# Patient Record
Sex: FEMALE | Race: WHITE | NOT HISPANIC OR LATINO | Employment: OTHER | URBAN - METROPOLITAN AREA
[De-identification: names, ages, dates, MRNs, and addresses within clinical notes are randomized per-mention and may not be internally consistent; named-entity substitution may affect disease eponyms.]

---

## 2016-04-20 LAB — EKG IMPRESSION: NORMAL

## 2017-02-07 ENCOUNTER — TELEPHONE (OUTPATIENT)
Dept: VASCULAR LAB | Facility: MEDICAL CENTER | Age: 62
End: 2017-02-07

## 2017-02-08 NOTE — TELEPHONE ENCOUNTER
Patient overdue for vascular care.  Medical assistant Unable to reach patient by phone  Await further patient contact  Until that time, we will defer all further fascial care, including management of cardiovascular risk factors to PCP    Michael J. Bloch, MD  Vascular Care    CC: Dr. Winchester

## 2017-11-15 ENCOUNTER — HOSPITAL ENCOUNTER (OUTPATIENT)
Dept: HOSPITAL 8 - CFH | Age: 62
Discharge: HOME | End: 2017-11-15
Attending: REGISTERED NURSE
Payer: MEDICARE

## 2017-11-15 DIAGNOSIS — Z87.891: ICD-10-CM

## 2017-11-15 DIAGNOSIS — J96.11: Primary | ICD-10-CM

## 2017-11-15 DIAGNOSIS — R91.1: ICD-10-CM

## 2017-11-15 DIAGNOSIS — K76.0: ICD-10-CM

## 2017-11-15 PROCEDURE — 71250 CT THORAX DX C-: CPT

## 2018-01-05 ENCOUNTER — HOSPITAL ENCOUNTER (OUTPATIENT)
Dept: RADIOLOGY | Facility: MEDICAL CENTER | Age: 63
DRG: 460 | End: 2018-01-05
Attending: NEUROLOGICAL SURGERY | Admitting: NEUROLOGICAL SURGERY
Payer: MEDICARE

## 2018-01-05 DIAGNOSIS — Z01.810 PRE-OPERATIVE CARDIOVASCULAR EXAMINATION: ICD-10-CM

## 2018-01-05 DIAGNOSIS — Z01.812 PRE-PROCEDURAL LABORATORY EXAMINATION: ICD-10-CM

## 2018-01-05 DIAGNOSIS — Z01.811 PRE-OPERATIVE RESPIRATORY EXAMINATION: ICD-10-CM

## 2018-01-05 LAB
ANION GAP SERPL CALC-SCNC: 10 MMOL/L (ref 0–11.9)
APPEARANCE UR: CLEAR
APTT PPP: 30.9 SEC (ref 24.7–36)
BASOPHILS # BLD AUTO: 0.8 % (ref 0–1.8)
BASOPHILS # BLD: 0.08 K/UL (ref 0–0.12)
BILIRUB UR QL STRIP.AUTO: NEGATIVE
BUN SERPL-MCNC: 14 MG/DL (ref 8–22)
CALCIUM SERPL-MCNC: 9.8 MG/DL (ref 8.5–10.5)
CHLORIDE SERPL-SCNC: 98 MMOL/L (ref 96–112)
CO2 SERPL-SCNC: 32 MMOL/L (ref 20–33)
COLOR UR: YELLOW
CREAT SERPL-MCNC: 0.95 MG/DL (ref 0.5–1.4)
CULTURE IF INDICATED INDCX: NO UA CULTURE
EKG IMPRESSION: NORMAL
EOSINOPHIL # BLD AUTO: 0.19 K/UL (ref 0–0.51)
EOSINOPHIL NFR BLD: 1.9 % (ref 0–6.9)
ERYTHROCYTE [DISTWIDTH] IN BLOOD BY AUTOMATED COUNT: 48.1 FL (ref 35.9–50)
EST. AVERAGE GLUCOSE BLD GHB EST-MCNC: 174 MG/DL
GFR SERPL CREATININE-BSD FRML MDRD: 59 ML/MIN/1.73 M 2
GLUCOSE SERPL-MCNC: 177 MG/DL (ref 65–99)
GLUCOSE UR STRIP.AUTO-MCNC: >=1000 MG/DL
HBA1C MFR BLD: 7.7 % (ref 0–5.6)
HCT VFR BLD AUTO: 43.6 % (ref 37–47)
HGB BLD-MCNC: 13.6 G/DL (ref 12–16)
IMM GRANULOCYTES # BLD AUTO: 0.02 K/UL (ref 0–0.11)
IMM GRANULOCYTES NFR BLD AUTO: 0.2 % (ref 0–0.9)
INR PPP: 0.95 (ref 0.87–1.13)
KETONES UR STRIP.AUTO-MCNC: NEGATIVE MG/DL
LEUKOCYTE ESTERASE UR QL STRIP.AUTO: NEGATIVE
LYMPHOCYTES # BLD AUTO: 3 K/UL (ref 1–4.8)
LYMPHOCYTES NFR BLD: 30.3 % (ref 22–41)
MCH RBC QN AUTO: 27.5 PG (ref 27–33)
MCHC RBC AUTO-ENTMCNC: 31.2 G/DL (ref 33.6–35)
MCV RBC AUTO: 88.3 FL (ref 81.4–97.8)
MICRO URNS: ABNORMAL
MONOCYTES # BLD AUTO: 0.64 K/UL (ref 0–0.85)
MONOCYTES NFR BLD AUTO: 6.5 % (ref 0–13.4)
NEUTROPHILS # BLD AUTO: 5.97 K/UL (ref 2–7.15)
NEUTROPHILS NFR BLD: 60.3 % (ref 44–72)
NITRITE UR QL STRIP.AUTO: NEGATIVE
NRBC # BLD AUTO: 0 K/UL
NRBC BLD-RTO: 0 /100 WBC
PH UR STRIP.AUTO: 5.5 [PH]
PLATELET # BLD AUTO: 384 K/UL (ref 164–446)
PMV BLD AUTO: 10.8 FL (ref 9–12.9)
POTASSIUM SERPL-SCNC: 3.2 MMOL/L (ref 3.6–5.5)
PROT UR QL STRIP: NEGATIVE MG/DL
PROTHROMBIN TIME: 12.4 SEC (ref 12–14.6)
RBC # BLD AUTO: 4.94 M/UL (ref 4.2–5.4)
RBC UR QL AUTO: NEGATIVE
SODIUM SERPL-SCNC: 140 MMOL/L (ref 135–145)
SP GR UR STRIP.AUTO: 1.01
UROBILINOGEN UR STRIP.AUTO-MCNC: 0.2 MG/DL
WBC # BLD AUTO: 9.9 K/UL (ref 4.8–10.8)

## 2018-01-05 PROCEDURE — 93010 ELECTROCARDIOGRAM REPORT: CPT | Performed by: INTERNAL MEDICINE

## 2018-01-05 PROCEDURE — 36415 COLL VENOUS BLD VENIPUNCTURE: CPT

## 2018-01-05 PROCEDURE — 81003 URINALYSIS AUTO W/O SCOPE: CPT

## 2018-01-05 PROCEDURE — 85730 THROMBOPLASTIN TIME PARTIAL: CPT

## 2018-01-05 PROCEDURE — 83036 HEMOGLOBIN GLYCOSYLATED A1C: CPT

## 2018-01-05 PROCEDURE — 93005 ELECTROCARDIOGRAM TRACING: CPT

## 2018-01-05 PROCEDURE — 85025 COMPLETE CBC W/AUTO DIFF WBC: CPT

## 2018-01-05 PROCEDURE — 85610 PROTHROMBIN TIME: CPT

## 2018-01-05 PROCEDURE — 80048 BASIC METABOLIC PNL TOTAL CA: CPT

## 2018-01-05 PROCEDURE — 71046 X-RAY EXAM CHEST 2 VIEWS: CPT

## 2018-01-05 RX ORDER — TEMAZEPAM 30 MG/1
30 CAPSULE ORAL
Status: ON HOLD | COMMUNITY
End: 2019-10-01 | Stop reason: SDUPTHER

## 2018-01-05 RX ORDER — ATORVASTATIN CALCIUM 20 MG/1
20 TABLET, FILM COATED ORAL
COMMUNITY
End: 2019-06-11

## 2018-01-05 RX ORDER — HYDROCODONE BITARTRATE AND ACETAMINOPHEN 10; 325 MG/1; MG/1
1 TABLET ORAL EVERY 4 HOURS PRN
Status: ON HOLD | COMMUNITY
End: 2018-01-14

## 2018-01-11 ENCOUNTER — APPOINTMENT (OUTPATIENT)
Dept: RADIOLOGY | Facility: MEDICAL CENTER | Age: 63
DRG: 460 | End: 2018-01-11
Attending: NEUROLOGICAL SURGERY
Payer: MEDICARE

## 2018-01-11 ENCOUNTER — HOSPITAL ENCOUNTER (INPATIENT)
Facility: MEDICAL CENTER | Age: 63
LOS: 3 days | DRG: 460 | End: 2018-01-14
Attending: NEUROLOGICAL SURGERY | Admitting: NEUROLOGICAL SURGERY
Payer: MEDICARE

## 2018-01-11 DIAGNOSIS — M48.062 LUMBAR STENOSIS WITH NEUROGENIC CLAUDICATION: ICD-10-CM

## 2018-01-11 LAB — GLUCOSE BLD-MCNC: 115 MG/DL (ref 65–99)

## 2018-01-11 PROCEDURE — 95940 IONM IN OPERATNG ROOM 15 MIN: CPT | Performed by: NEUROLOGICAL SURGERY

## 2018-01-11 PROCEDURE — 82962 GLUCOSE BLOOD TEST: CPT

## 2018-01-11 PROCEDURE — A9270 NON-COVERED ITEM OR SERVICE: HCPCS | Performed by: PHYSICIAN ASSISTANT

## 2018-01-11 PROCEDURE — 502000 HCHG MISC OR IMPLANTS RC 0278: Performed by: NEUROLOGICAL SURGERY

## 2018-01-11 PROCEDURE — 72100 X-RAY EXAM L-S SPINE 2/3 VWS: CPT

## 2018-01-11 PROCEDURE — 160009 HCHG ANES TIME/MIN: Performed by: NEUROLOGICAL SURGERY

## 2018-01-11 PROCEDURE — 700102 HCHG RX REV CODE 250 W/ 637 OVERRIDE(OP)

## 2018-01-11 PROCEDURE — 160035 HCHG PACU - 1ST 60 MINS PHASE I: Performed by: NEUROLOGICAL SURGERY

## 2018-01-11 PROCEDURE — 160002 HCHG RECOVERY MINUTES (STAT): Performed by: NEUROLOGICAL SURGERY

## 2018-01-11 PROCEDURE — 502240 HCHG MISC OR SUPPLY RC 0272: Performed by: NEUROLOGICAL SURGERY

## 2018-01-11 PROCEDURE — 160042 HCHG SURGERY MINUTES - EA ADDL 1 MIN LEVEL 5: Performed by: NEUROLOGICAL SURGERY

## 2018-01-11 PROCEDURE — A9270 NON-COVERED ITEM OR SERVICE: HCPCS

## 2018-01-11 PROCEDURE — 95937 NEUROMUSCULAR JUNCTION TEST: CPT | Performed by: NEUROLOGICAL SURGERY

## 2018-01-11 PROCEDURE — 700111 HCHG RX REV CODE 636 W/ 250 OVERRIDE (IP)

## 2018-01-11 PROCEDURE — 110454 HCHG SHELL REV 250: Performed by: NEUROLOGICAL SURGERY

## 2018-01-11 PROCEDURE — 110372 HCHG SHELL REV 278: Performed by: NEUROLOGICAL SURGERY

## 2018-01-11 PROCEDURE — 700112 HCHG RX REV CODE 229: Performed by: PHYSICIAN ASSISTANT

## 2018-01-11 PROCEDURE — 160048 HCHG OR STATISTICAL LEVEL 1-5: Performed by: NEUROLOGICAL SURGERY

## 2018-01-11 PROCEDURE — 501838 HCHG SUTURE GENERAL: Performed by: NEUROLOGICAL SURGERY

## 2018-01-11 PROCEDURE — 0SG00A0 FUSION OF LUMBAR VERTEBRAL JOINT WITH INTERBODY FUSION DEVICE, ANTERIOR APPROACH, ANTERIOR COLUMN, OPEN APPROACH: ICD-10-PCS | Performed by: NEUROLOGICAL SURGERY

## 2018-01-11 PROCEDURE — 500122 HCHG BOVIE, BLADE: Performed by: NEUROLOGICAL SURGERY

## 2018-01-11 PROCEDURE — 4A10X4G MONITORING OF CENTRAL NERVOUS ELECTRICAL ACTIVITY, INTRAOPERATIVE, EXTERNAL APPROACH: ICD-10-PCS | Performed by: NEUROLOGICAL SURGERY

## 2018-01-11 PROCEDURE — 770001 HCHG ROOM/CARE - MED/SURG/GYN PRIV*

## 2018-01-11 PROCEDURE — 700101 HCHG RX REV CODE 250

## 2018-01-11 PROCEDURE — 700111 HCHG RX REV CODE 636 W/ 250 OVERRIDE (IP): Performed by: PHYSICIAN ASSISTANT

## 2018-01-11 PROCEDURE — 95927 SOMATOSENSORY TESTING: CPT | Performed by: NEUROLOGICAL SURGERY

## 2018-01-11 PROCEDURE — 160036 HCHG PACU - EA ADDL 30 MINS PHASE I: Performed by: NEUROLOGICAL SURGERY

## 2018-01-11 PROCEDURE — A6402 STERILE GAUZE <= 16 SQ IN: HCPCS | Performed by: NEUROLOGICAL SURGERY

## 2018-01-11 PROCEDURE — 95861 NEEDLE EMG 2 EXTREMITIES: CPT | Performed by: NEUROLOGICAL SURGERY

## 2018-01-11 PROCEDURE — 700102 HCHG RX REV CODE 250 W/ 637 OVERRIDE(OP): Performed by: PHYSICIAN ASSISTANT

## 2018-01-11 PROCEDURE — 95938 SOMATOSENSORY TESTING: CPT | Performed by: NEUROLOGICAL SURGERY

## 2018-01-11 PROCEDURE — 160031 HCHG SURGERY MINUTES - 1ST 30 MINS LEVEL 5: Performed by: NEUROLOGICAL SURGERY

## 2018-01-11 PROCEDURE — C1713 ANCHOR/SCREW BN/BN,TIS/BN: HCPCS | Performed by: NEUROLOGICAL SURGERY

## 2018-01-11 DEVICE — BONE MATRIX OSTEOCEL PRO 7CC: Type: IMPLANTABLE DEVICE | Status: FUNCTIONAL

## 2018-01-11 DEVICE — IMPLANTABLE DEVICE: Type: IMPLANTABLE DEVICE | Status: FUNCTIONAL

## 2018-01-11 RX ORDER — OXYCODONE HCL 20 MG/1
TABLET, FILM COATED, EXTENDED RELEASE ORAL
Status: DISPENSED
Start: 2018-01-11 | End: 2018-01-12

## 2018-01-11 RX ORDER — POTASSIUM CHLORIDE 750 MG/1
20 TABLET, FILM COATED, EXTENDED RELEASE ORAL DAILY
Status: DISCONTINUED | OUTPATIENT
Start: 2018-01-11 | End: 2018-01-11

## 2018-01-11 RX ORDER — OXYCODONE HCL 10 MG/1
TABLET, FILM COATED, EXTENDED RELEASE ORAL
Status: COMPLETED
Start: 2018-01-11 | End: 2018-01-11

## 2018-01-11 RX ORDER — DOCUSATE SODIUM 100 MG/1
100 CAPSULE, LIQUID FILLED ORAL
Status: DISCONTINUED | OUTPATIENT
Start: 2018-01-11 | End: 2018-01-14 | Stop reason: HOSPADM

## 2018-01-11 RX ORDER — BUPIVACAINE HYDROCHLORIDE AND EPINEPHRINE 5; 5 MG/ML; UG/ML
INJECTION, SOLUTION EPIDURAL; INTRACAUDAL; PERINEURAL
Status: DISCONTINUED | OUTPATIENT
Start: 2018-01-11 | End: 2018-01-11 | Stop reason: HOSPADM

## 2018-01-11 RX ORDER — DIPHENHYDRAMINE HYDROCHLORIDE 50 MG/ML
25 INJECTION INTRAMUSCULAR; INTRAVENOUS EVERY 6 HOURS PRN
Status: DISCONTINUED | OUTPATIENT
Start: 2018-01-11 | End: 2018-01-14 | Stop reason: HOSPADM

## 2018-01-11 RX ORDER — ATORVASTATIN CALCIUM 20 MG/1
20 TABLET, FILM COATED ORAL
Status: DISCONTINUED | OUTPATIENT
Start: 2018-01-11 | End: 2018-01-14 | Stop reason: HOSPADM

## 2018-01-11 RX ORDER — CEFAZOLIN SODIUM 1 G/3ML
1 INJECTION, POWDER, FOR SOLUTION INTRAMUSCULAR; INTRAVENOUS EVERY 8 HOURS
Status: COMPLETED | OUTPATIENT
Start: 2018-01-11 | End: 2018-01-12

## 2018-01-11 RX ORDER — GABAPENTIN 300 MG/1
CAPSULE ORAL
Status: COMPLETED
Start: 2018-01-11 | End: 2018-01-11

## 2018-01-11 RX ORDER — LIDOCAINE HYDROCHLORIDE 10 MG/ML
0.5 INJECTION, SOLUTION INFILTRATION; PERINEURAL
Status: ACTIVE | OUTPATIENT
Start: 2018-01-11 | End: 2018-01-12

## 2018-01-11 RX ORDER — POTASSIUM CHLORIDE 20 MEQ/1
20 TABLET, EXTENDED RELEASE ORAL
Status: DISCONTINUED | OUTPATIENT
Start: 2018-01-11 | End: 2018-01-14 | Stop reason: HOSPADM

## 2018-01-11 RX ORDER — ACETAMINOPHEN 500 MG
TABLET ORAL
Status: COMPLETED
Start: 2018-01-11 | End: 2018-01-11

## 2018-01-11 RX ORDER — DIPHENHYDRAMINE HCL 25 MG
25 TABLET ORAL EVERY 6 HOURS PRN
Status: DISCONTINUED | OUTPATIENT
Start: 2018-01-11 | End: 2018-01-14 | Stop reason: HOSPADM

## 2018-01-11 RX ORDER — CLONIDINE HYDROCHLORIDE 0.1 MG/1
0.1 TABLET ORAL EVERY 4 HOURS PRN
Status: DISCONTINUED | OUTPATIENT
Start: 2018-01-11 | End: 2018-01-14 | Stop reason: HOSPADM

## 2018-01-11 RX ORDER — LABETALOL HYDROCHLORIDE 5 MG/ML
10 INJECTION, SOLUTION INTRAVENOUS
Status: DISCONTINUED | OUTPATIENT
Start: 2018-01-11 | End: 2018-01-14 | Stop reason: HOSPADM

## 2018-01-11 RX ORDER — ENEMA 19; 7 G/133ML; G/133ML
1 ENEMA RECTAL
Status: DISCONTINUED | OUTPATIENT
Start: 2018-01-11 | End: 2018-01-14 | Stop reason: HOSPADM

## 2018-01-11 RX ORDER — TIZANIDINE 4 MG/1
4 TABLET ORAL 2 TIMES DAILY PRN
Status: DISCONTINUED | OUTPATIENT
Start: 2018-01-11 | End: 2018-01-12

## 2018-01-11 RX ORDER — LIDOCAINE HYDROCHLORIDE 10 MG/ML
INJECTION, SOLUTION INFILTRATION; PERINEURAL
Status: COMPLETED
Start: 2018-01-11 | End: 2018-01-11

## 2018-01-11 RX ORDER — BISACODYL 10 MG
10 SUPPOSITORY, RECTAL RECTAL
Status: DISCONTINUED | OUTPATIENT
Start: 2018-01-11 | End: 2018-01-14 | Stop reason: HOSPADM

## 2018-01-11 RX ORDER — HYDROMORPHONE HYDROCHLORIDE 2 MG/ML
INJECTION, SOLUTION INTRAMUSCULAR; INTRAVENOUS; SUBCUTANEOUS
Status: COMPLETED
Start: 2018-01-11 | End: 2018-01-11

## 2018-01-11 RX ORDER — FUROSEMIDE 40 MG/1
40 TABLET ORAL EVERY EVENING
Status: ON HOLD | COMMUNITY
End: 2019-09-18

## 2018-01-11 RX ORDER — HYDROMORPHONE HYDROCHLORIDE 2 MG/ML
0.5 INJECTION, SOLUTION INTRAMUSCULAR; INTRAVENOUS; SUBCUTANEOUS
Status: DISCONTINUED | OUTPATIENT
Start: 2018-01-11 | End: 2018-01-14 | Stop reason: HOSPADM

## 2018-01-11 RX ORDER — TEMAZEPAM 15 MG/1
30 CAPSULE ORAL
Status: DISCONTINUED | OUTPATIENT
Start: 2018-01-11 | End: 2018-01-14 | Stop reason: HOSPADM

## 2018-01-11 RX ORDER — LEVOTHYROXINE SODIUM 0.07 MG/1
75 TABLET ORAL EVERY EVENING
Status: DISCONTINUED | OUTPATIENT
Start: 2018-01-11 | End: 2018-01-12

## 2018-01-11 RX ORDER — GLIPIZIDE 5 MG/1
5 TABLET ORAL EVERY EVENING
Status: DISCONTINUED | OUTPATIENT
Start: 2018-01-11 | End: 2018-01-14 | Stop reason: HOSPADM

## 2018-01-11 RX ORDER — DEXTROSE MONOHYDRATE 25 G/50ML
25 INJECTION, SOLUTION INTRAVENOUS
Status: DISCONTINUED | OUTPATIENT
Start: 2018-01-11 | End: 2018-01-14 | Stop reason: HOSPADM

## 2018-01-11 RX ORDER — TIZANIDINE 4 MG/1
4 TABLET ORAL 2 TIMES DAILY PRN
Status: ON HOLD | COMMUNITY
End: 2018-01-14

## 2018-01-11 RX ORDER — PROMETHAZINE HYDROCHLORIDE 25 MG/1
12.5-25 TABLET ORAL EVERY 4 HOURS PRN
Status: DISCONTINUED | OUTPATIENT
Start: 2018-01-11 | End: 2018-01-14 | Stop reason: HOSPADM

## 2018-01-11 RX ORDER — OXYCODONE HYDROCHLORIDE 5 MG/1
5 TABLET ORAL
Status: DISCONTINUED | OUTPATIENT
Start: 2018-01-11 | End: 2018-01-14 | Stop reason: HOSPADM

## 2018-01-11 RX ORDER — POLYETHYLENE GLYCOL 3350 17 G/17G
1 POWDER, FOR SOLUTION ORAL 2 TIMES DAILY PRN
Status: DISCONTINUED | OUTPATIENT
Start: 2018-01-11 | End: 2018-01-14 | Stop reason: HOSPADM

## 2018-01-11 RX ORDER — AMOXICILLIN 250 MG
1 CAPSULE ORAL NIGHTLY
Status: DISCONTINUED | OUTPATIENT
Start: 2018-01-11 | End: 2018-01-14 | Stop reason: HOSPADM

## 2018-01-11 RX ORDER — CALCIUM CARBONATE 500 MG/1
500 TABLET, CHEWABLE ORAL 2 TIMES DAILY
Status: DISCONTINUED | OUTPATIENT
Start: 2018-01-11 | End: 2018-01-14 | Stop reason: HOSPADM

## 2018-01-11 RX ORDER — FUROSEMIDE 40 MG/1
40 TABLET ORAL EVERY EVENING
Status: DISCONTINUED | OUTPATIENT
Start: 2018-01-11 | End: 2018-01-14 | Stop reason: HOSPADM

## 2018-01-11 RX ORDER — DOCUSATE SODIUM 100 MG/1
100 CAPSULE, LIQUID FILLED ORAL
COMMUNITY
End: 2018-07-18

## 2018-01-11 RX ORDER — VENLAFAXINE HYDROCHLORIDE 75 MG/1
75 CAPSULE, EXTENDED RELEASE ORAL EVERY EVENING
Status: DISCONTINUED | OUTPATIENT
Start: 2018-01-11 | End: 2018-01-14 | Stop reason: HOSPADM

## 2018-01-11 RX ORDER — LIDOCAINE AND PRILOCAINE 25; 25 MG/G; MG/G
1 CREAM TOPICAL
Status: ACTIVE | OUTPATIENT
Start: 2018-01-11 | End: 2018-01-12

## 2018-01-11 RX ORDER — PROMETHAZINE HYDROCHLORIDE 25 MG/1
12.5-25 SUPPOSITORY RECTAL EVERY 4 HOURS PRN
Status: DISCONTINUED | OUTPATIENT
Start: 2018-01-11 | End: 2018-01-14 | Stop reason: HOSPADM

## 2018-01-11 RX ORDER — VENLAFAXINE HYDROCHLORIDE 75 MG/1
150 CAPSULE, EXTENDED RELEASE ORAL EVERY EVENING
Status: ON HOLD | COMMUNITY
End: 2019-06-21

## 2018-01-11 RX ORDER — GABAPENTIN 400 MG/1
400 CAPSULE ORAL 2 TIMES DAILY
Status: DISCONTINUED | OUTPATIENT
Start: 2018-01-11 | End: 2018-01-14 | Stop reason: HOSPADM

## 2018-01-11 RX ORDER — SODIUM CHLORIDE 9 MG/ML
INJECTION, SOLUTION INTRAVENOUS CONTINUOUS
Status: DISCONTINUED | OUTPATIENT
Start: 2018-01-11 | End: 2018-01-14 | Stop reason: HOSPADM

## 2018-01-11 RX ORDER — BACITRACIN 50000 [IU]/1
INJECTION, POWDER, FOR SOLUTION INTRAMUSCULAR
Status: DISCONTINUED | OUTPATIENT
Start: 2018-01-11 | End: 2018-01-11 | Stop reason: HOSPADM

## 2018-01-11 RX ORDER — ONDANSETRON 2 MG/ML
4 INJECTION INTRAMUSCULAR; INTRAVENOUS EVERY 4 HOURS PRN
Status: DISCONTINUED | OUTPATIENT
Start: 2018-01-11 | End: 2018-01-14 | Stop reason: HOSPADM

## 2018-01-11 RX ORDER — OXYCODONE HCL 5 MG/5 ML
SOLUTION, ORAL ORAL
Status: COMPLETED
Start: 2018-01-11 | End: 2018-01-11

## 2018-01-11 RX ORDER — ONDANSETRON 4 MG/1
4 TABLET, ORALLY DISINTEGRATING ORAL EVERY 4 HOURS PRN
Status: DISCONTINUED | OUTPATIENT
Start: 2018-01-11 | End: 2018-01-14 | Stop reason: HOSPADM

## 2018-01-11 RX ORDER — OXYCODONE HYDROCHLORIDE 10 MG/1
10 TABLET ORAL
Status: DISCONTINUED | OUTPATIENT
Start: 2018-01-11 | End: 2018-01-14 | Stop reason: HOSPADM

## 2018-01-11 RX ORDER — MIDAZOLAM HYDROCHLORIDE 1 MG/ML
INJECTION INTRAMUSCULAR; INTRAVENOUS
Status: COMPLETED
Start: 2018-01-11 | End: 2018-01-11

## 2018-01-11 RX ORDER — ROPINIROLE 2 MG/1
4 TABLET, FILM COATED ORAL 2 TIMES DAILY
Status: DISCONTINUED | OUTPATIENT
Start: 2018-01-11 | End: 2018-01-14 | Stop reason: HOSPADM

## 2018-01-11 RX ADMIN — HYDROMORPHONE HYDROCHLORIDE 0.5 MG: 2 INJECTION INTRAMUSCULAR; INTRAVENOUS; SUBCUTANEOUS at 15:55

## 2018-01-11 RX ADMIN — POTASSIUM CHLORIDE 20 MEQ: 1500 TABLET, EXTENDED RELEASE ORAL at 22:09

## 2018-01-11 RX ADMIN — FENTANYL CITRATE 50 MCG: 50 INJECTION, SOLUTION INTRAMUSCULAR; INTRAVENOUS at 15:30

## 2018-01-11 RX ADMIN — SODIUM CHLORIDE: 9 INJECTION, SOLUTION INTRAVENOUS at 11:15

## 2018-01-11 RX ADMIN — OXYCODONE HYDROCHLORIDE 10 MG: 10 TABLET ORAL at 18:12

## 2018-01-11 RX ADMIN — TIZANIDINE HYDROCHLORIDE 4 MG: 4 TABLET ORAL at 18:12

## 2018-01-11 RX ADMIN — CEFAZOLIN 1 G: 330 INJECTION, POWDER, FOR SOLUTION INTRAMUSCULAR; INTRAVENOUS at 22:09

## 2018-01-11 RX ADMIN — STANDARDIZED SENNA CONCENTRATE AND DOCUSATE SODIUM 1 TABLET: 8.6; 5 TABLET, FILM COATED ORAL at 19:57

## 2018-01-11 RX ADMIN — GLIPIZIDE 5 MG: 5 TABLET ORAL at 19:57

## 2018-01-11 RX ADMIN — ANTACID TABLETS 500 MG: 500 TABLET, CHEWABLE ORAL at 19:56

## 2018-01-11 RX ADMIN — HYDROMORPHONE HYDROCHLORIDE 0.5 MG: 2 INJECTION INTRAMUSCULAR; INTRAVENOUS; SUBCUTANEOUS at 15:48

## 2018-01-11 RX ADMIN — VENLAFAXINE HYDROCHLORIDE 75 MG: 75 CAPSULE, EXTENDED RELEASE ORAL at 19:56

## 2018-01-11 RX ADMIN — DOCUSATE SODIUM 100 MG: 100 CAPSULE ORAL at 19:56

## 2018-01-11 RX ADMIN — FUROSEMIDE 40 MG: 40 TABLET ORAL at 19:57

## 2018-01-11 RX ADMIN — LIDOCAINE HYDROCHLORIDE 0.5 ML: 10 INJECTION, SOLUTION INFILTRATION; PERINEURAL at 11:15

## 2018-01-11 RX ADMIN — TEMAZEPAM 30 MG: 15 CAPSULE ORAL at 22:23

## 2018-01-11 RX ADMIN — OXYCODONE HYDROCHLORIDE 20 MG: 10 TABLET, FILM COATED, EXTENDED RELEASE ORAL at 13:15

## 2018-01-11 RX ADMIN — ROPINIROLE HYDROCHLORIDE 4 MG: 2 TABLET, FILM COATED ORAL at 20:11

## 2018-01-11 RX ADMIN — ACETAMINOPHEN 1000 MG: 500 TABLET, FILM COATED ORAL at 13:15

## 2018-01-11 RX ADMIN — OXYCODONE HYDROCHLORIDE 10 MG: 5 SOLUTION ORAL at 16:30

## 2018-01-11 RX ADMIN — MIDAZOLAM 1 MG: 1 INJECTION INTRAMUSCULAR; INTRAVENOUS at 16:15

## 2018-01-11 RX ADMIN — LEVOTHYROXINE SODIUM 75 MCG: 75 TABLET ORAL at 19:57

## 2018-01-11 RX ADMIN — GABAPENTIN 300 MG: 300 CAPSULE ORAL at 13:15

## 2018-01-11 RX ADMIN — FENTANYL CITRATE 50 MCG: 50 INJECTION, SOLUTION INTRAMUSCULAR; INTRAVENOUS at 15:35

## 2018-01-11 RX ADMIN — ATORVASTATIN CALCIUM 20 MG: 20 TABLET, FILM COATED ORAL at 19:56

## 2018-01-11 RX ADMIN — HYDROMORPHONE HYDROCHLORIDE 0.5 MG: 2 INJECTION INTRAMUSCULAR; INTRAVENOUS; SUBCUTANEOUS at 15:39

## 2018-01-11 RX ADMIN — GABAPENTIN 400 MG: 400 CAPSULE ORAL at 19:57

## 2018-01-11 ASSESSMENT — PAIN SCALES - GENERAL
PAINLEVEL_OUTOF10: 10
PAINLEVEL_OUTOF10: 10
PAINLEVEL_OUTOF10: 7
PAINLEVEL_OUTOF10: 9
PAINLEVEL_OUTOF10: 9
PAINLEVEL_OUTOF10: 8
PAINLEVEL_OUTOF10: 7
PAINLEVEL_OUTOF10: 5
PAINLEVEL_OUTOF10: 6
PAINLEVEL_OUTOF10: 7
PAINLEVEL_OUTOF10: 6
PAINLEVEL_OUTOF10: 8
PAINLEVEL_OUTOF10: 7
PAINLEVEL_OUTOF10: 5

## 2018-01-11 NOTE — OR SURGEON
Immediate Post OP Note    PreOp Diagnosis: Lumbar stenosis with Claudicataion with instability at L3-4    PostOp Diagnosis: Same    Procedure(s):  EXTREME LATERAL INTERBODY FUSION-STAGE #1 L3-4 XLIF - Wound Class: Clean    Surgeon(s):  Remi Lechuga III, M.D.    Anesthesiologist/Type of Anesthesia:  Anesthesiologist: Min Arthur M.D./General    Surgical Staff:  Assistant: Reymundo Farfan P.A.-C.  Circulator: Amy Lamb RSAEID; Susi Jackson RSAEID  Relief Circulator: Katlyn Oh R.N.  Scrub Person: Alison Suárez  Radiology Technologist: Jason Hernandez    Specimens:  * No specimens in log *    Estimated Blood Loss: 25cc    Findings: See Op Note    Complications: None        1/11/2018 3:12 PM Reymundo Farfan

## 2018-01-12 ENCOUNTER — APPOINTMENT (OUTPATIENT)
Dept: RADIOLOGY | Facility: MEDICAL CENTER | Age: 63
DRG: 460 | End: 2018-01-12
Attending: NEUROLOGICAL SURGERY
Payer: MEDICARE

## 2018-01-12 LAB
ANION GAP SERPL CALC-SCNC: 9 MMOL/L (ref 0–11.9)
BASOPHILS # BLD AUTO: 0.2 % (ref 0–1.8)
BASOPHILS # BLD: 0.02 K/UL (ref 0–0.12)
BUN SERPL-MCNC: 17 MG/DL (ref 8–22)
CALCIUM SERPL-MCNC: 9.2 MG/DL (ref 8.5–10.5)
CHLORIDE SERPL-SCNC: 101 MMOL/L (ref 96–112)
CO2 SERPL-SCNC: 31 MMOL/L (ref 20–33)
CREAT SERPL-MCNC: 0.93 MG/DL (ref 0.5–1.4)
EOSINOPHIL # BLD AUTO: 0 K/UL (ref 0–0.51)
EOSINOPHIL NFR BLD: 0 % (ref 0–6.9)
ERYTHROCYTE [DISTWIDTH] IN BLOOD BY AUTOMATED COUNT: 47.9 FL (ref 35.9–50)
GLUCOSE SERPL-MCNC: 122 MG/DL (ref 65–99)
HCT VFR BLD AUTO: 36.9 % (ref 37–47)
HGB BLD-MCNC: 11.7 G/DL (ref 12–16)
IMM GRANULOCYTES # BLD AUTO: 0.06 K/UL (ref 0–0.11)
IMM GRANULOCYTES NFR BLD AUTO: 0.5 % (ref 0–0.9)
INR PPP: 1.06 (ref 0.87–1.13)
LYMPHOCYTES # BLD AUTO: 1.32 K/UL (ref 1–4.8)
LYMPHOCYTES NFR BLD: 11.8 % (ref 22–41)
MCH RBC QN AUTO: 28.2 PG (ref 27–33)
MCHC RBC AUTO-ENTMCNC: 31.7 G/DL (ref 33.6–35)
MCV RBC AUTO: 88.9 FL (ref 81.4–97.8)
MONOCYTES # BLD AUTO: 0.57 K/UL (ref 0–0.85)
MONOCYTES NFR BLD AUTO: 5.1 % (ref 0–13.4)
NEUTROPHILS # BLD AUTO: 9.17 K/UL (ref 2–7.15)
NEUTROPHILS NFR BLD: 82.4 % (ref 44–72)
NRBC # BLD AUTO: 0 K/UL
NRBC BLD-RTO: 0 /100 WBC
PLATELET # BLD AUTO: 308 K/UL (ref 164–446)
PMV BLD AUTO: 10.8 FL (ref 9–12.9)
POTASSIUM SERPL-SCNC: 3.7 MMOL/L (ref 3.6–5.5)
PROTHROMBIN TIME: 13.5 SEC (ref 12–14.6)
RBC # BLD AUTO: 4.15 M/UL (ref 4.2–5.4)
SODIUM SERPL-SCNC: 141 MMOL/L (ref 135–145)
WBC # BLD AUTO: 11.1 K/UL (ref 4.8–10.8)

## 2018-01-12 PROCEDURE — 00NY0ZZ RELEASE LUMBAR SPINAL CORD, OPEN APPROACH: ICD-10-PCS | Performed by: NEUROLOGICAL SURGERY

## 2018-01-12 PROCEDURE — 36415 COLL VENOUS BLD VENIPUNCTURE: CPT

## 2018-01-12 PROCEDURE — 160002 HCHG RECOVERY MINUTES (STAT): Performed by: NEUROLOGICAL SURGERY

## 2018-01-12 PROCEDURE — 95927 SOMATOSENSORY TESTING: CPT | Performed by: NEUROLOGICAL SURGERY

## 2018-01-12 PROCEDURE — 80048 BASIC METABOLIC PNL TOTAL CA: CPT

## 2018-01-12 PROCEDURE — 500373 HCHG DRAIN, J-P FLAT 10MM 7044: Performed by: NEUROLOGICAL SURGERY

## 2018-01-12 PROCEDURE — 700105 HCHG RX REV CODE 258: Performed by: PHYSICIAN ASSISTANT

## 2018-01-12 PROCEDURE — 95940 IONM IN OPERATNG ROOM 15 MIN: CPT | Performed by: NEUROLOGICAL SURGERY

## 2018-01-12 PROCEDURE — 770001 HCHG ROOM/CARE - MED/SURG/GYN PRIV*

## 2018-01-12 PROCEDURE — 700111 HCHG RX REV CODE 636 W/ 250 OVERRIDE (IP): Performed by: PHYSICIAN ASSISTANT

## 2018-01-12 PROCEDURE — 90686 IIV4 VACC NO PRSV 0.5 ML IM: CPT | Performed by: NEUROLOGICAL SURGERY

## 2018-01-12 PROCEDURE — 95861 NEEDLE EMG 2 EXTREMITIES: CPT | Performed by: NEUROLOGICAL SURGERY

## 2018-01-12 PROCEDURE — 90471 IMMUNIZATION ADMIN: CPT

## 2018-01-12 PROCEDURE — 160048 HCHG OR STATISTICAL LEVEL 1-5: Performed by: NEUROLOGICAL SURGERY

## 2018-01-12 PROCEDURE — 160035 HCHG PACU - 1ST 60 MINS PHASE I: Performed by: NEUROLOGICAL SURGERY

## 2018-01-12 PROCEDURE — A9270 NON-COVERED ITEM OR SERVICE: HCPCS | Performed by: PHYSICIAN ASSISTANT

## 2018-01-12 PROCEDURE — A4314 CATH W/DRAINAGE 2-WAY LATEX: HCPCS | Performed by: NEUROLOGICAL SURGERY

## 2018-01-12 PROCEDURE — 700102 HCHG RX REV CODE 250 W/ 637 OVERRIDE(OP)

## 2018-01-12 PROCEDURE — 160041 HCHG SURGERY MINUTES - EA ADDL 1 MIN LEVEL 4: Performed by: NEUROLOGICAL SURGERY

## 2018-01-12 PROCEDURE — 700111 HCHG RX REV CODE 636 W/ 250 OVERRIDE (IP)

## 2018-01-12 PROCEDURE — 501838 HCHG SUTURE GENERAL: Performed by: NEUROLOGICAL SURGERY

## 2018-01-12 PROCEDURE — 160036 HCHG PACU - EA ADDL 30 MINS PHASE I: Performed by: NEUROLOGICAL SURGERY

## 2018-01-12 PROCEDURE — 160009 HCHG ANES TIME/MIN: Performed by: NEUROLOGICAL SURGERY

## 2018-01-12 PROCEDURE — 500122 HCHG BOVIE, BLADE: Performed by: NEUROLOGICAL SURGERY

## 2018-01-12 PROCEDURE — 700102 HCHG RX REV CODE 250 W/ 637 OVERRIDE(OP): Performed by: PHYSICIAN ASSISTANT

## 2018-01-12 PROCEDURE — 110454 HCHG SHELL REV 250: Performed by: NEUROLOGICAL SURGERY

## 2018-01-12 PROCEDURE — 72020 X-RAY EXAM OF SPINE 1 VIEW: CPT

## 2018-01-12 PROCEDURE — 4A1034G MONITORING OF CENTRAL NERVOUS ELECTRICAL ACTIVITY, INTRAOPERATIVE, PERCUTANEOUS APPROACH: ICD-10-PCS | Performed by: NEUROLOGICAL SURGERY

## 2018-01-12 PROCEDURE — 500363 HCHG DISSECT TOOL, ANSPACH S: Performed by: NEUROLOGICAL SURGERY

## 2018-01-12 PROCEDURE — 700112 HCHG RX REV CODE 229: Performed by: PHYSICIAN ASSISTANT

## 2018-01-12 PROCEDURE — 85025 COMPLETE CBC W/AUTO DIFF WBC: CPT

## 2018-01-12 PROCEDURE — 95938 SOMATOSENSORY TESTING: CPT | Performed by: NEUROLOGICAL SURGERY

## 2018-01-12 PROCEDURE — 160029 HCHG SURGERY MINUTES - 1ST 30 MINS LEVEL 4: Performed by: NEUROLOGICAL SURGERY

## 2018-01-12 PROCEDURE — 85610 PROTHROMBIN TIME: CPT

## 2018-01-12 PROCEDURE — A9270 NON-COVERED ITEM OR SERVICE: HCPCS

## 2018-01-12 PROCEDURE — 01NB0ZZ RELEASE LUMBAR NERVE, OPEN APPROACH: ICD-10-PCS | Performed by: NEUROLOGICAL SURGERY

## 2018-01-12 PROCEDURE — 700111 HCHG RX REV CODE 636 W/ 250 OVERRIDE (IP): Performed by: NEUROLOGICAL SURGERY

## 2018-01-12 PROCEDURE — 700101 HCHG RX REV CODE 250

## 2018-01-12 PROCEDURE — 95937 NEUROMUSCULAR JUNCTION TEST: CPT | Performed by: NEUROLOGICAL SURGERY

## 2018-01-12 RX ORDER — BUPIVACAINE HYDROCHLORIDE 5 MG/ML
INJECTION, SOLUTION PERINEURAL
Status: DISCONTINUED | OUTPATIENT
Start: 2018-01-12 | End: 2018-01-12 | Stop reason: HOSPADM

## 2018-01-12 RX ORDER — OXYCODONE HYDROCHLORIDE 10 MG/1
10 TABLET ORAL
Status: DISCONTINUED | OUTPATIENT
Start: 2018-01-12 | End: 2018-01-12

## 2018-01-12 RX ORDER — MIDAZOLAM HYDROCHLORIDE 1 MG/ML
INJECTION INTRAMUSCULAR; INTRAVENOUS
Status: DISCONTINUED | OUTPATIENT
Start: 2018-01-12 | End: 2018-01-12 | Stop reason: HOSPADM

## 2018-01-12 RX ORDER — OXYCODONE HYDROCHLORIDE 5 MG/1
5 TABLET ORAL
Status: DISCONTINUED | OUTPATIENT
Start: 2018-01-12 | End: 2018-01-12

## 2018-01-12 RX ORDER — TIZANIDINE 4 MG/1
4 TABLET ORAL EVERY 6 HOURS PRN
Status: DISCONTINUED | OUTPATIENT
Start: 2018-01-13 | End: 2018-01-13

## 2018-01-12 RX ORDER — SODIUM CHLORIDE, SODIUM LACTATE, POTASSIUM CHLORIDE, CALCIUM CHLORIDE 600; 310; 30; 20 MG/100ML; MG/100ML; MG/100ML; MG/100ML
INJECTION, SOLUTION INTRAVENOUS CONTINUOUS
Status: DISCONTINUED | OUTPATIENT
Start: 2018-01-12 | End: 2018-01-14 | Stop reason: HOSPADM

## 2018-01-12 RX ORDER — BUTALBITAL, ACETAMINOPHEN AND CAFFEINE 50; 325; 40 MG/1; MG/1; MG/1
1 TABLET ORAL EVERY 6 HOURS PRN
Status: DISCONTINUED | OUTPATIENT
Start: 2018-01-12 | End: 2018-01-14 | Stop reason: HOSPADM

## 2018-01-12 RX ORDER — ALPRAZOLAM 0.25 MG/1
0.25 TABLET ORAL 2 TIMES DAILY PRN
Status: DISCONTINUED | OUTPATIENT
Start: 2018-01-12 | End: 2018-01-14 | Stop reason: HOSPADM

## 2018-01-12 RX ORDER — ACETAMINOPHEN 500 MG
TABLET ORAL
Status: COMPLETED
Start: 2018-01-12 | End: 2018-01-12

## 2018-01-12 RX ORDER — HYDROMORPHONE HYDROCHLORIDE 2 MG/ML
INJECTION, SOLUTION INTRAMUSCULAR; INTRAVENOUS; SUBCUTANEOUS
Status: COMPLETED
Start: 2018-01-12 | End: 2018-01-12

## 2018-01-12 RX ORDER — CELECOXIB 200 MG/1
CAPSULE ORAL
Status: COMPLETED
Start: 2018-01-12 | End: 2018-01-12

## 2018-01-12 RX ORDER — BACITRACIN 50000 [IU]/1
INJECTION, POWDER, FOR SOLUTION INTRAMUSCULAR
Status: DISCONTINUED | OUTPATIENT
Start: 2018-01-12 | End: 2018-01-12 | Stop reason: HOSPADM

## 2018-01-12 RX ORDER — BUPIVACAINE HYDROCHLORIDE AND EPINEPHRINE 5; 5 MG/ML; UG/ML
INJECTION, SOLUTION PERINEURAL
Status: DISCONTINUED | OUTPATIENT
Start: 2018-01-12 | End: 2018-01-12 | Stop reason: HOSPADM

## 2018-01-12 RX ORDER — HYDROMORPHONE HYDROCHLORIDE 2 MG/ML
0.5 INJECTION, SOLUTION INTRAMUSCULAR; INTRAVENOUS; SUBCUTANEOUS
Status: DISCONTINUED | OUTPATIENT
Start: 2018-01-12 | End: 2018-01-12

## 2018-01-12 RX ORDER — ENEMA 19; 7 G/133ML; G/133ML
1 ENEMA RECTAL
Status: DISCONTINUED | OUTPATIENT
Start: 2018-01-12 | End: 2018-01-12

## 2018-01-12 RX ORDER — OXYCODONE HCL 5 MG/5 ML
SOLUTION, ORAL ORAL
Status: COMPLETED
Start: 2018-01-12 | End: 2018-01-12

## 2018-01-12 RX ORDER — LEVOTHYROXINE SODIUM 0.07 MG/1
75 TABLET ORAL
Status: DISCONTINUED | OUTPATIENT
Start: 2018-01-13 | End: 2018-01-14 | Stop reason: HOSPADM

## 2018-01-12 RX ADMIN — OXYCODONE HYDROCHLORIDE 5 MG: 5 TABLET ORAL at 22:37

## 2018-01-12 RX ADMIN — HYDROMORPHONE HYDROCHLORIDE 0.5 MG: 2 INJECTION INTRAMUSCULAR; INTRAVENOUS; SUBCUTANEOUS at 08:49

## 2018-01-12 RX ADMIN — SODIUM CHLORIDE, POTASSIUM CHLORIDE, SODIUM LACTATE AND CALCIUM CHLORIDE: 600; 310; 30; 20 INJECTION, SOLUTION INTRAVENOUS at 09:35

## 2018-01-12 RX ADMIN — FENTANYL CITRATE 50 MCG: 50 INJECTION, SOLUTION INTRAMUSCULAR; INTRAVENOUS at 17:05

## 2018-01-12 RX ADMIN — GABAPENTIN 400 MG: 400 CAPSULE ORAL at 08:58

## 2018-01-12 RX ADMIN — SITAGLIPTIN 100 MG: 100 TABLET, FILM COATED ORAL at 08:57

## 2018-01-12 RX ADMIN — HYDROMORPHONE HYDROCHLORIDE 0.5 MG: 2 INJECTION INTRAMUSCULAR; INTRAVENOUS; SUBCUTANEOUS at 12:36

## 2018-01-12 RX ADMIN — ACETAMINOPHEN 1000 MG: 500 TABLET, FILM COATED ORAL at 14:30

## 2018-01-12 RX ADMIN — ROPINIROLE HYDROCHLORIDE 4 MG: 2 TABLET, FILM COATED ORAL at 08:57

## 2018-01-12 RX ADMIN — INFLUENZA A VIRUS A/MICHIGAN/45/2015 X-275 (H1N1) ANTIGEN (FORMALDEHYDE INACTIVATED), INFLUENZA A VIRUS A/HONG KONG/4801/2014 X-263B (H3N2) ANTIGEN (FORMALDEHYDE INACTIVATED), INFLUENZA B VIRUS B/PHUKET/3073/2013 ANTIGEN (FORMALDEHYDE INACTIVATED), AND INFLUENZA B VIRUS B/BRISBANE/60/2008 ANTIGEN (FORMALDEHYDE INACTIVATED) 0.5 ML: 15; 15; 15; 15 INJECTION, SUSPENSION INTRAMUSCULAR at 12:43

## 2018-01-12 RX ADMIN — DOCUSATE SODIUM 100 MG: 100 CAPSULE ORAL at 19:50

## 2018-01-12 RX ADMIN — VENLAFAXINE HYDROCHLORIDE 75 MG: 75 CAPSULE, EXTENDED RELEASE ORAL at 19:49

## 2018-01-12 RX ADMIN — OXYCODONE HYDROCHLORIDE 10 MG: 5 SOLUTION ORAL at 17:10

## 2018-01-12 RX ADMIN — HYDROMORPHONE HYDROCHLORIDE 0.5 MG: 2 INJECTION INTRAMUSCULAR; INTRAVENOUS; SUBCUTANEOUS at 17:50

## 2018-01-12 RX ADMIN — ATORVASTATIN CALCIUM 20 MG: 20 TABLET, FILM COATED ORAL at 19:50

## 2018-01-12 RX ADMIN — ANTACID TABLETS 500 MG: 500 TABLET, CHEWABLE ORAL at 19:50

## 2018-01-12 RX ADMIN — TEMAZEPAM 30 MG: 15 CAPSULE ORAL at 22:37

## 2018-01-12 RX ADMIN — GLIPIZIDE 5 MG: 5 TABLET ORAL at 19:50

## 2018-01-12 RX ADMIN — HYDROMORPHONE HYDROCHLORIDE 0.5 MG: 2 INJECTION INTRAMUSCULAR; INTRAVENOUS; SUBCUTANEOUS at 05:33

## 2018-01-12 RX ADMIN — ROPINIROLE HYDROCHLORIDE 4 MG: 2 TABLET, FILM COATED ORAL at 19:53

## 2018-01-12 RX ADMIN — POTASSIUM CHLORIDE 20 MEQ: 1500 TABLET, EXTENDED RELEASE ORAL at 19:50

## 2018-01-12 RX ADMIN — OXYCODONE HYDROCHLORIDE 10 MG: 10 TABLET ORAL at 09:42

## 2018-01-12 RX ADMIN — FUROSEMIDE 40 MG: 40 TABLET ORAL at 19:50

## 2018-01-12 RX ADMIN — GABAPENTIN 400 MG: 400 CAPSULE ORAL at 19:51

## 2018-01-12 RX ADMIN — FENTANYL CITRATE 50 MCG: 50 INJECTION, SOLUTION INTRAMUSCULAR; INTRAVENOUS at 17:15

## 2018-01-12 RX ADMIN — ANTACID TABLETS 500 MG: 500 TABLET, CHEWABLE ORAL at 08:57

## 2018-01-12 RX ADMIN — HYDROMORPHONE HYDROCHLORIDE 0.5 MG: 2 INJECTION INTRAMUSCULAR; INTRAVENOUS; SUBCUTANEOUS at 17:28

## 2018-01-12 RX ADMIN — CEFAZOLIN 1 G: 330 INJECTION, POWDER, FOR SOLUTION INTRAMUSCULAR; INTRAVENOUS at 05:36

## 2018-01-12 RX ADMIN — OXYCODONE HYDROCHLORIDE 10 MG: 10 TABLET ORAL at 19:50

## 2018-01-12 RX ADMIN — OXYCODONE HYDROCHLORIDE 10 MG: 10 TABLET ORAL at 00:27

## 2018-01-12 RX ADMIN — METHOCARBAMOL 1000 MG: 100 INJECTION INTRAMUSCULAR; INTRAVENOUS at 09:34

## 2018-01-12 RX ADMIN — STANDARDIZED SENNA CONCENTRATE AND DOCUSATE SODIUM 1 TABLET: 8.6; 5 TABLET, FILM COATED ORAL at 19:50

## 2018-01-12 RX ADMIN — OXYCODONE HYDROCHLORIDE 10 MG: 10 TABLET ORAL at 03:16

## 2018-01-12 RX ADMIN — HYDROMORPHONE HYDROCHLORIDE 0.5 MG: 2 INJECTION INTRAMUSCULAR; INTRAVENOUS; SUBCUTANEOUS at 17:57

## 2018-01-12 RX ADMIN — METHOCARBAMOL 1000 MG: 100 INJECTION INTRAMUSCULAR; INTRAVENOUS at 19:54

## 2018-01-12 ASSESSMENT — PATIENT HEALTH QUESTIONNAIRE - PHQ9
6. FEELING BAD ABOUT YOURSELF - OR THAT YOU ARE A FAILURE OR HAVE LET YOURSELF OR YOUR FAMILY DOWN: SEVERAL DAYS
SUM OF ALL RESPONSES TO PHQ9 QUESTIONS 1 AND 2: 1
4. FEELING TIRED OR HAVING LITTLE ENERGY: NEARLY EVERY DAY
5. POOR APPETITE OR OVEREATING: MORE THAN HALF THE DAYS
9. THOUGHTS THAT YOU WOULD BE BETTER OFF DEAD, OR OF HURTING YOURSELF: NOT AT ALL
8. MOVING OR SPEAKING SO SLOWLY THAT OTHER PEOPLE COULD HAVE NOTICED. OR THE OPPOSITE, BEING SO FIGETY OR RESTLESS THAT YOU HAVE BEEN MOVING AROUND A LOT MORE THAN USUAL: MORE THAN HALF THE DAYS
2. FEELING DOWN, DEPRESSED, IRRITABLE, OR HOPELESS: NOT AT ALL
7. TROUBLE CONCENTRATING ON THINGS, SUCH AS READING THE NEWSPAPER OR WATCHING TELEVISION: MORE THAN HALF THE DAYS
3. TROUBLE FALLING OR STAYING ASLEEP OR SLEEPING TOO MUCH: NEARLY EVERY DAY
1. LITTLE INTEREST OR PLEASURE IN DOING THINGS: SEVERAL DAYS
SUM OF ALL RESPONSES TO PHQ QUESTIONS 1-9: 14

## 2018-01-12 ASSESSMENT — PAIN SCALES - GENERAL
PAINLEVEL_OUTOF10: 2
PAINLEVEL_OUTOF10: ASSUMED PAIN PRESENT
PAINLEVEL_OUTOF10: 8
PAINLEVEL_OUTOF10: 10
PAINLEVEL_OUTOF10: 8
PAINLEVEL_OUTOF10: 7
PAINLEVEL_OUTOF10: 10
PAINLEVEL_OUTOF10: 10
PAINLEVEL_OUTOF10: 9
PAINLEVEL_OUTOF10: 10
PAINLEVEL_OUTOF10: 7
PAINLEVEL_OUTOF10: 6
PAINLEVEL_OUTOF10: 7

## 2018-01-12 ASSESSMENT — LIFESTYLE VARIABLES
EVER_SMOKED: YES
ALCOHOL_USE: NO

## 2018-01-12 NOTE — PROGRESS NOTES
Neurosurgery Progress Note    Subjective:  POD #1 left L3/4 XLIF  Pt stable  C/o left flank / abdominal pain  Minimal left thigh paresthesia  Denies new neuro sypmtoms  Has mild HA, has hx of headaches  Pt has known broken right foot being managed by Dr. Mcdonald     Exam:  Lower motor 5/5  Sensory grossly intact     BP  Min: 100/53  Max: 127/61  Pulse  Av.5  Min: 65  Max: 113  Resp  Avg: 15.3  Min: 10  Max: 27  Temp  Av.4 °C (97.6 °F)  Min: 35.9 °C (96.7 °F)  Max: 37.4 °C (99.4 °F)  SpO2  Av.3 %  Min: 80 %  Max: 100 %    No Data Recorded        No results for input(s): SODIUM, POTASSIUM, CHLORIDE, CO2, GLUCOSE, BUN, CPKTOTAL in the last 72 hours.            Intake/Output       18 0700 - 18 0659 18 0700 - 18 0659       0460-5262 Total 7103-9375 2688-2152 Total       Intake    P.O.  120  -- 120  --  -- --    P.O. 120 -- 120 -- -- --    I.V.  2400  -- 2400  --  -- --    Crystalloid Intake 2400 -- 2400 -- -- --    Total Intake 2520 -- 2520 -- -- --       Output    Urine  --  -- --  --  -- --    Number of Times Voided -- 4 x 4 x -- -- --    Blood  20  -- 20  --  -- --    Est. Blood Loss (mL) 20 -- 20 -- -- --    Total Output 20 -- 20 -- -- --       Net I/O     2500 -- 2500 -- -- --            Intake/Output Summary (Last 24 hours) at 18 0751  Last data filed at 18 1700   Gross per 24 hour   Intake             2520 ml   Output               20 ml   Net             2500 ml            • Influenza Vaccine Quad pf  0.5 mL Once   • Methocarbamol IVPB  1,000 mg Q8HRS   • [START ON 2018] tizanidine  4 mg Q6HRS PRN   • lidocaine-prilocaine  1 Application Once PRN    Or   • lidocaine  0.5 mL Once PRN   • NS   Continuous   • venlafaxine XR  75 mg Q EVENING   • temazepam  30 mg QHS PRN   • sitagliptin  100 mg DAILY   • ropinirole  4 mg BID   • levothyroxine  75 mcg Q EVENING   • glipiZIDE  5 mg Q EVENING   • gabapentin  400 mg BID   • furosemide  40 mg Q EVENING   •  docusate sodium  100 mg QHS   • atorvastatin  20 mg QHS   • MD ALERT...Do not administer NSAIDS or ASPIRIN unless ORDERED By Neurosurgery  1 Each PRN   • senna-docusate  1 Tab Nightly   • polyethylene glycol/lytes  1 Packet BID PRN   • magnesium hydroxide  30 mL QDAY PRN   • bisacodyl  10 mg Q24HRS PRN   • fleet  1 Each Once PRN   • hydromorphone  0.5 mg Q3HRS PRN   • oxycodone immediate-release  5 mg Q3HRS PRN   • oxycodone immediate release  10 mg Q3HRS PRN   • ondansetron  4 mg Q4HRS PRN   • ondansetron  4 mg Q4HRS PRN   • promethazine  12.5-25 mg Q4HRS PRN   • promethazine  12.5-25 mg Q4HRS PRN   • prochlorperazine  5-10 mg Q4HRS PRN   • diphenhydrAMINE  25 mg Q6HRS PRN    Or   • diphenhydrAMINE  25 mg Q6HRS PRN   • labetalol  10 mg Q HOUR PRN   • benzocaine-menthol  1 Lozenge Q2HRS PRN   • calcium carbonate  500 mg BID   • clonidine  0.1 mg Q4HRS PRN   • glucose 4 g  16 g Q15 MIN PRN   • dextrose 50%  25 mL Q15 MIN PRN   • potassium chloride SA  20 mEq QHS       Assessment and Plan:  POD #1  PT/OT  NPO  Adjust medications  Stage 2 surgery today

## 2018-01-12 NOTE — OP REPORT
DATE OF SERVICE:  01/11/2018    PREOPERATIVE DIAGNOSES:  1.  Lumbar spondylosis.  2.  Lumbar stenosis.  3.  Lumbar spondylolisthesis.  4.  Lumbar radiculopathy.  5.  Obesity.    POSTOPERATIVE DIAGNOSES:  1.  Lumbar spondylosis.  2.  Lumbar stenosis.  3.  Lumbar spondylolisthesis.  4.  Lumbar radiculopathy.  5.  Obesity.    PROCEDURES PERFORMED:      Stage I:    1.  Left lateral retroperitoneal approach to the L3-L4 disk space.  2.  L3-L4 diskectomy and titanium interbody cage placement.  3.  L3-L4 interbody/allograft fusion.  4.  L3-L4 lateral plating and fixation.  5.  Intraoperative monitoring.  6.  Modifier 22.  Patient's body mass index of 33.5 took the case that would   normally be 1 hour, made it 2 hours, greater than 50% increase in the time of   physical and mental effort to complete safely, justifying the modifier 22 for   the entire case due to the additional time to deal with the body habitus.    SURGEON:  Remi Lechuga MD    FIRST ASSISTANT:  Michele Farfan PA-C.    ANESTHESIA:  General.    COMPLICATIONS:  None.    DRAINS LEFT:  None.    BLOOD LOSS:  10 mL.    FINDINGS:  Solid bony fixation.  No change in EMG.    DISPOSITION:  Extubated to recovery stage II tomorrow.    HISTORY OF PRESENT ILLNESS:  This is a 62-year-old woman who had life-limiting   right L3 radiculopathy and grade I L3-L4 spondylolisthesis.  She has multiple   levels of disease by her tenuous pulmonary status dictated that we do stage   minimally invasive procedures and lateral fixation for listhesis reduction and   foraminal height restoration followed by stage II decompression tomorrow.    After being cleared and careful by pulmonary service, she understood the risks   and benefits to include pain, infection, bleeding, CSF leak, failure to   completely resolve symptoms, neurologic deficit including pain, weakness,   numbness, bladder, bowel difficulties, failure of fixation, failure of fusion,   need for rostral caudal extensions  due to junctional stenosis, injury to the   ureter, great vessels, bowel contents and temporary or permanent motor and   sensory neuropathy from the transpsoas approach.  She understood the risks,   benefits, and agreed to consent.    SUMMARY OF OPERATIVE PROCEDURE:  Patient was brought to the operating suite,   placed under general anesthesia.  Duncan catheter was not placed.  Patient was   on a regular OR bed supine and turned in the right lateral decubitus position   with an axillary roll, arms up in winger position, all padded pressure points   secured.  Patient's body mass index lengthened the time even just for   positioning adding to the total time of the case to 2 hours, greater than 50%   increase in typical 1-hour to accept and adjust by the modifier 22 to the   entire case with a BMI of 33.5.  Patient was put in legs flexed position, all   padded pressure points secured caudally.  We did a special taping procedure to   get her with the break of the bed to break her at the iliac crest to open up   the space between the bottom of the rib cage and the iliac bone and using   x-ray fluoroscopy, we lined her up perfectly in the orthogonal plane   perpendicular to the floor.  Centered around the L3-L4 disk space kathie out an   incision guided by x-ray mimicking the L3-L4 disk space.  This was infiltrated   with Marcaine with epinephrine.  Preoperative antibiotics were given.  Proper   time-out was performed.  Patient was prepped and draped in sterile fashion.    A linear incision was made and the soft tissues dissected with bipolar and   monopolar electrocautery.  We got down through the fat into the fascia and   with blunt dissection, we cut external oblique fascia and then carefully with   sequential dissection splitting the fibers of the external oblique, internal   oblique, and transversalis fascia popping into the retroperitoneal fat.  We   brought the x-ray in lateral position using the NuNanoNord XLIF  instruments   centered over the L3-4 disk space and then once we knew we were there,   penetrated through the psoas with the first narrow dilator and then put a K   wire in.  We did sequential dilation with rotational nerve monitoring all the   way through to make sure there was nothing fired under 20 milliamps.  We   constructed a 120 cm deep NuVasive retractor, opened it with fluoroscopic   guidance and cleared with soft tissues using halo retractor to help move some   more the psoas away and stimulated all around to make sure that there was no   nervous tissue and there was not.  We put a posterior tarik, re-verified all   our trajectories and turned attention to the diskectomy.    L3-L4 diskectomy and titanium interbody cage placement:  Using the NuVasive   XLIF instruments, we made a large annulotomy and then using 18 mm and 22 mm   Tigre and curettes rostrally and caudally adjacent to the contralateral   annulus.  We did a 6 and then 8 mm , we did a complete diskectomy   carefully to spare the ALL, all in orthogonal plane.  We got all the way down to the   endplates with a uterine curette.  We trialled out a 12 mm high x 10 degree   lordotic x 22 mm wide x 45 mm long graft and had a nice tight snug fit and   reduced the listhesis.  The actual titanium graft was loaded and introduced   into the interbody space with continuous nerve monitoring, which never showed   any firing and then x-ray fluoroscopy and we had a perfect placement.  This   was verified in the AP and lateral plane.    L3-L4 interbody/allograft fusion:  Because we completely denuded down to the   endplates, the graft fit very nicely into the disk space and the apposition   was sure interbody fusion.  This was also packed with Osteocel allograft to   help supplement that fusion.    L3-L4 lateral plating fixation:  Using appropriately sized XLIF plate, we did    holes for the plate to pass over.  We did anterior and posterior  screws.    We placed 45 and 50 mm screws with bicortical purchase.  We verified with   x-rays in the AP and lateral planes.  We had good trajectories and there was   never any stimulation of the EMG.  Copiously irrigated bacitracin infused   saline.  Patient's body mass index did add to this case particularly in   dissection and time of approach, but the total retractor time was 35 minutes,   but the body habitus add to a total case time of 2 hours greater than 50%   increased time for physical and mental effort due to the patient's BMI of   33.5, justifying modifier 22 to the case.  We slowly withdrew the retractor   lay down the fibrillar over and noted good hemostasis over the graft.  No   drain was left.  We closed the wound after retracting our retractor making   sure all the muscles came together with 0 Vicryl for the fascia, 2-0 Vicryls   for the subcutaneous Michael layer, 2-0 Vicryls for the dermis and Steri-Strips   for the skin.  Sterile dressings were applied.  Patient was rolled to supine   and extubated without incident.    There were no complications.  Needle and sponge count correct at the end of   the case, stage II is tomorrow.       ____________________________________     MD JOHANA Fisher III / SATHISH    DD:  01/11/2018 15:20:43  DT:  01/11/2018 17:25:58    D#:  [id number]  Job#:  [id number]

## 2018-01-12 NOTE — THERAPY
Occupational Therapy Contact Note    OT order received. Pt scheduled for stage II surgery today. Will defer evaluation until post op.     Tish Casas, OTR/L

## 2018-01-12 NOTE — RESPIRATORY CARE
COPD EDUCATION by COPD CLINICAL EDUCATOR  1/12/2018 at 7:10 AM by Rut Marsh     Patient reviewed by COPD education team. Patient does not qualify for COPD program.

## 2018-01-12 NOTE — THERAPY
PT contact note:  pt scheduled for stage II of surgery today. Will check back after surgery is complete.

## 2018-01-12 NOTE — OR NURSING
"VSS.  Oximetry WNL on 2.5L nasal cannula.  Left lateral flank dsg CDI.  Lower / upper extrem strength moderate = bilat; denies tingling.  Pain \"getting better\".  Thomas PO without incident.   "

## 2018-01-12 NOTE — PROGRESS NOTES
Received pt from PACU.  She is oriented x4, c/o some L leg pain from hip.  She is able to ambulate 25 feet from gurney to bed.  She tried to urinate, but was unable to.  Pt c/o some pain at IV site.  New iv started on R FA, old iv d/cd.      Received pain medicaiton at 1815 for pain of 7/10 in back and L leg.

## 2018-01-13 PROCEDURE — A9270 NON-COVERED ITEM OR SERVICE: HCPCS | Performed by: PHYSICIAN ASSISTANT

## 2018-01-13 PROCEDURE — G8987 SELF CARE CURRENT STATUS: HCPCS | Mod: CI

## 2018-01-13 PROCEDURE — 97161 PT EVAL LOW COMPLEX 20 MIN: CPT

## 2018-01-13 PROCEDURE — 700102 HCHG RX REV CODE 250 W/ 637 OVERRIDE(OP): Performed by: PHYSICIAN ASSISTANT

## 2018-01-13 PROCEDURE — 700111 HCHG RX REV CODE 636 W/ 250 OVERRIDE (IP): Performed by: PHYSICIAN ASSISTANT

## 2018-01-13 PROCEDURE — G8989 SELF CARE D/C STATUS: HCPCS | Mod: CI

## 2018-01-13 PROCEDURE — G8988 SELF CARE GOAL STATUS: HCPCS | Mod: CI

## 2018-01-13 PROCEDURE — G8980 MOBILITY D/C STATUS: HCPCS | Mod: CI

## 2018-01-13 PROCEDURE — 97165 OT EVAL LOW COMPLEX 30 MIN: CPT

## 2018-01-13 PROCEDURE — 770001 HCHG ROOM/CARE - MED/SURG/GYN PRIV*

## 2018-01-13 PROCEDURE — 700112 HCHG RX REV CODE 229: Performed by: PHYSICIAN ASSISTANT

## 2018-01-13 PROCEDURE — G8979 MOBILITY GOAL STATUS: HCPCS | Mod: CI

## 2018-01-13 PROCEDURE — G8978 MOBILITY CURRENT STATUS: HCPCS | Mod: CI

## 2018-01-13 RX ORDER — METHOCARBAMOL 500 MG/1
500 TABLET, FILM COATED ORAL 3 TIMES DAILY PRN
Status: DISCONTINUED | OUTPATIENT
Start: 2018-01-13 | End: 2018-01-14 | Stop reason: HOSPADM

## 2018-01-13 RX ADMIN — HYDROMORPHONE HYDROCHLORIDE 0.5 MG: 2 INJECTION INTRAMUSCULAR; INTRAVENOUS; SUBCUTANEOUS at 17:28

## 2018-01-13 RX ADMIN — TIZANIDINE 4 MG: 4 TABLET ORAL at 08:07

## 2018-01-13 RX ADMIN — HYDROMORPHONE HYDROCHLORIDE 0.5 MG: 2 INJECTION INTRAMUSCULAR; INTRAVENOUS; SUBCUTANEOUS at 23:05

## 2018-01-13 RX ADMIN — LEVOTHYROXINE SODIUM 75 MCG: 75 TABLET ORAL at 05:51

## 2018-01-13 RX ADMIN — OXYCODONE HYDROCHLORIDE 10 MG: 10 TABLET ORAL at 01:45

## 2018-01-13 RX ADMIN — STANDARDIZED SENNA CONCENTRATE AND DOCUSATE SODIUM 1 TABLET: 8.6; 5 TABLET, FILM COATED ORAL at 19:56

## 2018-01-13 RX ADMIN — GABAPENTIN 400 MG: 400 CAPSULE ORAL at 19:57

## 2018-01-13 RX ADMIN — SITAGLIPTIN 100 MG: 100 TABLET, FILM COATED ORAL at 07:58

## 2018-01-13 RX ADMIN — VENLAFAXINE HYDROCHLORIDE 75 MG: 75 CAPSULE, EXTENDED RELEASE ORAL at 19:56

## 2018-01-13 RX ADMIN — POTASSIUM CHLORIDE 20 MEQ: 1500 TABLET, EXTENDED RELEASE ORAL at 19:57

## 2018-01-13 RX ADMIN — GABAPENTIN 400 MG: 400 CAPSULE ORAL at 07:58

## 2018-01-13 RX ADMIN — FUROSEMIDE 40 MG: 40 TABLET ORAL at 19:56

## 2018-01-13 RX ADMIN — GLIPIZIDE 5 MG: 5 TABLET ORAL at 19:57

## 2018-01-13 RX ADMIN — HYDROMORPHONE HYDROCHLORIDE 0.5 MG: 2 INJECTION INTRAMUSCULAR; INTRAVENOUS; SUBCUTANEOUS at 12:13

## 2018-01-13 RX ADMIN — TEMAZEPAM 30 MG: 15 CAPSULE ORAL at 19:57

## 2018-01-13 RX ADMIN — ATORVASTATIN CALCIUM 20 MG: 20 TABLET, FILM COATED ORAL at 19:57

## 2018-01-13 RX ADMIN — OXYCODONE HYDROCHLORIDE 10 MG: 10 TABLET ORAL at 05:50

## 2018-01-13 RX ADMIN — OXYCODONE HYDROCHLORIDE 5 MG: 5 TABLET ORAL at 19:57

## 2018-01-13 RX ADMIN — DOCUSATE SODIUM 100 MG: 100 CAPSULE ORAL at 19:56

## 2018-01-13 RX ADMIN — HYDROMORPHONE HYDROCHLORIDE 0.5 MG: 2 INJECTION INTRAMUSCULAR; INTRAVENOUS; SUBCUTANEOUS at 04:14

## 2018-01-13 RX ADMIN — METHOCARBAMOL 500 MG: 500 TABLET ORAL at 19:56

## 2018-01-13 RX ADMIN — ROPINIROLE HYDROCHLORIDE 4 MG: 2 TABLET, FILM COATED ORAL at 19:56

## 2018-01-13 RX ADMIN — HYDROMORPHONE HYDROCHLORIDE 0.5 MG: 2 INJECTION INTRAMUSCULAR; INTRAVENOUS; SUBCUTANEOUS at 07:58

## 2018-01-13 RX ADMIN — ANTACID TABLETS 500 MG: 500 TABLET, CHEWABLE ORAL at 07:58

## 2018-01-13 RX ADMIN — ANTACID TABLETS 500 MG: 500 TABLET, CHEWABLE ORAL at 19:56

## 2018-01-13 RX ADMIN — ENOXAPARIN SODIUM 40 MG: 100 INJECTION SUBCUTANEOUS at 14:50

## 2018-01-13 RX ADMIN — OXYCODONE HYDROCHLORIDE 10 MG: 10 TABLET ORAL at 14:47

## 2018-01-13 RX ADMIN — OXYCODONE HYDROCHLORIDE 10 MG: 10 TABLET ORAL at 18:31

## 2018-01-13 RX ADMIN — ROPINIROLE HYDROCHLORIDE 4 MG: 2 TABLET, FILM COATED ORAL at 07:58

## 2018-01-13 ASSESSMENT — COGNITIVE AND FUNCTIONAL STATUS - GENERAL
SUGGESTED CMS G CODE MODIFIER MOBILITY: CI
HELP NEEDED FOR BATHING: A LITTLE
WALKING IN HOSPITAL ROOM: A LITTLE
DAILY ACTIVITIY SCORE: 22
MOBILITY SCORE: 23
SUGGESTED CMS G CODE MODIFIER DAILY ACTIVITY: CJ
DRESSING REGULAR LOWER BODY CLOTHING: A LITTLE

## 2018-01-13 ASSESSMENT — GAIT ASSESSMENTS
ASSISTIVE DEVICE: FRONT WHEEL WALKER
DISTANCE (FEET): 150
GAIT LEVEL OF ASSIST: SUPERVISED

## 2018-01-13 ASSESSMENT — ACTIVITIES OF DAILY LIVING (ADL): TOILETING: INDEPENDENT

## 2018-01-13 ASSESSMENT — PAIN SCALES - GENERAL
PAINLEVEL_OUTOF10: 6
PAINLEVEL_OUTOF10: 4
PAINLEVEL_OUTOF10: 8
PAINLEVEL_OUTOF10: 8

## 2018-01-13 NOTE — OR SURGEON
Immediate Post OP Note    PreOp Diagnosis: Lumbar Stenosis with Claudication with L3.4 Spondylolisthesis    PostOp Diagnosis: Same    Procedure(s):  Bilateral L3-L4 laminectomy with right L4  transpedicular decompression - Wound Class: Clean    Surgeon(s):  Remi Lechuga III, M.D.    Anesthesiologist/Type of Anesthesia:  Anesthesiologist: Renee Del Rio M.D./General    Surgical Staff:  Assistant: Reymundo Farfan P.A.-C.  Circulator: Naga Nagy R.N.  Scrub Person: Alison Suárez  Radiology Technologist: Manju Huang    Specimens:  * No specimens in log *    Estimated Blood Loss: 50 cc     Findings: Severe facet hypertrophy.  See Op Note.     Complications: None        1/12/2018 4:55 PM Reymundo Farfan

## 2018-01-13 NOTE — THERAPY
"Occupational Therapy Evaluation completed.   Functional Status:  Pt s/p stage 1 left L3-4 XLIF, stage 2 bilateral L3-4 lami/decompression, reports LLE feeling \"weird\" when touched, new onset since sx per pt, RLE no c/o sensation change. Pt able to demonstrate basic self-care tasks with supervision post education/training in spinal precautions and ADL modifications. Pt will have assist from dtr and spouse 24/7 as needed and currently has no concerns regarding ability to care for self at home. No further acute inpatient skilled services indicated at this time.  Plan of Care: Patient with no further skilled OT needs in the acute care setting at this time  Discharge Recommendations:  Equipment: Front-Wheel Walker. Post-acute therapy Discharge to home with outpatient or home health for additional skilled therapy services.    See \"Rehab Therapy-Acute\" Patient Summary Report for complete documentation.    "

## 2018-01-13 NOTE — OP REPORT
DATE OF SERVICE:  01/12/2018    DATE OF SERVICE:  01/12/2018    PREOPERATIVE DIAGNOSES:  1.  Lumbar spondylosis.  2.  Lumbar stenosis.  3.  Lumbar spondylolisthesis.  4.  Lumbar radiculopathy.  5.  Obesity.  6.  Status post L3-L4 XLIF yesterday, stage I.    POSTOPERATIVE DIAGNOSES:  1.  Lumbar spondylosis.  2.  Lumbar stenosis.  3.  Lumbar spondylolisthesis.  4.  Lumbar radiculopathy.  5.  Obesity.  6.  Status post L3-L4 XLIF yesterday, stage I.    PROCEDURES PERFORMED:  1.  Right posterior MIS approach for bilateral L3 laminotomy, foraminotomy,   decompression of the thecal sac and nerve roots.  2.  Right posterior MIS approach for bilateral L4 laminotomy, foraminotomy,   decompression of thecal sac and nerve roots.  3.  Right L4 transpedicular approach 59 modifier.  4.  Intraoperative monitoring.  5.  Modifier 22, patient's body mass index of 33.5, took a case that normally would   be 1 hour, and instead made it  2 hours, a greater than 50% increase in time,  physical   effort to complete safely because of the patient's body habitus, justifying the   modifier 22 for the entire case.    SURGEON:  Remi Lechuga III, MD    FIRST ASSISTANT:  Michele Farfan PA-C    ANESTHESIA:  General.    COMPLICATIONS:  None.    DRAINS:  Left subfascial ARYAN.    ESTIMATED BLOOD LOSS:  50 mL    FINDINGS:  Well decompressed thecal sac.  No change in EMG.    DISPOSITION:  Extubated to recovery, to the floor.    HISTORY OF PRESENT ILLNESS:  This is a 62-year-old woman who had just   undergone L3-4 XLIF with spondylolisthesis.  Because of persistent   radiculopathy, we consented for a stage II decompression.  She understood the   risks and benefits to include pain, infection, bleeding, CSF leak, failure to   completely resolve symptoms, neurologic deficit, including pain, weakness,   numbness, bladder, and bowel difficulties, and iatrogenic destabilization   requiring fusion in the future at another level or posteriorly.  She   understood  the risks, benefits, and agreed to the consent.    SUMMARY OF OPERATIVE PROCEDURE:  Patient was brought to the operating suite,   placed under general anesthesia.  Duncan catheter was not placed.  Lines were   secured.  Upper extremity SSEPs and lower extremity EMG needle were hooked by   LMA monitoring.  The patient was rolled from supine to prone onto a Moe   frame on Carlos table, arms up in superman position, all padded pressure   points secured.  X-ray fluoroscopy brought in to draw a midline, a paraspinal   right MIS incision over the L3-L4 disk space.  This is infiltrated with   Marcaine with epinephrine.  Antibiotics were given.  Proper time-out was   performed.  Patient was prepped and draped in sterile fashion.    A linear incision was made and soft tissues dissected with bipolar and   monopolar electrocautery.  We found the dorsal fascia and incised it sharply.    Using longitudinal Toney muscle splitting technique, got down to the spinal   laminar junction of L3-4 in the hypertrophied facets, advanced a dilating   NuVasive retractor, opened it up for fluoroscopic guidance and cleared with   soft tissue to verify our level with x-ray.  We turned our attention to   decompression.  The patient's body mass index of 33.5 did add dissection time   to clear away the soft tissue adding up to the total time of the case to 2   hours, which is greater than 50% increase someone to expect it 1 hour for a   single level MIS decompression, this justifies the modifier 22 to the entire   case.    Bilateral L3, bilateral L4 laminotomies with a right transpedicular L4   decompression 59 modifier:  In order to make sure we were decompressing the L4   traversing an L3 exiting nerve roots, we performed a transpedicular approach   at L4 and this required additional level of skill, risk, and expertise to do   so without destabilizing the pedicle justifying the modifier 59.  We used the   Heckyl Jose Armando drill to drill off the  lamina at the ipsilateral spinal laminar   junction and formed an ipsilateral laminotomy and undercut the contralateral   lamina for bilateral laminotomies.  Using careful downward pressure on the   thecal sac and curette to remove the stuff from the opposite side, we   performed remaining part of the laminectomies bilaterally.  We brought the   [place] rongeurs to complete the foraminotomies.  With my assistant holding   the retraction of the thecal sac, we had a nice resection already, but we   decided to do the transpedicular approach and drilled down along the pedicle,   this allowed us to visualize the top of the L3 exiting nerve root and the top   of the L4 nerve root and they were nicely decompressed because of the extra   decompression work.  We waxed the bony edges, we put the Surgiflo down and had   good hemostasis.    We copiously irrigated bacitracin infused saline.  We tunneled out 7 fully   flat fluted ARYAN and secured to skin with a stitch.  We closed the wound in   anatomic layers with 0 Vicryl for the muscle, 0 Vicryl for the fascia, 2-0   Vicryl for the dermis, and staples and Steri-Strips for the skin.  Sterile   dressing was applied.  The NuVasive retractor before it was withdrawn had been   slowly moved up and we got good hemostasis of the dilated tissues.  The   patient was rolled from prone to supine and extubated without incident.    There were no complications.    Needle and sponge count correct at the end of the case.       ____________________________________     MD JOHANA Fishre III / SATHISH    DD:  01/12/2018 16:52:41  DT:  01/12/2018 18:30:21    D#:  [id number]  Job#:  [id number]

## 2018-01-13 NOTE — PROGRESS NOTES
Neurosurgery Progress Note    Subjective:  POD#2 Left L3/4 XLIF  POD#1 B/l L3-4 laminectomy/ decompresison   Pt stable, states feeling groggy  C/o left flank/ LLQ abd/ left anterior pain  Denies new neuro sx  Walked in the room and hallway w walker and RN help  On 3L O2 (uses 3L O2 PRN at home)  Has known broken right foot managed by Dr. Mcdonald  Drain 25cc/8hr    Exam:  Motor: 4/5 left hip flexion, otherwise 5/5 in BLEs  Sensory intact in BLEs  Lateral left incision is c/d/i  Posterior lumbar incision c/d/i  Drain working, ouput serosanguinous    BP  Min: 95/50  Max: 127/66  Pulse  Av.3  Min: 64  Max: 87  Resp  Avg: 15  Min: 11  Max: 18  Temp  Av.7 °C (98 °F)  Min: 36.3 °C (97.3 °F)  Max: 37.2 °C (99 °F)  SpO2  Av.2 %  Min: 90 %  Max: 100 %    No Data Recorded    Recent Labs      18   0842   WBC  11.1*   RBC  4.15*   HEMOGLOBIN  11.7*   HEMATOCRIT  36.9*   MCV  88.9   MCH  28.2   MCHC  31.7*   RDW  47.9   PLATELETCT  308   MPV  10.8     Recent Labs      18   0842   SODIUM  141   POTASSIUM  3.7   CHLORIDE  101   CO2  31   GLUCOSE  122*   BUN  17     Recent Labs      18   0842   INR  1.06           Intake/Output       18 0700 - 18 0659 18 0700 - 18 0659      1900-0659 Total 6295-1470 5591-0936 Total       Intake    I.V.  750  -- 750  --  -- --    Crystalloid Intake 750 -- 750 -- -- --    Total Intake 750 -- 750 -- -- --       Output    Urine  --  -- --  --  -- --    Number of Times Voided 1 x 1 x 2 x -- -- --    Drains  20  55 75  --  -- --    Carlos Wynn 1 20 55 75 -- -- --    Blood  50  -- 50  --  -- --    Est. Blood Loss (mL) 50 -- 50 -- -- --    Total Output 70 55 125 -- -- --       Net I/O     680 -45 544 -- -- --            Intake/Output Summary (Last 24 hours) at 18 0912  Last data filed at 18 0400   Gross per 24 hour   Intake              750 ml   Output              125 ml   Net              625 ml            • methocarbamol   500 mg TID PRN   • acetaminophen/caffeine/butalbital 325-40-50 mg  1 Tab Q6HRS PRN   • LR   Continuous   • enoxaparin  40 mg DAILY   • alprazolam  0.25 mg BID PRN   • levothyroxine  75 mcg AM ES   • NS   Continuous   • venlafaxine XR  75 mg Q EVENING   • temazepam  30 mg QHS PRN   • sitagliptin  100 mg DAILY   • ropinirole  4 mg BID   • glipiZIDE  5 mg Q EVENING   • gabapentin  400 mg BID   • furosemide  40 mg Q EVENING   • docusate sodium  100 mg QHS   • atorvastatin  20 mg QHS   • MD ALERT...Do not administer NSAIDS or ASPIRIN unless ORDERED By Neurosurgery  1 Each PRN   • senna-docusate  1 Tab Nightly   • polyethylene glycol/lytes  1 Packet BID PRN   • magnesium hydroxide  30 mL QDAY PRN   • bisacodyl  10 mg Q24HRS PRN   • fleet  1 Each Once PRN   • hydromorphone  0.5 mg Q3HRS PRN   • oxycodone immediate-release  5 mg Q3HRS PRN   • oxycodone immediate release  10 mg Q3HRS PRN   • ondansetron  4 mg Q4HRS PRN   • ondansetron  4 mg Q4HRS PRN   • promethazine  12.5-25 mg Q4HRS PRN   • promethazine  12.5-25 mg Q4HRS PRN   • prochlorperazine  5-10 mg Q4HRS PRN   • diphenhydrAMINE  25 mg Q6HRS PRN    Or   • diphenhydrAMINE  25 mg Q6HRS PRN   • labetalol  10 mg Q HOUR PRN   • benzocaine-menthol  1 Lozenge Q2HRS PRN   • calcium carbonate  500 mg BID   • clonidine  0.1 mg Q4HRS PRN   • glucose 4 g  16 g Q15 MIN PRN   • dextrose 50%  25 mL Q15 MIN PRN   • potassium chloride SA  20 mEq QHS       Assessment and Plan:  POD#2 Left L3/4 XLIF  POD#1 B/l L3-4 laminectomy/ decompresison   Adjusted meds to reduce drowsiness  PT/OT   Pain control  D/c ARYAN drain today  Likely d/c home tomorrow  Prophylactic anticoagulation:            Start date/time: 1/13/17

## 2018-01-13 NOTE — THERAPY
"61 y/o female adm for lumbar spondylosis/spondylolisthesis s/p stage 1 left L3-4 XLIF, stage 2 bilateral L3-4 lami/decompression. Hx of right foot fx, unable to wear boot due to DM. Intact BLE motor and sensory components, intact balance with level ground amb with FWW. No further acute PT services required at this time.    Physical Therapy Evaluation completed.   Bed Mobility:  Supine to Sit: Supervised  Transfers: Sit to Stand: Supervised  Gait: Level Of Assist: Supervised with Front-Wheel Walker       Plan of Care: Patient with no further skilled PT needs in the acute care setting at this time  Discharge Recommendations: Equipment: Front-Wheel Walker. Post-acute therapy Discharge to home with outpatient or home health for additional skilled therapy services.    See \"Rehab Therapy-Acute\" Patient Summary Report for complete documentation.     "

## 2018-01-14 VITALS
BODY MASS INDEX: 33.48 KG/M2 | RESPIRATION RATE: 18 BRPM | TEMPERATURE: 98.3 F | WEIGHT: 208.34 LBS | DIASTOLIC BLOOD PRESSURE: 80 MMHG | OXYGEN SATURATION: 94 % | HEART RATE: 84 BPM | SYSTOLIC BLOOD PRESSURE: 105 MMHG | HEIGHT: 66 IN

## 2018-01-14 PROCEDURE — A9270 NON-COVERED ITEM OR SERVICE: HCPCS | Performed by: PHYSICIAN ASSISTANT

## 2018-01-14 PROCEDURE — 700102 HCHG RX REV CODE 250 W/ 637 OVERRIDE(OP): Performed by: PHYSICIAN ASSISTANT

## 2018-01-14 PROCEDURE — 700111 HCHG RX REV CODE 636 W/ 250 OVERRIDE (IP): Performed by: PHYSICIAN ASSISTANT

## 2018-01-14 RX ORDER — OXYCODONE HYDROCHLORIDE 10 MG/1
10 TABLET ORAL EVERY 4 HOURS PRN
Qty: 90 TAB | Refills: 0 | Status: SHIPPED | OUTPATIENT
Start: 2018-01-14 | End: 2018-01-28

## 2018-01-14 RX ORDER — METHOCARBAMOL 500 MG/1
500 TABLET, FILM COATED ORAL 3 TIMES DAILY PRN
Qty: 90 TAB | Refills: 0 | Status: SHIPPED | OUTPATIENT
Start: 2018-01-14 | End: 2019-06-11

## 2018-01-14 RX ORDER — GABAPENTIN 400 MG/1
400 CAPSULE ORAL 2 TIMES DAILY
Qty: 60 CAP | Refills: 2 | Status: ON HOLD | OUTPATIENT
Start: 2018-01-14 | End: 2019-06-21

## 2018-01-14 RX ORDER — HYDROCODONE BITARTRATE AND ACETAMINOPHEN 10; 325 MG/1; MG/1
1-2 TABLET ORAL EVERY 4 HOURS PRN
Qty: 90 TAB | Refills: 0 | Status: SHIPPED | OUTPATIENT
Start: 2018-01-14 | End: 2018-01-14

## 2018-01-14 RX ADMIN — METHOCARBAMOL 500 MG: 500 TABLET ORAL at 02:33

## 2018-01-14 RX ADMIN — OXYCODONE HYDROCHLORIDE 10 MG: 10 TABLET ORAL at 10:04

## 2018-01-14 RX ADMIN — BUTALBITAL, ACETAMINOPHEN, AND CAFFEINE 1 TABLET: 50; 325; 40 TABLET ORAL at 10:06

## 2018-01-14 RX ADMIN — OXYCODONE HYDROCHLORIDE 10 MG: 10 TABLET ORAL at 05:40

## 2018-01-14 RX ADMIN — GABAPENTIN 400 MG: 400 CAPSULE ORAL at 07:50

## 2018-01-14 RX ADMIN — OXYCODONE HYDROCHLORIDE 10 MG: 10 TABLET ORAL at 02:33

## 2018-01-14 RX ADMIN — METHOCARBAMOL 500 MG: 500 TABLET ORAL at 07:52

## 2018-01-14 RX ADMIN — LEVOTHYROXINE SODIUM 75 MCG: 75 TABLET ORAL at 05:40

## 2018-01-14 RX ADMIN — ANTACID TABLETS 500 MG: 500 TABLET, CHEWABLE ORAL at 07:50

## 2018-01-14 RX ADMIN — HYDROMORPHONE HYDROCHLORIDE 0.5 MG: 2 INJECTION INTRAMUSCULAR; INTRAVENOUS; SUBCUTANEOUS at 07:50

## 2018-01-14 RX ADMIN — SITAGLIPTIN 100 MG: 100 TABLET, FILM COATED ORAL at 07:50

## 2018-01-14 RX ADMIN — ROPINIROLE HYDROCHLORIDE 4 MG: 2 TABLET, FILM COATED ORAL at 07:50

## 2018-01-14 RX ADMIN — OXYCODONE HYDROCHLORIDE 10 MG: 10 TABLET ORAL at 13:03

## 2018-01-14 ASSESSMENT — PAIN SCALES - GENERAL: PAINLEVEL_OUTOF10: 8

## 2018-01-14 NOTE — PROGRESS NOTES
Neurosurgery Progress Note    Subjective:  POD#3 Left L3/4 XLIF  POD#2 B/l L3-4 laminectomy/ decompresison   Pt stable  Much more alert, less groggy today (changed tizanidine to robaxin yesterday)  C/o left flank/ LLQ abd/ left anterior pain  Denies new neuro sx  Ambulated with PT/OT yesterday, rec home w walker  Has known broken right foot managed by Dr. Mcdonald  Drain 20cc/8hr    Exam:  AAOx3  Motor: 4/5 left hip flexor otherwise 5/5   Sensory intact in BLEs  Lateral left incision c/d/i  Posterior dressing old strikethrough    BP  Min: 87/52  Max: 116/66  Pulse  Av.8  Min: 64  Max: 84  Resp  Av  Min: 16  Max: 18  Temp  Av.6 °C (97.9 °F)  Min: 36.4 °C (97.5 °F)  Max: 36.8 °C (98.3 °F)  SpO2  Av.5 %  Min: 90 %  Max: 96 %    No Data Recorded    Recent Labs      18   0842   WBC  11.1*   RBC  4.15*   HEMOGLOBIN  11.7*   HEMATOCRIT  36.9*   MCV  88.9   MCH  28.2   MCHC  31.7*   RDW  47.9   PLATELETCT  308   MPV  10.8     Recent Labs      18   0842   SODIUM  141   POTASSIUM  3.7   CHLORIDE  101   CO2  31   GLUCOSE  122*   BUN  17     Recent Labs      18   0842   INR  1.06           Intake/Output       18 0700 - 18 0659 18 0700 - 01/15/18 0659       Total 190059 Total       Intake    P.O.  240  -- 240  --  -- --    P.O. 240 -- 240 -- -- --    Total Intake 240 -- 240 -- -- --       Output    Urine  --  -- --  --  -- --    Number of Times Voided 1 x -- 1 x -- -- --    Drains  20  45 65  --  -- --    Carlos Wynn 1 20 45 65 -- -- --    Total Output 20 45 65 -- -- --       Net I/O     220 -45 175 -- -- --            Intake/Output Summary (Last 24 hours) at 18 1002  Last data filed at 18 0400   Gross per 24 hour   Intake                0 ml   Output               65 ml   Net              -65 ml            • methocarbamol  500 mg TID PRN   • acetaminophen/caffeine/butalbital 325-40-50 mg  1 Tab Q6HRS PRN   • LR   Continuous    • enoxaparin  40 mg DAILY   • alprazolam  0.25 mg BID PRN   • levothyroxine  75 mcg AM ES   • NS   Continuous   • venlafaxine XR  75 mg Q EVENING   • temazepam  30 mg QHS PRN   • sitagliptin  100 mg DAILY   • ropinirole  4 mg BID   • glipiZIDE  5 mg Q EVENING   • gabapentin  400 mg BID   • furosemide  40 mg Q EVENING   • docusate sodium  100 mg QHS   • atorvastatin  20 mg QHS   • MD ALERT...Do not administer NSAIDS or ASPIRIN unless ORDERED By Neurosurgery  1 Each PRN   • senna-docusate  1 Tab Nightly   • polyethylene glycol/lytes  1 Packet BID PRN   • magnesium hydroxide  30 mL QDAY PRN   • bisacodyl  10 mg Q24HRS PRN   • fleet  1 Each Once PRN   • hydromorphone  0.5 mg Q3HRS PRN   • oxycodone immediate-release  5 mg Q3HRS PRN   • oxycodone immediate release  10 mg Q3HRS PRN   • ondansetron  4 mg Q4HRS PRN   • ondansetron  4 mg Q4HRS PRN   • promethazine  12.5-25 mg Q4HRS PRN   • promethazine  12.5-25 mg Q4HRS PRN   • prochlorperazine  5-10 mg Q4HRS PRN   • diphenhydrAMINE  25 mg Q6HRS PRN    Or   • diphenhydrAMINE  25 mg Q6HRS PRN   • labetalol  10 mg Q HOUR PRN   • benzocaine-menthol  1 Lozenge Q2HRS PRN   • calcium carbonate  500 mg BID   • clonidine  0.1 mg Q4HRS PRN   • glucose 4 g  16 g Q15 MIN PRN   • dextrose 50%  25 mL Q15 MIN PRN   • potassium chloride SA  20 mEq QHS       Assessment and Plan:  POD#3 Left L3/4 XLIF  POD#2 B/l L3-4 laminectomy/ decompresison   D/c drain today  Ordered FWW for d/c   D/c home today    Prophylactic anticoagulation: yes

## 2018-01-14 NOTE — DISCHARGE INSTRUCTIONS
Discharge Instructions    Discharged to home by car with relative. Discharged via wheelchair, hospital escort: Refused.  Special equipment needed: Walker    Be sure to schedule a follow-up appointment with your primary care doctor or any specialists as instructed.     Discharge Plan:   Influenza Vaccine Indication: Indicated: 9 to 64 years of age  Influenza Vaccine Given - only chart on this line when given: Influenza Vaccine Given (See MAR)    I understand that a diet low in cholesterol, fat, and sodium is recommended for good health. Unless I have been given specific instructions below for another diet, I accept this instruction as my diet prescription.   Other diet: general    Special Instructions: None    · Is patient discharged on Warfarin / Coumadin?   No     · Is patient Post Blood Transfusion?  No    Depression / Suicide Risk    As you are discharged from this [facility] facility, it is important to learn how to keep safe from harming yourself.    Recognize the warning signs:  · Abrupt changes in personality, positive or negative- including increase in energy   · Giving away possessions  · Change in eating patterns- significant weight changes-  positive or negative  · Change in sleeping patterns- unable to sleep or sleeping all the time   · Unwillingness or inability to communicate  · Depression  · Unusual sadness, discouragement and loneliness  · Talk of wanting to die  · Neglect of personal appearance   · Rebelliousness- reckless behavior  · Withdrawal from people/activities they love  · Confusion- inability to concentrate     If you or a loved one observes any of these behaviors or has concerns about self-harm, here's what you can do:  · Talk about it- your feelings and reasons for harming yourself  · Remove any means that you might use to hurt yourself (examples: pills, rope, extension cords, firearm)  · Get professional help from the community (Mental Health, Substance Abuse, psychological  counseling)  · Do not be alone:Call your Safe Contact- someone whom you trust who will be there for you.  · Call your local CRISIS HOTLINE 958-1630 or 434-391-7858  · Call your local Children's Mobile Crisis Response Team [place] (827) 161-1887 or www.[employer]  · Call the toll free National Suicide Prevention Hotlines   · National Suicide Prevention Lifeline 391-970-WLEF (3451)  · National MSI Line Network 800-SUICIDE (730-4628)    No bending/twisting  Do not drive if taking narcotics  Leave dressing on for 3 days, then ok to remove and shower. Leave open to air.  Do not submerge incision in water

## 2018-01-14 NOTE — DISCHARGE SUMMARY
CHIEF COMPLAINT ON ADMISSION  No chief complaint on file.      CODE STATUS  Full Code    HPI & HOSPITAL COURSE  This is a 62 y.o. Female with right L3 radiculopathy and grade I L3-4 spondylolisthesis presented to [facility] on 1/11/18 and underwent an elective Stage I left lateral approach L3-4 XLIF on 1/11/18 followed by a Stage II right posterior MIS bilateral L3 and L4 decompression on 1/12/18 by Dr. Remi Lechuga. The surgeries went well without complications and the patient was extubated in the OR and transferred from PACU to neurosurgical floor in stable condition.  Postoperatively the patient c/o expected incisional pain in the left flank, left anterior thigh soreness, and low back pain.  She did ambulate with PT/OT after Stage II surgery and was recommended a FWW.   The patient's hospitalization was unremarkable.      Therefore, she is discharged in good and stable condition with close outpatient follow-up.    SPECIFIC OUTPATIENT FOLLOW-UP  [facility] 2 weeks postop    DISCHARGE PROBLEM LIST  Active Problems:    * No active hospital problems. *  Resolved Problems:    * No resolved hospital problems. *      FOLLOW UP  No future appointments.  No follow-up provider specified.    MEDICATIONS ON DISCHARGE   Valentina Lu   Home Medication Instructions PRAKASH:[id number]    Printed on:01/14/18 1006   Medication Information                      atorvastatin (LIPITOR) 20 MG Tab  Take 20 mg by mouth every bedtime.             Cholecalciferol (VITAMIN D3 PO)  Take 1 Dose by mouth every day.             CINNAMON PO  Take 1 Dose by mouth every evening.             Cyanocobalamin (VITAMIN B-12 PO)  Take 2,500 mg by mouth every morning.             diphenhydrAMINE (BENADRYL) 25 MG Tab  Take 25 mg by mouth every bedtime.             docusate sodium (COLACE) 100 MG Cap  Take 100 mg by mouth every bedtime.             furosemide (LASIX) 40 MG Tab  Take 40 mg by mouth every evening.             gabapentin (NEURONTIN) 800 MG  tablet  Take 800 mg by mouth 4 times a day.             glipiZIDE (GLUCOTROL) 5 MG Tab  Take 5 mg by mouth every evening.             hydrocodone/acetaminophen (NORCO)  MG Tab  Take 1 Tab by mouth every four hours as needed.             levothyroxine (SYNTHROID) 75 MCG Tab  Take 75 mcg by mouth every evening.             potassium chloride (MICRO-K) 10 MEQ capsule  Take 20 mEq by mouth every evening.             ropinirole (REQUIP) 4 MG tablet  Take 4 mg by mouth 2 Times a Day.             sitagliptin (JANUVIA) 100 MG Tab  Take 100 mg by mouth every day.             temazepam (RESTORIL) 30 MG capsule  Take 30 mg by mouth every bedtime.             tizanidine (ZANAFLEX) 4 MG Tab  Take 4 mg by mouth 2 times a day as needed.             venlafaxine XR (EFFEXOR XR) 75 MG CAPSULE SR 24 HR  Take 75 mg by mouth every evening.                 DIET  Orders Placed This Encounter   Procedures   • DIET ORDER     Standing Status:   Standing     Number of Occurrences:   1     Order Specific Question:   Diet:     Answer:   Diabetic [3]       ACTIVITY  As tolerated.  Weight bearing as tolerated      CONSULTATIONS  none    PROCEDURES  See HPI    LABORATORY  Lab Results   Component Value Date/Time    SODIUM 141 01/12/2018 08:42 AM    POTASSIUM 3.7 01/12/2018 08:42 AM    CHLORIDE 101 01/12/2018 08:42 AM    CO2 31 01/12/2018 08:42 AM    GLUCOSE 122 (H) 01/12/2018 08:42 AM    BUN 17 01/12/2018 08:42 AM    CREATININE 0.93 01/12/2018 08:42 AM    CREATININE 0.8 01/19/2009 12:50 PM        Lab Results   Component Value Date/Time    WBC 11.1 (H) 01/12/2018 08:42 AM    HEMOGLOBIN 11.7 (L) 01/12/2018 08:42 AM    HEMATOCRIT 36.9 (L) 01/12/2018 08:42 AM    PLATELETCT 308 01/12/2018 08:42 AM        Total time of the discharge process exceeds 31 minutes

## 2018-01-14 NOTE — FACE TO FACE
Face to Face Note  -  Durable Medical Equipment    Christina Orona P.A.-C. - NPI: [id number]  I certify that this patient is under my care and that they had a durable medical equipment(DME)face to face encounter by myself that meets the physician DME face-to-face encounter requirements with this patient on:    Date of encounter:   Patient:                    MRN:                       YOB: 2020  Valentina Lu  [id number]  1955     The encounter with the patient was in whole, or in part, for the following medical condition, which is the primary reason for durable medical equipment:  Other - gait instability     I certify that, based on my findings, the following durable medical equipment is medically necessary:  Walkers.    HOME O2 Saturation Measurements:(Values must be present for Home Oxygen orders)         ,     ,         My Clinical findings support the need for the above equipment due to:  Abnormal Gait    Supporting Symptoms: low back pain, gait unsteadiness, right foot pain

## 2018-01-14 NOTE — CARE PLAN
Problem: Communication  Goal: The ability to communicate needs accurately and effectively will improve  Outcome: MET Date Met: 01/13/18  Pt communicates needs throughout shift    Problem: Safety  Goal: Will remain free from injury  Outcome: MET Date Met: 01/13/18  Pt remains free of injury during shift

## 2018-02-15 ENCOUNTER — HOSPITAL ENCOUNTER (OUTPATIENT)
Dept: LAB | Facility: MEDICAL CENTER | Age: 63
End: 2018-02-15
Attending: PODIATRIST
Payer: MEDICARE

## 2018-02-15 LAB — URATE SERPL-MCNC: 6 MG/DL (ref 1.9–8.2)

## 2018-02-15 PROCEDURE — 36415 COLL VENOUS BLD VENIPUNCTURE: CPT

## 2018-02-15 PROCEDURE — 84550 ASSAY OF BLOOD/URIC ACID: CPT

## 2018-07-13 ENCOUNTER — HOSPITAL ENCOUNTER (OUTPATIENT)
Facility: MEDICAL CENTER | Age: 63
End: 2018-07-13
Attending: INTERNAL MEDICINE | Admitting: INTERNAL MEDICINE
Payer: MEDICARE

## 2018-07-18 VITALS — BODY MASS INDEX: 37.38 KG/M2 | WEIGHT: 210.98 LBS | HEIGHT: 63 IN

## 2018-07-18 DIAGNOSIS — Z01.810 PRE-OPERATIVE CARDIOVASCULAR EXAMINATION: ICD-10-CM

## 2018-07-18 DIAGNOSIS — Z01.812 PRE-OPERATIVE LABORATORY EXAMINATION: ICD-10-CM

## 2018-07-18 LAB — EKG IMPRESSION: NORMAL

## 2018-07-18 PROCEDURE — 85027 COMPLETE CBC AUTOMATED: CPT

## 2018-07-18 PROCEDURE — 93010 ELECTROCARDIOGRAM REPORT: CPT | Performed by: INTERNAL MEDICINE

## 2018-07-18 PROCEDURE — 93005 ELECTROCARDIOGRAM TRACING: CPT

## 2018-07-18 PROCEDURE — 36415 COLL VENOUS BLD VENIPUNCTURE: CPT

## 2018-07-18 PROCEDURE — 80048 BASIC METABOLIC PNL TOTAL CA: CPT

## 2018-07-18 PROCEDURE — 83036 HEMOGLOBIN GLYCOSYLATED A1C: CPT

## 2018-07-18 NOTE — OR NURSING
Hx and meds reviewed, pre op instructions given. Pt aware may take the listed meds morning of surgery; gabapentin and temazepam if needed. Anesthesia fasting guidelines reviewed with pt .Asked to bring portable O2 tank and CPAP.

## 2018-07-19 LAB
ANION GAP SERPL CALC-SCNC: 11 MMOL/L (ref 0–11.9)
BUN SERPL-MCNC: 21 MG/DL (ref 8–22)
CALCIUM SERPL-MCNC: 9.7 MG/DL (ref 8.5–10.5)
CHLORIDE SERPL-SCNC: 96 MMOL/L (ref 96–112)
CO2 SERPL-SCNC: 34 MMOL/L (ref 20–33)
CREAT SERPL-MCNC: 1.1 MG/DL (ref 0.5–1.4)
ERYTHROCYTE [DISTWIDTH] IN BLOOD BY AUTOMATED COUNT: 49.8 FL (ref 35.9–50)
EST. AVERAGE GLUCOSE BLD GHB EST-MCNC: 134 MG/DL
GLUCOSE SERPL-MCNC: 67 MG/DL (ref 65–99)
HBA1C MFR BLD: 6.3 % (ref 0–5.6)
HCT VFR BLD AUTO: 41.9 % (ref 37–47)
HGB BLD-MCNC: 13 G/DL (ref 12–16)
MCH RBC QN AUTO: 28 PG (ref 27–33)
MCHC RBC AUTO-ENTMCNC: 31 G/DL (ref 33.6–35)
MCV RBC AUTO: 90.1 FL (ref 81.4–97.8)
PLATELET # BLD AUTO: 310 K/UL (ref 164–446)
PMV BLD AUTO: 12.6 FL (ref 9–12.9)
POTASSIUM SERPL-SCNC: 3.2 MMOL/L (ref 3.6–5.5)
RBC # BLD AUTO: 4.65 M/UL (ref 4.2–5.4)
SODIUM SERPL-SCNC: 141 MMOL/L (ref 135–145)
WBC # BLD AUTO: 8.6 K/UL (ref 4.8–10.8)

## 2018-09-04 ENCOUNTER — HOSPITAL ENCOUNTER (OUTPATIENT)
Dept: HOSPITAL 8 - CFH | Age: 63
Discharge: HOME | End: 2018-09-04
Attending: REGISTERED NURSE
Payer: MEDICARE

## 2018-09-04 DIAGNOSIS — J84.10: Primary | ICD-10-CM

## 2018-09-04 DIAGNOSIS — R91.1: ICD-10-CM

## 2018-09-04 PROCEDURE — 71250 CT THORAX DX C-: CPT

## 2018-09-11 ENCOUNTER — HOSPITAL ENCOUNTER (OUTPATIENT)
Facility: MEDICAL CENTER | Age: 63
End: 2018-09-11
Attending: INTERNAL MEDICINE | Admitting: INTERNAL MEDICINE
Payer: MEDICARE

## 2018-09-17 ENCOUNTER — APPOINTMENT (OUTPATIENT)
Dept: ADMISSIONS | Facility: MEDICAL CENTER | Age: 63
End: 2018-09-17
Payer: MEDICARE

## 2018-09-21 VITALS — BODY MASS INDEX: 37.92 KG/M2 | HEIGHT: 63 IN | WEIGHT: 214 LBS

## 2018-09-21 DIAGNOSIS — Z01.812 PRE-OPERATIVE LABORATORY EXAMINATION: ICD-10-CM

## 2018-09-21 LAB
ERYTHROCYTE [DISTWIDTH] IN BLOOD BY AUTOMATED COUNT: 48.9 FL (ref 35.9–50)
HCT VFR BLD AUTO: 43 % (ref 37–47)
HGB BLD-MCNC: 13.5 G/DL (ref 12–16)
MCH RBC QN AUTO: 27.7 PG (ref 27–33)
MCHC RBC AUTO-ENTMCNC: 31.4 G/DL (ref 33.6–35)
MCV RBC AUTO: 88.3 FL (ref 81.4–97.8)
PLATELET # BLD AUTO: 319 K/UL (ref 164–446)
PMV BLD AUTO: 11.6 FL (ref 9–12.9)
RBC # BLD AUTO: 4.87 M/UL (ref 4.2–5.4)
WBC # BLD AUTO: 7.2 K/UL (ref 4.8–10.8)

## 2018-09-21 PROCEDURE — 36415 COLL VENOUS BLD VENIPUNCTURE: CPT

## 2018-09-21 PROCEDURE — 80048 BASIC METABOLIC PNL TOTAL CA: CPT

## 2018-09-21 PROCEDURE — 85027 COMPLETE CBC AUTOMATED: CPT

## 2018-09-21 RX ORDER — PANTOPRAZOLE SODIUM 20 MG/1
20 TABLET, DELAYED RELEASE ORAL DAILY
COMMUNITY
End: 2019-06-11

## 2018-09-21 RX ORDER — HYDROCODONE BITARTRATE AND ACETAMINOPHEN 10; 325 MG/1; MG/1
1-2 TABLET ORAL EVERY 6 HOURS PRN
Status: ON HOLD | COMMUNITY
End: 2019-06-23

## 2018-09-21 RX ORDER — ALLOPURINOL 300 MG/1
300 TABLET ORAL DAILY
COMMUNITY
End: 2019-06-11

## 2018-09-21 NOTE — OR NURSING
"Preparing for your Procedure information\" sheet given to patient with verbal and written instructions. Patient instructed to continue prescribed medications through the day before surgery, instructed to take the following medications the day of surgery per anesthesia protocol  Protonix, hydrocodone as needed robaxin if needed and gabapentin if needed.  Pt instructed to clarify with Dr. Zarco if she should take her nighttime dose of januvia and glipizide the evening before surgery while doing bowel prep, pt agrees. Pt instructed to bring home 02 and cpap machine on DOS as well. Pt agrees.  "

## 2018-09-22 LAB
ANION GAP SERPL CALC-SCNC: 10 MMOL/L (ref 0–11.9)
BUN SERPL-MCNC: 17 MG/DL (ref 8–22)
CALCIUM SERPL-MCNC: 9.8 MG/DL (ref 8.5–10.5)
CHLORIDE SERPL-SCNC: 99 MMOL/L (ref 96–112)
CO2 SERPL-SCNC: 32 MMOL/L (ref 20–33)
CREAT SERPL-MCNC: 0.91 MG/DL (ref 0.5–1.4)
GLUCOSE SERPL-MCNC: 88 MG/DL (ref 65–99)
POTASSIUM SERPL-SCNC: 3.4 MMOL/L (ref 3.6–5.5)
SODIUM SERPL-SCNC: 141 MMOL/L (ref 135–145)

## 2019-01-14 ENCOUNTER — HOSPITAL ENCOUNTER (OUTPATIENT)
Dept: LAB | Facility: MEDICAL CENTER | Age: 64
End: 2019-01-14
Attending: FAMILY MEDICINE
Payer: MEDICARE

## 2019-01-14 LAB
BASOPHILS # BLD AUTO: 0.7 % (ref 0–1.8)
BASOPHILS # BLD: 0.06 K/UL (ref 0–0.12)
CLOSURE TME COLL+ADP BLD: 86 SEC (ref 62–104)
EOSINOPHIL # BLD AUTO: 0.11 K/UL (ref 0–0.51)
EOSINOPHIL NFR BLD: 1.3 % (ref 0–6.9)
ERYTHROCYTE [DISTWIDTH] IN BLOOD BY AUTOMATED COUNT: 50.7 FL (ref 35.9–50)
EST. AVERAGE GLUCOSE BLD GHB EST-MCNC: 128 MG/DL
HBA1C MFR BLD: 6.1 % (ref 0–5.6)
HCT VFR BLD AUTO: 46.8 % (ref 37–47)
HGB BLD-MCNC: 14.1 G/DL (ref 12–16)
IMM GRANULOCYTES # BLD AUTO: 0.02 K/UL (ref 0–0.11)
IMM GRANULOCYTES NFR BLD AUTO: 0.2 % (ref 0–0.9)
LYMPHOCYTES # BLD AUTO: 1.64 K/UL (ref 1–4.8)
LYMPHOCYTES NFR BLD: 20.1 % (ref 22–41)
MCH RBC QN AUTO: 27.1 PG (ref 27–33)
MCHC RBC AUTO-ENTMCNC: 30.1 G/DL (ref 33.6–35)
MCV RBC AUTO: 90 FL (ref 81.4–97.8)
MEDICATIONS NOTED 1688: ABNORMAL
MONOCYTES # BLD AUTO: 0.52 K/UL (ref 0–0.85)
MONOCYTES NFR BLD AUTO: 6.4 % (ref 0–13.4)
NEUTROPHILS # BLD AUTO: 5.8 K/UL (ref 2–7.15)
NEUTROPHILS NFR BLD: 71.3 % (ref 44–72)
NRBC # BLD AUTO: 0 K/UL
NRBC BLD-RTO: 0 /100 WBC
PLATELET # BLD AUTO: 344 K/UL (ref 164–446)
PLT FUNCTION COL/EPI  1661: >300 SEC (ref 83–170)
PMV BLD AUTO: 11.5 FL (ref 9–12.9)
RBC # BLD AUTO: 5.2 M/UL (ref 4.2–5.4)
T3 SERPL-MCNC: 101.9 NG/DL (ref 60–181)
T4 SERPL-MCNC: 7.7 UG/DL (ref 4–12)
TSH SERPL DL<=0.005 MIU/L-ACNC: 3.79 UIU/ML (ref 0.38–5.33)
WBC # BLD AUTO: 8.2 K/UL (ref 4.8–10.8)

## 2019-01-14 PROCEDURE — 83036 HEMOGLOBIN GLYCOSYLATED A1C: CPT | Mod: GA

## 2019-01-14 PROCEDURE — 84443 ASSAY THYROID STIM HORMONE: CPT

## 2019-01-14 PROCEDURE — 84436 ASSAY OF TOTAL THYROXINE: CPT

## 2019-01-14 PROCEDURE — 84480 ASSAY TRIIODOTHYRONINE (T3): CPT

## 2019-01-14 PROCEDURE — 85576 BLOOD PLATELET AGGREGATION: CPT

## 2019-01-14 PROCEDURE — 85025 COMPLETE CBC W/AUTO DIFF WBC: CPT

## 2019-01-14 PROCEDURE — 36415 COLL VENOUS BLD VENIPUNCTURE: CPT

## 2019-06-11 ENCOUNTER — HOSPITAL ENCOUNTER (OUTPATIENT)
Dept: RADIOLOGY | Facility: MEDICAL CENTER | Age: 64
DRG: 472 | End: 2019-06-11
Attending: NEUROLOGICAL SURGERY | Admitting: NEUROLOGICAL SURGERY
Payer: MEDICARE

## 2019-06-11 DIAGNOSIS — Z01.812 PRE-OPERATIVE LABORATORY EXAMINATION: ICD-10-CM

## 2019-06-11 DIAGNOSIS — Z01.810 PRE-OPERATIVE CARDIOVASCULAR EXAMINATION: ICD-10-CM

## 2019-06-11 DIAGNOSIS — Z01.811 PRE-OPERATIVE RESPIRATORY EXAMINATION: ICD-10-CM

## 2019-06-11 LAB
ANION GAP SERPL CALC-SCNC: 6 MMOL/L (ref 0–11.9)
APPEARANCE UR: CLEAR
APTT PPP: 27.1 SEC (ref 24.7–36)
BASOPHILS # BLD AUTO: 0.7 % (ref 0–1.8)
BASOPHILS # BLD: 0.06 K/UL (ref 0–0.12)
BILIRUB UR QL STRIP.AUTO: NEGATIVE
BUN SERPL-MCNC: 21 MG/DL (ref 8–22)
CALCIUM SERPL-MCNC: 9.6 MG/DL (ref 8.5–10.5)
CHLORIDE SERPL-SCNC: 101 MMOL/L (ref 96–112)
CO2 SERPL-SCNC: 30 MMOL/L (ref 20–33)
COLOR UR: YELLOW
CREAT SERPL-MCNC: 0.87 MG/DL (ref 0.5–1.4)
EKG IMPRESSION: NORMAL
EOSINOPHIL # BLD AUTO: 0.18 K/UL (ref 0–0.51)
EOSINOPHIL NFR BLD: 2.2 % (ref 0–6.9)
ERYTHROCYTE [DISTWIDTH] IN BLOOD BY AUTOMATED COUNT: 52.6 FL (ref 35.9–50)
GLUCOSE SERPL-MCNC: 86 MG/DL (ref 65–99)
GLUCOSE UR STRIP.AUTO-MCNC: 500 MG/DL
HCT VFR BLD AUTO: 45.9 % (ref 37–47)
HGB BLD-MCNC: 13.8 G/DL (ref 12–16)
IMM GRANULOCYTES # BLD AUTO: 0.04 K/UL (ref 0–0.11)
IMM GRANULOCYTES NFR BLD AUTO: 0.5 % (ref 0–0.9)
INR PPP: 0.94 (ref 0.87–1.13)
KETONES UR STRIP.AUTO-MCNC: NEGATIVE MG/DL
LEUKOCYTE ESTERASE UR QL STRIP.AUTO: NEGATIVE
LYMPHOCYTES # BLD AUTO: 2.02 K/UL (ref 1–4.8)
LYMPHOCYTES NFR BLD: 24.4 % (ref 22–41)
MCH RBC QN AUTO: 28.2 PG (ref 27–33)
MCHC RBC AUTO-ENTMCNC: 30.1 G/DL (ref 33.6–35)
MCV RBC AUTO: 93.7 FL (ref 81.4–97.8)
MICRO URNS: ABNORMAL
MONOCYTES # BLD AUTO: 0.69 K/UL (ref 0–0.85)
MONOCYTES NFR BLD AUTO: 8.3 % (ref 0–13.4)
NEUTROPHILS # BLD AUTO: 5.29 K/UL (ref 2–7.15)
NEUTROPHILS NFR BLD: 63.9 % (ref 44–72)
NITRITE UR QL STRIP.AUTO: NEGATIVE
NRBC # BLD AUTO: 0 K/UL
NRBC BLD-RTO: 0 /100 WBC
PH UR STRIP.AUTO: 5 [PH]
PLATELET # BLD AUTO: 293 K/UL (ref 164–446)
PMV BLD AUTO: 10.6 FL (ref 9–12.9)
POTASSIUM SERPL-SCNC: 4 MMOL/L (ref 3.6–5.5)
PROT UR QL STRIP: NEGATIVE MG/DL
PROTHROMBIN TIME: 12.8 SEC (ref 12–14.6)
RBC # BLD AUTO: 4.9 M/UL (ref 4.2–5.4)
RBC UR QL AUTO: NEGATIVE
SODIUM SERPL-SCNC: 137 MMOL/L (ref 135–145)
SP GR UR STRIP.AUTO: 1.03
UROBILINOGEN UR STRIP.AUTO-MCNC: 0.2 MG/DL
WBC # BLD AUTO: 8.3 K/UL (ref 4.8–10.8)

## 2019-06-11 PROCEDURE — 93010 ELECTROCARDIOGRAM REPORT: CPT | Performed by: INTERNAL MEDICINE

## 2019-06-11 PROCEDURE — 93005 ELECTROCARDIOGRAM TRACING: CPT

## 2019-06-11 PROCEDURE — 85610 PROTHROMBIN TIME: CPT

## 2019-06-11 PROCEDURE — 80048 BASIC METABOLIC PNL TOTAL CA: CPT

## 2019-06-11 PROCEDURE — 36415 COLL VENOUS BLD VENIPUNCTURE: CPT

## 2019-06-11 PROCEDURE — 85730 THROMBOPLASTIN TIME PARTIAL: CPT

## 2019-06-11 PROCEDURE — 71046 X-RAY EXAM CHEST 2 VIEWS: CPT

## 2019-06-11 PROCEDURE — 85025 COMPLETE CBC W/AUTO DIFF WBC: CPT

## 2019-06-11 PROCEDURE — 81003 URINALYSIS AUTO W/O SCOPE: CPT

## 2019-06-21 ENCOUNTER — APPOINTMENT (OUTPATIENT)
Dept: RADIOLOGY | Facility: MEDICAL CENTER | Age: 64
DRG: 472 | End: 2019-06-21
Attending: NEUROLOGICAL SURGERY
Payer: MEDICARE

## 2019-06-21 ENCOUNTER — ANESTHESIA EVENT (OUTPATIENT)
Dept: SURGERY | Facility: MEDICAL CENTER | Age: 64
DRG: 472 | End: 2019-06-21
Payer: MEDICARE

## 2019-06-21 ENCOUNTER — HOSPITAL ENCOUNTER (INPATIENT)
Facility: MEDICAL CENTER | Age: 64
LOS: 2 days | DRG: 472 | End: 2019-06-23
Attending: NEUROLOGICAL SURGERY | Admitting: NEUROLOGICAL SURGERY
Payer: MEDICARE

## 2019-06-21 ENCOUNTER — ANESTHESIA (OUTPATIENT)
Dept: SURGERY | Facility: MEDICAL CENTER | Age: 64
DRG: 472 | End: 2019-06-21
Payer: MEDICARE

## 2019-06-21 DIAGNOSIS — G89.18 ACUTE POST-OPERATIVE PAIN: ICD-10-CM

## 2019-06-21 DIAGNOSIS — M47.12 CERVICAL SPONDYLOSIS WITH MYELOPATHY: ICD-10-CM

## 2019-06-21 LAB
GLUCOSE BLD-MCNC: 102 MG/DL (ref 65–99)
GLUCOSE BLD-MCNC: 70 MG/DL (ref 65–99)
GLUCOSE BLD-MCNC: 80 MG/DL (ref 65–99)

## 2019-06-21 PROCEDURE — 501838 HCHG SUTURE GENERAL: Performed by: NEUROLOGICAL SURGERY

## 2019-06-21 PROCEDURE — 700111 HCHG RX REV CODE 636 W/ 250 OVERRIDE (IP): Performed by: PHYSICIAN ASSISTANT

## 2019-06-21 PROCEDURE — C1713 ANCHOR/SCREW BN/BN,TIS/BN: HCPCS | Performed by: NEUROLOGICAL SURGERY

## 2019-06-21 PROCEDURE — 00NW0ZZ RELEASE CERVICAL SPINAL CORD, OPEN APPROACH: ICD-10-PCS | Performed by: NEUROLOGICAL SURGERY

## 2019-06-21 PROCEDURE — 160048 HCHG OR STATISTICAL LEVEL 1-5: Performed by: NEUROLOGICAL SURGERY

## 2019-06-21 PROCEDURE — 700101 HCHG RX REV CODE 250: Performed by: NEUROLOGICAL SURGERY

## 2019-06-21 PROCEDURE — 95925 SOMATOSENSORY TESTING: CPT | Performed by: NEUROLOGICAL SURGERY

## 2019-06-21 PROCEDURE — 160002 HCHG RECOVERY MINUTES (STAT): Performed by: NEUROLOGICAL SURGERY

## 2019-06-21 PROCEDURE — 01N10ZZ RELEASE CERVICAL NERVE, OPEN APPROACH: ICD-10-PCS | Performed by: NEUROLOGICAL SURGERY

## 2019-06-21 PROCEDURE — 160029 HCHG SURGERY MINUTES - 1ST 30 MINS LEVEL 4: Performed by: NEUROLOGICAL SURGERY

## 2019-06-21 PROCEDURE — 160035 HCHG PACU - 1ST 60 MINS PHASE I: Performed by: NEUROLOGICAL SURGERY

## 2019-06-21 PROCEDURE — 95861 NEEDLE EMG 2 EXTREMITIES: CPT | Performed by: NEUROLOGICAL SURGERY

## 2019-06-21 PROCEDURE — 700102 HCHG RX REV CODE 250 W/ 637 OVERRIDE(OP): Performed by: ANESTHESIOLOGY

## 2019-06-21 PROCEDURE — 502240 HCHG MISC OR SUPPLY RC 0272: Performed by: NEUROLOGICAL SURGERY

## 2019-06-21 PROCEDURE — 770001 HCHG ROOM/CARE - MED/SURG/GYN PRIV*

## 2019-06-21 PROCEDURE — 700102 HCHG RX REV CODE 250 W/ 637 OVERRIDE(OP): Performed by: PHYSICIAN ASSISTANT

## 2019-06-21 PROCEDURE — 4A10X4G MONITORING OF CENTRAL NERVOUS ELECTRICAL ACTIVITY, INTRAOPERATIVE, EXTERNAL APPROACH: ICD-10-PCS | Performed by: NEUROLOGICAL SURGERY

## 2019-06-21 PROCEDURE — 4A1034G MONITORING OF CENTRAL NERVOUS ELECTRICAL ACTIVITY, INTRAOPERATIVE, PERCUTANEOUS APPROACH: ICD-10-PCS | Performed by: NEUROLOGICAL SURGERY

## 2019-06-21 PROCEDURE — 160009 HCHG ANES TIME/MIN: Performed by: NEUROLOGICAL SURGERY

## 2019-06-21 PROCEDURE — 95940 IONM IN OPERATNG ROOM 15 MIN: CPT | Performed by: NEUROLOGICAL SURGERY

## 2019-06-21 PROCEDURE — 700105 HCHG RX REV CODE 258: Performed by: ANESTHESIOLOGY

## 2019-06-21 PROCEDURE — 0RG20A0 FUSION OF 2 OR MORE CERVICAL VERTEBRAL JOINTS WITH INTERBODY FUSION DEVICE, ANTERIOR APPROACH, ANTERIOR COLUMN, OPEN APPROACH: ICD-10-PCS | Performed by: NEUROLOGICAL SURGERY

## 2019-06-21 PROCEDURE — 502000 HCHG MISC OR IMPLANTS RC 0278: Performed by: NEUROLOGICAL SURGERY

## 2019-06-21 PROCEDURE — 72040 X-RAY EXAM NECK SPINE 2-3 VW: CPT

## 2019-06-21 PROCEDURE — 82962 GLUCOSE BLOOD TEST: CPT

## 2019-06-21 PROCEDURE — 700111 HCHG RX REV CODE 636 W/ 250 OVERRIDE (IP): Performed by: NEUROLOGICAL SURGERY

## 2019-06-21 PROCEDURE — 95865 NEEDLE EMG LARYNX: CPT | Performed by: NEUROLOGICAL SURGERY

## 2019-06-21 PROCEDURE — 700101 HCHG RX REV CODE 250: Performed by: PHYSICIAN ASSISTANT

## 2019-06-21 PROCEDURE — 700105 HCHG RX REV CODE 258: Performed by: NEUROLOGICAL SURGERY

## 2019-06-21 PROCEDURE — 700101 HCHG RX REV CODE 250: Performed by: ANESTHESIOLOGY

## 2019-06-21 PROCEDURE — 700111 HCHG RX REV CODE 636 W/ 250 OVERRIDE (IP): Performed by: ANESTHESIOLOGY

## 2019-06-21 PROCEDURE — 95937 NEUROMUSCULAR JUNCTION TEST: CPT | Performed by: NEUROLOGICAL SURGERY

## 2019-06-21 PROCEDURE — 0PP304Z REMOVAL OF INTERNAL FIXATION DEVICE FROM CERVICAL VERTEBRA, OPEN APPROACH: ICD-10-PCS | Performed by: NEUROLOGICAL SURGERY

## 2019-06-21 PROCEDURE — 500367 HCHG DRAIN KIT, HEMOVAC: Performed by: NEUROLOGICAL SURGERY

## 2019-06-21 PROCEDURE — 160041 HCHG SURGERY MINUTES - EA ADDL 1 MIN LEVEL 4: Performed by: NEUROLOGICAL SURGERY

## 2019-06-21 PROCEDURE — 160036 HCHG PACU - EA ADDL 30 MINS PHASE I: Performed by: NEUROLOGICAL SURGERY

## 2019-06-21 PROCEDURE — 95955 EEG DURING SURGERY: CPT | Performed by: NEUROLOGICAL SURGERY

## 2019-06-21 PROCEDURE — A4314 CATH W/DRAINAGE 2-WAY LATEX: HCPCS | Performed by: NEUROLOGICAL SURGERY

## 2019-06-21 PROCEDURE — 700105 HCHG RX REV CODE 258: Performed by: PHYSICIAN ASSISTANT

## 2019-06-21 PROCEDURE — 500864 HCHG NEEDLE, SPINAL 18G: Performed by: NEUROLOGICAL SURGERY

## 2019-06-21 PROCEDURE — A9270 NON-COVERED ITEM OR SERVICE: HCPCS | Performed by: ANESTHESIOLOGY

## 2019-06-21 PROCEDURE — 700106 HCHG RX REV CODE 271: Performed by: NEUROLOGICAL SURGERY

## 2019-06-21 PROCEDURE — A9270 NON-COVERED ITEM OR SERVICE: HCPCS | Performed by: PHYSICIAN ASSISTANT

## 2019-06-21 DEVICE — IMPLANTABLE DEVICE: Type: IMPLANTABLE DEVICE | Status: FUNCTIONAL

## 2019-06-21 DEVICE — GRAFT ACTIFUSE ABX PUTTY 1.5ML: Type: IMPLANTABLE DEVICE | Status: FUNCTIONAL

## 2019-06-21 RX ORDER — GABAPENTIN 400 MG/1
800 CAPSULE ORAL 3 TIMES DAILY
Status: DISCONTINUED | OUTPATIENT
Start: 2019-06-21 | End: 2019-06-23 | Stop reason: HOSPADM

## 2019-06-21 RX ORDER — SODIUM CHLORIDE, SODIUM LACTATE, POTASSIUM CHLORIDE, CALCIUM CHLORIDE 600; 310; 30; 20 MG/100ML; MG/100ML; MG/100ML; MG/100ML
INJECTION, SOLUTION INTRAVENOUS
Status: DISCONTINUED | OUTPATIENT
Start: 2019-06-21 | End: 2019-06-21 | Stop reason: SURG

## 2019-06-21 RX ORDER — HYDROMORPHONE HYDROCHLORIDE 1 MG/ML
0.4 INJECTION, SOLUTION INTRAMUSCULAR; INTRAVENOUS; SUBCUTANEOUS
Status: DISCONTINUED | OUTPATIENT
Start: 2019-06-21 | End: 2019-06-21 | Stop reason: HOSPADM

## 2019-06-21 RX ORDER — AMOXICILLIN 250 MG
1 CAPSULE ORAL NIGHTLY
Status: DISCONTINUED | OUTPATIENT
Start: 2019-06-21 | End: 2019-06-23 | Stop reason: HOSPADM

## 2019-06-21 RX ORDER — LABETALOL HYDROCHLORIDE 5 MG/ML
5 INJECTION, SOLUTION INTRAVENOUS
Status: DISCONTINUED | OUTPATIENT
Start: 2019-06-21 | End: 2019-06-21 | Stop reason: HOSPADM

## 2019-06-21 RX ORDER — OXYCODONE HYDROCHLORIDE 10 MG/1
10 TABLET ORAL
Status: DISCONTINUED | OUTPATIENT
Start: 2019-06-21 | End: 2019-06-23 | Stop reason: HOSPADM

## 2019-06-21 RX ORDER — METHOCARBAMOL 750 MG/1
750 TABLET, FILM COATED ORAL 4 TIMES DAILY
Status: DISCONTINUED | OUTPATIENT
Start: 2019-06-21 | End: 2019-06-23 | Stop reason: HOSPADM

## 2019-06-21 RX ORDER — VENLAFAXINE HYDROCHLORIDE 75 MG/1
150 CAPSULE, EXTENDED RELEASE ORAL EVERY EVENING
Status: DISCONTINUED | OUTPATIENT
Start: 2019-06-21 | End: 2019-06-23 | Stop reason: HOSPADM

## 2019-06-21 RX ORDER — SODIUM CHLORIDE, SODIUM LACTATE, POTASSIUM CHLORIDE, CALCIUM CHLORIDE 600; 310; 30; 20 MG/100ML; MG/100ML; MG/100ML; MG/100ML
INJECTION, SOLUTION INTRAVENOUS CONTINUOUS
Status: DISCONTINUED | OUTPATIENT
Start: 2019-06-21 | End: 2019-06-21 | Stop reason: HOSPADM

## 2019-06-21 RX ORDER — POTASSIUM CHLORIDE 20 MEQ/1
10 TABLET, EXTENDED RELEASE ORAL EVERY EVENING
Status: DISCONTINUED | OUTPATIENT
Start: 2019-06-21 | End: 2019-06-23 | Stop reason: HOSPADM

## 2019-06-21 RX ORDER — HYDROMORPHONE HYDROCHLORIDE 1 MG/ML
0.2 INJECTION, SOLUTION INTRAMUSCULAR; INTRAVENOUS; SUBCUTANEOUS
Status: DISCONTINUED | OUTPATIENT
Start: 2019-06-21 | End: 2019-06-21 | Stop reason: HOSPADM

## 2019-06-21 RX ORDER — ONDANSETRON 2 MG/ML
INJECTION INTRAMUSCULAR; INTRAVENOUS PRN
Status: DISCONTINUED | OUTPATIENT
Start: 2019-06-21 | End: 2019-06-21 | Stop reason: SURG

## 2019-06-21 RX ORDER — HYDROMORPHONE HYDROCHLORIDE 2 MG/ML
INJECTION, SOLUTION INTRAMUSCULAR; INTRAVENOUS; SUBCUTANEOUS PRN
Status: DISCONTINUED | OUTPATIENT
Start: 2019-06-21 | End: 2019-06-21 | Stop reason: SURG

## 2019-06-21 RX ORDER — HALOPERIDOL 5 MG/ML
1 INJECTION INTRAMUSCULAR
Status: DISCONTINUED | OUTPATIENT
Start: 2019-06-21 | End: 2019-06-21 | Stop reason: HOSPADM

## 2019-06-21 RX ORDER — SODIUM CHLORIDE 9 MG/ML
INJECTION, SOLUTION INTRAVENOUS CONTINUOUS
Status: DISCONTINUED | OUTPATIENT
Start: 2019-06-21 | End: 2019-06-23 | Stop reason: HOSPADM

## 2019-06-21 RX ORDER — BISACODYL 10 MG
10 SUPPOSITORY, RECTAL RECTAL
Status: DISCONTINUED | OUTPATIENT
Start: 2019-06-21 | End: 2019-06-23 | Stop reason: HOSPADM

## 2019-06-21 RX ORDER — BUPIVACAINE HYDROCHLORIDE 5 MG/ML
INJECTION, SOLUTION PERINEURAL
Status: DISCONTINUED | OUTPATIENT
Start: 2019-06-21 | End: 2019-06-21 | Stop reason: HOSPADM

## 2019-06-21 RX ORDER — HYDROMORPHONE HYDROCHLORIDE 1 MG/ML
0.5 INJECTION, SOLUTION INTRAMUSCULAR; INTRAVENOUS; SUBCUTANEOUS
Status: DISCONTINUED | OUTPATIENT
Start: 2019-06-21 | End: 2019-06-23 | Stop reason: HOSPADM

## 2019-06-21 RX ORDER — ALPRAZOLAM 0.25 MG/1
0.25 TABLET ORAL 2 TIMES DAILY PRN
Status: DISCONTINUED | OUTPATIENT
Start: 2019-06-21 | End: 2019-06-23 | Stop reason: HOSPADM

## 2019-06-21 RX ORDER — OXYCODONE HYDROCHLORIDE 5 MG/1
5 TABLET ORAL
Status: DISCONTINUED | OUTPATIENT
Start: 2019-06-21 | End: 2019-06-23 | Stop reason: HOSPADM

## 2019-06-21 RX ORDER — ONDANSETRON 2 MG/ML
4 INJECTION INTRAMUSCULAR; INTRAVENOUS
Status: DISCONTINUED | OUTPATIENT
Start: 2019-06-21 | End: 2019-06-21 | Stop reason: HOSPADM

## 2019-06-21 RX ORDER — DIPHENHYDRAMINE HYDROCHLORIDE 50 MG/ML
12.5 INJECTION INTRAMUSCULAR; INTRAVENOUS
Status: DISCONTINUED | OUTPATIENT
Start: 2019-06-21 | End: 2019-06-21 | Stop reason: HOSPADM

## 2019-06-21 RX ORDER — CLONIDINE HYDROCHLORIDE 0.1 MG/1
0.1 TABLET ORAL EVERY 4 HOURS PRN
Status: DISCONTINUED | OUTPATIENT
Start: 2019-06-21 | End: 2019-06-23 | Stop reason: HOSPADM

## 2019-06-21 RX ORDER — CALCIUM CARBONATE 500 MG/1
500 TABLET, CHEWABLE ORAL 2 TIMES DAILY
Status: DISCONTINUED | OUTPATIENT
Start: 2019-06-21 | End: 2019-06-23 | Stop reason: HOSPADM

## 2019-06-21 RX ORDER — ROPINIROLE 2 MG/1
4 TABLET, FILM COATED ORAL 2 TIMES DAILY
Status: DISCONTINUED | OUTPATIENT
Start: 2019-06-21 | End: 2019-06-23 | Stop reason: HOSPADM

## 2019-06-21 RX ORDER — SODIUM CHLORIDE, SODIUM LACTATE, POTASSIUM CHLORIDE, CALCIUM CHLORIDE 600; 310; 30; 20 MG/100ML; MG/100ML; MG/100ML; MG/100ML
INJECTION, SOLUTION INTRAVENOUS CONTINUOUS
Status: ACTIVE | OUTPATIENT
Start: 2019-06-21 | End: 2019-06-22

## 2019-06-21 RX ORDER — PHENYLEPHRINE HYDROCHLORIDE 10 MG/ML
INJECTION, SOLUTION INTRAMUSCULAR; INTRAVENOUS; SUBCUTANEOUS PRN
Status: DISCONTINUED | OUTPATIENT
Start: 2019-06-21 | End: 2019-06-21 | Stop reason: SURG

## 2019-06-21 RX ORDER — SODIUM CHLORIDE, SODIUM LACTATE, POTASSIUM CHLORIDE, AND CALCIUM CHLORIDE .6; .31; .03; .02 G/100ML; G/100ML; G/100ML; G/100ML
IRRIGANT IRRIGATION
Status: DISCONTINUED | OUTPATIENT
Start: 2019-06-21 | End: 2019-06-21 | Stop reason: HOSPADM

## 2019-06-21 RX ORDER — OXYCODONE HCL 5 MG/5 ML
5 SOLUTION, ORAL ORAL
Status: COMPLETED | OUTPATIENT
Start: 2019-06-21 | End: 2019-06-21

## 2019-06-21 RX ORDER — DOCUSATE SODIUM 100 MG/1
100 CAPSULE, LIQUID FILLED ORAL
Status: DISCONTINUED | OUTPATIENT
Start: 2019-06-21 | End: 2019-06-21

## 2019-06-21 RX ORDER — DIPHENHYDRAMINE HCL 25 MG
25 TABLET ORAL EVERY 6 HOURS PRN
Status: DISCONTINUED | OUTPATIENT
Start: 2019-06-21 | End: 2019-06-23 | Stop reason: HOSPADM

## 2019-06-21 RX ORDER — TEMAZEPAM 15 MG/1
30 CAPSULE ORAL
Status: DISCONTINUED | OUTPATIENT
Start: 2019-06-21 | End: 2019-06-23 | Stop reason: HOSPADM

## 2019-06-21 RX ORDER — ONDANSETRON 2 MG/ML
4 INJECTION INTRAMUSCULAR; INTRAVENOUS EVERY 4 HOURS PRN
Status: DISCONTINUED | OUTPATIENT
Start: 2019-06-21 | End: 2019-06-23 | Stop reason: HOSPADM

## 2019-06-21 RX ORDER — DOCUSATE SODIUM 100 MG/1
100 CAPSULE, LIQUID FILLED ORAL 2 TIMES DAILY
Status: DISCONTINUED | OUTPATIENT
Start: 2019-06-21 | End: 2019-06-23 | Stop reason: HOSPADM

## 2019-06-21 RX ORDER — FUROSEMIDE 40 MG/1
40 TABLET ORAL EVERY EVENING
Status: DISCONTINUED | OUTPATIENT
Start: 2019-06-21 | End: 2019-06-23 | Stop reason: HOSPADM

## 2019-06-21 RX ORDER — BUPIVACAINE HYDROCHLORIDE AND EPINEPHRINE 5; 5 MG/ML; UG/ML
INJECTION, SOLUTION PERINEURAL
Status: DISCONTINUED | OUTPATIENT
Start: 2019-06-21 | End: 2019-06-21 | Stop reason: HOSPADM

## 2019-06-21 RX ORDER — VENLAFAXINE HYDROCHLORIDE 150 MG/1
150 CAPSULE, EXTENDED RELEASE ORAL EVERY EVENING
Status: ON HOLD | COMMUNITY
End: 2019-10-01 | Stop reason: SDUPTHER

## 2019-06-21 RX ORDER — OXYCODONE HCL 5 MG/5 ML
10 SOLUTION, ORAL ORAL
Status: COMPLETED | OUTPATIENT
Start: 2019-06-21 | End: 2019-06-21

## 2019-06-21 RX ORDER — GABAPENTIN 800 MG/1
800 TABLET ORAL 3 TIMES DAILY
Status: ON HOLD | COMMUNITY
End: 2019-10-01 | Stop reason: SDUPTHER

## 2019-06-21 RX ORDER — ENEMA 19; 7 G/133ML; G/133ML
1 ENEMA RECTAL
Status: DISCONTINUED | OUTPATIENT
Start: 2019-06-21 | End: 2019-06-23 | Stop reason: HOSPADM

## 2019-06-21 RX ORDER — DEXAMETHASONE SODIUM PHOSPHATE 4 MG/ML
4 INJECTION, SOLUTION INTRA-ARTICULAR; INTRALESIONAL; INTRAMUSCULAR; INTRAVENOUS; SOFT TISSUE EVERY 6 HOURS
Status: COMPLETED | OUTPATIENT
Start: 2019-06-21 | End: 2019-06-22

## 2019-06-21 RX ORDER — POLYETHYLENE GLYCOL 3350 17 G/17G
1 POWDER, FOR SOLUTION ORAL 2 TIMES DAILY PRN
Status: DISCONTINUED | OUTPATIENT
Start: 2019-06-21 | End: 2019-06-23 | Stop reason: HOSPADM

## 2019-06-21 RX ORDER — CLINDAMYCIN PHOSPHATE 900 MG/50ML
900 INJECTION, SOLUTION INTRAVENOUS EVERY 8 HOURS
Status: COMPLETED | OUTPATIENT
Start: 2019-06-21 | End: 2019-06-22

## 2019-06-21 RX ORDER — MIDAZOLAM HYDROCHLORIDE 1 MG/ML
1 INJECTION INTRAMUSCULAR; INTRAVENOUS
Status: DISCONTINUED | OUTPATIENT
Start: 2019-06-21 | End: 2019-06-21 | Stop reason: HOSPADM

## 2019-06-21 RX ORDER — GLIPIZIDE 5 MG/1
5 TABLET ORAL EVERY EVENING
Status: DISCONTINUED | OUTPATIENT
Start: 2019-06-21 | End: 2019-06-23 | Stop reason: HOSPADM

## 2019-06-21 RX ORDER — CEFAZOLIN SODIUM 1 G/3ML
INJECTION, POWDER, FOR SOLUTION INTRAMUSCULAR; INTRAVENOUS PRN
Status: DISCONTINUED | OUTPATIENT
Start: 2019-06-21 | End: 2019-06-21 | Stop reason: SURG

## 2019-06-21 RX ORDER — DOCUSATE SODIUM 100 MG/1
100 CAPSULE, LIQUID FILLED ORAL
COMMUNITY
End: 2019-07-06

## 2019-06-21 RX ORDER — ONDANSETRON 4 MG/1
4 TABLET, ORALLY DISINTEGRATING ORAL EVERY 4 HOURS PRN
Status: DISCONTINUED | OUTPATIENT
Start: 2019-06-21 | End: 2019-06-23 | Stop reason: HOSPADM

## 2019-06-21 RX ORDER — AMOXICILLIN 250 MG
1 CAPSULE ORAL
Status: DISCONTINUED | OUTPATIENT
Start: 2019-06-21 | End: 2019-06-23 | Stop reason: HOSPADM

## 2019-06-21 RX ORDER — HYDROMORPHONE HYDROCHLORIDE 1 MG/ML
0.1 INJECTION, SOLUTION INTRAMUSCULAR; INTRAVENOUS; SUBCUTANEOUS
Status: DISCONTINUED | OUTPATIENT
Start: 2019-06-21 | End: 2019-06-21 | Stop reason: HOSPADM

## 2019-06-21 RX ORDER — BACITRACIN 50000 [IU]/1
INJECTION, POWDER, FOR SOLUTION INTRAMUSCULAR
Status: DISCONTINUED | OUTPATIENT
Start: 2019-06-21 | End: 2019-06-21 | Stop reason: HOSPADM

## 2019-06-21 RX ORDER — DIPHENHYDRAMINE HYDROCHLORIDE 50 MG/ML
25 INJECTION INTRAMUSCULAR; INTRAVENOUS EVERY 6 HOURS PRN
Status: DISCONTINUED | OUTPATIENT
Start: 2019-06-21 | End: 2019-06-23 | Stop reason: HOSPADM

## 2019-06-21 RX ADMIN — FENTANYL CITRATE 50 MCG: 50 INJECTION INTRAMUSCULAR; INTRAVENOUS at 17:44

## 2019-06-21 RX ADMIN — CEFAZOLIN 2 G: 330 INJECTION, POWDER, FOR SOLUTION INTRAMUSCULAR; INTRAVENOUS at 14:22

## 2019-06-21 RX ADMIN — LIDOCAINE HYDROCHLORIDE 100 MG: 20 INJECTION, SOLUTION INTRAVENOUS at 14:24

## 2019-06-21 RX ADMIN — ONDANSETRON 4 MG: 2 INJECTION INTRAMUSCULAR; INTRAVENOUS at 16:25

## 2019-06-21 RX ADMIN — HYDROMORPHONE HYDROCHLORIDE 1 MG: 2 INJECTION, SOLUTION INTRAMUSCULAR; INTRAVENOUS; SUBCUTANEOUS at 16:26

## 2019-06-21 RX ADMIN — SUCCINYLCHOLINE CHLORIDE 140 MG: 20 INJECTION, SOLUTION INTRAMUSCULAR; INTRAVENOUS at 14:25

## 2019-06-21 RX ADMIN — SODIUM CHLORIDE: 9 INJECTION, SOLUTION INTRAVENOUS at 20:22

## 2019-06-21 RX ADMIN — EPHEDRINE SULFATE 10 MG: 50 INJECTION INTRAMUSCULAR; INTRAVENOUS; SUBCUTANEOUS at 14:42

## 2019-06-21 RX ADMIN — DEXAMETHASONE SODIUM PHOSPHATE 4 MG: 4 INJECTION, SOLUTION INTRA-ARTICULAR; INTRALESIONAL; INTRAMUSCULAR; INTRAVENOUS; SOFT TISSUE at 20:12

## 2019-06-21 RX ADMIN — PHENYLEPHRINE HYDROCHLORIDE 40 MCG: 10 INJECTION INTRAVENOUS at 15:00

## 2019-06-21 RX ADMIN — EPHEDRINE SULFATE 10 MG: 50 INJECTION INTRAMUSCULAR; INTRAVENOUS; SUBCUTANEOUS at 14:38

## 2019-06-21 RX ADMIN — PROPOFOL 140 MCG/KG/MIN: 10 INJECTION, EMULSION INTRAVENOUS at 14:27

## 2019-06-21 RX ADMIN — GLIPIZIDE 5 MG: 5 TABLET ORAL at 20:12

## 2019-06-21 RX ADMIN — PHENYLEPHRINE HYDROCHLORIDE 20 MCG: 10 INJECTION INTRAVENOUS at 15:40

## 2019-06-21 RX ADMIN — PROPOFOL 20 MG: 10 INJECTION, EMULSION INTRAVENOUS at 14:23

## 2019-06-21 RX ADMIN — ALFENTANIL HYDROCHLORIDE 1000 MCG: 500 INJECTION, SOLUTION INTRAVENOUS at 14:23

## 2019-06-21 RX ADMIN — OXYCODONE HYDROCHLORIDE 10 MG: 5 SOLUTION ORAL at 17:04

## 2019-06-21 RX ADMIN — SODIUM CHLORIDE, POTASSIUM CHLORIDE, SODIUM LACTATE AND CALCIUM CHLORIDE: 600; 310; 30; 20 INJECTION, SOLUTION INTRAVENOUS at 13:39

## 2019-06-21 RX ADMIN — CLINDAMYCIN IN 5 PERCENT DEXTROSE 900 MG: 18 INJECTION, SOLUTION INTRAVENOUS at 21:43

## 2019-06-21 RX ADMIN — PHENYLEPHRINE HYDROCHLORIDE 40 MCG: 10 INJECTION INTRAVENOUS at 14:44

## 2019-06-21 RX ADMIN — PROPOFOL 100 MG: 10 INJECTION, EMULSION INTRAVENOUS at 14:25

## 2019-06-21 RX ADMIN — REMIFENTANIL HYDROCHLORIDE 0.25 MCG/KG/MIN: 1 INJECTION, POWDER, LYOPHILIZED, FOR SOLUTION INTRAVENOUS at 14:28

## 2019-06-21 RX ADMIN — SODIUM CHLORIDE, POTASSIUM CHLORIDE, SODIUM LACTATE AND CALCIUM CHLORIDE: 600; 310; 30; 20 INJECTION, SOLUTION INTRAVENOUS at 14:22

## 2019-06-21 RX ADMIN — ROCURONIUM BROMIDE 5 MG: 10 INJECTION, SOLUTION INTRAVENOUS at 14:23

## 2019-06-21 ASSESSMENT — PAIN SCALES - GENERAL: PAIN_LEVEL: 0

## 2019-06-21 NOTE — OR NURSING
POC reviewed with pt. Call light within reach, educated to call for assistance, hourly rounding in place, bed in lowest position and locked.     Pt FS 70. Anesthesiologist aware, no new orders.

## 2019-06-21 NOTE — ANESTHESIA TIME REPORT
Anesthesia Start and Stop Event Times     Date Time Event    6/21/2019 1422 Anesthesia Start     1655 Anesthesia Stop        Responsible Staff  06/21/19    Name Role Begin End    Jerry Trivedi M.D. Anesth 1422 1655        Preop Diagnosis (Free Text):  Pre-op Diagnosis     CERVICAL STENOSIS        Preop Diagnosis (Codes):  Diagnosis Information     Diagnosis Code(s): Cervical stenosis of spinal canal [M48.02]        Post op Diagnosis  DDD (degenerative disc disease), cervical      Premium Reason  A. 3PM - 7AM    Comments:

## 2019-06-21 NOTE — ANESTHESIA POSTPROCEDURE EVALUATION
Patient: Valentina Lu    Procedure Summary     Date:  06/21/19 Room / Location:  [facility] OR 07 / SURGERY [facility]    Anesthesia Start:  1422 Anesthesia Stop:  1655    Procedures:       DISCECTOMY, SPINE, CERVICAL, ANTERIOR APPROACH, WITH FUSION - C3-5 (Neck)      REMOVAL, HARDWARE - C5-7 (Neck) Diagnosis:       Cervical stenosis of spinal canal      (cervical stenosis )    Surgeon:  Remi Lechuga III, M.D. Responsible Provider:  Jerry Trivedi M.D.    Anesthesia Type:  general ASA Status:  3          Final Anesthesia Type: general  Last vitals  BP   Blood Pressure : 149/94    Temp   36.9 °C (98.4 °F)    Pulse   Pulse: 73   Resp   16    SpO2   97 %      Anesthesia Post Evaluation    Patient location during evaluation: PACU  Patient participation: complete - patient participated  Level of consciousness: awake and alert  Pain score: 0    Airway patency: patent  Anesthetic complications: no  Cardiovascular status: hemodynamically stable  Respiratory status: acceptable  Hydration status: euvolemic    PONV: none

## 2019-06-21 NOTE — PROGRESS NOTES
The Medication Reconciliation process has been completed by interviewing the patient    Allergies have been reviewed  Antibiotic use in 30 days - none    Home Pharmacy:  [facility] [place]

## 2019-06-21 NOTE — ANESTHESIA QCDR
2019 [facility] Clinical Data Registry (for Quality Improvement)     Postoperative nausea/vomiting risk protocol (Adult = 18 yrs and Pediatric 3-17 yrs)- (430 and 463)  General inhalation anesthetic (NOT TIVA) with PONV risk factors: Yes  Provision of anti-emetic therapy with at least 2 different classes of agents: Yes   Patient DID NOT receive anti-emetic therapy and reason is documented in Medical Record:  N/A    Multimodal Pain Management- (AQI59)  Patient undergoing Elective Surgery (i.e. Outpatient, or ASC, or Prescheduled Surgery prior to Hospital Admission): Yes  Use of Multimodal Pain Management, two or more drugs and/or interventions, NOT including systemic opioids: Yes   Exception: Documented allergy to multiple classes of analgesics:  N/A    PACU assessment of acute postoperative pain prior to Anesthesia Care End- Applies to Patients Age = 18- (ABG7)  Initial PACU pain score is which of the following: < 7/10  Patient unable to report pain score: N/A    Post-anesthetic transfer of care checklist/protocol to PACU/ICU- (426 and 427)  Upon conclusion of case, patient transferred to which of the following locations: PACU/Non-ICU  Use of transfer checklist/protocol: Yes  Exclusion: Service Performed in Patient Hospital Room (and thus did not require transfer): N/A    PACU Reintubation- (AQI31)  General anesthesia requiring endotracheal intubation (ETT) along with subsequent extubation in OR or PACU: Yes  Required reintubation in the PACU: No   Extubation was a planned trial documented in the medical record prior to removal of the original airway device:  N/A    Unplanned admission to ICU related to anesthesia service up through end of PACU care- (MD51)  Unplanned admission to ICU (not initially anticipated at anesthesia start time): No

## 2019-06-21 NOTE — ANESTHESIA PREPROCEDURE EVALUATION
Relevant Problems   (+) Acute renal failure (HCC)   (+) COPD (chronic obstructive pulmonary disease) (HCC)   (+) HTN (hypertension)       Physical Exam    Airway   Mallampati: II  TM distance: >3 FB  Neck ROM: full       Cardiovascular - normal exam  Rhythm: regular  Rate: normal     Dental - normal exam         Pulmonary - normal exam  Breath sounds clear to auscultation     Abdominal    Neurological - normal exam                 Anesthesia Plan    ASA 3   ASA physical status 3 criteria: other (comment)    Plan - general       Airway plan will be ETT        Induction: intravenous    Postoperative Plan: Postoperative administration of opioids is intended.    Pertinent diagnostic labs and testing reviewed    Informed Consent:    Anesthetic plan and risks discussed with patient.    Use of blood products discussed with: patient whom consented to blood products.

## 2019-06-21 NOTE — OR SURGEON
Immediate Post OP Note    PreOp Diagnosis: C3-5 DDD, cervical stenosis.    PostOp Diagnosis: Same.    Procedure(s):  DISCECTOMY, SPINE, CERVICAL, ANTERIOR APPROACH, WITH FUSION - C3-5 - Wound Class: Clean with Drain  REMOVAL, HARDWARE - C5-7 - Wound Class: Clean with Drain    Surgeon(s):  Remi Lechuga III, M.D.    Anesthesiologist/Type of Anesthesia:  Anesthesiologist: Jerry Trivedi M.D./General    Surgical Staff:  Assistant: Reymundo Farfan P.A.-C.; Linn Pineda P.A.-C.  Circulator: Evelin Ventura R.N.  Scrub Person: Namrata Guillen  Radiology Technologist: Raquel Cantor    Specimens removed if any:  * No specimens in log *    Estimated Blood Loss: 20cc.    Findings: See op note.    Complications: None.        6/21/2019 4:56 PM Linn Pineda P.A.-C.

## 2019-06-21 NOTE — ANESTHESIA PROCEDURE NOTES
Airway  Date/Time: 6/21/2019 2:25 PM  Performed by: NONI AMBROSE  Authorized by: NONI AMBROSE     Location:  OR  Urgency:  Elective  Indications for Airway Management:  Anesthesia  Spontaneous Ventilation: absent    Sedation Level:  Deep  Preoxygenated: Yes    Patient Position:  Sniffing  Final Airway Type:  Endotracheal airway  Final Endotracheal Airway:  ETT and NIM tube  Cuffed: Yes    Technique Used for Successful ETT Placement:  Direct laryngoscopy  Insertion Site:  Oral  Blade Type:  Arnold  Laryngoscope Blade/Videolaryngoscope Blade Size:  2  ETT Size (mm):  8.0  Measured from:  Lips  ETT to Lips (cm):  18  Placement Verified by: auscultation and capnometry    Cormack-Lehane Classification:  Grade I - full view of glottis  Number of Attempts at Approach:  1

## 2019-06-22 LAB
GLUCOSE BLD-MCNC: 115 MG/DL (ref 65–99)
GLUCOSE BLD-MCNC: 139 MG/DL (ref 65–99)
GLUCOSE BLD-MCNC: 139 MG/DL (ref 65–99)
GLUCOSE BLD-MCNC: 146 MG/DL (ref 65–99)

## 2019-06-22 PROCEDURE — 700102 HCHG RX REV CODE 250 W/ 637 OVERRIDE(OP): Performed by: PHYSICIAN ASSISTANT

## 2019-06-22 PROCEDURE — 97162 PT EVAL MOD COMPLEX 30 MIN: CPT

## 2019-06-22 PROCEDURE — 700112 HCHG RX REV CODE 229: Performed by: PHYSICIAN ASSISTANT

## 2019-06-22 PROCEDURE — A9270 NON-COVERED ITEM OR SERVICE: HCPCS | Performed by: PHYSICIAN ASSISTANT

## 2019-06-22 PROCEDURE — 97165 OT EVAL LOW COMPLEX 30 MIN: CPT

## 2019-06-22 PROCEDURE — 700101 HCHG RX REV CODE 250: Performed by: PHYSICIAN ASSISTANT

## 2019-06-22 PROCEDURE — 82962 GLUCOSE BLOOD TEST: CPT | Mod: 91

## 2019-06-22 PROCEDURE — 770001 HCHG ROOM/CARE - MED/SURG/GYN PRIV*

## 2019-06-22 PROCEDURE — 700111 HCHG RX REV CODE 636 W/ 250 OVERRIDE (IP): Performed by: PHYSICIAN ASSISTANT

## 2019-06-22 RX ORDER — BUTALBITAL, ACETAMINOPHEN AND CAFFEINE 50; 325; 40 MG/1; MG/1; MG/1
1 TABLET ORAL EVERY 6 HOURS PRN
Status: DISCONTINUED | OUTPATIENT
Start: 2019-06-22 | End: 2019-06-23 | Stop reason: HOSPADM

## 2019-06-22 RX ADMIN — GABAPENTIN 800 MG: 400 CAPSULE ORAL at 11:56

## 2019-06-22 RX ADMIN — METHOCARBAMOL TABLETS 750 MG: 750 TABLET, COATED ORAL at 11:56

## 2019-06-22 RX ADMIN — ROPINIROLE HYDROCHLORIDE 4 MG: 2 TABLET, FILM COATED ORAL at 16:30

## 2019-06-22 RX ADMIN — METHOCARBAMOL TABLETS 750 MG: 750 TABLET, COATED ORAL at 21:34

## 2019-06-22 RX ADMIN — ANTACID TABLETS 500 MG: 500 TABLET, CHEWABLE ORAL at 16:31

## 2019-06-22 RX ADMIN — GABAPENTIN 800 MG: 400 CAPSULE ORAL at 07:46

## 2019-06-22 RX ADMIN — HYDROMORPHONE HYDROCHLORIDE 0.5 MG: 1 INJECTION, SOLUTION INTRAMUSCULAR; INTRAVENOUS; SUBCUTANEOUS at 06:09

## 2019-06-22 RX ADMIN — GLIPIZIDE 5 MG: 5 TABLET ORAL at 16:33

## 2019-06-22 RX ADMIN — VENLAFAXINE HYDROCHLORIDE 150 MG: 75 CAPSULE, EXTENDED RELEASE ORAL at 16:43

## 2019-06-22 RX ADMIN — OXYCODONE HYDROCHLORIDE 10 MG: 10 TABLET ORAL at 21:33

## 2019-06-22 RX ADMIN — GABAPENTIN 800 MG: 400 CAPSULE ORAL at 16:30

## 2019-06-22 RX ADMIN — SITAGLIPTIN 100 MG: 100 TABLET, FILM COATED ORAL at 07:46

## 2019-06-22 RX ADMIN — DEXAMETHASONE SODIUM PHOSPHATE 4 MG: 4 INJECTION, SOLUTION INTRA-ARTICULAR; INTRALESIONAL; INTRAMUSCULAR; INTRAVENOUS; SOFT TISSUE at 06:09

## 2019-06-22 RX ADMIN — DOCUSATE SODIUM 100 MG: 100 CAPSULE ORAL at 16:32

## 2019-06-22 RX ADMIN — BUTALBITAL, ACETAMINOPHEN, AND CAFFEINE 1 TABLET: 50; 325; 40 TABLET ORAL at 17:47

## 2019-06-22 RX ADMIN — METHOCARBAMOL TABLETS 750 MG: 750 TABLET, COATED ORAL at 16:30

## 2019-06-22 RX ADMIN — VITAMIN D, TAB 1000IU (100/BT) 5000 UNITS: 25 TAB at 11:55

## 2019-06-22 RX ADMIN — OXYCODONE HYDROCHLORIDE 10 MG: 10 TABLET ORAL at 16:33

## 2019-06-22 RX ADMIN — ANTACID TABLETS 500 MG: 500 TABLET, CHEWABLE ORAL at 07:46

## 2019-06-22 RX ADMIN — SENNOSIDES, DOCUSATE SODIUM 1 TABLET: 50; 8.6 TABLET, FILM COATED ORAL at 21:35

## 2019-06-22 RX ADMIN — ROPINIROLE HYDROCHLORIDE 4 MG: 2 TABLET, FILM COATED ORAL at 07:53

## 2019-06-22 RX ADMIN — METHOCARBAMOL TABLETS 750 MG: 750 TABLET, COATED ORAL at 07:46

## 2019-06-22 RX ADMIN — FUROSEMIDE 40 MG: 40 TABLET ORAL at 16:32

## 2019-06-22 RX ADMIN — HYDROMORPHONE HYDROCHLORIDE 0.5 MG: 1 INJECTION, SOLUTION INTRAMUSCULAR; INTRAVENOUS; SUBCUTANEOUS at 02:24

## 2019-06-22 RX ADMIN — CLINDAMYCIN IN 5 PERCENT DEXTROSE 900 MG: 18 INJECTION, SOLUTION INTRAVENOUS at 02:38

## 2019-06-22 RX ADMIN — DEXAMETHASONE SODIUM PHOSPHATE 4 MG: 4 INJECTION, SOLUTION INTRA-ARTICULAR; INTRALESIONAL; INTRAMUSCULAR; INTRAVENOUS; SOFT TISSUE at 02:40

## 2019-06-22 RX ADMIN — DOCUSATE SODIUM 100 MG: 100 CAPSULE ORAL at 07:52

## 2019-06-22 RX ADMIN — OXYCODONE HYDROCHLORIDE 10 MG: 10 TABLET ORAL at 11:57

## 2019-06-22 RX ADMIN — ENOXAPARIN SODIUM 40 MG: 100 INJECTION SUBCUTANEOUS at 16:32

## 2019-06-22 RX ADMIN — TEMAZEPAM 30 MG: 15 CAPSULE ORAL at 21:36

## 2019-06-22 RX ADMIN — OXYCODONE HYDROCHLORIDE 10 MG: 10 TABLET ORAL at 07:49

## 2019-06-22 RX ADMIN — POTASSIUM CHLORIDE 10 MEQ: 1500 TABLET, EXTENDED RELEASE ORAL at 16:32

## 2019-06-22 ASSESSMENT — COGNITIVE AND FUNCTIONAL STATUS - GENERAL
HELP NEEDED FOR BATHING: A LITTLE
DRESSING REGULAR UPPER BODY CLOTHING: A LITTLE
DAILY ACTIVITIY SCORE: 21
DRESSING REGULAR LOWER BODY CLOTHING: A LITTLE
MOVING TO AND FROM BED TO CHAIR: A LITTLE
WALKING IN HOSPITAL ROOM: A LITTLE
CLIMB 3 TO 5 STEPS WITH RAILING: A LITTLE
SUGGESTED CMS G CODE MODIFIER MOBILITY: CK
MOBILITY SCORE: 18
SUGGESTED CMS G CODE MODIFIER DAILY ACTIVITY: CJ
TURNING FROM BACK TO SIDE WHILE IN FLAT BAD: A LOT
STANDING UP FROM CHAIR USING ARMS: A LITTLE

## 2019-06-22 ASSESSMENT — PATIENT HEALTH QUESTIONNAIRE - PHQ9
SUM OF ALL RESPONSES TO PHQ9 QUESTIONS 1 AND 2: 0
1. LITTLE INTEREST OR PLEASURE IN DOING THINGS: NOT AT ALL
2. FEELING DOWN, DEPRESSED, IRRITABLE, OR HOPELESS: NOT AT ALL

## 2019-06-22 ASSESSMENT — GAIT ASSESSMENTS
DISTANCE (FEET): 120
ASSISTIVE DEVICE: FRONT WHEEL WALKER
GAIT LEVEL OF ASSIST: CONTACT GUARD ASSIST

## 2019-06-22 ASSESSMENT — ACTIVITIES OF DAILY LIVING (ADL): TOILETING: INDEPENDENT

## 2019-06-22 NOTE — PROGRESS NOTES
Pt aaox4. FLANAGAN 5/5. Up w/ x1 assist, steady gait with FWW. Pt c/o neck pain, Oxy given PRN w/ + results. +BS, passed bedside swallow, full liquid diet ordered for breakfast. Per Dr. Lechuga advance pt to regular diet. Pt then states difficulty swallowing regular diet and wants to go back on full liquid diet. Voiding w/o difficulty. Incision with s.s. Drain d/c'd by PA this AM. Aspen collar in place when OOB. Ok to have off in bed per PA. Reviewed poc with pt-verbalized understanding. Bed alarm in use. Call light in reach. Pt/Ot to work with pt this afternoon.

## 2019-06-22 NOTE — PROGRESS NOTES
Neurosurgery Progress Note    Subjective:  POD#1 Removal of C5-7 ACDF Plate with extension of C3-5 ACDF  Sore when swallowing this AM with failed swallow screenning at 4 am.   Neck sorer that last surgery but we had to go down to remove previous plate and extended fusion to C3  No swelling or fullness and speaking and breathing well.  Wound C/D/I.     Drain at 10 cc/ 8 hours   Has ambulated in room    Exam:  A&O x 3 . Speaking clearly. Afebrile  No significant swelling in neck.  Sore to swallow but hungry   Arms strong at 5/5  Sensory intact      BP  Min: 131/86  Max: 149/94  Pulse  Av.1  Min: 73  Max: 107  Resp  Av.7  Min: 8  Max: 17  Temp  Av.5 °C (97.7 °F)  Min: 36.1 °C (97 °F)  Max: 37 °C (98.6 °F)  SpO2  Av.8 %  Min: 72 %  Max: 97 %    No Data Recorded                      Intake/Output       19 0700 - 1959 19 07 - 19 0659      8602-2521 0587-6643 Total 1530-8854 4069-6877 Total       Intake    P.O.  150  -- 150  --  -- --    P.O. 150 -- 150 -- -- --    I.V.  1000  -- 1000  --  -- --    Volume (mL) (lactated ringers infusion) 1000 -- 1000 -- -- --    Total Intake 1150 -- 1150 -- -- --       Output    Urine  --  -- --  --  -- --    Number of Times Voided -- 2 x 2 x -- -- --    Drains  10  10 20  --  -- --    Output (mL) (Closed/Suction Drain 1 Anterior Neck Hemovac) 10 10 20 -- -- --    Blood  25  -- 25  --  -- --    Est. Blood Loss 25 -- 25 -- -- --    Total Output 35 10 45 -- -- --       Net I/O     1115 -10 1105 -- -- --            Intake/Output Summary (Last 24 hours) at 19 0706  Last data filed at 19 0400   Gross per 24 hour   Intake             1150 ml   Output               45 ml   Net             1105 ml            • furosemide  40 mg Q EVENING   • gabapentin  800 mg TID   • glipiZIDE  5 mg Q EVENING   • potassium chloride SA  10 mEq Q EVENING   • ROPINIRole  4 mg BID   • SITagliptin  100 mg QAM   • temazepam  30 mg QHS   • venlafaxine XR  150  mg Q EVENING   • Pharmacy Consult Request  1 Each PHARMACY TO DOSE   • MD ALERT...DO NOT ADMINISTER NSAIDS or ASPIRIN unless ORDERED By Neurosurgery  1 Each PRN   • docusate sodium  100 mg BID   • senna-docusate  1 Tab Nightly   • senna-docusate  1 Tab Q24HRS PRN   • polyethylene glycol/lytes  1 Packet BID PRN   • magnesium hydroxide  30 mL QDAY PRN   • bisacodyl  10 mg Q24HRS PRN   • fleet  1 Each Once PRN   • NS   Continuous   • enoxaparin  40 mg DAILY   • oxyCODONE immediate-release  5 mg Q3HRS PRN   • oxyCODONE immediate release  10 mg Q3HRS PRN   • HYDROmorphone  0.5 mg Q3HRS PRN   • diphenhydrAMINE  25 mg Q6HRS PRN    Or   • diphenhydrAMINE  25 mg Q6HRS PRN   • ondansetron  4 mg Q4HRS PRN   • ondansetron  4 mg Q4HRS PRN   • methocarbamol  750 mg 4X/DAY   • ALPRAZolam  0.25 mg BID PRN   • cloNIDine  0.1 mg Q4HRS PRN   • benzocaine-menthol  1 Lozenge Q2HRS PRN   • calcium carbonate  500 mg BID   • vitamin D  5,000 Units DAILY   • insulin regular  1-6 Units 4X/DAY ACHS    And   • glucose  16 g Q15 MIN PRN    And   • dextrose 10% bolus  250 mL Q15 MIN PRN       Assessment and Plan:  POD#1 Removal of C5-7 ACDF Plate with extension of C3-5 ACDF  Wound C/D/I  Swallowing better today--will try with swallowing this AM without collar--May remove for meals and showers.  PT/OT to ambulate today.  Will d/c drain this AM.  If swallowing better and ambulating well, might be able to go home today, otherwise home tomorrow.  Prophylactic anticoagulation: yes         Start date/time: today

## 2019-06-22 NOTE — THERAPY
"Physical Therapy Evaluation completed.   Bed Mobility:  Supine to Sit:  (NT; Pt found in chair)  Transfers: Sit to Stand: Stand by Assist  Gait: Level Of Assist: Contact Guard Assist (CGA 2' to pt complaints of lightheadedness. ) with Front-Wheel Walker       Plan of Care: Patient with no further skilled PT needs in the acute care setting at this time  Discharge Recommendations: Equipment: Front-Wheel Walker. See below    Pt presents to PT s/p cervical decompression. Pt able to perform bed mobility with spinal precautions with spv, stand <> sit with spv, and ambulate with a FWW with CGA for 120'. Pt reports having a CPAP and O2 at home, but not using it as recommended. She presents with dizziness/lightheadedness throughout treatment and reports hx of orthostatic hypotension. Pt reports that daughter can assist her at home, and pt feels confident that she will be able to perform ADL/IADLs if DCd home at this time. Pt is functionally capable of DC home with FWW for improed stability and decrease fall risk given co-morbidities. PT recommends outpatient PT after acute/subacute healing for progression of cervical mobility and strength, and balance training given diabetic neuropathy and orthostatic hypotension.       See \"Rehab Therapy-Acute\" Patient Summary Report for complete documentation.     "

## 2019-06-22 NOTE — RESPIRATORY CARE
COPD EDUCATION by COPD CLINICAL EDUCATOR  6/22/2019 at 7:41 AM by Rut Marsh     Patient's chart reviewed by COPD education team. Patient does not have a history or diagnosis of COPD, is a non-smoker and does not have an order for Respiratory Care Protocol, therefore does not qualify for the COPD program.

## 2019-06-22 NOTE — THERAPY
"Occupational Therapy Evaluation completed.   Functional Status:  Pt presents to skilled OT services following C3-5 fusion, discectomy and hardware removal. Pt performed bed mobility with supervision, LB dressing with supervision(does not wear socks, slip on style), toileting, toilet t/f and standing h/g tasks completed with supervision, trained on techniques for donning/doffing Aspen collar, dtrs to assist at home with ADLs/IADLs per pt. Pt appears to be functionally capable of d/c home with assist from family; however will benefit from  OT services to progress pt w/ higher level ADLs given pt's dynamic balance deficits to decrease fall risk and increase pt's I with higher level activities.   Plan of Care: Patient with no further skilled OT needs in the acute care setting at this time  Discharge Recommendations:  Equipment: No Equipment Needed. Post-acute therapy Recommend home health transitional care for continued occupational therapy services.       See \"Rehab Therapy-Acute\" Patient Summary Report for complete documentation.    "

## 2019-06-22 NOTE — PROGRESS NOTES
Received pt from PACU. Pt ambulated to bed. Two RN skin check complete. No signs of skin break down. Gauze clean dry intact to anterior neck.

## 2019-06-22 NOTE — OP REPORT
DATE OF SERVICE:  06/21/2019    PREOPERATIVE DIAGNOSES:  1.  Cervical spondylosis.  2.  Cervical stenosis.  3.  Cervical radiculopathy.  4.  Cervical myelopathy.  5.  Status post C5-C7 ACDF by another practitioner.    POSTOPERATIVE DIAGNOSES:  1.  Cervical spondylosis.  2.  Cervical stenosis.  3.  Cervical radiculopathy.  4.  Cervical myelopathy.  5.  Status post C5-C7 ACDF by another practitioner.    PROCEDURES PERFORMED:  1.  Anterior C5, C6, C7 hardware removal.  2.  Exploration of fusion.  3.  Partial C3 corpectomy, diskectomy, decompression of thecal sac and nerve   roots, 59 modifier.  4.  Partial C4 corpectomy, diskectomy, decompression of thecal sac and nerve   roots, 59 modifier.  5.  Partial C5 corpectomy, diskectomy, decompression of thecal sac and nerve   roots, 59 modifier.  6.  C3-4, C4-5 titanium interbody cage placement.  7.  C3-4, C4-5 interbody allograft autograft fusion.  8.  C3, C4, C5 anterior plate fixation.  9.  Intraoperative monitoring.  10.  Microscopic dissection.    SURGEON:  Remi Lechuga III, MD    FIRST ASSISTANT:  Michele Farfan PA-C    ANESTHESIA:  General.    COMPLICATIONS:  None.    ESTIMATED BLOOD LOSS:  25 mL.    FINDINGS:  Somewhat soft bony fixation.  Recommend collar for 6-8 weeks, good   fusion noted at C5-C7.    COMPLICATIONS:  None.    DRAINS:  Left subplatysmal Hemovac.    DISPOSITION:  Extubated to recovery in the floor.    HISTORY OF PRESENT ILLNESS:  This is a 63-year-old woman who had life-limiting   cervical spondylitic radicular myelopathy, junctional, above a C5-C7 ACDF.  I   explained to her risks, benefits and alternatives of removal of the hardware   because the plate was in the way of going all the way up to the C3-C5 disk   space and then a C3-5 partial corpectomy, diskectomy with interbody fusion.    She understood the risks, benefits, and alternatives to include pain,   infection, bleeding, CSF leak, failure to completely resolve symptoms,   neurologic  deficit including pain, weakness, bowel or bladder difficulties,   failure to fixation, failure to fusion, need for rostral caudal extension due   to junctional stenosis.  She understood the risks, benefits, and agreed to   consent.    SUMMARY OF OPERATIVE PROCEDURE:  The patient was brought to the operating   suite, placed under general anesthesia.  She was on the regular OR bed supine,   her head placed back in extension with a shoulder roll.  All padded pressure   points secured.  Intermittent monitoring hooked up to SSEP and EMG needles as   well as ran recurrent laryngeal nerve monitoring which only had minimal firing   during the approach and during the case overall.  X-ray fluoroscopy was   brought in to verify incision that was more rostral to the previous C5-7   incision.  This was infiltrated with Marcaine with epinephrine.  Preoperative   antibiotics were given.  Proper timeout was performed.  The patient was   prepped and draped in sterile fashion.    A linear incision was made and soft tissues dissected with bipolar and   monopolar electrocautery.  We found the platysma and incised it sharply.  We   did subplatysmal dissection.  Using blunt techniques, we moved the strap   muscles laterally, the sternocleidomastoid laterally, strap muscles medially   and then found the carotid sheath, moved it laterally, trachea and esophagus   moved medially.  We elevated the longus colli ____ 3-5 and then visualized the   rostral part of the plate.  We expanded that more to be able to visualize the   plate to be able to take it out.  We constructed a Shadow-Line retractor with   the wide corpectomy blade and [place] pins in the appropriate angles.    C5-C6, C6-C7 ANTERIOR HARDWARE REMOVAL AND EXPLORATION OF FUSION:  We used a   Kocher and a biting curette to try to move the screws and plate around, it   seemed solidly in, but it was in our way, so we had to remove it.  We used the   Medtronic Belva plate removal  tool system to unscrew all the screws and   put Surgiflo in the holes and we now began and noted a nice fusion at C5-C7.    PARTIAL C3, PARTIAL C4, PARTIAL C5 CORPECTOMY, DISKECTOMY, DECOMPRESSION OF   THE THECAL SAC AND NERVE ROOTS USING 59 MODIFIER:  Patient had completely   collapsed disk spaces.  We then performed partial corpectomies at every level   to recreate a trapezoidal space to accept interbody graft, greater than 50% of   the depth of the vertebral body justifying the 59 modifier coding.    Sequentially using [place] pin distraction, we did C3-4 and C4-5 levels, made   an annulotomy and removed with small amount of disk that was using [place]ando   drill to construct a trapezoidal space, harvesting the bone for a partial   corpectomy greater than 50% of the depth of the vertebral body at C3, C4, and   C5.  We got down to the thickened PLL.  We brought the microscope in for   increased visualization and elevated that with a Dittmar and then widely   resected it with [place] rongeurs all the way up to the foramen, confirmed   with Dittmar palpation.  We put Surgiflo down.  We bipolared back bleeding at   PLL and very happy with the central and foraminal decompression at C3, 4, and   5.    C3-4, C4-5 TITANIUM INTERBODY CAGE PLACEMENT:  Using the 4WEB cages, we   trialed our [id number] mm x 7 degree lordotic at both C3-4 and C4-5 and would   release [place] pin distraction if not too much over distraction of the   posterior facets.  These had a nice tight snug fit because of the partial   corpectomies and there was no change in SSEPs during the placement of them.    C3-4, C4-5 INTERBODY ALLOGRAFT AUTOGRAFT FUSION:  Before introduction of the   graft, this was packed tightly with the morcellized autograft and a little bit   of Actifuse allograft.  Because of the surface technology, will work for the   4WEB graft, allowing a direct finding from the interbody to the vertebral body   because of the partial  corpectomies reconstructing the trapezoidal space and   that would help assure interbody allograft autograft fusion.    C3, C4, C5, PLATING AND FIXATION:  Using a Zavation properly sized plate, we   cleared away and drilled off the osteophytes anteriorly so that the plate   would lie flat.  We drilled, drilling parallel to the endplates at C3, C4, and   C5.  We did self-tapping screws, 4.0x12 mm screws.  We did have 1 rescue   screw at left C5 because we found a hole from the previous plate placement and   screw placement.  We tightened down the 's locking nut frame to   prevent back out.  We were happy with our final construct and x-rays   demonstrating no change in SSEPs.    We copiously irrigated with bacitracin infused with saline.  We slowly   withdrew the retractors, achieved hemostasis, tunneled out the subplatysmal   Hemovac, secured to the skin with a stitch.  We closed the wound in anatomic   layers, 3-0 Vicryl to bring the platysma together, 3-0 Vicryl for the dermis,   and Steri-Strips for the skin.  Sterile dressing was applied.  The patient was   rolled from prone and was extubated and will go to recovery.    There were no complications.  Needle and sponge counts correct at the end of   the case.       ____________________________________     MD JOHANA Fisher III / SATHISH    DD:  06/21/2019 16:55:02  DT:  06/21/2019 19:31:50    D#:  [id number]  Job#:  [id number]

## 2019-06-23 VITALS
WEIGHT: 208.78 LBS | OXYGEN SATURATION: 90 % | BODY MASS INDEX: 33.55 KG/M2 | HEART RATE: 85 BPM | SYSTOLIC BLOOD PRESSURE: 122 MMHG | RESPIRATION RATE: 18 BRPM | DIASTOLIC BLOOD PRESSURE: 68 MMHG | HEIGHT: 66 IN | TEMPERATURE: 97.7 F

## 2019-06-23 LAB
GLUCOSE BLD-MCNC: 100 MG/DL (ref 65–99)
GLUCOSE BLD-MCNC: 120 MG/DL (ref 65–99)

## 2019-06-23 PROCEDURE — 82962 GLUCOSE BLOOD TEST: CPT

## 2019-06-23 PROCEDURE — A9270 NON-COVERED ITEM OR SERVICE: HCPCS | Performed by: PHYSICIAN ASSISTANT

## 2019-06-23 PROCEDURE — 700112 HCHG RX REV CODE 229: Performed by: PHYSICIAN ASSISTANT

## 2019-06-23 PROCEDURE — 302118 SHAMPOO,NO RINSE: Performed by: NEUROLOGICAL SURGERY

## 2019-06-23 PROCEDURE — 700102 HCHG RX REV CODE 250 W/ 637 OVERRIDE(OP): Performed by: PHYSICIAN ASSISTANT

## 2019-06-23 RX ORDER — METHOCARBAMOL 750 MG/1
750 TABLET, FILM COATED ORAL 3 TIMES DAILY
Qty: 90 TAB | Refills: 0 | Status: SHIPPED | OUTPATIENT
Start: 2019-06-23 | End: 2019-07-06

## 2019-06-23 RX ORDER — OXYCODONE HYDROCHLORIDE 5 MG/1
5 TABLET ORAL EVERY 4 HOURS PRN
Qty: 84 TAB | Refills: 0 | Status: SHIPPED | OUTPATIENT
Start: 2019-06-23 | End: 2019-07-06

## 2019-06-23 RX ADMIN — OXYCODONE HYDROCHLORIDE 10 MG: 10 TABLET ORAL at 04:30

## 2019-06-23 RX ADMIN — ANTACID TABLETS 500 MG: 500 TABLET, CHEWABLE ORAL at 04:34

## 2019-06-23 RX ADMIN — GABAPENTIN 800 MG: 400 CAPSULE ORAL at 04:33

## 2019-06-23 RX ADMIN — SITAGLIPTIN 100 MG: 100 TABLET, FILM COATED ORAL at 04:34

## 2019-06-23 RX ADMIN — GABAPENTIN 800 MG: 400 CAPSULE ORAL at 11:48

## 2019-06-23 RX ADMIN — OXYCODONE HYDROCHLORIDE 10 MG: 10 TABLET ORAL at 01:02

## 2019-06-23 RX ADMIN — METHOCARBAMOL TABLETS 750 MG: 750 TABLET, COATED ORAL at 11:48

## 2019-06-23 RX ADMIN — VITAMIN D, TAB 1000IU (100/BT) 5000 UNITS: 25 TAB at 04:34

## 2019-06-23 RX ADMIN — OXYCODONE HYDROCHLORIDE 10 MG: 10 TABLET ORAL at 07:46

## 2019-06-23 RX ADMIN — BUTALBITAL, ACETAMINOPHEN, AND CAFFEINE 1 TABLET: 50; 325; 40 TABLET ORAL at 01:04

## 2019-06-23 RX ADMIN — METHOCARBAMOL TABLETS 750 MG: 750 TABLET, COATED ORAL at 07:46

## 2019-06-23 RX ADMIN — OXYCODONE HYDROCHLORIDE 10 MG: 10 TABLET ORAL at 11:48

## 2019-06-23 RX ADMIN — DOCUSATE SODIUM 100 MG: 100 CAPSULE ORAL at 04:36

## 2019-06-23 RX ADMIN — ROPINIROLE HYDROCHLORIDE 4 MG: 2 TABLET, FILM COATED ORAL at 04:35

## 2019-06-23 NOTE — DISCHARGE SUMMARY
Admission:  6/21/19    Discharge: 6/23/19    Diagnosis: Cervical stenosis and Myelopathy    Admitting MD: Dr. Remi Lechuga     Procedure: Removal of previous  C5-7 ACDF Plate with extension of C3-5 ACDF    Course:  Valentina Lu is a 63-year-old woman who had life-limiting   cervical spondylitic radicular myelopathy, junctional, above a C5-C7 ACDF.  I   explained to her risks, benefits and alternatives of removal of the hardware   because the plate was in the way of going all the way up to the C3-C5 disk   space and then a C3-5 partial corpectomy, diskectomy with interbody fusion.    She understood the risks, benefits, and alternatives to include pain,   infection, bleeding, CSF leak, failure to completely resolve symptoms,   neurologic deficit including pain, weakness, bowel or bladder difficulties,   failure to fixation, failure to fusion, need for rostral caudal extension due   to junctional stenosis.  She understood the risks, benefits, and agreed to   Consent.    Patient was admitted to the hospital and taken to the operating room with Dr. Lechuga for removal of her previous ACDF C5-7 plate and extension of the ACDF to the C3 through C5 levels due to patient's cervical stenosis and early myelopathy.  Surgery went well and the C5 to C7 level was verified as being stable and fully fused.  The ACDF of C3-4 and C4-5 was performed without complications and the procedure went as well as expected.  Patient went to recovery room and then eventually to the neurosurgery for where she was monitored for the next 2 nights.  She had some difficulty with swallowing for the first day but eventually passed her swallow test without difficulty.  Her arm strength was better after surgery.  Her neck soreness was a little more prominent than her previous surgery but this is because of the additional work of removing the previous plate and then adding on the new levels.  Patient's drain output fell below acceptable ranges and her  drain was removed and Tegaderm dressing was placed over the whole dressing and drain site.  Patient ambulated well with physical therapy and her pain was eventually well controlled.  Patient was ready to leave for home today.    Will d/c to home today  F/u with [facility] in 2 weeks for re-check  Call sooner if symptoms worsen or concerns arise.  [phone]  Activity restrictions and wound care as per Pre-op instruction sheet  Aspen collar when OOB.  May remove in bed, when eating and for showers.  Resume home meds.  May shower tomorrow  No driving x 2 weeks  Continue regular home meds and add:   Oxycodone 5 1 po Q 4-6 hours PRN Pain  #84  No RF  14 days   Robaxin 750 mg 1 tab TID Prn Spasm  #90   1RF  30 Day

## 2019-06-23 NOTE — PROGRESS NOTES
Neurosurgery Progress Note    Subjective:  POD#2 Removal of C5-7 ACDF Plate with extension of C3-5 ACDF  Swallowing better this AM   Neck sorer that last surgery but we had to go down to remove previous plate and extended fusion to C3  No swelling or fullness and speaking and breathing well.  Wound C/D/I.     Has ambulated in room and larios  Feeling ready to d/c    Exam:  A&O x 3 . Speaking clearly. Afebrile  No significant swelling in neck.  Arms strong at 5/5  Sensory intact      BP  Min: 102/65  Max: 122/68  Pulse  Av.8  Min: 83  Max: 93  Resp  Av  Min: 16  Max: 18  Temp  Av.5 °C (97.7 °F)  Min: 36.2 °C (97.1 °F)  Max: 36.8 °C (98.3 °F)  SpO2  Av.3 %  Min: 90 %  Max: 95 %    No Data Recorded                      Intake/Output       19 - 1959 19 - 19 0659       Total 1900-0659 Total       Intake    P.O.    --   --  -- --    P.O.  --  -- -- --    Total Intake 2080 -- -- --       Output    Urine  --  -- --  --  -- --    Number of Times Voided 3 x 6 x 9 x -- -- --    Stool  --  -- --  --  -- --    Number of Times Stooled -- 0 x 0 x -- -- --    Total Output -- -- -- -- -- --       Net I/O      --  -- -- --            Intake/Output Summary (Last 24 hours) at 19 0822  Last data filed at 19 1700   Gross per 24 hour   Intake             1100 ml   Output                0 ml   Net             1100 ml            • acetaminophen/caffeine/butalbital 325-40-50 mg  1 Tab Q6HRS PRN   • furosemide  40 mg Q EVENING   • gabapentin  800 mg TID   • glipiZIDE  5 mg Q EVENING   • potassium chloride SA  10 mEq Q EVENING   • ROPINIRole  4 mg BID   • SITagliptin  100 mg QAM   • temazepam  30 mg QHS   • venlafaxine XR  150 mg Q EVENING   • Pharmacy Consult Request  1 Each PHARMACY TO DOSE   • MD ALERT...DO NOT ADMINISTER NSAIDS or ASPIRIN unless ORDERED By Neurosurgery  1 Each PRN   • docusate sodium  100 mg BID    • senna-docusate  1 Tab Nightly   • senna-docusate  1 Tab Q24HRS PRN   • polyethylene glycol/lytes  1 Packet BID PRN   • magnesium hydroxide  30 mL QDAY PRN   • bisacodyl  10 mg Q24HRS PRN   • fleet  1 Each Once PRN   • NS   Continuous   • enoxaparin  40 mg DAILY   • oxyCODONE immediate-release  5 mg Q3HRS PRN   • oxyCODONE immediate release  10 mg Q3HRS PRN   • HYDROmorphone  0.5 mg Q3HRS PRN   • diphenhydrAMINE  25 mg Q6HRS PRN    Or   • diphenhydrAMINE  25 mg Q6HRS PRN   • ondansetron  4 mg Q4HRS PRN   • ondansetron  4 mg Q4HRS PRN   • methocarbamol  750 mg 4X/DAY   • ALPRAZolam  0.25 mg BID PRN   • cloNIDine  0.1 mg Q4HRS PRN   • benzocaine-menthol  1 Lozenge Q2HRS PRN   • calcium carbonate  500 mg BID   • vitamin D  5,000 Units DAILY   • insulin regular  1-6 Units 4X/DAY ACHS    And   • glucose  16 g Q15 MIN PRN    And   • dextrose 10% bolus  250 mL Q15 MIN PRN       Assessment and Plan:  POD#2 Removal of C5-7 ACDF Plate with extension of C3-5 ACDF  Wound C/D/I  Swallowing better today  In [place] collar when OOB--May remove in bed, for meals and showers.  Ambulated well with PT/OT.  No assistive devices needed.  Pain controlled and arms feeling better.  Will d/c to home today  F/u with [facility] in 2 weeks for re-check  Call sooner if symptoms worsen or concerns arise.  [phone]  Activity restrictions and wound care as per Pre-op instruction sheet  Aspen collar when OOB.  May remove in bed, when eating and for showers.  Resume home meds.  May shower tomorrow  No driving x 2 weeks  Continue regular home meds and add:   Oxycodone 5 1 po Q 4-6 hours PRN Pain  #84  No RF  14 days   Robaxin 750 mg 1 tab TID Prn Spasm  #90   1RF  30 Day

## 2019-06-23 NOTE — PROGRESS NOTES
Pt aaox4. FLANAGAN 5/5. Up w/ SBA, steady gait with FWW. Pt c/o head/neck pain, Oxy and Fioricet given PRN w/ + results. +BS. Voiding w/o difficulty. Incision with s.s. Aspen collar in place when OOB. Ok to have off in bed per PA. Reviewed poc with pt-verbalized understanding. Bed alarm in use. Call light in reach. Pt to d/c home today.

## 2019-06-23 NOTE — DISCHARGE PLANNING
RN CM faxed to choice for [facility] for a FWW for patient to [facility].

## 2019-06-23 NOTE — CARE PLAN
Problem: Safety  Goal: Will remain free from falls    Intervention: Implement fall precautions  Bed alarm in use. Call light w/in reach. Instructed pt to call for assistance before getting OOB- pt verbalized understanding.        Problem: Pain Management  Goal: Pain level will decrease to patient's comfort goal    Intervention: Follow pain managment plan developed in collaboration with patient and Interdisciplinary Team  Oxy and Fioricet given PRN with +results. Educated pt on importance of pain control- pt verbalized understanding.

## 2019-06-23 NOTE — PROGRESS NOTES
Advanced diet to regular diabetic for lunch. Went over discharge instructions w/ pt, when to call the doctor, f/u appointments, medications, spinal precautions. Prescriptions and copy of discharge paperwork given to pt. Pt had no further questions, pt wanted to eat lunch prior to d/c home.

## 2019-06-23 NOTE — DISCHARGE PLANNING
Received Choice form at 1045  Agency/Facility Name: [facility]  Referral sent per Choice form at 1050  Referral for FWW sent to facility.

## 2019-06-23 NOTE — DISCHARGE INSTRUCTIONS
Follow up with [facility] in 2 weeks for re-check  Call sooner if symptoms worsen or concerns arise.  [phone]  Activity restrictions and wound care as per Pre-op instruction sheet  Aspen collar when out of bed.  May remove in bed, when eating and for showers.  Resume home meds.  May shower tomorrow 6/24  No driving x 2 weeks    Discharge Instructions    Discharged to home by car with relative. Discharged via wheelchair, hospital escort: Yes.  Special equipment needed: C-Collar and Walker    Be sure to schedule a follow-up appointment with your primary care doctor or any specialists as instructed.     Discharge Plan:        I understand that a diet low in cholesterol, fat, and sodium is recommended for good health. Unless I have been given specific instructions below for another diet, I accept this instruction as my diet prescription.   Other diet: Diabetic, soft foods    Special Instructions: None    · Is patient discharged on Warfarin / Coumadin?   No     Depression / Suicide Risk    As you are discharged from this [facility] facility, it is important to learn how to keep safe from harming yourself.    Recognize the warning signs:  · Abrupt changes in personality, positive or negative- including increase in energy   · Giving away possessions  · Change in eating patterns- significant weight changes-  positive or negative  · Change in sleeping patterns- unable to sleep or sleeping all the time   · Unwillingness or inability to communicate  · Depression  · Unusual sadness, discouragement and loneliness  · Talk of wanting to die  · Neglect of personal appearance   · Rebelliousness- reckless behavior  · Withdrawal from people/activities they love  · Confusion- inability to concentrate     If you or a loved one observes any of these behaviors or has concerns about self-harm, here's what you can do:  · Talk about it- your feelings and reasons for harming yourself  · Remove any means that you might use to hurt  yourself (examples: pills, rope, extension cords, firearm)  · Get professional help from the community (Mental Health, Substance Abuse, psychological counseling)  · Do not be alone:Call your Safe Contact- someone whom you trust who will be there for you.  · Call your local CRISIS HOTLINE 843-2971 or 412-622-6538  · Call your local Children's Mobile Crisis Response Team [place] (744) 477-8811 or www.GlobeIn  · Call the toll free National Suicide Prevention Hotlines   · National Suicide Prevention Lifeline 650-264-DZLY (2913)  · National Hope Line Network 800-SUICIDE (049-6438)

## 2019-07-06 ENCOUNTER — APPOINTMENT (OUTPATIENT)
Dept: RADIOLOGY | Facility: MEDICAL CENTER | Age: 64
End: 2019-07-06
Attending: EMERGENCY MEDICINE
Payer: MEDICARE

## 2019-07-06 ENCOUNTER — APPOINTMENT (OUTPATIENT)
Dept: RADIOLOGY | Facility: MEDICAL CENTER | Age: 64
End: 2019-07-06
Payer: MEDICARE

## 2019-07-06 ENCOUNTER — HOSPITAL ENCOUNTER (OUTPATIENT)
Facility: MEDICAL CENTER | Age: 64
End: 2019-07-09
Attending: EMERGENCY MEDICINE | Admitting: HOSPITALIST
Payer: MEDICARE

## 2019-07-06 DIAGNOSIS — J44.9 CHRONIC OBSTRUCTIVE PULMONARY DISEASE, UNSPECIFIED COPD TYPE (HCC): ICD-10-CM

## 2019-07-06 DIAGNOSIS — I10 ESSENTIAL HYPERTENSION: ICD-10-CM

## 2019-07-06 DIAGNOSIS — E11.42 DIABETIC POLYNEUROPATHY ASSOCIATED WITH TYPE 2 DIABETES MELLITUS (HCC): ICD-10-CM

## 2019-07-06 DIAGNOSIS — R53.1 RIGHT SIDED WEAKNESS: ICD-10-CM

## 2019-07-06 DIAGNOSIS — G25.81 RESTLESS LEGS SYNDROME (RLS): ICD-10-CM

## 2019-07-06 DIAGNOSIS — R55 NEAR SYNCOPE: ICD-10-CM

## 2019-07-06 DIAGNOSIS — R53.1 WEAKNESS: ICD-10-CM

## 2019-07-06 LAB
ABO GROUP BLD: NORMAL
ALBUMIN SERPL BCP-MCNC: 4.2 G/DL (ref 3.2–4.9)
ALBUMIN/GLOB SERPL: 1.2 G/DL
ALP SERPL-CCNC: 79 U/L (ref 30–99)
ALT SERPL-CCNC: 18 U/L (ref 2–50)
ANION GAP SERPL CALC-SCNC: 8 MMOL/L (ref 0–11.9)
APTT PPP: 28.3 SEC (ref 24.7–36)
AST SERPL-CCNC: 19 U/L (ref 12–45)
BASOPHILS # BLD AUTO: 0.7 % (ref 0–1.8)
BASOPHILS # BLD: 0.06 K/UL (ref 0–0.12)
BILIRUB SERPL-MCNC: 0.5 MG/DL (ref 0.1–1.5)
BLD GP AB SCN SERPL QL: NORMAL
BUN SERPL-MCNC: 16 MG/DL (ref 8–22)
CALCIUM SERPL-MCNC: 10.2 MG/DL (ref 8.5–10.5)
CHLORIDE SERPL-SCNC: 103 MMOL/L (ref 96–112)
CO2 SERPL-SCNC: 29 MMOL/L (ref 20–33)
CREAT SERPL-MCNC: 0.93 MG/DL (ref 0.5–1.4)
EOSINOPHIL # BLD AUTO: 0.19 K/UL (ref 0–0.51)
EOSINOPHIL NFR BLD: 2.2 % (ref 0–6.9)
ERYTHROCYTE [DISTWIDTH] IN BLOOD BY AUTOMATED COUNT: 48.6 FL (ref 35.9–50)
GLOBULIN SER CALC-MCNC: 3.4 G/DL (ref 1.9–3.5)
GLUCOSE SERPL-MCNC: 105 MG/DL (ref 65–99)
HCT VFR BLD AUTO: 44.8 % (ref 37–47)
HGB BLD-MCNC: 13.8 G/DL (ref 12–16)
IMM GRANULOCYTES # BLD AUTO: 0.02 K/UL (ref 0–0.11)
IMM GRANULOCYTES NFR BLD AUTO: 0.2 % (ref 0–0.9)
INR PPP: 1.03 (ref 0.87–1.13)
LYMPHOCYTES # BLD AUTO: 1.38 K/UL (ref 1–4.8)
LYMPHOCYTES NFR BLD: 15.7 % (ref 22–41)
MCH RBC QN AUTO: 27.9 PG (ref 27–33)
MCHC RBC AUTO-ENTMCNC: 30.8 G/DL (ref 33.6–35)
MCV RBC AUTO: 90.7 FL (ref 81.4–97.8)
MONOCYTES # BLD AUTO: 0.73 K/UL (ref 0–0.85)
MONOCYTES NFR BLD AUTO: 8.3 % (ref 0–13.4)
NEUTROPHILS # BLD AUTO: 6.4 K/UL (ref 2–7.15)
NEUTROPHILS NFR BLD: 72.9 % (ref 44–72)
NRBC # BLD AUTO: 0 K/UL
NRBC BLD-RTO: 0 /100 WBC
PLATELET # BLD AUTO: 357 K/UL (ref 164–446)
PMV BLD AUTO: 9.4 FL (ref 9–12.9)
POTASSIUM SERPL-SCNC: 3.5 MMOL/L (ref 3.6–5.5)
PROT SERPL-MCNC: 7.6 G/DL (ref 6–8.2)
PROTHROMBIN TIME: 13.7 SEC (ref 12–14.6)
RBC # BLD AUTO: 4.94 M/UL (ref 4.2–5.4)
RH BLD: NORMAL
SODIUM SERPL-SCNC: 140 MMOL/L (ref 135–145)
TROPONIN I SERPL-MCNC: <0.01 NG/ML (ref 0–0.04)
WBC # BLD AUTO: 8.8 K/UL (ref 4.8–10.8)

## 2019-07-06 PROCEDURE — 85025 COMPLETE CBC W/AUTO DIFF WBC: CPT

## 2019-07-06 PROCEDURE — 86850 RBC ANTIBODY SCREEN: CPT

## 2019-07-06 PROCEDURE — 80053 COMPREHEN METABOLIC PANEL: CPT

## 2019-07-06 PROCEDURE — 700102 HCHG RX REV CODE 250 W/ 637 OVERRIDE(OP): Performed by: EMERGENCY MEDICINE

## 2019-07-06 PROCEDURE — 70450 CT HEAD/BRAIN W/O DYE: CPT

## 2019-07-06 PROCEDURE — 86900 BLOOD TYPING SEROLOGIC ABO: CPT

## 2019-07-06 PROCEDURE — G0378 HOSPITAL OBSERVATION PER HR: HCPCS

## 2019-07-06 PROCEDURE — A9270 NON-COVERED ITEM OR SERVICE: HCPCS | Performed by: EMERGENCY MEDICINE

## 2019-07-06 PROCEDURE — 70498 CT ANGIOGRAPHY NECK: CPT

## 2019-07-06 PROCEDURE — 700117 HCHG RX CONTRAST REV CODE 255: Performed by: EMERGENCY MEDICINE

## 2019-07-06 PROCEDURE — 84484 ASSAY OF TROPONIN QUANT: CPT

## 2019-07-06 PROCEDURE — 99220 PR INITIAL OBSERVATION CARE,LEVL III: CPT | Performed by: HOSPITALIST

## 2019-07-06 PROCEDURE — 85730 THROMBOPLASTIN TIME PARTIAL: CPT

## 2019-07-06 PROCEDURE — 70496 CT ANGIOGRAPHY HEAD: CPT

## 2019-07-06 PROCEDURE — 86901 BLOOD TYPING SEROLOGIC RH(D): CPT

## 2019-07-06 PROCEDURE — 93005 ELECTROCARDIOGRAM TRACING: CPT | Performed by: EMERGENCY MEDICINE

## 2019-07-06 PROCEDURE — 71045 X-RAY EXAM CHEST 1 VIEW: CPT

## 2019-07-06 PROCEDURE — 85610 PROTHROMBIN TIME: CPT

## 2019-07-06 PROCEDURE — 0042T CT-CEREBRAL PERFUSION ANALYSIS: CPT

## 2019-07-06 PROCEDURE — 99285 EMERGENCY DEPT VISIT HI MDM: CPT | Performed by: PSYCHIATRY & NEUROLOGY

## 2019-07-06 PROCEDURE — 99285 EMERGENCY DEPT VISIT HI MDM: CPT

## 2019-07-06 PROCEDURE — 81003 URINALYSIS AUTO W/O SCOPE: CPT

## 2019-07-06 RX ORDER — LABETALOL HYDROCHLORIDE 5 MG/ML
10 INJECTION, SOLUTION INTRAVENOUS EVERY 4 HOURS PRN
Status: DISCONTINUED | OUTPATIENT
Start: 2019-07-06 | End: 2019-07-09 | Stop reason: HOSPADM

## 2019-07-06 RX ORDER — HYDRALAZINE HYDROCHLORIDE 20 MG/ML
10 INJECTION INTRAMUSCULAR; INTRAVENOUS
Status: DISCONTINUED | OUTPATIENT
Start: 2019-07-06 | End: 2019-07-09 | Stop reason: HOSPADM

## 2019-07-06 RX ORDER — ASPIRIN 300 MG/1
300 SUPPOSITORY RECTAL DAILY
Status: DISCONTINUED | OUTPATIENT
Start: 2019-07-06 | End: 2019-07-09 | Stop reason: HOSPADM

## 2019-07-06 RX ORDER — POTASSIUM CHLORIDE 20 MEQ/1
40 TABLET, EXTENDED RELEASE ORAL ONCE
Status: COMPLETED | OUTPATIENT
Start: 2019-07-06 | End: 2019-07-07

## 2019-07-06 RX ORDER — GABAPENTIN 400 MG/1
800 CAPSULE ORAL 3 TIMES DAILY
Status: DISCONTINUED | OUTPATIENT
Start: 2019-07-07 | End: 2019-07-09 | Stop reason: HOSPADM

## 2019-07-06 RX ORDER — METHOCARBAMOL 750 MG/1
750 TABLET, FILM COATED ORAL
Status: ON HOLD | COMMUNITY
End: 2019-09-26 | Stop reason: SDUPTHER

## 2019-07-06 RX ORDER — FUROSEMIDE 40 MG/1
40 TABLET ORAL EVERY EVENING
Status: DISCONTINUED | OUTPATIENT
Start: 2019-07-07 | End: 2019-07-07

## 2019-07-06 RX ORDER — ASPIRIN 81 MG/1
324 TABLET, CHEWABLE ORAL DAILY
Status: DISCONTINUED | OUTPATIENT
Start: 2019-07-06 | End: 2019-07-09 | Stop reason: HOSPADM

## 2019-07-06 RX ORDER — OXYCODONE HYDROCHLORIDE 5 MG/1
5 TABLET ORAL EVERY 4 HOURS PRN
Status: ON HOLD | COMMUNITY
End: 2019-07-08

## 2019-07-06 RX ORDER — ACETAMINOPHEN 325 MG/1
650 TABLET ORAL ONCE
Status: COMPLETED | OUTPATIENT
Start: 2019-07-06 | End: 2019-07-06

## 2019-07-06 RX ORDER — HYDROCODONE BITARTRATE AND ACETAMINOPHEN 10; 325 MG/1; MG/1
1 TABLET ORAL EVERY 8 HOURS PRN
Status: ON HOLD | COMMUNITY
End: 2019-09-26

## 2019-07-06 RX ORDER — METHOCARBAMOL 750 MG/1
750 TABLET, FILM COATED ORAL 3 TIMES DAILY PRN
Status: DISCONTINUED | OUTPATIENT
Start: 2019-07-06 | End: 2019-07-09 | Stop reason: HOSPADM

## 2019-07-06 RX ORDER — POLYETHYLENE GLYCOL 3350 17 G/17G
1 POWDER, FOR SOLUTION ORAL
Status: DISCONTINUED | OUTPATIENT
Start: 2019-07-06 | End: 2019-07-09 | Stop reason: HOSPADM

## 2019-07-06 RX ORDER — ATORVASTATIN CALCIUM 80 MG/1
80 TABLET, FILM COATED ORAL EVERY EVENING
Status: DISCONTINUED | OUTPATIENT
Start: 2019-07-07 | End: 2019-07-09 | Stop reason: HOSPADM

## 2019-07-06 RX ORDER — ROPINIROLE 2 MG/1
4 TABLET, FILM COATED ORAL
Status: DISCONTINUED | OUTPATIENT
Start: 2019-07-06 | End: 2019-07-07

## 2019-07-06 RX ORDER — AMOXICILLIN 250 MG
2 CAPSULE ORAL 2 TIMES DAILY
Status: DISCONTINUED | OUTPATIENT
Start: 2019-07-07 | End: 2019-07-09 | Stop reason: HOSPADM

## 2019-07-06 RX ORDER — SODIUM CHLORIDE 9 MG/ML
INJECTION, SOLUTION INTRAVENOUS CONTINUOUS
Status: DISCONTINUED | OUTPATIENT
Start: 2019-07-06 | End: 2019-07-06

## 2019-07-06 RX ORDER — ASPIRIN 325 MG
325 TABLET ORAL DAILY
Status: DISCONTINUED | OUTPATIENT
Start: 2019-07-06 | End: 2019-07-09 | Stop reason: HOSPADM

## 2019-07-06 RX ORDER — VENLAFAXINE HYDROCHLORIDE 75 MG/1
150 CAPSULE, EXTENDED RELEASE ORAL EVERY EVENING
Status: DISCONTINUED | OUTPATIENT
Start: 2019-07-07 | End: 2019-07-09 | Stop reason: HOSPADM

## 2019-07-06 RX ORDER — TEMAZEPAM 15 MG/1
30 CAPSULE ORAL
Status: DISCONTINUED | OUTPATIENT
Start: 2019-07-06 | End: 2019-07-09 | Stop reason: HOSPADM

## 2019-07-06 RX ORDER — BISACODYL 10 MG
10 SUPPOSITORY, RECTAL RECTAL
Status: DISCONTINUED | OUTPATIENT
Start: 2019-07-06 | End: 2019-07-09 | Stop reason: HOSPADM

## 2019-07-06 RX ADMIN — IOHEXOL 120 ML: 350 INJECTION, SOLUTION INTRAVENOUS at 21:32

## 2019-07-06 RX ADMIN — ACETAMINOPHEN 650 MG: 325 TABLET, FILM COATED ORAL at 23:08

## 2019-07-06 ASSESSMENT — ENCOUNTER SYMPTOMS
DIZZINESS: 1
SENSORY CHANGE: 0
TINGLING: 0
HEADACHES: 0
BACK PAIN: 0
TREMORS: 0
VOMITING: 0
PALPITATIONS: 1
SHORTNESS OF BREATH: 1
SEIZURES: 0
NECK PAIN: 1
WEAKNESS: 1
DOUBLE VISION: 0
MEMORY LOSS: 0
LOSS OF CONSCIOUSNESS: 0
BLURRED VISION: 0
SPEECH CHANGE: 1
CHILLS: 0
MYALGIAS: 0
FOCAL WEAKNESS: 1
NAUSEA: 0
PHOTOPHOBIA: 0
FALLS: 0
FEVER: 0

## 2019-07-07 ENCOUNTER — APPOINTMENT (OUTPATIENT)
Dept: CARDIOLOGY | Facility: MEDICAL CENTER | Age: 64
End: 2019-07-07
Attending: HOSPITALIST
Payer: MEDICARE

## 2019-07-07 ENCOUNTER — APPOINTMENT (OUTPATIENT)
Dept: RADIOLOGY | Facility: MEDICAL CENTER | Age: 64
End: 2019-07-07
Attending: HOSPITALIST
Payer: MEDICARE

## 2019-07-07 PROBLEM — F41.9 ANXIETY: Status: ACTIVE | Noted: 2019-07-07

## 2019-07-07 PROBLEM — I63.9 CVA (CEREBRAL VASCULAR ACCIDENT) (HCC): Status: ACTIVE | Noted: 2019-07-07

## 2019-07-07 PROBLEM — E87.6 HYPOKALEMIA: Status: ACTIVE | Noted: 2019-07-07

## 2019-07-07 PROBLEM — R51.9 HEAD ACHE: Status: ACTIVE | Noted: 2019-07-07

## 2019-07-07 LAB
APPEARANCE UR: CLEAR
BILIRUB UR QL STRIP.AUTO: NEGATIVE
COLOR UR: YELLOW
GLUCOSE BLD-MCNC: 124 MG/DL (ref 65–99)
GLUCOSE BLD-MCNC: 149 MG/DL (ref 65–99)
GLUCOSE BLD-MCNC: 89 MG/DL (ref 65–99)
GLUCOSE UR STRIP.AUTO-MCNC: NEGATIVE MG/DL
KETONES UR STRIP.AUTO-MCNC: NEGATIVE MG/DL
LEUKOCYTE ESTERASE UR QL STRIP.AUTO: NEGATIVE
LV EJECT FRACT  99904: 70
LV EJECT FRACT MOD 2C 99903: 71.22
LV EJECT FRACT MOD 4C 99902: 71.99
LV EJECT FRACT MOD BP 99901: 69.88
MICRO URNS: NORMAL
NITRITE UR QL STRIP.AUTO: NEGATIVE
PH UR STRIP.AUTO: 5.5 [PH]
PROT UR QL STRIP: NEGATIVE MG/DL
RBC UR QL AUTO: NEGATIVE
SP GR UR REFRACTOMETRY: 1
SP GR UR STRIP.AUTO: <=1.005
UROBILINOGEN UR STRIP.AUTO-MCNC: 0.2 MG/DL

## 2019-07-07 PROCEDURE — 700102 HCHG RX REV CODE 250 W/ 637 OVERRIDE(OP): Performed by: HOSPITALIST

## 2019-07-07 PROCEDURE — 96365 THER/PROPH/DIAG IV INF INIT: CPT | Mod: XU

## 2019-07-07 PROCEDURE — 93306 TTE W/DOPPLER COMPLETE: CPT

## 2019-07-07 PROCEDURE — A9270 NON-COVERED ITEM OR SERVICE: HCPCS | Performed by: HOSPITALIST

## 2019-07-07 PROCEDURE — 97165 OT EVAL LOW COMPLEX 30 MIN: CPT

## 2019-07-07 PROCEDURE — 99225 PR SUBSEQUENT OBSERVATION CARE,LEVEL II: CPT | Performed by: HOSPITALIST

## 2019-07-07 PROCEDURE — 94664 DEMO&/EVAL PT USE INHALER: CPT

## 2019-07-07 PROCEDURE — 700111 HCHG RX REV CODE 636 W/ 250 OVERRIDE (IP): Performed by: HOSPITALIST

## 2019-07-07 PROCEDURE — 82962 GLUCOSE BLOOD TEST: CPT | Mod: 91

## 2019-07-07 PROCEDURE — 96375 TX/PRO/DX INJ NEW DRUG ADDON: CPT | Mod: XU

## 2019-07-07 PROCEDURE — G0378 HOSPITAL OBSERVATION PER HR: HCPCS

## 2019-07-07 PROCEDURE — 96376 TX/PRO/DX INJ SAME DRUG ADON: CPT | Mod: XU

## 2019-07-07 PROCEDURE — 97162 PT EVAL MOD COMPLEX 30 MIN: CPT

## 2019-07-07 PROCEDURE — 92610 EVALUATE SWALLOWING FUNCTION: CPT

## 2019-07-07 PROCEDURE — 94660 CPAP INITIATION&MGMT: CPT

## 2019-07-07 PROCEDURE — 700105 HCHG RX REV CODE 258: Performed by: HOSPITALIST

## 2019-07-07 PROCEDURE — 93306 TTE W/DOPPLER COMPLETE: CPT | Mod: 26 | Performed by: INTERNAL MEDICINE

## 2019-07-07 RX ORDER — ROPINIROLE 2 MG/1
4 TABLET, FILM COATED ORAL 2 TIMES DAILY
Status: DISCONTINUED | OUTPATIENT
Start: 2019-07-07 | End: 2019-07-09 | Stop reason: HOSPADM

## 2019-07-07 RX ORDER — LORAZEPAM 1 MG/1
.5-1 TABLET ORAL EVERY 6 HOURS PRN
Status: DISCONTINUED | OUTPATIENT
Start: 2019-07-07 | End: 2019-07-07

## 2019-07-07 RX ORDER — KETOROLAC TROMETHAMINE 30 MG/ML
15 INJECTION, SOLUTION INTRAMUSCULAR; INTRAVENOUS ONCE
Status: COMPLETED | OUTPATIENT
Start: 2019-07-07 | End: 2019-07-07

## 2019-07-07 RX ORDER — HYDROCODONE BITARTRATE AND ACETAMINOPHEN 10; 325 MG/1; MG/1
1 TABLET ORAL
Status: DISCONTINUED | OUTPATIENT
Start: 2019-07-07 | End: 2019-07-08

## 2019-07-07 RX ADMIN — METHOCARBAMOL 1000 MG: 100 INJECTION INTRAMUSCULAR; INTRAVENOUS at 02:24

## 2019-07-07 RX ADMIN — GABAPENTIN 800 MG: 400 CAPSULE ORAL at 12:20

## 2019-07-07 RX ADMIN — ASPIRIN 300 MG: 300 SUPPOSITORY RECTAL at 03:18

## 2019-07-07 RX ADMIN — GABAPENTIN 800 MG: 400 CAPSULE ORAL at 17:53

## 2019-07-07 RX ADMIN — VENLAFAXINE HYDROCHLORIDE 150 MG: 75 CAPSULE, EXTENDED RELEASE ORAL at 17:53

## 2019-07-07 RX ADMIN — FENTANYL CITRATE 50 MCG: 50 INJECTION INTRAMUSCULAR; INTRAVENOUS at 10:10

## 2019-07-07 RX ADMIN — KETOROLAC TROMETHAMINE 15 MG: 30 INJECTION, SOLUTION INTRAMUSCULAR at 22:40

## 2019-07-07 RX ADMIN — LORAZEPAM 1 MG: 1 TABLET ORAL at 13:41

## 2019-07-07 RX ADMIN — FENTANYL CITRATE 50 MCG: 50 INJECTION INTRAMUSCULAR; INTRAVENOUS at 03:56

## 2019-07-07 RX ADMIN — TEMAZEPAM 30 MG: 15 CAPSULE ORAL at 20:36

## 2019-07-07 RX ADMIN — POTASSIUM CHLORIDE 40 MEQ: 20 TABLET, EXTENDED RELEASE ORAL at 10:10

## 2019-07-07 RX ADMIN — ASPIRIN 325 MG: 325 TABLET, FILM COATED ORAL at 20:35

## 2019-07-07 RX ADMIN — HYDROCODONE BITARTRATE AND ACETAMINOPHEN 1 TABLET: 10; 325 TABLET ORAL at 17:53

## 2019-07-07 RX ADMIN — ROPINIROLE HYDROCHLORIDE 4 MG: 2 TABLET, FILM COATED ORAL at 17:52

## 2019-07-07 RX ADMIN — SITAGLIPTIN 100 MG: 100 TABLET, FILM COATED ORAL at 17:52

## 2019-07-07 RX ADMIN — FENTANYL CITRATE 50 MCG: 50 INJECTION INTRAMUSCULAR; INTRAVENOUS at 06:06

## 2019-07-07 RX ADMIN — ATORVASTATIN CALCIUM 80 MG: 80 TABLET, FILM COATED ORAL at 17:52

## 2019-07-07 ASSESSMENT — PATIENT HEALTH QUESTIONNAIRE - PHQ9
1. LITTLE INTEREST OR PLEASURE IN DOING THINGS: NOT AT ALL
2. FEELING DOWN, DEPRESSED, IRRITABLE, OR HOPELESS: NOT AT ALL
SUM OF ALL RESPONSES TO PHQ9 QUESTIONS 1 AND 2: 0

## 2019-07-07 ASSESSMENT — ENCOUNTER SYMPTOMS
HEARTBURN: 0
BRUISES/BLEEDS EASILY: 0
NERVOUS/ANXIOUS: 1
COUGH: 0
PALPITATIONS: 0
HEMOPTYSIS: 0
SENSORY CHANGE: 1
BACK PAIN: 0
DIZZINESS: 0
HEADACHES: 1
WEAKNESS: 0
ABDOMINAL PAIN: 0
EYE REDNESS: 0
STRIDOR: 0
FOCAL WEAKNESS: 1
MYALGIAS: 0
SENSORY CHANGE: 0
FLANK PAIN: 0
DEPRESSION: 0
BLURRED VISION: 0
SPEECH CHANGE: 0
DOUBLE VISION: 0
SHORTNESS OF BREATH: 0
EYE DISCHARGE: 0
HALLUCINATIONS: 0
VOMITING: 0
DIAPHORESIS: 0
TREMORS: 1
NECK PAIN: 0
FOCAL WEAKNESS: 0
NAUSEA: 0
FEVER: 0
CHILLS: 0
DIARRHEA: 0
WEAKNESS: 1

## 2019-07-07 ASSESSMENT — LIFESTYLE VARIABLES
EVER_SMOKED: YES
ALCOHOL_USE: NO
SUBSTANCE_ABUSE: 0

## 2019-07-07 ASSESSMENT — COGNITIVE AND FUNCTIONAL STATUS - GENERAL
SUGGESTED CMS G CODE MODIFIER DAILY ACTIVITY: CJ
HELP NEEDED FOR BATHING: A LOT
WALKING IN HOSPITAL ROOM: A LITTLE
TURNING FROM BACK TO SIDE WHILE IN FLAT BAD: A LITTLE
PERSONAL GROOMING: A LITTLE
WALKING IN HOSPITAL ROOM: TOTAL
MOVING FROM LYING ON BACK TO SITTING ON SIDE OF FLAT BED: A LITTLE
SUGGESTED CMS G CODE MODIFIER MOBILITY: CM
DRESSING REGULAR LOWER BODY CLOTHING: A LITTLE
MOBILITY SCORE: 8
STANDING UP FROM CHAIR USING ARMS: A LITTLE
DRESSING REGULAR LOWER BODY CLOTHING: A LOT
CLIMB 3 TO 5 STEPS WITH RAILING: TOTAL
MOVING TO AND FROM BED TO CHAIR: UNABLE
DAILY ACTIVITIY SCORE: 14
SUGGESTED CMS G CODE MODIFIER DAILY ACTIVITY: CK
EATING MEALS: A LITTLE
CLIMB 3 TO 5 STEPS WITH RAILING: A LITTLE
TOILETING: A LOT
MOVING FROM LYING ON BACK TO SITTING ON SIDE OF FLAT BED: UNABLE
MOBILITY SCORE: 18
HELP NEEDED FOR BATHING: A LITTLE
SUGGESTED CMS G CODE MODIFIER MOBILITY: CK
TURNING FROM BACK TO SIDE WHILE IN FLAT BAD: A LITTLE
MOVING TO AND FROM BED TO CHAIR: A LITTLE
STANDING UP FROM CHAIR USING ARMS: TOTAL
DRESSING REGULAR UPPER BODY CLOTHING: A LOT
PERSONAL GROOMING: A LITTLE
TOILETING: A LITTLE
DAILY ACTIVITIY SCORE: 20

## 2019-07-07 ASSESSMENT — GAIT ASSESSMENTS
DISTANCE (FEET): 15
DEVIATION: OTHER (COMMENT)
ASSISTIVE DEVICE: FRONT WHEEL WALKER
GAIT LEVEL OF ASSIST: SUPERVISED

## 2019-07-07 ASSESSMENT — COPD QUESTIONNAIRES
HAVE YOU SMOKED AT LEAST 100 CIGARETTES IN YOUR ENTIRE LIFE: YES
DO YOU EVER COUGH UP ANY MUCUS OR PHLEGM?: NO/ONLY WITH OCCASIONAL COLDS OR INFECTIONS
IN THE PAST 12 MONTHS DO YOU DO LESS THAN YOU USED TO BECAUSE OF YOUR BREATHING PROBLEMS: DISAGREE/UNSURE
COPD SCREENING SCORE: 5
DURING THE PAST 4 WEEKS HOW MUCH DID YOU FEEL SHORT OF BREATH: SOME OF THE TIME

## 2019-07-07 ASSESSMENT — ACTIVITIES OF DAILY LIVING (ADL): TOILETING: INDEPENDENT

## 2019-07-07 NOTE — THERAPY
Speech Language Therapy Clinical Swallow Evaluation completed.  Functional Status: Pt was A&Ox4, cooperative and eager to eat. Oral mech exam notable for significant R sided facial edema, R facial droop, significantly restricted labiofacial/mandibular/lingual ROM, lingual deviation to L. Pt is edentulous but has well-fitting upper dentures. Pt is s/p cervical fusion via anterior approach 2 wks ago. She reports some difficulty swallowing since surgery, but no choking/coughing with PO intake and she was eating regular foods with the exception of some meats. Pt was presented with ice chips (5), NTL via tsp/cup, thins via tsp/cup/straw, purees, soft solids. Oral phase was notable for reduced rotary/lateral mastication, effortful bolus manipulation and increased oral prep time for chewable solids. Pharyngeal swallow repsonse was timely and hyolaryngeal excursion palpated as complete. Pt swallowed 2x/bolus with purees, reporting globus sensation pointing to throat at the level of the pyriforms. A head turn to the R and/or a thin liquid rinse was effective to relieve this sensation. Pt noted to swallow 3x/bolus with soft solids, concerning for pharyngeal retention (without use of aforementioned strategies). No s/sx of aspiration occurred with PO intake. Recommend initiating a diet of Dysphagia 1/thin liquids with assistance needed for self-feeding as pt is not able to use her R hand. Swallow strategies as posted. SLP to follow.   Recommendations - Diet: Dysphagia I, Thin Liquid                          Strategies: Assistance needed for meal tray set-up and feeding; Head of Bed at 90 Degrees; Head turn to Right with solids; alternate solids and liquids; reduce bite/sip size                          Medication Administration: Crush larger Medications in Puree, Float Whole with Puree if small  Plan of Care: Will benefit from Speech Therapy 5 times per week  Post-Acute Therapy: Recommend inpatient transitional care services  "for continued speech therapy services.        See \"Rehab Therapy-Acute\" Patient Summary Report for complete documentation.   "

## 2019-07-07 NOTE — ED NOTES
"Med Rec Updated and Complete per Pt at bedside  Allergies Reviewed  No PO ABX last 14 days.    Pt reports she prefers to be called \"Maxine\".      "

## 2019-07-07 NOTE — PROGRESS NOTES
Monitor summary: SR 81-92, NM 0.14, QRS 0.10, QT 0.36, with rare PACs and PVCs per strip from monitor room.

## 2019-07-07 NOTE — ED NOTES
Patient transported to floor. All questions answered and concerns alleviated. Neuro assessment unchanged.

## 2019-07-07 NOTE — CARE PLAN
Problem: Venous Thromboembolism (VTW)/Deep Vein Thrombosis (DVT) Prevention:  Goal: Patient will participate in Venous Thrombosis (VTE)/Deep Vein Thrombosis (DVT)Prevention Measures  Outcome: PROGRESSING SLOWER THAN EXPECTED  Pt refusing SCDs due to restless legs     Problem: Skin Integrity  Goal: Risk for impaired skin integrity will decrease  Outcome: PROGRESSING AS EXPECTED  No skin issues noted at this time

## 2019-07-07 NOTE — PROGRESS NOTES
Hospital Medicine Daily Progress Note    Date of Service  7/7/2019    Chief Complaint  63 y.o. female admitted 7/6/2019 with headaches , near syncope and right sided weakness.     Hospital Course    64 yo female, hx multiple medical problems  Including obesity , DM, P neuropathy, migraines, neck surgery with fusion 2 weeks ago admitted w above symptoms .  Plan for stroke workup.       Interval Problem Update    Right hemiparesis improved.  States her RLS is uncontrolled.  Having problems urinating. Low K. Has been under severe stress with family and medical issues .     Consultants/Specialty    Neurology     Code Status  Full     Disposition    Anticipate dc home when stable.     Review of Systems  Review of Systems   Constitutional: Positive for malaise/fatigue. Negative for chills, diaphoresis and fever.   HENT: Negative for congestion.    Eyes: Negative for discharge and redness.   Respiratory: Negative for cough, shortness of breath and stridor.    Cardiovascular: Negative for chest pain, palpitations and leg swelling.   Gastrointestinal: Negative for abdominal pain, diarrhea and vomiting.   Genitourinary: Negative for dysuria, flank pain and hematuria.        Diff voiding.    Musculoskeletal: Negative for back pain, joint pain and neck pain.   Neurological: Positive for tremors (chronic RLS ), weakness and headaches. Negative for sensory change, speech change and focal weakness.   Psychiatric/Behavioral: Negative for hallucinations and substance abuse. The patient is nervous/anxious.         Physical Exam  Temp:  [36.4 °C (97.5 °F)-36.7 °C (98 °F)] 36.4 °C (97.5 °F)  Pulse:  [84-94] 84  Resp:  [16-17] 16  BP: (151-166)/(89-95) 159/93  SpO2:  [89 %-94 %] 94 %    Physical Exam   Constitutional: She is oriented to person, place, and time.   HENT:   Head: Normocephalic and atraumatic.   Right Ear: External ear normal.   Left Ear: External ear normal.   Nose: Nose normal.   Eyes: EOM are normal. Right eye exhibits  no discharge. Left eye exhibits no discharge. No scleral icterus.   Neck: Neck supple. No JVD present.   Cardiovascular: Normal rate and regular rhythm.    No murmur heard.  Pulmonary/Chest: Effort normal. No stridor. She has no wheezes. She has no rales.   Abdominal: Soft. Bowel sounds are normal. She exhibits no distension. There is no tenderness.   Obese.    Musculoskeletal: She exhibits no edema or tenderness.   Intermittent tremors lower ext.   Neurological: She is alert and oriented to person, place, and time. No cranial nerve deficit.   Right upper ext approx 4/5 strength. Lower ext 4/ 5 .    Skin: Skin is warm and dry. She is not diaphoretic. No pallor.   Psychiatric:   Depressed affect .   Vitals reviewed.      Fluids  No intake or output data in the 24 hours ending 07/07/19 1013    Laboratory  Recent Labs      07/06/19 2116   WBC  8.8   RBC  4.94   HEMOGLOBIN  13.8   HEMATOCRIT  44.8   MCV  90.7   MCH  27.9   MCHC  30.8*   RDW  48.6   PLATELETCT  357   MPV  9.4     Recent Labs      07/06/19   2116   SODIUM  140   POTASSIUM  3.5*   CHLORIDE  103   CO2  29   GLUCOSE  105*   BUN  16   CREATININE  0.93   CALCIUM  10.2     Recent Labs      07/06/19   2116   APTT  28.3   INR  1.03               Imaging  DX-CHEST-PORTABLE (1 VIEW)   Final Result      Cardiomegaly and mild diffuse interstitial edema. No focal consolidation or pleural effusions.      CT-CTA NECK WITH & W/O-POST PROCESSING   Final Result      1.  No high-grade stenosis, large vessel occlusion, aneurysm or dissection.   2.  Partially visualized groundglass opacities in the upper lungs may represent mild edema or early or early bronchiolitis/developing bronchopneumonia.      CT-CTA HEAD WITH & W/O-POST PROCESS   Final Result      No large vessel occlusion, high-grade stenosis or aneurysm of the [place].      CT-CEREBRAL PERFUSION ANALYSIS   Final Result      1.  Cerebral blood flow less than 30% likely representing completed infarct = 0 mL.       2.  T Max more than 6 seconds likely representing combination of completed infarct and ischemia = 0 mL.      3.  Mismatched volume likely representing ischemic brain/penumbra = 0 mL.      4.  Please note that the cerebral perfusion was performed on the limited brain tissue around the basal ganglia region. Infarct/ischemia outside the CT perfusion sections can be missed in this study.      CT-HEAD W/O   Final Result      1.  No CT evidence of acute infarct, hemorrhage or mass.   2.  Mild global parenchymal atrophy. Chronic small vessel ischemic changes.      MR-BRAIN-W/O    (Results Pending)   EC-ECHOCARDIOGRAM COMPLETE W/O CONT    (Results Pending)        Assessment/Plan  * CVA (cerebral vascular accident) (HCC)   Assessment & Plan    Concern for CVA with acute right hemiparesis Neuro consulted- No TPA given  Fu MRI brain   Fu echo w bubble study   Pt/ot/speech evaluations   Asa, statin therapy   Neuro input.      Head ache   Assessment & Plan    Reports hx of migraines   Resume home pain relievers.   Dc IV fentanyl .       Hypokalemia   Assessment & Plan    Add kcl  Fu BMP      Anxiety   Assessment & Plan    w depression   Continue Effexor, add prn ativan.      Diabetic polyneuropathy ([facility])- (present on admission)   Assessment & Plan    Resume home gapaentin      Restless legs syndrome (RLS)- (present on admission)   Assessment & Plan    Resume home requip   Leg tremor still uncontrolled - will be unable to do MRI-- add prn Ativan.      Type 2 diabetes mellitus, without long-term current use of insulin ([facility])- (present on admission)   Assessment & Plan    Controlled  Serial accu checks, SSI coverage as needed.   Start Januvia      COPD (chronic obstructive pulmonary disease) ([facility])- (present on admission)   Assessment & Plan    Not in acute exacerbation   resp care protocol ordered     HTN (hypertension)- (present on admission)   Assessment & Plan    Allowing for permissive htn - monitor BP           VTE  prophylaxis:SCDs

## 2019-07-07 NOTE — ED NOTES
Family reports to nursing staff that the patient's house is going to be sold in an upcoming auction and that patient is going to be displaced. Family reports that the patient has been extremely stressed due to the move and has been unlike herself the past week or so.

## 2019-07-07 NOTE — CONSULTS
Referring Physician: Dr. Ancelmo Jackson    Referral Reason:  Stroke code    HPI:  Valentina Lu is a 63 y.o. right-handed female with numerous medical problems as outlined below who was brought to emergency room for evaluation of presyncopal episode associated with severe headache as well as right-sided weakness.  The onset of her symptom was 8:30 PM, almost 30 minutes prior to arrival.  Apparently she was out shopping with her daughter when she felt weak, sweaty and had near syncopal episode.  She did not lose consciousness did not have a fall.  911 called and paramedics arrived and noted she is weak on the right side including face arm and leg.  She underwent brain CT followed by CT angiogram of the head and neck and CT perfusion which were all unremarkable.  I evaluated her briefly in CT scanner and subsequently in the emergency room bed 6.  She appears very emotional and with further questioning she had some family emotional situation earlier with son-in-law.  Patient had neck surgery with fusion 2 weeks ago.      ROS:   Review of Systems   Constitutional: Negative for chills, fever and malaise/fatigue.   HENT: Negative for hearing loss and tinnitus.    Eyes: Negative for blurred vision, double vision and photophobia.   Respiratory: Positive for shortness of breath.    Cardiovascular: Positive for palpitations. Negative for chest pain.   Gastrointestinal: Negative for nausea and vomiting.   Genitourinary: Negative for hematuria.   Musculoskeletal: Positive for neck pain. Negative for back pain, falls and myalgias.   Skin: Negative for rash.   Neurological: Positive for dizziness, speech change, focal weakness and weakness. Negative for tingling, tremors, sensory change, seizures, loss of consciousness and headaches.   Psychiatric/Behavioral: Negative for memory loss.       Past Medical History:   Past Medical History:   Diagnosis Date   • Anemia 01/05/2018   • Anesthesia 7/12/10    difficulty breathing on  "emergence   • Anesthesia 01/05/2018    Pt. states no problems with anesthesia   • Arrhythmia     MVP   • Arthritis     all over   • Asthma 15-18    HX of, not on meds   • BPPV (benign paroxysmal positional vertigo)    • Breast lump or mass     Sourav breasts- had removed   • Breath shortness 01/05/2018    \"With Exertion\" 3l NC PRN   • Cancer ([facility])     ovarian, caught early with hysterectomy   • Chickenpox    • Dental disorder 01/05/2018    upper dentures, lower missing teeth   • depression     taking venlafaxine   • Diabetes (HCC)     oral medication   • Disorder of thyroid     low thyroid   • Essential and other specified forms of tremor    • Fibromyalgia     neuropathy   • Foot fracture, right 12/13/2017   • GERD (gastroesophageal reflux disease)    • [place]serratian measles    • Heart burn    • Heart valve disease     mitral valve prolapse   • Helicobacter pylori    • Hiatus hernia syndrome     pt. denies 1-5-18   • High cholesterol 01/05/2018    \"Controlled with medication\"   • Hyperlipidemia    • Hypertension 01/05/2018    HX of, not currently on meds   • IBS (irritable bowel syndrome) 01/05/2018   • Indigestion    • Influenza    • Migraine with aura, without mention of intractable migraine without mention of status migrainosus    • MRSA (methicillin resistant Staphylococcus aureus) 2005   • Other specified disorder of intestines     IBS   • Oxygen dependent 01/05/2018    3L   • Pain     entire body,pain scale 3   • Pain 01/05/2018    \"Back & Legs\"   • Pneumonia 01/05/2018    \"HX OF\"     • Polyneuropathy in diabetes(357.2)    • Renal disorder 01/05/2018    \"Due to a medication I was taking\". Name unknown   • Restless legs syndrome (RLS)    • Sleep apnea     sleep study done, states it was only for 2 hours, not the whole time   • Sleep apnea 01/05/2018    CPAP USE   • Snoring 01/05/2018    DX ALONZO on CPAP   • Stroke ([facility]) 2007    short term memory loss   • Syncope     September '08 [address]   • TIA (transient ischemic " attack)     HX OF   • TIA (transient ischemic attack)     per pt has had TIAs   • Urinary incontinence 01/05/2018    occ. leaking   • Vision disturbance     flashes of light right/floaters on left eye       Past Surgical History:   Past Surgical History:   Procedure Laterality Date   • CERVICAL DISK AND FUSION ANTERIOR  6/21/2019    Procedure: DISCECTOMY, SPINE, CERVICAL, ANTERIOR APPROACH, WITH FUSION - C3-5;  Surgeon: Remi Lechuga III, M.D.;  Location: SURGERY [facility];  Service: Neurosurgery   • HARDWARE REMOVAL NEURO  6/21/2019    Procedure: REMOVAL, HARDWARE - C5-7;  Surgeon: Remi Lechuga III, M.D.;  Location: SURGERY [facility];  Service: Neurosurgery   • LAMINOTOMY Bilateral 1/12/2018    Procedure: Bilateral L3-L4 laminectomy with right L4  transpedicular decompression;  Surgeon: Remi Lechuga III, M.D.;  Location: [facility];  Service: Neurosurgery   • EXTREME LATERAL INTERBODY FUSION Left 1/11/2018    Procedure: EXTREME LATERAL INTERBODY FUSION-STAGE #1 L3-4 XLIF;  Surgeon: Remi Lechuga III, M.D.;  Location: [facility];  Service: Neurosurgery   • CERVICAL DISK AND FUSION ANTERIOR  1/22/2016    Procedure: CERVICAL DISK AND FUSION ANTERIOR C5-7;  Surgeon: Jerry Flores M.D.;  Location: [facility];  Service:    • MYRINGOTOMY  9/17/2010    Performed by CARY BANUELOS at SURGERY [facility]   • NASAL ENDOSCOPY  8/12/2010    Performed by CARY BANUELOS at SURGERY SAME DAY [facility]   • ETHMOIDECTOMY  8/12/2010    Performed by CARY BANUELOS at SURGERY SAME DAY [facility]   • ABDOMINAL HYSTERECTOMY TOTAL      Hysterectomy, Total Abdominal   • BREAST BIOPSY      X2 bilateral   • CHEST TUBE SUCTION      spontaneous pneumothorax - age 14   • CHOLECYSTECTOMY     • TONSILLECTOMY         Social History:   Social History     Social History   • Marital status:      Spouse name: N/A   • Number of children: N/A   • Years of education: N/A      Occupational History   • Not on file.     Social History Main Topics   • Smoking status: Former Smoker     Packs/day: 1.00     Years: 50.00     Types: Cigarettes     Quit date: 4/20/2015   • Smokeless tobacco: Never Used      Comment: 1 ppd 40 yrs   • Alcohol use No   • Drug use: No   • Sexual activity: Yes     Partners: Female, Male     Other Topics Concern   • Not on file     Social History Narrative   • No narrative on file       Family Hx:   Family History   Problem Relation Age of Onset   • Diabetes Unknown    • Heart Disease Unknown    • Hypertension Unknown    • Lung Disease Unknown        Current Medications:   No current facility-administered medications for this encounter.      Current Outpatient Prescriptions   Medication Sig Dispense Refill   • oxyCODONE immediate-release (ROXICODONE) 5 MG Tab Take 1 Tab by mouth every four hours as needed for Severe Pain for up to 14 days. 84 Tab 0   • methocarbamol (ROBAXIN) 750 MG Tab Take 1 Tab by mouth 3 times a day for 30 days. 90 Tab 0   • docusate sodium (COLACE) 100 MG Cap Take 100 mg by mouth every bedtime.     • gabapentin (NEURONTIN) 800 MG tablet Take 800 mg by mouth 3 times a day.     • venlafaxine (EFFEXOR-XR) 150 MG extended-release capsule Take 150 mg by mouth every evening.     • furosemide (LASIX) 40 MG Tab Take 40 mg by mouth every evening.     • sitagliptin (JANUVIA) 100 MG Tab Take 100 mg by mouth every morning.     • CINNAMON PO Take 1,000 mg by mouth 2 Times a Day.     • temazepam (RESTORIL) 30 MG capsule Take 30 mg by mouth every bedtime.     • ropinirole (REQUIP) 4 MG tablet Take 4 mg by mouth 2 Times a Day.     • glipiZIDE (GLUCOTROL) 5 MG Tab Take 5 mg by mouth every evening.     • potassium chloride (MICRO-K) 10 MEQ capsule Take 10 mEq by mouth every evening.     • diphenhydrAMINE (BENADRYL) 25 MG Tab Take 25 mg by mouth every bedtime.         Allergies:   Allergies   Allergen Reactions   • Pcn [Penicillins] Anaphylaxis     Tolerated  "Cefazolin 01/22/16  RXN=age 6   • Demerol Itching     PEX=5277   • Imitrex [Sumatriptan Succinate] Unspecified     Heart beats fast  OMG=4579   • Metformin Vomiting and Nausea     HXE=2981   • Morphine Itching, Vomiting and Cough     voniting headaches. Tolerates dilaudid,oxycodone 01/22/16   FAN=8580   • Stadol [Butorphanol Tartrate] Itching     RXN= 2011   • Tape Unspecified     RXN=ongoing Paper ok   • Vistaril [Hyzine] Itching     DJY=9377   • Prednisone      \"crying\" nonstop after getting an epidural steriod per pt; requests steroids be put in her allergy list       Physical Exam:   Vitals:    07/06/19 2118 07/06/19 2134   BP: (!) 166/95    Pulse: 94    Resp: 16    SpO2: 94%    Weight:  91 kg (200 lb 9.9 oz)   Height:  1.676 m (5' 6\")       Physical Exam   GENERAL:  Lying in the hospital bed in no apparent distress.  Head: Normocephalic and atraumatic.   Eyes: Pupils are equal, round, and reactive to light. EOM are normal.   Cardiovascular: Normal rate and regular rhythm.    Pulmonary/Chest: Breath sounds normal.   Abdominal: Soft. Bowel sounds are normal. He exhibits no distension. There is no tenderness.   Skin: Skin is warm and dry. No rash noted. No erythema.  Neuro Exam  MENTAL STATUS:  Awake, alert, oriented times 3.  Speech is slightly dysarthric but fully comprehendible, she is very slow to respond, comprehension is intact.  CRANIAL NERVES:  PERRL, EOMI with no nystagmus, face is symmetric, although intermittently she has right facial weakness which is not constant and appears to be voluntary, facial sensation is decreased to light touch, tongue is in the midline, palate is symmetric. Hearing is intact to finger rub bilaterally. Shoulder shrugs are normal.  MOTOR:  Motor examination showed normal strength in direct testing of left upper and lower extremities, proximal and distal.  She has significant inconsistency on her exam and has no effort to raise her right arm against gravity although when I hold " her arm up she hold it for a few seconds and then all of a sudden drop it.  SENSATION: Decreased in the right upper and lower extremity and right face.  REFLEXES:  2+ and symmetric, toes are downgoing bilaterally  COORDINATION:  Normal finger to nose and heel to shin bilaterally  GAIT:  Deferred       NIH Stroke Scale:    1a. Level of Consciousness (Alert, drowsy, etc): 0= Alert    1b. LOC Questions (Month, age): 0= Answers both correctly    1c. LOC Commands (Open/close eyes make fist/let go): 0= Obeys both correctly    2.   Best Gaze (Eyes open - patient follows examiner's finger on face): 0= Normal    3.   Visual Fields (introduce visual stimulus/threat to patient's field quadrants): 0= No visual loss    4.   Facial Paresis (Show teeth, raise eyebrows and squeeze eyes shut): 2 = Partial     5a. Motor Arm - Left (Elevate arm to 90 degrees if patient is sitting, 45 degrees if  supine): 0= No drift    5b. Motor Arm - Right (Elevate arm to 90 degrees if patient is sitting, 45 degrees if supine): 2= Can't resist gravity    6a. Motor Leg - Left (Elevate leg 30 degrees with patient supine): 0= No drift    6b. Motor Leg - Right  (Elevate leg 30 degrees with patient supine): 1= Drift    7.   Limb Ataxia (Finger-nose, heel down shin): 0= No ataxia    8.   Sensory (Pin prick to face, arm, trunk and leg - compare side to side): 1= Partial loss    9.  Best Language (Name item, describe a picture and read sentences): 0= No aphasia    10. Dysarthria (Evaluate speech clarity by patient repeating listed words): 0    11. Extinction and Inattention (Use information from prior testing to identify neglect or  double simultaneous stimuli testing): 0= No neglect    Total NIH Score: 6     Labs:  Recent Labs      07/06/19 2116   WBC  8.8   RBC  4.94   HEMOGLOBIN  13.8   HEMATOCRIT  44.8   MCV  90.7   MCH  27.9   MCHC  30.8*   RDW  48.6   PLATELETCT  357   MPV  9.4     Recent Labs      07/06/19 2116   SODIUM  140   POTASSIUM  3.5*    CHLORIDE  103   CO2  29   GLUCOSE  105*   BUN  16   CREATININE  0.93   CALCIUM  10.2     Recent Labs      07/06/19 2116   APTT  28.3   INR  1.03         Recent Labs      07/06/19 2116   TROPONINI  <0.01         Recent Labs      07/06/19 2116   SODIUM  140   POTASSIUM  3.5*   CHLORIDE  103   CO2  29   GLUCOSE  105*   BUN  16     Recent Labs      07/06/19 2116   SODIUM  140   POTASSIUM  3.5*   CHLORIDE  103   CO2  29   BUN  16   CREATININE  0.93   CALCIUM  10.2     Recent Labs      07/06/19 2116   APTT  28.3   INR  1.03     Results for orders placed or performed during the hospital encounter of 01/22/16   ECHOCARDIOGRAM COMP W/O CONT   Result Value Ref Range    Eject.Frac. MOD BP 59.62     Eject.Frac. MOD 4C 62.32     Eject.Frac. MOD 2C 74.85     Left Ventrical Ejection Fraction 60          Imaging reviewed:    DX-CHEST-PORTABLE (1 VIEW)   Final Result      Cardiomegaly and mild diffuse interstitial edema. No focal consolidation or pleural effusions.      CT-CTA NECK WITH & W/O-POST PROCESSING   Final Result      1.  No high-grade stenosis, large vessel occlusion, aneurysm or dissection.   2.  Partially visualized groundglass opacities in the upper lungs may represent mild edema or early or early bronchiolitis/developing bronchopneumonia.      CT-CTA HEAD WITH & W/O-POST PROCESS   Final Result      No large vessel occlusion, high-grade stenosis or aneurysm of the [place].      CT-CEREBRAL PERFUSION ANALYSIS   Final Result      1.  Cerebral blood flow less than 30% likely representing completed infarct = 0 mL.      2.  T Max more than 6 seconds likely representing combination of completed infarct and ischemia = 0 mL.      3.  Mismatched volume likely representing ischemic brain/penumbra = 0 mL.      4.  Please note that the cerebral perfusion was performed on the limited brain tissue around the basal ganglia region. Infarct/ischemia outside the CT perfusion sections can be missed in this study.       CT-HEAD W/O   Final Result      1.  No CT evidence of acute infarct, hemorrhage or mass.   2.  Mild global parenchymal atrophy. Chronic small vessel ischemic changes.             Assessment/Plan:  Valentina is a 63 y.o. female with multiple medical problems as outlined above who presented with presyncopal episode and right-sided weakness involving face arm and leg.  There is significant inconsistency on her exam and I doubt this represents acute his stroke, although stroke cannot be 100% ruled out at this point.  According to her daughter she had some emotional situation this afternoon anything her symptoms are more consistent with conversion.  In any case even if this is vascular in the stroke she is not a candidate for administration of TPA due to recent neck surgery as of 2 weeks ago.  She is not a thrombectomy candidate as there is no large vessel occlusion on CT angiogram of the head and neck and his CT perfusion is unremarkable.  She will be admitted for stroke work-up.  Q4hr neuro checks.  Telemetry.  Permissive Hypertension at least 24hrs post event, allow <220/<120  Aspirin 325mg daily. HOLD ANTICOAGULATION.  Statin full dose high-potency LDL <70 GOAL  Check labs: Hgb A1C, lipid profile, sed rate, TSH, troponin, UA  Physical/Occupational Therapy Evaluation, & PMR MD consult.  NPO strict until dysphagia screen cleared.  MRI of the brain w/o contrast.   TTE with bubble study  Total critical care time spent in the emergency room was 50 minutes    Greater than 50% of this 45 minute face to face encounter was devoted to disease state counseling on stroke and coordination of care. (see above assessment and plan for further details of discussion).

## 2019-07-07 NOTE — PROGRESS NOTES
Assumed pt care at 0000. Received telephone report from KAYLEEN Kline. Pt A&O x 3. Pt has right sided numbness and weakness being worked up for a stroke. Pt is continent. Ambulates with 2x max assist. Patient NPO due to failed dysphagia screen. Plan of care discussed. Fall and aspiration precautions in place. Q4 hour neuro checks and hourly rounding in place.      0120: Dr. [place] notified that pt failed dysphagia screen and cannot take her PO medication for restless legs. MD also notified that pt is complaining of 10/10 head pain. Orders received for IV robaxin for restless legs and IV fentanyl PRN for pain.

## 2019-07-07 NOTE — H&P
Hospital Medicine History & Physical Note    Date of Service  7/6/2019    Primary Care Physician  Clarisa Winchester M.D.    Consultants  neuro    Code Status  full    Chief Complaint  Right sided weakness     History of Presenting Illness  63 y.o. female who presented 7/6/2019 with past medical history of fibromyalgia, chronic pain, hypertension, COPD, restless leg syndrome who presents with right-sided weakness.  This patient presented with a presyncopal episode with severe headache associated right-sided weakness.  Symptoms started 30 minutes prior to arrival at 8:30 PM.  Shopping with her daughter then felt weak sweaty near syncopal and right-sided weakness.  She has had right-sided facial droop arm and leg.  This patient will be admitted to the hospital with concerns of stroke.  Neuro has been consulted for evaluation.    Review of Systems  Review of Systems   Constitutional: Negative for chills and fever.   HENT: Negative for congestion, hearing loss and tinnitus.    Eyes: Negative for blurred vision, double vision and discharge.   Respiratory: Negative for cough, hemoptysis and shortness of breath.    Cardiovascular: Negative for chest pain, palpitations and leg swelling.   Gastrointestinal: Negative for abdominal pain, heartburn, nausea and vomiting.   Genitourinary: Negative for dysuria and flank pain.   Musculoskeletal: Negative for joint pain and myalgias.   Skin: Negative for rash.   Neurological: Positive for sensory change and focal weakness. Negative for dizziness, speech change and weakness.   Endo/Heme/Allergies: Negative for environmental allergies. Does not bruise/bleed easily.   Psychiatric/Behavioral: Negative for depression, hallucinations and substance abuse.       Past Medical History   has a past medical history of Anemia (01/05/2018); Anesthesia (7/12/10); Anesthesia (01/05/2018); Arrhythmia; Arthritis; Asthma (15-18); BPPV (benign paroxysmal positional vertigo); Breast lump or mass; Breath  shortness (01/05/2018); Cancer ([facility]); Chickenpox; Dental disorder (01/05/2018); depression; Diabetes ([facility]); Disorder of thyroid; Essential and other specified forms of tremor; Fibromyalgia; Foot fracture, right (12/13/2017); GERD (gastroesophageal reflux disease); [place] measles; Heart burn; Heart valve disease; Helicobacter pylori; Hiatus hernia syndrome; High cholesterol (01/05/2018); Hyperlipidemia; Hypertension (01/05/2018); IBS (irritable bowel syndrome) (01/05/2018); Indigestion; Influenza; Migraine with aura, without mention of intractable migraine without mention of status migrainosus; MRSA (methicillin resistant Staphylococcus aureus) (2005); Other specified disorder of intestines; Oxygen dependent (01/05/2018); Pain; Pain (01/05/2018); Pneumonia (01/05/2018); Polyneuropathy in diabetes(357.2); Renal disorder (01/05/2018); Restless legs syndrome (RLS); Sleep apnea; Sleep apnea (01/05/2018); Snoring (01/05/2018); Stroke ([facility]) (2007); Syncope; TIA (transient ischemic attack); TIA (transient ischemic attack); Urinary incontinence (01/05/2018); and Vision disturbance.    Surgical History   has a past surgical history that includes Chest Tube Suction; breast biopsy; tonsillectomy; nasal endoscopy (8/12/2010); ethmoidectomy (8/12/2010); myringotomy (9/17/2010); cervical disk and fusion anterior (1/22/2016); extreme lateral interbody fusion (Left, 1/11/2018); laminotomy (Bilateral, 1/12/2018); cholecystectomy; abdominal hysterectomy total; cervical disk and fusion anterior (6/21/2019); and hardware removal neuro (6/21/2019).     Family History  family history includes Diabetes in her unknown relative; Heart Disease in her unknown relative; Hypertension in her unknown relative; Lung Disease in her unknown relative.     Social History   reports that she quit smoking about 4 years ago. Her smoking use included Cigarettes. She has a 50.00 pack-year smoking history. She has never used smokeless tobacco. She reports  "that she does not drink alcohol or use drugs.    Allergies  Allergies   Allergen Reactions   • Pcn [Penicillins] Anaphylaxis     Tolerated Cefazolin 01/22/16  RXN=age 6   • Demerol Itching     RSE=4663   • Imitrex [Sumatriptan Succinate] Unspecified     Heart beats fast  GWB=0909   • Metformin Vomiting and Nausea     VKR=8356   • Morphine Itching, Vomiting and Cough     voniting headaches. Tolerates dilaudid,oxycodone 01/22/16   CPE=0909   • Stadol [Butorphanol Tartrate] Itching     RXN= 2011   • Tape Unspecified     RXN=ongoing Paper ok   • Vistaril [Hyzine] Itching     KSJ=7968   • Prednisone      \"crying\" nonstop after getting an epidural steriod per pt; requests steroids be put in her allergy list       Medications  Prior to Admission Medications   Prescriptions Last Dose Informant Patient Reported? Taking?   Cinnamon 500 MG Tab 7/5/2019 at PM Patient Yes No   Sig: Take 1,000 mg by mouth every evening.   HYDROcodone/acetaminophen (NORCO)  MG Tab 7/5/2019 at PRN Patient Yes Yes   Sig: Take 1 Tab by mouth every evening as needed for Severe Pain.   diphenhydrAMINE (BENADRYL) 25 MG Tab 7/5/2019 at PM Patient Yes No   Sig: Take 25 mg by mouth every bedtime.   furosemide (LASIX) 40 MG Tab 7/5/2019 at PM Patient Yes No   Sig: Take 40 mg by mouth every evening.   gabapentin (NEURONTIN) 800 MG tablet 7/5/2019 at PM Patient Yes No   Sig: Take 800 mg by mouth 3 times a day.   glipiZIDE (GLUCOTROL) 5 MG Tab 7/5/2019 at PM Patient Yes No   Sig: Take 5 mg by mouth every evening.   methocarbamol (ROBAXIN) 750 MG Tab 7/5/2019 at PRN Patient Yes Yes   Sig: Take 750 mg by mouth 3 times a day as needed (Pain).   oxyCODONE immediate-release (ROXICODONE) 5 MG Tab <2weeks at PRN Patient Yes Yes   Sig: Take 5 mg by mouth every four hours as needed for Severe Pain.   potassium chloride (MICRO-K) 10 MEQ capsule 7/5/2019 at PM Patient Yes No   Sig: Take 10 mEq by mouth every evening.   ropinirole (REQUIP) 4 MG tablet 7/5/2019 at PM " Patient Yes No   Sig: Take 4 mg by mouth every bedtime.   sitagliptin (JANUVIA) 100 MG Tab 7/5/2019 at PM Patient Yes No   Sig: Take 100 mg by mouth every evening.   temazepam (RESTORIL) 30 MG capsule 7/5/2019 at PM Patient Yes No   Sig: Take 30 mg by mouth every bedtime.   venlafaxine (EFFEXOR-XR) 150 MG extended-release capsule 7/5/2019 at PM Patient Yes No   Sig: Take 150 mg by mouth every evening.      Facility-Administered Medications: None       Physical Exam  Pulse:  [86-94] 88  Resp:  [16] 16  BP: (166)/(95) 166/95  SpO2:  [89 %-94 %] 91 %    Physical Exam   Constitutional: She appears well-developed and well-nourished. She appears distressed.   HENT:   Head: Normocephalic and atraumatic.   Eyes: Pupils are equal, round, and reactive to light. Conjunctivae and EOM are normal.   Neck: Normal range of motion. Neck supple. No JVD present.   Cardiovascular: Normal rate, regular rhythm, normal heart sounds and intact distal pulses.    No murmur heard.  Pulmonary/Chest: Effort normal and breath sounds normal. No respiratory distress. She has no wheezes.   Abdominal: Soft. Bowel sounds are normal. She exhibits no distension. There is no tenderness.   Musculoskeletal: Normal range of motion. She exhibits no edema.   Neurological: She exhibits normal muscle tone.   Right sided weakness 4/5, right sided facial droop   Skin: Skin is warm and dry.   Psychiatric: She has a normal mood and affect. Her behavior is normal. Judgment and thought content normal.   Nursing note and vitals reviewed.      Laboratory:  Recent Labs      07/06/19 2116   WBC  8.8   RBC  4.94   HEMOGLOBIN  13.8   HEMATOCRIT  44.8   MCV  90.7   MCH  27.9   MCHC  30.8*   RDW  48.6   PLATELETCT  357   MPV  9.4     Recent Labs      07/06/19 2116   SODIUM  140   POTASSIUM  3.5*   CHLORIDE  103   CO2  29   GLUCOSE  105*   BUN  16   CREATININE  0.93   CALCIUM  10.2     Recent Labs      07/06/19 2116   ALTSGPT  18   ASTSGOT  19   ALKPHOSPHAT  79    TBILIRUBIN  0.5   GLUCOSE  105*     Recent Labs      07/06/19 2116   APTT  28.3   INR  1.03             Recent Labs      07/06/19 2116   TROPONINI  <0.01       Urinalysis:    No results found     Imaging:  DX-CHEST-PORTABLE (1 VIEW)   Final Result      Cardiomegaly and mild diffuse interstitial edema. No focal consolidation or pleural effusions.      CT-CTA NECK WITH & W/O-POST PROCESSING   Final Result      1.  No high-grade stenosis, large vessel occlusion, aneurysm or dissection.   2.  Partially visualized groundglass opacities in the upper lungs may represent mild edema or early or early bronchiolitis/developing bronchopneumonia.      CT-CTA HEAD WITH & W/O-POST PROCESS   Final Result      No large vessel occlusion, high-grade stenosis or aneurysm of the [place].      CT-CEREBRAL PERFUSION ANALYSIS   Final Result      1.  Cerebral blood flow less than 30% likely representing completed infarct = 0 mL.      2.  T Max more than 6 seconds likely representing combination of completed infarct and ischemia = 0 mL.      3.  Mismatched volume likely representing ischemic brain/penumbra = 0 mL.      4.  Please note that the cerebral perfusion was performed on the limited brain tissue around the basal ganglia region. Infarct/ischemia outside the CT perfusion sections can be missed in this study.      CT-HEAD W/O   Final Result      1.  No CT evidence of acute infarct, hemorrhage or mass.   2.  Mild global parenchymal atrophy. Chronic small vessel ischemic changes.      MR-BRAIN-W/O    (Results Pending)   EC-ECHOCARDIOGRAM COMPLETE W/O CONT    (Results Pending)         Assessment/Plan:  I anticipate this patient will require at least two midnights for appropriate medical management, necessitating inpatient admission.    * CVA (cerebral vascular accident) ([facility])   Assessment & Plan    Concern for CVA, Neuro consulted  No TPA given  Cont with Neuro checks   NIH of 6  Admit to neuro   A1c, lipid panel   Echo  cardiogram with bubble  Mri brain   Pt/ot/speech evaluations   Asa, statin therapy      Diabetic polyneuropathy (HCC)- (present on admission)   Assessment & Plan    Resume home gapaentin      Restless legs syndrome (RLS)- (present on admission)   Assessment & Plan    Resume home requip      COPD (chronic obstructive pulmonary disease) (HCC)- (present on admission)   Assessment & Plan    Not in acute exacerbation   resp care protocol ordered     HTN (hypertension)- (present on admission)   Assessment & Plan    Allowing for permissive htn          VTE prophylaxis: scd

## 2019-07-07 NOTE — RESPIRATORY CARE
COPD EDUCATION by COPD CLINICAL EDUCATOR  7/7/2019 at 12:40 PM by Erna Hamm     Patient interviewed by COPD education team. Patient refused COPD program at this time. She has ALONZO for which she has a CPAP machine. She has seen [facility] in 2016 with PFT's (see EMR). She now sees [facility] for her continued care. She only has an Albuterol MDI as needed. Her 2016 PFT showed COPD (DLOC) with -0- bronchodilator response.  We reviewed care/cleaing of CPAP device.

## 2019-07-07 NOTE — ASSESSMENT & PLAN NOTE
Concern for CVA with acute right hemiparesis Neuro consulted- No TPA given.  Exam and observations of right weakness inconsistent .  Overall improved.  Echo no embolic source, negative study for shunt  Plan MRI brain with anesthesia  Pt/ot/speech evaluations   Continue Asa, statin therapy   Neuro input.   Home PT/OT as needed  Consider psychiatry evaluation if clinically worsens and MRI negative .

## 2019-07-07 NOTE — ED PROVIDER NOTES
ED Provider Note    Scribed for Ancelmo Jackson M.D. by Markos Hicks. 7/6/2019, 9:13 PM.    Primary care provider: Clarisa Winchester M.D.  Means of arrival: Ambulance  History obtained from: EMS  History limited by: None    CHIEF COMPLAINT  Chief Complaint   Patient presents with   • Possible Stroke   • Syncope       HPI  Valentina Lu is a 63 y.o. female who presents to the Emergency Department for evaluation of a possible stroke onset prior to arrival. Per EMS, patient was at [employer] with her family when they noticed the patient felt weak and sweaty and had a near-syncopal episode. The patient laid on the ground for some time before feeling slightly better and walked to the [facility] area. It was there when the patient had a full syncopal episode and laid on the ground until EMS arrived for transport to [facility]. EMS states the patient did not have full loss of consciousness. EMS notes that the patient had definite right sided deficits with facial drooping. The patient has a history of a stoke 2 years ago and she also has extensive family history of stroke. Patient has medical history of neuropathy, diabetes, hypertension, and a recent spinal fusion.    REVIEW OF SYSTEMS  See HPI for further details. All other systems are negative.     PAST MEDICAL HISTORY   has a past medical history of Anemia (01/05/2018); Anesthesia (7/12/10); Anesthesia (01/05/2018); Arrhythmia; Arthritis; Asthma (15-18); BPPV (benign paroxysmal positional vertigo); Breast lump or mass; Breath shortness (01/05/2018); Cancer ([facility]); Chickenpox; Dental disorder (01/05/2018); depression; Diabetes ([facility]); Disorder of thyroid; Essential and other specified forms of tremor; Fibromyalgia; Foot fracture, right (12/13/2017); GERD (gastroesophageal reflux disease); Belgian measles; Heart burn; Heart valve disease; Helicobacter pylori; Hiatus hernia syndrome; High cholesterol (01/05/2018); Hyperlipidemia; Hypertension (01/05/2018); IBS (irritable bowel  syndrome) (01/05/2018); Indigestion; Influenza; Migraine with aura, without mention of intractable migraine without mention of status migrainosus; MRSA (methicillin resistant Staphylococcus aureus) (2005); Other specified disorder of intestines; Oxygen dependent (01/05/2018); Pain; Pain (01/05/2018); Pneumonia (01/05/2018); Polyneuropathy in diabetes(357.2); Renal disorder (01/05/2018); Restless legs syndrome (RLS); Sleep apnea; Sleep apnea (01/05/2018); Snoring (01/05/2018); Stroke (HCC) (2007); Syncope; TIA (transient ischemic attack); TIA (transient ischemic attack); Urinary incontinence (01/05/2018); and Vision disturbance.    SURGICAL HISTORY   has a past surgical history that includes Chest Tube Suction; breast biopsy; tonsillectomy; nasal endoscopy (8/12/2010); ethmoidectomy (8/12/2010); myringotomy (9/17/2010); cervical disk and fusion anterior (1/22/2016); extreme lateral interbody fusion (Left, 1/11/2018); laminotomy (Bilateral, 1/12/2018); cholecystectomy; abdominal hysterectomy total; cervical disk and fusion anterior (6/21/2019); and hardware removal neuro (6/21/2019).    SOCIAL HISTORY  Social History   Substance Use Topics   • Smoking status: Former Smoker     Packs/day: 1.00     Years: 50.00     Types: Cigarettes     Quit date: 4/20/2015   • Smokeless tobacco: Never Used      Comment: 1 ppd 40 yrs   • Alcohol use No      History   Drug Use No       FAMILY HISTORY  Family History   Problem Relation Age of Onset   • Diabetes Unknown    • Heart Disease Unknown    • Hypertension Unknown    • Lung Disease Unknown        CURRENT MEDICATIONS  Reviewed.  See Encounter Summary.     ALLERGIES  Allergies   Allergen Reactions   • Pcn [Penicillins] Anaphylaxis     Tolerated Cefazolin 01/22/16  RXN=age 6   • Demerol Itching     TJS=3645   • Imitrex [Sumatriptan Succinate] Unspecified     Heart beats fast  NUU=2616   • Metformin Vomiting and Nausea     TAV=5686   • Morphine Itching, Vomiting and Cough     voniting  "headaches. Tolerates dilaudid,oxycodone 01/22/16   YRO=4173   • Stadol [Butorphanol Tartrate] Itching     RXN= 2011   • Tape Unspecified     RXN=ongoing Paper ok   • Vistaril [Hyzine] Itching     CIW=0399   • Prednisone      \"crying\" nonstop after getting an epidural steriod per pt; requests steroids be put in her allergy list       PHYSICAL EXAM  VITAL SIGNS: BP (!) 166/95   Pulse 94   Resp 16   Ht 1.676 m (5' 6\")   Wt 91 kg (200 lb 9.9 oz)   SpO2 94%   BMI 32.38 kg/m²   Constitutional: Alert in mild distress.  HENT: No signs of trauma, Bilateral external ears normal, Nose normal.   Eyes: Pupils are equal and reactive, Conjunctiva normal, Non-icteric.   Neck: Normal range of motion, No tenderness, Supple, No stridor.   Cardiovascular: Regular rate and rhythm, no murmurs.   Thorax & Lungs: Normal breath sounds, No respiratory distress, No wheezing, No chest tenderness.   Abdomen: Bowel sounds normal, Soft, No tenderness, No masses, No pulsatile masses. No peritoneal signs.  Skin: Warm, Dry, No erythema, No rash.   Back: No bony tenderness, No CVA tenderness.   Extremities: Intact distal pulses, No edema, No tenderness, No cyanosis  Musculoskeletal: Good range of motion in all major joints. No tenderness to palpation or major deformities noted.   Neurologic: Right facial droop. Right upper and lower extremity weakness. Exam continues to change and is inconsistent. Patient places downward pressure when asked to raise extremities. NIH 5  Psychiatric: Affect normal, Judgment normal, Mood normal.       DIAGNOSTIC STUDIES / PROCEDURES     LABS  Results for orders placed or performed during the hospital encounter of 07/06/19   CBC WITH DIFFERENTIAL   Result Value Ref Range    WBC 8.8 4.8 - 10.8 K/uL    RBC 4.94 4.20 - 5.40 M/uL    Hemoglobin 13.8 12.0 - 16.0 g/dL    Hematocrit 44.8 37.0 - 47.0 %    MCV 90.7 81.4 - 97.8 fL    MCH 27.9 27.0 - 33.0 pg    MCHC 30.8 (L) 33.6 - 35.0 g/dL    RDW 48.6 35.9 - 50.0 fL    " Platelet Count 357 164 - 446 K/uL    MPV 9.4 9.0 - 12.9 fL    Neutrophils-Polys 72.90 (H) 44.00 - 72.00 %    Lymphocytes 15.70 (L) 22.00 - 41.00 %    Monocytes 8.30 0.00 - 13.40 %    Eosinophils 2.20 0.00 - 6.90 %    Basophils 0.70 0.00 - 1.80 %    Immature Granulocytes 0.20 0.00 - 0.90 %    Nucleated RBC 0.00 /100 WBC    Neutrophils (Absolute) 6.40 2.00 - 7.15 K/uL    Lymphs (Absolute) 1.38 1.00 - 4.80 K/uL    Monos (Absolute) 0.73 0.00 - 0.85 K/uL    Eos (Absolute) 0.19 0.00 - 0.51 K/uL    Baso (Absolute) 0.06 0.00 - 0.12 K/uL    Immature Granulocytes (abs) 0.02 0.00 - 0.11 K/uL    NRBC (Absolute) 0.00 K/uL   COMP METABOLIC PANEL   Result Value Ref Range    Sodium 140 135 - 145 mmol/L    Potassium 3.5 (L) 3.6 - 5.5 mmol/L    Chloride 103 96 - 112 mmol/L    Co2 29 20 - 33 mmol/L    Anion Gap 8.0 0.0 - 11.9    Glucose 105 (H) 65 - 99 mg/dL    Bun 16 8 - 22 mg/dL    Creatinine 0.93 0.50 - 1.40 mg/dL    Calcium 10.2 8.5 - 10.5 mg/dL    AST(SGOT) 19 12 - 45 U/L    ALT(SGPT) 18 2 - 50 U/L    Alkaline Phosphatase 79 30 - 99 U/L    Total Bilirubin 0.5 0.1 - 1.5 mg/dL    Albumin 4.2 3.2 - 4.9 g/dL    Total Protein 7.6 6.0 - 8.2 g/dL    Globulin 3.4 1.9 - 3.5 g/dL    A-G Ratio 1.2 g/dL   PROTHROMBIN TIME   Result Value Ref Range    PT 13.7 12.0 - 14.6 sec    INR 1.03 0.87 - 1.13   APTT   Result Value Ref Range    APTT 28.3 24.7 - 36.0 sec   COD (ADULT)   Result Value Ref Range    ABO Grouping Only A     Rh Grouping Only POS     Antibody Screen-Cod NEG    TROPONIN   Result Value Ref Range    Troponin I <0.01 0.00 - 0.04 ng/mL   URINALYSIS CULTURE, IF INDICATED   Result Value Ref Range    Color Yellow     Character Clear     Specific Gravity <=1.005 <1.035    Ph 5.5 5.0 - 8.0    Glucose Negative Negative mg/dL    Ketones Negative Negative mg/dL    Protein Negative Negative mg/dL    Bilirubin Negative Negative    Urobilinogen, Urine 0.2 Negative    Nitrite Negative Negative    Leukocyte Esterase Negative Negative    Occult  Blood Negative Negative    Micro Urine Req see below    ESTIMATED GFR   Result Value Ref Range    GFR If African American >60 >60 mL/min/1.73 m 2    GFR If Non African American >60 >60 mL/min/1.73 m 2   REFRACTOMETER SG   Result Value Ref Range    Specific Gravity 1.005      All labs were reviewed by me.    RADIOLOGY  DX-CHEST-PORTABLE (1 VIEW)   Final Result      Cardiomegaly and mild diffuse interstitial edema. No focal consolidation or pleural effusions.      CT-CTA NECK WITH & W/O-POST PROCESSING   Final Result      1.  No high-grade stenosis, large vessel occlusion, aneurysm or dissection.   2.  Partially visualized groundglass opacities in the upper lungs may represent mild edema or early or early bronchiolitis/developing bronchopneumonia.      CT-CTA HEAD WITH & W/O-POST PROCESS   Final Result      No large vessel occlusion, high-grade stenosis or aneurysm of the [place].      CT-CEREBRAL PERFUSION ANALYSIS   Final Result      1.  Cerebral blood flow less than 30% likely representing completed infarct = 0 mL.      2.  T Max more than 6 seconds likely representing combination of completed infarct and ischemia = 0 mL.      3.  Mismatched volume likely representing ischemic brain/penumbra = 0 mL.      4.  Please note that the cerebral perfusion was performed on the limited brain tissue around the basal ganglia region. Infarct/ischemia outside the CT perfusion sections can be missed in this study.      CT-HEAD W/O   Final Result      1.  No CT evidence of acute infarct, hemorrhage or mass.   2.  Mild global parenchymal atrophy. Chronic small vessel ischemic changes.      MR-BRAIN-W/O    (Results Pending)   EC-ECHOCARDIOGRAM COMPLETE W/O CONT    (Results Pending)     The radiologist's interpretation of all radiological studies and images have been reviewed by me.    COURSE & MEDICAL DECISION MAKING  Pertinent Labs & Imaging studies reviewed. (See chart for details)    9:13 PM - Patient seen and examined  at bedside. Ordered DX-Chest, CT-Head w/o, CT-Cerebral Perfusion Analysis, CT-CTA Head w/ and w/o, CT-CTA Neck w/ and w/o, CBC w/ Diff, CMP, Prothrombin Time, APTT, COD (Adult), Troponin, and Urinalysis to evaluate her symptoms.     9:34 PM I discussed the patient's case and the above findings with Dr. Mcgowan (Neurology) who states that his initial impression is that the patient did not have a stroke. Patient appears to place downward pressure when asked to keep extremities raised.      9:36 PM - Patient was reevaluated at bedside. The neurologist discussed with the patient regarding the complications of TPA. The patient is not a candidate for IV Alteplase for stroke because she recently had surgery.    10:07 PM - Paged Hospitalist at this time.    10:20 PM - Obtained and reviewed past medical records which indicated the patient had cervical surgery performed by Dr. Lechuga. The patient had an extension of ACDF from C3 to C5      11:08 PM - 12:33 AM I discussed the patient's case and the above findings with Dr. Garcia (Hospitalist) who agrees to admit the patient.      CRITICAL CARE  The very real possibility of a deterioration of this patient's condition required the highest level of my preparedness for sudden, emergent intervention.  I provided critical care services, which included medication orders, frequent reevaluations of the patient's condition and response to treatment, ordering and reviewing test results, and discussing the case with various consultants.  The critical care time associated with the care of the patient was 35 minutes. Review chart for interventions. This time is exclusive of any other billable procedures.       Decision Making:  This is a 63 y.o. year old female who presents with strokelike symptoms.  Prehospital alert for strokelike symptoms.  Vital signs, past medical history, medications and blood sugar reviewed with EMS crew was emergently at triage desk.  Patient was brought emergently to  CT scanner and the patient was then evaluated again both by myself as well as neurology that has been consulted.  At this point we agree that the story is somewhat vague, the patient has had recent surgery which leaves her with her indication for lytics.  Laboratory evaluation and current imaging is again largely benign.  The patient has had improving symptoms throughout her observation here in the ER multiple re-evaluations.  At this point I discussed the case with the hospital service was agreeable ongoing inpatient stroke rule out.    DISPOSITION:  Patient will be admitted to Dr. Garcia in gurded condition.    FINAL IMPRESSION  1. Near syncope    2. Right sided weakness       The critical care time associated with the care of the patient was 35 minutes. Review chart for interventions. This time is exclusive of any other billable procedures.      IMarkos (Kayeibe), am scribing for, and in the presence of, Ancelmo Jackson M.D..    Electronically signed by: Markos Hicks (Kayeibreuben), 7/6/2019    Ancelmo MELLO M.D. personally performed the services described in this documentation, as scribed by Maroks Hicks in my presence, and it is both accurate and complete.    C.    The note accurately reflects work and decisions made by me.  Ancelmo Jackson  7/7/2019  6:21 PM

## 2019-07-07 NOTE — THERAPY
"Physical Therapy Evaluation completed.   Bed Mobility:  Supine to Sit: Supervised  Transfers: Sit to Stand: Supervised  Gait: Level Of Assist: Supervised with Front-Wheel Walker       Plan of Care: Will benefit from Physical Therapy 2 times per week  Discharge Recommendations: Equipment: No Equipment Needed. Post-acute therapy Currently anticipate no further skilled therapy needs once patient is discharged from the inpatient setting.    See \"Rehab Therapy-Acute\" Patient Summary Report for complete documentation.       Pt is a 64 y/o female presenting to acute setting with c/o right sided weakness. Upon performing PT eval, Pt demonstrated equal strength at BLE. SHe did exhibit some right foot drop with ambulation initially but was able to compensate for it for about 2-3 strides before returning to a more normal pattern with heel strike and toe off. Pt was able to manage a FWW without assistance and maintained standing static posture without LOB while performing ADLs. Pt refused continued ambulation into [place], PT to continue working with Pt in acute setting to increase activity tolerance and work toward ambulation without AD. Anticipate she will be able to DC home when medically cleared.   "

## 2019-07-07 NOTE — ED TRIAGE NOTES
"Valentina Lu  63 y.o. female  Chief Complaint   Patient presents with   • Possible Stroke   • Syncope       Pt BIBA as a Stoke Alert.     Per EMS, pt was at a grocery store this evening when she became diaphoretic and weak and had what family reports as a near syncopal episode. Pt was sat down, and felt better. Before leaving the grocery store, pt had another syncopal episode. Following the second syncopal episode, pt was found to have right sided weakness, right sided facial droop and HA.     Vitals PTA: /100, HR 99 SR  FSBS PTA: 109  Time of onset 20:45  Door Time: 21:11  Pt is not known to be on blood thinners.     Pt to CT and to RED 6 with trauma RN, pt remained on [place].   Hand off to Eliud BEYER.  Neurology bedside.     Blood Pressure : (!) 166/95, Pulse: 94, Respiration: 16, Height: 167.6 cm (5' 6\"), Weight: 91 kg (200 lb 9.9 oz), BMI (Calculated): 32.38, BSA (Calculated): 2.1, Pulse Oximetry: 94 %, O2 (LPM): 0, O2 Delivery: None (Room Air)      "

## 2019-07-07 NOTE — PROGRESS NOTES
2 RN skin check complete with Manju RN    -Yanna-rectal area red and blanching     -2-3 wk old incision on neck from discectomy CDI, edges well approximated    No other skin issues noted. Pt turns self from side to side.

## 2019-07-07 NOTE — ED NOTES
Patient and informed medical team that she had spinal fusion surgery 2 weeks ago. Neuro informed family that the patient would not be a candidate for TPA due to the risk of bleeding.

## 2019-07-08 ENCOUNTER — APPOINTMENT (OUTPATIENT)
Dept: RADIOLOGY | Facility: MEDICAL CENTER | Age: 64
End: 2019-07-08
Attending: HOSPITALIST
Payer: MEDICARE

## 2019-07-08 ENCOUNTER — ANESTHESIA EVENT (OUTPATIENT)
Dept: RADIOLOGY | Facility: MEDICAL CENTER | Age: 64
End: 2019-07-08
Payer: MEDICARE

## 2019-07-08 ENCOUNTER — ANESTHESIA (OUTPATIENT)
Dept: RADIOLOGY | Facility: MEDICAL CENTER | Age: 64
End: 2019-07-08
Payer: MEDICARE

## 2019-07-08 PROBLEM — R13.10 DYSPHAGIA: Status: ACTIVE | Noted: 2019-07-08

## 2019-07-08 LAB
GLUCOSE BLD-MCNC: 130 MG/DL (ref 65–99)
GLUCOSE BLD-MCNC: 131 MG/DL (ref 65–99)
GLUCOSE BLD-MCNC: 93 MG/DL (ref 65–99)
GLUCOSE BLD-MCNC: 97 MG/DL (ref 65–99)

## 2019-07-08 PROCEDURE — 94660 CPAP INITIATION&MGMT: CPT

## 2019-07-08 PROCEDURE — G0378 HOSPITAL OBSERVATION PER HR: HCPCS

## 2019-07-08 PROCEDURE — 160002 HCHG RECOVERY MINUTES (STAT)

## 2019-07-08 PROCEDURE — 700102 HCHG RX REV CODE 250 W/ 637 OVERRIDE(OP): Performed by: HOSPITALIST

## 2019-07-08 PROCEDURE — 700111 HCHG RX REV CODE 636 W/ 250 OVERRIDE (IP): Performed by: HOSPITALIST

## 2019-07-08 PROCEDURE — 700101 HCHG RX REV CODE 250: Performed by: ANESTHESIOLOGY

## 2019-07-08 PROCEDURE — 700111 HCHG RX REV CODE 636 W/ 250 OVERRIDE (IP): Performed by: ANESTHESIOLOGY

## 2019-07-08 PROCEDURE — A9270 NON-COVERED ITEM OR SERVICE: HCPCS | Performed by: HOSPITALIST

## 2019-07-08 PROCEDURE — 700105 HCHG RX REV CODE 258: Performed by: ANESTHESIOLOGY

## 2019-07-08 PROCEDURE — 82962 GLUCOSE BLOOD TEST: CPT | Mod: 91

## 2019-07-08 PROCEDURE — 70551 MRI BRAIN STEM W/O DYE: CPT

## 2019-07-08 PROCEDURE — 99225 PR SUBSEQUENT OBSERVATION CARE,LEVEL II: CPT | Performed by: HOSPITALIST

## 2019-07-08 PROCEDURE — 96376 TX/PRO/DX INJ SAME DRUG ADON: CPT

## 2019-07-08 RX ORDER — HYDROCODONE BITARTRATE AND ACETAMINOPHEN 10; 325 MG/1; MG/1
1 TABLET ORAL EVERY 6 HOURS PRN
Status: DISCONTINUED | OUTPATIENT
Start: 2019-07-08 | End: 2019-07-09 | Stop reason: HOSPADM

## 2019-07-08 RX ORDER — KETOROLAC TROMETHAMINE 30 MG/ML
15 INJECTION, SOLUTION INTRAMUSCULAR; INTRAVENOUS ONCE
Status: COMPLETED | OUTPATIENT
Start: 2019-07-08 | End: 2019-07-08

## 2019-07-08 RX ORDER — SODIUM CHLORIDE, SODIUM LACTATE, POTASSIUM CHLORIDE, CALCIUM CHLORIDE 600; 310; 30; 20 MG/100ML; MG/100ML; MG/100ML; MG/100ML
INJECTION, SOLUTION INTRAVENOUS
Status: DISCONTINUED | OUTPATIENT
Start: 2019-07-08 | End: 2019-07-08 | Stop reason: SURG

## 2019-07-08 RX ADMIN — PROPOFOL 180 MG: 10 INJECTION, EMULSION INTRAVENOUS at 12:47

## 2019-07-08 RX ADMIN — TEMAZEPAM 30 MG: 15 CAPSULE ORAL at 20:25

## 2019-07-08 RX ADMIN — HYDROCODONE BITARTRATE AND ACETAMINOPHEN 1 TABLET: 10; 325 TABLET ORAL at 15:13

## 2019-07-08 RX ADMIN — LIDOCAINE HYDROCHLORIDE 40 MG: 20 INJECTION, SOLUTION INFILTRATION; PERINEURAL at 12:47

## 2019-07-08 RX ADMIN — ASPIRIN 325 MG: 325 TABLET, FILM COATED ORAL at 20:25

## 2019-07-08 RX ADMIN — SODIUM CHLORIDE, POTASSIUM CHLORIDE, SODIUM LACTATE AND CALCIUM CHLORIDE: 600; 310; 30; 20 INJECTION, SOLUTION INTRAVENOUS at 12:44

## 2019-07-08 RX ADMIN — ATORVASTATIN CALCIUM 80 MG: 80 TABLET, FILM COATED ORAL at 20:29

## 2019-07-08 RX ADMIN — ROPINIROLE HYDROCHLORIDE 4 MG: 2 TABLET, FILM COATED ORAL at 18:04

## 2019-07-08 RX ADMIN — GABAPENTIN 800 MG: 400 CAPSULE ORAL at 18:05

## 2019-07-08 RX ADMIN — HYDROCODONE BITARTRATE AND ACETAMINOPHEN 1 TABLET: 10; 325 TABLET ORAL at 21:03

## 2019-07-08 RX ADMIN — ROPINIROLE HYDROCHLORIDE 4 MG: 2 TABLET, FILM COATED ORAL at 05:50

## 2019-07-08 RX ADMIN — SITAGLIPTIN 100 MG: 100 TABLET, FILM COATED ORAL at 18:05

## 2019-07-08 RX ADMIN — KETOROLAC TROMETHAMINE 15 MG: 30 INJECTION, SOLUTION INTRAMUSCULAR; INTRAVENOUS at 21:12

## 2019-07-08 RX ADMIN — VENLAFAXINE HYDROCHLORIDE 150 MG: 75 CAPSULE, EXTENDED RELEASE ORAL at 18:05

## 2019-07-08 RX ADMIN — GABAPENTIN 800 MG: 400 CAPSULE ORAL at 05:50

## 2019-07-08 ASSESSMENT — ENCOUNTER SYMPTOMS
CHILLS: 0
BACK PAIN: 0
SENSORY CHANGE: 0
ABDOMINAL PAIN: 0
DIARRHEA: 0
SHORTNESS OF BREATH: 0
HEADACHES: 1
COUGH: 0
HALLUCINATIONS: 0
DIAPHORESIS: 0
VOMITING: 0
NERVOUS/ANXIOUS: 1
TREMORS: 1
FEVER: 0
NECK PAIN: 0
WEAKNESS: 1
SPEECH CHANGE: 0
FOCAL WEAKNESS: 1

## 2019-07-08 ASSESSMENT — PAIN SCALES - GENERAL: PAIN_LEVEL: 1

## 2019-07-08 ASSESSMENT — LIFESTYLE VARIABLES: SUBSTANCE_ABUSE: 0

## 2019-07-08 NOTE — PROGRESS NOTES
Monitor Summary:  SR 71- , ME .18, QRS .08, QT .40 with Freq pvc, coup, coup, rare pac.  per strip from monitor room.

## 2019-07-08 NOTE — CARE PLAN
Problem: Pain Management  Goal: Pain level will decrease to patient's comfort goal  Outcome: PROGRESSING AS EXPECTED  Assessing pain level frequently using 0 to 10 scale.  Providing medication per MAR.  Providing non-pharmacological intervention and therapeutic communication.      Problem: Safety  Goal: Will remain free from injury  Outcome: PROGRESSING AS EXPECTED  Fall precautions in place. Bed alarm on.

## 2019-07-08 NOTE — THERAPY
"Occupational Therapy Evaluation completed.   Functional Status:  SPV standing grooming, toileting, functional mobility w/ FWW  Plan of Care: Will benefit from Occupational Therapy 2 times per week  Discharge Recommendations:  Equipment: No Equipment Needed. Post-acute therapy Currently anticipate no further skilled therapy needs once patient is discharged from the inpatient setting.    See \"Rehab Therapy-Acute\" Patient Summary Report for complete documentation.      Pt is a 62 y/o female who presents to acute due to new onset of R sided weakness. PMH includes fibromyalgia, chronic pain, HTN, COPD and restless leg syndrome. Pt demo'd weakness in R UE during formal testing, however was able to use it functionally during ADL tasks. Used R UE to wipe herself after toileting, put toothpaste on toothbrush, and grasped FWW w/ it. Pt required encouragement to perform fine motor tasks w/ R hand, however w/ increased time and effort was able to complete tasks. Will follow for Acute OT services while in house. Recommend DC home.   "

## 2019-07-08 NOTE — PROGRESS NOTES
Assumed care of patient at 0700.    Received report from night RN.    Pt alert and oriented x 4.  Pt resting comfortably in bed.   Bed alarm on.  Hourly rounding implemented.

## 2019-07-08 NOTE — THERAPY
Note only: Attempted to see patient for dysphagia tx, pt currently NPO for MRI w/sedation. SLP to follow as patient is able.

## 2019-07-08 NOTE — ANESTHESIA PREPROCEDURE EVALUATION
Relevant Problems   (+) Acute renal failure (HCC)   (+) COPD (chronic obstructive pulmonary disease) (HCC)   (+) CVA (cerebral vascular accident) (HCC)   (+) HTN (hypertension)   (+) Head ache   (+) Type 2 diabetes mellitus, without long-term current use of insulin ([facility])       Physical Exam    Airway   Mallampati: II  TM distance: >3 FB  Neck ROM: full       Cardiovascular - normal exam  Rhythm: regular  Rate: normal  (-) murmur     Dental - normal exam         Pulmonary - normal exam  Breath sounds clear to auscultation     Abdominal    Neurological - normal exam                 Anesthesia Plan    ASA 3       Plan - general       Airway plan will be LMA                  Informed Consent:

## 2019-07-08 NOTE — PROGRESS NOTES
Assumed care of patient at 0700.    Received report from night RN.    Pt alert and oriented x 4, has complaints of pain in her head.  Discussed with Dr. Jack regarding pain management.  Dr. Jack to order pain medication.  Pt states she can't use her right arm.  PT able to move it on command.  Was able to hold and use the shower hand wand to clean herself.    Pt resting comfortably in bed.   Bed alarm on.  Hourly rounding implemented.

## 2019-07-08 NOTE — OR NURSING
13[facility] S/P MRI with sedation. Arrived with LMA in place. Removed on arrival.  Bedside report received from RN. And Anesthesiologist.    1355-resting quietly. Report called to KAYLEEN Georges.    1435-trans. To room on procedure table in stable condition with transport. Chart and dentures with patient.

## 2019-07-08 NOTE — PROGRESS NOTES
Hospital Medicine Daily Progress Note    Date of Service  7/8/2019    Chief Complaint  63 y.o. female admitted 7/6/2019 with headaches , near syncope and right sided weakness.     Hospital Course    62 yo female, hx multiple medical problems  Including obesity , DM, P neuropathy, migraines, neck surgery with fusion 2 weeks ago admitted w above symptoms .  Plan for stroke workup.       Interval Problem Update    Right upper and lower extremity strength continues to improve  Has been observed walking.  Very anxious with movements from restless legs.  States she cannot do MRI unless under anesthesia    Consultants/Specialty    Neurology     Code Status  Full     Disposition    Anticipate dc home when stable     Review of Systems  Review of Systems   Constitutional: Positive for malaise/fatigue. Negative for chills, diaphoresis and fever.   Respiratory: Negative for cough and shortness of breath.    Cardiovascular: Negative for chest pain and leg swelling.   Gastrointestinal: Negative for abdominal pain, diarrhea and vomiting.   Genitourinary: Negative for dysuria and hematuria.        Diff voiding.    Musculoskeletal: Negative for back pain, joint pain and neck pain.   Neurological: Positive for tremors (chronic RLS ), focal weakness, weakness and headaches. Negative for sensory change and speech change.   Psychiatric/Behavioral: Negative for hallucinations and substance abuse. The patient is nervous/anxious.         Physical Exam  Temp:  [36.2 °C (97.2 °F)-36.9 °C (98.4 °F)] 36.9 °C (98.4 °F)  Pulse:  [76-88] 88  Resp:  [14-19] 18  BP: (123-159)/(80-99) 136/94  SpO2:  [90 %-95 %] 90 %    Physical Exam   Constitutional: She is oriented to person, place, and time.   Anxious   HENT:   Head: Normocephalic and atraumatic.   Right Ear: External ear normal.   Left Ear: External ear normal.   Nose: Nose normal.   Eyes: EOM are normal. Right eye exhibits no discharge. Left eye exhibits no discharge. No scleral icterus.   Neck:  Neck supple. No JVD present.   Cardiovascular: Normal rate and regular rhythm.    No murmur heard.  Pulmonary/Chest: Effort normal. No stridor. She has no wheezes. She has no rales.   Abdominal: Soft. Bowel sounds are normal. She exhibits no distension. There is no tenderness.   Obese.    Musculoskeletal: She exhibits no edema or tenderness.   Intermittent tremors lower ext.   Neurological: She is alert and oriented to person, place, and time. No cranial nerve deficit.   Right upper and lower extremity slightly weaker than left.  Otherwise 5/5   Skin: Skin is warm and dry. She is not diaphoretic. No pallor.   Vitals reviewed.      Fluids    Intake/Output Summary (Last 24 hours) at 07/08/19 0716  Last data filed at 07/07/19 1900   Gross per 24 hour   Intake              300 ml   Output                0 ml   Net              300 ml       Laboratory  Recent Labs      07/06/19 2116   WBC  8.8   RBC  4.94   HEMOGLOBIN  13.8   HEMATOCRIT  44.8   MCV  90.7   MCH  27.9   MCHC  30.8*   RDW  48.6   PLATELETCT  357   MPV  9.4     Recent Labs      07/06/19 2116   SODIUM  140   POTASSIUM  3.5*   CHLORIDE  103   CO2  29   GLUCOSE  105*   BUN  16   CREATININE  0.93   CALCIUM  10.2     Recent Labs      07/06/19 2116   APTT  28.3   INR  1.03               Imaging  EC-ECHOCARDIOGRAM COMPLETE W/O CONT   Final Result      DX-CHEST-PORTABLE (1 VIEW)   Final Result      Cardiomegaly and mild diffuse interstitial edema. No focal consolidation or pleural effusions.      CT-CTA NECK WITH & W/O-POST PROCESSING   Final Result      1.  No high-grade stenosis, large vessel occlusion, aneurysm or dissection.   2.  Partially visualized groundglass opacities in the upper lungs may represent mild edema or early or early bronchiolitis/developing bronchopneumonia.      CT-CTA HEAD WITH & W/O-POST PROCESS   Final Result      No large vessel occlusion, high-grade stenosis or aneurysm of the [place] of [place].      CT-CEREBRAL PERFUSION ANALYSIS    Final Result      1.  Cerebral blood flow less than 30% likely representing completed infarct = 0 mL.      2.  T Max more than 6 seconds likely representing combination of completed infarct and ischemia = 0 mL.      3.  Mismatched volume likely representing ischemic brain/penumbra = 0 mL.      4.  Please note that the cerebral perfusion was performed on the limited brain tissue around the basal ganglia region. Infarct/ischemia outside the CT perfusion sections can be missed in this study.      CT-HEAD W/O   Final Result      1.  No CT evidence of acute infarct, hemorrhage or mass.   2.  Mild global parenchymal atrophy. Chronic small vessel ischemic changes.      MR-BRAIN-W/O    (Results Pending)        Assessment/Plan  * CVA (cerebral vascular accident) (HCC)   Assessment & Plan    Concern for CVA with acute right hemiparesis Neuro consulted- No TPA given.  Exam and observations of right weakness inconsistent .  Overall improved.  Echo no embolic source, negative study for shunt  Plan MRI brain with anesthesia  Pt/ot/speech evaluations   Continue Asa, statin therapy   Neuro input.   Home PT/OT as needed  Consider psychiatry evaluation if clinically worsens and MRI negative .  Consideration for psychogenic cause.      Head ache   Assessment & Plan    Reports hx of migraines   Resume home pain relievers.   PRN Norco       Hypokalemia   Assessment & Plan    Continue KCl supplements       Anxiety   Assessment & Plan    w depression   Continue Effexor     Diabetic polyneuropathy (HCC)- (present on admission)   Assessment & Plan    Resume home gapaentin      Restless legs syndrome (RLS)- (present on admission)   Assessment & Plan    Resumed on  home requip 4 mg twice daily     Type 2 diabetes mellitus, without long-term current use of insulin (HCC)- (present on admission)   Assessment & Plan    Fair control  Monitor with serial accu checks, SSI coverage as needed.   Continue Januvia      COPD (chronic obstructive  pulmonary disease) (HCC)- (present on admission)   Assessment & Plan    Not in acute exacerbation   resp care protocol ordered     HTN (hypertension)- (present on admission)   Assessment & Plan    Allowing for permissive htn - monitor BP           VTE prophylaxis:SCDs

## 2019-07-08 NOTE — ANESTHESIA PREPROCEDURE EVALUATION
Relevant Problems   (+) Acute renal failure (HCC)   (+) COPD (chronic obstructive pulmonary disease) (HCC)   (+) CVA (cerebral vascular accident) (HCC)   (+) HTN (hypertension)   (+) Head ache   (+) Type 2 diabetes mellitus, without long-term current use of insulin (HCC)       Physical Exam    Anesthesia Plan

## 2019-07-08 NOTE — FACE TO FACE
Face to Face Supporting Documentation - Home Health    The encounter with this patient was in whole or in part the primary reason for home health admission.    Date of encounter:   Patient:                    MRN:                       YOB: 2021  Valentina Lu  [id number]  1955     Home health to see patient for:  Physical Therapy evaluation and treatment and Occupational therapy evaluation and treatment    Skilled need for:  Comment: right sided weakness, restless leg syndrome dysphagia     Skilled nursing interventions to include:  Comment: PT/OT, SLP    Homebound status evidenced by:  Need the aid of supportive devices such as crutches, canes, wheelchairs or walkers or Needs the assistance of another person in order to leave the home. Leaving home requires a considerable and taxing effort. There is a normal inability to leave the home.    Community Physician to provide follow up care: Clarisa Winchester M.D.     Optional Interventions? No      I certify the face to face encounter for this home health care referral meets the CMS requirements and the encounter/clinical assessment with the patient was, in whole, or in part, for the medical condition(s) listed above, which is the primary reason for home health care. Based on my clinical findings: the service(s) are medically necessary, support the need for home health care, and the homebound criteria are met.  I certify that this patient has had a face to face encounter by myself.  Jose Elias Jack M.D. - NPI: [id number]

## 2019-07-08 NOTE — PROGRESS NOTES
Pt is A/Ox4, PANCHITO. Pt c/o headache. Voiding. Up with x1 assist and FWW to bathroom. Tele in place. Bed alarm on. Call light within reach. Hourly rounding in place.

## 2019-07-08 NOTE — ANESTHESIA POSTPROCEDURE EVALUATION
Patient: Valentina Lu    Procedure Summary     Date:  07/08/19 Room / Location:  [facility]    Anesthesia Start:  1244 Anesthesia Stop:      Procedure:  MR-BRAIN-W/O Diagnosis:  (Okay for patient to be off telemetry monitoring for MRI)    Scheduled Providers:   Responsible Provider:  Remi Lamb M.D.    Anesthesia Type:  general ASA Status:  3          Final Anesthesia Type: general  Last vitals  BP   Blood Pressure : 152/87, NIBP: 144/89    Temp   37.7 °C (99.9 °F)    Pulse   Pulse: 79, Heart Rate (Monitored): 84   Resp   16    SpO2   90 %      Anesthesia Post Evaluation    Patient location during evaluation: PACU  Patient participation: complete - patient participated  Level of consciousness: awake  Pain score: 1    Airway patency: patent  Anesthetic complications: no  Cardiovascular status: hemodynamically stable  Respiratory status: acceptable  Hydration status: euvolemic    PONV: none           Nurse Pain Score: 8 (NPRS)

## 2019-07-08 NOTE — ANESTHESIA QCDR
2019 Qualified Clinical Data Registry (for Quality Improvement)     Postoperative nausea/vomiting risk protocol (Adult = 18 yrs and Pediatric 3-17 yrs)- (430 and 463)  General inhalation anesthetic (NOT TIVA) with PONV risk factors: Yes  Provision of anti-emetic therapy with at least 2 different classes of agents: No   Patient DID NOT receive anti-emetic therapy and reason is documented in Medical Record: No       Multimodal Pain Management- (AQI59)  Patient undergoing Elective Surgery (i.e. Outpatient, or ASC, or Prescheduled Surgery prior to Hospital Admission): Yes  Use of Multimodal Pain Management, two or more drugs and/or interventions, NOT including systemic opioids: No   Exception: Documented allergy to multiple classes of analgesics: No        PACU assessment of acute postoperative pain prior to Anesthesia Care End- Applies to Patients Age = 18- (ABG7)  Initial PACU pain score is which of the following: < 7/10  Patient unable to report pain score: N/A    Post-anesthetic transfer of care checklist/protocol to PACU/ICU- (426 and 427)  Upon conclusion of case, patient transferred to which of the following locations: PACU/Non-ICU  Use of transfer checklist/protocol: Yes  Exclusion: Service Performed in Patient Hospital Room (and thus did not require transfer): N/A    PACU Reintubation- (AQI31)  General anesthesia requiring endotracheal intubation (ETT) along with subsequent extubation in OR or PACU: No  Required reintubation in the PACU: N/A  Extubation was a planned trial documented in the medical record prior to removal of the original airway device: N/A    Unplanned admission to ICU related to anesthesia service up through end of PACU care- (MD51)  Unplanned admission to ICU (not initially anticipated at anesthesia start time): No

## 2019-07-08 NOTE — ANESTHESIA TIME REPORT
Anesthesia Start and Stop Event Times     Date Time Event    7/8/2019 1244 Anesthesia Start        Responsible Staff  07/08/19    Name Role Begin End    Remi Lamb M.D. Anesth 1317         Preop Diagnosis (Free Text):  Pre-op Diagnosis             Preop Diagnosis (Codes):    Post op Diagnosis  Headache      Premium Reason  Non-Premium    Comments:

## 2019-07-08 NOTE — DISCHARGE PLANNING
Follow up for rehab current documentation does not support therapy need for IRF level of care. No physiatry consult ordered per protocol.

## 2019-07-08 NOTE — PROGRESS NOTES
Monitor Summary: SR 78-89, ID .16, QRS .08, QT .40 with frequent PVC, coup, trig, and rare PAC per strip from monitor room.

## 2019-07-08 NOTE — ANESTHESIA PROCEDURE NOTES
Airway  Date/Time: 7/8/2019 12:48 PM  Performed by: GAEL HEBERT  Authorized by: GAEL HEBERT     Location:  OR  Urgency:  Elective  Indications for Airway Management:  Anesthesia  Spontaneous Ventilation: absent    Sedation Level:  Deep  Preoxygenated: Yes    Patient Position:  Sniffing  Mask Difficulty Assessment:  1 - vent by mask  Final Airway Type:  Supraglottic airway  SGA Size:  3  Number of Attempts at Approach:  1

## 2019-07-09 ENCOUNTER — PATIENT OUTREACH (OUTPATIENT)
Dept: HEALTH INFORMATION MANAGEMENT | Facility: OTHER | Age: 64
End: 2019-07-09

## 2019-07-09 VITALS
SYSTOLIC BLOOD PRESSURE: 142 MMHG | TEMPERATURE: 97 F | DIASTOLIC BLOOD PRESSURE: 83 MMHG | RESPIRATION RATE: 18 BRPM | WEIGHT: 201.06 LBS | OXYGEN SATURATION: 93 % | HEIGHT: 66 IN | HEART RATE: 70 BPM | BODY MASS INDEX: 32.31 KG/M2

## 2019-07-09 LAB
CHOLEST SERPL-MCNC: 157 MG/DL (ref 100–199)
GLUCOSE BLD-MCNC: 101 MG/DL (ref 65–99)
GLUCOSE BLD-MCNC: 85 MG/DL (ref 65–99)
HDLC SERPL-MCNC: 35 MG/DL
LDLC SERPL CALC-MCNC: 88 MG/DL
TRIGL SERPL-MCNC: 168 MG/DL (ref 0–149)

## 2019-07-09 PROCEDURE — G0378 HOSPITAL OBSERVATION PER HR: HCPCS

## 2019-07-09 PROCEDURE — A9270 NON-COVERED ITEM OR SERVICE: HCPCS | Performed by: HOSPITALIST

## 2019-07-09 PROCEDURE — 94660 CPAP INITIATION&MGMT: CPT

## 2019-07-09 PROCEDURE — 700102 HCHG RX REV CODE 250 W/ 637 OVERRIDE(OP): Performed by: HOSPITALIST

## 2019-07-09 PROCEDURE — 99217 PR OBSERVATION CARE DISCHARGE: CPT | Performed by: HOSPITALIST

## 2019-07-09 PROCEDURE — 82962 GLUCOSE BLOOD TEST: CPT

## 2019-07-09 PROCEDURE — 96376 TX/PRO/DX INJ SAME DRUG ADON: CPT

## 2019-07-09 PROCEDURE — 36415 COLL VENOUS BLD VENIPUNCTURE: CPT

## 2019-07-09 PROCEDURE — 80061 LIPID PANEL: CPT

## 2019-07-09 PROCEDURE — 700111 HCHG RX REV CODE 636 W/ 250 OVERRIDE (IP): Performed by: HOSPITALIST

## 2019-07-09 RX ORDER — KETOROLAC TROMETHAMINE 30 MG/ML
30 INJECTION, SOLUTION INTRAMUSCULAR; INTRAVENOUS ONCE
Status: COMPLETED | OUTPATIENT
Start: 2019-07-09 | End: 2019-07-09

## 2019-07-09 RX ADMIN — GABAPENTIN 800 MG: 400 CAPSULE ORAL at 12:15

## 2019-07-09 RX ADMIN — GABAPENTIN 800 MG: 400 CAPSULE ORAL at 06:25

## 2019-07-09 RX ADMIN — ROPINIROLE HYDROCHLORIDE 4 MG: 2 TABLET, FILM COATED ORAL at 03:22

## 2019-07-09 RX ADMIN — HYDROCODONE BITARTRATE AND ACETAMINOPHEN 1 TABLET: 10; 325 TABLET ORAL at 03:21

## 2019-07-09 RX ADMIN — HYDROCODONE BITARTRATE AND ACETAMINOPHEN 1 TABLET: 10; 325 TABLET ORAL at 09:57

## 2019-07-09 RX ADMIN — KETOROLAC TROMETHAMINE 30 MG: 30 INJECTION, SOLUTION INTRAMUSCULAR at 12:15

## 2019-07-09 NOTE — DISCHARGE PLANNING
Agency/Facility Name: [facility]  Spoke To: Nataly  Outcome: Patient accepted. Macarena(W) notified. Ella([facility]) notified of discharge.

## 2019-07-09 NOTE — DISCHARGE INSTRUCTIONS
Discharge Instructions    Discharged to home by car with friend. Discharged via wheelchair, hospital escort: Refused.  Special equipment needed: Not Applicable    Be sure to schedule a follow-up appointment with your primary care doctor or any specialists as instructed.     Discharge Plan:   Diet Plan: Discussed  Activity Level: Discussed  Confirmed Follow up Appointment: Appointment Scheduled  Confirmed Symptoms Management: Discussed  Medication Reconciliation Updated: Yes  Influenza Vaccine Indication: Not indicated: Previously immunized this influenza season and > 8 years of age    I understand that a diet low in cholesterol, fat, and sodium is recommended for good health. Unless I have been given specific instructions below for another diet, I accept this instruction as my diet prescription.   Other diet: Dysphagia 1, thin liquids    Special Instructions: None    · Is patient discharged on Warfarin / Coumadin?   No     Depression / Suicide Risk    As you are discharged from this [facility] facility, it is important to learn how to keep safe from harming yourself.    Recognize the warning signs:  · Abrupt changes in personality, positive or negative- including increase in energy   · Giving away possessions  · Change in eating patterns- significant weight changes-  positive or negative  · Change in sleeping patterns- unable to sleep or sleeping all the time   · Unwillingness or inability to communicate  · Depression  · Unusual sadness, discouragement and loneliness  · Talk of wanting to die  · Neglect of personal appearance   · Rebelliousness- reckless behavior  · Withdrawal from people/activities they love  · Confusion- inability to concentrate     If you or a loved one observes any of these behaviors or has concerns about self-harm, here's what you can do:  · Talk about it- your feelings and reasons for harming yourself  · Remove any means that you might use to hurt yourself (examples: pills, rope, extension  cords, firearm)  · Get professional help from the community (Mental Health, Substance Abuse, psychological counseling)  · Do not be alone:Call your Safe Contact- someone whom you trust who will be there for you.  · Call your local CRISIS HOTLINE 177-3368 or 977-085-1928  · Call your local Children's Mobile Crisis Response Team [place] (234) 465-2014 or www.[employer]  · Call the toll free National Suicide Prevention Hotlines   · National Suicide Prevention Lifeline 799-839-ERKR (4299)  · National Hope Line Network 800-SUICIDE (048-0058)

## 2019-07-09 NOTE — DISCHARGE SUMMARY
Hospital Medicine Discharge Note     Admit Date:  7/6/2019       Discharge Date:   7/9/2019    Attending Physician: Jose Fuentes    Diagnoses (includes active and resolved):     Principal Problem:    CVA (cerebral vascular accident) (HCC) POA: Unknown  Active Problems:    HTN (hypertension) POA: Yes    COPD (chronic obstructive pulmonary disease) (HCC) POA: Yes    Type 2 diabetes mellitus, without long-term current use of insulin (HCC) POA: Yes    Restless legs syndrome (RLS) POA: Yes    Diabetic polyneuropathy (HCC) POA: Yes      Overview: ICD-10 transition    Anxiety POA: Unknown    Hypokalemia POA: Unknown    Head ache POA: Unknown    Dysphagia POA: Unknown  Resolved Problems:    * No resolved hospital problems. *     Hospital Summary (Brief Narrative):       62 yo female, hx multiple medical problems  Including obesity , DM, P neuropathy, migraines, neck surgery with fusion 2 weeks ago admitted w headaches , near syncope and right sided weakness.    Neurology consulted given symptoms had stroke work-up.  Placed on aspirin, statin therapy.   She had inconsistent exam and observations of right weakness.  MRI revealed no acute stroke.  Symptoms improving.  She has been under extreme stress.  Possible psychogenic symptoms .  Following admission her weakness markedly improved .  She has dysphagia and recommended for continued speech therapy . She will be arranged for home health.  Patient discharged in stable condition.    Consultants:      none    Imaging/ Testing:      MR-BRAIN-W/O   Final Result      1.  Mild supratentorial white matter disease most consistent with microvascular ischemic change.   2.  No evidence of acute infarction, hemorrhage, or mass lesion.      EC-ECHOCARDIOGRAM COMPLETE W/O CONT   Final Result      DX-CHEST-PORTABLE (1 VIEW)   Final Result      Cardiomegaly and mild diffuse interstitial edema. No focal consolidation or pleural effusions.      CT-CTA NECK WITH & W/O-POST PROCESSING   Final  Result      1.  No high-grade stenosis, large vessel occlusion, aneurysm or dissection.   2.  Partially visualized groundglass opacities in the upper lungs may represent mild edema or early or early bronchiolitis/developing bronchopneumonia.      CT-CTA HEAD WITH & W/O-POST PROCESS   Final Result      No large vessel occlusion, high-grade stenosis or aneurysm of the [place] of [place].      CT-CEREBRAL PERFUSION ANALYSIS   Final Result      1.  Cerebral blood flow less than 30% likely representing completed infarct = 0 mL.      2.  T Max more than 6 seconds likely representing combination of completed infarct and ischemia = 0 mL.      3.  Mismatched volume likely representing ischemic brain/penumbra = 0 mL.      4.  Please note that the cerebral perfusion was performed on the limited brain tissue around the basal ganglia region. Infarct/ischemia outside the CT perfusion sections can be missed in this study.      CT-HEAD W/O   Final Result      1.  No CT evidence of acute infarct, hemorrhage or mass.   2.  Mild global parenchymal atrophy. Chronic small vessel ischemic changes.        Procedures:          none    Discharge Medications:             Medication List      START taking these medications      Instructions   aspirin EC 81 MG Tbec  Commonly known as:  ECOTRIN   Take 1 Tab by mouth every day.  Dose:  81 mg        CONTINUE taking these medications      Instructions   Cinnamon 500 MG Tabs   Take 1,000 mg by mouth every evening.  Dose:  1000 mg     diphenhydrAMINE 25 MG Tabs  Commonly known as:  BENADRYL   Take 25 mg by mouth every bedtime.  Dose:  25 mg     furosemide 40 MG Tabs  Commonly known as:  LASIX   Take 40 mg by mouth every evening.  Dose:  40 mg     gabapentin 800 MG tablet  Commonly known as:  NEURONTIN   Take 800 mg by mouth 3 times a day.  Dose:  800 mg     glipiZIDE 5 MG Tabs  Commonly known as:  GLUCOTROL   Take 5 mg by mouth every evening.  Dose:  5 mg     HYDROcodone/acetaminophen  MG  Tabs  Commonly known as:  NORCO   Take 1 Tab by mouth every evening as needed for Severe Pain.  Dose:  1 Tab     methocarbamol 750 MG Tabs  Commonly known as:  ROBAXIN   Take 750 mg by mouth 3 times a day as needed (Pain).  Dose:  750 mg     potassium chloride 10 MEQ capsule  Commonly known as:  MICRO-K   Take 10 mEq by mouth every evening.  Dose:  10 mEq     ropinirole 4 MG tablet  Commonly known as:  REQUIP   Take 4 mg by mouth every bedtime.  Dose:  4 mg     SITagliptin 100 MG Tabs  Commonly known as:  JANUVIA   Take 100 mg by mouth every evening.  Dose:  100 mg     temazepam 30 MG capsule  Commonly known as:  RESTORIL   Take 30 mg by mouth every bedtime.  Dose:  30 mg     venlafaxine 150 MG extended-release capsule  Commonly known as:  EFFEXOR-XR   Take 150 mg by mouth every evening.  Dose:  150 mg        STOP taking these medications    oxyCODONE immediate-release 5 MG Tabs  Commonly known as:  ROXICODONE          Diet:       DIET ORDERS (Through next 24h)    Start     Ordered    07/07/19 0955  Diet Order Regular  ALL MEALS     Question Answer Comment   Diet: Regular    Texture/Fiber modifications: Dysphagia 1(Pureed)specify fluid consistency(question 6)    Consistency/Fluid modifications: Thin Liquids    Miscellaneous modifications: SLP - 1:1 Supervision by Nursing pt needs A with feeding       07/07/19 0955        Activity:   As tolerated.    Code status:   Full code    Primary Care Provider:    Clarisa Winchester M.D.    Follow up appointment details :      .  Clarisa Winchester M.D.  [address] [facility] [address]  [phone]    Go on 7/15/2019  PLEASE ARRIVE AT 11:00AM FOR YOUR 11:15AM APPOINTMENT. THIS APPOINTMENT WILL BE WITH LUCIA DOUGLAS. THANK YOU **PLEASE BE SURE TO CONTACT OFFICE 24 HOURS IN ADVANCE IF YOU WILL BE UNABLE TO MAKE THIS APPOINTMENT**    [facility]  [address]   [place] [phone]        Time spent on discharge day patient visit: 40  minutes    #################################################
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
## 2019-07-09 NOTE — CARE PLAN
Problem: Pain Management  Goal: Pain level will decrease to patient's comfort goal  Outcome: PROGRESSING AS EXPECTED  Pt medicated per MAR for headache. MD notified and PRNs received. Will continue to monitor and treat as needed.     Problem: Safety  Goal: Will remain free from injury  Outcome: PROGRESSING AS EXPECTED  Bed locked and in lowest position. Call light and personal belongings in reach. Bed alarm in place. Hourly rounding.

## 2019-07-09 NOTE — DISCHARGE PLANNING
Received Choice form at [phone]  Agency/Facility Name: [facility]  Referral sent per Choice form @ [phone]

## 2019-07-09 NOTE — CARE PLAN
Problem: Pain Management  Goal: Pain level will decrease to patient's comfort goal  Outcome: PROGRESSING SLOWER THAN EXPECTED  Assessing pain level frequently using 0 to 10 scale.  Providing medication per MAR.  Providing non-pharmacological intervention and therapeutic communication.      Problem: Knowledge Deficit  Goal: Knowledge of disease process/condition, treatment plan, diagnostic tests, and medications will improve  Outcome: PROGRESSING AS EXPECTED  Reviewing plan of care, activities, and medication with patient.  Encouraging patient to ask questions and participate in plan of care.  Providing answers to all questions.  Continuing with current plan of care.  Hourly rounding in practice.

## 2019-07-09 NOTE — PROGRESS NOTES
Pt discharged home with home health. Family at bedside. Discharge education provided to patient and family, all questions answered at this time. All belongings accounted for, patients personal CPAP taken home. PIV removed, dressing applied.

## 2019-07-09 NOTE — PROGRESS NOTES
Pt A/Ox4, FLANAGAN. Pt c/o headache medicated per MAR. VSS on RA. Voiding. Up with x1 assist and FWW. Able to make needs known. Fall precautions in place. Hourly rounding in place.

## 2019-07-09 NOTE — PROGRESS NOTES
Monitor Summary: SR 74-91, KY .16, QRS .10, QT .38 with frequent pvc, bigem, trigem, coup  per strip from monitor room.

## 2019-07-10 NOTE — PROGRESS NOTES
Monitor summary: SR 68-86, PA 0.14, QRS 0.10, QT 0.38, rare couplet PVC, rare bigem and tachy up to 130 bpm per strip from monitor room.

## 2019-09-10 ENCOUNTER — HOSPITAL ENCOUNTER (OUTPATIENT)
Dept: RADIOLOGY | Facility: MEDICAL CENTER | Age: 64
DRG: 455 | End: 2019-09-10
Attending: NEUROLOGICAL SURGERY | Admitting: NEUROLOGICAL SURGERY
Payer: MEDICARE

## 2019-09-10 DIAGNOSIS — Z01.811 PRE-OPERATIVE RESPIRATORY EXAMINATION: ICD-10-CM

## 2019-09-10 DIAGNOSIS — Z01.812 PRE-OPERATIVE LABORATORY EXAMINATION: ICD-10-CM

## 2019-09-10 DIAGNOSIS — Z01.810 PRE-OPERATIVE CARDIOVASCULAR EXAMINATION: ICD-10-CM

## 2019-09-10 LAB
ANION GAP SERPL CALC-SCNC: 6 MMOL/L (ref 0–11.9)
APPEARANCE UR: CLEAR
APTT PPP: 25.2 SEC (ref 24.7–36)
BACTERIA #/AREA URNS HPF: ABNORMAL /HPF
BASOPHILS # BLD AUTO: 0.9 % (ref 0–1.8)
BASOPHILS # BLD: 0.06 K/UL (ref 0–0.12)
BILIRUB UR QL STRIP.AUTO: NEGATIVE
BUN SERPL-MCNC: 18 MG/DL (ref 8–22)
CALCIUM SERPL-MCNC: 10.3 MG/DL (ref 8.5–10.5)
CHLORIDE SERPL-SCNC: 100 MMOL/L (ref 96–112)
CO2 SERPL-SCNC: 35 MMOL/L (ref 20–33)
COLOR UR: YELLOW
CREAT SERPL-MCNC: 0.93 MG/DL (ref 0.5–1.4)
EOSINOPHIL # BLD AUTO: 0.13 K/UL (ref 0–0.51)
EOSINOPHIL NFR BLD: 2 % (ref 0–6.9)
EPI CELLS #/AREA URNS HPF: NEGATIVE /HPF
ERYTHROCYTE [DISTWIDTH] IN BLOOD BY AUTOMATED COUNT: 47.5 FL (ref 35.9–50)
GLUCOSE SERPL-MCNC: 107 MG/DL (ref 65–99)
GLUCOSE UR STRIP.AUTO-MCNC: 500 MG/DL
HCT VFR BLD AUTO: 46.2 % (ref 37–47)
HGB BLD-MCNC: 14.2 G/DL (ref 12–16)
HYALINE CASTS #/AREA URNS LPF: ABNORMAL /LPF
IMM GRANULOCYTES # BLD AUTO: 0.02 K/UL (ref 0–0.11)
IMM GRANULOCYTES NFR BLD AUTO: 0.3 % (ref 0–0.9)
INR PPP: 0.95 (ref 0.87–1.13)
KETONES UR STRIP.AUTO-MCNC: ABNORMAL MG/DL
LEUKOCYTE ESTERASE UR QL STRIP.AUTO: ABNORMAL
LYMPHOCYTES # BLD AUTO: 1.84 K/UL (ref 1–4.8)
LYMPHOCYTES NFR BLD: 28.4 % (ref 22–41)
MCH RBC QN AUTO: 28.6 PG (ref 27–33)
MCHC RBC AUTO-ENTMCNC: 30.7 G/DL (ref 33.6–35)
MCV RBC AUTO: 93.1 FL (ref 81.4–97.8)
MICRO URNS: ABNORMAL
MONOCYTES # BLD AUTO: 0.63 K/UL (ref 0–0.85)
MONOCYTES NFR BLD AUTO: 9.7 % (ref 0–13.4)
NEUTROPHILS # BLD AUTO: 3.79 K/UL (ref 2–7.15)
NEUTROPHILS NFR BLD: 58.7 % (ref 44–72)
NITRITE UR QL STRIP.AUTO: NEGATIVE
NRBC # BLD AUTO: 0 K/UL
NRBC BLD-RTO: 0 /100 WBC
PH UR STRIP.AUTO: 5.5 [PH] (ref 5–8)
PLATELET # BLD AUTO: 262 K/UL (ref 164–446)
PMV BLD AUTO: 11.1 FL (ref 9–12.9)
POTASSIUM SERPL-SCNC: 4.1 MMOL/L (ref 3.6–5.5)
PROT UR QL STRIP: NEGATIVE MG/DL
PROTHROMBIN TIME: 12.9 SEC (ref 12–14.6)
RBC # BLD AUTO: 4.96 M/UL (ref 4.2–5.4)
RBC # URNS HPF: ABNORMAL /HPF
RBC UR QL AUTO: NEGATIVE
SODIUM SERPL-SCNC: 141 MMOL/L (ref 135–145)
SP GR UR STRIP.AUTO: 1.03
UROBILINOGEN UR STRIP.AUTO-MCNC: 0.2 MG/DL
WBC # BLD AUTO: 6.5 K/UL (ref 4.8–10.8)
WBC #/AREA URNS HPF: ABNORMAL /HPF

## 2019-09-10 PROCEDURE — 81001 URINALYSIS AUTO W/SCOPE: CPT

## 2019-09-10 PROCEDURE — 87186 SC STD MICRODIL/AGAR DIL: CPT

## 2019-09-10 PROCEDURE — 36415 COLL VENOUS BLD VENIPUNCTURE: CPT

## 2019-09-10 PROCEDURE — 71045 X-RAY EXAM CHEST 1 VIEW: CPT

## 2019-09-10 PROCEDURE — 87086 URINE CULTURE/COLONY COUNT: CPT

## 2019-09-10 PROCEDURE — 83036 HEMOGLOBIN GLYCOSYLATED A1C: CPT

## 2019-09-10 PROCEDURE — 80048 BASIC METABOLIC PNL TOTAL CA: CPT

## 2019-09-10 PROCEDURE — 85610 PROTHROMBIN TIME: CPT

## 2019-09-10 PROCEDURE — 85730 THROMBOPLASTIN TIME PARTIAL: CPT

## 2019-09-10 PROCEDURE — 87077 CULTURE AEROBIC IDENTIFY: CPT

## 2019-09-10 PROCEDURE — 85025 COMPLETE CBC W/AUTO DIFF WBC: CPT

## 2019-09-11 LAB
EST. AVERAGE GLUCOSE BLD GHB EST-MCNC: 134 MG/DL
HBA1C MFR BLD: 6.3 % (ref 0–5.6)

## 2019-09-12 LAB
BACTERIA UR CULT: ABNORMAL
BACTERIA UR CULT: ABNORMAL
SIGNIFICANT IND 70042: ABNORMAL
SITE SITE: ABNORMAL
SOURCE SOURCE: ABNORMAL

## 2019-09-12 NOTE — PROGRESS NOTES
Spoke to Dr Ankush Wilson's medical assistant, and informed him that patient's urine culture came back positive for E. Coli.

## 2019-09-18 ENCOUNTER — APPOINTMENT (OUTPATIENT)
Dept: RADIOLOGY | Facility: MEDICAL CENTER | Age: 64
DRG: 455 | End: 2019-09-18
Attending: NEUROLOGICAL SURGERY
Payer: MEDICARE

## 2019-09-18 ENCOUNTER — HOSPITAL ENCOUNTER (INPATIENT)
Facility: MEDICAL CENTER | Age: 64
LOS: 9 days | DRG: 455 | End: 2019-09-27
Attending: NEUROLOGICAL SURGERY | Admitting: NEUROLOGICAL SURGERY
Payer: MEDICARE

## 2019-09-18 ENCOUNTER — APPOINTMENT (OUTPATIENT)
Dept: RADIOLOGY | Facility: MEDICAL CENTER | Age: 64
DRG: 455 | End: 2019-09-18
Attending: PHYSICIAN ASSISTANT
Payer: MEDICARE

## 2019-09-18 ENCOUNTER — ANESTHESIA (OUTPATIENT)
Dept: SURGERY | Facility: MEDICAL CENTER | Age: 64
DRG: 455 | End: 2019-09-18
Payer: MEDICARE

## 2019-09-18 ENCOUNTER — ANESTHESIA EVENT (OUTPATIENT)
Dept: SURGERY | Facility: MEDICAL CENTER | Age: 64
DRG: 455 | End: 2019-09-18
Payer: MEDICARE

## 2019-09-18 DIAGNOSIS — M47.12 CERVICAL SPONDYLOSIS WITH MYELOPATHY: ICD-10-CM

## 2019-09-18 DIAGNOSIS — R26.9 ABNORMALITY OF GAIT AND MOBILITY: ICD-10-CM

## 2019-09-18 DIAGNOSIS — Z98.1 STATUS POST CERVICAL SPINAL FUSION: ICD-10-CM

## 2019-09-18 LAB — GLUCOSE BLD-MCNC: 76 MG/DL (ref 65–99)

## 2019-09-18 PROCEDURE — 700111 HCHG RX REV CODE 636 W/ 250 OVERRIDE (IP): Performed by: ANESTHESIOLOGY

## 2019-09-18 PROCEDURE — A9270 NON-COVERED ITEM OR SERVICE: HCPCS | Performed by: PHYSICIAN ASSISTANT

## 2019-09-18 PROCEDURE — 160035 HCHG PACU - 1ST 60 MINS PHASE I: Performed by: NEUROLOGICAL SURGERY

## 2019-09-18 PROCEDURE — 01N10ZZ RELEASE CERVICAL NERVE, OPEN APPROACH: ICD-10-PCS | Performed by: NEUROLOGICAL SURGERY

## 2019-09-18 PROCEDURE — 700111 HCHG RX REV CODE 636 W/ 250 OVERRIDE (IP): Performed by: NEUROLOGICAL SURGERY

## 2019-09-18 PROCEDURE — 00NW0ZZ RELEASE CERVICAL SPINAL CORD, OPEN APPROACH: ICD-10-PCS | Performed by: NEUROLOGICAL SURGERY

## 2019-09-18 PROCEDURE — 82962 GLUCOSE BLOOD TEST: CPT

## 2019-09-18 PROCEDURE — 110371 HCHG SHELL REV 272: Performed by: NEUROLOGICAL SURGERY

## 2019-09-18 PROCEDURE — 770001 HCHG ROOM/CARE - MED/SURG/GYN PRIV*

## 2019-09-18 PROCEDURE — 700102 HCHG RX REV CODE 250 W/ 637 OVERRIDE(OP): Performed by: ANESTHESIOLOGY

## 2019-09-18 PROCEDURE — 72040 X-RAY EXAM NECK SPINE 2-3 VW: CPT

## 2019-09-18 PROCEDURE — 0PP304Z REMOVAL OF INTERNAL FIXATION DEVICE FROM CERVICAL VERTEBRA, OPEN APPROACH: ICD-10-PCS | Performed by: NEUROLOGICAL SURGERY

## 2019-09-18 PROCEDURE — 95937 NEUROMUSCULAR JUNCTION TEST: CPT | Performed by: NEUROLOGICAL SURGERY

## 2019-09-18 PROCEDURE — 95861 NEEDLE EMG 2 EXTREMITIES: CPT | Performed by: NEUROLOGICAL SURGERY

## 2019-09-18 PROCEDURE — 160029 HCHG SURGERY MINUTES - 1ST 30 MINS LEVEL 4: Performed by: NEUROLOGICAL SURGERY

## 2019-09-18 PROCEDURE — 501838 HCHG SUTURE GENERAL: Performed by: NEUROLOGICAL SURGERY

## 2019-09-18 PROCEDURE — 95939 C MOTOR EVOKED UPR&LWR LIMBS: CPT | Performed by: NEUROLOGICAL SURGERY

## 2019-09-18 PROCEDURE — 95925 SOMATOSENSORY TESTING: CPT | Performed by: NEUROLOGICAL SURGERY

## 2019-09-18 PROCEDURE — 700101 HCHG RX REV CODE 250: Performed by: NEUROLOGICAL SURGERY

## 2019-09-18 PROCEDURE — 500112 HCHG BONE WAX: Performed by: NEUROLOGICAL SURGERY

## 2019-09-18 PROCEDURE — 700105 HCHG RX REV CODE 258: Performed by: NEUROLOGICAL SURGERY

## 2019-09-18 PROCEDURE — C1713 ANCHOR/SCREW BN/BN,TIS/BN: HCPCS | Performed by: NEUROLOGICAL SURGERY

## 2019-09-18 PROCEDURE — 0RG10AJ FUSION OF CERVICAL VERTEBRAL JOINT WITH INTERBODY FUSION DEVICE, POSTERIOR APPROACH, ANTERIOR COLUMN, OPEN APPROACH: ICD-10-PCS | Performed by: NEUROLOGICAL SURGERY

## 2019-09-18 PROCEDURE — 700102 HCHG RX REV CODE 250 W/ 637 OVERRIDE(OP): Performed by: PHYSICIAN ASSISTANT

## 2019-09-18 PROCEDURE — 0RB30ZZ EXCISION OF CERVICAL VERTEBRAL DISC, OPEN APPROACH: ICD-10-PCS | Performed by: NEUROLOGICAL SURGERY

## 2019-09-18 PROCEDURE — 502000 HCHG MISC OR IMPLANTS RC 0278: Performed by: NEUROLOGICAL SURGERY

## 2019-09-18 PROCEDURE — 700105 HCHG RX REV CODE 258: Performed by: PHYSICIAN ASSISTANT

## 2019-09-18 PROCEDURE — 700112 HCHG RX REV CODE 229: Performed by: PHYSICIAN ASSISTANT

## 2019-09-18 PROCEDURE — 700111 HCHG RX REV CODE 636 W/ 250 OVERRIDE (IP): Performed by: PHYSICIAN ASSISTANT

## 2019-09-18 PROCEDURE — A4314 CATH W/DRAINAGE 2-WAY LATEX: HCPCS | Performed by: NEUROLOGICAL SURGERY

## 2019-09-18 PROCEDURE — 160002 HCHG RECOVERY MINUTES (STAT): Performed by: NEUROLOGICAL SURGERY

## 2019-09-18 PROCEDURE — A9270 NON-COVERED ITEM OR SERVICE: HCPCS | Performed by: ANESTHESIOLOGY

## 2019-09-18 PROCEDURE — 700101 HCHG RX REV CODE 250: Performed by: ANESTHESIOLOGY

## 2019-09-18 PROCEDURE — 160036 HCHG PACU - EA ADDL 30 MINS PHASE I: Performed by: NEUROLOGICAL SURGERY

## 2019-09-18 PROCEDURE — 700111 HCHG RX REV CODE 636 W/ 250 OVERRIDE (IP)

## 2019-09-18 PROCEDURE — A6402 STERILE GAUZE <= 16 SQ IN: HCPCS | Performed by: NEUROLOGICAL SURGERY

## 2019-09-18 PROCEDURE — 95940 IONM IN OPERATNG ROOM 15 MIN: CPT | Performed by: NEUROLOGICAL SURGERY

## 2019-09-18 PROCEDURE — 160048 HCHG OR STATISTICAL LEVEL 1-5: Performed by: NEUROLOGICAL SURGERY

## 2019-09-18 PROCEDURE — 160009 HCHG ANES TIME/MIN: Performed by: NEUROLOGICAL SURGERY

## 2019-09-18 PROCEDURE — 160041 HCHG SURGERY MINUTES - EA ADDL 1 MIN LEVEL 4: Performed by: NEUROLOGICAL SURGERY

## 2019-09-18 DEVICE — GRAFT ACTIFUSE ABX PUTTY 1.5ML: Type: IMPLANTABLE DEVICE | Status: FUNCTIONAL

## 2019-09-18 RX ORDER — IPRATROPIUM BROMIDE AND ALBUTEROL SULFATE 2.5; .5 MG/3ML; MG/3ML
3 SOLUTION RESPIRATORY (INHALATION)
Status: DISCONTINUED | OUTPATIENT
Start: 2019-09-18 | End: 2019-09-18 | Stop reason: HOSPADM

## 2019-09-18 RX ORDER — AMOXICILLIN 250 MG
1 CAPSULE ORAL NIGHTLY
Status: DISCONTINUED | OUTPATIENT
Start: 2019-09-18 | End: 2019-09-23

## 2019-09-18 RX ORDER — GLIPIZIDE 5 MG/1
5 TABLET ORAL EVERY EVENING
Status: DISCONTINUED | OUTPATIENT
Start: 2019-09-18 | End: 2019-09-27 | Stop reason: HOSPADM

## 2019-09-18 RX ORDER — LIDOCAINE HYDROCHLORIDE 10 MG/ML
INJECTION, SOLUTION EPIDURAL; INFILTRATION; INTRACAUDAL; PERINEURAL
Status: COMPLETED
Start: 2019-09-18 | End: 2019-09-18

## 2019-09-18 RX ORDER — DIPHENHYDRAMINE HCL 25 MG
25 TABLET ORAL EVERY 6 HOURS PRN
Status: DISCONTINUED | OUTPATIENT
Start: 2019-09-18 | End: 2019-09-27 | Stop reason: HOSPADM

## 2019-09-18 RX ORDER — TEMAZEPAM 15 MG/1
30 CAPSULE ORAL
Status: DISCONTINUED | OUTPATIENT
Start: 2019-09-18 | End: 2019-09-27 | Stop reason: HOSPADM

## 2019-09-18 RX ORDER — MORPHINE SULFATE 4 MG/ML
4 INJECTION, SOLUTION INTRAMUSCULAR; INTRAVENOUS
Status: DISCONTINUED | OUTPATIENT
Start: 2019-09-18 | End: 2019-09-23

## 2019-09-18 RX ORDER — POTASSIUM CHLORIDE 20 MEQ/1
10 TABLET, EXTENDED RELEASE ORAL EVERY EVENING
Status: DISCONTINUED | OUTPATIENT
Start: 2019-09-18 | End: 2019-09-27 | Stop reason: HOSPADM

## 2019-09-18 RX ORDER — OXYCODONE HCL 5 MG/5 ML
5 SOLUTION, ORAL ORAL
Status: COMPLETED | OUTPATIENT
Start: 2019-09-18 | End: 2019-09-18

## 2019-09-18 RX ORDER — ROPINIROLE 2 MG/1
8 TABLET, FILM COATED ORAL
Status: DISCONTINUED | OUTPATIENT
Start: 2019-09-18 | End: 2019-09-27 | Stop reason: HOSPADM

## 2019-09-18 RX ORDER — ENEMA 19; 7 G/133ML; G/133ML
1 ENEMA RECTAL
Status: DISCONTINUED | OUTPATIENT
Start: 2019-09-18 | End: 2019-09-23

## 2019-09-18 RX ORDER — ATORVASTATIN CALCIUM 40 MG/1
20 TABLET, FILM COATED ORAL
Status: DISCONTINUED | OUTPATIENT
Start: 2019-09-18 | End: 2019-09-27 | Stop reason: HOSPADM

## 2019-09-18 RX ORDER — CEFAZOLIN SODIUM 1 G/3ML
INJECTION, POWDER, FOR SOLUTION INTRAMUSCULAR; INTRAVENOUS PRN
Status: DISCONTINUED | OUTPATIENT
Start: 2019-09-18 | End: 2019-09-18 | Stop reason: SURG

## 2019-09-18 RX ORDER — OXYCODONE HYDROCHLORIDE 5 MG/1
5 TABLET ORAL
Status: DISCONTINUED | OUTPATIENT
Start: 2019-09-18 | End: 2019-09-23

## 2019-09-18 RX ORDER — LEVOTHYROXINE SODIUM 0.07 MG/1
75 TABLET ORAL
Status: DISCONTINUED | OUTPATIENT
Start: 2019-09-18 | End: 2019-09-27 | Stop reason: HOSPADM

## 2019-09-18 RX ORDER — ONDANSETRON 4 MG/1
4 TABLET, ORALLY DISINTEGRATING ORAL EVERY 4 HOURS PRN
Status: DISCONTINUED | OUTPATIENT
Start: 2019-09-18 | End: 2019-09-27 | Stop reason: HOSPADM

## 2019-09-18 RX ORDER — OXYCODONE HYDROCHLORIDE 10 MG/1
10 TABLET ORAL
Status: DISCONTINUED | OUTPATIENT
Start: 2019-09-18 | End: 2019-09-23

## 2019-09-18 RX ORDER — PANTOPRAZOLE SODIUM 20 MG/1
20 TABLET, DELAYED RELEASE ORAL
Refills: 2 | Status: ON HOLD | COMMUNITY
Start: 2019-08-09 | End: 2021-06-11

## 2019-09-18 RX ORDER — OMEPRAZOLE 20 MG/1
20 CAPSULE, DELAYED RELEASE ORAL
Status: DISCONTINUED | OUTPATIENT
Start: 2019-09-18 | End: 2019-09-27 | Stop reason: HOSPADM

## 2019-09-18 RX ORDER — BUPIVACAINE HYDROCHLORIDE AND EPINEPHRINE 5; 5 MG/ML; UG/ML
INJECTION, SOLUTION PERINEURAL
Status: DISCONTINUED | OUTPATIENT
Start: 2019-09-18 | End: 2019-09-18 | Stop reason: HOSPADM

## 2019-09-18 RX ORDER — LEVOTHYROXINE SODIUM 0.07 MG/1
75 TABLET ORAL
Refills: 2 | Status: ON HOLD | COMMUNITY
Start: 2019-07-11 | End: 2019-10-01 | Stop reason: SDUPTHER

## 2019-09-18 RX ORDER — SODIUM CHLORIDE 9 MG/ML
INJECTION, SOLUTION INTRAVENOUS CONTINUOUS
Status: DISCONTINUED | OUTPATIENT
Start: 2019-09-18 | End: 2019-09-27 | Stop reason: HOSPADM

## 2019-09-18 RX ORDER — SODIUM CHLORIDE, SODIUM LACTATE, POTASSIUM CHLORIDE, CALCIUM CHLORIDE 600; 310; 30; 20 MG/100ML; MG/100ML; MG/100ML; MG/100ML
INJECTION, SOLUTION INTRAVENOUS CONTINUOUS
Status: ACTIVE | OUTPATIENT
Start: 2019-09-18 | End: 2019-09-19

## 2019-09-18 RX ORDER — METHOCARBAMOL 750 MG/1
750 TABLET, FILM COATED ORAL 4 TIMES DAILY
Status: DISCONTINUED | OUTPATIENT
Start: 2019-09-18 | End: 2019-09-23

## 2019-09-18 RX ORDER — SODIUM CHLORIDE, SODIUM LACTATE, POTASSIUM CHLORIDE, CALCIUM CHLORIDE 600; 310; 30; 20 MG/100ML; MG/100ML; MG/100ML; MG/100ML
INJECTION, SOLUTION INTRAVENOUS CONTINUOUS
Status: DISCONTINUED | OUTPATIENT
Start: 2019-09-18 | End: 2019-09-18 | Stop reason: HOSPADM

## 2019-09-18 RX ORDER — FUROSEMIDE 20 MG/1
20 TABLET ORAL
Status: DISCONTINUED | OUTPATIENT
Start: 2019-09-18 | End: 2019-09-27 | Stop reason: HOSPADM

## 2019-09-18 RX ORDER — GABAPENTIN 400 MG/1
800 CAPSULE ORAL 3 TIMES DAILY
Status: DISCONTINUED | OUTPATIENT
Start: 2019-09-19 | End: 2019-09-27 | Stop reason: HOSPADM

## 2019-09-18 RX ORDER — FUROSEMIDE 20 MG/1
20 TABLET ORAL PRN
Status: ON HOLD | COMMUNITY
End: 2019-10-01

## 2019-09-18 RX ORDER — OXYCODONE HCL 5 MG/5 ML
10 SOLUTION, ORAL ORAL
Status: COMPLETED | OUTPATIENT
Start: 2019-09-18 | End: 2019-09-18

## 2019-09-18 RX ORDER — CALCIUM CARBONATE 500 MG/1
500 TABLET, CHEWABLE ORAL 2 TIMES DAILY
Status: DISCONTINUED | OUTPATIENT
Start: 2019-09-18 | End: 2019-09-23

## 2019-09-18 RX ORDER — HYDROMORPHONE HYDROCHLORIDE 2 MG/ML
INJECTION, SOLUTION INTRAMUSCULAR; INTRAVENOUS; SUBCUTANEOUS PRN
Status: DISCONTINUED | OUTPATIENT
Start: 2019-09-18 | End: 2019-09-18 | Stop reason: SURG

## 2019-09-18 RX ORDER — CIPROFLOXACIN 500 MG/1
500 TABLET, FILM COATED ORAL 2 TIMES DAILY
Status: ON HOLD | COMMUNITY
Start: 2019-09-17 | End: 2019-09-26

## 2019-09-18 RX ORDER — BISACODYL 10 MG
10 SUPPOSITORY, RECTAL RECTAL
Status: DISCONTINUED | OUTPATIENT
Start: 2019-09-18 | End: 2019-09-23

## 2019-09-18 RX ORDER — METHOCARBAMOL 750 MG/1
750 TABLET, FILM COATED ORAL 4 TIMES DAILY
Status: DISCONTINUED | OUTPATIENT
Start: 2019-09-18 | End: 2019-09-18

## 2019-09-18 RX ORDER — AMOXICILLIN 250 MG
1 CAPSULE ORAL
Status: DISCONTINUED | OUTPATIENT
Start: 2019-09-18 | End: 2019-09-23

## 2019-09-18 RX ORDER — HALOPERIDOL 5 MG/ML
1 INJECTION INTRAMUSCULAR
Status: DISCONTINUED | OUTPATIENT
Start: 2019-09-18 | End: 2019-09-18 | Stop reason: HOSPADM

## 2019-09-18 RX ORDER — HYDROMORPHONE HYDROCHLORIDE 1 MG/ML
0.4 INJECTION, SOLUTION INTRAMUSCULAR; INTRAVENOUS; SUBCUTANEOUS
Status: DISCONTINUED | OUTPATIENT
Start: 2019-09-18 | End: 2019-09-18 | Stop reason: HOSPADM

## 2019-09-18 RX ORDER — CEFAZOLIN SODIUM 2 G/100ML
2 INJECTION, SOLUTION INTRAVENOUS EVERY 8 HOURS
Status: COMPLETED | OUTPATIENT
Start: 2019-09-19 | End: 2019-09-19

## 2019-09-18 RX ORDER — LORAZEPAM 2 MG/ML
0.5 INJECTION INTRAMUSCULAR
Status: DISCONTINUED | OUTPATIENT
Start: 2019-09-18 | End: 2019-09-18 | Stop reason: HOSPADM

## 2019-09-18 RX ORDER — ONDANSETRON 2 MG/ML
INJECTION INTRAMUSCULAR; INTRAVENOUS PRN
Status: DISCONTINUED | OUTPATIENT
Start: 2019-09-18 | End: 2019-09-18 | Stop reason: SURG

## 2019-09-18 RX ORDER — DEXAMETHASONE SODIUM PHOSPHATE 4 MG/ML
INJECTION, SOLUTION INTRA-ARTICULAR; INTRALESIONAL; INTRAMUSCULAR; INTRAVENOUS; SOFT TISSUE PRN
Status: DISCONTINUED | OUTPATIENT
Start: 2019-09-18 | End: 2019-09-18 | Stop reason: SURG

## 2019-09-18 RX ORDER — ATORVASTATIN CALCIUM 20 MG/1
20 TABLET, FILM COATED ORAL
Refills: 2 | Status: ON HOLD | COMMUNITY
Start: 2019-07-11 | End: 2019-10-01 | Stop reason: SDUPTHER

## 2019-09-18 RX ORDER — HYDROMORPHONE HYDROCHLORIDE 1 MG/ML
0.1 INJECTION, SOLUTION INTRAMUSCULAR; INTRAVENOUS; SUBCUTANEOUS
Status: DISCONTINUED | OUTPATIENT
Start: 2019-09-18 | End: 2019-09-18 | Stop reason: HOSPADM

## 2019-09-18 RX ORDER — ONDANSETRON 2 MG/ML
4 INJECTION INTRAMUSCULAR; INTRAVENOUS EVERY 4 HOURS PRN
Status: DISCONTINUED | OUTPATIENT
Start: 2019-09-18 | End: 2019-09-27 | Stop reason: HOSPADM

## 2019-09-18 RX ORDER — BACITRACIN 50000 [IU]/1
INJECTION, POWDER, FOR SOLUTION INTRAMUSCULAR
Status: DISCONTINUED | OUTPATIENT
Start: 2019-09-18 | End: 2019-09-18 | Stop reason: HOSPADM

## 2019-09-18 RX ORDER — HYDROMORPHONE HYDROCHLORIDE 1 MG/ML
0.2 INJECTION, SOLUTION INTRAMUSCULAR; INTRAVENOUS; SUBCUTANEOUS
Status: DISCONTINUED | OUTPATIENT
Start: 2019-09-18 | End: 2019-09-18 | Stop reason: HOSPADM

## 2019-09-18 RX ORDER — HYDRALAZINE HYDROCHLORIDE 20 MG/ML
5 INJECTION INTRAMUSCULAR; INTRAVENOUS
Status: DISCONTINUED | OUTPATIENT
Start: 2019-09-18 | End: 2019-09-18 | Stop reason: HOSPADM

## 2019-09-18 RX ORDER — CIPROFLOXACIN 500 MG/1
500 TABLET, FILM COATED ORAL 2 TIMES DAILY
Status: DISPENSED | OUTPATIENT
Start: 2019-09-19 | End: 2019-09-24

## 2019-09-18 RX ORDER — ACETAMINOPHEN 500 MG
1000 TABLET ORAL
Status: COMPLETED | OUTPATIENT
Start: 2019-09-18 | End: 2019-09-18

## 2019-09-18 RX ORDER — ALPRAZOLAM 0.5 MG/1
0.5 TABLET ORAL 2 TIMES DAILY PRN
Status: DISCONTINUED | OUTPATIENT
Start: 2019-09-18 | End: 2019-09-23

## 2019-09-18 RX ORDER — DOCUSATE SODIUM 100 MG/1
100 CAPSULE, LIQUID FILLED ORAL 2 TIMES DAILY
Status: DISCONTINUED | OUTPATIENT
Start: 2019-09-18 | End: 2019-09-23

## 2019-09-18 RX ORDER — LABETALOL HYDROCHLORIDE 5 MG/ML
5 INJECTION, SOLUTION INTRAVENOUS
Status: DISCONTINUED | OUTPATIENT
Start: 2019-09-18 | End: 2019-09-18 | Stop reason: HOSPADM

## 2019-09-18 RX ORDER — DIPHENHYDRAMINE HYDROCHLORIDE 50 MG/ML
25 INJECTION INTRAMUSCULAR; INTRAVENOUS EVERY 6 HOURS PRN
Status: DISCONTINUED | OUTPATIENT
Start: 2019-09-18 | End: 2019-09-27 | Stop reason: HOSPADM

## 2019-09-18 RX ORDER — BUPIVACAINE HYDROCHLORIDE 5 MG/ML
INJECTION, SOLUTION PERINEURAL
Status: DISCONTINUED | OUTPATIENT
Start: 2019-09-18 | End: 2019-09-18 | Stop reason: HOSPADM

## 2019-09-18 RX ORDER — REMIFENTANIL HYDROCHLORIDE 1 MG/ML
INJECTION, POWDER, LYOPHILIZED, FOR SOLUTION INTRAVENOUS
Status: DISCONTINUED | OUTPATIENT
Start: 2019-09-18 | End: 2019-09-18 | Stop reason: SURG

## 2019-09-18 RX ORDER — POLYETHYLENE GLYCOL 3350 17 G/17G
1 POWDER, FOR SOLUTION ORAL 2 TIMES DAILY PRN
Status: DISCONTINUED | OUTPATIENT
Start: 2019-09-18 | End: 2019-09-23

## 2019-09-18 RX ORDER — ONDANSETRON 2 MG/ML
4 INJECTION INTRAMUSCULAR; INTRAVENOUS
Status: DISCONTINUED | OUTPATIENT
Start: 2019-09-18 | End: 2019-09-18 | Stop reason: HOSPADM

## 2019-09-18 RX ORDER — VENLAFAXINE 75 MG/1
150 TABLET ORAL
Status: DISCONTINUED | OUTPATIENT
Start: 2019-09-18 | End: 2019-09-27 | Stop reason: HOSPADM

## 2019-09-18 RX ADMIN — DIPHENHYDRAMINE HCL 25 MG: 25 TABLET ORAL at 22:17

## 2019-09-18 RX ADMIN — LIDOCAINE HYDROCHLORIDE 0.5 ML: 10 INJECTION, SOLUTION EPIDURAL; INFILTRATION; INTRACAUDAL; PERINEURAL at 13:15

## 2019-09-18 RX ADMIN — POTASSIUM CHLORIDE 10 MEQ: 1500 TABLET, EXTENDED RELEASE ORAL at 22:21

## 2019-09-18 RX ADMIN — MIDAZOLAM HYDROCHLORIDE 2 MG: 1 INJECTION, SOLUTION INTRAMUSCULAR; INTRAVENOUS at 15:10

## 2019-09-18 RX ADMIN — ROCURONIUM BROMIDE 10 MG: 10 INJECTION, SOLUTION INTRAVENOUS at 15:18

## 2019-09-18 RX ADMIN — FENTANYL CITRATE 50 MCG: 50 INJECTION INTRAMUSCULAR; INTRAVENOUS at 18:09

## 2019-09-18 RX ADMIN — FENTANYL CITRATE 50 MCG: 50 INJECTION INTRAMUSCULAR; INTRAVENOUS at 18:11

## 2019-09-18 RX ADMIN — HYDROMORPHONE HYDROCHLORIDE 0.8 MG: 2 INJECTION, SOLUTION INTRAMUSCULAR; INTRAVENOUS; SUBCUTANEOUS at 15:12

## 2019-09-18 RX ADMIN — ACETAMINOPHEN 1000 MG: 500 TABLET ORAL at 13:37

## 2019-09-18 RX ADMIN — FENTANYL CITRATE 50 MCG: 50 INJECTION INTRAMUSCULAR; INTRAVENOUS at 18:16

## 2019-09-18 RX ADMIN — DEXAMETHASONE SODIUM PHOSPHATE 4 MG: 4 INJECTION, SOLUTION INTRA-ARTICULAR; INTRALESIONAL; INTRAMUSCULAR; INTRAVENOUS; SOFT TISSUE at 16:38

## 2019-09-18 RX ADMIN — ONDANSETRON 4 MG: 2 INJECTION INTRAMUSCULAR; INTRAVENOUS at 17:24

## 2019-09-18 RX ADMIN — FENTANYL CITRATE 50 MCG: 50 INJECTION INTRAMUSCULAR; INTRAVENOUS at 18:05

## 2019-09-18 RX ADMIN — MORPHINE SULFATE 4 MG: 4 INJECTION INTRAVENOUS at 23:00

## 2019-09-18 RX ADMIN — HYDROMORPHONE HYDROCHLORIDE 0.2 MG: 1 INJECTION, SOLUTION INTRAMUSCULAR; INTRAVENOUS; SUBCUTANEOUS at 20:19

## 2019-09-18 RX ADMIN — PROPOFOL 120 MCG/KG/MIN: 10 INJECTION, EMULSION INTRAVENOUS at 15:21

## 2019-09-18 RX ADMIN — VENLAFAXINE 150 MG: 75 TABLET ORAL at 22:22

## 2019-09-18 RX ADMIN — PROPOFOL 150 MG: 10 INJECTION, EMULSION INTRAVENOUS at 15:18

## 2019-09-18 RX ADMIN — LIDOCAINE HYDROCHLORIDE 75 MG: 20 INJECTION, SOLUTION INTRAVENOUS at 15:18

## 2019-09-18 RX ADMIN — SUCCINYLCHOLINE CHLORIDE 140 MG: 20 INJECTION, SOLUTION INTRAMUSCULAR; INTRAVENOUS at 15:18

## 2019-09-18 RX ADMIN — ROPINIROLE HYDROCHLORIDE 8 MG: 2 TABLET, FILM COATED ORAL at 22:26

## 2019-09-18 RX ADMIN — SODIUM CHLORIDE, POTASSIUM CHLORIDE, SODIUM LACTATE AND CALCIUM CHLORIDE: 600; 310; 30; 20 INJECTION, SOLUTION INTRAVENOUS at 13:15

## 2019-09-18 RX ADMIN — TEMAZEPAM 30 MG: 15 CAPSULE ORAL at 22:22

## 2019-09-18 RX ADMIN — SITAGLIPTIN 100 MG: 100 TABLET, FILM COATED ORAL at 22:22

## 2019-09-18 RX ADMIN — LEVOTHYROXINE SODIUM 75 MCG: 75 TABLET ORAL at 22:20

## 2019-09-18 RX ADMIN — SENNOSIDES, DOCUSATE SODIUM 1 TABLET: 50; 8.6 TABLET, FILM COATED ORAL at 22:22

## 2019-09-18 RX ADMIN — GLIPIZIDE 5 MG: 5 TABLET ORAL at 22:19

## 2019-09-18 RX ADMIN — METHOCARBAMOL TABLETS 750 MG: 750 TABLET, COATED ORAL at 22:20

## 2019-09-18 RX ADMIN — CEFAZOLIN 2 G: 330 INJECTION, POWDER, FOR SOLUTION INTRAMUSCULAR; INTRAVENOUS at 15:32

## 2019-09-18 RX ADMIN — SODIUM CHLORIDE: 9 INJECTION, SOLUTION INTRAVENOUS at 22:18

## 2019-09-18 RX ADMIN — EPHEDRINE SULFATE 5 MG: 50 INJECTION, SOLUTION INTRAVENOUS at 15:23

## 2019-09-18 RX ADMIN — OMEPRAZOLE 20 MG: 20 CAPSULE, DELAYED RELEASE ORAL at 22:20

## 2019-09-18 RX ADMIN — EPHEDRINE SULFATE 5 MG: 50 INJECTION, SOLUTION INTRAVENOUS at 15:37

## 2019-09-18 RX ADMIN — Medication 0.5 ML: at 13:15

## 2019-09-18 RX ADMIN — DOCUSATE SODIUM 100 MG: 100 CAPSULE, LIQUID FILLED ORAL at 22:19

## 2019-09-18 RX ADMIN — OXYCODONE HYDROCHLORIDE 10 MG: 5 SOLUTION ORAL at 18:05

## 2019-09-18 RX ADMIN — ANTACID TABLETS 500 MG: 500 TABLET, CHEWABLE ORAL at 22:17

## 2019-09-18 RX ADMIN — CEFAZOLIN SODIUM 2 G: 2 INJECTION, SOLUTION INTRAVENOUS at 22:30

## 2019-09-18 RX ADMIN — REMIFENTANIL HYDROCHLORIDE 0.09 MCG/KG/MIN: 1 INJECTION, POWDER, LYOPHILIZED, FOR SOLUTION INTRAVENOUS at 15:21

## 2019-09-18 RX ADMIN — EPHEDRINE SULFATE 5 MG: 50 INJECTION, SOLUTION INTRAVENOUS at 15:42

## 2019-09-18 RX ADMIN — ATORVASTATIN CALCIUM 20 MG: 40 TABLET, FILM COATED ORAL at 22:18

## 2019-09-18 ASSESSMENT — LIFESTYLE VARIABLES
TOTAL SCORE: 0
ALCOHOL_USE: NO
TOTAL SCORE: 0
EVER FELT BAD OR GUILTY ABOUT YOUR DRINKING: NO
HOW MANY TIMES IN THE PAST YEAR HAVE YOU HAD 5 OR MORE DRINKS IN A DAY: 0
TOTAL SCORE: 0
HAVE PEOPLE ANNOYED YOU BY CRITICIZING YOUR DRINKING: NO
DOES PATIENT WANT TO STOP DRINKING: NO
CONSUMPTION TOTAL: NEGATIVE
ON A TYPICAL DAY WHEN YOU DRINK ALCOHOL HOW MANY DRINKS DO YOU HAVE: 0
AVERAGE NUMBER OF DAYS PER WEEK YOU HAVE A DRINK CONTAINING ALCOHOL: 0
EVER HAD A DRINK FIRST THING IN THE MORNING TO STEADY YOUR NERVES TO GET RID OF A HANGOVER: NO
HAVE YOU EVER FELT YOU SHOULD CUT DOWN ON YOUR DRINKING: NO

## 2019-09-18 ASSESSMENT — COGNITIVE AND FUNCTIONAL STATUS - GENERAL
DAILY ACTIVITIY SCORE: 24
SUGGESTED CMS G CODE MODIFIER MOBILITY: CJ
WALKING IN HOSPITAL ROOM: A LITTLE
CLIMB 3 TO 5 STEPS WITH RAILING: A LITTLE
SUGGESTED CMS G CODE MODIFIER DAILY ACTIVITY: CH
MOBILITY SCORE: 22

## 2019-09-18 ASSESSMENT — PATIENT HEALTH QUESTIONNAIRE - PHQ9
8. MOVING OR SPEAKING SO SLOWLY THAT OTHER PEOPLE COULD HAVE NOTICED. OR THE OPPOSITE, BEING SO FIGETY OR RESTLESS THAT YOU HAVE BEEN MOVING AROUND A LOT MORE THAN USUAL: NOT AT ALL
SUM OF ALL RESPONSES TO PHQ QUESTIONS 1-9: 9
SUM OF ALL RESPONSES TO PHQ9 QUESTIONS 1 AND 2: 3
9. THOUGHTS THAT YOU WOULD BE BETTER OFF DEAD, OR OF HURTING YOURSELF: NOT AT ALL
6. FEELING BAD ABOUT YOURSELF - OR THAT YOU ARE A FAILURE OR HAVE LET YOURSELF OR YOUR FAMILY DOWN: SEVERAL DAYS
4. FEELING TIRED OR HAVING LITTLE ENERGY: NEARLY EVERY DAY
5. POOR APPETITE OR OVEREATING: NOT AT ALL
1. LITTLE INTEREST OR PLEASURE IN DOING THINGS: NOT AT ALL
3. TROUBLE FALLING OR STAYING ASLEEP OR SLEEPING TOO MUCH: SEVERAL DAYS
7. TROUBLE CONCENTRATING ON THINGS, SUCH AS READING THE NEWSPAPER OR WATCHING TELEVISION: SEVERAL DAYS
2. FEELING DOWN, DEPRESSED, IRRITABLE, OR HOPELESS: NEARLY EVERY DAY

## 2019-09-18 ASSESSMENT — COPD QUESTIONNAIRES
HAVE YOU SMOKED AT LEAST 100 CIGARETTES IN YOUR ENTIRE LIFE: NO/DON'T KNOW
DURING THE PAST 4 WEEKS HOW MUCH DID YOU FEEL SHORT OF BREATH: SOME OF THE TIME
COPD SCREENING SCORE: 3
IN THE PAST 12 MONTHS DO YOU DO LESS THAN YOU USED TO BECAUSE OF YOUR BREATHING PROBLEMS: DISAGREE/UNSURE
DO YOU EVER COUGH UP ANY MUCUS OR PHLEGM?: NO/ONLY WITH OCCASIONAL COLDS OR INFECTIONS

## 2019-09-18 ASSESSMENT — PAIN SCALES - GENERAL: PAIN_LEVEL: 4

## 2019-09-18 NOTE — PROGRESS NOTES
Med rec updated and complete  Allergies reviewed  Interviewed pt with family at bedside with permission from pt.  Pt reports that she was told to take half a tablet of her GLIPIZIDE 5MG and JANUVIA 100MG for today's surgery (9/18/2019)

## 2019-09-18 NOTE — ANESTHESIA PREPROCEDURE EVALUATION
Relevant Problems   PULMONARY   (+) COPD (chronic obstructive pulmonary disease) ([facility])      NEURO   (+) CVA (cerebral vascular accident) ([facility])   (+) Head ache      CARDIAC   (+) CVA (cerebral vascular accident) ([facility])   (+) HTN (hypertension)   (+) Migraine with aura         (+) Acute renal failure ([facility])      ENDO   (+) Type 2 diabetes mellitus, without long-term current use of insulin ([facility])      Other   (+) Anxiety   (+) Cervical spondylosis with myelopathy   (+) Chronic pain syndrome   (+) Diabetic polyneuropathy ([facility])   (+) Fibromyalgia syndrome   ALONZO    Physical Exam    Airway   Mallampati: III  TM distance: >3 FB  Neck ROM: full       Cardiovascular - normal exam  Rhythm: regular  Rate: normal  (-) murmur     Dental - normal exam  (+) upper dentures, lower dentures         Pulmonary - normal exam  Breath sounds clear to auscultation     Abdominal    Neurological - normal exam               Anesthesia Plan    ASA 3 (see relevant problem list)       Plan - general       Airway plan will be ETT        Induction: intravenous    Postoperative Plan: Postoperative administration of opioids is intended.    Pertinent diagnostic labs and testing reviewed    Informed Consent:    Anesthetic plan and risks discussed with patient.    Use of blood products discussed with: patient whom consented to blood products.

## 2019-09-18 NOTE — ANESTHESIA PROCEDURE NOTES
Airway  Date/Time: 9/18/2019 3:19 PM  Performed by: Rolf Moya M.D.  Authorized by: Rolf Moya M.D.     Location:  OR  Urgency:  Elective  Indications for Airway Management:  Anesthesia  Spontaneous Ventilation: absent    Sedation Level:  Deep  Preoxygenated: Yes    Patient Position:  Sniffing  Final Airway Type:  Endotracheal airway  Final Endotracheal Airway:  ETT and NIM tube  Cuffed: Yes    Technique Used for Successful ETT Placement:  Video laryngoscopy  Insertion Site:  Oral  Blade Type:  Tati  Laryngoscope Blade/Videolaryngoscope Blade Size:  3  ETT Size (mm):  6.0  Measured from:  Lips  ETT to Lips (cm):  21  Placement Verified by: auscultation and capnometry    Cormack-Lehane Classification:  Grade I - full view of glottis  Number of Attempts at Approach:  1

## 2019-09-19 ENCOUNTER — APPOINTMENT (OUTPATIENT)
Dept: RADIOLOGY | Facility: MEDICAL CENTER | Age: 64
DRG: 455 | End: 2019-09-19
Attending: PHYSICIAN ASSISTANT
Payer: MEDICARE

## 2019-09-19 LAB
ANION GAP SERPL CALC-SCNC: 11 MMOL/L (ref 0–11.9)
BUN SERPL-MCNC: 13 MG/DL (ref 8–22)
CALCIUM SERPL-MCNC: 9 MG/DL (ref 8.5–10.5)
CHLORIDE SERPL-SCNC: 103 MMOL/L (ref 96–112)
CO2 SERPL-SCNC: 27 MMOL/L (ref 20–33)
CREAT SERPL-MCNC: 0.83 MG/DL (ref 0.5–1.4)
ERYTHROCYTE [DISTWIDTH] IN BLOOD BY AUTOMATED COUNT: 49.1 FL (ref 35.9–50)
GLUCOSE SERPL-MCNC: 130 MG/DL (ref 65–99)
HCT VFR BLD AUTO: 43.8 % (ref 37–47)
HGB BLD-MCNC: 12.9 G/DL (ref 12–16)
MCH RBC QN AUTO: 28 PG (ref 27–33)
MCHC RBC AUTO-ENTMCNC: 29.5 G/DL (ref 33.6–35)
MCV RBC AUTO: 95 FL (ref 81.4–97.8)
PLATELET # BLD AUTO: 233 K/UL (ref 164–446)
PMV BLD AUTO: 10.4 FL (ref 9–12.9)
POTASSIUM SERPL-SCNC: 4.3 MMOL/L (ref 3.6–5.5)
RBC # BLD AUTO: 4.61 M/UL (ref 4.2–5.4)
SODIUM SERPL-SCNC: 141 MMOL/L (ref 135–145)
WBC # BLD AUTO: 6.9 K/UL (ref 4.8–10.8)

## 2019-09-19 PROCEDURE — 700117 HCHG RX CONTRAST REV CODE 255: Performed by: PHYSICIAN ASSISTANT

## 2019-09-19 PROCEDURE — 70498 CT ANGIOGRAPHY NECK: CPT

## 2019-09-19 PROCEDURE — 36415 COLL VENOUS BLD VENIPUNCTURE: CPT

## 2019-09-19 PROCEDURE — A9270 NON-COVERED ITEM OR SERVICE: HCPCS | Performed by: PHYSICIAN ASSISTANT

## 2019-09-19 PROCEDURE — 700102 HCHG RX REV CODE 250 W/ 637 OVERRIDE(OP): Performed by: PHYSICIAN ASSISTANT

## 2019-09-19 PROCEDURE — 700111 HCHG RX REV CODE 636 W/ 250 OVERRIDE (IP): Performed by: PHYSICIAN ASSISTANT

## 2019-09-19 PROCEDURE — 85027 COMPLETE CBC AUTOMATED: CPT

## 2019-09-19 PROCEDURE — 80048 BASIC METABOLIC PNL TOTAL CA: CPT

## 2019-09-19 PROCEDURE — 700105 HCHG RX REV CODE 258: Performed by: PHYSICIAN ASSISTANT

## 2019-09-19 PROCEDURE — 770001 HCHG ROOM/CARE - MED/SURG/GYN PRIV*

## 2019-09-19 PROCEDURE — 72141 MRI NECK SPINE W/O DYE: CPT

## 2019-09-19 PROCEDURE — 700112 HCHG RX REV CODE 229: Performed by: PHYSICIAN ASSISTANT

## 2019-09-19 PROCEDURE — 97161 PT EVAL LOW COMPLEX 20 MIN: CPT

## 2019-09-19 PROCEDURE — 72125 CT NECK SPINE W/O DYE: CPT

## 2019-09-19 PROCEDURE — 97166 OT EVAL MOD COMPLEX 45 MIN: CPT

## 2019-09-19 RX ORDER — LORAZEPAM 2 MG/ML
2 INJECTION INTRAMUSCULAR ONCE
Status: COMPLETED | OUTPATIENT
Start: 2019-09-19 | End: 2019-09-19

## 2019-09-19 RX ORDER — DIPHENHYDRAMINE HCL 25 MG
25 TABLET ORAL ONCE
Status: COMPLETED | OUTPATIENT
Start: 2019-09-19 | End: 2019-09-19

## 2019-09-19 RX ADMIN — POTASSIUM CHLORIDE 10 MEQ: 1500 TABLET, EXTENDED RELEASE ORAL at 17:20

## 2019-09-19 RX ADMIN — ROPINIROLE HYDROCHLORIDE 8 MG: 2 TABLET, FILM COATED ORAL at 22:34

## 2019-09-19 RX ADMIN — LEVOTHYROXINE SODIUM 75 MCG: 75 TABLET ORAL at 22:29

## 2019-09-19 RX ADMIN — DOCUSATE SODIUM 100 MG: 100 CAPSULE, LIQUID FILLED ORAL at 17:19

## 2019-09-19 RX ADMIN — METHOCARBAMOL TABLETS 750 MG: 750 TABLET, COATED ORAL at 09:34

## 2019-09-19 RX ADMIN — CIPROFLOXACIN HYDROCHLORIDE 500 MG: 500 TABLET, FILM COATED ORAL at 17:19

## 2019-09-19 RX ADMIN — METHOCARBAMOL TABLETS 750 MG: 750 TABLET, COATED ORAL at 17:16

## 2019-09-19 RX ADMIN — METHOCARBAMOL TABLETS 750 MG: 750 TABLET, COATED ORAL at 22:29

## 2019-09-19 RX ADMIN — MELATONIN 5000 UNITS: at 05:47

## 2019-09-19 RX ADMIN — SITAGLIPTIN 100 MG: 100 TABLET, FILM COATED ORAL at 17:16

## 2019-09-19 RX ADMIN — DIPHENHYDRAMINE HCL 25 MG: 25 TABLET ORAL at 07:55

## 2019-09-19 RX ADMIN — DIPHENHYDRAMINE HCL 25 MG: 25 TABLET ORAL at 09:45

## 2019-09-19 RX ADMIN — FUROSEMIDE 20 MG: 20 TABLET ORAL at 17:17

## 2019-09-19 RX ADMIN — MORPHINE SULFATE 4 MG: 4 INJECTION INTRAVENOUS at 07:18

## 2019-09-19 RX ADMIN — LORAZEPAM 2 MG: 2 INJECTION INTRAMUSCULAR; INTRAVENOUS at 09:45

## 2019-09-19 RX ADMIN — TEMAZEPAM 30 MG: 15 CAPSULE ORAL at 22:28

## 2019-09-19 RX ADMIN — OXYCODONE HYDROCHLORIDE 10 MG: 10 TABLET ORAL at 09:40

## 2019-09-19 RX ADMIN — DOCUSATE SODIUM 100 MG: 100 CAPSULE, LIQUID FILLED ORAL at 05:48

## 2019-09-19 RX ADMIN — VENLAFAXINE 150 MG: 75 TABLET ORAL at 22:29

## 2019-09-19 RX ADMIN — SODIUM CHLORIDE: 9 INJECTION, SOLUTION INTRAVENOUS at 13:42

## 2019-09-19 RX ADMIN — GABAPENTIN 800 MG: 400 CAPSULE ORAL at 05:48

## 2019-09-19 RX ADMIN — SENNOSIDES, DOCUSATE SODIUM 1 TABLET: 50; 8.6 TABLET, FILM COATED ORAL at 22:29

## 2019-09-19 RX ADMIN — OXYCODONE HYDROCHLORIDE 5 MG: 5 TABLET ORAL at 22:33

## 2019-09-19 RX ADMIN — OMEPRAZOLE 20 MG: 20 CAPSULE, DELAYED RELEASE ORAL at 22:28

## 2019-09-19 RX ADMIN — ENOXAPARIN SODIUM 40 MG: 100 INJECTION SUBCUTANEOUS at 17:21

## 2019-09-19 RX ADMIN — GABAPENTIN 800 MG: 400 CAPSULE ORAL at 17:12

## 2019-09-19 RX ADMIN — OXYCODONE HYDROCHLORIDE 10 MG: 10 TABLET ORAL at 00:14

## 2019-09-19 RX ADMIN — OXYCODONE HYDROCHLORIDE 10 MG: 10 TABLET ORAL at 05:48

## 2019-09-19 RX ADMIN — ONDANSETRON 4 MG: 2 INJECTION INTRAMUSCULAR; INTRAVENOUS at 13:42

## 2019-09-19 RX ADMIN — ANTACID TABLETS 500 MG: 500 TABLET, CHEWABLE ORAL at 05:49

## 2019-09-19 RX ADMIN — CEFAZOLIN SODIUM 2 G: 2 INJECTION, SOLUTION INTRAVENOUS at 09:34

## 2019-09-19 RX ADMIN — ANTACID TABLETS 500 MG: 500 TABLET, CHEWABLE ORAL at 17:19

## 2019-09-19 RX ADMIN — CIPROFLOXACIN HYDROCHLORIDE 500 MG: 500 TABLET, FILM COATED ORAL at 05:48

## 2019-09-19 RX ADMIN — GLIPIZIDE 5 MG: 5 TABLET ORAL at 17:17

## 2019-09-19 RX ADMIN — OXYCODONE HYDROCHLORIDE 10 MG: 10 TABLET ORAL at 17:26

## 2019-09-19 RX ADMIN — IOHEXOL 80 ML: 350 INJECTION, SOLUTION INTRAVENOUS at 00:02

## 2019-09-19 RX ADMIN — ATORVASTATIN CALCIUM 20 MG: 40 TABLET, FILM COATED ORAL at 22:30

## 2019-09-19 ASSESSMENT — GAIT ASSESSMENTS
ASSISTIVE DEVICE: FRONT WHEEL WALKER
DISTANCE (FEET): 125
GAIT LEVEL OF ASSIST: SUPERVISED
DEVIATION: BRADYKINETIC

## 2019-09-19 ASSESSMENT — COGNITIVE AND FUNCTIONAL STATUS - GENERAL
WALKING IN HOSPITAL ROOM: A LITTLE
MOBILITY SCORE: 18
SUGGESTED CMS G CODE MODIFIER MOBILITY: CK
DAILY ACTIVITIY SCORE: 18
PERSONAL GROOMING: A LITTLE
HELP NEEDED FOR BATHING: A LITTLE
DRESSING REGULAR LOWER BODY CLOTHING: A LITTLE
CLIMB 3 TO 5 STEPS WITH RAILING: A LITTLE
SUGGESTED CMS G CODE MODIFIER DAILY ACTIVITY: CK
EATING MEALS: A LITTLE
STANDING UP FROM CHAIR USING ARMS: A LITTLE
MOVING TO AND FROM BED TO CHAIR: A LITTLE
TOILETING: A LITTLE
MOVING FROM LYING ON BACK TO SITTING ON SIDE OF FLAT BED: A LITTLE
TURNING FROM BACK TO SIDE WHILE IN FLAT BAD: A LITTLE
DRESSING REGULAR UPPER BODY CLOTHING: A LITTLE

## 2019-09-19 NOTE — THERAPY
"Occupational Therapy Evaluation completed.   Plan of Care: Will benefit from Occupational Therapy 3 times per week  Discharge Recommendations:  Equipment: No Equipment Needed. Post-acute therapy Recommend home health transitional care for continued occupational therapy services.     Patient present stage 1, pending stage 2, cervical sx with collar. Patient reports I with ADLs IADLS and mobility PTA. Patient, upon eval, presents with decreased balance, endurance, tolerance, ADLs, and mobility necessitating CG and cues ADLs and hand held assist with mobility. Patient limited by end of session due to Ativan for MRI fully on board. Patient SO assist as needed. Patient would benefit from skilled OT in this setting followed by likely progress to DC home with services and SO.     See \"Rehab Therapy-Acute\" Patient Summary Report for complete documentation.    "

## 2019-09-19 NOTE — RESPIRATORY CARE
COPD EDUCATION by COPD CLINICAL EDUCATOR  9/19/2019 at 10:21 AM by Rut Marsh     Patient's chart reviewed by COPD education team. Patient does not have an order for Respiratory Care Protocol, therefore the COPD education team cannot treat.

## 2019-09-19 NOTE — ANESTHESIA POSTPROCEDURE EVALUATION
Patient: Valentina Lu    Procedure Summary     Date:  09/18/19 Room / Location:  [facility] OR 05 / SURGERY [facility] [place]    Anesthesia Start:  1510 Anesthesia Stop:  1753    Procedures:       EXPLORATION OF CERVICAL FUSION and ANTERIOR CERVICAL HARDWARE REMOVAL, ANTERIOR CERVICAL 3 - 4 INTERBODY FUSION, CERVICAL 3 - 5 ANTERIOR FIXATION (Spine Cervical)      PARTIAL CERVICAL 3 - 4 CORPECTOMY (Spine Cervical) Diagnosis:  (CERVICAL PSEUDOARTHOSIS, KYPHOSIS, HARDWARE FAILURE)    Surgeon:  Remi Lechuga III, M.D. Responsible Provider:  Rolf Moya M.D.    Anesthesia Type:  general ASA Status:  3          Final Anesthesia Type: general  Last vitals  BP   Blood Pressure : 130/68, NIBP: 109/58    Temp   36.2 °C (97.1 °F)    Pulse   Pulse: 78   Resp   12    SpO2   94 %      Anesthesia Post Evaluation    Patient location during evaluation: PACU  Patient participation: complete - patient participated  Level of consciousness: awake and alert  Pain score: 4    Airway patency: patent  Anesthetic complications: no  Cardiovascular status: hemodynamically stable  Respiratory status: acceptable  Hydration status: euvolemic    PONV: none

## 2019-09-19 NOTE — PROGRESS NOTES
Neurosurgery Progress Note    Subjective:  POD#1 Revision of C3-4 and C4-5 ACDF After hardware failure  Will need posterior Stage 2 Fusion but looking for time to do this  Getting MRI and CTA neck today for planning.  Arms feel good.    Neck pain tolerable   Pain control good  Drain at 25cc/8 hrs .  Will remove drain this afterneeon if less that 20cc/8.  Will dicsuss stage 2 scheduling with Dr Lechuga today--May be next week,  Swallowing and speaking well    Exam:  Wound C/D/I  Motor 5/5  Sensory intact  Swallowing and speaking well    BP  Min: 99/63  Max: 140/70  Pulse  Av.1  Min: 65  Max: 91  Resp  Av.3  Min: 12  Max: 19  Temp  Av.7 °C (98 °F)  Min: 36.2 °C (97.1 °F)  Max: 37.3 °C (99.1 °F)  SpO2  Av.3 %  Min: 91 %  Max: 98 %    No data recorded    Recent Labs     19  0231   WBC 6.9   RBC 4.61   HEMOGLOBIN 12.9   HEMATOCRIT 43.8   MCV 95.0   MCH 28.0   MCHC 29.5*   RDW 49.1   PLATELETCT 233   MPV 10.4     Recent Labs     19  0231   SODIUM 141   POTASSIUM 4.3   CHLORIDE 103   CO2 27   GLUCOSE 130*   BUN 13   CREATININE 0.83   CALCIUM 9.0               Intake/Output       19 0700 - 19 0659 19 0700 - 19 0659       6611-2679 Total 1900-0659 Total       Intake    I.V.  1600  653 2253  --  -- --    Volume (mL) (NS infusion) -- 653 653 -- -- --    Volume (mL) (lactated ringers infusion) 1600 -- 1600 -- -- --    Total Intake 4410 148 7178 -- -- --       Output    Urine  275  -- 275  --  -- --    Urine 275 -- 275 -- -- --    Number of Times Voided -- 1 x 1 x -- -- --    Drains  --  25 25  --  -- --    Output (mL) (Closed/Suction Drain Anterior Neck Hemovac) -- 25 25 -- -- --    Blood  100  -- 100  --  -- --    Est. Blood Loss 100 -- 100 -- -- --    Total Output 375 25 400 -- -- --       Net I/O     1518 767 2895 -- -- --            Intake/Output Summary (Last 24 hours) at 2019 0843  Last data filed at 2019 0550  Gross per 24 hour   Intake  2253 ml   Output 400 ml   Net 1853 ml            • LORazepam  2 mg Once   • diphenhydrAMINE  25 mg Q6HRS PRN   • potassium chloride SA  10 mEq Q EVENING   • omeprazole  20 mg QHS   • levothyroxine  75 mcg QHS   • glipiZIDE  5 mg Q EVENING   • gabapentin  800 mg TID   • furosemide  20 mg QDAY PRN   • ciprofloxacin  500 mg BID   • atorvastatin  20 mg QHS   • venlafaxine  150 mg QHS   • ROPINIRole  8 mg QHS   • SITagliptin  100 mg Q EVENING   • temazepam  30 mg QHS   • Pharmacy Consult Request  1 Each PHARMACY TO DOSE   • MD ALERT...DO NOT ADMINISTER NSAIDS or ASPIRIN unless ORDERED By Neurosurgery  1 Each PRN   • docusate sodium  100 mg BID   • senna-docusate  1 Tab Nightly   • senna-docusate  1 Tab Q24HRS PRN   • polyethylene glycol/lytes  1 Packet BID PRN   • magnesium hydroxide  30 mL QDAY PRN   • bisacodyl  10 mg Q24HRS PRN   • fleet  1 Each Once PRN   • NS   Continuous   • enoxaparin  40 mg DAILY   • oxyCODONE immediate release  10 mg Q3HRS PRN   • oxyCODONE immediate-release  5 mg Q3HRS PRN   • morphine injection  4 mg Q3HRS PRN   • ceFAZolin  2 g Q8HR   • diphenhydrAMINE  25 mg Q6HRS PRN    Or   • diphenhydrAMINE  25 mg Q6HRS PRN   • ondansetron  4 mg Q4HRS PRN   • ondansetron  4 mg Q4HRS PRN   • methocarbamol  750 mg 4X/DAY   • ALPRAZolam  0.5 mg BID PRN   • vitamin D  5,000 Units DAILY   • calcium carbonate  500 mg BID   • benzocaine-menthol  1 Lozenge Q2HRS PRN   • Influenza Vaccine High-Dose pf  0.5 mL Once PRN       Assessment and Plan:  POD#1 Revision of C3-4 and C4-5 ACDF After hardware failure  Will need posterior Stage 2 Fusion but looking for time to do this  To CT and MRI today   Will likely D/C drain this afternoon.  Strength good.  Continue pain management and PT ambulation   Home tomorrow if unable to do stage 2  Over weekend.  Prophylactic anticoagulation: yes         Start date/time: today

## 2019-09-19 NOTE — ANESTHESIA TIME REPORT
Anesthesia Start and Stop Event Times     Date Time Event    9/18/2019 1451 Ready for Procedure     1510 Anesthesia Start     1753 Anesthesia Stop        Responsible Staff  09/18/19    Name Role Begin End    Rolf Moya M.D. Anesth 1510 1753        Preop Diagnosis (Free Text):  Pre-op Diagnosis     CERVICAL PSEUDOARTHOSIS, KYPHOSIS, HARDWARE FAILURE        Preop Diagnosis (Codes):    Post op Diagnosis  Cervical pseudoarthrosis (HCC)  hardware failure    Premium Reason  A. 3PM - 7AM    Comments:

## 2019-09-19 NOTE — ANESTHESIA QCDR
2019 [facility] Clinical Data Registry (for Quality Improvement)     Postoperative nausea/vomiting risk protocol (Adult = 18 yrs and Pediatric 3-17 yrs)- (430 and 463)  General inhalation anesthetic (NOT TIVA) with PONV risk factors: No  Provision of anti-emetic therapy with at least 2 different classes of agents: N/A  Patient DID NOT receive anti-emetic therapy and reason is documented in Medical Record: N/A    Multimodal Pain Management- (AQI59)  Patient undergoing Elective Surgery (i.e. Outpatient, or ASC, or Prescheduled Surgery prior to Hospital Admission): Yes  Use of Multimodal Pain Management, two or more drugs and/or interventions, NOT including systemic opioids: Yes   Exception: Documented allergy to multiple classes of analgesics:  N/A    PACU assessment of acute postoperative pain prior to Anesthesia Care End- Applies to Patients Age = 18- (ABG7)  Initial PACU pain score is which of the following: < 7/10  Patient unable to report pain score: N/A    Post-anesthetic transfer of care checklist/protocol to PACU/ICU- (426 and 427)  Upon conclusion of case, patient transferred to which of the following locations: PACU/Non-ICU  Use of transfer checklist/protocol: Yes  Exclusion: Service Performed in Patient Hospital Room (and thus did not require transfer): N/A    PACU Reintubation- (AQI31)  General anesthesia requiring endotracheal intubation (ETT) along with subsequent extubation in OR or PACU: Yes  Required reintubation in the PACU: No   Extubation was a planned trial documented in the medical record prior to removal of the original airway device:  N/A    Unplanned admission to ICU related to anesthesia service up through end of PACU care- (MD51)  Unplanned admission to ICU (not initially anticipated at anesthesia start time): No

## 2019-09-19 NOTE — OR SURGEON
Immediate Post OP Note    PreOp Diagnosis: Cervical Pseudoarthrosis with previous C3-C5 ACDF Hardware failure and Instability    PostOp Diagnosis: Same    Procedure(s):  EXPLORATION OF CERVICAL FUSION and ANTERIOR CERVICAL HARDWARE REMOVAL, ANTERIOR CERVICAL 3 - 4 INTERBODY FUSION, CERVICAL 3 - 5 ANTERIOR FIXATION - Wound Class: Clean with Drain  PARTIAL CERVICAL 3 - 4 CORPECTOMY - Wound Class: Clean with Drain    Surgeon(s):  Remi Lechuga III, M.D.    Anesthesiologist/Type of Anesthesia:  Anesthesiologist: Rolf Moya M.D./General    Surgical Staff:  Assistant: Reymundo Farfan P.A.-C.  Circulator: Danita Reina R.N.; Ruby Fraga R.N.  Scrub Person: Martell Penaloza  Radiology Technologist: Beckie Arnold    Specimens removed if any:  * No specimens in log *    Estimated Blood Loss: 50cc    Findings: See Op Note    Complications: None        9/18/2019 5:47 PM Reymundo Farfan P.A.-C.

## 2019-09-19 NOTE — THERAPY
"Physical Therapy Evaluation completed.   Bed Mobility:  Supine to Sit: Supervision  Transfers: Sit to Stand: Supervision  Gait: Level Of Assist: Supervised with Front-Wheel Walker       Plan of Care: Patient with no further skilled PT needs in the acute care setting at this time  Discharge Recommendations: Equipment: 4-Wheel Walker. Post-acute therapy Recommend home health transitional care for continued physical therapy services.     Ms. Lu is a 62 y/o female who presents to acute secondary to removal old cervical hardwear and C3-5 ACDF. PA reports plan for stage II however currently unable to set a date and likely will discharge patient home prior to stage II, thus PT eval completed. Pt presents with lower extremity strength that is equal bilaterally and WFL. No sensation deficits. Reviewed spinal precautions and Aspen use, pt demonstrated good undersatnding. Functionally she was able to perform gait, transfers, and bed mobility without assist using FWW. No additional acute PT needs. Pt requesting 4WW for home which is reasonable with MD approval. Will need either 4WW or FWW. REcommend home health therapies. If pt does remain in house for stage II surgery PT will re-eval as ordered by MD.     See \"Rehab Therapy-Acute\" Patient Summary Report for complete documentation.     "

## 2019-09-19 NOTE — PROGRESS NOTES
2 RN skin assessment complete. Scattered bruises bilat arms. Surgical incision anterior neck with original surgical dressing in place. No other skin concerns   Subjective:       Reason for visit: Izabella Dumont is a 23 y.o. female here for follow up of Morbid obesity. She was last seen 2 months ago. She was initially referred by her gynecologist. She is here today with her mother. History of present illness: As per mother, patients history is significant for   - Autism  - Migraines severe on multiple medications - See below  - Severe Menstrual migraines - On OCP, as Depo associated with weight gain   - Morbid Obesity - Gained 9 lbs in 2 months    Menstrual history - attained menarche at age 4 years, started on OCPs 12/2016  for severe menstrual migraines. Followed by Gynecology. She has not had a cycle for more than a year. Has baseline migraines requiring sumatriptan injections once a week. Obesity - Has struggled with obesity for many years. Denies polyphagia, + polydipsia and No polyuria or nocturia. .   Denies symptoms of hypothyroidism such as cold tolerance, dry hair, dry skin, constipation. No snoring at night except when really tired. No hip or joint pains  No exercise intolerance, SOB, chest pain, palpitations    Has seen Nutritionist in the past. Patient is frustrated as she continues to gain weight despite dietary changes. Does yoga twice a week, but no intense activity. Does chores around the home and is helping out mother more as mothers neck is sprained. Plans to start gym in January along with mother. However, concerned that migraines may get worse with exercise. Breakfast - Smoothies - Strawberry and Mangoes, greek Yoghurt/ Skim milk  Lunch - Cheese stick   Dinner - Meat and vegetables with sweet potatoes - does portion control   Drinks - Water    At last visit - due to hypertension and striae noted, ACTH and cortisol were drawn and they were not high, however they are are not excellent screening tests to assess hypercortisolism. Patient had 24 hour urinary cortisol done with  (Ph# [phone]) due to similar concerns of [place] which was normal - 7 (NOrmal 0-50). Patients blood pressure I snot elevated today. MIgraines are very severe. - On multiple medications. Triggers - anxiety, noise , certain aura    Autism and anxiety - Receives weekly counseling. On Zoloft - dose increased 2 months ago. Birth History - Weighed 6 lbs, Term, NVD    Surgeries: NONE    Hospitalizations: NONE    Family history: No family history of thyroid disease, heart disease, hypertension or high cholesterol or DM. Fathers side unknown. He has Gout. Father is obese. Social History:  Home schooled for past 1 year as anxiety at school seemed to worsen headaches    ROS:  Constitutional: decreased energy   ENT: normal hearing, no sorethroat   Eye: normal vision, denied double vision, blurred vision  Respiratory system: no wheezing, no respiratory discomfort  CVS: no palpitations, + pedal edema  GI: normal bowel movements, abdominal pain followed by GI, food allergy related? Dov Plsugey Allergy: no skin rash or angioedema, significant stria on abdomen and inner thighs and arms, habit of skin pricking  Neuorlogical: headache, no focal weakness. No burning  Behavioural: normal behavior, normal mood. Medications - See scanned   Prior to Admission medications    Medication Sig Start Date End Date Taking? Authorizing Provider   omeprazole (PRILOSEC) 40 mg capsule TAKE 1 CAPSULE BY MOUTH DAILY 11/22/17  Yes Corona Pichardo MD   SUMAtriptan succinate [facility]) 3 mg/0.5 mL pnij by SubCUTAneous route. Indications: MIGRAINE   Yes Historical Provider   hydrOXYzine HCl (ATARAX) 25 mg tablet TK 1 T PO Q 6 HOURS PRF ITCHING 6/6/17  Yes Historical Provider   norethindrone-ethinyl estradiol (JUNEL 1/20, 21,) 1-20 mg-mcg tab Take  by mouth.    Yes Historical Provider   omeprazole (PRILOSEC) 40 mg capsule TK 1 C PO D 8/24/17  Yes Historical Provider   ketoconazole (NIZORAL) 2 % topical cream KIARA EXT AA QD 8/30/17  Yes Historical Provider   mupirocin (BACTROBAN) 2 % ointment APPLY TO AFFECTED AREA OF SKIN TID UNTIL RESOLVED 6/6/17  Yes Historical Provider   triamcinolone acetonide (KENALOG) 0.1 % topical cream APPLY TO INSECT BITES BID PRN DO NOT APPLY TO FACE 6/6/17  Yes Historical Provider   sertraline (ZOLOFT) 50 mg tablet Take one tablet by mouth daily 4/13/17  Yes Historical Provider   butalbital-acetaminophen-caff (FIORICET) -40 mg per capsule Take 1-2 capsules by mouth every 8-12 hours prn 4/4/17  Yes Historical Provider   diclofenac potassium (CATAFLAM) 50 mg tablet Take one tablet by mouth at onset of migraine. May repeat in 2 hours. No more than 2 tablets per 24 hours 4/21/17  Yes Historical Provider   isomethepten-caf-acetaminophen 65- mg tab Take by mouth. Take 1 tablet by mouth at onset of migraine (may repeat in 2 hours-max dose 2 tablets in 24 hours   Yes Historical Provider   diphenhydrAMINE (BENADRYL) 25 mg capsule Take by mouth as needed (migraine). Yes Historical Provider   diclofenac potassium (ZIPSOR) 25 mg capsule Take 50 mg by mouth. Yes Historical Provider   B INFANTIS/B ANI/B ADDISON/B BIFID (PROBIOTIC DIGESTIVE CARE PO) Take  by mouth. Yes Historical Provider   acetaminophen-isometheptene-caffeine 500-130-20 mg (PRODRIN) 130- mg tablet Take 1 Tab by mouth. Indications: take one tab at onset of HA may repeat in 2 hours   Yes Historical Provider   zonisamide (ZONEGRAN) 100 mg capsule Take 100 mg by mouth daily. Yes Historical Provider   ondansetron (ZOFRAN ODT) 8 mg disintegrating tablet Take 1 Tab by mouth every eight (8) hours as needed for Nausea. 10/15/15  Yes Jodie Howell MD   ketorolac (TORADOL) 10 mg tablet Take 1 Tab by mouth every six (6) hours as needed for Pain. Indications: SEVERE PAIN 10/15/15  Yes Jodie Howell MD   butalbital-acetaminophen-caffeine (FIORICET, ESGIC) -40 mg per tablet Take 1 Tab by mouth every six (6) hours as needed for Pain or Headache. Max Daily Amount: 4 Tabs. Indications: MIGRAINE 9/24/15  Yes Caralee Osler, MD   fexofenadine TY [facility]) 180 mg tablet Take 180 mg by mouth daily. Historical Provider       Allergies: Allergies   Allergen Reactions    Yeast, Dried Other (comments)     Upset stomach            Objective:       Visit Vitals    /77 (BP 1 Location: Right arm, BP Patient Position: Sitting)    Pulse 124    Temp 98.2 °F (36.8 °C) (Oral)    Ht 5' 5.95\" (1.675 m)    Wt 296 lb 3.2 oz (134.4 kg)    SpO2 100%    BMI 47.89 kg/m2       Height: 75 %ile (Z= 0.67) based on [facility] 2-20 Years stature-for-age data using vitals from 12/12/2017. Weight: >99 %ile (Z= 2.70) based on [facility] 2-20 Years weight-for-age data using vitals from 12/12/2017. BMI: Body mass index is 47.89 kg/(m^2). Percentile: [unfilled]    Alert, Cooperative, obese   HEENT: No thyromegaly, EOM intact, No tonsillar hypertrophy   S1 S2 heard: Normal rhythm  Bilateral air entry. No rhonchi or crepitation    Abdomen is soft, non tender, No organomegaly   Tray 5 breast    - Tray 5 Pubic hair   MSK - Normal ROM  Skin - No rashes or birth marks, striae on abdomen, upper arms and inner thighs.  Few are velvety in color and wide, No acanthosis  Significant pedal edema, non pitting      Laboratory data:  Results for orders placed or performed in visit on 10/17/17   ACTH   Result Value Ref Range    ACTH, plasma 3.6 (L) 7.2 - 63.3 pg/mL   CORTISOL   Result Value Ref Range    Cortisol, random 9.0 ug/dL   METABOLIC PANEL, COMPREHENSIVE   Result Value Ref Range    Glucose 86 65 - 99 mg/dL    BUN 10 6 - 20 mg/dL    Creatinine 0.61 0.57 - 1.00 mg/dL    GFR est non- >59 mL/min/1.73    GFR est  >59 mL/min/1.73    BUN/Creatinine ratio 16 9 - 23    Sodium 139 134 - 144 mmol/L    Potassium 4.5 3.5 - 5.2 mmol/L    Chloride 99 96 - 106 mmol/L    CO2 19 18 - 29 mmol/L    Calcium 9.6 8.7 - 10.2 mg/dL    Protein, total 6.9 6.0 - 8.5 g/dL    Albumin 3.9 3.5 - 5.5 g/dL    GLOBULIN, TOTAL 3.0 1.5 - 4.5 g/dL    A-G Ratio 1.3 1.2 - 2.2    Bilirubin, total 0.2 0.0 - 1.2 mg/dL    Alk. phosphatase 67 43 - 101 IU/L    AST (SGOT) 12 0 - 40 IU/L    ALT (SGPT) 11 0 - 32 IU/L   HEMOGLOBIN A1C WITH EAG   Result Value Ref Range    Hemoglobin A1c 5.2 4.8 - 5.6 %    Estimated average glucose 103 mg/dL     9/2016 - Lipids - High TG, High Total cholesterol  4/2017 - TFT - WNL  9/2016 - A1C - 5.5  4/2017 - Vitamin D - 51    Radiology -   4/2017 - Pelvic US - wnl        Assessment:       Juan Molina is a 23 y.o. female presenting for morbid obesity. She has ontinued weight gain. Her diet is not unhealthy, however is not very active which is also limited as this may be a potential trigger for migraines. She has continued weight gain. She is symptomatic with increased thirst, however no acanthosis. Her A1C has been darrell. Will obtain OGTT along with insulin if she is at risk for diabetes. She has significant pedal edema, her most recent renal function were WNL. Etiology unclear. No signs of DVT. No diagnosis found. Plan:       ICD-10-CM ICD-9-CM    1.  Morbid obesity (HCC) E66.01 278.01 GLUCOSE, 2 HR, PP GLUCOLA      INSULIN      INSULIN      INSULIN      GLUCOSE, RANDOM      GLUCOSE, RANDOM      VITAMIN D, 25 HYDROXY       - Follow up in 3 month    Total time with patient 40 minutes  Time spent counseling patient more than 50%

## 2019-09-19 NOTE — DISCHARGE PLANNING
Anticipated Discharge Disposition: Home with HH    Action: LSW spoke with Pt at bedside regarding PT/OT recommendation for HH, Pt requested to speak with Reinaldo ISAAC asshe has in the past, LSW faxed choice to [facility]. LSW requested HH order/F2F be entered by Dr. Lechuga when D/C date has been determined.    Reinaldo Ingram met with Pt at [facility]      Barriers to Discharge: pending surgery, HH order/F2F     Plan: HH order/F2F to be entered when D/C date is determined, Referral to be sent after order is entered

## 2019-09-19 NOTE — DISCHARGE PLANNING
Received Choice form at 0583  Agency/Facility Name: [facility]  Waiting for Order, cancelled order in workqueue as it was from previous admission and .

## 2019-09-19 NOTE — OP REPORT
DATE OF SERVICE:  09/18/2019    PREOPERATIVE DIAGNOSES:  1.  Cervical spondylosis.  2.  Cervical stenosis.  3.  Cervical spondylolisthesis.  4.  Cervical kyphosis.  5.  Pseudoarthrosis.  6.  Obesity.  7.  Status post C3-C5 anterior cervical discectomy and fusion with me 4 months   ago.     POSTOPERATIVE DIAGNOSES:  1.  Cervical spondylosis.  2.  Cervical stenosis.  3.  Cervical spondylolisthesis.  4.  Cervical kyphosis.  5.  Pseudoarthrosis.  6.  Obesity.  7.  Status post C3-C5 anterior cervical discectomy and fusion with me 4 months   ago.     PROCEDURES PERFORMED:  1.  Removal of hardware C3, C4, C5 anteriorly.  2.  Exploration of fusion C3, C4, C5.  3.  C3 partial corpectomy, diskectomy, decompression of thecal sac and nerve   roots, 59 modifier.  4.  Partial C4 corpectomy, diskectomy, decompression of thecal sac and nerve   roots, 59 modifier.  5.  C3, C4 PEEK interbody cage placement.  6.  C3, C4 interbody/allograft/autograft fusion.  7.  C3, C4, C5 anterior plating fixation.  8.  Intraoperative monitoring.  7.  Modifier 22, patient's body mass index of 35, took case normally an hour   and half, made it 2 hours and 45 minutes, greater than 50% increase in time,   physical and mental effort to complete it safely deal with additional body   habitus and scar from previous surgery justifying modifier 22 to the entire   case.    SURGEON:  Remi Lechuga III, MD    ASSISTANT:  Michele Farfan PA-C    ANESTHESIA:  General.    COMPLICATIONS:  None.    ESTIMATED BLOOD LOSS:  50 mL.    FINDINGS:  Clear pseudoarthrosis at C3-C4.  All hardware removed.  Patient's   very soft bone noted on placement of stand-alone cage because of C3 was   unavailable for anterior plating.  Dictates that we must do a posterior   procedure preferably during this hospitalization due to the tenuous nature of   this construct.  We will discuss the patient postoperatively.  No change in   motors and SSEPs.    COMPLICATIONS:  None.    DRAINS:   Left subplatysmal Hemovac.    DISPOSITION:  Extubated to recovery and to floor.    HISTORY OF PRESENT ILLNESS:  This is a 63-year-old woman who had undergone an   uncomplicated C3-C5 ACDF with me above C5-C6, C6-C7 previous ACDF.  She   developed increasing neck pain, cervical kyphosis and fairly early   postoperatively, she noted already have extruded the C3-C4 graft and the   screws at C3 and C4 clear.  DEXA scan showed osteoporosis.  She started on   medications.  This is likely accounting for why she failed so quickly with a   pseudoarthrosis.  I explained the risks, benefits, and alternatives of an   anterior hardware removal, repeat corpectomies and large interbody cage   placement, possibly stand-alone and re-plating of the disk space at C4-C5 that   is still fusing and this includes pain, infection, bleeding, CSF leak,   failure to completely resolve symptoms, neurologic deficit, weakness,   numbness, bladder or bowel difficulties, failure of fixation, failure of   fusion, need for posterior supplementation, rostral caudal extensions due to   junctional stenosis, injury to the carotid artery, trachea, esophagus and   temporary or permanent speaking and swallowing difficulties.  She understood   the risks, benefits, and agreed to consent.    SUMMARY OF OPERATIVE PROCEDURE:  Patient was brought to the operating suite,   placed under general anesthesia.  Recurrent laryngeal nerve monitoring was   done and motors and SSEPs were run at baseline.  Patient was placed back in   head back in extension with a shoulder bump and head placed in a head pillow.    All padded pressure points secured.  Shoulders and head taped down.  Motors   and SSEPs were stable throughout the case and there was only intermittent   recurrent laryngeal nerve firing during the approach.  We decided to go on the   left side because she had 2 previous surgeries on the right side to avoid   scarring and injury to the important internal organs.   We kathie out a left   anterior transverse incision overlying the C4 screws.  This was infiltrated   with Marcaine with epinephrine.  Preoperative antibiotics were given.  Proper   time-out was performed.  Patient was prepped and draped in sterile fashion.    The patient's body mass index increased the time, all adding the time of   positioning, approach, etc. justifying modifier 22 to the entire case due to   the scar and BMI of 35.    A linear incision was made and soft tissues dissected with monopolar   electrocautery.  We found the platysma, incised it sharply and did   subplatysmal dissection.  We did encounter scar down deep, but the initial   dissection planes were okay.  We found the sternocleidomastoid, moved   laterally, the strap muscles moved medially, found the carotid sheath moved   laterally.  We only saw intermittent recurrent laryngeal nerve firing during   the approach.  We elevated the esophagus and passed an NG tube by anesthesia   to confirm the tubular structure.  There is lot of thickened scar in the   prevertebral space over the plate.  We elevated this with Bovie.  We isolated   the plate.  We constructed a Shadow-Line retractor with up-down corpectomy   blades to give us nice visualization.  We cleared away his all soft tissue   over the hardware.    C3-C4 hardware removal and exploration of fusion:  This was clear that the   screws were elevated and were removed easily.  At C3 and C4, noting a clear   pseudoarthrosis.  We removed the plate with the Zavation hardware screwdriver,   it elevated easily.  We cleared away all the way down to actual bone at C3,   C4, and C5.  C3 was very remodeled due to the extrusion of the graft.  The   graft came out and clearly was easily removed, indicating pseudoarthrosis at   those levels.    Partial C3, partial C4 corpectomy, diskectomy, decompression of thecal sac and   nerve roots:  Using [employer] Armando drill, we performed partial corpectomies just   to try  to align with very degenerated and collapsed into vertebral body at C3   and as well as C4 to trying to maintain the endplates best we can, but we   drilled greater than 50% of depth of the vertebral body.  We put Surgiflo   down.  We felt that this was at least good enough bone to put a graft into.    We got all the way down to the dura, which had adherent to the scar from the   previous surgery.  We were happy with this creation of a new trapezoidal space   widened it to accept the standalone graft.    C3-C4 PEEK interbody cage placement:  Using the Z-LINK standalone cage from   [employer], we placed that cage pounded into place.  There was no change in   motors and SSEPs.  We had reasonable apposition on C4, but C3 was so   degenerated; and from the sunken in graft previously, the partial corpectomies   were created.  It created nice space with a size of 9 mm graft that had a   reasonable snug fit.  We were unable to use [place] pin distraction either   because there was really not much vertebral body bone available at C3.  We put   Surgiflo down before introduction of the graft and we are as happy as we   could be with the apposition, but I do recommend there is a strong   consideration for posterior fixation before she leaves the hospital, the   single screw in C3 from the standalone graft, I do not want to fail quickly   like she did before.    C3-C4 interbody allograft autograft fusion:  Before introduction of the graft,   we packed tightly with demineralized bone matrix allograft and some of the   corpectomy bone.  Because of the tightest apposition we could make with   re-drilling the trapezoidal space with partial corpectomies, helped to assure   interbody fusion.    C3, C4, C5 anterior plating fixation:  The C3-C4 was a standalone plate and so   was awled in and then drilled to a depth of 14 mm.  Bony confines were   confirmed with whole probe and then we placed 4.0x14 mm self-tapping screws   with  reasonable bony purchase, although the soft bone was noted.  At C4-C5, we   chose another plate that mimicked the holes from the previous, re-drilled   them and used rescue screws 4.5x40 mm screws with good bony purchase.  We   tightened using  screwdriver to tighten down the locking nuts to   prevent back out.  We were happy with our final construct.  There was no   change in motors and SSEPs.    We copiously irrigated with bacitracin infused saline.  We tunneled out a   subfascial ARYAN and secured to skin with a stitch.  We slowly withdrew the   retractors and assured hemostasis, closed the wound in anatomic layers with   3-0 Vicryl to bring the platysma back together, 3-0 Vicryl for the dermis, and   Steri-Strips for the skin.  Sterile dressing was applied.  Patient was   extubated and Duncan removed.    There were no complications.  Needle and sponge count were correct at the end   of the case.  Body mass index of 35 and increased scar from previous surgery   took the case normally an hour and half, made it 2 hours 45 minutes, greater   than 50% increase in time, physical and mental effort to complete it safely   justifying modifier 22 to the entire case.       ____________________________________     MD JOHANA Fisher III / SATHISH    DD:  09/18/2019 17:51:52  DT:  09/19/2019 00:40:07    D#:  [id number]  Job#:  [id number]

## 2019-09-19 NOTE — CARE PLAN
Problem: Communication  Goal: The ability to communicate needs accurately and effectively will improve  Outcome: PROGRESSING AS EXPECTED  Note:   Effectively communicates needs with staff     Problem: Infection  Goal: Will remain free from infection  Outcome: PROGRESSING AS EXPECTED  Note:   Antibiotics administered as ordered     Problem: Pain Management  Goal: Pain level will decrease to patient's comfort goal  Outcome: PROGRESSING AS EXPECTED  Note:   Pain level within acceptable limits with use of PRN pain medications

## 2019-09-20 ENCOUNTER — HOSPITAL ENCOUNTER (OUTPATIENT)
Dept: RADIOLOGY | Facility: MEDICAL CENTER | Age: 64
End: 2019-09-20

## 2019-09-20 LAB
ANION GAP SERPL CALC-SCNC: 9 MMOL/L (ref 0–11.9)
BUN SERPL-MCNC: 15 MG/DL (ref 8–22)
CALCIUM SERPL-MCNC: 8.9 MG/DL (ref 8.5–10.5)
CHLORIDE SERPL-SCNC: 104 MMOL/L (ref 96–112)
CO2 SERPL-SCNC: 31 MMOL/L (ref 20–33)
CREAT SERPL-MCNC: 0.87 MG/DL (ref 0.5–1.4)
ERYTHROCYTE [DISTWIDTH] IN BLOOD BY AUTOMATED COUNT: 50 FL (ref 35.9–50)
GLUCOSE SERPL-MCNC: 114 MG/DL (ref 65–99)
HCT VFR BLD AUTO: 40.8 % (ref 37–47)
HGB BLD-MCNC: 12 G/DL (ref 12–16)
MCH RBC QN AUTO: 28.3 PG (ref 27–33)
MCHC RBC AUTO-ENTMCNC: 29.4 G/DL (ref 33.6–35)
MCV RBC AUTO: 96.2 FL (ref 81.4–97.8)
PLATELET # BLD AUTO: 236 K/UL (ref 164–446)
PMV BLD AUTO: 10.6 FL (ref 9–12.9)
POTASSIUM SERPL-SCNC: 3.5 MMOL/L (ref 3.6–5.5)
RBC # BLD AUTO: 4.24 M/UL (ref 4.2–5.4)
SODIUM SERPL-SCNC: 144 MMOL/L (ref 135–145)
WBC # BLD AUTO: 8.2 K/UL (ref 4.8–10.8)

## 2019-09-20 PROCEDURE — 36415 COLL VENOUS BLD VENIPUNCTURE: CPT

## 2019-09-20 PROCEDURE — A9270 NON-COVERED ITEM OR SERVICE: HCPCS | Performed by: PHYSICIAN ASSISTANT

## 2019-09-20 PROCEDURE — 770001 HCHG ROOM/CARE - MED/SURG/GYN PRIV*

## 2019-09-20 PROCEDURE — 85027 COMPLETE CBC AUTOMATED: CPT

## 2019-09-20 PROCEDURE — 700111 HCHG RX REV CODE 636 W/ 250 OVERRIDE (IP): Performed by: PHYSICIAN ASSISTANT

## 2019-09-20 PROCEDURE — 700112 HCHG RX REV CODE 229: Performed by: PHYSICIAN ASSISTANT

## 2019-09-20 PROCEDURE — 80048 BASIC METABOLIC PNL TOTAL CA: CPT

## 2019-09-20 PROCEDURE — 700102 HCHG RX REV CODE 250 W/ 637 OVERRIDE(OP): Performed by: PHYSICIAN ASSISTANT

## 2019-09-20 RX ADMIN — DOCUSATE SODIUM 100 MG: 100 CAPSULE, LIQUID FILLED ORAL at 18:09

## 2019-09-20 RX ADMIN — GABAPENTIN 800 MG: 400 CAPSULE ORAL at 06:21

## 2019-09-20 RX ADMIN — METHOCARBAMOL TABLETS 750 MG: 750 TABLET, COATED ORAL at 20:34

## 2019-09-20 RX ADMIN — CIPROFLOXACIN HYDROCHLORIDE 500 MG: 500 TABLET, FILM COATED ORAL at 18:09

## 2019-09-20 RX ADMIN — GABAPENTIN 800 MG: 400 CAPSULE ORAL at 18:09

## 2019-09-20 RX ADMIN — ATORVASTATIN CALCIUM 20 MG: 40 TABLET, FILM COATED ORAL at 20:35

## 2019-09-20 RX ADMIN — OXYCODONE HYDROCHLORIDE 10 MG: 10 TABLET ORAL at 03:12

## 2019-09-20 RX ADMIN — OXYCODONE HYDROCHLORIDE 10 MG: 10 TABLET ORAL at 22:05

## 2019-09-20 RX ADMIN — GABAPENTIN 800 MG: 400 CAPSULE ORAL at 12:12

## 2019-09-20 RX ADMIN — SITAGLIPTIN 100 MG: 100 TABLET, FILM COATED ORAL at 18:10

## 2019-09-20 RX ADMIN — TEMAZEPAM 30 MG: 15 CAPSULE ORAL at 20:35

## 2019-09-20 RX ADMIN — MELATONIN 5000 UNITS: at 06:21

## 2019-09-20 RX ADMIN — ANTACID TABLETS 500 MG: 500 TABLET, CHEWABLE ORAL at 06:21

## 2019-09-20 RX ADMIN — OXYCODONE HYDROCHLORIDE 10 MG: 10 TABLET ORAL at 12:12

## 2019-09-20 RX ADMIN — DOCUSATE SODIUM 100 MG: 100 CAPSULE, LIQUID FILLED ORAL at 06:21

## 2019-09-20 RX ADMIN — CIPROFLOXACIN HYDROCHLORIDE 500 MG: 500 TABLET, FILM COATED ORAL at 06:21

## 2019-09-20 RX ADMIN — OMEPRAZOLE 20 MG: 20 CAPSULE, DELAYED RELEASE ORAL at 20:34

## 2019-09-20 RX ADMIN — POTASSIUM CHLORIDE 10 MEQ: 1500 TABLET, EXTENDED RELEASE ORAL at 18:10

## 2019-09-20 RX ADMIN — ANTACID TABLETS 500 MG: 500 TABLET, CHEWABLE ORAL at 18:09

## 2019-09-20 RX ADMIN — OXYCODONE HYDROCHLORIDE 10 MG: 10 TABLET ORAL at 06:21

## 2019-09-20 RX ADMIN — SENNOSIDES, DOCUSATE SODIUM 1 TABLET: 50; 8.6 TABLET, FILM COATED ORAL at 20:35

## 2019-09-20 RX ADMIN — MORPHINE SULFATE 4 MG: 4 INJECTION INTRAVENOUS at 07:42

## 2019-09-20 RX ADMIN — VENLAFAXINE 150 MG: 75 TABLET ORAL at 20:36

## 2019-09-20 RX ADMIN — GLIPIZIDE 5 MG: 5 TABLET ORAL at 18:10

## 2019-09-20 RX ADMIN — ROPINIROLE HYDROCHLORIDE 8 MG: 2 TABLET, FILM COATED ORAL at 22:05

## 2019-09-20 RX ADMIN — LEVOTHYROXINE SODIUM 75 MCG: 75 TABLET ORAL at 20:36

## 2019-09-20 RX ADMIN — METHOCARBAMOL TABLETS 750 MG: 750 TABLET, COATED ORAL at 18:09

## 2019-09-20 RX ADMIN — DIPHENHYDRAMINE HCL 25 MG: 25 TABLET ORAL at 20:35

## 2019-09-20 RX ADMIN — METHOCARBAMOL TABLETS 750 MG: 750 TABLET, COATED ORAL at 09:40

## 2019-09-20 RX ADMIN — MORPHINE SULFATE 4 MG: 4 INJECTION INTRAVENOUS at 16:16

## 2019-09-20 NOTE — PROGRESS NOTES
Neurosurgery Progress Note    Subjective:  POD#2 Revision of C3-4 and C4-5 ACDF After hardware failure  C2-T1 PCF on Monday.  Getting MRI and CTA neck today for planning.  Arms feel good.    Neck pain tolerable, minimal right arm soreness.  Eating, voiding, ambulating.  Drain 0cc/8hr    Exam:  Wound C/D/I  Motor 5/5  Sensory intact  Swallowing and speaking well    BP  Min: 93/74  Max: 121/72  Pulse  Av.8  Min: 69  Max: 77  Resp  Av  Min: 16  Max: 18  Temp  Av.8 °C (98.3 °F)  Min: 36.7 °C (98 °F)  Max: 37.2 °C (98.9 °F)  SpO2  Av.6 %  Min: 90 %  Max: 97 %    No data recorded    Recent Labs     19  02319  023   WBC 6.9 8.2   RBC 4.61 4.24   HEMOGLOBIN 12.9 12.0   HEMATOCRIT 43.8 40.8   MCV 95.0 96.2   MCH 28.0 28.3   MCHC 29.5* 29.4*   RDW 49.1 50.0   PLATELETCT 233 236   MPV 10.4 10.6     Recent Labs     19  0231 19  023   SODIUM 141 144   POTASSIUM 4.3 3.5*   CHLORIDE 103 104   CO2 27 31   GLUCOSE 130* 114*   BUN 13 15   CREATININE 0.83 0.87   CALCIUM 9.0 8.9               Intake/Output       19 07 - 19 0659 19 07 - 19 59       Total  Total       Intake    Total Intake -- -- -- -- -- --       Output    Urine  --  -- --  --  -- --    Number of Times Voided -- 4 x 4 x -- -- --    Drains  20  10 30  --  -- --    Output (mL) (Closed/Suction Drain Anterior Neck Hemovac) 20 10 30 -- -- --    Total Output 20 10 30 -- -- --       Net I/O     -20 -10 -30 -- -- --            Intake/Output Summary (Last 24 hours) at 2019 0737  Last data filed at 2019 0404  Gross per 24 hour   Intake --   Output 30 ml   Net -30 ml            • potassium chloride SA  10 mEq Q EVENING   • omeprazole  20 mg QHS   • levothyroxine  75 mcg QHS   • glipiZIDE  5 mg Q EVENING   • gabapentin  800 mg TID   • furosemide  20 mg QDAY PRN   • ciprofloxacin  500 mg BID   • atorvastatin  20 mg QHS   • venlafaxine  150 mg QHS   •  ROPINIRole  8 mg QHS   • SITagliptin  100 mg Q EVENING   • temazepam  30 mg QHS   • Pharmacy Consult Request  1 Each PHARMACY TO DOSE   • MD ALERT...DO NOT ADMINISTER NSAIDS or ASPIRIN unless ORDERED By Neurosurgery  1 Each PRN   • docusate sodium  100 mg BID   • senna-docusate  1 Tab Nightly   • senna-docusate  1 Tab Q24HRS PRN   • polyethylene glycol/lytes  1 Packet BID PRN   • magnesium hydroxide  30 mL QDAY PRN   • bisacodyl  10 mg Q24HRS PRN   • fleet  1 Each Once PRN   • NS   Continuous   • enoxaparin  40 mg DAILY   • oxyCODONE immediate release  10 mg Q3HRS PRN   • oxyCODONE immediate-release  5 mg Q3HRS PRN   • morphine injection  4 mg Q3HRS PRN   • diphenhydrAMINE  25 mg Q6HRS PRN    Or   • diphenhydrAMINE  25 mg Q6HRS PRN   • ondansetron  4 mg Q4HRS PRN   • ondansetron  4 mg Q4HRS PRN   • methocarbamol  750 mg 4X/DAY   • ALPRAZolam  0.5 mg BID PRN   • vitamin D  5,000 Units DAILY   • calcium carbonate  500 mg BID   • benzocaine-menthol  1 Lozenge Q2HRS PRN   • Influenza Vaccine High-Dose pf  0.5 mL Once PRN       Assessment and Plan:  POD#2 Revision of C3-4 and C4-5 ACDF After hardware failure  C2-T1 fusion Monday.  Ordered NPO Sunday at midnight.  D/c drain today.  PT/OT/ambulate as tolerated.

## 2019-09-20 NOTE — PROGRESS NOTES
0815Report received, plan of care reviewed and discussed, assessment complete, oriented to room, bed alarm on, nonskid socks applied, advised to call for assistance.   1015 Discussed plan of care, encouraged and answered all questions, IV ativan given for MRI, will continue to monitor.  1215 Lethargic after ativan administration, will continue to monitor.  1415 Sleeping, call light within reach.  1615 Up in bed, more alert, all needs met at this time.  1845 Report given to next shift.

## 2019-09-20 NOTE — CARE PLAN
Problem: Communication  Goal: The ability to communicate needs accurately and effectively will improve  Outcome: PROGRESSING AS EXPECTED  Note:   Effectively voices needs to staff     Problem: Pain Management  Goal: Pain level will decrease to patient's comfort goal  Outcome: PROGRESSING AS EXPECTED  Note:   Pain within acceptable limits with use of prescribed medications

## 2019-09-20 NOTE — PROGRESS NOTES
R C2 pedicle unfavorable for full pedicle screw, but will perform stage 2 C2-T1 fusion with probably C3/4 lami Monday afternoon.

## 2019-09-21 PROCEDURE — A9270 NON-COVERED ITEM OR SERVICE: HCPCS | Performed by: PHYSICIAN ASSISTANT

## 2019-09-21 PROCEDURE — 770001 HCHG ROOM/CARE - MED/SURG/GYN PRIV*

## 2019-09-21 PROCEDURE — 700112 HCHG RX REV CODE 229: Performed by: PHYSICIAN ASSISTANT

## 2019-09-21 PROCEDURE — 700111 HCHG RX REV CODE 636 W/ 250 OVERRIDE (IP): Performed by: PHYSICIAN ASSISTANT

## 2019-09-21 PROCEDURE — 700102 HCHG RX REV CODE 250 W/ 637 OVERRIDE(OP): Performed by: PHYSICIAN ASSISTANT

## 2019-09-21 RX ADMIN — OMEPRAZOLE 20 MG: 20 CAPSULE, DELAYED RELEASE ORAL at 22:03

## 2019-09-21 RX ADMIN — OXYCODONE HYDROCHLORIDE 10 MG: 10 TABLET ORAL at 23:36

## 2019-09-21 RX ADMIN — MORPHINE SULFATE 4 MG: 4 INJECTION INTRAVENOUS at 15:05

## 2019-09-21 RX ADMIN — METHOCARBAMOL TABLETS 750 MG: 750 TABLET, COATED ORAL at 16:47

## 2019-09-21 RX ADMIN — GABAPENTIN 800 MG: 400 CAPSULE ORAL at 12:48

## 2019-09-21 RX ADMIN — MELATONIN 5000 UNITS: at 06:03

## 2019-09-21 RX ADMIN — LEVOTHYROXINE SODIUM 75 MCG: 75 TABLET ORAL at 22:04

## 2019-09-21 RX ADMIN — GLIPIZIDE 5 MG: 5 TABLET ORAL at 16:46

## 2019-09-21 RX ADMIN — OXYCODONE HYDROCHLORIDE 10 MG: 10 TABLET ORAL at 12:49

## 2019-09-21 RX ADMIN — TEMAZEPAM 30 MG: 15 CAPSULE ORAL at 22:03

## 2019-09-21 RX ADMIN — GABAPENTIN 800 MG: 400 CAPSULE ORAL at 16:47

## 2019-09-21 RX ADMIN — OXYCODONE HYDROCHLORIDE 10 MG: 10 TABLET ORAL at 16:46

## 2019-09-21 RX ADMIN — ATORVASTATIN CALCIUM 20 MG: 40 TABLET, FILM COATED ORAL at 22:04

## 2019-09-21 RX ADMIN — ANTACID TABLETS 500 MG: 500 TABLET, CHEWABLE ORAL at 16:48

## 2019-09-21 RX ADMIN — SENNOSIDES, DOCUSATE SODIUM 1 TABLET: 50; 8.6 TABLET, FILM COATED ORAL at 22:04

## 2019-09-21 RX ADMIN — DOCUSATE SODIUM 100 MG: 100 CAPSULE, LIQUID FILLED ORAL at 16:47

## 2019-09-21 RX ADMIN — CIPROFLOXACIN HYDROCHLORIDE 500 MG: 500 TABLET, FILM COATED ORAL at 06:03

## 2019-09-21 RX ADMIN — METHOCARBAMOL TABLETS 750 MG: 750 TABLET, COATED ORAL at 22:04

## 2019-09-21 RX ADMIN — POTASSIUM CHLORIDE 10 MEQ: 1500 TABLET, EXTENDED RELEASE ORAL at 16:48

## 2019-09-21 RX ADMIN — DOCUSATE SODIUM 100 MG: 100 CAPSULE, LIQUID FILLED ORAL at 06:03

## 2019-09-21 RX ADMIN — ENOXAPARIN SODIUM 40 MG: 100 INJECTION SUBCUTANEOUS at 06:02

## 2019-09-21 RX ADMIN — ANTACID TABLETS 500 MG: 500 TABLET, CHEWABLE ORAL at 06:04

## 2019-09-21 RX ADMIN — VENLAFAXINE 150 MG: 75 TABLET ORAL at 22:03

## 2019-09-21 RX ADMIN — OXYCODONE HYDROCHLORIDE 5 MG: 5 TABLET ORAL at 06:06

## 2019-09-21 RX ADMIN — ROPINIROLE HYDROCHLORIDE 8 MG: 2 TABLET, FILM COATED ORAL at 22:04

## 2019-09-21 RX ADMIN — SITAGLIPTIN 100 MG: 100 TABLET, FILM COATED ORAL at 16:46

## 2019-09-21 RX ADMIN — GABAPENTIN 800 MG: 400 CAPSULE ORAL at 06:03

## 2019-09-21 RX ADMIN — MORPHINE SULFATE 4 MG: 4 INJECTION INTRAVENOUS at 19:23

## 2019-09-21 RX ADMIN — METHOCARBAMOL TABLETS 750 MG: 750 TABLET, COATED ORAL at 12:48

## 2019-09-21 RX ADMIN — OXYCODONE HYDROCHLORIDE 10 MG: 10 TABLET ORAL at 02:34

## 2019-09-21 RX ADMIN — CIPROFLOXACIN HYDROCHLORIDE 500 MG: 500 TABLET, FILM COATED ORAL at 16:47

## 2019-09-21 NOTE — PROGRESS NOTES
0900Report received, plan of care reviewed and discussed, assessment complete, oriented to room, bed alarm on, nonskid socks applied, advised to call for assistance.   1100 Discussed plan of care, encouraged and answered all questions, will continue to monitor.  1300 Up in bed, all needs met at this time.  1500 Complaints of pain, medication administered per MAR.  1700 Complaints of pain, medication administered per MAR.  1845 Report given to next shift.

## 2019-09-21 NOTE — CARE PLAN
Problem: Communication  Goal: The ability to communicate needs accurately and effectively will improve  Outcome: PROGRESSING AS EXPECTED  Note:   Effectively communicates needs to staff     Problem: Venous Thromboembolism (VTW)/Deep Vein Thrombosis (DVT) Prevention:  Goal: Patient will participate in Venous Thrombosis (VTE)/Deep Vein Thrombosis (DVT)Prevention Measures  Outcome: PROGRESSING AS EXPECTED  Note:   Frequently changes position and moves/flexes legs     Problem: Knowledge Deficit  Goal: Knowledge of disease process/condition, treatment plan, diagnostic tests, and medications will improve  Outcome: PROGRESSING AS EXPECTED  Note:   Asks questions and verbalizes understanding of plan of care

## 2019-09-21 NOTE — PROGRESS NOTES
Neurosurgery Progress Note    Subjective:  POD#3 Revision of C3-4 and C4-5 ACDF After hardware failure  Stage 2 C2-T1 PCF on Monday.  MRI and CTA neck Completed Friday  Arms feel good.    Neck pain tolerable, minimal right arm soreness.  Eating, voiding, ambulating.  Drain out  Patient is remaining in the hospital until her stage II surgery is done on Monday    Exam:  Wound C/D/I  Motor 5/5  Sensory intact  Swallowing and speaking well    BP  Min: 105/55  Max: 135/77  Pulse  Av.3  Min: 75  Max: 83  Resp  Av.1  Min: 16  Max: 18  Temp  Av.1 °C (98.7 °F)  Min: 36.7 °C (98 °F)  Max: 37.4 °C (99.4 °F)  SpO2  Av %  Min: 92 %  Max: 97 %    No data recorded    Recent Labs     19   WBC 6.9 8.2   RBC 4.61 4.24   HEMOGLOBIN 12.9 12.0   HEMATOCRIT 43.8 40.8   MCV 95.0 96.2   MCH 28.0 28.3   MCHC 29.5* 29.4*   RDW 49.1 50.0   PLATELETCT 233 236   MPV 10.4 10.6     Recent Labs     19   SODIUM 141 144   POTASSIUM 4.3 3.5*   CHLORIDE 103 104   CO2 27 31   GLUCOSE 130* 114*   BUN 13 15   CREATININE 0.83 0.87   CALCIUM 9.0 8.9               Intake/Output       19 - 19 - 19 0659       Total  Total       Intake    Total Intake -- -- -- -- -- --       Output    Drains  5  -- 5  --  -- --    Output (mL) ([REMOVED] Closed/Suction Drain Anterior Neck Hemovac) 5 -- 5 -- -- --    Stool  --  -- --  --  -- --    Number of Times Stooled 0 x -- 0 x -- -- --    Total Output 5 -- 5 -- -- --       Net I/O     -5 -- -5 -- -- --          No intake or output data in the 24 hours ending 19 0958         • potassium chloride SA  10 mEq Q EVENING   • omeprazole  20 mg QHS   • levothyroxine  75 mcg QHS   • glipiZIDE  5 mg Q EVENING   • gabapentin  800 mg TID   • furosemide  20 mg QDAY PRN   • ciprofloxacin  500 mg BID   • atorvastatin  20 mg QHS   • venlafaxine  150 mg QHS   • ROPINIRole  8 mg QHS    • SITagliptin  100 mg Q EVENING   • temazepam  30 mg QHS   • Pharmacy Consult Request  1 Each PHARMACY TO DOSE   • MD ALERT...DO NOT ADMINISTER NSAIDS or ASPIRIN unless ORDERED By Neurosurgery  1 Each PRN   • docusate sodium  100 mg BID   • senna-docusate  1 Tab Nightly   • senna-docusate  1 Tab Q24HRS PRN   • polyethylene glycol/lytes  1 Packet BID PRN   • magnesium hydroxide  30 mL QDAY PRN   • bisacodyl  10 mg Q24HRS PRN   • fleet  1 Each Once PRN   • NS   Continuous   • enoxaparin  40 mg DAILY   • oxyCODONE immediate release  10 mg Q3HRS PRN   • oxyCODONE immediate-release  5 mg Q3HRS PRN   • morphine injection  4 mg Q3HRS PRN   • diphenhydrAMINE  25 mg Q6HRS PRN    Or   • diphenhydrAMINE  25 mg Q6HRS PRN   • ondansetron  4 mg Q4HRS PRN   • ondansetron  4 mg Q4HRS PRN   • methocarbamol  750 mg 4X/DAY   • ALPRAZolam  0.5 mg BID PRN   • vitamin D  5,000 Units DAILY   • calcium carbonate  500 mg BID   • benzocaine-menthol  1 Lozenge Q2HRS PRN   • Influenza Vaccine High-Dose pf  0.5 mL Once PRN       Assessment and Plan:  POD#32 Revision of C3-4 and C4-5 ACDF After hardware failure  C2-T1 fusion Monday and patient will remain in the hospital until stage II is completed  Ordered NPO Sunday at midnight.  PT/OT/ambulate as tolerated.  Continue pain management as ordered.

## 2019-09-22 PROCEDURE — A9270 NON-COVERED ITEM OR SERVICE: HCPCS | Performed by: PHYSICIAN ASSISTANT

## 2019-09-22 PROCEDURE — 700112 HCHG RX REV CODE 229: Performed by: PHYSICIAN ASSISTANT

## 2019-09-22 PROCEDURE — 700111 HCHG RX REV CODE 636 W/ 250 OVERRIDE (IP): Performed by: PHYSICIAN ASSISTANT

## 2019-09-22 PROCEDURE — 700102 HCHG RX REV CODE 250 W/ 637 OVERRIDE(OP): Performed by: PHYSICIAN ASSISTANT

## 2019-09-22 PROCEDURE — 770001 HCHG ROOM/CARE - MED/SURG/GYN PRIV*

## 2019-09-22 RX ADMIN — OXYCODONE HYDROCHLORIDE 10 MG: 10 TABLET ORAL at 05:02

## 2019-09-22 RX ADMIN — GABAPENTIN 800 MG: 400 CAPSULE ORAL at 11:57

## 2019-09-22 RX ADMIN — METHOCARBAMOL TABLETS 750 MG: 750 TABLET, COATED ORAL at 16:30

## 2019-09-22 RX ADMIN — ANTACID TABLETS 500 MG: 500 TABLET, CHEWABLE ORAL at 05:01

## 2019-09-22 RX ADMIN — CIPROFLOXACIN HYDROCHLORIDE 500 MG: 500 TABLET, FILM COATED ORAL at 05:02

## 2019-09-22 RX ADMIN — SITAGLIPTIN 100 MG: 100 TABLET, FILM COATED ORAL at 16:29

## 2019-09-22 RX ADMIN — ENOXAPARIN SODIUM 40 MG: 100 INJECTION SUBCUTANEOUS at 05:01

## 2019-09-22 RX ADMIN — METHOCARBAMOL TABLETS 750 MG: 750 TABLET, COATED ORAL at 11:57

## 2019-09-22 RX ADMIN — MORPHINE SULFATE 4 MG: 4 INJECTION INTRAVENOUS at 11:57

## 2019-09-22 RX ADMIN — ROPINIROLE HYDROCHLORIDE 8 MG: 2 TABLET, FILM COATED ORAL at 21:54

## 2019-09-22 RX ADMIN — SENNOSIDES, DOCUSATE SODIUM 1 TABLET: 50; 8.6 TABLET, FILM COATED ORAL at 21:55

## 2019-09-22 RX ADMIN — DOCUSATE SODIUM 100 MG: 100 CAPSULE, LIQUID FILLED ORAL at 16:29

## 2019-09-22 RX ADMIN — GLIPIZIDE 5 MG: 5 TABLET ORAL at 16:30

## 2019-09-22 RX ADMIN — GABAPENTIN 800 MG: 400 CAPSULE ORAL at 16:30

## 2019-09-22 RX ADMIN — MELATONIN 5000 UNITS: at 05:01

## 2019-09-22 RX ADMIN — OMEPRAZOLE 20 MG: 20 CAPSULE, DELAYED RELEASE ORAL at 21:55

## 2019-09-22 RX ADMIN — OXYCODONE HYDROCHLORIDE 10 MG: 10 TABLET ORAL at 09:40

## 2019-09-22 RX ADMIN — OXYCODONE HYDROCHLORIDE 10 MG: 10 TABLET ORAL at 21:55

## 2019-09-22 RX ADMIN — DOCUSATE SODIUM 100 MG: 100 CAPSULE, LIQUID FILLED ORAL at 05:01

## 2019-09-22 RX ADMIN — TEMAZEPAM 30 MG: 15 CAPSULE ORAL at 21:54

## 2019-09-22 RX ADMIN — METHOCARBAMOL TABLETS 750 MG: 750 TABLET, COATED ORAL at 09:40

## 2019-09-22 RX ADMIN — GABAPENTIN 800 MG: 400 CAPSULE ORAL at 05:02

## 2019-09-22 RX ADMIN — VENLAFAXINE 150 MG: 75 TABLET ORAL at 21:55

## 2019-09-22 RX ADMIN — ANTACID TABLETS 500 MG: 500 TABLET, CHEWABLE ORAL at 16:29

## 2019-09-22 RX ADMIN — POTASSIUM CHLORIDE 10 MEQ: 1500 TABLET, EXTENDED RELEASE ORAL at 16:29

## 2019-09-22 RX ADMIN — LEVOTHYROXINE SODIUM 75 MCG: 75 TABLET ORAL at 21:54

## 2019-09-22 RX ADMIN — CIPROFLOXACIN HYDROCHLORIDE 500 MG: 500 TABLET, FILM COATED ORAL at 16:29

## 2019-09-22 RX ADMIN — DIPHENHYDRAMINE HCL 25 MG: 25 TABLET ORAL at 13:43

## 2019-09-22 RX ADMIN — ATORVASTATIN CALCIUM 20 MG: 40 TABLET, FILM COATED ORAL at 21:55

## 2019-09-22 RX ADMIN — MORPHINE SULFATE 4 MG: 4 INJECTION INTRAVENOUS at 19:04

## 2019-09-22 RX ADMIN — OXYCODONE HYDROCHLORIDE 10 MG: 10 TABLET ORAL at 16:30

## 2019-09-22 RX ADMIN — MAGNESIUM HYDROXIDE 30 ML: 400 SUSPENSION ORAL at 09:40

## 2019-09-22 RX ADMIN — METHOCARBAMOL TABLETS 750 MG: 750 TABLET, COATED ORAL at 21:55

## 2019-09-22 NOTE — PROGRESS NOTES
Pt is A/Ox4 and reports of pain, medicated per MAR. Family is at bedside and husbands stays overnight. Pt does not like to wear her Aspen collar as ordered. Pt has a shorter neck and reports that it is not comfortable because her jaw is pushed up. Pt was agreeable to wear collar while sleeping.

## 2019-09-22 NOTE — PROGRESS NOTES
Problem: Communication  Goal: The ability to communicate needs accurately and effectively will improve  Outcome: PROGRESSING AS EXPECTED  Note:   Pt is able to effectively communicate needs and concerns. Pt utilizes call light appropriately to call for assistance. Will continue to monitor for additional communication needs and provide interventions as needed.      Problem: Safety  Goal: Will remain free from injury  Outcome: PROGRESSING AS EXPECTED  Note:   Fall risk has been assessed. Pt was educated on level of fall risk and to call for assistance when getting out of bed. Fall precautions are in place. Will continue to assess fall risk and implement appropriate fall interventions.

## 2019-09-22 NOTE — PROGRESS NOTES
0745Report received, plan of care reviewed and discussed, assessment complete, oriented to room, bed alarm on, nonskid socks applied, advised to call for assistance.   0945 Discussed plan of care, encouraged and answered all questions, will continue to monitor.  1145 Complaints of pain, medication administered per MAR, will continue to monitor.  1345 Complaints of itching, medication administered per MAR, all needs met at this time.  1545 Up in bed, all needs met at this time.  1745 Complaints of pain, medication administered per MAR, will continue to monitor.  1845 Report given to next shift.

## 2019-09-22 NOTE — PROGRESS NOTES
Neurosurgery Progress Note    Subjective:  POD#4 Revision of C3-4 and C4-5 ACDF After hardware failure  Stage 2 C2-T1 PCF on Monday.  MRI and CTA neck Completed Friday  Arms feel good.    Neck pain tolerable, minimal right arm soreness.  Eating, voiding, ambulating.  Drain out  Patient is remaining in the hospital until her stage II surgery is done on Monday    Exam:  Wound C/D/I  Motor 5/5  Sensory intact  Swallowing and speaking well    BP  Min: 108/71  Max: 122/75  Pulse  Av.5  Min: 74  Max: 77  Resp  Av.5  Min: 17  Max: 18  Temp  Av.4 °C (97.6 °F)  Min: 36.4 °C (97.6 °F)  Max: 36.4 °C (97.6 °F)  SpO2  Av.5 %  Min: 95 %  Max: 96 %    No data recorded    Recent Labs     19  0238   WBC 8.2   RBC 4.24   HEMOGLOBIN 12.0   HEMATOCRIT 40.8   MCV 96.2   MCH 28.3   MCHC 29.4*   RDW 50.0   PLATELETCT 236   MPV 10.6     Recent Labs     19  0238   SODIUM 144   POTASSIUM 3.5*   CHLORIDE 104   CO2 31   GLUCOSE 114*   BUN 15   CREATININE 0.87   CALCIUM 8.9               Intake/Output       19 - 1959 19 07 - 19 0659      6314-5199 9684-3037 Total 4033-8506 1946-5294 Total       Intake    Total Intake -- -- -- -- -- --       Output    Urine  --  -- --  --  -- --    Number of Times Voided -- 2 x 2 x -- -- --    Total Output -- -- -- -- -- --       Net I/O     -- -- -- -- -- --          No intake or output data in the 24 hours ending 19 1308         • potassium chloride SA  10 mEq Q EVENING   • omeprazole  20 mg QHS   • levothyroxine  75 mcg QHS   • glipiZIDE  5 mg Q EVENING   • gabapentin  800 mg TID   • furosemide  20 mg QDAY PRN   • ciprofloxacin  500 mg BID   • atorvastatin  20 mg QHS   • venlafaxine  150 mg QHS   • ROPINIRole  8 mg QHS   • SITagliptin  100 mg Q EVENING   • temazepam  30 mg QHS   • Pharmacy Consult Request  1 Each PHARMACY TO DOSE   • MD ALERT...DO NOT ADMINISTER NSAIDS or ASPIRIN unless ORDERED By Neurosurgery  1 Each PRN   • docusate  sodium  100 mg BID   • senna-docusate  1 Tab Nightly   • senna-docusate  1 Tab Q24HRS PRN   • polyethylene glycol/lytes  1 Packet BID PRN   • magnesium hydroxide  30 mL QDAY PRN   • bisacodyl  10 mg Q24HRS PRN   • fleet  1 Each Once PRN   • NS   Continuous   • enoxaparin  40 mg DAILY   • oxyCODONE immediate release  10 mg Q3HRS PRN   • oxyCODONE immediate-release  5 mg Q3HRS PRN   • morphine injection  4 mg Q3HRS PRN   • diphenhydrAMINE  25 mg Q6HRS PRN    Or   • diphenhydrAMINE  25 mg Q6HRS PRN   • ondansetron  4 mg Q4HRS PRN   • ondansetron  4 mg Q4HRS PRN   • methocarbamol  750 mg 4X/DAY   • ALPRAZolam  0.5 mg BID PRN   • vitamin D  5,000 Units DAILY   • calcium carbonate  500 mg BID   • benzocaine-menthol  1 Lozenge Q2HRS PRN   • Influenza Vaccine High-Dose pf  0.5 mL Once PRN       Assessment and Plan:  POD#4 Revision of C3-4 and C4-5 ACDF After hardware failure  Stage 2 C2-T1 fusion Monday and patient will remain in the hospital until stage II is completed  Ordered NPO Sunday at midnight.  PT/OT/ambulate as tolerated.  Continue pain management as ordered.

## 2019-09-23 ENCOUNTER — APPOINTMENT (OUTPATIENT)
Dept: RADIOLOGY | Facility: MEDICAL CENTER | Age: 64
DRG: 455 | End: 2019-09-23
Attending: NEUROLOGICAL SURGERY
Payer: MEDICARE

## 2019-09-23 ENCOUNTER — ANESTHESIA EVENT (OUTPATIENT)
Dept: SURGERY | Facility: MEDICAL CENTER | Age: 64
DRG: 455 | End: 2019-09-23
Payer: MEDICARE

## 2019-09-23 ENCOUNTER — ANESTHESIA (OUTPATIENT)
Dept: SURGERY | Facility: MEDICAL CENTER | Age: 64
DRG: 455 | End: 2019-09-23
Payer: MEDICARE

## 2019-09-23 PROCEDURE — 160048 HCHG OR STATISTICAL LEVEL 1-5: Performed by: NEUROLOGICAL SURGERY

## 2019-09-23 PROCEDURE — 770001 HCHG ROOM/CARE - MED/SURG/GYN PRIV*

## 2019-09-23 PROCEDURE — 160002 HCHG RECOVERY MINUTES (STAT): Performed by: NEUROLOGICAL SURGERY

## 2019-09-23 PROCEDURE — 700111 HCHG RX REV CODE 636 W/ 250 OVERRIDE (IP): Performed by: ANESTHESIOLOGY

## 2019-09-23 PROCEDURE — 500885 HCHG PACK, JACKSON TABLE: Performed by: NEUROLOGICAL SURGERY

## 2019-09-23 PROCEDURE — C1713 ANCHOR/SCREW BN/BN,TIS/BN: HCPCS | Performed by: NEUROLOGICAL SURGERY

## 2019-09-23 PROCEDURE — 700106 HCHG RX REV CODE 271: Performed by: NEUROLOGICAL SURGERY

## 2019-09-23 PROCEDURE — 502000 HCHG MISC OR IMPLANTS RC 0278: Performed by: NEUROLOGICAL SURGERY

## 2019-09-23 PROCEDURE — 95907 NVR CNDJ TST 1-2 STUDIES: CPT | Performed by: NEUROLOGICAL SURGERY

## 2019-09-23 PROCEDURE — 160036 HCHG PACU - EA ADDL 30 MINS PHASE I: Performed by: NEUROLOGICAL SURGERY

## 2019-09-23 PROCEDURE — 700105 HCHG RX REV CODE 258: Performed by: ANESTHESIOLOGY

## 2019-09-23 PROCEDURE — 700102 HCHG RX REV CODE 250 W/ 637 OVERRIDE(OP): Performed by: ANESTHESIOLOGY

## 2019-09-23 PROCEDURE — 72040 X-RAY EXAM NECK SPINE 2-3 VW: CPT

## 2019-09-23 PROCEDURE — 95925 SOMATOSENSORY TESTING: CPT | Performed by: NEUROLOGICAL SURGERY

## 2019-09-23 PROCEDURE — 501838 HCHG SUTURE GENERAL: Performed by: NEUROLOGICAL SURGERY

## 2019-09-23 PROCEDURE — 700101 HCHG RX REV CODE 250: Performed by: ANESTHESIOLOGY

## 2019-09-23 PROCEDURE — 95939 C MOTOR EVOKED UPR&LWR LIMBS: CPT | Performed by: NEUROLOGICAL SURGERY

## 2019-09-23 PROCEDURE — 700101 HCHG RX REV CODE 250: Performed by: NEUROLOGICAL SURGERY

## 2019-09-23 PROCEDURE — 95861 NEEDLE EMG 2 EXTREMITIES: CPT | Performed by: NEUROLOGICAL SURGERY

## 2019-09-23 PROCEDURE — 95940 IONM IN OPERATNG ROOM 15 MIN: CPT | Performed by: NEUROLOGICAL SURGERY

## 2019-09-23 PROCEDURE — A4314 CATH W/DRAINAGE 2-WAY LATEX: HCPCS | Performed by: NEUROLOGICAL SURGERY

## 2019-09-23 PROCEDURE — 95937 NEUROMUSCULAR JUNCTION TEST: CPT | Performed by: NEUROLOGICAL SURGERY

## 2019-09-23 PROCEDURE — 160035 HCHG PACU - 1ST 60 MINS PHASE I: Performed by: NEUROLOGICAL SURGERY

## 2019-09-23 PROCEDURE — 0RG20K1 FUSION OF 2 OR MORE CERVICAL VERTEBRAL JOINTS WITH NONAUTOLOGOUS TISSUE SUBSTITUTE, POSTERIOR APPROACH, POSTERIOR COLUMN, OPEN APPROACH: ICD-10-PCS | Performed by: NEUROLOGICAL SURGERY

## 2019-09-23 PROCEDURE — 700112 HCHG RX REV CODE 229: Performed by: PHYSICIAN ASSISTANT

## 2019-09-23 PROCEDURE — 500374 HCHG DRAIN, J-P FLAT 7MM FULL: Performed by: NEUROLOGICAL SURGERY

## 2019-09-23 PROCEDURE — A9270 NON-COVERED ITEM OR SERVICE: HCPCS | Performed by: PHYSICIAN ASSISTANT

## 2019-09-23 PROCEDURE — 160009 HCHG ANES TIME/MIN: Performed by: NEUROLOGICAL SURGERY

## 2019-09-23 PROCEDURE — 160031 HCHG SURGERY MINUTES - 1ST 30 MINS LEVEL 5: Performed by: NEUROLOGICAL SURGERY

## 2019-09-23 PROCEDURE — 01N80ZZ RELEASE THORACIC NERVE, OPEN APPROACH: ICD-10-PCS | Performed by: NEUROLOGICAL SURGERY

## 2019-09-23 PROCEDURE — 700102 HCHG RX REV CODE 250 W/ 637 OVERRIDE(OP): Performed by: PHYSICIAN ASSISTANT

## 2019-09-23 PROCEDURE — 700111 HCHG RX REV CODE 636 W/ 250 OVERRIDE (IP): Performed by: PHYSICIAN ASSISTANT

## 2019-09-23 PROCEDURE — A9270 NON-COVERED ITEM OR SERVICE: HCPCS | Performed by: ANESTHESIOLOGY

## 2019-09-23 PROCEDURE — 0RG40K1 FUSION OF CERVICOTHORACIC VERTEBRAL JOINT WITH NONAUTOLOGOUS TISSUE SUBSTITUTE, POSTERIOR APPROACH, POSTERIOR COLUMN, OPEN APPROACH: ICD-10-PCS | Performed by: NEUROLOGICAL SURGERY

## 2019-09-23 PROCEDURE — 95955 EEG DURING SURGERY: CPT | Performed by: NEUROLOGICAL SURGERY

## 2019-09-23 PROCEDURE — 500864 HCHG NEEDLE, SPINAL 18G: Performed by: NEUROLOGICAL SURGERY

## 2019-09-23 PROCEDURE — 160042 HCHG SURGERY MINUTES - EA ADDL 1 MIN LEVEL 5: Performed by: NEUROLOGICAL SURGERY

## 2019-09-23 DEVICE — IMPLANTABLE DEVICE: Type: IMPLANTABLE DEVICE | Status: FUNCTIONAL

## 2019-09-23 DEVICE — MAGNIFUSE 1X5CM: Type: IMPLANTABLE DEVICE | Status: FUNCTIONAL

## 2019-09-23 RX ORDER — CEFAZOLIN SODIUM 1 G/3ML
INJECTION, POWDER, FOR SOLUTION INTRAMUSCULAR; INTRAVENOUS PRN
Status: DISCONTINUED | OUTPATIENT
Start: 2019-09-23 | End: 2019-09-23 | Stop reason: SURG

## 2019-09-23 RX ORDER — BACITRACIN 50000 [IU]/1
INJECTION, POWDER, FOR SOLUTION INTRAMUSCULAR
Status: DISCONTINUED | OUTPATIENT
Start: 2019-09-23 | End: 2019-09-23 | Stop reason: HOSPADM

## 2019-09-23 RX ORDER — HALOPERIDOL 5 MG/ML
1 INJECTION INTRAMUSCULAR
Status: DISCONTINUED | OUTPATIENT
Start: 2019-09-23 | End: 2019-09-23 | Stop reason: HOSPADM

## 2019-09-23 RX ORDER — ACETAMINOPHEN 500 MG
1000 TABLET ORAL EVERY 4 HOURS PRN
Status: DISCONTINUED | OUTPATIENT
Start: 2019-09-23 | End: 2019-09-23 | Stop reason: HOSPADM

## 2019-09-23 RX ORDER — HYDROMORPHONE HYDROCHLORIDE 1 MG/ML
0.4 INJECTION, SOLUTION INTRAMUSCULAR; INTRAVENOUS; SUBCUTANEOUS
Status: DISCONTINUED | OUTPATIENT
Start: 2019-09-23 | End: 2019-09-23 | Stop reason: HOSPADM

## 2019-09-23 RX ORDER — OXYCODONE HCL 5 MG/5 ML
10 SOLUTION, ORAL ORAL
Status: COMPLETED | OUTPATIENT
Start: 2019-09-23 | End: 2019-09-23

## 2019-09-23 RX ORDER — AMOXICILLIN 250 MG
1 CAPSULE ORAL NIGHTLY
Status: DISCONTINUED | OUTPATIENT
Start: 2019-09-24 | End: 2019-09-27 | Stop reason: HOSPADM

## 2019-09-23 RX ORDER — BUPIVACAINE HYDROCHLORIDE AND EPINEPHRINE 5; 5 MG/ML; UG/ML
INJECTION, SOLUTION PERINEURAL
Status: DISCONTINUED | OUTPATIENT
Start: 2019-09-23 | End: 2019-09-23 | Stop reason: HOSPADM

## 2019-09-23 RX ORDER — MIDAZOLAM HYDROCHLORIDE 1 MG/ML
1 INJECTION INTRAMUSCULAR; INTRAVENOUS
Status: DISCONTINUED | OUTPATIENT
Start: 2019-09-23 | End: 2019-09-23 | Stop reason: HOSPADM

## 2019-09-23 RX ORDER — SODIUM CHLORIDE AND POTASSIUM CHLORIDE 150; 900 MG/100ML; MG/100ML
INJECTION, SOLUTION INTRAVENOUS CONTINUOUS
Status: DISCONTINUED | OUTPATIENT
Start: 2019-09-24 | End: 2019-09-27 | Stop reason: HOSPADM

## 2019-09-23 RX ORDER — OXYCODONE HYDROCHLORIDE 10 MG/1
10 TABLET ORAL
Status: DISCONTINUED | OUTPATIENT
Start: 2019-09-23 | End: 2019-09-27 | Stop reason: HOSPADM

## 2019-09-23 RX ORDER — POLYETHYLENE GLYCOL 3350 17 G/17G
1 POWDER, FOR SOLUTION ORAL 2 TIMES DAILY PRN
Status: DISCONTINUED | OUTPATIENT
Start: 2019-09-23 | End: 2019-09-27 | Stop reason: HOSPADM

## 2019-09-23 RX ORDER — METHOCARBAMOL 750 MG/1
750 TABLET, FILM COATED ORAL EVERY 8 HOURS PRN
Status: DISCONTINUED | OUTPATIENT
Start: 2019-09-23 | End: 2019-09-24

## 2019-09-23 RX ORDER — HYDROMORPHONE HYDROCHLORIDE 1 MG/ML
0.5 INJECTION, SOLUTION INTRAMUSCULAR; INTRAVENOUS; SUBCUTANEOUS
Status: DISCONTINUED | OUTPATIENT
Start: 2019-09-23 | End: 2019-09-27 | Stop reason: HOSPADM

## 2019-09-23 RX ORDER — ENEMA 19; 7 G/133ML; G/133ML
1 ENEMA RECTAL
Status: DISCONTINUED | OUTPATIENT
Start: 2019-09-23 | End: 2019-09-27 | Stop reason: HOSPADM

## 2019-09-23 RX ORDER — ALPRAZOLAM 0.25 MG/1
0.25 TABLET ORAL 2 TIMES DAILY PRN
Status: DISCONTINUED | OUTPATIENT
Start: 2019-09-23 | End: 2019-09-27 | Stop reason: HOSPADM

## 2019-09-23 RX ORDER — LABETALOL HYDROCHLORIDE 5 MG/ML
INJECTION, SOLUTION INTRAVENOUS PRN
Status: DISCONTINUED | OUTPATIENT
Start: 2019-09-23 | End: 2019-09-23 | Stop reason: SURG

## 2019-09-23 RX ORDER — SODIUM CHLORIDE, SODIUM LACTATE, POTASSIUM CHLORIDE, AND CALCIUM CHLORIDE .6; .31; .03; .02 G/100ML; G/100ML; G/100ML; G/100ML
IRRIGANT IRRIGATION
Status: DISCONTINUED | OUTPATIENT
Start: 2019-09-23 | End: 2019-09-23 | Stop reason: HOSPADM

## 2019-09-23 RX ORDER — CEFAZOLIN SODIUM 2 G/100ML
2 INJECTION, SOLUTION INTRAVENOUS EVERY 8 HOURS
Status: COMPLETED | OUTPATIENT
Start: 2019-09-24 | End: 2019-09-26

## 2019-09-23 RX ORDER — MIDAZOLAM HYDROCHLORIDE 1 MG/ML
INJECTION INTRAMUSCULAR; INTRAVENOUS PRN
Status: DISCONTINUED | OUTPATIENT
Start: 2019-09-23 | End: 2019-09-23 | Stop reason: SURG

## 2019-09-23 RX ORDER — BUPIVACAINE HYDROCHLORIDE 5 MG/ML
INJECTION, SOLUTION EPIDURAL; INTRACAUDAL
Status: DISCONTINUED | OUTPATIENT
Start: 2019-09-23 | End: 2019-09-23 | Stop reason: HOSPADM

## 2019-09-23 RX ORDER — ONDANSETRON 2 MG/ML
INJECTION INTRAMUSCULAR; INTRAVENOUS PRN
Status: DISCONTINUED | OUTPATIENT
Start: 2019-09-23 | End: 2019-09-23 | Stop reason: SURG

## 2019-09-23 RX ORDER — DOCUSATE SODIUM 100 MG/1
100 CAPSULE, LIQUID FILLED ORAL 2 TIMES DAILY
Status: DISCONTINUED | OUTPATIENT
Start: 2019-09-24 | End: 2019-09-27 | Stop reason: HOSPADM

## 2019-09-23 RX ORDER — DIPHENHYDRAMINE HYDROCHLORIDE 50 MG/ML
12.5 INJECTION INTRAMUSCULAR; INTRAVENOUS
Status: DISCONTINUED | OUTPATIENT
Start: 2019-09-23 | End: 2019-09-23 | Stop reason: HOSPADM

## 2019-09-23 RX ORDER — SODIUM CHLORIDE, SODIUM LACTATE, POTASSIUM CHLORIDE, CALCIUM CHLORIDE 600; 310; 30; 20 MG/100ML; MG/100ML; MG/100ML; MG/100ML
INJECTION, SOLUTION INTRAVENOUS CONTINUOUS
Status: DISCONTINUED | OUTPATIENT
Start: 2019-09-23 | End: 2019-09-23 | Stop reason: HOSPADM

## 2019-09-23 RX ORDER — PHENYLEPHRINE HYDROCHLORIDE 10 MG/ML
INJECTION, SOLUTION INTRAMUSCULAR; INTRAVENOUS; SUBCUTANEOUS PRN
Status: DISCONTINUED | OUTPATIENT
Start: 2019-09-23 | End: 2019-09-23 | Stop reason: SURG

## 2019-09-23 RX ORDER — CLONIDINE HYDROCHLORIDE 0.1 MG/1
0.1 TABLET ORAL EVERY 4 HOURS PRN
Status: DISCONTINUED | OUTPATIENT
Start: 2019-09-23 | End: 2019-09-27 | Stop reason: HOSPADM

## 2019-09-23 RX ORDER — LABETALOL HYDROCHLORIDE 5 MG/ML
5 INJECTION, SOLUTION INTRAVENOUS
Status: DISCONTINUED | OUTPATIENT
Start: 2019-09-23 | End: 2019-09-23 | Stop reason: HOSPADM

## 2019-09-23 RX ORDER — AMOXICILLIN 250 MG
1 CAPSULE ORAL
Status: DISCONTINUED | OUTPATIENT
Start: 2019-09-23 | End: 2019-09-27 | Stop reason: HOSPADM

## 2019-09-23 RX ORDER — BISACODYL 10 MG
10 SUPPOSITORY, RECTAL RECTAL
Status: DISCONTINUED | OUTPATIENT
Start: 2019-09-23 | End: 2019-09-27 | Stop reason: HOSPADM

## 2019-09-23 RX ORDER — HYDROMORPHONE HYDROCHLORIDE 1 MG/ML
0.2 INJECTION, SOLUTION INTRAMUSCULAR; INTRAVENOUS; SUBCUTANEOUS
Status: DISCONTINUED | OUTPATIENT
Start: 2019-09-23 | End: 2019-09-23 | Stop reason: HOSPADM

## 2019-09-23 RX ORDER — OXYCODONE HYDROCHLORIDE 5 MG/1
5 TABLET ORAL
Status: DISCONTINUED | OUTPATIENT
Start: 2019-09-23 | End: 2019-09-27 | Stop reason: HOSPADM

## 2019-09-23 RX ORDER — METOPROLOL TARTRATE 1 MG/ML
1 INJECTION, SOLUTION INTRAVENOUS
Status: DISCONTINUED | OUTPATIENT
Start: 2019-09-23 | End: 2019-09-23 | Stop reason: HOSPADM

## 2019-09-23 RX ORDER — CALCIUM CARBONATE 500 MG/1
500 TABLET, CHEWABLE ORAL 2 TIMES DAILY
Status: DISCONTINUED | OUTPATIENT
Start: 2019-09-24 | End: 2019-09-27 | Stop reason: HOSPADM

## 2019-09-23 RX ORDER — ONDANSETRON 2 MG/ML
4 INJECTION INTRAMUSCULAR; INTRAVENOUS
Status: DISCONTINUED | OUTPATIENT
Start: 2019-09-23 | End: 2019-09-23 | Stop reason: HOSPADM

## 2019-09-23 RX ORDER — HYDROMORPHONE HYDROCHLORIDE 1 MG/ML
0.1 INJECTION, SOLUTION INTRAMUSCULAR; INTRAVENOUS; SUBCUTANEOUS
Status: DISCONTINUED | OUTPATIENT
Start: 2019-09-23 | End: 2019-09-23 | Stop reason: HOSPADM

## 2019-09-23 RX ORDER — OXYCODONE HCL 5 MG/5 ML
5 SOLUTION, ORAL ORAL
Status: COMPLETED | OUTPATIENT
Start: 2019-09-23 | End: 2019-09-23

## 2019-09-23 RX ADMIN — LABETALOL HYDROCHLORIDE 10 MG: 5 INJECTION, SOLUTION INTRAVENOUS at 19:43

## 2019-09-23 RX ADMIN — PHENYLEPHRINE HYDROCHLORIDE 200 MCG: 10 INJECTION INTRAVENOUS at 18:45

## 2019-09-23 RX ADMIN — OMEPRAZOLE 20 MG: 20 CAPSULE, DELAYED RELEASE ORAL at 23:22

## 2019-09-23 RX ADMIN — PROPOFOL 120 MCG/KG/MIN: 10 INJECTION, EMULSION INTRAVENOUS at 17:05

## 2019-09-23 RX ADMIN — MELATONIN 5000 UNITS: at 04:02

## 2019-09-23 RX ADMIN — METHOCARBAMOL TABLETS 750 MG: 750 TABLET, COATED ORAL at 09:49

## 2019-09-23 RX ADMIN — HYDROMORPHONE HYDROCHLORIDE 0.4 MG: 1 INJECTION, SOLUTION INTRAMUSCULAR; INTRAVENOUS; SUBCUTANEOUS at 20:40

## 2019-09-23 RX ADMIN — MORPHINE SULFATE 4 MG: 4 INJECTION INTRAVENOUS at 00:26

## 2019-09-23 RX ADMIN — FENTANYL CITRATE 50 MCG: 50 INJECTION INTRAMUSCULAR; INTRAVENOUS at 20:12

## 2019-09-23 RX ADMIN — HYDROMORPHONE HYDROCHLORIDE 0.2 MG: 1 INJECTION, SOLUTION INTRAMUSCULAR; INTRAVENOUS; SUBCUTANEOUS at 21:03

## 2019-09-23 RX ADMIN — DOCUSATE SODIUM 100 MG: 100 CAPSULE, LIQUID FILLED ORAL at 04:02

## 2019-09-23 RX ADMIN — GABAPENTIN 800 MG: 400 CAPSULE ORAL at 04:02

## 2019-09-23 RX ADMIN — GABAPENTIN 800 MG: 400 CAPSULE ORAL at 12:40

## 2019-09-23 RX ADMIN — SUCCINYLCHOLINE CHLORIDE 100 MG: 20 INJECTION, SOLUTION INTRAMUSCULAR; INTRAVENOUS at 15:52

## 2019-09-23 RX ADMIN — ATORVASTATIN CALCIUM 20 MG: 40 TABLET, FILM COATED ORAL at 23:24

## 2019-09-23 RX ADMIN — SENNOSIDES, DOCUSATE SODIUM 1 TABLET: 50; 8.6 TABLET, FILM COATED ORAL at 23:24

## 2019-09-23 RX ADMIN — OXYCODONE HYDROCHLORIDE 10 MG: 10 TABLET ORAL at 07:39

## 2019-09-23 RX ADMIN — PHENYLEPHRINE HYDROCHLORIDE 200 MCG: 10 INJECTION INTRAVENOUS at 16:44

## 2019-09-23 RX ADMIN — VENLAFAXINE 150 MG: 75 TABLET ORAL at 23:23

## 2019-09-23 RX ADMIN — PHENYLEPHRINE HYDROCHLORIDE 200 MCG: 10 INJECTION INTRAVENOUS at 16:10

## 2019-09-23 RX ADMIN — PROPOFOL 160 MG: 10 INJECTION, EMULSION INTRAVENOUS at 15:52

## 2019-09-23 RX ADMIN — PROPOFOL 50 MG: 10 INJECTION, EMULSION INTRAVENOUS at 19:04

## 2019-09-23 RX ADMIN — CIPROFLOXACIN HYDROCHLORIDE 500 MG: 500 TABLET, FILM COATED ORAL at 04:02

## 2019-09-23 RX ADMIN — FENTANYL CITRATE 50 MCG: 50 INJECTION INTRAMUSCULAR; INTRAVENOUS at 20:18

## 2019-09-23 RX ADMIN — DIPHENHYDRAMINE HCL 25 MG: 25 TABLET ORAL at 03:59

## 2019-09-23 RX ADMIN — PROPOFOL 160 MCG/KG/MIN: 10 INJECTION, EMULSION INTRAVENOUS at 15:52

## 2019-09-23 RX ADMIN — OXYCODONE HYDROCHLORIDE 10 MG: 10 TABLET ORAL at 03:59

## 2019-09-23 RX ADMIN — TEMAZEPAM 30 MG: 15 CAPSULE ORAL at 23:23

## 2019-09-23 RX ADMIN — METHOCARBAMOL TABLETS 750 MG: 750 TABLET, COATED ORAL at 23:23

## 2019-09-23 RX ADMIN — ANTACID TABLETS 500 MG: 500 TABLET, CHEWABLE ORAL at 04:02

## 2019-09-23 RX ADMIN — PHENYLEPHRINE HYDROCHLORIDE 200 MCG: 10 INJECTION INTRAVENOUS at 16:23

## 2019-09-23 RX ADMIN — LEVOTHYROXINE SODIUM 75 MCG: 75 TABLET ORAL at 23:23

## 2019-09-23 RX ADMIN — PHENYLEPHRINE HYDROCHLORIDE 200 MCG: 10 INJECTION INTRAVENOUS at 17:36

## 2019-09-23 RX ADMIN — OXYCODONE HYDROCHLORIDE 10 MG: 5 SOLUTION ORAL at 20:12

## 2019-09-23 RX ADMIN — OXYCODONE HYDROCHLORIDE 10 MG: 10 TABLET ORAL at 12:40

## 2019-09-23 RX ADMIN — PHENYLEPHRINE HYDROCHLORIDE 200 MCG: 10 INJECTION INTRAVENOUS at 18:00

## 2019-09-23 RX ADMIN — OXYCODONE HYDROCHLORIDE 10 MG: 10 TABLET ORAL at 23:23

## 2019-09-23 RX ADMIN — ONDANSETRON 4 MG: 2 INJECTION INTRAMUSCULAR; INTRAVENOUS at 19:02

## 2019-09-23 RX ADMIN — REMIFENTANIL HYDROCHLORIDE 0.1 MCG/KG/MIN: 1 INJECTION, POWDER, LYOPHILIZED, FOR SOLUTION INTRAVENOUS at 16:40

## 2019-09-23 RX ADMIN — PHENYLEPHRINE HYDROCHLORIDE 200 MCG: 10 INJECTION INTRAVENOUS at 17:18

## 2019-09-23 RX ADMIN — CEFAZOLIN 2 G: 330 INJECTION, POWDER, FOR SOLUTION INTRAMUSCULAR; INTRAVENOUS at 16:43

## 2019-09-23 RX ADMIN — MIDAZOLAM 2 MG: 1 INJECTION INTRAMUSCULAR; INTRAVENOUS at 15:53

## 2019-09-23 RX ADMIN — HYDROMORPHONE HYDROCHLORIDE 0.4 MG: 1 INJECTION, SOLUTION INTRAMUSCULAR; INTRAVENOUS; SUBCUTANEOUS at 20:53

## 2019-09-23 RX ADMIN — POLYETHYLENE GLYCOL 3350 1 PACKET: 17 POWDER, FOR SOLUTION ORAL at 23:22

## 2019-09-23 ASSESSMENT — PAIN SCALES - GENERAL: PAIN_LEVEL: 3

## 2019-09-23 NOTE — PROGRESS NOTES
Pt is A/Ox4 with c/o pain at her neck incision, medicated per MAR. She is also reporting itching from the dressing r/t her tape allergy, ice packs were provided, which seem to help. Pt is aware that she is NPO starting midnight.     POC was discussed and all concerns were addressed at this time with the pt.

## 2019-09-23 NOTE — PROGRESS NOTES
Neurosurgery Progress Note    Subjective:  POD#5 Revision of C3-4 and C4-5 ACDF   C/O pain at op site and posterior cervical region      Exam:  Wound C/D/I  Motor 5/5  Sensory intact  Swallowing and speaking well    BP  Min: 106/56  Max: 133/83  Pulse  Av.3  Min: 72  Max: 79  Resp  Av  Min: 16  Max: 18  Temp  Av.4 °C (97.6 °F)  Min: 36.3 °C (97.4 °F)  Max: 36.6 °C (97.8 °F)  SpO2  Av.8 %  Min: 95 %  Max: 96 %    No data recorded                      Intake/Output       19 - 1959 19 - 19 0659       Total  Total       Intake    Total Intake -- -- -- -- -- --       Output    Urine  --  -- --  --  -- --    Number of Times Voided 1 x 2 x 3 x -- -- --    Total Output -- -- -- -- -- --       Net I/O     -- -- -- -- -- --          No intake or output data in the 24 hours ending 19 0752         • potassium chloride SA  10 mEq Q EVENING   • omeprazole  20 mg QHS   • levothyroxine  75 mcg QHS   • glipiZIDE  5 mg Q EVENING   • gabapentin  800 mg TID   • furosemide  20 mg QDAY PRN   • ciprofloxacin  500 mg BID   • atorvastatin  20 mg QHS   • venlafaxine  150 mg QHS   • ROPINIRole  8 mg QHS   • SITagliptin  100 mg Q EVENING   • temazepam  30 mg QHS   • Pharmacy Consult Request  1 Each PHARMACY TO DOSE   • MD ALERT...DO NOT ADMINISTER NSAIDS or ASPIRIN unless ORDERED By Neurosurgery  1 Each PRN   • docusate sodium  100 mg BID   • senna-docusate  1 Tab Nightly   • senna-docusate  1 Tab Q24HRS PRN   • polyethylene glycol/lytes  1 Packet BID PRN   • magnesium hydroxide  30 mL QDAY PRN   • bisacodyl  10 mg Q24HRS PRN   • fleet  1 Each Once PRN   • NS   Continuous   • oxyCODONE immediate release  10 mg Q3HRS PRN   • oxyCODONE immediate-release  5 mg Q3HRS PRN   • morphine injection  4 mg Q3HRS PRN   • diphenhydrAMINE  25 mg Q6HRS PRN    Or   • diphenhydrAMINE  25 mg Q6HRS PRN   • ondansetron  4 mg Q4HRS PRN   • ondansetron  4 mg Q4HRS  PRN   • methocarbamol  750 mg 4X/DAY   • ALPRAZolam  0.5 mg BID PRN   • vitamin D  5,000 Units DAILY   • calcium carbonate  500 mg BID   • benzocaine-menthol  1 Lozenge Q2HRS PRN   • Influenza Vaccine High-Dose pf  0.5 mL Once PRN       Assessment and Plan:  POD#5 Revision of C3-4 and C4-5 ACDF   To OR today for planned staged II  NPO. OK for sips with meds

## 2019-09-23 NOTE — ANESTHESIA PROCEDURE NOTES
Airway  Date/Time: 9/23/2019 3:54 PM  Performed by: Ancelmo Duke M.D.  Authorized by: Ancelmo Duke M.D.     Location:  OR  Urgency:  Elective  Indications for Airway Management:  Anesthesia  Spontaneous Ventilation: absent    Sedation Level:  Deep  Preoxygenated: Yes    Patient Position:  Sniffing  Mask Difficulty Assessment:  1 - vent by mask  Final Airway Type:  Endotracheal airway  Final Endotracheal Airway:  ETT  Cuffed: Yes    Technique Used for Successful ETT Placement:  Direct laryngoscopy  Insertion Site:  Oral  Blade Type:  Tati  Laryngoscope Blade/Videolaryngoscope Blade Size:  3  ETT Size (mm):  6.5  Measured from:  Teeth  ETT to Teeth (cm):  18  Placement Verified by: auscultation and capnometry    Cormack-Lehane Classification:  Grade I - full view of glottis  Number of Attempts at Approach:  1

## 2019-09-23 NOTE — ANESTHESIA PREPROCEDURE EVALUATION
Relevant Problems   PULMONARY   (+) COPD (chronic obstructive pulmonary disease) ([facility])      NEURO   (+) CVA (cerebral vascular accident) ([facility])   (+) Head ache      CARDIAC   (+) CVA (cerebral vascular accident) ([facility])   (+) HTN (hypertension)   (+) Migraine with aura         (+) Acute renal failure ([facility])      ENDO   (+) Type 2 diabetes mellitus, without long-term current use of insulin ([facility])       Physical Exam    Airway   Mallampati: II  TM distance: >3 FB  Neck ROM: full       Cardiovascular - normal exam  Rhythm: regular  Rate: normal  (-) murmur     Dental - normal exam         Pulmonary - normal exam  Breath sounds clear to auscultation     Abdominal    Neurological - normal exam                 Anesthesia Plan    ASA 3   ASA physical status 3 criteria: CVA or TIA - history (> 3 months)    Plan - general       Airway plan will be ETT                  Informed Consent:    Anesthetic plan and risks discussed with patient.

## 2019-09-23 NOTE — PROGRESS NOTES
Patient refused SCDs, educated regarding importance.  Education reinforced by KAYLEEN Vargas.  Patient continues to refuse.

## 2019-09-24 ENCOUNTER — APPOINTMENT (OUTPATIENT)
Dept: RADIOLOGY | Facility: MEDICAL CENTER | Age: 64
DRG: 455 | End: 2019-09-24
Attending: NEUROLOGICAL SURGERY
Payer: MEDICARE

## 2019-09-24 LAB
ANION GAP SERPL CALC-SCNC: 7 MMOL/L (ref 0–11.9)
BUN SERPL-MCNC: 12 MG/DL (ref 8–22)
CALCIUM SERPL-MCNC: 9 MG/DL (ref 8.5–10.5)
CHLORIDE SERPL-SCNC: 98 MMOL/L (ref 96–112)
CO2 SERPL-SCNC: 36 MMOL/L (ref 20–33)
CREAT SERPL-MCNC: 0.75 MG/DL (ref 0.5–1.4)
ERYTHROCYTE [DISTWIDTH] IN BLOOD BY AUTOMATED COUNT: 48.8 FL (ref 35.9–50)
GLUCOSE BLD-MCNC: 118 MG/DL (ref 65–99)
GLUCOSE SERPL-MCNC: 92 MG/DL (ref 65–99)
HCT VFR BLD AUTO: 40.7 % (ref 37–47)
HGB BLD-MCNC: 12.3 G/DL (ref 12–16)
MCH RBC QN AUTO: 28.9 PG (ref 27–33)
MCHC RBC AUTO-ENTMCNC: 30.2 G/DL (ref 33.6–35)
MCV RBC AUTO: 95.5 FL (ref 81.4–97.8)
PLATELET # BLD AUTO: 239 K/UL (ref 164–446)
PMV BLD AUTO: 9.7 FL (ref 9–12.9)
POTASSIUM SERPL-SCNC: 4.3 MMOL/L (ref 3.6–5.5)
RBC # BLD AUTO: 4.26 M/UL (ref 4.2–5.4)
SODIUM SERPL-SCNC: 141 MMOL/L (ref 135–145)
WBC # BLD AUTO: 6.4 K/UL (ref 4.8–10.8)

## 2019-09-24 PROCEDURE — 700101 HCHG RX REV CODE 250: Performed by: PHYSICIAN ASSISTANT

## 2019-09-24 PROCEDURE — 80048 BASIC METABOLIC PNL TOTAL CA: CPT

## 2019-09-24 PROCEDURE — 700102 HCHG RX REV CODE 250 W/ 637 OVERRIDE(OP): Performed by: PHYSICIAN ASSISTANT

## 2019-09-24 PROCEDURE — 82962 GLUCOSE BLOOD TEST: CPT

## 2019-09-24 PROCEDURE — A9270 NON-COVERED ITEM OR SERVICE: HCPCS | Performed by: PHYSICIAN ASSISTANT

## 2019-09-24 PROCEDURE — 700105 HCHG RX REV CODE 258: Performed by: PHYSICIAN ASSISTANT

## 2019-09-24 PROCEDURE — 700111 HCHG RX REV CODE 636 W/ 250 OVERRIDE (IP): Performed by: PHYSICIAN ASSISTANT

## 2019-09-24 PROCEDURE — 72040 X-RAY EXAM NECK SPINE 2-3 VW: CPT

## 2019-09-24 PROCEDURE — 770001 HCHG ROOM/CARE - MED/SURG/GYN PRIV*

## 2019-09-24 PROCEDURE — 36415 COLL VENOUS BLD VENIPUNCTURE: CPT

## 2019-09-24 PROCEDURE — 97164 PT RE-EVAL EST PLAN CARE: CPT

## 2019-09-24 PROCEDURE — 97166 OT EVAL MOD COMPLEX 45 MIN: CPT

## 2019-09-24 PROCEDURE — 85027 COMPLETE CBC AUTOMATED: CPT

## 2019-09-24 PROCEDURE — 700112 HCHG RX REV CODE 229: Performed by: PHYSICIAN ASSISTANT

## 2019-09-24 PROCEDURE — 302128 INFUSION PUMP: Performed by: NEUROLOGICAL SURGERY

## 2019-09-24 RX ORDER — KETOROLAC TROMETHAMINE 30 MG/ML
30 INJECTION, SOLUTION INTRAMUSCULAR; INTRAVENOUS EVERY 6 HOURS
Status: COMPLETED | OUTPATIENT
Start: 2019-09-24 | End: 2019-09-25

## 2019-09-24 RX ORDER — METHOCARBAMOL 750 MG/1
750 TABLET, FILM COATED ORAL 4 TIMES DAILY
Status: DISCONTINUED | OUTPATIENT
Start: 2019-09-24 | End: 2019-09-25

## 2019-09-24 RX ORDER — ROPINIROLE 2 MG/1
8 TABLET, FILM COATED ORAL ONCE
Status: COMPLETED | OUTPATIENT
Start: 2019-09-24 | End: 2019-09-24

## 2019-09-24 RX ADMIN — MAGNESIUM HYDROXIDE 30 ML: 400 SUSPENSION ORAL at 17:52

## 2019-09-24 RX ADMIN — ROPINIROLE HYDROCHLORIDE 8 MG: 2 TABLET, FILM COATED ORAL at 22:27

## 2019-09-24 RX ADMIN — KETOROLAC TROMETHAMINE 30 MG: 30 INJECTION, SOLUTION INTRAMUSCULAR at 12:25

## 2019-09-24 RX ADMIN — ANTACID TABLETS 500 MG: 500 TABLET, CHEWABLE ORAL at 17:50

## 2019-09-24 RX ADMIN — KETOROLAC TROMETHAMINE 30 MG: 30 INJECTION, SOLUTION INTRAMUSCULAR at 17:52

## 2019-09-24 RX ADMIN — GABAPENTIN 800 MG: 400 CAPSULE ORAL at 12:25

## 2019-09-24 RX ADMIN — METHOCARBAMOL TABLETS 750 MG: 750 TABLET, COATED ORAL at 12:25

## 2019-09-24 RX ADMIN — CEFAZOLIN SODIUM 2 G: 2 INJECTION, SOLUTION INTRAVENOUS at 17:50

## 2019-09-24 RX ADMIN — POTASSIUM CHLORIDE AND SODIUM CHLORIDE: 900; 150 INJECTION, SOLUTION INTRAVENOUS at 22:51

## 2019-09-24 RX ADMIN — SITAGLIPTIN 100 MG: 100 TABLET, FILM COATED ORAL at 17:52

## 2019-09-24 RX ADMIN — DOCUSATE SODIUM 100 MG: 100 CAPSULE, LIQUID FILLED ORAL at 06:01

## 2019-09-24 RX ADMIN — GABAPENTIN 800 MG: 400 CAPSULE ORAL at 17:50

## 2019-09-24 RX ADMIN — OMEPRAZOLE 20 MG: 20 CAPSULE, DELAYED RELEASE ORAL at 22:28

## 2019-09-24 RX ADMIN — SENNOSIDES, DOCUSATE SODIUM 1 TABLET: 50; 8.6 TABLET, FILM COATED ORAL at 22:28

## 2019-09-24 RX ADMIN — POTASSIUM CHLORIDE AND SODIUM CHLORIDE: 900; 150 INJECTION, SOLUTION INTRAVENOUS at 02:21

## 2019-09-24 RX ADMIN — ATORVASTATIN CALCIUM 20 MG: 40 TABLET, FILM COATED ORAL at 22:28

## 2019-09-24 RX ADMIN — GABAPENTIN 800 MG: 400 CAPSULE ORAL at 06:01

## 2019-09-24 RX ADMIN — ENOXAPARIN SODIUM 40 MG: 100 INJECTION SUBCUTANEOUS at 22:27

## 2019-09-24 RX ADMIN — HYDROMORPHONE HYDROCHLORIDE 0.5 MG: 1 INJECTION, SOLUTION INTRAMUSCULAR; INTRAVENOUS; SUBCUTANEOUS at 02:07

## 2019-09-24 RX ADMIN — VENLAFAXINE 150 MG: 75 TABLET ORAL at 22:28

## 2019-09-24 RX ADMIN — POTASSIUM CHLORIDE 10 MEQ: 1500 TABLET, EXTENDED RELEASE ORAL at 17:50

## 2019-09-24 RX ADMIN — ANTACID TABLETS 500 MG: 500 TABLET, CHEWABLE ORAL at 06:01

## 2019-09-24 RX ADMIN — ROPINIROLE HYDROCHLORIDE 8 MG: 2 TABLET, FILM COATED ORAL at 14:23

## 2019-09-24 RX ADMIN — CEFAZOLIN SODIUM 2 G: 2 INJECTION, SOLUTION INTRAVENOUS at 09:27

## 2019-09-24 RX ADMIN — TEMAZEPAM 30 MG: 15 CAPSULE ORAL at 22:27

## 2019-09-24 RX ADMIN — OXYCODONE HYDROCHLORIDE 10 MG: 10 TABLET ORAL at 22:51

## 2019-09-24 RX ADMIN — DOCUSATE SODIUM 100 MG: 100 CAPSULE, LIQUID FILLED ORAL at 17:52

## 2019-09-24 RX ADMIN — METHOCARBAMOL TABLETS 750 MG: 750 TABLET, COATED ORAL at 17:50

## 2019-09-24 RX ADMIN — METHOCARBAMOL TABLETS 750 MG: 750 TABLET, COATED ORAL at 22:28

## 2019-09-24 RX ADMIN — LEVOTHYROXINE SODIUM 75 MCG: 75 TABLET ORAL at 22:28

## 2019-09-24 RX ADMIN — KETOROLAC TROMETHAMINE 30 MG: 30 INJECTION, SOLUTION INTRAMUSCULAR at 09:28

## 2019-09-24 RX ADMIN — GLIPIZIDE 5 MG: 5 TABLET ORAL at 17:50

## 2019-09-24 RX ADMIN — CEFAZOLIN SODIUM 2 G: 2 INJECTION, SOLUTION INTRAVENOUS at 02:13

## 2019-09-24 RX ADMIN — MELATONIN 5000 UNITS: at 06:01

## 2019-09-24 RX ADMIN — METHOCARBAMOL TABLETS 750 MG: 750 TABLET, COATED ORAL at 09:28

## 2019-09-24 ASSESSMENT — COGNITIVE AND FUNCTIONAL STATUS - GENERAL
PERSONAL GROOMING: A LITTLE
MOVING TO AND FROM BED TO CHAIR: UNABLE
CLIMB 3 TO 5 STEPS WITH RAILING: TOTAL
WALKING IN HOSPITAL ROOM: A LOT
TURNING FROM BACK TO SIDE WHILE IN FLAT BAD: A LITTLE
SUGGESTED CMS G CODE MODIFIER DAILY ACTIVITY: CK
DRESSING REGULAR LOWER BODY CLOTHING: A LOT
DRESSING REGULAR UPPER BODY CLOTHING: A LOT
TOILETING: A LITTLE
STANDING UP FROM CHAIR USING ARMS: A LITTLE
HELP NEEDED FOR BATHING: A LOT
SUGGESTED CMS G CODE MODIFIER MOBILITY: CL
EATING MEALS: A LITTLE
MOBILITY SCORE: 11
DAILY ACTIVITIY SCORE: 15
MOVING FROM LYING ON BACK TO SITTING ON SIDE OF FLAT BED: UNABLE

## 2019-09-24 ASSESSMENT — GAIT ASSESSMENTS
GAIT LEVEL OF ASSIST: MODERATE ASSIST
DEVIATION: ATAXIC;BRADYKINETIC
DISTANCE (FEET): 2
ASSISTIVE DEVICE: FRONT WHEEL WALKER

## 2019-09-24 ASSESSMENT — ACTIVITIES OF DAILY LIVING (ADL): TOILETING: INDEPENDENT

## 2019-09-24 NOTE — PROGRESS NOTES
2245 Pt arrived back to unit via bed. Family is at bedside, and her  will be staying overnight. C/o of pain, 10/10. VSS. Pt has aspen collar in place per orders, slight drainage on dressing and on collar pads from posterior incision. Pt was ambulated to bathroom x1 assist, very slow and unsteady with FWW. Schedule 2100 meds and PRN pain meds were administered at this time.

## 2019-09-24 NOTE — PROGRESS NOTES
Neurosurgery Progress Note    Subjective:  POD#6 Revision of C3-4 and C4-5 ACDF   POD#1 C2-T1 PCF  C/o severe posterior neck pain, tension headache  Denies radicular pain  Limited mobility  Drain 60cc/8hr  Voiding, eating        Exam:  Incisions C/D/I  Motor 5/5  Sensory intact  Swallowing and speaking well    BP  Min: 97/67  Max: 153/79  Pulse  Av.1  Min: 64  Max: 98  Resp  Avg: 15.5  Min: 12  Max: 20  Temp  Av.5 °C (97.7 °F)  Min: 35.9 °C (96.6 °F)  Max: 37 °C (98.6 °F)  SpO2  Av.8 %  Min: 90 %  Max: 96 %    No data recorded    Recent Labs     19   WBC 6.4   RBC 4.26   HEMOGLOBIN 12.3   HEMATOCRIT 40.7   MCV 95.5   MCH 28.9   MCHC 30.2*   RDW 48.8   PLATELETCT 239   MPV 9.7     Recent Labs     19   SODIUM 141   POTASSIUM 4.3   CHLORIDE 98   CO2 36*   GLUCOSE 92   BUN 12   CREATININE 0.75   CALCIUM 9.0               Intake/Output       19 0700 - 19 0659 19 0700 - 19 0659       8401-2427 Total 4506-5865 4186-4264 Total       Intake    P.O.  --  90 90  --  -- --    P.O. -- 90 90 -- -- --    I.V.  --  2800.1 2800.1  --  -- --    Phenylephrine Volume -- 0.1 0.1 -- -- --    Volume (mL) (NS infusion) -- 2800 2800 -- -- --    Total Intake -- 2890.1 2890.1 -- -- --       Output    Urine  150  325 475  --  -- --    Urine 150 325 475 -- -- --    Number of Times Voided -- -- -- 1 x -- 1 x    Drains  --  60 60  --  -- --    Output (mL) (Closed/Suction Drain 1 Posterior Neck Carlos Wynn) -- 60 60 -- -- --    Blood  75  -- 75  --  -- --    Est. Blood Loss 75 -- 75 -- -- --    Total Output 225 385 610 -- -- --       Net I/O     -225 2505.1 2280.1 -- -- --            Intake/Output Summary (Last 24 hours) at 2019 0750  Last data filed at 2019 2200  Gross per 24 hour   Intake 2890.14 ml   Output 610 ml   Net 2280.14 ml            • methocarbamol  750 mg 4X/DAY   • Pharmacy Consult Request  1 Each PHARMACY TO DOSE   • MD ALERT...DO NOT ADMINISTER  NSAIDS or ASPIRIN unless ORDERED By Neurosurgery  1 Each PRN   • ALPRAZolam  0.25 mg BID PRN   • cloNIDine  0.1 mg Q4HRS PRN   • benzocaine-menthol  1 Lozenge Q2HRS PRN   • calcium carbonate  500 mg BID   • docusate sodium  100 mg BID   • senna-docusate  1 Tab Nightly   • senna-docusate  1 Tab Q24HRS PRN   • polyethylene glycol/lytes  1 Packet BID PRN   • magnesium hydroxide  30 mL QDAY PRN   • bisacodyl  10 mg Q24HRS PRN   • fleet  1 Each Once PRN   • 0.9 % NaCl with KCl 20 mEq 1,000 mL   Continuous   • enoxaparin  40 mg DAILY   • oxyCODONE immediate-release  5 mg Q3HRS PRN   • oxyCODONE immediate release  10 mg Q3HRS PRN   • HYDROmorphone  0.5 mg Q3HRS PRN   • ceFAZolin  2 g Q8HR   • potassium chloride SA  10 mEq Q EVENING   • omeprazole  20 mg QHS   • levothyroxine  75 mcg QHS   • glipiZIDE  5 mg Q EVENING   • gabapentin  800 mg TID   • furosemide  20 mg QDAY PRN   • atorvastatin  20 mg QHS   • venlafaxine  150 mg QHS   • ROPINIRole  8 mg QHS   • SITagliptin  100 mg Q EVENING   • temazepam  30 mg QHS   • NS   Continuous   • diphenhydrAMINE  25 mg Q6HRS PRN    Or   • diphenhydrAMINE  25 mg Q6HRS PRN   • ondansetron  4 mg Q4HRS PRN   • ondansetron  4 mg Q4HRS PRN   • vitamin D  5,000 Units DAILY   • Influenza Vaccine High-Dose pf  0.5 mL Once PRN       Assessment and Plan:  POD#6 Revision of C3-4 and C4-5 ACDF   POD#1 C2-T1 PCF  PT/OT/ambulate as tolerated  Adding toradol for pain  To wear cervical collar at all times  NPO. OK for sips with meds

## 2019-09-24 NOTE — OP REPORT
DATE OF SERVICE:  09/23/2019    PREOPERATIVE DIAGNOSES:  1.  Cervical spondylosis.  2.  Cervical stenosis.  3.  Cervical radiculopathy.  4.  Status post C3-C4 partial corpectomy, interbody fusion and revision C3-C5   anterior fixation.  5.  Osteoporosis.  6.  Cervical kyphosis.      POSTOPERATIVE DIAGNOSES:  1.  Cervical spondylosis.  2.  Cervical stenosis.  3.  Cervical radiculopathy.  4.  Status post C3-C4 partial corpectomy, interbody fusion and revision, C3-C5   anterior fixation.  5.  Osteoporosis.  6.  Cervical kyphosis.    PROCEDURES PERFORMED:  Stage II:  1.  Stealth guidance for the spine.  2.  T1 bilateral laminotomies.  3. C2, C3, C4, C5, C6, T1 lateral mass and pedicle screw fixation.  4. C2, C3, C4, C5, C6, T1 posterolateral allograft/bere fusion.  4.  Intraoperative monitoring.  5.  Modifier 22.  Patient's body mass index of 36 took the case that normally   would be 2 hours, made it 4 hours, greater than 50% increase in time, physical   and mental effort to deal with additional body habitus justifying modifier 22   the entire case.    SURGEON:  Remi Lechuga MD    ASSISTANT:  Linn Scales PA-C    COMPLICATIONS:  None.    DRAINS:  Left subfascial ARYAN.    BLOOD LOSS:  50 mL    FINDINGS:  Reasonable bony fixation, no change in motors and SSEPs.    DISPOSITION:  Extubated to recovery and to floor.    HISTORY OF PRESENT ILLNESS:  This is a 63-year-old woman who had a previous   C5-C7 ACDF, which seemed like good fusion, but developing cervical kyphosis.    Several months ago she had undergone a C3-C5 ACDF.  They were rostral to the   original ACDF with another practitioner and she quickly failed her hardware.    She had a DEXA scan which shows significant osteoporosis.  We deemed her a   candidate for anterior revision, but given the small amount of bone that was   left at C3 and unfortunately due to very poor bone quality, I recommend C2-T1   supplementation to prevent any future kyphosis and other  hardware failures.  I   explained the risks, benefits, and alternatives including pain, infection,   bleeding, CSF leak, failure to completely resolve symptoms, neurologic   deficit, weakness, numbness, bladder or bowel difficulties, failure of   fixation, failure of fusion, need for rostral caudal extensions due to   junctional stenosis.  She understood the risks, benefits, agreed and   consented, and done well from the anterior procedure to revise the collapsing   and extruding hardware.    SUMMARY OF OPERATIVE PROCEDURE:  Patient was brought to the operating suite,   placed under general anesthesia.  Duncan catheter was placed, lines secured.    Upper and lower extremity motors and SSEPs were run at baseline by NMA   monitoring.  Anesthesia was kept at MAPs greater than 80 during the case.  The   patient was placed in [place] headholder fixation, flipped prone onto a   chest, hip, thigh OSI table with a [place] headholder attached in anatomic   position, guided by x-ray to try to bring the head back in a more normal   positioning.  Repeat motors and SSEPs showed no changes.  X-ray fluoroscopy   was brought into draw a midline C2-T1 incision.  This was infiltrated with   Marcaine with epinephrine.  Preoperative antibiotics were given.  Proper   time-out was performed.  Patient was prepped and draped in sterile fashion.    The patient's body mass index greatly increased the time of positioning, all   adding the total case time of 4 hours justifying modifier 22 the entire case.    A linear incision was made and soft tissues dissected with bipolar and   monopolar electrocautery.  We found the dorsal fascia, incised it sharply   using subperiosteal technique, stripped the muscles off the C2, C3, C4, C5,   C6, C7, and T1 lateral masses and transverse processes.  The patient's body   mass index added the time length of dissection justifying modifier 22.  We   advanced the retractors, infiltrated the muscle with  Maryse, verified levels   with x-ray, and turned our attention to the Stealth guidance.    Stealth guidance for the spine:  Using the [employer] O-arm system, we did a T1   spinous process clamp, draped the patient appropriately, ran an O-arm spin   and all trajectories were excellent.    Bilateral T1 laminotomies:  To improve our visualization of T1, we did perform   bilateral laminotomies at T1.  This is to visualize the pedicle being able to   utilize the stealth and having a second check on that well angled screw.  We   got down to ligamentum flavum, used Kerrison rongeurs and we had nice   visualization, bilaterally T1 pedicle trajectories.    C2, C3, C4, C5, C6, and T1 lateral mass and pedicle screw fixation:  Using the   pinnacle-ecs Z-Link system as well as the Medtronic O-arm instruments, for the   C2-T1 pedicle we made a small corticectomy, very aware that the right   vertebral artery was very abnormally medialized and superior, so we are going   to place a shorter screw on the left C2.  We made a small corticectomy guided   by the reference frame, awled under stealth, tapped under stealth and then we   placed a 4.0x24 mm screw with good bony purchase.  On the right C2, we made a   much more shallow screw because of the vertebral artery was right in our way,   a 3.5x12 mm screw.  There was no injury to the vertebral arteries that could   be detected bilaterally at C3, C4, C5 and C6.  We placed 3.5x14 mm screws.    The left C6 was a short lateral mass.  We have gone through it, so we decided   to use a 3.5x12 mm screw.  The left T1 was medialized and the inferior   trajectory guided by stealth, we placed a 4.0x24 mm screw and on the right a   4.0x26 mm screw.  We then stimulated the screws and the stimulation parameters   were 10 milliamps or greater.  We redraped the patient, ran another O-arm   spin and all trajectories were excellent for our screws.    C3, C4, C5, C6, C7, and T1 posterolateral allograft/bere  fusion:  Leaving the   interspinous ligament intact for structure, we decorticated the facet joints   of C2-C3, C3-C4, C4-C5, C5-C6, C6-C7 and transverse processes of T1 as well as   the lateral gutters; to that, we laid a MagniFuse bag of bones, absorbable   allograft bags to allow for onlay fusion.  This was supplemented with a 4.0   lordotic bere, cut to appropriate length, bent with required amount of curved   to fit nicely into the setscrews using manufacture torque device to tighten   down the locking caps.  We were happy with our final construct.  There was no   change in motors and SSEPs.  An x-ray result was good.    We copiously irrigated with bacitracin infused saline.  We tunneled a 7 Duncan   flat fluted ARYAN and secured to skin with a stitch.  We closed the wound in   anatomic layers, 0 Vicryl for the fascia, 2-0 Vicryls for the dermis and   staples for the skin.  Sterile dressing was applied.  Patient was rolled prone   to supine, removed from [place] headholder without incident.  Duncan removed   and extubated without incident.  There were no complications.  Needle and   sponge count correct at the end of the case.  The patient's body mass index of   36 greatly increased the time of the case to 4 hours justifying modifier 22   the entire case.       ____________________________________     MD JOHANA Fisher III / SATHISH    DD:  09/23/2019 19:44:25  DT:  09/23/2019 21:38:38    D#:  [id number]  Job#:  [id number]

## 2019-09-24 NOTE — PROGRESS NOTES
Neurosurgery Progress Note    Subjective:  POD#6 Revision of C3-4 and C4-5 ACDF, POD#1 Cervical Lami/fusion C2-T1 with Stealth  Confused with pain meds and slow moving/difficult to mobilize.    C/O pain at posterior cervical Op site.  Requesting pain meds every 3 hours-  Muscle relaxant on PRN on post-op order  Collar hard to tolerate in bed due to short neck/obesity  Arm feel better   Drain at 60cc/8hrs.    Exam:  Wound C/D/I  Motor 5/5, but sluggish with pain meds  Sensory intact  Swallowing and speaking well    BP  Min: 97/67  Max: 153/79  Pulse  Av.1  Min: 64  Max: 98  Resp  Avg: 15.5  Min: 12  Max: 20  Temp  Av.5 °C (97.7 °F)  Min: 35.9 °C (96.6 °F)  Max: 37 °C (98.6 °F)  SpO2  Av.8 %  Min: 90 %  Max: 96 %    No data recorded    Recent Labs     19  0225   WBC 6.4   RBC 4.26   HEMOGLOBIN 12.3   HEMATOCRIT 40.7   MCV 95.5   MCH 28.9   MCHC 30.2*   RDW 48.8   PLATELETCT 239   MPV 9.7     Recent Labs     19  0225   SODIUM 141   POTASSIUM 4.3   CHLORIDE 98   CO2 36*   GLUCOSE 92   BUN 12   CREATININE 0.75   CALCIUM 9.0               Intake/Output       19 0700 - 19 0659 19 07 - 19 0659       Total 1900-0659 Total       Intake    P.O.  --  90 90  --  -- --    P.O. -- 90 90 -- -- --    I.V.  --  2800.1 2800.1  --  -- --    Phenylephrine Volume -- 0.1 0.1 -- -- --    Volume (mL) (NS infusion) -- 2800 2800 -- -- --    Total Intake -- 2890.1 2890.1 -- -- --       Output    Urine  150  325 475  --  -- --    Urine 150 325 475 -- -- --    Number of Times Voided -- -- -- 1 x -- 1 x    Drains  --  60 60  --  -- --    Output (mL) (Closed/Suction Drain 1 Posterior Neck Carlos Wynn) -- 60 60 -- -- --    Blood  75  -- 75  --  -- --    Est. Blood Loss 75 -- 75 -- -- --    Total Output 225 385 610 -- -- --       Net I/O     -225 2505.1 2280.1 -- -- --            Intake/Output Summary (Last 24 hours) at 2019 0747  Last data filed at 2019  2200  Gross per 24 hour   Intake 2890.14 ml   Output 610 ml   Net 2280.14 ml            • methocarbamol  750 mg 4X/DAY   • Pharmacy Consult Request  1 Each PHARMACY TO DOSE   • MD ALERT...DO NOT ADMINISTER NSAIDS or ASPIRIN unless ORDERED By Neurosurgery  1 Each PRN   • ALPRAZolam  0.25 mg BID PRN   • cloNIDine  0.1 mg Q4HRS PRN   • benzocaine-menthol  1 Lozenge Q2HRS PRN   • calcium carbonate  500 mg BID   • docusate sodium  100 mg BID   • senna-docusate  1 Tab Nightly   • senna-docusate  1 Tab Q24HRS PRN   • polyethylene glycol/lytes  1 Packet BID PRN   • magnesium hydroxide  30 mL QDAY PRN   • bisacodyl  10 mg Q24HRS PRN   • fleet  1 Each Once PRN   • 0.9 % NaCl with KCl 20 mEq 1,000 mL   Continuous   • enoxaparin  40 mg DAILY   • oxyCODONE immediate-release  5 mg Q3HRS PRN   • oxyCODONE immediate release  10 mg Q3HRS PRN   • HYDROmorphone  0.5 mg Q3HRS PRN   • ceFAZolin  2 g Q8HR   • potassium chloride SA  10 mEq Q EVENING   • omeprazole  20 mg QHS   • levothyroxine  75 mcg QHS   • glipiZIDE  5 mg Q EVENING   • gabapentin  800 mg TID   • furosemide  20 mg QDAY PRN   • atorvastatin  20 mg QHS   • venlafaxine  150 mg QHS   • ROPINIRole  8 mg QHS   • SITagliptin  100 mg Q EVENING   • temazepam  30 mg QHS   • NS   Continuous   • diphenhydrAMINE  25 mg Q6HRS PRN    Or   • diphenhydrAMINE  25 mg Q6HRS PRN   • ondansetron  4 mg Q4HRS PRN   • ondansetron  4 mg Q4HRS PRN   • vitamin D  5,000 Units DAILY   • Influenza Vaccine High-Dose pf  0.5 mL Once PRN       Assessment and Plan:  POD#6 Revision of C3-4 and C4-5 ACDF, POD#1 Cervical Lami/fusion C2-T1 with Stealth.  Arms feel good but increased posterior neck pain as expected.  Adjusted Muscle relaxant and Added tylenol to decrease sedation of Oxycodone  Collar to be on if Pt OOB (Fits poorly due to body habitus in bed)  PT/OT today  Continue drain monitoring  Lovenox today

## 2019-09-24 NOTE — PROGRESS NOTES
Pt A&Ox4, denies any numbness and tingling, pain is 8/10 (pain med given).    Bed alarm on, call light within reach, pt educated on importance of using the call light when she needs assistance, pt verbalized understanding.

## 2019-09-24 NOTE — PROGRESS NOTES
Pt has been rating her pain 8/10 and higher, medicated per MAR. Since arrival back on the unit, pt has been slower to ambulate with c/o severe pain in her neck region that is affecting her ability to get out of bed. As the shift progressed, with each subsequent episode of getting out of bed, the pt has become shakier and weaker.     With the latest attempt of going to the bathroom, the patient had extreme difficulty with sitting up at the edge of bed and then was very unsteady with ambulating to the bathroom. This RN administered a fingerstick glucose check to check if the pt was hypoglycemic due to the increased shakiness. Blood glucose was 116. Pt was also able to answer all of the orientation questions and there were no neuro changes at this time. The pt was laid flat in bed and the aspen collar was removed, which the patient stated provided some relief to her pain. The posterior dressing also had increased dried drainage present. No additional pain medications were administered at this time, encouraged pt to sleep and relax to help with her pain.

## 2019-09-24 NOTE — ANESTHESIA QCDR
2019 [facility] Clinical Data Registry (for Quality Improvement)     Postoperative nausea/vomiting risk protocol (Adult = 18 yrs and Pediatric 3-17 yrs)- (430 and 463)  General inhalation anesthetic (NOT TIVA) with PONV risk factors: Yes  Provision of anti-emetic therapy with at least 2 different classes of agents: Yes   Patient DID NOT receive anti-emetic therapy and reason is documented in Medical Record:  N/A    Multimodal Pain Management- (AQI59)  Patient undergoing Elective Surgery (i.e. Outpatient, or ASC, or Prescheduled Surgery prior to Hospital Admission): Yes  Use of Multimodal Pain Management, two or more drugs and/or interventions, NOT including systemic opioids: Yes   Exception: Documented allergy to multiple classes of analgesics:  N/A    PACU assessment of acute postoperative pain prior to Anesthesia Care End- Applies to Patients Age = 18- (ABG7)  Initial PACU pain score is which of the following: < 7/10  Patient unable to report pain score: N/A    Post-anesthetic transfer of care checklist/protocol to PACU/ICU- (426 and 427)  Upon conclusion of case, patient transferred to which of the following locations: PACU/Non-ICU  Use of transfer checklist/protocol: Yes  Exclusion: Service Performed in Patient Hospital Room (and thus did not require transfer): N/A    PACU Reintubation- (AQI31)  General anesthesia requiring endotracheal intubation (ETT) along with subsequent extubation in OR or PACU: Yes  Required reintubation in the PACU: No   Extubation was a planned trial documented in the medical record prior to removal of the original airway device:  N/A    Unplanned admission to ICU related to anesthesia service up through end of PACU care- (MD51)  Unplanned admission to ICU (not initially anticipated at anesthesia start time):

## 2019-09-24 NOTE — PROGRESS NOTES
1350 - MAX Farfan paged to notify him that per pt, she was seen by MD Lechuga and MD Lechuga is ok with her to get a dose of her requip at this time. Per MAX Farfan, ok to give one dose of requip right now and pt can still get her scheduled dose tonight.    6434 - Pharmacy notified that when entering order for 1 time dose, emar is giving a pop up warning that we are exceeding daily limit dose. Per pharmacy, ok to override this.

## 2019-09-24 NOTE — ANESTHESIA TIME REPORT
Anesthesia Start and Stop Event Times     Date Time Event    9/23/2019 1539 Ready for Procedure     1540 Anesthesia Start     1956 Anesthesia Stop        Responsible Staff  09/23/19    Name Role Begin End    Ancelmo Duke M.D. Anesth 1540 1956        Preop Diagnosis (Free Text):  Pre-op Diagnosis     cervical spondylosis         Preop Diagnosis (Codes):  Diagnosis Information     Diagnosis Code(s): Cervical spondylosis [M47.812]        Post op Diagnosis  Spinal stenosis in cervical region      Premium Reason  A. 3PM - 7AM    Comments: a  line

## 2019-09-24 NOTE — PROGRESS NOTES
1138 - MAX Farfan contacted to clarify his order and MAX Esteves's order about when pt should wear her c-collar brace.    1143 - Per MAX Farfan, we will follow his orders.

## 2019-09-24 NOTE — THERAPY
"Occupational Therapy Evaluation completed.   Functional Status:  ModA sit>supine, ModA toilet txf to BSC, MaxA LB dress, MaxA UB dress/aspen collar don, SPV toileting, poor activity tolerance, high pain  Plan of Care: Will benefit from Occupational Therapy 4 times per week  Discharge Recommendations:  Equipment: Will Continue to Assess for Equipment Needs. Post-acute therapy Recommend post-acute placement for additional occupational therapy services prior to discharge home. Patient can tolerate post-acute therapies at a 5x/week frequency. (Pt may progress in acute setting to allow for safe d/c home however at current level would need placement, will continue to assess.)    See \"Rehab Therapy-Acute\" Patient Summary Report for complete documentation.    Pt re-evaluated for OT needs following stage II C2-T1 posterior fusion d/t significant decline in functional status. Pt presents with dramatically decreased activity tolerance and high pain limiting her independence with ADLs and ADL transfers. 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 Pt may demonstrate functional improvements with acute OT to be safe for d/c home at SPV level but would currently require post-acute placement for continued inpatient therapies. Will follow closely and update recommendations pending pt progress.   "

## 2019-09-24 NOTE — OR NURSING
Pt slow to fully wake from anesthesia.  Pt's eyes puffy, nose stuffy.  Pt on 4 L NC.  No c/o nausea, tolerating PO fluids and medication.  Difficulty getting control of pt's pain, down from 10/10 to 6/10.  Posterior neck surgical site intact, small amount of serosanguineous drainage on dressing.  Aspen collar in place. ARYAN drain compressed, patent and draining.  VSS, afebrile, FLANAGAN, A/O x4.  BUE strength 5, BLE strength 5.    No belongings in PACU.

## 2019-09-24 NOTE — OR SURGEON
Immediate Post OP Note    PreOp Diagnosis: C3-5 hardware failure, cervical stenosis, cervical radiculopathy    PostOp Diagnosis: Same    Procedure(s):  FUSION, SPINE, CERVICAL, POSTERIOR APPROACH- C2-t1 STEALTH FUSION - Wound Class: Clean with Drain  LAMINotomy T1, SPINE, Wound Class: Clean with Drain    Surgeon(s):  Remi Lechuga III, M.D.    Anesthesiologist/Type of Anesthesia:  Anesthesiologist: Ancelmo Duke M.D./General    Surgical Staff:  Assistant: Linn Pineda P.A.-C.  Circulator: Evelin Ventura RSAEID; Rody Freeman RSAEID  Relief Circulator: Shayy Worley RAlissonNAlisson  Relief Scrub: Deny Hatch; Ashly George  Scrub Person: Dipti Franco  Radiology Technologist: Prem Crocker  Stealth : Carlotta Whaley    Specimens removed if any:  * No specimens in log *    Estimated Blood Loss: 50cc    Findings: See dictation. Went well.    Complications: None.        9/23/2019 7:34 PM Linn Pineda P.A.-C.

## 2019-09-24 NOTE — ANESTHESIA POSTPROCEDURE EVALUATION
Patient: Valentina Lu    Procedure Summary     Date:  09/23/19 Room / Location:  [facility] OR 05 / SURGERY [facility]    Anesthesia Start:  1540 Anesthesia Stop:  1956    Procedures:       FUSION, SPINE, CERVICAL, POSTERIOR APPROACH- C2-t1 STEALTH FUSION (N/A Neck)      LAMINECTOMY, SPINE, CERVICAL, POSTERIOR APPROACH- C3-4 (Neck) Diagnosis:       Cervical spondylosis      (cervical spondylosis )    Surgeon:  Remi Lechuga III, M.D. Responsible Provider:  Ancelmo Duke M.D.    Anesthesia Type:  general ASA Status:  3          Final Anesthesia Type: general  Last vitals  BP   Blood Pressure : (!) 97/67    Temp   36.5 °C (97.7 °F)    Pulse   Pulse: 70   Resp   16    SpO2   94 %      Anesthesia Post Evaluation    Patient location during evaluation: PACU  Patient participation: complete - patient participated  Level of consciousness: awake and alert  Pain score: 3    Airway patency: patent  Anesthetic complications: no  Cardiovascular status: hemodynamically stable  Respiratory status: acceptable  Hydration status: euvolemic    PONV: none           Nurse Pain Score: 9 (NPRS)

## 2019-09-24 NOTE — PROGRESS NOTES
Spiritual Care Note    Patient Information     Patient's Name: Valentina Lu   MRN: [id number]    YOB: 1955   Age and Gender: 63 y.o. female   Service Area: Neurosurgery   Room (and Bed): [place] [address]   Ethnicity or Nationality:     Primary Language: English   Spiritism/Spiritual preference: Religion   Place of Residence:    Family/Friends/Others Present:    Clinical Team Present: No   Medical Diagnosis(-es)/Procedure(s): Cervical Spondylosis   Code Status: Full Code    Date of Admission: 9/18/2019   Length of Stay: 6 days        Spiritual Care Provider Information:  Name of Spiritual Care Provider: Kanika Pearce  Title of Spiritual Care Provider: Associate [place]  Phone Number: [phone]  E-mail: [email or web address]  Total time : 10 minutes    Spiritual Screen Results:    Gen Nursing  Spiritual Screen  Is your spiritual health or inner well-being important to you as you cope with your medical condition?: Yes  Would you like to receive a visit from our Spiritual Care team or your own Samaritan or spiritual leader?: Yes  Was spiritual care education provided to the patient?: Declined  Sources of Hope/[place]: Family, Prayer     Palliative Care  PC Spiritism/Spiritual Screening  Was spiritual care education provided to the patient?: Declined      Encounter/Request Information  Encounter/Request Type   Visited With: Patient not available(Visited twice; pt out of room for testing, and then asleep. )  Nature of the Visit: Initial  Continue Visiting: Yes  Next Follow-up Date: 09/25/19  General Visit: Yes  Referral From/ Origin of Request: Epic nursing    Religous Needs/Values       Spiritual Assessment     Spiritual Care Encounters    Observations/Symptoms: (Patient sleeping.)    Interacton/Conversation: Pt sleeping.  Left card offering follow-up visit; chatted with .    Interventions: (Left card.)    Plan: (Visit on 9/25.)    Notes:

## 2019-09-24 NOTE — CARE PLAN
Problem: Knowledge Deficit  Goal: Knowledge of disease process/condition, treatment plan, diagnostic tests, and medications will improve  Note:   Pt updated on plan of care     Problem: Pain Management  Goal: Pain level will decrease to patient's comfort goal  Note:   Routine pain assessment performed and appropriate pain management interventions implemented.

## 2019-09-24 NOTE — THERAPY
"Physical Therapy Evaluation completed.   Bed Mobility:  Supine to Sit: Supervised Sit to supine moderate assist  Transfers: Sit to Stand: Minimal Assist  Gait: Level Of Assist: Moderate Assist with Front-Wheel Walker       Plan of Care: Will benefit from Physical Therapy 5 times per week  Discharge Recommendations: Equipment: Will Continue to Assess for Equipment Needs. Post-acute therapy Recommend post-acute placement for continued physical therapy services prior to discharge home. Patient can tolerate post-acute therapies at a 5x/week frequency.       Ms. Lu is now s/p stage II C2-T1 posterior fusion. She presents with a significant decline in function since her initial surgery. Suspect deficits are largely pain related, however results in significant dynamic balance and gross motor coordination deficits that negatively imapct her ability to perform gait, transfers, and bed mobility at her prior level of function and prevent her safe discharge home. Despite her high pain she was very motivated to work with therapies and did demonstrate LE strength that is WFL. First steps to commode very ataxic with poor motor control, however this improved when returning into bed. At this point recommend post acute placement for additional therapies prior to discharge home. If her pain resolves and she can improve to a supervision level during her acute stay, then her deficits can be managed by home health therapies. Acute PT to continue to follow while in house.     See \"Rehab Therapy-Acute\" Patient Summary Report for complete documentation.     "

## 2019-09-25 ENCOUNTER — APPOINTMENT (OUTPATIENT)
Dept: RADIOLOGY | Facility: MEDICAL CENTER | Age: 64
DRG: 455 | End: 2019-09-25
Attending: NEUROLOGICAL SURGERY
Payer: MEDICARE

## 2019-09-25 LAB
ANION GAP SERPL CALC-SCNC: 4 MMOL/L (ref 0–11.9)
BUN SERPL-MCNC: 10 MG/DL (ref 8–22)
CALCIUM SERPL-MCNC: 8.7 MG/DL (ref 8.5–10.5)
CHLORIDE SERPL-SCNC: 100 MMOL/L (ref 96–112)
CO2 SERPL-SCNC: 37 MMOL/L (ref 20–33)
CREAT SERPL-MCNC: 0.76 MG/DL (ref 0.5–1.4)
ERYTHROCYTE [DISTWIDTH] IN BLOOD BY AUTOMATED COUNT: 48.5 FL (ref 35.9–50)
GLUCOSE SERPL-MCNC: 101 MG/DL (ref 65–99)
HCT VFR BLD AUTO: 38.9 % (ref 37–47)
HGB BLD-MCNC: 11.4 G/DL (ref 12–16)
MCH RBC QN AUTO: 27.8 PG (ref 27–33)
MCHC RBC AUTO-ENTMCNC: 29.3 G/DL (ref 33.6–35)
MCV RBC AUTO: 94.9 FL (ref 81.4–97.8)
PLATELET # BLD AUTO: 212 K/UL (ref 164–446)
PMV BLD AUTO: 9.8 FL (ref 9–12.9)
POTASSIUM SERPL-SCNC: 4 MMOL/L (ref 3.6–5.5)
RBC # BLD AUTO: 4.1 M/UL (ref 4.2–5.4)
SODIUM SERPL-SCNC: 141 MMOL/L (ref 135–145)
WBC # BLD AUTO: 4.9 K/UL (ref 4.8–10.8)

## 2019-09-25 PROCEDURE — 36415 COLL VENOUS BLD VENIPUNCTURE: CPT

## 2019-09-25 PROCEDURE — 770001 HCHG ROOM/CARE - MED/SURG/GYN PRIV*

## 2019-09-25 PROCEDURE — 700102 HCHG RX REV CODE 250 W/ 637 OVERRIDE(OP): Performed by: PHYSICIAN ASSISTANT

## 2019-09-25 PROCEDURE — 80048 BASIC METABOLIC PNL TOTAL CA: CPT

## 2019-09-25 PROCEDURE — A9270 NON-COVERED ITEM OR SERVICE: HCPCS | Performed by: PHYSICIAN ASSISTANT

## 2019-09-25 PROCEDURE — 700101 HCHG RX REV CODE 250: Performed by: PHYSICIAN ASSISTANT

## 2019-09-25 PROCEDURE — 700112 HCHG RX REV CODE 229: Performed by: PHYSICIAN ASSISTANT

## 2019-09-25 PROCEDURE — 700105 HCHG RX REV CODE 258: Performed by: PHYSICIAN ASSISTANT

## 2019-09-25 PROCEDURE — 97530 THERAPEUTIC ACTIVITIES: CPT

## 2019-09-25 PROCEDURE — 700111 HCHG RX REV CODE 636 W/ 250 OVERRIDE (IP): Performed by: PHYSICIAN ASSISTANT

## 2019-09-25 PROCEDURE — 85027 COMPLETE CBC AUTOMATED: CPT

## 2019-09-25 RX ADMIN — METHOCARBAMOL 1000 MG: 100 INJECTION INTRAMUSCULAR; INTRAVENOUS at 17:52

## 2019-09-25 RX ADMIN — CEFAZOLIN SODIUM 2 G: 2 INJECTION, SOLUTION INTRAVENOUS at 08:31

## 2019-09-25 RX ADMIN — DIPHENHYDRAMINE HCL 25 MG: 25 TABLET ORAL at 18:40

## 2019-09-25 RX ADMIN — GABAPENTIN 800 MG: 400 CAPSULE ORAL at 13:01

## 2019-09-25 RX ADMIN — ANTACID TABLETS 500 MG: 500 TABLET, CHEWABLE ORAL at 16:24

## 2019-09-25 RX ADMIN — MELATONIN 5000 UNITS: at 05:04

## 2019-09-25 RX ADMIN — ROPINIROLE HYDROCHLORIDE 8 MG: 2 TABLET, FILM COATED ORAL at 22:57

## 2019-09-25 RX ADMIN — KETOROLAC TROMETHAMINE 30 MG: 30 INJECTION, SOLUTION INTRAMUSCULAR at 00:11

## 2019-09-25 RX ADMIN — OXYCODONE HYDROCHLORIDE 10 MG: 10 TABLET ORAL at 13:05

## 2019-09-25 RX ADMIN — POTASSIUM CHLORIDE AND SODIUM CHLORIDE: 900; 150 INJECTION, SOLUTION INTRAVENOUS at 17:52

## 2019-09-25 RX ADMIN — OXYCODONE HYDROCHLORIDE 10 MG: 10 TABLET ORAL at 19:29

## 2019-09-25 RX ADMIN — ENOXAPARIN SODIUM 40 MG: 100 INJECTION SUBCUTANEOUS at 22:53

## 2019-09-25 RX ADMIN — METHOCARBAMOL 1000 MG: 100 INJECTION INTRAMUSCULAR; INTRAVENOUS at 09:25

## 2019-09-25 RX ADMIN — SITAGLIPTIN 100 MG: 100 TABLET, FILM COATED ORAL at 16:24

## 2019-09-25 RX ADMIN — POTASSIUM CHLORIDE 10 MEQ: 1500 TABLET, EXTENDED RELEASE ORAL at 16:24

## 2019-09-25 RX ADMIN — METHOCARBAMOL 1000 MG: 100 INJECTION INTRAMUSCULAR; INTRAVENOUS at 22:40

## 2019-09-25 RX ADMIN — CEFAZOLIN SODIUM 2 G: 2 INJECTION, SOLUTION INTRAVENOUS at 00:11

## 2019-09-25 RX ADMIN — OMEPRAZOLE 20 MG: 20 CAPSULE, DELAYED RELEASE ORAL at 22:53

## 2019-09-25 RX ADMIN — OXYCODONE HYDROCHLORIDE 10 MG: 10 TABLET ORAL at 08:28

## 2019-09-25 RX ADMIN — CEFAZOLIN SODIUM 2 G: 2 INJECTION, SOLUTION INTRAVENOUS at 18:24

## 2019-09-25 RX ADMIN — GLIPIZIDE 5 MG: 5 TABLET ORAL at 16:24

## 2019-09-25 RX ADMIN — LEVOTHYROXINE SODIUM 75 MCG: 75 TABLET ORAL at 22:53

## 2019-09-25 RX ADMIN — DOCUSATE SODIUM 100 MG: 100 CAPSULE, LIQUID FILLED ORAL at 05:04

## 2019-09-25 RX ADMIN — HYDROMORPHONE HYDROCHLORIDE 0.5 MG: 1 INJECTION, SOLUTION INTRAMUSCULAR; INTRAVENOUS; SUBCUTANEOUS at 03:39

## 2019-09-25 RX ADMIN — ATORVASTATIN CALCIUM 20 MG: 40 TABLET, FILM COATED ORAL at 22:52

## 2019-09-25 RX ADMIN — GABAPENTIN 800 MG: 400 CAPSULE ORAL at 05:04

## 2019-09-25 RX ADMIN — SENNOSIDES, DOCUSATE SODIUM 1 TABLET: 50; 8.6 TABLET, FILM COATED ORAL at 22:53

## 2019-09-25 RX ADMIN — GABAPENTIN 800 MG: 400 CAPSULE ORAL at 16:25

## 2019-09-25 RX ADMIN — ANTACID TABLETS 500 MG: 500 TABLET, CHEWABLE ORAL at 05:04

## 2019-09-25 RX ADMIN — VENLAFAXINE 150 MG: 75 TABLET ORAL at 22:53

## 2019-09-25 RX ADMIN — DOCUSATE SODIUM 100 MG: 100 CAPSULE, LIQUID FILLED ORAL at 16:24

## 2019-09-25 RX ADMIN — OXYCODONE HYDROCHLORIDE 10 MG: 10 TABLET ORAL at 16:24

## 2019-09-25 RX ADMIN — Medication 10 MG: at 18:37

## 2019-09-25 RX ADMIN — TEMAZEPAM 30 MG: 15 CAPSULE ORAL at 22:51

## 2019-09-25 ASSESSMENT — COGNITIVE AND FUNCTIONAL STATUS - GENERAL
MOVING FROM LYING ON BACK TO SITTING ON SIDE OF FLAT BED: A LOT
TURNING FROM BACK TO SIDE WHILE IN FLAT BAD: A LOT
WALKING IN HOSPITAL ROOM: A LOT
STANDING UP FROM CHAIR USING ARMS: A LOT
MOVING TO AND FROM BED TO CHAIR: A LOT
CLIMB 3 TO 5 STEPS WITH RAILING: TOTAL
SUGGESTED CMS G CODE MODIFIER MOBILITY: CL
MOBILITY SCORE: 11

## 2019-09-25 ASSESSMENT — GAIT ASSESSMENTS: GAIT LEVEL OF ASSIST: UNABLE TO PARTICIPATE

## 2019-09-25 NOTE — DISCHARGE PLANNING
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      Call out to daughter to verify discharge support. Awaiting call back.      Thank you for referral.

## 2019-09-25 NOTE — PROGRESS NOTES
Neurosurgery Progress Note    Subjective:  POD#7 Revision of C3-4 and C4-5 ACDF   POD#2 C2-T1 PCF  C/o severe posterior neck pain  Denies radicular pain  Limited mobility  Drain 65cc/8hr  Pt does not want rehab and would like to go home with HH if possible        Exam:  Incisions C/D/I  Motor 5/5  Sensory intact  Swallowing and speaking well    BP  Min: 108/58  Max: 148/65  Pulse  Av  Min: 75  Max: 98  Resp  Av  Min: 18  Max: 18  Temp  Av.8 °C (98.2 °F)  Min: 36.7 °C (98.1 °F)  Max: 36.9 °C (98.4 °F)  SpO2  Av.7 %  Min: 93 %  Max: 96 %    No data recorded    Recent Labs     19   WBC 6.4 4.9   RBC 4.26 4.10*   HEMOGLOBIN 12.3 11.4*   HEMATOCRIT 40.7 38.9   MCV 95.5 94.9   MCH 28.9 27.8   MCHC 30.2* 29.3*   RDW 48.8 48.5   PLATELETCT 239 212   MPV 9.7 9.8     Recent Labs     19   SODIUM 141 141   POTASSIUM 4.3 4.0   CHLORIDE 98 100   CO2 36* 37*   GLUCOSE 92 101*   BUN 12 10   CREATININE 0.75 0.76   CALCIUM 9.0 8.7               Intake/Output       19 07 - 1959 19 07 - 19 0659       Total 1900-0659 Total       Intake    P.O.  360  -- 360  --  -- --    P.O. 360 -- 360 -- -- --    I.V.  --  2821.7 2821.7  --  -- --    Volume (mL) (0.9 % NaCl with KCl 20 mEq infusion) -- 2821.7 2821.7 -- -- --    IV Piggyback  --  100 100  --  -- --    Volume (mL) (ceFAZolin in dextrose (ANCEF) IVPB premix 2 g) -- 100 100 -- -- --    Total Intake 360 2921.7 3281.7 -- -- --       Output    Urine  --  1950  --  -- --    Number of Times Voided 2 x -- 2 x -- -- --    Urine Void (mL) -- 1950 -- -- --    Drains  80  135 215  --  -- --    Output (mL) (Closed/Suction Drain 1 Posterior Neck Carlos Wynn) 80 135 215 -- -- --    Total Output 80 5 2165 -- -- --       Net I/O     280 836.7 1116.7 -- -- --            Intake/Output Summary (Last 24 hours) at 2019 0755  Last data filed at 2019  0634  Gross per 24 hour   Intake 3281.67 ml   Output 2165 ml   Net 1116.67 ml            • methocarbamol  750 mg 4X/DAY   • Pharmacy Consult Request  1 Each PHARMACY TO DOSE   • MD BARR...DO NOT ADMINISTER NSAIDS or ASPIRIN unless ORDERED By Neurosurgery  1 Each PRN   • ALPRAZolam  0.25 mg BID PRN   • cloNIDine  0.1 mg Q4HRS PRN   • benzocaine-menthol  1 Lozenge Q2HRS PRN   • calcium carbonate  500 mg BID   • docusate sodium  100 mg BID   • senna-docusate  1 Tab Nightly   • senna-docusate  1 Tab Q24HRS PRN   • polyethylene glycol/lytes  1 Packet BID PRN   • magnesium hydroxide  30 mL QDAY PRN   • bisacodyl  10 mg Q24HRS PRN   • fleet  1 Each Once PRN   • 0.9 % NaCl with KCl 20 mEq 1,000 mL   Continuous   • enoxaparin  40 mg DAILY   • oxyCODONE immediate-release  5 mg Q3HRS PRN   • oxyCODONE immediate release  10 mg Q3HRS PRN   • HYDROmorphone  0.5 mg Q3HRS PRN   • ceFAZolin  2 g Q8HR   • potassium chloride SA  10 mEq Q EVENING   • omeprazole  20 mg QHS   • levothyroxine  75 mcg QHS   • glipiZIDE  5 mg Q EVENING   • gabapentin  800 mg TID   • furosemide  20 mg QDAY PRN   • atorvastatin  20 mg QHS   • venlafaxine  150 mg QHS   • ROPINIRole  8 mg QHS   • SITagliptin  100 mg Q EVENING   • temazepam  30 mg QHS   • NS   Continuous   • diphenhydrAMINE  25 mg Q6HRS PRN    Or   • diphenhydrAMINE  25 mg Q6HRS PRN   • ondansetron  4 mg Q4HRS PRN   • ondansetron  4 mg Q4HRS PRN   • vitamin D  5,000 Units DAILY   • Influenza Vaccine High-Dose pf  0.5 mL Once PRN       Assessment and Plan:  POD#7 Revision of C3-4 and C4-5 ACDF   POD#2 C2-T1 PCF  PT/OT/ambulate as tolerated  Started IV Robaxin  Collar to be on if Pt OOB (Fits poorly due to body habitus in bed)  HH has been ordered, discussed with patient she will need to ambulate. If not we can arrange SNF vs Rehab  Hopefully home tomorrow

## 2019-09-25 NOTE — PROGRESS NOTES
0815Report received, plan of care reviewed and discussed, assessment complete, oriented to room, bed alarm on, nonskid socks applied, advised to call for assistance.   1015 Sleeping, will continue to monitor.  1215 Up to bedside commode, lethargic, all needs met at this time.  1415 Resting, all needs met at this time.  1615 Complaints of pain, lethargic, medication administered per MAR.  1845 Report given to next shift.

## 2019-09-25 NOTE — DISCHARGE PLANNING
Received Order at 0930  Agency/Facility Name: [facility]  Referral sent per Choice form @ 0930     @1010  Agency/Facility Name: [facility]  Spoke To:   Outcome: Admission in meeting, will callback    @1040  Agency/Facility Name: [facility]  Spoke To:    Outcome: No longer accepting Medicare

## 2019-09-25 NOTE — PROGRESS NOTES
"      Spiritual Care Note    Patient Information     Patient's Name: Valentina Lu   MRN: [id number]    YOB: 1955   Age and Gender: 63 y.o. female   Service Area: Neurosurgery   Room (and Bed): [place] [address]   Ethnicity or Nationality:     Primary Language: English   Church/Spiritual preference: Faith   Place of Residence: [place], [place]   Family/Friends/Others Present:    Clinical Team Present: No   Medical Diagnosis(-es)/Procedure(s): Cervical Spondylosis   Code Status: Full Code    Date of Admission: 9/18/2019   Length of Stay: 7 days        Spiritual Care Provider Information:  Name of Spiritual Care Provider: Kanika Pearce  Title of Spiritual Care Provider: Associate   Phone Number: [phone]  E-mail: [email or web address]  Total time : 20 minutes    Spiritual Screen Results:    Gen Nursing  Spiritual Screen  Is your spiritual health or inner well-being important to you as you cope with your medical condition?: Yes  Would you like to receive a visit from our Spiritual Care team or your own Gnosticist or spiritual leader?: Yes  Was spiritual care education provided to the patient?: Declined  Sources of Hope/[place]: Family, Prayer     Palliative Care  PC Church/Spiritual Screening  Was spiritual care education provided to the patient?: Declined      Encounter/Request Information  Encounter/Request Type   Visited With: Patient and family together  Nature of the Visit: Follow-up  Continue Visiting: No  Next Follow-up Date: 09/25/19  General Visit: Yes  Referral From/ Origin of Request: Epic nursing    Religous Needs/Values  Church Needs Visit  Church Needs: Prayer  Ritual Needs Visit  Ritual Needs: [place]    Spiritual Assessment     Spiritual Care Encounters    Observations/Symptoms: Discouraged, Pain, Other (Comment)(Sleepy)    Interacton/Conversation: Pt said \"I'm not feeling too well today.  It hurts.\"   offered prayer and blessing; pt and  were " grateful.    Assessment: Need, Distress    Need: Seeking Spiritual Assistance and Support    Distress: Coping    Interventions: (Prayer, blessing)    Outcomes: Connectedness with the Holy/with God, Coping, Spiritual Comfort    Plan: Visit Upon Request    Notes:

## 2019-09-25 NOTE — FACE TO FACE
Face to Face Supporting Documentation - Home Health    The encounter with this patient was in whole or in part the primary reason for home health admission.    Date of encounter:   Patient:                    MRN:                       YOB: 2021  Valentina Lu  [id number]  1955     Home health to see patient for:  Skilled Nursing care for assessment, interventions & education, Physical Therapy evaluation and treatment and Occupational therapy evaluation and treatment    Skilled need for:  Surgical Aftercare s/p cervical fusion    Skilled nursing interventions to include:  Wound Care    Homebound status evidenced by:  Needs the assistance of another person in order to leave the home. Leaving home requires a considerable and taxing effort. There is a normal inability to leave the home.    Community Physician to provide follow up care: Clarisa Winchester M.D.     Optional Interventions? No      I certify the face to face encounter for this home health care referral me  ets the CMS requirements and the encounter/clinical assessment with the patient was, in whole, or in part, for the medical condition(s) listed above, which is the primary reason for home health care. Based on my clinical findings: the service(s) are medically necessary, support the need for home health care, and the homebound criteria are met.  I certify that this patient has had a face to face encounter by myself.  Linn Pineda P.A.-C. - NPI: [id number]

## 2019-09-25 NOTE — DISCHARGE PLANNING
Anticipated Discharge Disposition:   [facility]    Action:    Patient s/p cervical surgeries.  Spoke to patient and significant other.  Pt drowsy receiving O24LNC.  She uses oxygen intermittently at home and [employer] company is [employer].  Pt stated she lives with her s/o Cesar, son, daughter-in-law and daughter.  Pt independent with ADLs and IADLs.      OT/PT recommending post-acute placement.  Pt agreeable.    KARYNA Pineda paged at [phone] to request physiatry consult.    [place] text to [facility] EDDIE Andres regarding possible consult.    Barriers to Discharge:    Neurosurgery clearance  ARYAN drain    Plan:    Collaborate with patient and medical team.    Care Transition Team Assessment    Information Source  Orientation : Oriented x 4  Information Given By: Patient  Informant's Name: alesia  Who is responsible for making decisions for patient? : Patient    Readmission Evaluation  Is this a readmission?: Yes - planned readmission    Elopement Risk  Legal Hold: No  Ambulatory or Self Mobile in Wheelchair: No-Not an Elopement Risk  Disoriented: No  Psychiatric Symptoms: None  History of Wandering: No  Elopement this Admit: No  Vocalizing Wanting to Leave: No  Displays Behaviors, Body Language Wanting to Leave: No-Not at Risk for Elopement  Elopement Risk: Not at Risk for Elopement    Interdisciplinary Discharge Planning  Does Admitting Nurse Feel This Could be a Complex Discharge?: No  Lives with - Patient's Self Care Capacity: Spouse  Patient or legal guardian wants to designate a caregiver (see row info): No  Support Systems: Family Member(s)  Housing / Facility: 1 Story Apartment / Condo  Do You Take your Prescribed Medications Regularly: Yes  Able to Return to Previous ADL's: Future Time w/Therapy  Mobility Issues: No  Prior Services: Meals on Wheels  Patient Expects to be Discharged to:: home with therapy visiting the home  Assistance Needed: Unknown at this Time  Durable Medical Equipment: Home Oxygen    Discharge  Preparedness  What is your plan after discharge?: Uncertain - pending medical team collaboration  What are your discharge supports?: Other (comment)  Prior Functional Level: Ambulatory, Independent with Activities of Daily Living, Independent with Medication Management  Difficulity with ADLs: Bathing, Dressing, Toileting, Walking  Difficulity with IADLs: Cooking, Driving, Laundry, Shopping    Functional Assesment  Prior Functional Level: Ambulatory, Independent with Activities of Daily Living, Independent with Medication Management    Finances  Financial Barriers to Discharge: No  Prescription Coverage: Yes    Vision / Hearing Impairment  Vision Impairment : No  Hearing Impairment : Yes  Hearing Impairment: Patient Declines to Wear Hearing Device(s)  Does Pt Need Special Equipment for the Hearing Impaired?: No         Advance Directive  Advance Directive?: None    Domestic Abuse  Have you ever been the victim of abuse or violence?: No  Physical Abuse or Sexual Abuse: No  Verbal Abuse or Emotional Abuse: No  Possible Abuse Reported to:: Not Applicable         Discharge Risks or Barriers  Discharge risks or barriers?: No    Anticipated Discharge Information  Anticipated discharge disposition: Acute rehab  Discharge Contact Phone Number: [phone]

## 2019-09-25 NOTE — CARE PLAN
Problem: Knowledge Deficit  Goal: Knowledge of the prescribed therapeutic regimen will improve  Outcome: PROGRESSING AS EXPECTED   Educated on prescribed medications and their uses and side effects  Problem: Fluid Volume:  Goal: Will maintain balanced intake and output  Outcome: PROGRESSING AS EXPECTED  Monitored intake and output. Po fluid intake encouraged and frequent toileting offered

## 2019-09-25 NOTE — THERAPY
"Patient pleasant and agreeable to therapy session.  Patient c/o pain throughout session and therefor moved very slowly.  Able to perform bed mobility, after patient education on proper log roll, with mod assist.  Required max assist to scoot to EOB.  Patient sit<>stand using fww and min assist from therapist.  Patient urgently requesting to use restroom.  Patient able to transfer onto bedside commode with min assist.  Therapist deferred further treatment due to patients pain levels.  Will continue to follow while in house.  Per eval recommend post acute placement at this time.      Physical Therapy Treatment completed.   Bed Mobility:  Supine to Sit: Moderate Assist  Transfers: Sit to Stand: Minimal Assist  Gait: Level Of Assist: Unable to Participate        Plan of Care: Will benefit from Physical Therapy 5 times per week  Discharge Recommendations: Equipment: Will Continue to Assess for Equipment Needs. Post-acute therapy Discharge to a transitional care facility for continued skilled therapy services.     See \"Rehab Therapy-Acute\" Patient Summary Report for complete documentation.       "

## 2019-09-25 NOTE — PROGRESS NOTES
Patient A&O x4. One person assist to bedside commode, slow to mobilize. R Foot PIV and RFA PIV, both patent. ARYAN Drain to posterior neck. Incisions to posterior and anterioR neck, dressing C/D/I. Complained of neck pain, Oxycodone given x1. Scheduled Toradol given per MAR. Urgency with urination. Needs met at this time. Bed alarm on, bed in lowest position. [facility]

## 2019-09-26 PROCEDURE — 770001 HCHG ROOM/CARE - MED/SURG/GYN PRIV*

## 2019-09-26 PROCEDURE — 700112 HCHG RX REV CODE 229: Performed by: PHYSICIAN ASSISTANT

## 2019-09-26 PROCEDURE — 92610 EVALUATE SWALLOWING FUNCTION: CPT

## 2019-09-26 PROCEDURE — 97116 GAIT TRAINING THERAPY: CPT

## 2019-09-26 PROCEDURE — 700102 HCHG RX REV CODE 250 W/ 637 OVERRIDE(OP): Performed by: PHYSICIAN ASSISTANT

## 2019-09-26 PROCEDURE — 97530 THERAPEUTIC ACTIVITIES: CPT

## 2019-09-26 PROCEDURE — 700105 HCHG RX REV CODE 258: Performed by: PHYSICIAN ASSISTANT

## 2019-09-26 PROCEDURE — A9270 NON-COVERED ITEM OR SERVICE: HCPCS | Performed by: PHYSICIAN ASSISTANT

## 2019-09-26 PROCEDURE — 700111 HCHG RX REV CODE 636 W/ 250 OVERRIDE (IP): Performed by: PHYSICIAN ASSISTANT

## 2019-09-26 PROCEDURE — 302118 SHAMPOO,NO RINSE: Performed by: NEUROLOGICAL SURGERY

## 2019-09-26 PROCEDURE — 99358 PROLONG SERVICE W/O CONTACT: CPT | Performed by: PHYSICAL MEDICINE & REHABILITATION

## 2019-09-26 RX ORDER — ACETAMINOPHEN 325 MG/1
650 TABLET ORAL EVERY 6 HOURS PRN
Status: DISCONTINUED | OUTPATIENT
Start: 2019-09-26 | End: 2019-09-27 | Stop reason: HOSPADM

## 2019-09-26 RX ORDER — METHOCARBAMOL 750 MG/1
750 TABLET, FILM COATED ORAL 3 TIMES DAILY
Qty: 9090 TAB | Refills: 1 | Status: ON HOLD | OUTPATIENT
Start: 2019-09-26 | End: 2019-10-01 | Stop reason: SDUPTHER

## 2019-09-26 RX ORDER — OXYCODONE AND ACETAMINOPHEN 10; 325 MG/1; MG/1
1 TABLET ORAL EVERY 4 HOURS PRN
Qty: 84 TAB | Refills: 0 | Status: ON HOLD | OUTPATIENT
Start: 2019-09-26 | End: 2019-10-01 | Stop reason: SDUPTHER

## 2019-09-26 RX ADMIN — MELATONIN 5000 UNITS: at 06:40

## 2019-09-26 RX ADMIN — OXYCODONE HYDROCHLORIDE 10 MG: 10 TABLET ORAL at 02:05

## 2019-09-26 RX ADMIN — DOCUSATE SODIUM 100 MG: 100 CAPSULE, LIQUID FILLED ORAL at 17:34

## 2019-09-26 RX ADMIN — SENNOSIDES, DOCUSATE SODIUM 1 TABLET: 50; 8.6 TABLET, FILM COATED ORAL at 20:59

## 2019-09-26 RX ADMIN — GLIPIZIDE 5 MG: 5 TABLET ORAL at 17:33

## 2019-09-26 RX ADMIN — ENOXAPARIN SODIUM 40 MG: 100 INJECTION SUBCUTANEOUS at 20:58

## 2019-09-26 RX ADMIN — ANTACID TABLETS 500 MG: 500 TABLET, CHEWABLE ORAL at 17:35

## 2019-09-26 RX ADMIN — SITAGLIPTIN 100 MG: 100 TABLET, FILM COATED ORAL at 17:34

## 2019-09-26 RX ADMIN — GABAPENTIN 800 MG: 400 CAPSULE ORAL at 17:34

## 2019-09-26 RX ADMIN — OXYCODONE HYDROCHLORIDE 10 MG: 10 TABLET ORAL at 09:51

## 2019-09-26 RX ADMIN — OXYCODONE HYDROCHLORIDE 10 MG: 10 TABLET ORAL at 15:13

## 2019-09-26 RX ADMIN — TEMAZEPAM 30 MG: 15 CAPSULE ORAL at 20:58

## 2019-09-26 RX ADMIN — GABAPENTIN 800 MG: 400 CAPSULE ORAL at 14:50

## 2019-09-26 RX ADMIN — VENLAFAXINE 150 MG: 75 TABLET ORAL at 20:58

## 2019-09-26 RX ADMIN — DOCUSATE SODIUM 100 MG: 100 CAPSULE, LIQUID FILLED ORAL at 06:41

## 2019-09-26 RX ADMIN — POTASSIUM CHLORIDE 10 MEQ: 1500 TABLET, EXTENDED RELEASE ORAL at 17:34

## 2019-09-26 RX ADMIN — ANTACID TABLETS 500 MG: 500 TABLET, CHEWABLE ORAL at 06:40

## 2019-09-26 RX ADMIN — CEFAZOLIN SODIUM 2 G: 2 INJECTION, SOLUTION INTRAVENOUS at 02:05

## 2019-09-26 RX ADMIN — GABAPENTIN 800 MG: 400 CAPSULE ORAL at 06:42

## 2019-09-26 RX ADMIN — LEVOTHYROXINE SODIUM 75 MCG: 75 TABLET ORAL at 20:58

## 2019-09-26 RX ADMIN — METHOCARBAMOL 1000 MG: 100 INJECTION INTRAMUSCULAR; INTRAVENOUS at 06:40

## 2019-09-26 RX ADMIN — CEFAZOLIN SODIUM 2 G: 2 INJECTION, SOLUTION INTRAVENOUS at 11:15

## 2019-09-26 RX ADMIN — ATORVASTATIN CALCIUM 20 MG: 40 TABLET, FILM COATED ORAL at 20:58

## 2019-09-26 RX ADMIN — OXYCODONE HYDROCHLORIDE 10 MG: 10 TABLET ORAL at 06:42

## 2019-09-26 RX ADMIN — OMEPRAZOLE 20 MG: 20 CAPSULE, DELAYED RELEASE ORAL at 20:58

## 2019-09-26 RX ADMIN — METHOCARBAMOL 1000 MG: 100 INJECTION INTRAMUSCULAR; INTRAVENOUS at 23:04

## 2019-09-26 RX ADMIN — OXYCODONE HYDROCHLORIDE 10 MG: 10 TABLET ORAL at 23:30

## 2019-09-26 RX ADMIN — HYDROMORPHONE HYDROCHLORIDE 0.5 MG: 1 INJECTION, SOLUTION INTRAMUSCULAR; INTRAVENOUS; SUBCUTANEOUS at 17:47

## 2019-09-26 RX ADMIN — ROPINIROLE HYDROCHLORIDE 8 MG: 2 TABLET, FILM COATED ORAL at 23:06

## 2019-09-26 RX ADMIN — CEFAZOLIN SODIUM 2 G: 2 INJECTION, SOLUTION INTRAVENOUS at 23:18

## 2019-09-26 RX ADMIN — METHOCARBAMOL 1000 MG: 100 INJECTION INTRAMUSCULAR; INTRAVENOUS at 14:50

## 2019-09-26 ASSESSMENT — COGNITIVE AND FUNCTIONAL STATUS - GENERAL
MOVING FROM LYING ON BACK TO SITTING ON SIDE OF FLAT BED: UNABLE
CLIMB 3 TO 5 STEPS WITH RAILING: TOTAL
MOVING TO AND FROM BED TO CHAIR: UNABLE
SUGGESTED CMS G CODE MODIFIER MOBILITY: CL
WALKING IN HOSPITAL ROOM: A LITTLE
TURNING FROM BACK TO SIDE WHILE IN FLAT BAD: UNABLE
STANDING UP FROM CHAIR USING ARMS: A LITTLE
MOBILITY SCORE: 10

## 2019-09-26 ASSESSMENT — GAIT ASSESSMENTS
DISTANCE (FEET): 5
ASSISTIVE DEVICE: FRONT WHEEL WALKER
DEVIATION: BRADYKINETIC;SHUFFLED GAIT
GAIT LEVEL OF ASSIST: MINIMAL ASSIST

## 2019-09-26 NOTE — FACE TO FACE
ceFace to Face Supporting Documentation - Home Health    The encounter with this patient was in whole or in part the primary reason for home health admission.    Date of encounter:   Patient:                    MRN:                       YOB: 2019  Valentina Lu  [id number]  1955     Home health to see patient for:  Physical Therapy evaluation and treatment and Occupational therapy evaluation and treatment    Skilled need for:  Surgical Aftercare Anterior and Posterior cervical fusion    Skilled nursing interventions to include:  Comment: PT and OT only    Homebound status evidenced by:  Needs the assistance of another person in order to leave the home. Leaving home requires a considerable and taxing effort. There is a normal inability to leave the home.    Community Physician to provide follow up care: Clarisa Winchester M.D.     Optional Interventions? No      I certify the face to face encounter for this home health care referral meets the CMS requirements and the encounter/clinical assessment with the patient was, in whole, or in part, for the medical condition(s) listed above, which is the primary reason for home health care. Based on my clinical findings: the service(s) are medically necessary, support the need for home health care, and the homebound criteria are met.  I certify that this patient has had a face to face encounter by myself.  Reymundo Farfan P.A.-C. - NPI: [id number]

## 2019-09-26 NOTE — DISCHARGE PLANNING
Spoke with Cesar (MICHAEL) and he is agreeable with [facility] rehab as well.  He would like to speak with their daughter this evening.  A swallow eval has been ordered.  This can be completed @ [facility].  TCC will follow up with Valentina in the am.

## 2019-09-26 NOTE — DISCHARGE PLANNING
Dr. Gordon is recommending [facility].  Spoke with Valentina and she is agreeable.  She will speak with her Spouse to be sure he is good with it.

## 2019-09-26 NOTE — PROGRESS NOTES
Neurosurgery Progress Note    Subjective:  POD#8 Revision of C3-4 and C4-5 ACDF   POD#3 C2-T1 PCF  C/o posterior neck pain--expected s/p Surgery  Denies radicular pain  So to mobilize, but doing better today  Drain 30cc/8hr  Pt does not want rehab and would like to go home with HH if possible  Feels ready to go home today if HH set up      Exam:  Incisions C/D/I  Motor 5/5  Sensory intact  Swallowing and speaking well    BP  Min: 108/69  Max: 148/85  Pulse  Av  Min: 73  Max: 78  Resp  Av  Min: 16  Max: 18  Temp  Av.7 °C (98 °F)  Min: 36.6 °C (97.8 °F)  Max: 36.8 °C (98.2 °F)  SpO2  Av.7 %  Min: 97 %  Max: 98 %    No data recorded    Recent Labs     19   WBC 6.4 4.9   RBC 4.26 4.10*   HEMOGLOBIN 12.3 11.4*   HEMATOCRIT 40.7 38.9   MCV 95.5 94.9   MCH 28.9 27.8   MCHC 30.2* 29.3*   RDW 48.8 48.5   PLATELETCT 239 212   MPV 9.7 9.8     Recent Labs     19   SODIUM 141 141   POTASSIUM 4.3 4.0   CHLORIDE 98 100   CO2 36* 37*   GLUCOSE 92 101*   BUN 12 10   CREATININE 0.75 0.76   CALCIUM 9.0 8.7               Intake/Output       19 - 19 - 19 0659       Total  Total       Intake    Total Intake -- -- -- -- -- --       Output    Drains  40  65 105  --  -- --    Output (mL) (Closed/Suction Drain 1 Posterior Neck Carlos Wynn) 40 65 105 -- -- --    Total Output 40 65 105 -- -- --       Net I/O     -40 -65 -105 -- -- --            Intake/Output Summary (Last 24 hours) at 2019 0816  Last data filed at 2019 0400  Gross per 24 hour   Intake --   Output 105 ml   Net -105 ml            • acetaminophen  650 mg Q6HRS PRN   • Methocarbamol IVPB  1,000 mg Q8HRS   • Pharmacy Consult Request  1 Each PHARMACY TO DOSE   • MD ALERT...DO NOT ADMINISTER NSAIDS or ASPIRIN unless ORDERED By Neurosurgery  1 Each PRN   • ALPRAZolam  0.25 mg BID PRN   • cloNIDine  0.1 mg Q4HRS PRN   •  benzocaine-menthol  1 Lozenge Q2HRS PRN   • calcium carbonate  500 mg BID   • docusate sodium  100 mg BID   • senna-docusate  1 Tab Nightly   • senna-docusate  1 Tab Q24HRS PRN   • polyethylene glycol/lytes  1 Packet BID PRN   • magnesium hydroxide  30 mL QDAY PRN   • bisacodyl  10 mg Q24HRS PRN   • fleet  1 Each Once PRN   • 0.9 % NaCl with KCl 20 mEq 1,000 mL   Continuous   • enoxaparin  40 mg DAILY   • oxyCODONE immediate-release  5 mg Q3HRS PRN   • oxyCODONE immediate release  10 mg Q3HRS PRN   • HYDROmorphone  0.5 mg Q3HRS PRN   • ceFAZolin  2 g Q8HR   • potassium chloride SA  10 mEq Q EVENING   • omeprazole  20 mg QHS   • levothyroxine  75 mcg QHS   • glipiZIDE  5 mg Q EVENING   • gabapentin  800 mg TID   • furosemide  20 mg QDAY PRN   • atorvastatin  20 mg QHS   • venlafaxine  150 mg QHS   • ROPINIRole  8 mg QHS   • SITagliptin  100 mg Q EVENING   • temazepam  30 mg QHS   • NS   Continuous   • diphenhydrAMINE  25 mg Q6HRS PRN    Or   • diphenhydrAMINE  25 mg Q6HRS PRN   • ondansetron  4 mg Q4HRS PRN   • ondansetron  4 mg Q4HRS PRN   • vitamin D  5,000 Units DAILY   • Influenza Vaccine High-Dose pf  0.5 mL Once PRN       Assessment and Plan:  POD#8 Revision of C3-4 and C4-5 ACDF   POD#3 C2-T1 PCF  PT/OT/ambulate as tolerated. Home Health ordered.Today   Started IV Robaxin  Collar to be on if Pt OOB (Fits poorly due to body habitus in bed)  HH has been ordered, discussed with patient she will need to ambulate.   Home today  F/u in [facility] in 2 weeks foe wound check.Staple removal  Activity and wound careas per Pre op instuctions sheet and HH PT/OT.  Collar at all times--off for meals and showers.    May shower.  Resume home meds   Add Percocet 10/325 1 tab PO q 4 hours prn pain   Robaxin 750 mg 1 PO TID Prn Stasm

## 2019-09-26 NOTE — CARE PLAN
Problem: Communication  Goal: The ability to communicate needs accurately and effectively will improve  9/26/2019 0637 by KOTA Tran.N.  Outcome: PROGRESSING AS EXPECTED  9/26/2019 0530 by KOTA Tran.N.  Outcome: PROGRESSING AS EXPECTED     Problem: Safety  Goal: Will remain free from injury  9/26/2019 0637 by KOTA Tran.N.  Outcome: PROGRESSING AS EXPECTED  9/26/2019 0530 by Madelaine Blake R.N.  Outcome: PROGRESSING AS EXPECTED  Goal: Will remain free from falls  9/26/2019 0637 by Madelaine Blake R.N.  Outcome: PROGRESSING AS EXPECTED  9/26/2019 0530 by Madelaine Blake R.N.  Outcome: PROGRESSING AS EXPECTED     Problem: Infection  Goal: Will remain free from infection  Outcome: PROGRESSING AS EXPECTED

## 2019-09-26 NOTE — PROGRESS NOTES
Pt has difficulty swallowing, chocking over the breakfast, moist cough after sips of water. NPO per nursing judgment at this time. Reymundo Farfan PA notified at this time. SLP arvind ordered per PA at this time.    Per PT pt is not safe to go home with HH. Rehab recommended. PA notified at this time. Per PA, hold dc till tomorrow.    Paged SLP at 1010.

## 2019-09-26 NOTE — THERAPY
"Physical Therapy Treatment completed.   Bed Mobility:  Supine to Sit: Minimal Assist  Transfers: Sit to Stand: Minimal Assist  Gait: Level Of Assist: Minimal Assist with Front-Wheel Walker       Plan of Care: Will benefit from Physical Therapy 5 times per week  Discharge Recommendations: Equipment: Will Continue to Assess for Equipment Needs. Post-acute therapy Recommend post-acute placement for continued physical therapy services prior to discharge home. Patient can tolerate post-acute therapies at a 5x/week frequency.       See \"Rehab Therapy-Acute\" Patient Summary Report for complete documentation.     Pt found having a very hard time swallowing her breakfast, RN notified. Pt presenting w/ improved functional mobility. She still c/o high levels of pain however was able to tolerate more mobility today. Pt requires a lot of time and effort to perform all mobility. She needed cues for log rolling and was unable to recall spinal precautions. Pt not wanting to wear brace d/t the pressure it put on her neck muscles as they are very sensitive right now. Pt able to ambulate 5 feet x 2. She required a lot of rest breaks for just a short distances and took a very long time to perform. Pt states she will be home w/ her SO only during the day and he needs surgery himself so he will not be able to assist. Pt currently is not at a safe level for DC home. Continue to recommend post acute placement.  "

## 2019-09-26 NOTE — THERAPY
"Speech Language Therapy Clinical Swallow Evaluation completed.  Functional Status: Pt was alert, responsive and cooperative. Per RN, pt had difficulty swallowing with breakfast, coughing with sips of water. Pt corroborated this report, stating she felt like something was stuck in her throat and wouldn't go down. [facility] exam was WFL for orofacial strength, ROM, coordination. Pt is edentulous, reports she does not wear her dentures to eat. Pt also reports she progressed back to a regular diet after prior admission and first ACDF surgery. Pt was presented with ice chips (5), thins via tsp cup (5 oz), purees (4 oz), soft solids (3 oz). Oral phase was prolonged due to lack of dentition, though pt was adequate able to mash chewable solids with no oral residue remaining. Pharyngeal swallow trigger was timely. Hyolaryngeal excursion palpated as complete. Swallows were audible with thins. Pt reported sensation of soft solids getting stuck x2, but a 2nd swallow was effective to clear. No s/sx of aspiration occurred with PO intake and pt reported she felt much better than earlier this morning. Recommend restarting pt on a Dysphagia 2/thin liquid diet with monitoring during meals. SLP to follow.     Recommendations - Diet: Dysphagia II, Thin Liquid                          Strategies: Monitor during meals and Head of Bed at 90 Degrees                          Medication Administration: Crush all Medications in Puree, Float Whole with Puree (per pt tolerance)  Plan of Care: Will benefit from Speech Therapy 3 times per week  Post-Acute Therapy: Recommend outpatient or home health transitional care services for continued speech therapy services      See \"Rehab Therapy-Acute\" Patient Summary Report for complete documentation.   "

## 2019-09-26 NOTE — CONSULTS
Physical Medicine and Rehabilitation   Chart Review Consult     NAME: Valentina Lu  Date: 9/26/2019  LOS: 8 Day(s)      Chief Compliant: Postop day #8 revision of C3-4 and C4-5 ACDF; postop day #3 C2-T1 PCF.  Surgeries performed by Dr. Colby Lechuga MD on 9/18/2019, and 9/23/2019.    Chart review completed.  Valentina Lu is a 63 y.o.  female with a PMH of diabetes, hypertension, hyperlipidemia, GERD, arthritis; who is admitted for cervical hardware failure and is currently status post two-stage ACDF revision and PCF.  Per chart review, patient has had a relatively uncomplicated recovery.    9/25/2019 physical therapy: Mod assist with bed mobility, min assist with sit to stand.  9/24/2019 occupational therapy: Mod assist toilet transfer to bedside commode, max assist lower body dressing, max assist upper body dressing    she does apparently continue to have moderately severe cognitive impairment, dysphagia, and functional impairment with mobility and activities of daily living. This patient is a very good candidate for acute inpatient rehabilitation, both reasonable and necessary, including 24 hr Physician supervision and rehab nursing care, evaluation and treatment by physical therapy, occupational therapy. she appears to be able to tolerate therapy 3 hours per day 5 days per week or a minimum of 15 hours per week.     her precautions include:Fall/Safety precautions.     DVT PPX: Lovenox 40 mg daily    PMH:  Past Medical History:   Diagnosis Date   • Anemia 01/05/2018   • Arrhythmia     MVP   • Arthritis     all over   • Asthma 15-18    HX of, not on meds   • BPPV (benign paroxysmal positional vertigo)    • Breast lump or mass     Sourav breasts- had removed   • Cancer (HCC)     ovarian, caught early with hysterectomy   • Chickenpox    • Dental disorder 01/05/2018    upper dentures, lower missing teeth   • depression     taking venlafaxine   • Diabetes (HCC)     oral medication   • Disorder of thyroid  "    low thyroid   • Essential and other specified forms of tremor    • Fibromyalgia     neuropathy   • Foot fracture, right 12/13/2017   • GERD (gastroesophageal reflux disease)    • [place] measles    • Heart burn    • Heart valve disease     mitral valve prolapse   • Helicobacter pylori    • Hemorrhagic disorder (HCC)     bruises easily   • Hiatus hernia syndrome     pt. denies 1-5-18   • High cholesterol 01/05/2018    \"Controlled with medication\"   • Hyperlipidemia    • Hypertension 09/10/2019    HX of, not currently on meds   • IBS (irritable bowel syndrome) 01/05/2018   • Indigestion    • Influenza    • Migraine with aura, without mention of intractable migraine without mention of status migrainosus    • MRSA (methicillin resistant Staphylococcus aureus) 2005   • Other specified disorder of intestines     IBS   • Oxygen dependent 09/10/2019    3 liter PRN   • Pain 09/10/2019    entire body,pain scale 6-7/10   • Pneumonia 01/05/2018    \"HX OF\"     • Polyneuropathy in diabetes(357.2)    • Renal disorder 01/05/2018    \"Due to a medication I was taking\". Name unknown   • Restless legs syndrome (RLS)    • Sleep apnea     sleep study done, states it was only for 2 hours, not the whole time   • Sleep apnea 01/05/2018    doesn't tolerate CPAP   • Snoring 01/05/2018   • Stroke ([facility]) 2007    short term memory loss   • Syncope     September '08 [place]   • TIA (transient ischemic attack)     per pt has had TIAs reports last    • Urinary incontinence 01/05/2018    occ. leaking   • Vision disturbance     flashes of light right/floaters on left eye       PSH:  Past Surgical History:   Procedure Laterality Date   • CERVICAL LAMINECTOMY POSTERIOR  9/23/2019    Procedure: LAMINECTOMY, SPINE, CERVICAL, POSTERIOR APPROACH- C3-4;  Surgeon: Remi Lechuga III, M.D.;  Location: SURGERY [facility];  Service: Neurosurgery   • CERVICAL FUSION POSTERIOR N/A 9/23/2019    Procedure: FUSION, SPINE, CERVICAL, POSTERIOR " APPROACH- C2-t1 STEALTH FUSION;  Surgeon: Remi Lechuga III, M.D.;  Location: [facility];  Service: Neurosurgery   • CERVICAL DISK AND FUSION ANTERIOR  9/18/2019    Procedure: EXPLORATION OF CERVICAL FUSION and ANTERIOR CERVICAL HARDWARE REMOVAL, ANTERIOR CERVICAL 3 - 4 INTERBODY FUSION, CERVICAL 3 - 5 ANTERIOR FIXATION;  Surgeon: Remi Lechuga III, M.D.;  Location: [facility];  Service: Neurosurgery   • CORPECTOMY  9/18/2019    Procedure: PARTIAL CERVICAL 3 - 4 CORPECTOMY;  Surgeon: eRmi Lechuga III, M.D.;  Location: [facility];  Service: Neurosurgery   • CERVICAL DISK AND FUSION ANTERIOR  6/21/2019    Procedure: DISCECTOMY, SPINE, CERVICAL, ANTERIOR APPROACH, WITH FUSION - C3-5;  Surgeon: Remi Lechuga III, M.D.;  Location: [facility];  Service: Neurosurgery   • HARDWARE REMOVAL NEURO  6/21/2019    Procedure: REMOVAL, HARDWARE - C5-7;  Surgeon: Remi Lechuga III, M.D.;  Location: [facility];  Service: Neurosurgery   • LAMINOTOMY Bilateral 1/12/2018    Procedure: Bilateral L3-L4 laminectomy with right L4  transpedicular decompression;  Surgeon: Remi Lechuga III, M.D.;  Location: [facility];  Service: Neurosurgery   • EXTREME LATERAL INTERBODY FUSION Left 1/11/2018    Procedure: EXTREME LATERAL INTERBODY FUSION-STAGE #1 L3-4 XLIF;  Surgeon: Remi Lechuga III, M.D.;  Location: [facility];  Service: Neurosurgery   • CERVICAL DISK AND FUSION ANTERIOR  1/22/2016    Procedure: CERVICAL DISK AND FUSION ANTERIOR C5-7;  Surgeon: Jerry Flores M.D.;  Location: [facility];  Service:    • MYRINGOTOMY  9/17/2010    Performed by CARY BANUELOS at [facility] ORS   • NASAL ENDOSCOPY  8/12/2010    Performed by CARY BANUELOS at [facility]ERY [facility] ORS   • ETHMOIDECTOMY  8/12/2010    Performed by CARY BANUELOS at [facility] ORS   • ABDOMINAL HYSTERECTOMY TOTAL      Hysterectomy, Total  Abdominal   • BREAST BIOPSY      X2 bilateral   • CHEST TUBE SUCTION      spontaneous pneumothorax - age 14   • CHOLECYSTECTOMY     • TONSILLECTOMY         FAMILY HISTORY:  Family History   Problem Relation Age of Onset   • Diabetes Other    • Heart Disease Other    • Hypertension Other    • Lung Disease Other        MEDICATIONS:  Current Facility-Administered Medications   Medication Dose   • acetaminophen (TYLENOL) tablet 650 mg  650 mg   • methocarbamol (ROBAXIN) 1,000 mg in  mL IVPB  1,000 mg   • Pharmacy Consult Request ...Pain Management Review 1 Each  1 Each   • MD ALERT...DO NOT ADMINISTER NSAIDS or ASPIRIN unless ORDERED By Neurosurgery 1 Each  1 Each   • ALPRAZolam (XANAX) tablet 0.25 mg  0.25 mg   • cloNIDine (CATAPRES) tablet 0.1 mg  0.1 mg   • benzocaine-menthol (CEPACOL) lozenge 1 Lozenge  1 Lozenge   • calcium carbonate (TUMS) chewable tab 500 mg  500 mg   • docusate sodium (COLACE) capsule 100 mg  100 mg   • senna-docusate (PERICOLACE or SENOKOT S) 8.6-50 MG per tablet 1 Tab  1 Tab   • senna-docusate (PERICOLACE or SENOKOT S) 8.6-50 MG per tablet 1 Tab  1 Tab   • polyethylene glycol/lytes (MIRALAX) PACKET 1 Packet  1 Packet   • magnesium hydroxide (MILK OF MAGNESIA) suspension 30 mL  30 mL   • bisacodyl (DULCOLAX) suppository 10 mg  10 mg   • fleet enema 133 mL  1 Each   • 0.9 % NaCl with KCl 20 mEq infusion     • enoxaparin (LOVENOX) inj 40 mg  40 mg   • oxyCODONE immediate-release (ROXICODONE) tablet 5 mg  5 mg   • oxyCODONE immediate release (ROXICODONE) tablet 10 mg  10 mg   • HYDROmorphone pf (DILAUDID) injection 0.5 mg  0.5 mg   • ceFAZolin in dextrose (ANCEF) IVPB premix 2 g  2 g   • potassium chloride SA (Kdur) tablet 10 mEq  10 mEq   • omeprazole (PRILOSEC) capsule 20 mg  20 mg   • levothyroxine (SYNTHROID) tablet 75 mcg  75 mcg   • glipiZIDE (GLUCOTROL) tablet 5 mg  5 mg   • gabapentin (NEURONTIN) capsule 800 mg  800 mg   • furosemide (LASIX) tablet 20 mg  20 mg   • atorvastatin  (LIPITOR) tablet 20 mg  20 mg   • venlafaxine (EFFEXOR) tablet 150 mg  150 mg   • ROPINIRole (REQUIP) tablet 8 mg  8 mg   • SITagliptin (JANUVIA) tablet 100 mg  100 mg   • temazepam (RESTORIL) capsule 30 mg  30 mg   • NS infusion     • diphenhydrAMINE (BENADRYL) tablet/capsule 25 mg  25 mg    Or   • diphenhydrAMINE (BENADRYL) injection 25 mg  25 mg   • ondansetron (ZOFRAN) syringe/vial injection 4 mg  4 mg   • ondansetron (ZOFRAN ODT) dispertab 4 mg  4 mg   • vitamin D (cholecalciferol) tablet 5,000 Units  5,000 Units   • Influenza Vaccine High-Dose pf injection 0.5 mL  0.5 mL       ALLERGIES:  Pcn [penicillins]; Demerol; Imitrex [sumatriptan succinate]; Metformin; Morphine; Prednisone; Stadol [butorphanol tartrate]; Tape; and Vistaril [hyzine]    PSYCHOSOCIAL HISTORY:  Patient lives with her significant other Cesar, son, daughter-in-law and daughter.  Previously independent with ADLs and IADLs.  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 just moved into a handicap accessible apartment.    Estimated length of stay is approximately 10-14 days with good rehabilitation potential.   her disposition is to discharge to premorbid independent living setting with the supportive care of her spouse/family and community resources.     We will proceed with transfer to acute inpatient rehabilitation when appropriate as per her attending physician.  Thank for allowing us to participate in her care.      Antonio Gordon, DO   Physical Medicine and Rehabilitation   9/26/2019

## 2019-09-26 NOTE — PREADMISSION SCREENING NOTE
"  Pre-Admission Screening Form    Patient Information:   Name: Valentina Lu     MRN: [id number]       : 1955      Age: 63 y.o.   Gender: female      Race: White [7]       Marital Status:  [4]  Family Contact: Mohini Curiel,Ashlie Young        Relationship: Daughter [2]  Significant other [13]  Daughter [2]  Home Phone: [phone]             Cell Phone: [phone]  Advanced Directives: None  Code Status:  FULL  Current Attending Provider: Remi Lechuga III, M.D.  Referring Physician: Linn Rodrigues PA-C       Physiatrist Consult: Dr. Gordon       Referral Date: 2019  Primary Payor Source:  MEDICARE  Secondary Payor Source:  MEDICAID FFS    Medical Information:   Date of Admission to Acute Care [place]  Room Number: S146/00  Rehabilitation Diagnosis: 03.9 Other Neurologic  Immunization History   Administered Date(s) Administered   • Influenza TIV (IM) 10/01/2015   • Influenza Vaccine Pediatric Preserv Free 2010   • Influenza Vaccine Quad Inj (Pf) 2018   • Pneumococcal Conjugate Vaccine (Prevnar/PCV-13) 2016   • Pneumococcal polysaccharide vaccine (PPSV-23) 2010     Allergies   Allergen Reactions   • Pcn [Penicillins] Anaphylaxis     Tolerated Cefazolin 14  RXN=age 6   • Demerol Itching     HVY=9547   • Imitrex [Sumatriptan Succinate] Unspecified     Heart beats fast  PZR=3266   • Metformin Vomiting and Nausea     AYE=2930   • Morphine Itching, Vomiting and Cough     voniting headaches. Tolerates dilaudid,oxycodone 16   RKM=1071   • Prednisone      \"crying\" nonstop after getting an epidural steriod per pt; requests steroids be put in her allergy list   • Stadol [Butorphanol Tartrate] Itching     RXN=    • Tape Unspecified     RXN=ongoing Paper ok   • Vistaril [Hyzine] Itching     EOJ=9237     Past Medical History:   Diagnosis Date   • Anemia 2018   • Arrhythmia     " "MVP   • Arthritis     all over   • Asthma 15-18    HX of, not on meds   • BPPV (benign paroxysmal positional vertigo)    • Breast lump or mass     Sourav breasts- had removed   • Cancer (formerly [facility])     ovarian, caught early with hysterectomy   • Chickenpox    • Dental disorder 01/05/2018    upper dentures, lower missing teeth   • depression     taking venlafaxine   • Diabetes (formerly [facility])     oral medication   • Disorder of thyroid     low thyroid   • Essential and other specified forms of tremor    • Fibromyalgia     neuropathy   • Foot fracture, right 12/13/2017   • GERD (gastroesophageal reflux disease)    • Malay measles    • Heart burn    • Heart valve disease     mitral valve prolapse   • Helicobacter pylori    • Hemorrhagic disorder (formerly [facility])     bruises easily   • Hiatus hernia syndrome     pt. denies 1-5-18   • High cholesterol 01/05/2018    \"Controlled with medication\"   • Hyperlipidemia    • Hypertension 09/10/2019    HX of, not currently on meds   • IBS (irritable bowel syndrome) 01/05/2018   • Indigestion    • Influenza    • Migraine with aura, without mention of intractable migraine without mention of status migrainosus    • MRSA (methicillin resistant Staphylococcus aureus) 2005   • Other specified disorder of intestines     IBS   • Oxygen dependent 09/10/2019    3 liter PRN   • Pain 09/10/2019    entire body,pain scale 6-7/10   • Pneumonia 01/05/2018    \"HX OF\"     • Polyneuropathy in diabetes(357.2)    • Renal disorder 01/05/2018    \"Due to a medication I was taking\". Name unknown   • Restless legs syndrome (RLS)    • Sleep apnea     sleep study done, states it was only for 2 hours, not the whole time   • Sleep apnea 01/05/2018    doesn't tolerate CPAP   • Snoring 01/05/2018   • Stroke (formerly [facility]) 2007    short term memory loss   • Syncope     September '08 [place]   • TIA (transient ischemic attack)     per pt has had TIAs reports last    • Urinary incontinence 01/05/2018    occ. leaking   • Vision disturbance  "    flashes of light right/floaters on left eye     Past Surgical History:   Procedure Laterality Date   • CERVICAL LAMINECTOMY POSTERIOR  9/23/2019    Procedure: LAMINECTOMY, SPINE, CERVICAL, POSTERIOR APPROACH- C3-4;  Surgeon: Remi Lechuga III, M.D.;  Location: [facility];  Service: Neurosurgery   • CERVICAL FUSION POSTERIOR N/A 9/23/2019    Procedure: FUSION, SPINE, CERVICAL, POSTERIOR APPROACH- C2-t1 STEALTH FUSION;  Surgeon: Remi Lechuga III, M.D.;  Location: [facility];  Service: Neurosurgery   • CERVICAL DISK AND FUSION ANTERIOR  9/18/2019    Procedure: EXPLORATION OF CERVICAL FUSION and ANTERIOR CERVICAL HARDWARE REMOVAL, ANTERIOR CERVICAL 3 - 4 INTERBODY FUSION, CERVICAL 3 - 5 ANTERIOR FIXATION;  Surgeon: Remi Lechuga III, M.D.;  Location: [facility];  Service: Neurosurgery   • CORPECTOMY  9/18/2019    Procedure: PARTIAL CERVICAL 3 - 4 CORPECTOMY;  Surgeon: Remi Lechuga III, M.D.;  Location: [facility];  Service: Neurosurgery   • CERVICAL DISK AND FUSION ANTERIOR  6/21/2019    Procedure: DISCECTOMY, SPINE, CERVICAL, ANTERIOR APPROACH, WITH FUSION - C3-5;  Surgeon: Remi Lechuga III, M.D.;  Location: [facility];  Service: Neurosurgery   • HARDWARE REMOVAL NEURO  6/21/2019    Procedure: REMOVAL, HARDWARE - C5-7;  Surgeon: Remi Lechuga III, M.D.;  Location: [facility];  Service: Neurosurgery   • LAMINOTOMY Bilateral 1/12/2018    Procedure: Bilateral L3-L4 laminectomy with right L4  transpedicular decompression;  Surgeon: Remi Lechuga III, M.D.;  Location: [facility];  Service: Neurosurgery   • EXTREME LATERAL INTERBODY FUSION Left 1/11/2018    Procedure: EXTREME LATERAL INTERBODY FUSION-STAGE #1 L3-4 XLIF;  Surgeon: Remi Lechuga III, M.D.;  Location: [facility];  Service: Neurosurgery   • CERVICAL DISK AND FUSION ANTERIOR  1/22/2016    Procedure: CERVICAL DISK AND FUSION ANTERIOR C5-7;   Surgeon: Jerry Flores M.D.;  Location: SURGERY [facility];  Service:    • MYRINGOTOMY  9/17/2010    Performed by CARY BANUELOS at SURGERY [facility]   • NASAL ENDOSCOPY  8/12/2010    Performed by CARY BANUELOS at SURGERY SAME DAY [facility]   • ETHMOIDECTOMY  8/12/2010    Performed by CARY BANUELOS at SURGERY SAME DAY [facility]   • ABDOMINAL HYSTERECTOMY TOTAL      Hysterectomy, Total Abdominal   • BREAST BIOPSY      X2 bilateral   • CHEST TUBE SUCTION      spontaneous pneumothorax - age 14   • CHOLECYSTECTOMY     • TONSILLECTOMY         History Leading to Admission, Conditions that Caused the Need for Rehab (CMS):   Remi Lechuga III, M.D.   Physician   Surgery Neurosurgery   OP Report                    DATE OF SERVICE:  09/18/2019     PREOPERATIVE DIAGNOSES:  1.  Cervical spondylosis.  2.  Cervical stenosis.  3.  Cervical spondylolisthesis.  4.  Cervical kyphosis.  5.  Pseudoarthrosis.  6.  Obesity.  7.  Status post C3-C5 anterior cervical discectomy and fusion with me 4 months   ago.      POSTOPERATIVE DIAGNOSES:  1.  Cervical spondylosis.  2.  Cervical stenosis.  3.  Cervical spondylolisthesis.  4.  Cervical kyphosis.  5.  Pseudoarthrosis.  6.  Obesity.  7.  Status post C3-C5 anterior cervical discectomy and fusion with me 4 months   ago.      PROCEDURES PERFORMED:  1.  Removal of hardware C3, C4, C5 anteriorly.  2.  Exploration of fusion C3, C4, C5.  3.  C3 partial corpectomy, diskectomy, decompression of thecal sac and nerve   roots, 59 modifier.  4.  Partial C4 corpectomy, diskectomy, decompression of thecal sac and nerve   roots, 59 modifier.  5.  C3, C4 PEEK interbody cage placement.  6.  C3, C4 interbody/allograft/autograft fusion.  7.  C3, C4, C5 anterior plating fixation.  8.  Intraoperative monitoring.  7.  Modifier 22, patient's body mass index of 35, took case normally an hour   and half, made it 2 hours and 45 minutes, greater than 50% increase in time,   physical and  mental effort to complete it safely deal with additional body   habitus and scar from previous surgery justifying modifier 22 to the entire   case.     SURGEON:  Remi Lechuga III, MD     ASSISTANT:  Michele Farfan PA-C     ANESTHESIA:  General.     COMPLICATIONS:  None.     ESTIMATED BLOOD LOSS:  50 mL.     FINDINGS:  Clear pseudoarthrosis at C3-C4.  All hardware removed.  Patient's   very soft bone noted on placement of stand-alone cage because of C3 was   unavailable for anterior plating.  Dictates that we must do a posterior   procedure preferably during this hospitalization due to the tenuous nature of   this construct.  We will discuss the patient postoperatively.  No change in   motors and SSEPs.     COMPLICATIONS:  None.     DRAINS:  Left subplatysmal Hemovac.     DISPOSITION:  Extubated to recovery and to floor.     HISTORY OF PRESENT ILLNESS:  This is a 63-year-old woman who had undergone an   uncomplicated C3-C5 ACDF with me above C5-C6, C6-C7 previous ACDF.  She   developed increasing neck pain, cervical kyphosis and fairly early   postoperatively, she noted already have extruded the C3-C4 graft and the   screws at C3 and C4 clear.  DEXA scan showed osteoporosis.  She started on   medications.  This is likely accounting for why she failed so quickly with a   pseudoarthrosis.  I explained the risks, benefits, and alternatives of an   anterior hardware removal, repeat corpectomies and large interbody cage   placement, possibly stand-alone and re-plating of the disk space at C4-C5 that   is still fusing and this includes pain, infection, bleeding, CSF leak,   failure to completely resolve symptoms, neurologic deficit, weakness,   numbness, bladder or bowel difficulties, failure of fixation, failure of   fusion, need for posterior supplementation, rostral caudal extensions due to   junctional stenosis, injury to the carotid artery, trachea, esophagus and   temporary or permanent speaking and swallowing  difficulties.  She understood   the risks, benefits, and agreed to consent.     SUMMARY OF OPERATIVE PROCEDURE:  Patient was brought to the operating suite,   placed under general anesthesia.  Recurrent laryngeal nerve monitoring was   done and motors and SSEPs were run at baseline.  Patient was placed back in   head back in extension with a shoulder bump and head placed in a head pillow.    All padded pressure points secured.  Shoulders and head taped down.  Motors   and SSEPs were stable throughout the case and there was only intermittent   recurrent laryngeal nerve firing during the approach.  We decided to go on the   left side because she had 2 previous surgeries on the right side to avoid   scarring and injury to the important internal organs.  We kathie out a left   anterior transverse incision overlying the C4 screws.  This was infiltrated   with Marcaine with epinephrine.  Preoperative antibiotics were given.  Proper   time-out was performed.  Patient was prepped and draped in sterile fashion.    The patient's body mass index increased the time, all adding the time of   positioning, approach, etc. justifying modifier 22 to the entire case due to   the scar and BMI of 35.     A linear incision was made and soft tissues dissected with monopolar   electrocautery.  We found the platysma, incised it sharply and did   subplatysmal dissection.  We did encounter scar down deep, but the initial   dissection planes were okay.  We found the sternocleidomastoid, moved   laterally, the strap muscles moved medially, found the carotid sheath moved   laterally.  We only saw intermittent recurrent laryngeal nerve firing during   the approach.  We elevated the esophagus and passed an NG tube by anesthesia   to confirm the tubular structure.  There is lot of thickened scar in the   prevertebral space over the plate.  We elevated this with Bovie.  We isolated   the plate.  We constructed a Shadow-Line retractor with up-down  corpectomy   blades to give us nice visualization.  We cleared away his all soft tissue   over the hardware.     C3-C4 hardware removal and exploration of fusion:  This was clear that the   screws were elevated and were removed easily.  At C3 and C4, noting a clear   pseudoarthrosis.  We removed the plate with the [employer] hardware screwdriver,   it elevated easily.  We cleared away all the way down to actual bone at C3,   C4, and C5.  C3 was very remodeled due to the extrusion of the graft.  The   graft came out and clearly was easily removed, indicating pseudoarthrosis at   those levels.     Partial C3, partial C4 corpectomy, diskectomy, decompression of thecal sac and   nerve roots:  Using [employer] Armando drill, we performed partial corpectomies just   to try to align with very degenerated and collapsed into vertebral body at C3   and as well as C4 to trying to maintain the endplates best we can, but we   drilled greater than 50% of depth of the vertebral body.  We put Surgiflo   down.  We felt that this was at least good enough bone to put a graft into.    We got all the way down to the dura, which had adherent to the scar from the   previous surgery.  We were happy with this creation of a new trapezoidal space   widened it to accept the standalone graft.     C3-C4 PEEK interbody cage placement:  Using the Z-LINK standalone cage from   [employer], we placed that cage pounded into place.  There was no change in   motors and SSEPs.  We had reasonable apposition on C4, but C3 was so   degenerated; and from the sunken in graft previously, the partial corpectomies   were created.  It created nice space with a size of 9 mm graft that had a   reasonable snug fit.  We were unable to use [place] pin distraction either   because there was really not much vertebral body bone available at C3.  We put   Surgiflo down before introduction of the graft and we are as happy as we   could be with the apposition, but I do recommend there is  a strong   consideration for posterior fixation before she leaves the hospital, the   single screw in C3 from the standalone graft, I do not want to fail quickly   like she did before.     C3-C4 interbody allograft autograft fusion:  Before introduction of the graft,   we packed tightly with demineralized bone matrix allograft and some of the   corpectomy bone.  Because of the tightest apposition we could make with   re-drilling the trapezoidal space with partial corpectomies, helped to assure   interbody fusion.     C3, C4, C5 anterior plating fixation:  The C3-C4 was a standalone plate and so   was awled in and then drilled to a depth of 14 mm.  Bony confines were   confirmed with whole probe and then we placed 4.0x14 mm self-tapping screws   with reasonable bony purchase, although the soft bone was noted.  At C4-C5, we   chose another plate that mimicked the holes from the previous, re-drilled   them and used rescue screws 4.5x40 mm screws with good bony purchase.  We   tightened using  screwdriver to tighten down the locking nuts to   prevent back out.  We were happy with our final construct.  There was no   change in motors and SSEPs.     We copiously irrigated with bacitracin infused saline.  We tunneled out a   subfascial ARYAN and secured to skin with a stitch.  We slowly withdrew the   retractors and assured hemostasis, closed the wound in anatomic layers with   3-0 Vicryl to bring the platysma back together, 3-0 Vicryl for the dermis, and   Steri-Strips for the skin.  Sterile dressing was applied.  Patient was   extubated and Duncan removed.     There were no complications.  Needle and sponge count were correct at the end   of the case.  Body mass index of 35 and increased scar from previous surgery   took the case normally an hour and half, made it 2 hours 45 minutes, greater   than 50% increase in time, physical and mental effort to complete it safely   justifying modifier 22 to the entire  case.        ____________________________________     MD Remi Fisher III, III, M.D.   Physician   Surgery Neurosurgery   OP Report      Date of Service:  9/23/2019 12:00 AM                      DATE OF SERVICE:  09/23/2019     PREOPERATIVE DIAGNOSES:  1.  Cervical spondylosis.  2.  Cervical stenosis.  3.  Cervical radiculopathy.  4.  Status post C3-C4 partial corpectomy, interbody fusion and revision C3-C5   anterior fixation.  5.  Osteoporosis.  6.  Cervical kyphosis.      POSTOPERATIVE DIAGNOSES:  1.  Cervical spondylosis.  2.  Cervical stenosis.  3.  Cervical radiculopathy.  4.  Status post C3-C4 partial corpectomy, interbody fusion and revision, C3-C5   anterior fixation.  5.  Osteoporosis.  6.  Cervical kyphosis.     PROCEDURES PERFORMED:  Stage II:  1.  Stealth guidance for the spine.  2.  T1 bilateral laminotomies.  3. C2, C3, C4, C5, C6, T1 lateral mass and pedicle screw fixation.  4. C2, C3, C4, C5, C6, T1 posterolateral allograft/bere fusion.  4.  Intraoperative monitoring.  5.  Modifier 22.  Patient's body mass index of 36 took the case that normally   would be 2 hours, made it 4 hours, greater than 50% increase in time, physical   and mental effort to deal with additional body habitus justifying modifier 22   the entire case.     SURGEON:  Remi Lechuga MD     ASSISTANT:  Linn Scales PA-C     COMPLICATIONS:  None.     DRAINS:  Left subfascial ARYAN.     BLOOD LOSS:  50 mL     FINDINGS:  Reasonable bony fixation, no change in motors and SSEPs.     DISPOSITION:  Extubated to recovery and to floor.     HISTORY OF PRESENT ILLNESS:  This is a 63-year-old woman who had a previous   C5-C7 ACDF, which seemed like good fusion, but developing cervical kyphosis.    Several months ago she had undergone a C3-C5 ACDF.  They were rostral to the   original ACDF with another practitioner and she quickly failed her hardware.    She had a DEXA scan which shows significant osteoporosis.   We deemed her a   candidate for anterior revision, but given the small amount of bone that was   left at C3 and unfortunately due to very poor bone quality, I recommend C2-T1   supplementation to prevent any future kyphosis and other hardware failures.  I   explained the risks, benefits, and alternatives including pain, infection,   bleeding, CSF leak, failure to completely resolve symptoms, neurologic   deficit, weakness, numbness, bladder or bowel difficulties, failure of   fixation, failure of fusion, need for rostral caudal extensions due to   junctional stenosis.  She understood the risks, benefits, agreed and   consented, and done well from the anterior procedure to revise the collapsing   and extruding hardware.     SUMMARY OF OPERATIVE PROCEDURE:  Patient was brought to the operating suite,   placed under general anesthesia.  Duncan catheter was placed, lines secured.    Upper and lower extremity motors and SSEPs were run at baseline by NMA   monitoring.  Anesthesia was kept at MAPs greater than 80 during the case.  The   patient was placed in Quintero headholder fixation, flipped prone onto a   chest, hip, thigh OSI table with a Quintero headholder attached in anatomic   position, guided by x-ray to try to bring the head back in a more normal   positioning.  Repeat motors and SSEPs showed no changes.  X-ray fluoroscopy   was brought into draw a midline C2-T1 incision.  This was infiltrated with   Marcaine with epinephrine.  Preoperative antibiotics were given.  Proper   time-out was performed.  Patient was prepped and draped in sterile fashion.     The patient's body mass index greatly increased the time of positioning, all   adding the total case time of 4 hours justifying modifier 22 the entire case.    A linear incision was made and soft tissues dissected with bipolar and   monopolar electrocautery.  We found the dorsal fascia, incised it sharply   using subperiosteal technique, stripped the muscles off  the C2, C3, C4, C5,   C6, C7, and T1 lateral masses and transverse processes.  The patient's body   mass index added the time length of dissection justifying modifier 22.  We   advanced the retractors, infiltrated the muscle with Marcaine, verified levels   with x-ray, and turned our attention to the Stealth guidance.     Stealth guidance for the spine:  Using the [employer] O-arm system, we did a T1   spinous process clamp, draped the patient appropriately, ran an O-arm spin   and all trajectories were excellent.     Bilateral T1 laminotomies:  To improve our visualization of T1, we did perform   bilateral laminotomies at T1.  This is to visualize the pedicle being able to   utilize the stealth and having a second check on that well angled screw.  We   got down to ligamentum flavum, used Kerrison rongeurs and we had nice   visualization, bilaterally T1 pedicle trajectories.     C2, C3, C4, C5, C6, and T1 lateral mass and pedicle screw fixation:  Using the   [employer] Z-Link system as well as the [employer] O-arm instruments, for the   C2-T1 pedicle we made a small corticectomy, very aware that the right   vertebral artery was very abnormally medialized and superior, so we are going   to place a shorter screw on the left C2.  We made a small corticectomy guided   by the reference frame, awled under stealth, tapped under stealth and then we   placed a 4.0x24 mm screw with good bony purchase.  On the right C2, we made a   much more shallow screw because of the vertebral artery was right in our way,   a 3.5x12 mm screw.  There was no injury to the vertebral arteries that could   be detected bilaterally at C3, C4, C5 and C6.  We placed 3.5x14 mm screws.    The left C6 was a short lateral mass.  We have gone through it, so we decided   to use a 3.5x12 mm screw.  The left T1 was medialized and the inferior   trajectory guided by stealth, we placed a 4.0x24 mm screw and on the right a   4.0x26 mm screw.  We then stimulated  the screws and the stimulation parameters   were 10 milliamps or greater.  We redraped the patient, ran another O-arm   spin and all trajectories were excellent for our screws.     C3, C4, C5, C6, C7, and T1 posterolateral allograft/bere fusion:  Leaving the   interspinous ligament intact for structure, we decorticated the facet joints   of C2-C3, C3-C4, C4-C5, C5-C6, C6-C7 and transverse processes of T1 as well as   the lateral gutters; to that, we laid a MagniFuse bag of bones, absorbable   allograft bags to allow for onlay fusion.  This was supplemented with a 4.0   lordotic bere, cut to appropriate length, bent with required amount of curved   to fit nicely into the setscrews using manufacture torque device to tighten   down the locking caps.  We were happy with our final construct.  There was no   change in motors and SSEPs.  An x-ray result was good.     We copiously irrigated with bacitracin infused saline.  We tunneled a 7 Duncan   flat fluted ARYAN and secured to skin with a stitch.  We closed the wound in   anatomic layers, 0 Vicryl for the fascia, 2-0 Vicryls for the dermis and   staples for the skin.  Sterile dressing was applied.  Patient was rolled prone   to supine, removed from [place] headholder without incident.  Duncan removed   and extubated without incident.  There were no complications.  Needle and   sponge count correct at the end of the case.  The patient's body mass index of   36 greatly increased the time of the case to 4 hours justifying modifier 22   the entire case.        ____________________________________     Remi Lechuga III, MD     [facility]     DD:  09/23/2019 19:44:25  DT:  09/23/2019 21:38:38        Antonio Gordon D.O.   Physician   Physical Medicine & Rehab   Consults      Date of Service:  9/26/2019  8:47 AM                                Physical Medicine and Rehabilitation   Chart Review Consult      NAME: Valentina Lu  Date: 9/26/2019  LOS: 8 Day(s)        Chief  Compliant: Postop day #8 revision of C3-4 and C4-5 ACDF; postop day #3 C2-T1 PCF.  Surgeries performed by Dr. Colby Lechuga MD on 9/18/2019, and 9/23/2019.     Chart review completed.  Valentina Lu is a 63 y.o.  female with a PMH of diabetes, hypertension, hyperlipidemia, GERD, arthritis; who is admitted for cervical hardware failure and is currently status post two-stage ACDF revision and PCF.  Per chart review, patient has had a relatively uncomplicated recovery.     9/25/2019 physical therapy: Mod assist with bed mobility, min assist with sit to stand.  9/24/2019 occupational therapy: Mod assist toilet transfer to bedside commode, max assist lower body dressing, max assist upper body dressing     she does apparently continue to have moderately severe cognitive impairment, dysphagia, and functional impairment with mobility and activities of daily living. This patient is a very good candidate for acute inpatient rehabilitation, both reasonable and necessary, including 24 hr Physician supervision and rehab nursing care, evaluation and treatment by physical therapy, occupational therapy. she appears to be able to tolerate therapy 3 hours per day 5 days per week or a minimum of 15 hours per week.      her precautions include:Fall/Safety precautions.      DVT PPX: Lovenox 40 mg daily     PMH:  Past Medical History   Past Medical History:   Diagnosis Date   • Anemia 01/05/2018   • Arrhythmia       MVP   • Arthritis       all over   • Asthma 15-18     HX of, not on meds   • BPPV (benign paroxysmal positional vertigo)     • Breast lump or mass       Sourav breasts- had removed   • Cancer (HCC)       ovarian, caught early with hysterectomy   • Chickenpox     • Dental disorder 01/05/2018     upper dentures, lower missing teeth   • depression       taking venlafaxine   • Diabetes (HCC)       oral medication   • Disorder of thyroid       low thyroid   • Essential and other specified forms of tremor     • Fibromyalgia        "neuropathy   • Foot fracture, right 12/13/2017   • GERD (gastroesophageal reflux disease)     • Portuguese measles     • Heart burn     • Heart valve disease       mitral valve prolapse   • Helicobacter pylori     • Hemorrhagic disorder (HCC)       bruises easily   • Hiatus hernia syndrome       pt. denies 1-5-18   • High cholesterol 01/05/2018     \"Controlled with medication\"   • Hyperlipidemia     • Hypertension 09/10/2019     HX of, not currently on meds   • IBS (irritable bowel syndrome) 01/05/2018   • Indigestion     • Influenza     • Migraine with aura, without mention of intractable migraine without mention of status migrainosus     • MRSA (methicillin resistant Staphylococcus aureus) 2005   • Other specified disorder of intestines       IBS   • Oxygen dependent 09/10/2019     3 liter PRN   • Pain 09/10/2019     entire body,pain scale 6-7/10   • Pneumonia 01/05/2018     \"HX OF\"     • Polyneuropathy in diabetes(357.2)     • Renal disorder 01/05/2018     \"Due to a medication I was taking\". Name unknown   • Restless legs syndrome (RLS)     • Sleep apnea       sleep study done, states it was only for 2 hours, not the whole time   • Sleep apnea 01/05/2018     doesn't tolerate CPAP   • Snoring 01/05/2018   • Stroke ([facility]) 2007     short term memory loss   • Syncope       September '08 [place]   • TIA (transient ischemic attack)       per pt has had TIAs reports last    • Urinary incontinence 01/05/2018     occ. leaking   • Vision disturbance       flashes of light right/floaters on left eye            PSH:  Past Surgical History         Past Surgical History:   Procedure Laterality Date   • CERVICAL LAMINECTOMY POSTERIOR   9/23/2019     Procedure: LAMINECTOMY, SPINE, CERVICAL, POSTERIOR APPROACH- C3-4;  Surgeon: Remi Lechuga III, M.D.;  Location: SURGERY [facility];  Service: Neurosurgery   • CERVICAL FUSION POSTERIOR N/A 9/23/2019     Procedure: FUSION, SPINE, CERVICAL, POSTERIOR APPROACH- C2-t1 " STEALTH FUSION;  Surgeon: Remi Lechuga III, M.D.;  Location: [facility];  Service: Neurosurgery   • CERVICAL DISK AND FUSION ANTERIOR   9/18/2019     Procedure: EXPLORATION OF CERVICAL FUSION and ANTERIOR CERVICAL HARDWARE REMOVAL, ANTERIOR CERVICAL 3 - 4 INTERBODY FUSION, CERVICAL 3 - 5 ANTERIOR FIXATION;  Surgeon: Remi Lechuga III, M.D.;  Location: [facility];  Service: Neurosurgery   • CORPECTOMY   9/18/2019     Procedure: PARTIAL CERVICAL 3 - 4 CORPECTOMY;  Surgeon: Remi Lechuga III, M.D.;  Location: [facility];  Service: Neurosurgery   • CERVICAL DISK AND FUSION ANTERIOR   6/21/2019     Procedure: DISCECTOMY, SPINE, CERVICAL, ANTERIOR APPROACH, WITH FUSION - C3-5;  Surgeon: Remi Lechuga III, M.D.;  Location: [facility];  Service: Neurosurgery   • HARDWARE REMOVAL NEURO   6/21/2019     Procedure: REMOVAL, HARDWARE - C5-7;  Surgeon: Remi Lechuga III, M.D.;  Location: [facility];  Service: Neurosurgery   • LAMINOTOMY Bilateral 1/12/2018     Procedure: Bilateral L3-L4 laminectomy with right L4  transpedicular decompression;  Surgeon: Remi Lechuga III, M.D.;  Location: [facility];  Service: Neurosurgery   • EXTREME LATERAL INTERBODY FUSION Left 1/11/2018     Procedure: EXTREME LATERAL INTERBODY FUSION-STAGE #1 L3-4 XLIF;  Surgeon: Remi Lechuga III, M.D.;  Location: [facility];  Service: Neurosurgery   • CERVICAL DISK AND FUSION ANTERIOR   1/22/2016     Procedure: CERVICAL DISK AND FUSION ANTERIOR C5-7;  Surgeon: Jerry Flores M.D.;  Location: [facility];  Service:    • MYRINGOTOMY   9/17/2010     Performed by CARY BANUELOS at [facility] ORS   • NASAL ENDOSCOPY   8/12/2010     Performed by CARY BANUELOS at SURGERY [facility] ORS   • ETHMOIDECTOMY   8/12/2010     Performed by CARY BANUELOS at SURGERY [facility] ORS   • ABDOMINAL HYSTERECTOMY TOTAL         Hysterectomy,  Total Abdominal   • BREAST BIOPSY         X2 bilateral   • CHEST TUBE SUCTION         spontaneous pneumothorax - age 14   • CHOLECYSTECTOMY       • TONSILLECTOMY                FAMILY HISTORY:  Family History         Family History   Problem Relation Age of Onset   • Diabetes Other     • Heart Disease Other     • Hypertension Other     • Lung Disease Other              MEDICATIONS:       Current Facility-Administered Medications   Medication Dose   • acetaminophen (TYLENOL) tablet 650 mg  650 mg   • methocarbamol (ROBAXIN) 1,000 mg in  mL IVPB  1,000 mg   • Pharmacy Consult Request ...Pain Management Review 1 Each  1 Each   • MD ALERT...DO NOT ADMINISTER NSAIDS or ASPIRIN unless ORDERED By Neurosurgery 1 Each  1 Each   • ALPRAZolam (XANAX) tablet 0.25 mg  0.25 mg   • cloNIDine (CATAPRES) tablet 0.1 mg  0.1 mg   • benzocaine-menthol (CEPACOL) lozenge 1 Lozenge  1 Lozenge   • calcium carbonate (TUMS) chewable tab 500 mg  500 mg   • docusate sodium (COLACE) capsule 100 mg  100 mg   • senna-docusate (PERICOLACE or SENOKOT S) 8.6-50 MG per tablet 1 Tab  1 Tab   • senna-docusate (PERICOLACE or SENOKOT S) 8.6-50 MG per tablet 1 Tab  1 Tab   • polyethylene glycol/lytes (MIRALAX) PACKET 1 Packet  1 Packet   • magnesium hydroxide (MILK OF MAGNESIA) suspension 30 mL  30 mL   • bisacodyl (DULCOLAX) suppository 10 mg  10 mg   • fleet enema 133 mL  1 Each   • 0.9 % NaCl with KCl 20 mEq infusion     • enoxaparin (LOVENOX) inj 40 mg  40 mg   • oxyCODONE immediate-release (ROXICODONE) tablet 5 mg  5 mg   • oxyCODONE immediate release (ROXICODONE) tablet 10 mg  10 mg   • HYDROmorphone pf (DILAUDID) injection 0.5 mg  0.5 mg   • ceFAZolin in dextrose (ANCEF) IVPB premix 2 g  2 g   • potassium chloride SA (Kdur) tablet 10 mEq  10 mEq   • omeprazole (PRILOSEC) capsule 20 mg  20 mg   • levothyroxine (SYNTHROID) tablet 75 mcg  75 mcg   • glipiZIDE (GLUCOTROL) tablet 5 mg  5 mg   • gabapentin (NEURONTIN) capsule 800 mg  800 mg   •  furosemide (LASIX) tablet 20 mg  20 mg   • atorvastatin (LIPITOR) tablet 20 mg  20 mg   • venlafaxine (EFFEXOR) tablet 150 mg  150 mg   • ROPINIRole (REQUIP) tablet 8 mg  8 mg   • SITagliptin (JANUVIA) tablet 100 mg  100 mg   • temazepam (RESTORIL) capsule 30 mg  30 mg   • NS infusion     • diphenhydrAMINE (BENADRYL) tablet/capsule 25 mg  25 mg     Or   • diphenhydrAMINE (BENADRYL) injection 25 mg  25 mg   • ondansetron (ZOFRAN) syringe/vial injection 4 mg  4 mg   • ondansetron (ZOFRAN ODT) dispertab 4 mg  4 mg   • vitamin D (cholecalciferol) tablet 5,000 Units  5,000 Units   • Influenza Vaccine High-Dose pf injection 0.5 mL  0.5 mL         ALLERGIES:  Pcn [penicillins]; Demerol; Imitrex [sumatriptan succinate]; Metformin; Morphine; Prednisone; Stadol [butorphanol tartrate]; Tape; and Vistaril [hyzine]     PSYCHOSOCIAL HISTORY:  Patient lives with her significant other Cesar, son, daughter-in-law and daughter.  Previously independent with ADLs and IADLs.  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 just moved into a handicap accessible apartment.     Estimated length of stay is approximately 10-14 days with good rehabilitation potential.   her disposition is to discharge to premorbid independent living setting with the supportive care of her spouse/family and community resources.      We will proceed with transfer to acute inpatient rehabilitation when appropriate as per her attending physician.  Thank for allowing us to participate in her care.        Antonio Gordon DO   Physical Medicine and Rehabilitation   9/26/2019                   Imaging:   MRI Cervical Spine w/o on 9/18/2019.     1.  Postsurgical changes C3-C5 ACDF . Prevertebral soft tissue swelling consistent with recent surgery.  2.  C5-C7 bony interbody fusion.  3.  Mild degenerative changes at C2 without significant spinal stenosis seen.      Cervical Spine 2 or 3 views on 9/23/2019      Intraoperative images intended for surgical hardware localization and  operative diagnostic purposes demonstrating C2-T1 posterior spinal fusion. ACDF is again noted. See surgical report for details.  Fluoroscopy Time:  12 seconds.  3 fluoroscopic images were obtained.     Cervial Spine 2 or 3 view on 9/24/2019  HISTORY/REASON FOR EXAM: Neck pain    TECHNIQUE/EXAM DESCRIPTION AND NUMBER OF VIEWS:  Cervical spine series, 2 views.    COMPARISON: None.      FINDINGS:  There are surgical changes extending from C2 through T1 characterized by posterior pedicular screw and bere fixation with laminectomy change. There is anterior fusion at C3-4 and C4-5 as well as disc prosthesis material at C5-6 and C6-7.  There are surgical drains posteriorly.  There are skin staples.        1.  Surgical change extending from C2 through T1.    2.  Surgical drains and skin staples present.         Co-morbidities: Pleas see below.  Potential Risk - Complications: Deep Vein Thrombosis, Pain and Pressure Ulcer  Level of Risk: High    Ongoing Medical Management Needed (Medical/Nursing Needs):   Patient Active Problem List    Diagnosis Date Noted   • CVA (cerebral vascular accident) ([facility]) 07/07/2019     Priority: High   • Cervical spondylosis with myelopathy 01/22/2016     Priority: High   • Dysphagia 07/08/2019   • Anxiety 07/07/2019   • Hypokalemia 07/07/2019   • Head ache 07/07/2019   • Hypotension 04/20/2016   • Normocytic anemia 04/20/2016   • Acute renal failure ([facility]) 04/20/2016   • Weakness 04/20/2016   • Bradycardia 01/25/2016   • Chest pain 01/25/2016   • Diabetic polyneuropathy ([facility])    • BPPV (benign paroxysmal positional vertigo)    • Restless legs syndrome (RLS)    • Migraine with aura    • Essential and other specified forms of tremor    • Type 2 diabetes mellitus, without long-term current use of insulin ([facility]) 02/09/2012   • FH: diabetes mellitus 09/19/2011   • Blood glucose elevated 09/19/2011   • COPD (chronic obstructive pulmonary disease) ([facility]) 11/28/2010   • Tremor 11/28/2010   • Eustachian tube  "dysfunction 09/22/2010   • Sinusitis, chronic 09/22/2010   • HTN (hypertension) 09/22/2010   • Fibromyalgia syndrome 09/22/2010   • Chronic pain syndrome 09/22/2010       Current Vital Signs:   Temperature: 36.7 °C (98.1 °F) Pulse: 75 Respiration: 18 Blood Pressure : 132/73  Weight: 95.1 kg (209 lb 10.5 oz) Height: 162.6 cm (5' 4\")  Pulse Oximetry: 96 % O2 (LPM): 4      Completed Laboratory Reports:  Recent Labs     09/24/19 0225 09/24/19 0542 09/25/19 0225   WBC 6.4  --  4.9   HEMOGLOBIN 12.3  --  11.4*   HEMATOCRIT 40.7  --  38.9   PLATELETCT 239  --  212   SODIUM 141  --  141   POTASSIUM 4.3  --  4.0   BUN 12  --  10   CREATININE 0.75  --  0.76   GLUCOSE 92  --  101*   POCGLUCOSE  --  118*  --      Additional Labs: Not Applicable    Prior Living Situation:   Housing / Facility: 1 Story Apartment / Condo  Steps Into Home: 0  Steps In Home: 0  Lives with - Patient's Self Care Capacity: Spouse  Equipment Owned: 4-Wheel Walker, Front-Wheel Walker, Wheelchair, Grab Bar(s) In Tub / Shower, Grab Bar(s) By Toilet, Raised Toilet Seat Without Arms, Bed Side Commode    Prior Level of Function / Living Situation:   Physical Therapy: Prior Services: Meals on Wheels  Housing / Facility: 1 Story Apartment / Condo  Steps Into Home: 0  Steps In Home: 0  Bathroom Set up: Walk In Shower, Grab Bars, Shower Chair  Equipment Owned: 4-Wheel Walker, Front-Wheel Walker, Wheelchair, Grab Bar(s) In Tub / Shower, Grab Bar(s) By Toilet, Raised Toilet Seat Without Arms, Bed Side Commode  Lives with - Patient's Self Care Capacity: Spouse  Bed Mobility: Independent  Transfer Status: Independent  Ambulation: Independent  Distance Ambulation (Feet): (community ambulator)  Assistive Devices Used: None  Current Level of Function:   Level Of Assist: Unable to Participate  Assistive Device: Front Wheel Walker  Distance (Feet): 2  Deviation: Ataxic, Bradykinetic  Weight Bearing Status: FWB  Skilled Intervention: Verbal Cuing, Tactile Cuing, " Sequencing  Comments: 2/2 pain  Supine to Sit: Moderate Assist  Sit to Supine: Moderate Assist  Scooting: Maximal Assist  Rolling: Minimal Assist to Rt.  Skilled Intervention: Verbal Cuing, Sequencing  Comments: Patient required increased assist this day secondary to pain.  required VCs for proper log roll and hand palcement.    Sit to Stand: Minimal Assist  Bed, Chair, Wheelchair Transfer: Minimal Assist  Toilet Transfers: Minimal Assist  Tub / Shower Transfers: Stand by Assist  Transfer Method: Stand Pivot  Skilled Intervention: Verbal Cuing, Sequencing  Comments: VCs on proper hand placement.  Sitting in Chair: 9 min(BSC)  Sitting Edge of Bed: 5min  Standinmin  Occupational Therapy:   Self Feeding: Independent  Grooming / Hygiene: Independent  Bathing: Independent  Dressing: Independent  Toileting: Independent  Medication Management: Independent  Laundry: Independent  Kitchen Mobility: Independent  Finances: Independent  Home Management: Independent  Shopping: Independent  Prior Level Of Mobility: Supervision With Device in Community, Uses Wheel Chair for Community Mobility, Independent With Device in Home  Prior Services: Meals on Wheels  Housing / Facility: 1 Story Apartment / [facility]  Current Level of Function:   Eating: Not Tested  Bathing: Not Tested  Upper Body Dressing: Maximal Assist  Lower Body Dressing: Maximal Assist  Toileting: Supervision  Speech Language Pathology:      Rehabilitation Prognosis/Potential: Good  Estimated Length of Stay:10 - 14  days    Nursing:   Orientation : Oriented x 4  Continent    Scope/Intensity of Services Recommended:  Physical Therapy: 1.5 hr / day  5 days / week. Therapeutic Interventions Required: Maximize Endurance, Mobility, Strength, Safety and family training.   Occupational Therapy: 1.5 hr / day 5 days / week. Therapeutic Interventions Required: Maximize Self Care, ADLs, IADLs, Energy Conservation and family training.   Rehabilitation Nursin/. Therapeutic  Interventions Required: Monitor Pain, Skin, Wound(s), Vital Signs, Intake and Output, Labs, Safety and Family Training  Rehabilitation Physician: 3 - 5 days / week. Therapeutic Interventions Required: Medical Management  Respiratory Care: Eval and Tx. Therapeutic Interventions Required: Pulmonary Toileting and Per Protcol  Dietician: Ara and TX. Therapeutic Interventions Required: per protcol.    Rehabilitation Goals and Plan (Expected frequency & duration of treatment in the IRF):   Return to the Community -  Anticipated Date of Rehabilitation Admission: 9/26/2019  Patient/Family oriented IRF level of care/facility/plan: Yes  Patient/Family willing to participate in IRF care/facility/plan: Yes  Patient able to tolerate IRF level of care proposed: Yes  Patient has potential to benefit IRF level of care proposed: Yes  Comments: Not Applicable    Special Needs or Precautions - Medical Necessity:  Pain Management  Diet:   DIET ORDERS (From admission to next 24h)     Start     Ordered    09/26/19 0844  Diet NPO  ALL MEALS     Question:  Restrict to:  Answer:  Strict    09/26/19 0843              Betsy Hadley R.N.   Registered Nurse            Date of Service:  9/26/2019 10:07 AM               Addendum             Pt has difficulty swallowing, chocking over the breakfast, moist cough after sips of water. NPO per nursing judgment at this time. Reymundo Farfan PA notified at this time. SLP eval ordered per PA at this time.     Per PT pt is not safe to go home with HH. Rehab recommended. PA notified at this time. Per PA, hold dc till tomorrow.     Paged SLP at 1010.                Anticipated Discharge Destination / Patient/Family Goal:  Destination: Patient lives with her sig other, Cesar, son and dtr in law plus daugher.  Single story apartment/condo, walk in shower, grab bars by toilet and shower.   Support System: SO, son and dtr in law, and dtr.   Anticipated home health services: OT, PT and Nursing  Previously  used HH service/ provider: Not Applicable  Anticipated DME Needs: Pt has the following:  fww, 4ww, wc, raised toilet seat w/o arms, bed side commode, and home 02 thorugh [facility]-wears intermintently   Outpatient Services: OT, PT and this will be confirmed---home health vs out pt therapy depending on pt's progress wth therapies during stay @ Rehab.   Alternative resources to address additional identified needs:   PT also has Meals On Wheels.     Pre-Screen Completed: 9/26/2019 10:26 AM ERNESTO Muñoz, CCM

## 2019-09-26 NOTE — PROGRESS NOTES
Noted redness and open sore on Sacrum (blanching), wound photos uploaded, wound RN protocol initiated, barier cream in use, no other major skin breakdown noted, will continue to monitor,

## 2019-09-27 ENCOUNTER — APPOINTMENT (OUTPATIENT)
Dept: PAIN MANAGEMENT | Facility: REHABILITATION | Age: 64
DRG: 561 | End: 2019-09-27
Payer: MEDICARE

## 2019-09-27 ENCOUNTER — HOSPITAL ENCOUNTER (INPATIENT)
Facility: REHABILITATION | Age: 64
LOS: 4 days | DRG: 561 | End: 2019-10-01
Attending: PHYSICAL MEDICINE & REHABILITATION | Admitting: PHYSICAL MEDICINE & REHABILITATION
Payer: MEDICARE

## 2019-09-27 VITALS
HEART RATE: 71 BPM | TEMPERATURE: 97.9 F | DIASTOLIC BLOOD PRESSURE: 63 MMHG | WEIGHT: 209.66 LBS | BODY MASS INDEX: 35.79 KG/M2 | OXYGEN SATURATION: 92 % | SYSTOLIC BLOOD PRESSURE: 115 MMHG | HEIGHT: 64 IN | RESPIRATION RATE: 16 BRPM

## 2019-09-27 DIAGNOSIS — F51.01 PRIMARY INSOMNIA: ICD-10-CM

## 2019-09-27 DIAGNOSIS — M47.12 CERVICAL SPONDYLOSIS WITH MYELOPATHY: ICD-10-CM

## 2019-09-27 DIAGNOSIS — G89.4 CHRONIC PAIN SYNDROME: ICD-10-CM

## 2019-09-27 PROBLEM — Z98.1 S/P LAMINECTOMY WITH SPINAL FUSION: Status: ACTIVE | Noted: 2019-09-27

## 2019-09-27 PROBLEM — E07.9 THYROID DISORDER: Status: ACTIVE | Noted: 2019-09-27

## 2019-09-27 PROCEDURE — 700111 HCHG RX REV CODE 636 W/ 250 OVERRIDE (IP): Performed by: PHYSICAL MEDICINE & REHABILITATION

## 2019-09-27 PROCEDURE — A9270 NON-COVERED ITEM OR SERVICE: HCPCS | Performed by: PHYSICIAN ASSISTANT

## 2019-09-27 PROCEDURE — 99223 1ST HOSP IP/OBS HIGH 75: CPT | Mod: AI | Performed by: PHYSICAL MEDICINE & REHABILITATION

## 2019-09-27 PROCEDURE — 770010 HCHG ROOM/CARE - REHAB SEMI PRIVAT*

## 2019-09-27 PROCEDURE — 700101 HCHG RX REV CODE 250: Performed by: PHYSICIAN ASSISTANT

## 2019-09-27 PROCEDURE — A9270 NON-COVERED ITEM OR SERVICE: HCPCS | Performed by: PHYSICAL MEDICINE & REHABILITATION

## 2019-09-27 PROCEDURE — 94760 N-INVAS EAR/PLS OXIMETRY 1: CPT

## 2019-09-27 PROCEDURE — 700102 HCHG RX REV CODE 250 W/ 637 OVERRIDE(OP): Performed by: PHYSICAL MEDICINE & REHABILITATION

## 2019-09-27 PROCEDURE — 700105 HCHG RX REV CODE 258: Performed by: PHYSICIAN ASSISTANT

## 2019-09-27 PROCEDURE — 700111 HCHG RX REV CODE 636 W/ 250 OVERRIDE (IP): Performed by: PHYSICIAN ASSISTANT

## 2019-09-27 PROCEDURE — 700101 HCHG RX REV CODE 250: Performed by: PHYSICAL MEDICINE & REHABILITATION

## 2019-09-27 PROCEDURE — 700102 HCHG RX REV CODE 250 W/ 637 OVERRIDE(OP): Performed by: PHYSICIAN ASSISTANT

## 2019-09-27 PROCEDURE — 700112 HCHG RX REV CODE 229: Performed by: PHYSICIAN ASSISTANT

## 2019-09-27 RX ORDER — ATORVASTATIN CALCIUM 10 MG/1
20 TABLET, FILM COATED ORAL
Status: DISCONTINUED | OUTPATIENT
Start: 2019-09-27 | End: 2019-10-01 | Stop reason: HOSPADM

## 2019-09-27 RX ORDER — BISACODYL 10 MG
10 SUPPOSITORY, RECTAL RECTAL
Status: DISCONTINUED | OUTPATIENT
Start: 2019-09-27 | End: 2019-10-01 | Stop reason: HOSPADM

## 2019-09-27 RX ORDER — GLIPIZIDE 5 MG/1
5 TABLET ORAL EVERY EVENING
Status: DISCONTINUED | OUTPATIENT
Start: 2019-09-27 | End: 2019-10-01 | Stop reason: HOSPADM

## 2019-09-27 RX ORDER — GLIPIZIDE 5 MG/1
5 TABLET ORAL EVERY EVENING
Status: CANCELLED | OUTPATIENT
Start: 2019-09-27

## 2019-09-27 RX ORDER — GABAPENTIN 400 MG/1
800 CAPSULE ORAL 3 TIMES DAILY
Status: DISCONTINUED | OUTPATIENT
Start: 2019-09-27 | End: 2019-09-27

## 2019-09-27 RX ORDER — VENLAFAXINE 75 MG/1
150 TABLET ORAL
Status: CANCELLED | OUTPATIENT
Start: 2019-09-27

## 2019-09-27 RX ORDER — POTASSIUM CHLORIDE 20 MEQ/1
10 TABLET, EXTENDED RELEASE ORAL EVERY EVENING
Status: DISCONTINUED | OUTPATIENT
Start: 2019-09-27 | End: 2019-09-28

## 2019-09-27 RX ORDER — VENLAFAXINE 37.5 MG/1
150 TABLET ORAL
Status: DISCONTINUED | OUTPATIENT
Start: 2019-09-27 | End: 2019-10-01 | Stop reason: HOSPADM

## 2019-09-27 RX ORDER — ECHINACEA PURPUREA EXTRACT 125 MG
2 TABLET ORAL PRN
Status: DISCONTINUED | OUTPATIENT
Start: 2019-09-27 | End: 2019-10-01 | Stop reason: HOSPADM

## 2019-09-27 RX ORDER — ALUMINA, MAGNESIA, AND SIMETHICONE 2400; 2400; 240 MG/30ML; MG/30ML; MG/30ML
20 SUSPENSION ORAL
Status: DISCONTINUED | OUTPATIENT
Start: 2019-09-27 | End: 2019-10-01 | Stop reason: HOSPADM

## 2019-09-27 RX ORDER — ROPINIROLE 2 MG/1
8 TABLET, FILM COATED ORAL
Status: CANCELLED | OUTPATIENT
Start: 2019-09-27

## 2019-09-27 RX ORDER — GABAPENTIN 400 MG/1
800 CAPSULE ORAL 3 TIMES DAILY
Status: CANCELLED | OUTPATIENT
Start: 2019-09-27

## 2019-09-27 RX ORDER — ACETAMINOPHEN 325 MG/1
650 TABLET ORAL EVERY 4 HOURS PRN
Status: DISCONTINUED | OUTPATIENT
Start: 2019-09-27 | End: 2019-10-01 | Stop reason: HOSPADM

## 2019-09-27 RX ORDER — OXYCODONE HYDROCHLORIDE 5 MG/1
5 TABLET ORAL
Status: DISCONTINUED | OUTPATIENT
Start: 2019-09-27 | End: 2019-10-01 | Stop reason: HOSPADM

## 2019-09-27 RX ORDER — POLYVINYL ALCOHOL 14 MG/ML
1 SOLUTION/ DROPS OPHTHALMIC PRN
Status: DISCONTINUED | OUTPATIENT
Start: 2019-09-27 | End: 2019-10-01 | Stop reason: HOSPADM

## 2019-09-27 RX ORDER — METHOCARBAMOL 500 MG/1
500 TABLET, FILM COATED ORAL EVERY 8 HOURS
Status: DISCONTINUED | OUTPATIENT
Start: 2019-09-27 | End: 2019-09-28

## 2019-09-27 RX ORDER — LIDOCAINE 50 MG/G
2 PATCH TOPICAL EVERY 24 HOURS
Status: DISCONTINUED | OUTPATIENT
Start: 2019-09-27 | End: 2019-09-28

## 2019-09-27 RX ORDER — FUROSEMIDE 20 MG/1
20 TABLET ORAL
Status: CANCELLED | OUTPATIENT
Start: 2019-09-27

## 2019-09-27 RX ORDER — ATORVASTATIN CALCIUM 40 MG/1
20 TABLET, FILM COATED ORAL
Status: CANCELLED | OUTPATIENT
Start: 2019-09-27

## 2019-09-27 RX ORDER — LEVOTHYROXINE SODIUM 0.07 MG/1
75 TABLET ORAL
Status: DISCONTINUED | OUTPATIENT
Start: 2019-09-27 | End: 2019-10-01 | Stop reason: HOSPADM

## 2019-09-27 RX ORDER — LEVOTHYROXINE SODIUM 0.07 MG/1
75 TABLET ORAL
Status: CANCELLED | OUTPATIENT
Start: 2019-09-27

## 2019-09-27 RX ORDER — TRAMADOL HYDROCHLORIDE 50 MG/1
50 TABLET ORAL EVERY 4 HOURS PRN
Status: DISCONTINUED | OUTPATIENT
Start: 2019-09-27 | End: 2019-10-01 | Stop reason: HOSPADM

## 2019-09-27 RX ORDER — POLYETHYLENE GLYCOL 3350 17 G/17G
1 POWDER, FOR SOLUTION ORAL
Status: DISCONTINUED | OUTPATIENT
Start: 2019-09-27 | End: 2019-10-01 | Stop reason: HOSPADM

## 2019-09-27 RX ORDER — OMEPRAZOLE 20 MG/1
20 CAPSULE, DELAYED RELEASE ORAL DAILY
Status: DISCONTINUED | OUTPATIENT
Start: 2019-09-28 | End: 2019-10-01 | Stop reason: HOSPADM

## 2019-09-27 RX ORDER — HYDRALAZINE HYDROCHLORIDE 25 MG/1
25 TABLET, FILM COATED ORAL EVERY 8 HOURS PRN
Status: DISCONTINUED | OUTPATIENT
Start: 2019-09-27 | End: 2019-10-01 | Stop reason: HOSPADM

## 2019-09-27 RX ORDER — TEMAZEPAM 15 MG/1
30 CAPSULE ORAL
Status: CANCELLED | OUTPATIENT
Start: 2019-09-27

## 2019-09-27 RX ORDER — FUROSEMIDE 20 MG/1
20 TABLET ORAL
Status: DISCONTINUED | OUTPATIENT
Start: 2019-09-27 | End: 2019-10-01 | Stop reason: HOSPADM

## 2019-09-27 RX ORDER — TRAZODONE HYDROCHLORIDE 50 MG/1
50 TABLET ORAL
Status: DISCONTINUED | OUTPATIENT
Start: 2019-09-27 | End: 2019-10-01 | Stop reason: HOSPADM

## 2019-09-27 RX ORDER — TEMAZEPAM 15 MG/1
30 CAPSULE ORAL
Status: DISCONTINUED | OUTPATIENT
Start: 2019-09-27 | End: 2019-10-01 | Stop reason: HOSPADM

## 2019-09-27 RX ORDER — ROPINIROLE 1 MG/1
8 TABLET, FILM COATED ORAL
Status: DISCONTINUED | OUTPATIENT
Start: 2019-09-27 | End: 2019-10-01 | Stop reason: HOSPADM

## 2019-09-27 RX ORDER — POTASSIUM CHLORIDE 20 MEQ/1
10 TABLET, EXTENDED RELEASE ORAL EVERY EVENING
Status: CANCELLED | OUTPATIENT
Start: 2019-09-27

## 2019-09-27 RX ORDER — ONDANSETRON 2 MG/ML
4 INJECTION INTRAMUSCULAR; INTRAVENOUS 4 TIMES DAILY PRN
Status: DISCONTINUED | OUTPATIENT
Start: 2019-09-27 | End: 2019-10-01 | Stop reason: HOSPADM

## 2019-09-27 RX ORDER — GABAPENTIN 300 MG/1
900 CAPSULE ORAL 3 TIMES DAILY
Status: DISCONTINUED | OUTPATIENT
Start: 2019-09-27 | End: 2019-10-01 | Stop reason: HOSPADM

## 2019-09-27 RX ORDER — OXYCODONE HYDROCHLORIDE 10 MG/1
10 TABLET ORAL
Status: DISCONTINUED | OUTPATIENT
Start: 2019-09-27 | End: 2019-10-01 | Stop reason: HOSPADM

## 2019-09-27 RX ORDER — AMOXICILLIN 250 MG
2 CAPSULE ORAL 2 TIMES DAILY
Status: DISCONTINUED | OUTPATIENT
Start: 2019-09-27 | End: 2019-10-01 | Stop reason: HOSPADM

## 2019-09-27 RX ORDER — ONDANSETRON 4 MG/1
4 TABLET, ORALLY DISINTEGRATING ORAL 4 TIMES DAILY PRN
Status: DISCONTINUED | OUTPATIENT
Start: 2019-09-27 | End: 2019-10-01 | Stop reason: HOSPADM

## 2019-09-27 RX ADMIN — OXYCODONE HYDROCHLORIDE 10 MG: 10 TABLET ORAL at 16:46

## 2019-09-27 RX ADMIN — DOCUSATE SODIUM 100 MG: 100 CAPSULE, LIQUID FILLED ORAL at 04:28

## 2019-09-27 RX ADMIN — MELATONIN 5000 UNITS: at 04:28

## 2019-09-27 RX ADMIN — OXYCODONE HYDROCHLORIDE 10 MG: 10 TABLET ORAL at 19:54

## 2019-09-27 RX ADMIN — METHOCARBAMOL TABLETS 500 MG: 500 TABLET, COATED ORAL at 21:32

## 2019-09-27 RX ADMIN — POTASSIUM CHLORIDE 10 MEQ: 1500 TABLET, EXTENDED RELEASE ORAL at 21:33

## 2019-09-27 RX ADMIN — POTASSIUM CHLORIDE AND SODIUM CHLORIDE: 900; 150 INJECTION, SOLUTION INTRAVENOUS at 05:15

## 2019-09-27 RX ADMIN — ATORVASTATIN CALCIUM 20 MG: 10 TABLET, FILM COATED ORAL at 21:34

## 2019-09-27 RX ADMIN — VENLAFAXINE HYDROCHLORIDE 150 MG: 37.5 TABLET ORAL at 21:32

## 2019-09-27 RX ADMIN — GABAPENTIN 800 MG: 400 CAPSULE ORAL at 04:28

## 2019-09-27 RX ADMIN — OXYCODONE HYDROCHLORIDE 10 MG: 10 TABLET ORAL at 04:28

## 2019-09-27 RX ADMIN — METHOCARBAMOL TABLETS 500 MG: 500 TABLET, COATED ORAL at 14:07

## 2019-09-27 RX ADMIN — TEMAZEPAM 30 MG: 15 CAPSULE ORAL at 21:32

## 2019-09-27 RX ADMIN — ANTACID TABLETS 500 MG: 500 TABLET, CHEWABLE ORAL at 04:28

## 2019-09-27 RX ADMIN — GLIPIZIDE 5 MG: 5 TABLET ORAL at 21:34

## 2019-09-27 RX ADMIN — LIDOCAINE 2 PATCH: 50 PATCH TOPICAL at 16:46

## 2019-09-27 RX ADMIN — ENOXAPARIN SODIUM 40 MG: 100 INJECTION SUBCUTANEOUS at 21:35

## 2019-09-27 RX ADMIN — METHOCARBAMOL 1000 MG: 100 INJECTION INTRAMUSCULAR; INTRAVENOUS at 04:28

## 2019-09-27 RX ADMIN — GABAPENTIN 800 MG: 400 CAPSULE ORAL at 14:07

## 2019-09-27 RX ADMIN — SENNOSIDES,DOCUSATE SODIUM 2 TABLET: 8.6; 5 TABLET, FILM COATED ORAL at 21:33

## 2019-09-27 RX ADMIN — GABAPENTIN 900 MG: 300 CAPSULE ORAL at 21:34

## 2019-09-27 RX ADMIN — SENNOSIDES,DOCUSATE SODIUM 2 TABLET: 8.6; 5 TABLET, FILM COATED ORAL at 12:23

## 2019-09-27 RX ADMIN — OXYCODONE HYDROCHLORIDE 10 MG: 10 TABLET ORAL at 08:54

## 2019-09-27 RX ADMIN — SITAGLIPTIN 100 MG: 50 TABLET, FILM COATED ORAL at 21:32

## 2019-09-27 RX ADMIN — OXYCODONE HYDROCHLORIDE 10 MG: 10 TABLET ORAL at 12:23

## 2019-09-27 RX ADMIN — ROPINIROLE HYDROCHLORIDE 8 MG: 1 TABLET, FILM COATED ORAL at 21:31

## 2019-09-27 RX ADMIN — LEVOTHYROXINE SODIUM 75 MCG: 75 TABLET ORAL at 22:43

## 2019-09-27 ASSESSMENT — COPD QUESTIONNAIRES
DO YOU EVER COUGH UP ANY MUCUS OR PHLEGM?: NO/ONLY WITH OCCASIONAL COLDS OR INFECTIONS
COPD SCREENING SCORE: 4
HAVE YOU SMOKED AT LEAST 100 CIGARETTES IN YOUR ENTIRE LIFE: YES
DURING THE PAST 4 WEEKS HOW MUCH DID YOU FEEL SHORT OF BREATH: NONE/LITTLE OF THE TIME

## 2019-09-27 ASSESSMENT — PATIENT HEALTH QUESTIONNAIRE - PHQ9
2. FEELING DOWN, DEPRESSED, IRRITABLE, OR HOPELESS: NOT AT ALL
1. LITTLE INTEREST OR PLEASURE IN DOING THINGS: NOT AT ALL
SUM OF ALL RESPONSES TO PHQ9 QUESTIONS 1 AND 2: 0

## 2019-09-27 ASSESSMENT — LIFESTYLE VARIABLES: EVER_SMOKED: YES

## 2019-09-27 NOTE — CARE PLAN
Problem: Communication  Goal: The ability to communicate needs accurately and effectively will improve  Outcome: PROGRESSING AS EXPECTED  Intervention: [place] patient and significant other/support system to call light to alert staff of needs  Flowsheets (Taken 9/26/2019 1854)  Oriented to:: All of the Following : Location of Bathroom, Visiting Policy, Unit Routine, Call Light and Bedside Controls, Bedside Rail Policy, Smoking Policy, Rights and Responsibilities, Bedside Report, and Patient Education Notebook  Note:   Pt is able to voice his needs/feelings at this time.      Problem: Safety  Goal: Will remain free from falls  Outcome: PROGRESSING AS EXPECTED  Intervention: Implement fall precautions  Flowsheets  Taken 9/25/2019 2100 by Madelaine Blake RAlissonN.  Environmental Precautions: Treaded Slipper Socks on Patient;Personal Belongings, Wastebasket, Call Bell etc. in Easy Reach;Report Given to Other Health Care Providers Regarding Fall Risk;Bed in Low Position;Communication Sign for Patients & Families;Mobility Assessed & Appropriate Sign Placed  Taken 9/26/2019 1854 by Betsy Hadley RSAEID  Chair/Bed Strip Alarm: Yes - Alarm On  Note:   Bed locked in lowest position, bed alarm on. Call light and personal belongings within reach. Hourly rounding.

## 2019-09-27 NOTE — DISCHARGE PLANNING
Spoke with Valentina and Cesar (S.O.) this am and they are all in agreement with [facility].  Dr. Ivy has accepted.  Anticipate an admission once medically cleared.  Jackie GUEVARA) is aware.

## 2019-09-27 NOTE — CARE PLAN
Problem: Infection  Goal: Will remain free from infection  Outcome: PROGRESSING AS EXPECTED  Intervention: Give CDC handouts for infection prevention (infection prevention/hand washing, disease specific, and device specific) and document in Education  Note:   Patient educated regarding infection control including hand hygiene. Patient educated regarding s/s of infection including pain, redness, and swelling. Patient encouraged to discuss any concerns with care team.       Problem: Pain Management  Goal: Pain level will decrease to patient's comfort goal  Outcome: PROGRESSING AS EXPECTED  Intervention: Educate and implement non-pharmacologic comfort measures. Examples: relaxation, distration, play therapy, activity therapy, massage, etc.  Note:   Educate and implement non-pharmacologic comfort measures and give PRN pain medication per MAR

## 2019-09-27 NOTE — DISCHARGE INSTRUCTIONS
Discharge Instructions    Discharged to other by medical transportation with relative. Discharged via wheelchair, hospital escort: Yes.  Special equipment needed: Not Applicable    Be sure to schedule a follow-up appointment with your primary care doctor or any specialists as instructed.     Discharge Plan:   Diet Plan: (P) Discussed  Activity Level: (P) Discussed  Confirmed Follow up Appointment: (P) Patient to Call and Schedule Appointment  Confirmed Symptoms Management: (P) Discussed  Medication Reconciliation Updated: (P) Yes  Influenza Vaccine Indication: Indicated: 65 years and older  Influenza Vaccine Given - only chart on this line when given: (P) Influenza Vaccine Given (See MAR)    I understand that a diet low in cholesterol, fat, and sodium is recommended for good health. Unless I have been given specific instructions below for another diet, I accept this instruction as my diet prescription.       Special Instructions:    F/u in [facility] in 2 weeks foe wound check.Staple removal   Activity and wound careas per Pre op instuctions sheet and HH PT/OT.   Collar at all times--off for meals and showers.     May shower.   Resume home meds   · Is patient discharged on Warfarin / Coumadin?   No     Depression / Suicide Risk    As you are discharged from this [facility] facility, it is important to learn how to keep safe from harming yourself.    Recognize the warning signs:  · Abrupt changes in personality, positive or negative- including increase in energy   · Giving away possessions  · Change in eating patterns- significant weight changes-  positive or negative  · Change in sleeping patterns- unable to sleep or sleeping all the time   · Unwillingness or inability to communicate  · Depression  · Unusual sadness, discouragement and loneliness  · Talk of wanting to die  · Neglect of personal appearance   · Rebelliousness- reckless behavior  · Withdrawal from people/activities they love  · Confusion-  inability to concentrate     If you or a loved one observes any of these behaviors or has concerns about self-harm, here's what you can do:  · Talk about it- your feelings and reasons for harming yourself  · Remove any means that you might use to hurt yourself (examples: pills, rope, extension cords, firearm)  · Get professional help from the community (Mental Health, Substance Abuse, psychological counseling)  · Do not be alone:Call your Safe Contact- someone whom you trust who will be there for you.  · Call your local CRISIS HOTLINE 946-6865 or 794-660-1736  · Call your local Children's Mobile Crisis Response Team [place] (173) 840-7709 or www.[employer]  · Call the toll free National Suicide Prevention Hotlines   · National Suicide Prevention Lifeline 975-571-NTNS (3454)  · [employer] Network 800-SUICIDE (254-3417)            Cervical Fusion  Cervical fusion is a procedure that is done on bones in the neck (cervical spine) to relieve pressure on the spinal cord or one or more nerve roots.  There are two types of cervical fusion:  · Posterior cervical fusion. This surgery is done through the back (posterior) of the neck. The goal of this procedure is to make two or more of the bones in the spinal column (vertebrae) grow together (fuse). This procedure is most commonly used to treat neck fractures and dislocations and to fix deformities in the curve of the neck.  · Anterior cervical fusion. This surgery is done through the front (anterior) part of the neck. This procedure is most commonly used to treat herniated cervical disk, degenerative cervical disease, and arthritis in the cervical spine. During this type of surgery:  ¨ The affected disk between the two vertebrae (intervertebral disk) is removed, as well as any extra bone on the edges of the vertebrae (bone spurs). This takes pressure off of the nerves or spinal cord (decompression).  ¨ The area where the disk was removed is filled with a  bone material (graft) or artificial bone material (implant) and then it fuses over time.  In either procedure, hardware such as rods, screws, metal plates, or cages may be inserted to stabilize the vertebrae while they heal.  Tell a health care provider about:  · Any allergies you have.  · All medicines you are taking, including vitamins, herbs, eye drops, creams, and over-the-counter medicines.  · Any problems you or family members have had with anesthetic medicines.  · Any blood disorders you have.  · Any surgeries you have had.  · Any medical conditions you have.  · Whether you are pregnant or may be pregnant.  What are the risks?  Generally, this is a safe procedure. However, problems may occur, including:  · Infection.  · Bleeding.  · Allergic reactions to medicines or dyes.  · Damage to other structures or organs, such as nerves near the spine.  · Spinal fluid leakage.  · Blood clots.  · Trouble controlling urination or bowel movements.  · Vertebrae not fusing together completely (pseudoarthrosis).  · Temporary hoarseness of the voice.  · Difficulty swallowing.  What happens before the procedure?  Staying hydrated   Follow instructions from your health care provider about hydration, which may include:  · Up to 2 hours before the procedure - you may continue to drink clear liquids, such as water, clear fruit juice, black coffee, and plain tea.  Eating and drinking restrictions   Follow instructions from your health care provider about eating and drinking, which may include:  · 8 hours before the procedure - stop eating heavy meals or foods such as meat, fried foods, or fatty foods.  · 6 hours before the procedure - stop eating light meals or foods, such as toast or cereal.  · 6 hours before the procedure - stop drinking milk or drinks that contain milk.  · 2 hours before the procedure - stop drinking clear liquids.  Medicines  · Ask your health care provider about:  ¨ Changing or stopping your regular  medicines. This is especially important if you are taking diabetes medicines or blood thinners.  ¨ Taking medicines such as aspirin and ibuprofen. These medicines can thin your blood. Do not take these medicines before your procedure if your health care provider instructs you not to.  · You may be given antibiotic medicine to help prevent infection.  General instructions  · Ask your health care provider how your surgical site will be marked or identified.  · You will have blood and urine samples taken.  · You may have tests, such as:  ¨ X-rays.  ¨ CT scan.  ¨ MRI.  · Plan to have someone take you home from the hospital or clinic.  · If you will be going home right after the procedure, plan to have someone with you for 24 hours.  What happens during the procedure?  · To reduce your risk of infection:  ¨ Your health care team will wash or sanitize their hands.  ¨ Your skin will be washed with soap.  ¨ Hair may be removed from the surgical area.  · An IV tube will be inserted into one of your veins.  · You will be given one or more of the following:  ¨ A medicine to help you relax (sedative).  ¨ A medicine to make you fall asleep (general anesthetic).  · If you are having a posterior cervical fusion:  ¨ An incision will be made in the back of your neck.  ¨ Two or more vertebrae will be joined into one solid section of bone with a bone graft.  · If you are having an anterior cervical fusion:  ¨ An incision will be made in the area where the fusion will be placed. This is usually done at the front of your neck.  ¨ Your neck muscles will be pushed aside.  ¨ The affected disk and bone spurs will be removed (decompression).  ¨ The area where the disk was removed will then be filled with bone graft or bone implants or both.  · Screws and rods or metal plates may be used to stabilize the vertebrae while they fuse.  · Your incision may be closed.  · A bandage (dressing) may be placed over your incision.  The procedure may vary  among health care providers and hospitals.  What happens after the procedure?  · You will be given medicine to relieve pain as needed.  · You will continue to receive fluids and medicines through an IV tube.  · Your blood pressure, heart rate, breathing rate, and blood oxygen level will be monitored until the medicines you were given have worn off.  · You may be given a brace to wear while you heal.  · Do not drive until your health care provider approves.  · You may have to wear compression stockings. These stockings help to prevent blood clots and reduce swelling in your legs.  · You may be asked to do breathing exercises. This helps prevent a lung infection.  · You will be encouraged to stand up and walk as soon as you are able. You will be taught how to move correctly and how to stand and walk.  · You will be assisted to turn in bed frequently by moving your whole body without twisting your back (log rolling technique).  This information is not intended to replace advice given to you by your health care provider. Make sure you discuss any questions you have with your health care provider.  Document Released: 06/09/2003 Document Revised: 07/12/2017 Document Reviewed: 06/28/2017  [employer] Interactive Patient Education © 2017 [employer]        Acetaminophen; Oxycodone tablets  What is this medicine?  ACETAMINOPHEN; OXYCODONE (a set a RAFAT reji fen; ox i KOE done) is a pain reliever. It is used to treat moderate to severe pain.  This medicine may be used for other purposes; ask your health care provider or pharmacist if you have questions.  COMMON BRAND NAME(S): Endocet, Magnacet, Narvox, Percocet, Perloxx, Primalev, Primlev, Roxicet, Xolox  What should I tell my health care provider before I take this medicine?  They need to know if you have any of these conditions:  -brain tumor  -Crohn's disease, inflammatory bowel disease, or ulcerative colitis  -drug abuse or addiction  -head injury  -heart or circulation  problems  -if you often drink alcohol  -kidney disease or problems going to the bathroom  -liver disease  -lung disease, asthma, or breathing problems  -an unusual or allergic reaction to acetaminophen, oxycodone, other opioid analgesics, other medicines, foods, dyes, or preservatives  -pregnant or trying to get pregnant  -breast-feeding  How should I use this medicine?  Take this medicine by mouth with a full glass of water. Follow the directions on the prescription label. You can take it with or without food. If it upsets your stomach, take it with food. Take your medicine at regular intervals. Do not take it more often than directed.  A special MedGuide will be given to you by the pharmacist with each prescription and refill. Be sure to read this information carefully each time.  Talk to your pediatrician regarding the use of this medicine in children. Special care may be needed.  Overdosage: If you think you have taken too much of this medicine contact a poison control center or emergency room at once.  NOTE: This medicine is only for you. Do not share this medicine with others.  What if I miss a dose?  If you miss a dose, take it as soon as you can. If it is almost time for your next dose, take only that dose. Do not take double or extra doses.  What may interact with this medicine?  This medicine may interact with the following medications:  -alcohol  -antihistamines for allergy, cough and cold  -antiviral medicines for HIV or AIDS  -atropine  -certain antibiotics like clarithromycin, erythromycin, linezolid, rifampin  -certain medicines for anxiety or sleep  -certain medicines for bladder problems like oxybutynin, tolterodine  -certain medicines for depression like amitriptyline, fluoxetine, sertraline  -certain medicines for fungal infections like ketoconazole, itraconazole, voriconazole  -certain medicines for migraine headache like almotriptan, eletriptan, frovatriptan, naratriptan, rizatriptan,  sumatriptan, zolmitriptan  -certain medicines for nausea or vomiting like dolasetron, ondansetron, palonosetron  -certain medicines for Parkinson's disease like benztropine, trihexyphenidyl  -certain medicines for seizures like phenobarbital, phenytoin, primidone  -certain medicines for stomach problems like dicyclomine, hyoscyamine  -certain medicines for travel sickness like scopolamine  -diuretics  -general anesthetics like halothane, isoflurane, methoxyflurane, propofol  -ipratropium  -local anesthetics like lidocaine, pramoxine, tetracaine  -MAOIs like Carbex, Eldepryl, Marplan, Nardil, and Parnate  -medicines that relax muscles for surgery  -methylene blue  -nilotinib  -other medicines with acetaminophen  -other narcotic medicines for pain or cough  -phenothiazines like chlorpromazine, mesoridazine, prochlorperazine, thioridazine  This list may not describe all possible interactions. Give your health care provider a list of all the medicines, herbs, non-prescription drugs, or dietary supplements you use. Also tell them if you smoke, drink alcohol, or use illegal drugs. Some items may interact with your medicine.  What should I watch for while using this medicine?  Tell your doctor or health care professional if your pain does not go away, if it gets worse, or if you have new or a different type of pain. You may develop tolerance to the medicine. Tolerance means that you will need a higher dose of the medication for pain relief. Tolerance is normal and is expected if you take this medicine for a long time.  Do not suddenly stop taking your medicine because you may develop a severe reaction. Your body becomes used to the medicine. This does NOT mean you are addicted. Addiction is a behavior related to getting and using a drug for a non-medical reason. If you have pain, you have a medical reason to take pain medicine. Your doctor will tell you how much medicine to take. If your doctor wants you to stop the  medicine, the dose will be slowly lowered over time to avoid any side effects.  There are different types of narcotic medicines (opiates). If you take more than one type at the same time or if you are taking another medicine that also causes drowsiness, you may have more side effects. Give your health care provider a list of all medicines you use. Your doctor will tell you how much medicine to take. Do not take more medicine than directed. Call emergency for help if you have problems breathing or unusual sleepiness.  Do not take other medicines that contain acetaminophen with this medicine. Always read labels carefully. If you have questions, ask your doctor or pharmacist.  If you take too much acetaminophen get medical help right away. Too much acetaminophen can be very dangerous and cause liver damage. Even if you do not have symptoms, it is important to get help right away.  You may get drowsy or dizzy. Do not drive, use machinery, or do anything that needs mental alertness until you know how this medicine affects you. Do not stand or sit up quickly, especially if you are an older patient. This reduces the risk of dizzy or fainting spells. Alcohol may interfere with the effect of this medicine. Avoid alcoholic drinks.  The medicine will cause constipation. Try to have a bowel movement at least every 2 to 3 days. If you do not have a bowel movement for 3 days, call your doctor or health care professional.  Your mouth may get dry. Chewing sugarless gum or sucking hard candy, and drinking plenty or water may help. Contact your doctor if the problem does not go away or is severe.  What side effects may I notice from receiving this medicine?  Side effects that you should report to your doctor or health care professional as soon as possible:  -allergic reactions like skin rash, itching or hives, swelling of the face, lips, or tongue  -breathing problems  -confusion  -redness, blistering, peeling or loosening of the  skin, including inside the mouth  -signs and symptoms of liver injury like dark yellow or brown urine; general ill feeling or flu-like symptoms; light-colored stools; loss of appetite; nausea; right upper belly pain; unusually weak or tired; yellowing of the eyes or skin  -signs and symptoms of low blood pressure like dizziness; feeling faint or lightheaded, falls; unusually weak or tired  -trouble passing urine or change in the amount of urine  Side effects that usually do not require medical attention (report to your doctor or health care professional if they continue or are bothersome):  -constipation  -dry mouth  -nausea, vomiting  -tiredness  This list may not describe all possible side effects. Call your doctor for medical advice about side effects. You may report side effects to FDA at 0-912-FDA-1266.  Where should I keep my medicine?  Keep out of the reach of children. This medicine can be abused. Keep your medicine in a safe place to protect it from theft. Do not share this medicine with anyone. Selling or giving away this medicine is dangerous and against the law.  This medicine may cause accidental overdose and death if it taken by other adults, children, or pets. Mix any unused medicine with a substance like cat litter or coffee grounds. Then throw the medicine away in a sealed container like a sealed bag or a coffee can with a lid. Do not use the medicine after the expiration date.  Store at room temperature between 20 and 25 degrees C (68 and 77 degrees F).  NOTE: This sheet is a summary. It may not cover all possible information. If you have questions about this medicine, talk to your doctor, pharmacist, or health care provider.  © 2018 [employer] Standard (2016-09-12 21:48:12)      Methocarbamol tablets  What is this medicine?  METHOCARBAMOL (meth oh LEV ba mole) helps to relieve pain and stiffness in muscles caused by strains, sprains, or other injury to your muscles.  This medicine may be used  for other purposes; ask your health care provider or pharmacist if you have questions.  COMMON BRAND NAME(S): Robaxin  What should I tell my health care provider before I take this medicine?  They need to know if you have any of these conditions:  -kidney disease  -seizures  -an unusual or allergic reaction to methocarbamol, other medicines, foods, dyes, or preservatives  -pregnant or trying to get pregnant  -breast-feeding  How should I use this medicine?  Take this medicine by mouth with a full glass of water. Follow the directions on the prescription label. Take your medicine at regular intervals. Do not take your medicine more often than directed.  Talk to your pediatrician regarding the use of this medicine in children. Special care may be needed.  Overdosage: If you think you have taken too much of this medicine contact a poison control center or emergency room at once.  NOTE: This medicine is only for you. Do not share this medicine with others.  What if I miss a dose?  If you miss a dose, take it as soon as you can. If it is almost time for your next dose, take only the next dose. Do not take double or extra doses.  What may interact with this medicine?  Do not take this medication with any of the following medicines:  -narcotic medicines for cough  This medicine may also interact with the following medications:  -alcohol  -antihistamines for allergy, cough and cold  -certain medicines for anxiety or sleep  -certain medicines for depression like amitriptyline, fluoxetine, sertraline  -certain medicines for seizures like phenobarbital, primidone  -cholinesterase inhibitors like neostigmine, ambenonium, and pyridostigmine bromide  -general anesthetics like halothane, isoflurane, methoxyflurane, propofol  -local anesthetics like lidocaine, pramoxine, tetracaine  -medicines that relax muscles for surgery  -narcotic medicines for pain  -phenothiazines like chlorpromazine, mesoridazine, prochlorperazine,  thioridazine  This list may not describe all possible interactions. Give your health care provider a list of all the medicines, herbs, non-prescription drugs, or dietary supplements you use. Also tell them if you smoke, drink alcohol, or use illegal drugs. Some items may interact with your medicine.  What should I watch for while using this medicine?  Tell your doctor or health care professional if your symptoms do not start to get better or if they get worse.  You may get drowsy or dizzy. Do not drive, use machinery, or do anything that needs mental alertness until you know how this medicine affects you. Do not stand or sit up quickly, especially if you are an older patient. This reduces the risk of dizzy or fainting spells. Alcohol may interfere with the effect of this medicine. Avoid alcoholic drinks.  If you are taking another medicine that also causes drowsiness, you may have more side effects. Give your health care provider a list of all medicines you use. Your doctor will tell you how much medicine to take. Do not take more medicine than directed. Call emergency for help if you have problems breathing or unusual sleepiness.  What side effects may I notice from receiving this medicine?  Side effects that you should report to your doctor or health care professional as soon as possible:  -allergic reactions like skin rash, itching or hives, swelling of the face, lips, or tongue  -breathing problems  -confusion  -seizures  -unusually weak or tired  Side effects that usually do not require medical attention (report to your doctor or health care professional if they continue or are bothersome):  -dizziness  -headache  -metallic taste  -tiredness  -upset stomach  This list may not describe all possible side effects. Call your doctor for medical advice about side effects. You may report side effects to FDA at 0-823-FDA-6083.  Where should I keep my medicine?  Keep out of the reach of children.  Store at room  temperature between 20 and 25 degrees C (68 and 77 degrees F). Keep container tightly closed. Throw away any unused medicine after the expiration date.  NOTE: This sheet is a summary. It may not cover all possible information. If you have questions about this medicine, talk to your doctor, pharmacist, or health care provider.  © 2018 [employer] (2016-09-27 13:11:54)

## 2019-09-27 NOTE — PROGRESS NOTES
Neurosurgery Progress Note    Subjective:  POD#9 Revision of C3-4 and C4-5 ACDF   POD#4 C2-T1 PCF  C/o posterior neck pain  Denies radicular pain  Has been seen by rehab and accepted.       Exam:  Incisions C/D/I  Motor 5/5  Sensory intact  Swallowing and speaking well    BP  Min: 112/72  Max: 124/73  Pulse  Av.7  Min: 66  Max: 73  Resp  Avg: 15.3  Min: 14  Max: 16  Temp  Av.6 °C (97.8 °F)  Min: 36.1 °C (96.9 °F)  Max: 37.1 °C (98.7 °F)  SpO2  Av.3 %  Min: 93 %  Max: 94 %    No data recorded    Recent Labs     19   WBC 4.9   RBC 4.10*   HEMOGLOBIN 11.4*   HEMATOCRIT 38.9   MCV 94.9   MCH 27.8   MCHC 29.3*   RDW 48.5   PLATELETCT 212   MPV 9.8     Recent Labs     19   SODIUM 141   POTASSIUM 4.0   CHLORIDE 100   CO2 37*   GLUCOSE 101*   BUN 10   CREATININE 0.76   CALCIUM 8.7               Intake/Output       19 07 - 19 0659 19 07 - 19 0659       0775-0922 Total  4269-9650 Total       Intake    Total Intake -- -- -- -- -- --       Output    Drains  25  -- 25  --  -- --    Output (mL) (Closed/Suction Drain 1 Posterior Neck Carlos Wynn) 25 -- 25 -- -- --    Total Output 25 -- 25 -- -- --       Net I/O     -25 -- -25 -- -- --            Intake/Output Summary (Last 24 hours) at 2019 0826  Last data filed at 2019 1200  Gross per 24 hour   Intake --   Output 25 ml   Net -25 ml            • acetaminophen  650 mg Q6HRS PRN   • Methocarbamol IVPB  1,000 mg Q8HRS   • Pharmacy Consult Request  1 Each PHARMACY TO DOSE   • MD ALERT...DO NOT ADMINISTER NSAIDS or ASPIRIN unless ORDERED By Neurosurgery  1 Each PRN   • ALPRAZolam  0.25 mg BID PRN   • cloNIDine  0.1 mg Q4HRS PRN   • benzocaine-menthol  1 Lozenge Q2HRS PRN   • calcium carbonate  500 mg BID   • docusate sodium  100 mg BID   • senna-docusate  1 Tab Nightly   • senna-docusate  1 Tab Q24HRS PRN   • polyethylene glycol/lytes  1 Packet BID PRN   • magnesium hydroxide  30 mL QDAY  PRN   • bisacodyl  10 mg Q24HRS PRN   • fleet  1 Each Once PRN   • 0.9 % NaCl with KCl 20 mEq 1,000 mL   Continuous   • enoxaparin  40 mg DAILY   • oxyCODONE immediate-release  5 mg Q3HRS PRN   • oxyCODONE immediate release  10 mg Q3HRS PRN   • HYDROmorphone  0.5 mg Q3HRS PRN   • potassium chloride SA  10 mEq Q EVENING   • omeprazole  20 mg QHS   • levothyroxine  75 mcg QHS   • glipiZIDE  5 mg Q EVENING   • gabapentin  800 mg TID   • furosemide  20 mg QDAY PRN   • atorvastatin  20 mg QHS   • venlafaxine  150 mg QHS   • ROPINIRole  8 mg QHS   • SITagliptin  100 mg Q EVENING   • temazepam  30 mg QHS   • NS   Continuous   • diphenhydrAMINE  25 mg Q6HRS PRN    Or   • diphenhydrAMINE  25 mg Q6HRS PRN   • ondansetron  4 mg Q4HRS PRN   • ondansetron  4 mg Q4HRS PRN   • vitamin D  5,000 Units DAILY   • Influenza Vaccine High-Dose pf  0.5 mL Once PRN       Assessment and Plan:  POD#9 Revision of C3-4 and C4-5 ACDF   POD#4 C2-T1 PCF  OK for transfer to rehab.

## 2019-09-27 NOTE — DISCHARGE SUMMARY
DATE OF ADMISSION:  09/18/2019    DATE OF DISCHARGE:  09/27/2019    ADMISSION DIAGNOSES:  1. Cervical spondylosis.  2. Cervical stenosis.  3. Cervical spondylolisthesis.  4. Cervical kyphosis.  5. Pseudoarthrosis.  6. Status post C3 to C5 anterior cervical discectomy and fusion with hardware   failure.    DISCHARGE DIAGNOSES:  1. Cervical spondylosis.  2. Cervical stenosis.  3. Cervical spondylolisthesis.  4. Cervical kyphosis.  5. Pseudoarthrosis.  6. Status post C3 to C5 anterior cervical discectomy and fusion with hardware   failure.  7. Status post stage I removal of hardware C3, C4, C5 anteriorly with   exploration of fusion with C3 partial corpectomy, C4 partial corpectomy, and   C3-C4 PEEK interbody cage placement with anterior plate fixation followed by   stage II C2-T1 cervical fusion.    HOSPITAL COURSE:  The patient is a very pleasant 63-year-old female who   previously underwent a C3 to C5 ACDF with Dr. Lechuga on 06/21/2019.  Following   surgery, she had resolution of her preoperative radicular pain; however,   experienced a new onset of posterior neck pain and on followup x-rays, she was   found to have an anterior fixation plate sitting proud of the C3 to C5   vertebral body with C3-C4 cage protruding anterior to the body of C4 requiring   revision.  She has known osteopenia.  The patient was taken to surgery as   above.  Following stage I, she did undergo MRI and CTA for planning for   posterior procedure.  She denied significant arm pain.  Her drain output was   monitored closely.  Her speech and swallow were good.  She was mobilized with   PT and OT.  Stage II was planned for Monday.  Following stage II, patient has   done well here on the floor.  Denying radicular leg pain.  Complaining of   severe posterior neck pain and headaches.  Her pain was controlled with oral   and IV analgesia.  She was placed in a cervical collar at all times.  Toradol   was added.  Home health was ordered as the patient  did not want to go to   rehab.  This was arranged here in the hospital.  On the date of discharge, her   vital signs are stable.  She is afebrile.  Her pain is well controlled.  She   is neurologically intact.  Her incisions are clean, dry, and intact.    DISCHARGE INSTRUCTIONS:  Follow up at [facility] as previously arranged.    Wear cervical collar at all times.  No bending, lifting, or twisting.  No   overhead lifting.  Reports fever, chills, or drainage from incision.  Return   to the ER with chest pain, shortness of breath, fever, chills, leg, or arm   swelling.  Ice the incision frequently.  Stool softeners p.r.n.    DISCHARGE PRESCRIPTIONS:  Include Percocet and Robaxin.  Patient is to follow   up with [facility] in 2 weeks' time in a previously arranged appointment.    The above represents a brief summary of the patient's hospital stay.  For   details, please see the medical chart.             ____________________________________     KARYNA Guadarrama / SATHISH    DD:  09/26/2019 08:17:09  DT:  09/27/2019 06:52:34    D#:  [id number]  Job#:  [id number]    cc: Remi Lechuga III, MD

## 2019-09-27 NOTE — PROGRESS NOTES
-----------Non-Face-to-Face------------  Prolonged non-face-to-face time was spent on 9/27/19 from 1515 to 1550 and 1615 to 1625 by this reviewer.  This time included medical chart review, medication review, and decision making for the appropriateness of transfer to inpatient rehabilitation.  Please see PM&R Chart Review Consult from 9/27/19 by Dr. Gordon for detailed summation of the medical chart and the reasoning for current recommendations.  Notified of possible transfer, reviewed the case and notified they were thinking of discharge home. Then notified later that decision to come to [facility]. Clarified cervical precautions.  In addition to the above, time was spent coordinating care with case management as well as transitional care coordination team in the acceptance and transfer of the patient to the inpatient rehabilitation facility setting.

## 2019-09-27 NOTE — PROGRESS NOTES
Patient admitted to facility at 1115 via WC; accompanied by hospital transport.  Patient assisted to room and positioned in bed for comfort and safety; call light within reach.  Patient assisted with stowing belongings and oriented to room and facility.  Admission assessment performed and documented in computer. 2 RN skin check complete with Gabi, photos taken and uploaded to .  Admission paperwork completed; signed copies placed in chart.  Will continue to monitor.

## 2019-09-27 NOTE — H&P
REHABILITATION HISTORY AND PHYSICAL/POST ADMISSION PHYSICAL EVALUATION    Date of Admission: 9/27/2019  3:41 PM  Valentina Lu  [id number]/01    [facility] Code / Diagnosis to Support: 08.9 Other Orthopedic  Etiologic Diagnosis: S/P laminectomy with spinal fusion    CC: Neck pain    HPI:  Adapted from Dr. Gordon's PM&R chart review from 9/26/19:   Valentina Lu is a 63 y.o.  female with a PMH of diabetes, hypertension, hyperlipidemia, GERD, arthritis; who is admitted for cervical hardware failure and is currently status post two-stage ACDF revision and PCF.  Per chart patient underwent revision of C3-4 and C4-C5 ACDF on 9/18/19 and C2-T1 PCF on 9/23/19 with Dr. Lechuga. Post-operative course with only mild anemia.  Patient switched from IV pain medications to PO.    9/25/2019 physical therapy: Mod assist with bed mobility, min assist with sit to stand.  9/24/2019 occupational therapy: Mod assist toilet transfer to bedside commode, max assist lower body dressing, max assist upper body dressing    Patient transferred on 9/27/19 after monitoring on 9/26/19.  Patient reports her radicular symptoms into her hands are improved but she has severe pain in her trapezius muscles bilaterally. She reports she understands that this is post-operative pain. She reports gabapentin and Oxycodone have helped.  Discussed lidocaine patches as well.  She is on oxygen but denies SOB. Not on oxygen at home. Denies NVD; has had a bowel movement. She reports she did have choking episodes once but it never happened before or after. Discussed SLP evaluation. Denies easy bruising or bleeding.     REVIEW OF SYSTEMS:     Comprehensive 14 point ROS was reviewed and all were negative except as noted elsewhere in this document.     PMH:  Past Medical History:   Diagnosis Date   • Anemia 01/05/2018   • Arrhythmia     MVP   • Arthritis     all over   • Asthma 15-18    HX of, not on meds   • BPPV (benign paroxysmal positional vertigo)    • Breast  "lump or mass     Sourav breasts- had removed   • Cancer (HCC)     ovarian, caught early with hysterectomy   • Chickenpox    • Dental disorder 01/05/2018    upper dentures, lower missing teeth   • depression     taking venlafaxine   • Diabetes (HCC)     oral medication   • Disorder of thyroid     low thyroid   • Essential and other specified forms of tremor    • Fibromyalgia     neuropathy   • Foot fracture, right 12/13/2017   • GERD (gastroesophageal reflux disease)    • Mexican measles    • Heart burn    • Heart valve disease     mitral valve prolapse   • Helicobacter pylori    • Hemorrhagic disorder ([facility])     bruises easily   • Hiatus hernia syndrome     pt. denies 1-5-18   • High cholesterol 01/05/2018    \"Controlled with medication\"   • Hyperlipidemia    • Hypertension 09/10/2019    HX of, not currently on meds   • IBS (irritable bowel syndrome) 01/05/2018   • Indigestion    • Influenza    • Migraine with aura, without mention of intractable migraine without mention of status migrainosus    • MRSA (methicillin resistant Staphylococcus aureus) 2005   • Other specified disorder of intestines     IBS   • Oxygen dependent 09/10/2019    3 liter PRN   • Pain 09/10/2019    entire body,pain scale 6-7/10   • Pneumonia 01/05/2018    \"HX OF\"     • Polyneuropathy in diabetes(357.2)    • Renal disorder 01/05/2018    \"Due to a medication I was taking\". Name unknown   • Restless legs syndrome (RLS)    • Sleep apnea     sleep study done, states it was only for 2 hours, not the whole time   • Sleep apnea 01/05/2018    doesn't tolerate CPAP   • Snoring 01/05/2018   • Stroke ([facility]) 2007    short term memory loss   • Syncope     September '08 [place]   • TIA (transient ischemic attack)     per pt has had TIAs reports last    • Urinary incontinence 01/05/2018    occ. leaking   • Vision disturbance     flashes of light right/floaters on left eye       PSH:  Past Surgical History:   Procedure Laterality Date   • CERVICAL " LAMINECTOMY POSTERIOR  9/23/2019    Procedure: LAMINECTOMY, SPINE, CERVICAL, POSTERIOR APPROACH- C3-4;  Surgeon: Remi Lechuga III, M.D.;  Location: [facility];  Service: Neurosurgery   • CERVICAL FUSION POSTERIOR N/A 9/23/2019    Procedure: FUSION, SPINE, CERVICAL, POSTERIOR APPROACH- C2-t1 STEALTH FUSION;  Surgeon: Remi Lechuga III, M.D.;  Location: [facility];  Service: Neurosurgery   • CERVICAL DISK AND FUSION ANTERIOR  9/18/2019    Procedure: EXPLORATION OF CERVICAL FUSION and ANTERIOR CERVICAL HARDWARE REMOVAL, ANTERIOR CERVICAL 3 - 4 INTERBODY FUSION, CERVICAL 3 - 5 ANTERIOR FIXATION;  Surgeon: Remi Lechuga III, M.D.;  Location: [facility];  Service: Neurosurgery   • CORPECTOMY  9/18/2019    Procedure: PARTIAL CERVICAL 3 - 4 CORPECTOMY;  Surgeon: Remi Lechuga III, M.D.;  Location: [facility];  Service: Neurosurgery   • CERVICAL DISK AND FUSION ANTERIOR  6/21/2019    Procedure: DISCECTOMY, SPINE, CERVICAL, ANTERIOR APPROACH, WITH FUSION - C3-5;  Surgeon: Remi Lechuga III, M.D.;  Location: [facility];  Service: Neurosurgery   • HARDWARE REMOVAL NEURO  6/21/2019    Procedure: REMOVAL, HARDWARE - C5-7;  Surgeon: Remi Lechuga III, M.D.;  Location: [facility];  Service: Neurosurgery   • LAMINOTOMY Bilateral 1/12/2018    Procedure: Bilateral L3-L4 laminectomy with right L4  transpedicular decompression;  Surgeon: Remi Lechuga III, M.D.;  Location: [facility];  Service: Neurosurgery   • EXTREME LATERAL INTERBODY FUSION Left 1/11/2018    Procedure: EXTREME LATERAL INTERBODY FUSION-STAGE #1 L3-4 XLIF;  Surgeon: Remi Lechuga III, M.D.;  Location: [facility];  Service: Neurosurgery   • CERVICAL DISK AND FUSION ANTERIOR  1/22/2016    Procedure: CERVICAL DISK AND FUSION ANTERIOR C5-7;  Surgeon: Jerry Flores M.D.;  Location: [facility];  Service:    • MYRINGOTOMY  9/17/2010    Performed by  CARY BANUELOS at SURGERY [facility] ORS   • NASAL ENDOSCOPY  8/12/2010    Performed by CARY BANUELOS at SURGERY SAME DAY [facility] ORS   • ETHMOIDECTOMY  8/12/2010    Performed by CARY BANUELOS at SURGERY SAME DAY [facility] ORS   • ABDOMINAL HYSTERECTOMY TOTAL      Hysterectomy, Total Abdominal   • BREAST BIOPSY      X2 bilateral   • CHEST TUBE SUCTION      spontaneous pneumothorax - age 14   • CHOLECYSTECTOMY     • TONSILLECTOMY         FAMILY HISTORY:  Family History   Problem Relation Age of Onset   • Diabetes Other    • Heart Disease Other    • Hypertension Other    • Lung Disease Other    Non pertinent to schedule C spine operation.      MEDICATIONS:  Current Facility-Administered Medications   Medication Dose   • Respiratory Care per Protocol     • Pharmacy Consult Request ...Pain Management Review 1 Each  1 Each   • oxyCODONE immediate-release (ROXICODONE) tablet 5 mg  5 mg   • oxyCODONE immediate release (ROXICODONE) tablet 10 mg  10 mg   • tramadol (ULTRAM) 50 MG tablet 50 mg  50 mg   • hydrALAZINE (APRESOLINE) tablet 25 mg  25 mg   • acetaminophen (TYLENOL) tablet 650 mg  650 mg   • senna-docusate (PERICOLACE or SENOKOT S) 8.6-50 MG per tablet 2 Tab  2 Tab    And   • polyethylene glycol/lytes (MIRALAX) PACKET 1 Packet  1 Packet    And   • magnesium hydroxide (MILK OF MAGNESIA) suspension 30 mL  30 mL    And   • bisacodyl (DULCOLAX) suppository 10 mg  10 mg   • artificial tears ophthalmic solution 1 Drop  1 Drop   • benzocaine-menthol (CEPACOL) lozenge 1 Lozenge  1 Lozenge   • mag hydrox-al hydrox-simeth (MAALOX PLUS ES or MYLANTA DS) suspension 20 mL  20 mL   • ondansetron (ZOFRAN ODT) dispertab 4 mg  4 mg    Or   • ondansetron (ZOFRAN) syringe/vial injection 4 mg  4 mg   • traZODone (DESYREL) tablet 50 mg  50 mg   • sodium chloride (OCEAN) 0.65 % nasal spray 2 Spray  2 Spray   • enoxaparin (LOVENOX) inj 40 mg  40 mg   • [START ON 9/28/2019] vitamin D (cholecalciferol) tablet 5,000 Units  5,000  Units   • atorvastatin (LIPITOR) tablet 20 mg  20 mg   • furosemide (LASIX) tablet 20 mg  20 mg   • gabapentin (NEURONTIN) capsule 800 mg  800 mg   • glipiZIDE (GLUCOTROL) tablet 5 mg  5 mg   • levothyroxine (SYNTHROID) tablet 75 mcg  75 mcg   • potassium chloride SA (Kdur) tablet 10 mEq  10 mEq   • ROPINIRole (REQUIP) tablet 8 mg  8 mg   • SITagliptin (JANUVIA) tablet 100 mg  100 mg   • temazepam (RESTORIL) capsule 30 mg  30 mg   • venlafaxine (EFFEXOR) tablet 150 mg  150 mg   • methocarbamol (ROBAXIN) tablet 500 mg  500 mg       ALLERGIES:  Pcn [penicillins]; Demerol; Imitrex [sumatriptan succinate]; Metformin; Morphine; Prednisone; Stadol [butorphanol tartrate]; Tape; and Vistaril [hyzine]    PSYCHOSOCIAL HISTORY:  Housing / Facility: 1 Story Apartment / Condo  Steps Into Home: 0  Steps In Home: 0  Lives with - Patient's Self Care Capacity: Spouse  Equipment Owned: 4-Wheel Walker, Front-Wheel Walker, Wheelchair, Grab Bar(s) In Tub / Shower, Grab Bar(s) By Toilet, Raised Toilet Seat Without Arms, Bed Side Commode     Prior Level of Function / Living Situation:   Physical Therapy: Prior Services: Meals on Wheels  Housing / Facility: 1 Story Apartment / Condo  Steps Into Home: 0  Steps In Home: 0  Bathroom Set up: Walk In Shower, Grab Bars, Shower Chair  Equipment Owned: 4-Wheel Walker, Front-Wheel Walker, Wheelchair, Grab Bar(s) In Tub / Shower, Grab Bar(s) By Toilet, Raised Toilet Seat Without Arms, Bed Side Commode  Lives with - Patient's Self Care Capacity: Spouse  Bed Mobility: Independent  Transfer Status: Independent  Ambulation: Independent  Distance Ambulation (Feet): (community ambulator)  Assistive Devices Used: None  Current Level of Function:   Level Of Assist: Unable to Participate  Assistive Device: Front Wheel Walker  Distance (Feet): 2  Deviation: Ataxic, Bradykinetic  Weight Bearing Status: FWB  Skilled Intervention: Verbal Cuing, Tactile Cuing, Sequencing  Comments: 2/2 pain  Supine to Sit:  "Moderate Assist  Sit to Supine: Moderate Assist  Scooting: Maximal Assist  Rolling: Minimal Assist to Rt.  Skilled Intervention: Verbal Cuing, Sequencing  Comments: Patient required increased assist this day secondary to pain.  required VCs for proper log roll and hand palcement.    Sit to Stand: Minimal Assist  Bed, Chair, Wheelchair Transfer: Minimal Assist  Toilet Transfers: Minimal Assist  Tub / Shower Transfers: Stand by Assist  Transfer Method: Stand Pivot  Skilled Intervention: Verbal Cuing, Sequencing  Comments: VCs on proper hand placement.  Sitting in Chair: 9 min(BSC)  Sitting Edge of Bed: 5min  Standinmin  Occupational Therapy:   Self Feeding: Independent  Grooming / Hygiene: Independent  Bathing: Independent  Dressing: Independent  Toileting: Independent  Medication Management: Independent  Laundry: Independent  Kitchen Mobility: Independent  Finances: Independent  Home Management: Independent  Shopping: Independent  Prior Level Of Mobility: Supervision With Device in Community, Uses Wheel Chair for Community Mobility, Independent With Device in Home  Prior Services: Meals on Wheels  Housing / Facility: 1 Story Apartment / Condo  Current Level of Function:   Eating: Not Tested  Bathing: Not Tested  Upper Body Dressing: Maximal Assist  Lower Body Dressing: Maximal Assist  Toileting: Supervision  Speech Language Pathology:      Rehabilitation Prognosis/Potential: Good  Estimated Length of Stay:10 - 14  days       CURRENT LEVEL OF FUNCTION:   Same as level of function prior to admission to [facility]    PHYSICAL EXAM:     VITAL SIGNS:   height is 1.702 m (5' 7\") and weight is 85.7 kg (189 lb). Her temporal temperature is 36.4 °C (97.6 °F). Her blood pressure is 109/57 and her pulse is 75. Her respiration is 18 and oxygen saturation is 96%.     GENERAL: No apparent distress  HEENT: Normocephalic/atraumatic, EOMI and PERRL; anterior incision bandaged  CARDIAC: Regular rate and rhythm, " normal S1, S2   LUNGS: Clear to auscultation   ABDOMINAL: bowel sounds present, soft, nontender and nondistended , protuberant  EXTREMITIES: no contractures, spasticity, or edema.  No calf tenderness bilaterally  NEURO:  Mental status:  A&Ox4 (person, place, date, situation) answers questions appropriately  Speech: fluent, no aphasia or dysarthria    CRANIAL NERVES:  Motor:   Limited proximal due to pain  At least antigravity  3/5 EF   4/5 WE   4/5 FF  4/5 BLE except HF 3/5 limited due to body habitus and bed  Sensory: intact to light touch through out  DTRs: 3+ in left bicep, 2+ in right    RADIOLOGY:  MRI C spine 9/19/19  Impression       1.  Postsurgical changes C3-C5 ACDF . Prevertebral soft tissue swelling consistent with recent surgery.  2.  C5-C7 bony interbody fusion.  3.  Mild degenerative changes at C2 without significant spinal stenosis seen.         LABS:  Recent Labs     09/25/19 0225   SODIUM 141   POTASSIUM 4.0   CHLORIDE 100   CO2 37*   GLUCOSE 101*   BUN 10   CREATININE 0.76   CALCIUM 8.7     Recent Labs     09/25/19 0225   WBC 4.9   RBC 4.10*   HEMOGLOBIN 11.4*   HEMATOCRIT 38.9   MCV 94.9   MCH 27.8   MCHC 29.3*   RDW 48.5   PLATELETCT 212   MPV 9.8             PRIMARY REHAB DIAGNOSIS:    This patient is a 63 y.o. female admitted for acute inpatient rehabilitation with S/P laminectomy with spinal fusion.    IMPAIRMENTS:   ADLs/IADLs  Mobility  Swallow    SECONDARY DIAGNOSIS/MEDICAL CO-MORBIDITIES AFFECTING FUNCTION:  DM  hypertension, hyperlipidemia, GERD, arthritis  Anemia     RELEVANT CHANGES SINCE PREADMISSION EVALUATION:    Status unchanged    The patient's rehabilitation potential is Very Good  The patient's medical prognosis is good    PLAN:   Discussion and Recommendations:   1. The patient requires an acute inpatient rehabilitation program with a coordinated program of care at an intensity and frequency not available at a lower level of care. This recommendation is substantiated by the  patient's medical physicians who recommend that the patient's intervention and assessment of medical issues needs to be done at an acute level of care for patient's safety and maximum outcome.   2. A coordinated program of care will be supplied by an interdisciplinary team of physical therapy, occupational therapy, rehab physician, rehab nursing, and, if needed, speech therapy and rehab psychology. Rehab team presents a patient-specific rehabilitation and education program concentrating on prevention of future problems related to accessibility, mobility, skin, bowel, bladder, sexuality, and psychosocial and medical/surgical problems.   3. Need for Rehabilitation Physician: The rehab physician will be evaluating the patient on a multi-weekly basis to help coordinate the program of care. The rehab physician communicates between medical physicians, therapists, and nurses to maximize the patient's potential outcome. Specific areas in which the rehab physician will be providing daily assessment include the following:   A. Assessing the patient's heart rate and blood pressure response (vitals monitoring) to activity and making adjustments in medications or conservative measures as needed.   B. The rehab physician will be assessing the frequency at which the program can be increased to allow the patient to reach optimal functional outcome.   C. The rehab physician will also provide assessments in daily skin care, especially in light of patient's impairments in mobility.   D. The rehab physician will provide special expertise in understanding how to work with functional impairment and recommend appropriate interventions, compensatory techniques, and education that will facilitate the patient's outcome.   4. Rehab R.N.   The rehab RN will be working with patient to carry over in room mobility and activities of daily living when the patient is not in 3 hours of skilled therapy. Rehab nursing will be working in conjunction  with rehab physician to address all the medical issues above and continue to assess laboratory work and discuss abnormalities with the treating physicians, assess vitals, and response to activity, and discuss and report abnormalities with the rehab physician. Rehab RN will also continue daily skin care, supervise bladder/bowel program, instruct in medication administration, and ensure patient safety.   5. Rehab Therapy: Therapies to treat at intensity and frequency of (may change after completion of evaluation by all therapeutic disciplines):       PT:  Physical therapy to address mobility, transfer, gait training and evaluation for adaptive equipment needs 1 hour/day at least 5 days/week for the duration of the ELOS (see below)       OT:  Occupational therapy to address ADLs, self-care, home management training, functional mobility/transfers and assistive device evaluation, and community re-integration 1  hour/day at least 5 days/week for the duration of the ELOS (see below).        ST/Dysphagia:  Speech therapy to address speech, language, and cognitive deficits as well as swallowing difficulties with retraining/dysphagia management and community re-integration with comprehension, expression, cognitive training 1  hour/day at least 5 days/week for the duration of the ELOS (see below).     Medical management / Rehabilitation Issues/ Adverse Potential as part of rehabilitation plan     REHABILITATION ISSUES/ADVERSE POTENTIAL:  1.  Cervical hardware failure - s/p  revision of C3-4 and C4-C5 ACDF on 9/18/19 and C2-T1 PCF on 9/23/19 with Dr. Lechuga. Patient demonstrates functional deficits in strength, balance, coordination, and ADL's. Patient is admitted to [facility] for comprehensive rehabilitation therapy as described below.   Rehabilitation nursing monitors bowel and bladder control, educates on medication administration, co-morbidities and monitors patient safety.    2.  Neurostimulants: None  at this time but continue to assess daily for need to initiate should status change.    3.  DVT prophylaxis:  Patient is on Lovenox for anticoagulation upon transfer. Encourage OOB. Monitor daily for signs and symptoms of DVT including but not limited to swelling and pain to prevent the development of DVT that may interfere with therapies.    4.  GI prophylaxis:  On prilosec to prevent gastritis/dyspepsia which may interfere with therapies.    5.  Pain: No issues with pain currently / Controlled with APAP/Oxycodone/Gabapentin     6.  Nutrition/Dysphagia: Dietician monitors nutrient intake, recommend supplements prn and provide nutrition education to pt/family to promote optimal nutrition for wound healing/recovery.     7.  Bladder/bowel:  Start bowel and bladder program as described below, to prevent constipation, urinary retention (which may lead to UTI), and urinary incontinence (which will impact upon pt's functional independence).   - Post void bladder scans, I&O cath for PVRs >400  - up to commode after meal     8.  Skin/dermal ulcer prophylaxis: Monitor for new skin conditions with q.2 h. turns as required to prevent the development of skin breakdown.     9.  Cognition/Behavior:  Psychologist Dr. Mayer provides adjustment counseling to illness and psychosocial barriers that may be potential barriers to rehabilitation.     10. Respiratory therapy: RT performs O2 management prn, breathing retraining, pulmonary hygiene and bronchospasm management prn to optimize participation in therapies.     MEDICAL CO-MORBIDITIES/ADVERSE POTENTIAL AFFECTING FUNCTION:  Cervical stenosis - s/p  revision of C3-4 and C4-C5 ACDF on 9/18/19 and C2-T1 PCF on 9/23/19 with Dr. Lechuga.  -PT and OT for mobility and ADLs  -C collar - when OOB due to not fitting in bed. OK off for eating and showers. Cleared for shower    Dysphagia - Patient with choking episode after surgical fixation which is common after anterior approach.    -SLP for  swallow. Dysphagia 2 on transfer    Pain - Patient on APAP/Oxycodone PRN and Gabapentin 800 mg TID on transfer  -Increase Gabapentin and start 2 patches to trapezius.   -Continue Robaxin TID    DM - Patient on Glipizide 5 mg, Sitagliptin  100 mg daily,  -Monitor, most recent A1c 6.3 on 9/10/19.     HLD - Patient on Atorvastatin 20 mg.    Hypothyroidism - On 75 mcg QHS.     Hypokalemia - On 10 mEq daily. Check AM CMP    Anemia - Mild post-operative blood loss anemia. Check CBC in AM.     RLS - Patient on Ropinirole 8 mg QHS    GI Ppx - Start omeprazole while on dysphagia diet.     DVT Ppx - Patient on Lovenox on transfer.    I personally performed a complete drug regimen review and no potential clinically significant medication issues were identified.     Pt was seen today for 77 min, and entire time spent in face-to-face contact was >50% in counseling and coordination of care as detailed in A/P above.        GOALS/EXPECTED LEVEL OF FUNCTION BASED ON CURRENT MEDICAL AND FUNCTIONAL STATUS (may change based on patient's medical status and rate of impairment recovery):  Transfers:   Modified Independent  Mobility/Gait:   Modified Independent  ADL's:   Modified Independent  Swallowing:  Regular    DISPOSITION: Discharge to pre-morbid independent living setting with the supportive care of patient's spouse.    ELOS: 7-14 days

## 2019-09-27 NOTE — PROGRESS NOTES
0900Report received, plan of care reviewed and discussed, assessment complete, oriented to room, bed alarm on, nonskid socks applied, advised to call for assistance.   1015 Discharge instructions reviewed, encouraged and answered all questions, awaiting discharge to rehab.

## 2019-09-27 NOTE — THERAPY
Speech Therapy Contact Note    Per RN, pt is leaving for [facility] at 10:30 am. RN reports pt sometimes chokes while eating when she's sleepy and not sitting upright. SLP requested pt be upright and alert for all PO intake to minimize risk of aspiration/choking. RN to cancel swallow re-evaluation as pt is leaving and will be seen by SLP at rehab.

## 2019-09-28 LAB
25(OH)D3 SERPL-MCNC: 34 NG/ML (ref 30–100)
ALBUMIN SERPL BCP-MCNC: 4.3 G/DL (ref 3.2–4.9)
ALBUMIN/GLOB SERPL: 1.5 G/DL
ALP SERPL-CCNC: 92 U/L (ref 30–99)
ALT SERPL-CCNC: 13 U/L (ref 2–50)
ANION GAP SERPL CALC-SCNC: 8 MMOL/L (ref 0–11.9)
AST SERPL-CCNC: 19 U/L (ref 12–45)
BASOPHILS # BLD AUTO: 0.8 % (ref 0–1.8)
BASOPHILS # BLD: 0.05 K/UL (ref 0–0.12)
BILIRUB SERPL-MCNC: 0.5 MG/DL (ref 0.1–1.5)
BUN SERPL-MCNC: 16 MG/DL (ref 8–22)
CALCIUM SERPL-MCNC: 10.3 MG/DL (ref 8.5–10.5)
CHLORIDE SERPL-SCNC: 99 MMOL/L (ref 96–112)
CHOLEST SERPL-MCNC: 141 MG/DL (ref 100–199)
CO2 SERPL-SCNC: 35 MMOL/L (ref 20–33)
CREAT SERPL-MCNC: 0.69 MG/DL (ref 0.5–1.4)
EOSINOPHIL # BLD AUTO: 0.34 K/UL (ref 0–0.51)
EOSINOPHIL NFR BLD: 5.2 % (ref 0–6.9)
ERYTHROCYTE [DISTWIDTH] IN BLOOD BY AUTOMATED COUNT: 47.7 FL (ref 35.9–50)
GLOBULIN SER CALC-MCNC: 2.9 G/DL (ref 1.9–3.5)
GLUCOSE SERPL-MCNC: 86 MG/DL (ref 65–99)
HCT VFR BLD AUTO: 43.7 % (ref 37–47)
HDLC SERPL-MCNC: 44 MG/DL
HGB BLD-MCNC: 13.3 G/DL (ref 12–16)
IMM GRANULOCYTES # BLD AUTO: 0.04 K/UL (ref 0–0.11)
IMM GRANULOCYTES NFR BLD AUTO: 0.6 % (ref 0–0.9)
LDLC SERPL CALC-MCNC: 61 MG/DL
LYMPHOCYTES # BLD AUTO: 1.48 K/UL (ref 1–4.8)
LYMPHOCYTES NFR BLD: 22.7 % (ref 22–41)
MCH RBC QN AUTO: 28 PG (ref 27–33)
MCHC RBC AUTO-ENTMCNC: 30.4 G/DL (ref 33.6–35)
MCV RBC AUTO: 92 FL (ref 81.4–97.8)
MONOCYTES # BLD AUTO: 0.69 K/UL (ref 0–0.85)
MONOCYTES NFR BLD AUTO: 10.6 % (ref 0–13.4)
NEUTROPHILS # BLD AUTO: 3.93 K/UL (ref 2–7.15)
NEUTROPHILS NFR BLD: 60.1 % (ref 44–72)
NRBC # BLD AUTO: 0 K/UL
NRBC BLD-RTO: 0 /100 WBC
PLATELET # BLD AUTO: 280 K/UL (ref 164–446)
PMV BLD AUTO: 9.8 FL (ref 9–12.9)
POTASSIUM SERPL-SCNC: 4.7 MMOL/L (ref 3.6–5.5)
PROT SERPL-MCNC: 7.2 G/DL (ref 6–8.2)
RBC # BLD AUTO: 4.75 M/UL (ref 4.2–5.4)
SODIUM SERPL-SCNC: 142 MMOL/L (ref 135–145)
TRIGL SERPL-MCNC: 181 MG/DL (ref 0–149)
TSH SERPL DL<=0.005 MIU/L-ACNC: 3.3 UIU/ML (ref 0.38–5.33)
WBC # BLD AUTO: 6.5 K/UL (ref 4.8–10.8)

## 2019-09-28 PROCEDURE — 92610 EVALUATE SWALLOWING FUNCTION: CPT

## 2019-09-28 PROCEDURE — 80061 LIPID PANEL: CPT

## 2019-09-28 PROCEDURE — 700101 HCHG RX REV CODE 250: Performed by: PHYSICAL MEDICINE & REHABILITATION

## 2019-09-28 PROCEDURE — 99233 SBSQ HOSP IP/OBS HIGH 50: CPT | Performed by: PHYSICAL MEDICINE & REHABILITATION

## 2019-09-28 PROCEDURE — 80053 COMPREHEN METABOLIC PANEL: CPT

## 2019-09-28 PROCEDURE — 700111 HCHG RX REV CODE 636 W/ 250 OVERRIDE (IP): Performed by: PHYSICAL MEDICINE & REHABILITATION

## 2019-09-28 PROCEDURE — 700102 HCHG RX REV CODE 250 W/ 637 OVERRIDE(OP): Performed by: PHYSICAL MEDICINE & REHABILITATION

## 2019-09-28 PROCEDURE — 97166 OT EVAL MOD COMPLEX 45 MIN: CPT

## 2019-09-28 PROCEDURE — 85025 COMPLETE CBC W/AUTO DIFF WBC: CPT

## 2019-09-28 PROCEDURE — 36415 COLL VENOUS BLD VENIPUNCTURE: CPT

## 2019-09-28 PROCEDURE — 82306 VITAMIN D 25 HYDROXY: CPT

## 2019-09-28 PROCEDURE — 97535 SELF CARE MNGMENT TRAINING: CPT

## 2019-09-28 PROCEDURE — 97162 PT EVAL MOD COMPLEX 30 MIN: CPT

## 2019-09-28 PROCEDURE — 770010 HCHG ROOM/CARE - REHAB SEMI PRIVAT*

## 2019-09-28 PROCEDURE — 84443 ASSAY THYROID STIM HORMONE: CPT

## 2019-09-28 PROCEDURE — A9270 NON-COVERED ITEM OR SERVICE: HCPCS | Performed by: PHYSICAL MEDICINE & REHABILITATION

## 2019-09-28 PROCEDURE — 97530 THERAPEUTIC ACTIVITIES: CPT

## 2019-09-28 RX ORDER — METHOCARBAMOL 750 MG/1
750 TABLET, FILM COATED ORAL ONCE
Status: COMPLETED | OUTPATIENT
Start: 2019-09-28 | End: 2019-09-28

## 2019-09-28 RX ORDER — METHOCARBAMOL 750 MG/1
750 TABLET, FILM COATED ORAL EVERY 8 HOURS
Status: DISCONTINUED | OUTPATIENT
Start: 2019-09-28 | End: 2019-10-01 | Stop reason: HOSPADM

## 2019-09-28 RX ORDER — LIDOCAINE 50 MG/G
2 PATCH TOPICAL ONCE
Status: COMPLETED | OUTPATIENT
Start: 2019-09-28 | End: 2019-09-29

## 2019-09-28 RX ORDER — LIDOCAINE 50 MG/G
2 PATCH TOPICAL EVERY 24 HOURS
Status: DISCONTINUED | OUTPATIENT
Start: 2019-09-29 | End: 2019-10-01 | Stop reason: HOSPADM

## 2019-09-28 RX ADMIN — METHOCARBAMOL TABLETS 750 MG: 750 TABLET, COATED ORAL at 14:47

## 2019-09-28 RX ADMIN — LEVOTHYROXINE SODIUM 75 MCG: 75 TABLET ORAL at 20:56

## 2019-09-28 RX ADMIN — VITAMIN D, TAB 1000IU (100/BT) 5000 UNITS: 25 TAB at 08:42

## 2019-09-28 RX ADMIN — METHOCARBAMOL TABLETS 750 MG: 750 TABLET, COATED ORAL at 21:00

## 2019-09-28 RX ADMIN — ATORVASTATIN CALCIUM 20 MG: 10 TABLET, FILM COATED ORAL at 20:57

## 2019-09-28 RX ADMIN — GABAPENTIN 900 MG: 300 CAPSULE ORAL at 20:57

## 2019-09-28 RX ADMIN — ROPINIROLE HYDROCHLORIDE 8 MG: 1 TABLET, FILM COATED ORAL at 20:55

## 2019-09-28 RX ADMIN — SENNOSIDES,DOCUSATE SODIUM 2 TABLET: 8.6; 5 TABLET, FILM COATED ORAL at 08:43

## 2019-09-28 RX ADMIN — VENLAFAXINE HYDROCHLORIDE 150 MG: 37.5 TABLET ORAL at 20:56

## 2019-09-28 RX ADMIN — METHOCARBAMOL TABLETS 500 MG: 500 TABLET, COATED ORAL at 05:00

## 2019-09-28 RX ADMIN — GABAPENTIN 900 MG: 300 CAPSULE ORAL at 14:43

## 2019-09-28 RX ADMIN — LIDOCAINE 2 PATCH: 50 PATCH TOPICAL at 14:47

## 2019-09-28 RX ADMIN — OXYCODONE HYDROCHLORIDE 10 MG: 10 TABLET ORAL at 22:24

## 2019-09-28 RX ADMIN — OXYCODONE HYDROCHLORIDE 10 MG: 10 TABLET ORAL at 04:05

## 2019-09-28 RX ADMIN — SENNOSIDES,DOCUSATE SODIUM 2 TABLET: 8.6; 5 TABLET, FILM COATED ORAL at 20:55

## 2019-09-28 RX ADMIN — METHOCARBAMOL TABLETS 500 MG: 500 TABLET, COATED ORAL at 13:00

## 2019-09-28 RX ADMIN — TEMAZEPAM 30 MG: 15 CAPSULE ORAL at 20:55

## 2019-09-28 RX ADMIN — OXYCODONE HYDROCHLORIDE 10 MG: 10 TABLET ORAL at 13:00

## 2019-09-28 RX ADMIN — OMEPRAZOLE 20 MG: 20 CAPSULE, DELAYED RELEASE ORAL at 08:43

## 2019-09-28 RX ADMIN — OXYCODONE HYDROCHLORIDE 10 MG: 10 TABLET ORAL at 08:42

## 2019-09-28 RX ADMIN — OXYCODONE HYDROCHLORIDE 10 MG: 10 TABLET ORAL at 18:11

## 2019-09-28 RX ADMIN — GLIPIZIDE 5 MG: 5 TABLET ORAL at 20:57

## 2019-09-28 RX ADMIN — SITAGLIPTIN 100 MG: 50 TABLET, FILM COATED ORAL at 20:56

## 2019-09-28 RX ADMIN — ENOXAPARIN SODIUM 40 MG: 100 INJECTION SUBCUTANEOUS at 20:57

## 2019-09-28 RX ADMIN — GABAPENTIN 900 MG: 300 CAPSULE ORAL at 08:42

## 2019-09-28 ASSESSMENT — BRIEF INTERVIEW FOR MENTAL STATUS (BIMS)
BIMS SUMMARY SCORE: 13
WHAT DAY OF THE WEEK IS IT: CORRECT
WHAT MONTH IS IT: MISSED BY 6 DAYS TO 1 MONTH
ASKED TO RECALL BED: YES, AFTER CUEING (A PIECE OF FURNITURE")"
ASKED TO RECALL SOCK: YES, NO CUE REQUIRED
ASKED TO RECALL BLUE: YES, NO CUE REQUIRED
WHAT YEAR IS IT: CORRECT
INITIAL REPETITION OF BED BLUE SOCK - FIRST ATTEMPT: 3

## 2019-09-28 ASSESSMENT — ACTIVITIES OF DAILY LIVING (ADL): TOILETING: INDEPENDENT

## 2019-09-28 NOTE — PROGRESS NOTES
"Rehab Progress Note     Encounter Date: 9/28/2019    CC: Neck pain, back spasms    Interval Events (Subjective)  Patient sitting up in bed after therapy evaluations. She reports the evaluations went very well and she is happy to get a shower. Reviewed admission labs with her including normal CMP and CBC.  She reports her only complaint is her neck pain. She reports the lidocaine patches helped overnight but was told she can only wear them for 12 hours. She is also having significant left trap spasms.  Discussed increasing spasm medication and she is in agreement. Will adjust timing of lidocaine patches. Denies NVD. Denies constipation    Objective:  VITAL SIGNS: /82   Pulse 84   Temp 36.6 °C (97.8 °F) (Oral)   Resp 18   Ht 1.702 m (5' 7\")   Wt 85.7 kg (189 lb)   LMP  (LMP Unknown)   SpO2 94%   BMI 29.60 kg/m²   Gen: NAD  Psych: Mood and affect appropriate  CV: RRR, no edema  Resp: CTAB, no upper airway sounds  Abd: NTND  Neuro: AOx4, 5/5 BUE, muscle spasms felt in left upper trap    Recent Results (from the past 72 hour(s))   CBC with Differential    Collection Time: 09/28/19  5:58 AM   Result Value Ref Range    WBC 6.5 4.8 - 10.8 K/uL    RBC 4.75 4.20 - 5.40 M/uL    Hemoglobin 13.3 12.0 - 16.0 g/dL    Hematocrit 43.7 37.0 - 47.0 %    MCV 92.0 81.4 - 97.8 fL    MCH 28.0 27.0 - 33.0 pg    MCHC 30.4 (L) 33.6 - 35.0 g/dL    RDW 47.7 35.9 - 50.0 fL    Platelet Count 280 164 - 446 K/uL    MPV 9.8 9.0 - 12.9 fL    Neutrophils-Polys 60.10 44.00 - 72.00 %    Lymphocytes 22.70 22.00 - 41.00 %    Monocytes 10.60 0.00 - 13.40 %    Eosinophils 5.20 0.00 - 6.90 %    Basophils 0.80 0.00 - 1.80 %    Immature Granulocytes 0.60 0.00 - 0.90 %    Nucleated RBC 0.00 /100 WBC    Neutrophils (Absolute) 3.93 2.00 - 7.15 K/uL    Lymphs (Absolute) 1.48 1.00 - 4.80 K/uL    Monos (Absolute) 0.69 0.00 - 0.85 K/uL    Eos (Absolute) 0.34 0.00 - 0.51 K/uL    Baso (Absolute) 0.05 0.00 - 0.12 K/uL    Immature Granulocytes (abs) 0.04 " 0.00 - 0.11 K/uL    NRBC (Absolute) 0.00 K/uL   Comp Metabolic Panel (CMP)    Collection Time: 09/28/19  5:58 AM   Result Value Ref Range    Sodium 142 135 - 145 mmol/L    Potassium 4.7 3.6 - 5.5 mmol/L    Chloride 99 96 - 112 mmol/L    Co2 35 (H) 20 - 33 mmol/L    Anion Gap 8.0 0.0 - 11.9    Glucose 86 65 - 99 mg/dL    Bun 16 8 - 22 mg/dL    Creatinine 0.69 0.50 - 1.40 mg/dL    Calcium 10.3 8.5 - 10.5 mg/dL    AST(SGOT) 19 12 - 45 U/L    ALT(SGPT) 13 2 - 50 U/L    Alkaline Phosphatase 92 30 - 99 U/L    Total Bilirubin 0.5 0.1 - 1.5 mg/dL    Albumin 4.3 3.2 - 4.9 g/dL    Total Protein 7.2 6.0 - 8.2 g/dL    Globulin 2.9 1.9 - 3.5 g/dL    A-G Ratio 1.5 g/dL   Lipid Profile (Lipid Panel)    Collection Time: 09/28/19  5:58 AM   Result Value Ref Range    Cholesterol,Tot 141 100 - 199 mg/dL    Triglycerides 181 (H) 0 - 149 mg/dL    HDL 44 >=40 mg/dL    LDL 61 <100 mg/dL   TSH with Reflex to FT4    Collection Time: 09/28/19  5:58 AM   Result Value Ref Range    TSH 3.300 0.380 - 5.330 uIU/mL   Vitamin D, 25-hydroxy (blood)    Collection Time: 09/28/19  5:58 AM   Result Value Ref Range    25-Hydroxy   Vitamin D 25 34 30 - 100 ng/mL   ESTIMATED GFR    Collection Time: 09/28/19  5:58 AM   Result Value Ref Range    GFR If African American >60 >60 mL/min/1.73 m 2    GFR If Non African American >60 >60 mL/min/1.73 m 2       Current Facility-Administered Medications   Medication Frequency   • Respiratory Care per Protocol Continuous RT   • oxyCODONE immediate-release (ROXICODONE) tablet 5 mg Q3HRS PRN   • oxyCODONE immediate release (ROXICODONE) tablet 10 mg Q3HRS PRN   • tramadol (ULTRAM) 50 MG tablet 50 mg Q4HRS PRN   • hydrALAZINE (APRESOLINE) tablet 25 mg Q8HRS PRN   • acetaminophen (TYLENOL) tablet 650 mg Q4HRS PRN   • senna-docusate (PERICOLACE or SENOKOT S) 8.6-50 MG per tablet 2 Tab BID    And   • polyethylene glycol/lytes (MIRALAX) PACKET 1 Packet QDAY PRN    And   • magnesium hydroxide (MILK OF MAGNESIA) suspension 30 mL  QDAY PRN    And   • bisacodyl (DULCOLAX) suppository 10 mg QDAY PRN   • artificial tears ophthalmic solution 1 Drop PRN   • benzocaine-menthol (CEPACOL) lozenge 1 Lozenge Q2HRS PRN   • mag hydrox-al hydrox-simeth (MAALOX PLUS ES or MYLANTA DS) suspension 20 mL Q2HRS PRN   • ondansetron (ZOFRAN ODT) dispertab 4 mg 4X/DAY PRN    Or   • ondansetron (ZOFRAN) syringe/vial injection 4 mg 4X/DAY PRN   • traZODone (DESYREL) tablet 50 mg QHS PRN   • sodium chloride (OCEAN) 0.65 % nasal spray 2 Spray PRN   • enoxaparin (LOVENOX) inj 40 mg DAILY   • vitamin D (cholecalciferol) tablet 5,000 Units DAILY   • atorvastatin (LIPITOR) tablet 20 mg QHS   • furosemide (LASIX) tablet 20 mg QDAY PRN   • glipiZIDE (GLUCOTROL) tablet 5 mg Q EVENING   • levothyroxine (SYNTHROID) tablet 75 mcg QHS   • potassium chloride SA (Kdur) tablet 10 mEq Q EVENING   • ROPINIRole (REQUIP) tablet 8 mg QHS   • SITagliptin (JANUVIA) tablet 100 mg Q EVENING   • temazepam (RESTORIL) capsule 30 mg QHS   • venlafaxine (EFFEXOR) tablet 150 mg QHS   • methocarbamol (ROBAXIN) tablet 500 mg Q8HRS   • gabapentin (NEURONTIN) capsule 900 mg TID   • lidocaine (LIDODERM) 5 % 2 Patch Q24HR   • omeprazole (PRILOSEC) capsule 20 mg DAILY       Orders Placed This Encounter   Procedures   • Diet Order Diabetic     Standing Status:   Standing     Number of Occurrences:   1     Order Specific Question:   Diet:     Answer:   Diabetic [3]     Order Specific Question:   Texture/Fiber modifications:     Answer:   Dysphagia 2(Pureed/Chopped)specify fluid consistency(question 6) [2]     Order Specific Question:   Consistency/Fluid modifications:     Answer:   Thin Liquids [3]       Assessment:  Active Hospital Problems    Diagnosis   • *S/P laminectomy with spinal fusion   • CVA (cerebral vascular accident) ([facility])   • Thyroid disorder   • Diabetic polyneuropathy ([facility])   • Restless legs syndrome (RLS)   • Type 2 diabetes mellitus, without long-term current use of insulin ([facility])   •  COPD (chronic obstructive pulmonary disease) (HCC)   • Tremor   • HTN (hypertension)       Medical Decision Making and Plan:  Cervical stenosis - s/p  revision of C3-4 and C4-C5 ACDF on 9/18/19 and C2-T1 PCF on 9/23/19 with Dr. Lechuga.  -PT and OT for mobility and ADLs  -C collar - when OOB due to not fitting in bed. OK off for eating and showers. Cleared for shower     Dysphagia - Patient with choking episode after surgical fixation which is common after anterior approach.    -SLP for swallow. Dysphagia 2 on transfer     Pain - Patient on APAP/Oxycodone PRN and Gabapentin 800 mg TID on transfer  -Increase Gabapentin and start 2 Lidocaine patches to trapezius.   -Continue Robaxin TID. Increase to 750 mg TID, and adjust timing of lidocaine patches.      DM - Patient on Glipizide 5 mg, Sitagliptin  100 mg daily,  -Monitor, most recent A1c 6.3 on 9/10/19.      HLD - Patient on Atorvastatin 20 mg.     Hypothyroidism - On 75 mcg QHS.      Hypokalemia - On 10 mEq daily. Check AM CMP - wnl. Stop K supplement     Anemia - Mild post-operative blood loss anemia. Check CBC in AM - wnl.     RLS - Patient on Ropinirole 8 mg QHS     GI Ppx - Start omeprazole while on dysphagia diet.      DVT Ppx - Patient on Lovenox on transfer.     Total time:  35 minutes.  I spent greater than 50% of the time for patient care and coordination on this date, including unit/floor time, and face-to-face time with the patient as per assessment and plan above.  Discussion included normal admission labs, stop K supplement and remove IV, and increase Robaxin. Discussed risk of sedation and constipation. Patient will monitor on stool softener.     Deedee Ivy M.D.

## 2019-09-28 NOTE — DISCHARGE SUMMARY
DATE OF ADMISSION:  09/18/2019    DATE OF TRANSFER:  09/27/2019    TRANSFER DIAGNOSES:  1.  C3 through C5 cervical spondylosis.  2.  C3 through C5 cervical spinal stenosis.  3.  Cervical spondylolisthesis with kyphotic deformity and pseudoarthrosis.  4.  Status post prior C3 through C5 anterior cervical diskectomy and fusion   done 4 months ago.    SURGERIES PERFORMED:    1.  Removal of C3 through C5 anterior cervical hardware with exploration of   fusion, C3-C4 partial corpectomy with C3-C4 anterior cervical fusion with C3   through C5 anterior plating performed 09/18/2019 by Dr. Remi Lechuga.  2.  C2 through T1 stealth-guided posterior instrumentation performed   09/23/2019 by Dr. Remi Lechuga.    COMPLICATIONS:  None.    REASON FOR ADMISSION:  This is a 63-year-old female who had undergone prior C3   through C5 anterior cervical diskectomy and fusion 4 months ago.  She did   develop hardware failure with pseudoarthrosis.  Due to the degree of   instability and deformity, patient was indicated for revision of the anterior   cervical fusion with posterior stabilization via staged procedures.    HOSPITAL COURSE:  The patient was admitted on date of surgery #1, she   underwent surgery without complication.  After recovery, she was transferred   to neurosurgical floor.  She was seen by physical therapy throughout her stay.    By 09/23, she was taken back to the operating room for the planned stage II   procedure.  That surgery as well, went without complication.  After recovery,   she was transferred back to the neurosurgical floor.  She has been slow to   mobilize throughout her stay.  She has been seen by the rehab service and has   been deemed appropriate for inpatient rehabilitation.  She has been accepted   at this point, she is now cleared for transfer.    INSTRUCTIONS ON TRANSFER:  Patient can mobilize as tolerated with standard   cervical precautions.  She is to avoid pushing, pulling or lifting greater   than  15 pounds.  It is okay for her to shower.  She is not to submerge the   wound.  Her diet is to remain regular as tolerated.    TRANSFER MEDICATIONS:  Include Tylenol 650 mg p.o. q.6 hours p.r.n.   temperature greater than 101 or mild pain, Xanax 0.25 mg p.o. twice daily,   Lipitor 20 mg p.o. nightly, Dulcolax suppository 10 mg daily p.r.n.   constipation, Tums 500 mg oral twice daily p.r.n. indigestion, Catapres 0.1 mg   p.o. q.4 hours p.r.n. systolic greater than 160, Benadryl 25 mg p.o. q.6   hours p.r.n. itching; Colace 100 mg p.o. twice daily, hold for loose stools;   Lovenox 40 mg subcutaneously daily, Lasix 20 mg p.o. daily, Neurontin 800 mg   p.o. 3 times daily, Glucotrol 5 mg p.o. nightly, Synthroid 75 mcg p.o.   nightly, milk of magnesia 30 mL p.o. daily p.r.n. constipation, Prilosec 20 mg   p.o. daily, Zofran 4 mg p.o. q.6 hours p.r.n. nausea, oxycodone 5-10 mg p.o.   q.3 hours p.r.n. pain, potassium chloride 10 mEq p.o. nightly, Requip 8 mg   p.o. nightly, Januvia tablet 100 mg p.o. nightly, Restoril 30 mg p.o. nightly,   Effexor 150 mg p.o. nightly, vitamin D 5000 units p.o. daily.    All the patient's questions have been answered.  She is cleared for transfer.       ____________________________________     KARYNA BROWNE / SATHISH    DD:  09/27/2019 08:35:42  DT:  09/28/2019 03:34:06    D#:  [id number]  Job#:  [id number]

## 2019-09-28 NOTE — THERAPY
"Occupational Therapy   Initial Evaluation     Patient Name: Valentina Lu  Age:  63 y.o., Sex:  female  Medical Record #: [id number]  Today's Date: 9/28/2019     Subjective    \"Everyone calls me Maxine, but I like Pinky.\"      Objective       09/28/19 1301   Prior Living Situation   Prior Services Home-Independent   Housing / Facility 1 Story House  (Recently moved into ADA accessible apartment with spouse)   Bathroom Set up   (Grab bars to be installed in shower/toilet)   Equipment Owned 4-Wheel Walker;Front-Wheel Walker;Wheelchair;Power Wheel Chair;Raised Toilet Seat With Arms;Bed Side Commode  (w/c belong to spouse )   Lives with - Patient's Self Care Capacity Spouse   Comments Daughter lives nearby; spouse has MS and has been experiencing increased incidents of falling.     Prior Level of ADL Function   Self Feeding Independent   Grooming / Hygiene Independent   Bathing Independent   Dressing Independent   Toileting Independent   Prior Level of IADL Function   Medication Management Independent   Laundry Independent   Kitchen Mobility Independent   Finances Independent   Home Management Independent   Shopping Independent   Prior Level Of Mobility Independent With Device in Community   Driving / Transportation Driving Independent   Occupation (Pre-Hospital Vocational) Retired Due To Age   IRF-THEO:  Prior Functioning: Everyday Activities   Self Care Independent   Indoor Mobility (Ambulation) Independent   Stairs Independent   Functional Cognition Independent   Prior Device Use None of the given options   Pain 0 - 10 Group   Location Shoulder   Location Orientation Left   Pain Rating Scale (NPRS) 6   Description Burning   Therapist Pain Assessment Post Activity   Cognition    Cognition / Consciousness WDL   Level of Consciousness Alert   Passive ROM Upper Body   Passive ROM Upper Body WDL   Active ROM Upper Body   Active ROM Upper Body  WDL   Strength Upper Body   Upper Body Strength  Not Tested   Sensation " Upper Body   Upper Extremity Sensation  WDL   Upper Body Muscle Tone   Upper Body Muscle Tone  WDL   Balance Assessment   Sitting Balance (Static) Fair +   Sitting Balance (Dynamic) Fair +   Standing Balance (Static) Fair -   Standing Balance (Dynamic) Fair -   Weight Shift Sitting Fair   Weight Shift Standing Fair   Comments Standing balance maintained with FWW; no LOB   Bed Mobility    Supine to Sit Supervised   Sit to Supine Supervised   Sit to Stand Stand by Assist   Coordination Upper Body   Coordination WDL   IRF-THEO:  Eating   Assistance Needed Set-up / clean-up   CARE Score 5   Discharge Goal:  Assistance Needed Independent   Discharge Goal:  Score 6   IRF-THEO:  Oral Hygiene   Assistance Needed Set-up / clean-up   CARE Score 5   Discharge Goal:  Assistance Needed Independent   Discharge Goal:  Score 6   IRF-THEO:  Shower/Bathe Self   Assistance Needed Physical assistance   Physical Assistance Level Less than 25%   CARE Score 3   Discharge Goal:  Assistance Needed Independent   Discharge Goal Score 6   IRF-THEO:  Upper Body Dressing   Assistance Needed Incidental touching   Physical Assistance Level No physical assistance or only touching/steadying assist   CARE Score 4   Discharge Goal:  Assistance Needed Independent   Dischage Goal:  Score 6   IRF-THEO:  Lower Body Dressing   Assistance Needed Physical assistance   Physical Assistance Level Less than 25%   CARE Score 3   Discharge Goal:  Assistance Needed Independent   Discharge Goal:  Score 6   IRF THEO:  Putting On/Taking Off Footwear   Assistance Needed Set-up / clean-up   Physical Assistance Level No physical assistance or only touching/steadying assist   CARE Score 5   Discharge Goal:  Assistance Needed Independent   Discharge Goal:  Score 6   IRF-THEO:  Toileting Hygiene   Assistance Needed Incidental touching   Physical Assistance Level No physical assistance or only touching/steadying assist   CARE Score 4   Discharge Goal:  Assistance Needed Independent    Discharge Goal:  Score 6   IRF-THEO:  Toilet Transfer   Assistance Needed Incidental touching   Physical Assistance Level No physical assistance or only touching/steadying assist   CARE Score 4   Discharge Goal:  Assistance Needed Independent   Discahrge Goal:  Score 6   IRF-THEO:  Hearing, Speech, and Vision   Expression of Ideas and Wants 4   Understanding Verbal and Non-Verbal Content 4   Precautions   Precautions Fall Risk;Cervical Collar  ;Spinal / Back Precautions    Current Discharge Plan   Current Discharge Plan Return to Prior Living Situation   Benefit    Therapy Benefit Patient Would Benefit from Inpatient Rehab Occupational Therapy to Maximize [place] with ADLs, IADLs and Functional Mobility.   Interdisciplinary Plan of Care Collaboration   IDT Collaboration with  Physical Therapist;Physician   Patient Position at End of Therapy In Bed;Call Light within Reach;Phone within Reach   Collaboration Comments Transfer of care    Equipment Needs   Assistive Device / DME 4-Wheel Walker;Grab Bars In Shower / Tub;Grab Bars By Toilet   Adaptive Equipment Not Assessed   OT Total Time Spent   OT Individual Total Time Spent (Mins) 60   OT Charge Group   Charges Yes   OT Self Care / ADL 1   OT Evaluation OT Evaluation Mod     FIM Grooming Score:  5 - Standby Prompting/Supervision or Set-up  Grooming Description:  Increased time, Supervision for safety(hair care seated at sinkside )    FIM Bathing Score:  4 - Minimal Assistance  Bathing Description:  Grab bar, Hand held shower, Tub bench, Supervision for safety, Increased time(CGA for standing pursuits )    FIM Upper Body Dressin - Standby Prompting/Supervision or Set-up  Upper Body Dressing Description:  Verbal cueing, Requires minimal contact, Application of orthotic or brace(Assist applying c-collar only)    FIM Lower Body Dressing Score:  4 - Minimal Assistance  Lower Body Dressing Description:  Increased time, Requires minimal contact, Supervision for  safety, Verbal cueing(RLE threading assist)    FIM Toileting Body Dressin - Minimal Assistance  Toileting Description:  Grab bar, Increased time, Supervision for safety(CGA to don pants with FWW support)    FIM Bed/Chair/Wheelchair Transfers Score: 4 - Minimal Assistance  Bed/Chair/Wheelchair Transfers Description:  Increased time, Supervision for safety, Verbal cueing, Initial preparation for task(Supine to sit with HOB elevated and use of handrail at a CGA level secondary to upper trapezius pain. )    FIM Toilet Transfer Score:  4 - Minimal Assistance  Toilet Transfer Description:  Grab bar, Increased time, Supervision for safety, Verbal cueing(CGA with FWW)    Assessment  Patient is 63 y.o. female with a diagnosis of S/P laminectomy with spinal fusion of C3-C5.  Additional factors influencing patient status / progress (ie: cognitive factors, co-morbidities, social support, etc): diabetes, hypertension, GERD, arthritis.      Plan  Recommend Occupational Therapy  minutes per day 5-7 days per week for 10-14 days for the following treatments:  OT Group Therapy, OT Self Care/ADL, OT Neuro Re-Ed/Balance, OT Therapeutic Activity, OT Evaluation and OT Therapeutic Exercise.    Goals:  Long term and short term goals have been discussed with patient and they are in agreement.    Occupational Therapy Goals (Active)      There are no active problems.

## 2019-09-28 NOTE — FLOWSHEET NOTE
09/27/19 1907   Type of Assessment   Assessment Yes   Patient History   Pulmonary Diagnosis Hx of Asthma, ALONZO, both not being treated/   Surgical Procedures cervical spine surgery   Home O2 Yes  (PRN very occasionally)   Home Treatments/Frequency No   COPD Risk Screening   Do you have a history of COPD? No   COPD Population Screener   During the past 4 weeks, how much did you feel short of breath? 0   Do you ever cough up any mucus or phlegm? 0   In the past 12 months, you do less than you used to because of your breathing problems 0   Have you smoked at least 100 cigarettes in your entire life? 2   How old are you? 2   COPD Screening Score 4   Smoking History   Have you ever smoked Yes   Have you smoked in the last 12 months No   Confirm Quit Date 09/27/14   Level Of Consciousness   Level of Consciousness Alert   Respiratory WDL   Respiratory (WDL) X   Chest Exam   Respiration 16   Pulse 72   Breath Sounds   RUL Breath Sounds Clear   RML Breath Sounds Clear   RLL Breath Sounds Clear   WILYE Breath Sounds Clear   LLL Breath Sounds Clear   Secretions   Cough Non Productive   Oximetry   #Pulse Oximetry (Single Determination) Yes   Oxygen   Home O2 Use Prior To Admission? No   Pulse Oximetry 92 %   O2 Daily Delivery Respiratory  Room Air with O2 Available

## 2019-09-28 NOTE — THERAPY
"Speech Language Pathology   Initial Assessment     Patient Name: Valentina Lu  AGE:  63 y.o., SEX:  female  Medical Record #: [id number]  Today's Date: 9/28/2019     Subjective  63 year old female with intermittent reported dysphagia symptoms in the setting of two-stage ACDF revision and PCF.  Underwent revision of C3-4 and C4-C5 ACDF on 9/18/19 and C2-T1 PCF on 9/23/19.    Patient does not recall prior SLP intervention during hospital stay X2.  Chart review indicates prior SLP evaluation and intervention during 7/2019 cervical surgery with recommendation for dysphagia I, thin liquids, and HH SLP intervention. Patient reports no HH SLP following hospital discharge and advanced self back to regular textures. SLP swallow evaluation completed 9/26/19 recommending dysphagia II and thin liquids, medications crushed vs floated whole in puree per patient preference.      Patient reports no awareness of swallow impairment at the present time.  When asked, she does endorse a single isolated episode in the last 1-2 days when she experienced the sensation of \"not choking, but it wouldn't go down.  I was gurgly.\"      Patient resting in bed when approached for SLP evaluation.  Spouse, Cesar, present at bedside. Patient reports a remote history of esophageal dilation approximately 5 years ago; she does endorse globus sensation prior to 7/2019 cervical surgery and \"I just deal with it.\"  She does not implement any compensatory swallowing strategies at baseline; consumes FOIS level 6 diet (soft meat/chopped small).  She does not always wear her upper denture plate for po intake.  She does not have a lower denture plate.       Objective   09/28/19 0801   Prior Living Situation   Prior Services Home-Independent;Meals on Wheels   Housing / Facility 1 Story House   Lives with - Patient's Self Care Capacity Spouse;Other (Comments)  (able to care for self)   Prior Level Of Function   Communication Within Functional Limits " "  Swallow Impaired  (prior esophageal, pharyngeal dysphagia)   Dentition Edentulous   Dentures Uppers  (not in place during evaluation, does not have lower dentures)   Hearing Within Functional Limits for Evaluation   Hearing Aid None   Vision Within Functional Limits for Evaluation   Patient's Primary Language English   Occupation (Pre-Hospital Vocational) Other (Comments)  (not working, spends time crafting)   Comments Provides care for grandchild, care for spouse   IRF-THEO:  Prior Functioning: Everyday Activities   Self Care Independent   Indoor Mobility (Ambulation) Independent  (with device at home)   Stairs Independent  (does have AD, if needed)   Functional Cognition Independent   Prior Device Use None of the given options  (AD in community)   IRF-THEO:  Cognitive Pattern Assessment   Cognitive Pattern Assessment Used BIMS   IRF-THEO:  Brief Interview for Mental Status (BIMS)   Repetition of Three Words (First Attempt) 3   Temporal Orientation: Able to Report the Correct Year Correct   Temporal Orientation: Able to Report the Correct Month Missed by 6 days to 1 month   Temporal Orientation: Able to Report the Correct Day of the Week Correct   Able to Recall \"Sock\" Yes, no cue required   Able to Recall \"Blue\" Yes, no cue required   Able to Recall \"Bed\" Yes, after cueing (\"a piece of furniture\")   BIMS Summary Score 13   Social / Pragmatic Communication   Social / Pragmatic Communication WDL   Tracheostomy   Tracheostomy No   Speech Mechanisms / Voice Production   Speech Mechanisms / Voice Production (WDL) X   Pitch: Other (Comment)  (reduced)   Labial Function   Labial Function (WDL) WDL   Lingual Function   Lingual Function (WDL) WDL   Jaw Function   Jaw Function (WDL) WDL   Velar Function   Velar Function (WDL) WDL   Laryngeal Function   Laryngeal Function (WDL) X   Voice Quality Minimal  (reduced vocal range)   Volutional Cough Within Functional Limits   Excursion Upon Swallow Complete   Swallowing   Puree " Within Functional Limits   Thin Liquid Minimal   Masticated Foods Minimal   Dysphagia Strategies / Recommendations   Strategies / Interventions Recommended (Yes / No) Yes   Compensatory Strategies Direct Supervision During Meals;Head of Bed 90 Degrees During Eating / Drinking   Diet / Liquid Recommendation Dysphagia II;Thin Liquid   Medication Administration  Whole with Liquid Wash  (1 at a time)   Therapy Interventions Dysphagia Therapy By Speech Language Pathologist;MBSS Evaluation   Barriers To Oral Feeding   Barriers To Oral Feeding None   Cervical Ausculatation Not Tested   Pre-Feeding Oral Stimulation Trial Not Tested   IRF-THEO:  Swallowing/Nutritional Status   Swallowing/Nutritional Status Modified food consistency;Supervision during eating   IRF-THEO:  Eating   Assistance Needed Set-up / clean-up;Supervision   [place] Physical Assistance Level No physical assistance or only touching/steadying assist   CARE Score 4   Discharge Goal:  Assistance Needed Independent   Discharge Goal:  Physical Assistance Level No physical assistance or only touching/steadying assist   Discharge Goal:  Score 6   IRF-THEO:  Oral Hygiene   Assistance Needed Set-up / clean-up   Physical Assistance Level No physical assistance   CARE Score 5   Discharge Goal:  Assistance Needed Independent   Discharge Goal:  Physical Assistance Level No physical assistance or only touching/steadying assist   Discharge Goal:  Score 6   Outcome Measures   Outcome Measures Utilized MASA   MASA (Singh Assessment of Swallowing Ability)   Alertness 10   Cooperation 10   Auditory Comprehension 10   Respiration 8  (h/o Asthma per chart review)   Respiratory Rate for Swallow 5   Dysphasia 5   Dyspraxia 5   Dysarthria 5   Saliva 5   Lip Seal 5   Tongue Movement 10   Tongue Strength 10   Tongue Coordination 10   Oral Preparation 10   Gag 1   Palate 10   Bolus Clearance 10   Oral Transit 10   Cough Reflex 1   Voluntary Cough 10   Voice 8  (reduced vocal range)    Trache 10   Pharygneal Phase 8  (suspected post-swallow residue d/t coughing)   Pharyngeal Response 5  (cough during/after swallow X3)   Diet Recommendations Mechanical soft with chopped meat  (Dysphagia II)   Fluid Recommendations Regular  (thin)   Swallow Integrity Possible dysphagia/aspiration  (minimal risk for dysphagia and aspiration per MASA )   Total Score 181   Dysphagia Severity No abnormality detected   Aspiration Severity No abnormality detected   Problem List   Problem List Dysphagia;Memory Deficit;Cognitive-Linguistic Deficits   Current Discharge Plan   Current Discharge Plan Return to Prior Living Situation   Benefit   Therapy Benefit Patient would benefit from Inpatient Rehab Speech-Language Pathology to address above identified deficits.   Interdisciplinary Plan of Care Collaboration   IDT Collaboration with  Nursing   Patient Position at End of Therapy In Bed;Bed Alarm On;Call Light within Reach;Tray Table within Reach;Phone within Reach;Family / Friend in Room   Collaboration Comments Notified RN of SLP evaluation results and recommindations, T-dine, continue dysphagia diet.    Speech Language Pathologist Assigned   Assigned SLP / Pager # LM, 60 swallow and cog, T-dine   SLP Total Time Spent   SLP Individual Total Time Spent (Mins) 60   SLP Charge Group   SLP Oral Pharyngeal Evaluation Oral Pharyngeal Evaluation       FIM Eating Score:  5 - Standby Prompting/Supervision or Set-up  Eating Description:  Modified diet, Supervision for safety    FIM Comprehension Score:  5 - Stand-by Prompting/Supervision or Set-up  Comprehension Description:  Verbal cues    FIM Expression Score:  7 - Independent  Expression Description:       FIM Social Interaction Score:  7 - Independent  Social Interaction Description:       FIM Problem Solving Score:  5 - Standby Prompting/Supervision or Set-up  Problem Solving Description:  Verbal cueing, Bed/chair alarm, Therapy schedule    FIM Memory Score:  4 - Minimal  Assistance  Memory Description:  Verbal cueing, Bed/chair alarm, Therapy schedule    Assessment  Patient is 63 y.o. female with suspected oral-pharyngeal and esophageal dysphagia in the setting of revision of C3-4 and C4-C5 ACDF on 9/18/19 and C2-T1 PCF on 9/23/19.  Additional factors influencing patient status/progress (ie: cognitive factors, co-morbidities, social support, etc): dysphagia diet upon admission, prior dysphagia, prior ACDF surgery, multi-level fusion, + coughing with po intake, prior esophageal dilation, pleasant and cooperative.      Initiate T-dine for supervision, safe swallow training  (upright for po intake, oral care before meals).  Recommend MBSS for physiologic evaluation of swallowing for development of swallow rehabilitation plan of care.  Of note, patient endorses she is below her cognitive baseline during this admission. Note reduced recall for recent events re: swallow status upon SLP questioning.  Recommend cognitive-linguistic evaluation.    Plan  Recommend Speech Therapy 30-60 minutes per day 5-6 days per week for 2 weeks for the following treatments:  SLP Swallowing Dysfunction Treatment, SLP Oral Pharyngeal Evaluation, SLP Video Swallow Evaluation, SLP Self Care / ADL Training , SLP Cognitive Skill Development and SLP Group Treatment.    Goals:  Long term and short term goals have been discussed with patient and spouse and they are in agreement.    Speech Therapy Problems     Problem: Problem Solving STGs     Dates: Start: 09/28/19       Description:     Goal: STG-Within one week, patient will     Dates: Start: 09/28/19       Description: 1) Individualized goal:  Standardized cognitive assessment with goals, as indicated  2) Interventions:  SLP Aphasia Evaluation, SLP Self Care / ADL Training , SLP Cognitive Skill Development and SLP Group Treatment                   Problem: Speech/Swallowing LTGs     Dates: Start: 09/28/19       Description:     Goal: LTG-By discharge, patient  will safely swallow     Dates: Start: 09/28/19       Description: 1) Individualized goal:  Regular textures and thin liquids to maintain nutrition and hydration free from evidence of prandial pneumonia  2) Interventions:  SLP Swallowing Dysfunction Treatment, SLP Video Swallow Evaluation and SLP Group Treatment           Goal: LTG-By discharge, patient will solve complex problem     Dates: Start: 09/28/19       Description: 1) Individualized goal:  With mod I at the home/community level with 90% accuracy or greater  2) Interventions:  SLP Speech Language Treatment, SLP Self Care / ADL Training  and SLP Cognitive Skill Development                   Problem: Swallowing STGs     Dates: Start: 09/28/19       Description:     Goal: STG-Within one week, patient will     Dates: Start: 09/28/19       Description: 1) Individualized goal:  Participate in MBSS with goals as appropriate  2) Interventions:  SLP Swallowing Dysfunction Treatment, SLP Video Swallow Evaluation and SLP Group Treatment             Goal: STG-Within one week, patient will     Dates: Start: 09/28/19       Description: 1) Individualized goal:  Will return demonstrate safe swallow precautions (upright for intake, remain upright for 30 minutes, oral care before meals) with mod I X2 sessions  2) Interventions:  SLP Swallowing Dysfunction Treatment, SLP Video Swallow Evaluation and SLP Group Treatment

## 2019-09-28 NOTE — THERAPY
Physical Therapy   Initial Evaluation     Patient Name: Valentina Lu  Age:  63 y.o., Sex:  female  Medical Record #: [id number]  Today's Date: 9/28/2019     Subjective    Patient reported spasm-type pain in posterior neck.      Objective       09/28/19 1031   Prior Living Situation   Prior Services Home-Independent   Housing / Facility 1 Story House   Steps Into Home 0   Steps In Home 0   Bathroom Set up   (Grab bars to be installed at shower and toilet)   Equipment Owned 4-Wheel Walker;Front-Wheel Walker;Wheelchair;Power Wheel Chair;Raised Toilet Seat With Arms;Bed Side Commode  (both wc belong to spouse)   Lives with - Patient's Self Care Capacity Spouse   Prior Level of Functional Mobility   Bed Mobility Independent   Transfer Status Independent   Ambulation Independent   Distance Ambulation (Feet)   (unlimited)   Assistive Devices Used None   Stairs Independent   IRF-THEO:  Prior Functioning: Everyday Activities   Self Care Independent   Indoor Mobility (Ambulation) Independent   Stairs Independent   Functional Cognition Independent   Prior Device Use None of the given options   Pain 0 - 10 Group   Location Shoulder;Neck   Location Orientation Right;Left;Posterior   Therapist Pain Assessment 6;7;Prior to Activity   Active ROM Lower Body    Active ROM Lower Body  WDL   Strength Lower Body   Lower Body Strength  WDL   Sensation Lower Body   Lower Extremity Sensation   X   Lt Lower Extremity Light Touch   (L great toe numbness )   Paresthesia Present    Comments Neuropathy on bottom of feet per patient report.   Lower Body Muscle Tone   Lower Body Muscle Tone  Not Tested   Bed Mobility    Supine to Sit Supervised   Sit to Supine Supervised   Sit to Stand Stand by Assist   Scooting Supervised   Rolling Supervised   Comments Log roll   Coordination Lower Body    Coordination Lower Body  Not Tested   IRF-THEO:  Roll Left and Right   Assistance Needed Supervision   Physical Assistance Level No physical assistance  or only touching/steadying assist   CARE Score 4   Discharge Goal:  Assistance Needed Independent   Discharge Goal:  Physical Assistance Level No physical assistance or only touching/steadying assist   Discharge Goal:  Score 6   IRF-THEO:  Sit to Lying   Assistance Needed Supervision;Adaptive equipment   Physical Assistance Level No physical assistance or only touching/steadying assist   CARE Score 4   Discharge Goal:  Assistance Needed Independent   Discharge Goal:  Physical Assistance Level No physical assistance or only touching/steadying assist   Discharge Goal:  Score 6   IRF-THEO:  Lying to Sitting on Side of Bed   Assistance Needed Supervision;Adaptive equipment   Physical Assistance Level No physical assistance or only touching/steadying assist   CARE Score 4   Discharge Goal:  Assistance Needed Independent   Discharge Goal:  Physical Assistance Level No physical assistance or only touching/steadying assist   Discharge Goal:  Score 6   IRF-THEO:  Sit to Stand   Assistance Needed Supervision;Adaptive equipment   Physical Assistance Level No physical assistance or only touching/steadying assist   CARE Score 4   Discharge Goal:  Assistance Needed Independent;Adaptive equipment   Discharge Goal:  Physical Assistance Level No physical assistance or only touching/steadying assist   Discahrge Goal:  Score 6   IRF-THEO:  Chair/Bed-to-Chair Transfer   Assistance Needed Supervision;Adaptive equipment   Physical Assistance Level No physical assistance or only touching/steadying assist   CARE Score 4   Discharge Goal:  Assistance Needed Adaptive equipment;Independent   Discharge Goal:  Physical Assistance Level No physical assistance or only touching/steadying assist   Discharge Goal:  Score 6   IRF-THEO:  Car Transfer   Reason if not Attempted Safety concerns   CARE Score 88   Discharge Goal:  Assistance Needed Adaptive equipment;Supervision   Discharge Goal:  Physical Assistance Level No physical assistance or only  touching/steadying assist   Discharge Goal:  Score 4   IRF THEO:  Walking   Does the Patient Walk? Yes   IRF THEO:  Walk 10 Feet   Assistance Needed Adaptive equipment;Incidental touching   Physical Assistance Level No physical assistance or only touching/steadying assist   CARE Score 4   Discharge Goal:  Assistance Needed Independent;Adaptive equipment   Discharge Goal:  Physical Assistance Level No physical assistance or only touching/steadying assist   Discharge Goal:  Score 6   IRF-THEO:  Walk 50 Feet with Two Turns   Assistance Needed Adaptive equipment;Incidental touching   Physical Assistance Level No physical assistance or only touching/steadying assist   CARE Score 4   Discharge Goal:  Assistance Needed Adaptive equipment;Independent   Discharge Goal:  Physical Assistance Level No physical assistance or only touching/steadying assist   Discharge Goal:  Score 6   IRF-THEO:  Walk 150 Feet   Reason if not Attempted Safety concerns   CARE Score 88   Discharge Goal:  Assistance Needed Adaptive equipment;Independent   Discharge Goal:  Physical Assistance Level No physical assistance or only touching/steadying assist   Discharge Goal:  Score 6   IRF THEO:  Walking 10 Feet on Uneven Surfaces   Reason if not Attempted Safety concerns   CARE Score 88   Discharge Goal:  Assistance Needed Supervision;Adaptive equipment   Discharge Goal:  Physical Assistance Level No physical assistance or only touching/steadying assist   Discharge Goal:  Score 4   IRF THEO:  1 Step (Curb)   Reason if not Attempted Safety concerns   CARE Score 88   Discharge Goal:  Assistance Needed Adaptive equipment;Supervision   Discharge Goal:  Physical Assistance Level No physical assistance or only touching/steadying assist   Discharge Goal:  Score 4   IRF-THEO:  4 Steps   Assistance Needed Incidental touching;Adaptive equipment   Physical Assistance Level No physical assistance or only touching/steadying assist   CARE Score 4   Discharge Goal:   Assistance Needed Adaptive equipment;Supervision   Discharge Goal:  Physical Assistance Level No physical assistance or only touching/steadying assist   Discharge Goal:  Score 4   IRF THEO:  12 Steps   Reason if not Attempted Safety concerns   CARE Score 88   Discharge Goal:  Assistance Needed Adaptive equipment;Supervision   Discharge Goal:  Physical Assistance Level No physical assistance or only touching/steadying assist   Discharge Goal:  Score 4   IRF THEO:  Picking Up Object   Reason if not Attempted Safety concerns   CARE Score 88   Discharge Goal:  Assistance Needed Adaptive equipment;Independent   Discharge Goal:  Physical Assistance Level No physical assistance or only touching/steadying assist   Discharge Goal:  Score 6   IRF-THEO:  Wheel 50 Feet with Two Turns   Indicate the Type of Wheelchair or Scooter Used Manual   Reason if not Attempted Safety concerns   CARE Score 88   Discharge Goal:  Assistance Needed Adaptive equipment;Independent   Discharge Goal:  Physical Assistance Level No physical assistance or only touching/steadying assist   Discharge Goal:  Score 6   IRF-THEO:  Wheel 150 Feet   Indicate the Type of Wheelchair or Scooter Used Manual   Reason if not Attempted Safety concerns   CARE Score 88   Discharge Goal:  Assistance Needed Adaptive equipment;Independent   Discharge Goal:  Physical Assistance Level No physical assistance or only touching/steadying assist   Discharge Goal:  Score 6   Problem List    Problems Impaired Ambulation;Impaired Balance;Decreased Activity Tolerance;Limited Knowledge of Post-Op Precautions;Impaired Transfers;Pain   Precautions   Precautions Fall Risk;Cervical Collar  ;Spinal / Back Precautions    Interdisciplinary Plan of Care Collaboration   IDT Collaboration with  Speech Therapist;Family / Caregiver   Patient Position at End of Therapy Seated  (T Dine)   Collaboration Comments POC   Benefit   Therapy Benefit Patient Would Benefit from Inpatient Rehabilitation  "Physical Therapy to Maximize Functional Fisher with ADLs, IADLs and Mobility.   PT Total Time Spent   PT Individual Total Time Spent (Mins) 60   PT Charge Group   PT Therapeutic Activities 1   PT Evaluation PT Evaluation Mod       FIM Bed/Chair/Wheelchair Transfers Score: 4 - Minimal Assistance  Bed/Chair/Wheelchair Transfers Description:  Adaptive equipment(CGA SPT wc <> bed, SPV log roll)    FIM Walking Score:  2 - Max Assistance  Walking Description:  Walker(120 ft with FWW indoors CGA)    FIM Wheelchair Score:  1 - Total Assistance  Wheelchair Description:  (30 ft indoors SPV )    FIM Stairs Score:  2 - Max Assistance  Stairs Description:  Ascends/descends 4 to 11 steps, Requires incidental assist, Hand rails(CGA up/down 4 6\" stairs with BHR)    Assessment  Patient is 63 y.o. female with a diagnosis of s/p spinal fusion: 2 ACDF revision C3-C4, C4-C5 9/18 and PCF revision C2-T1n 9/13, after cervical hardware failure.  Additional factors influencing patient status / progress (ie: cognitive factors, co-morbidities, social support, etc): diabetes, HTN, hyperlipidemia, GERD, arthritis, spinal precautions, C collar.      Plan  Recommend Physical Therapy  minutes per day 5-7 days per week for 2 weeks for the following treatments:  PT Group Therapy, PT E Stim Attended, PT Orthotics Training, PT Gait Training, PT Self Care/Home Eval, PT Therapeutic Exercises, PT Neuro Re-Ed/Balance, PT Aquatic Therapy, PT Therapeutic Activity, PT Manual Therapy and PT Evaluation.    Goals:  Long term and short term goals have been discussed with patient and they are in agreement.    Physical Therapy Problems     Problem: Mobility     Dates: Start: 09/28/19       Description:     Goal: STG-Within one week, patient will ambulate community distances     Dates: Start: 09/28/19       Description: 1) Individualized goal:  Patient will amb with FWW over various surfaces >150 ft SBA  2) Interventions:  PT Group Therapy, PT E Stim " Attended, PT Gait Training, PT Therapeutic Exercises, PT Neuro Re-Ed/Balance, PT Aquatic Therapy, PT Therapeutic Activity and PT Manual Therapy                   Problem: Mobility Transfers     Dates: Start: 09/28/19       Description:     Goal: STG-Within one week, patient will sit to stand     Dates: Start: 09/28/19       Description: 1) Individualized goal:  Patient will transfer SPV with FWW SPT/ STS  2) Interventions:  PT Group Therapy, PT E Stim Attended, PT Gait Training, PT Therapeutic Exercises, PT Neuro Re-Ed/Balance, PT Aquatic Therapy, PT Therapeutic Activity and PT Manual Therapy                   Problem: PT-Long Term Goals     Dates: Start: 09/28/19       Description:     Goal: LTG-By discharge, patient will ambulate     Dates: Start: 09/28/19       Description: 1) Individualized goal:  Patient will amb with FWW >300 ft over various surfaces mod I   2) Interventions:  PT Group Therapy, PT E Stim Attended, PT Gait Training, PT Therapeutic Exercises, PT Neuro Re-Ed/Balance, PT Aquatic Therapy, PT Therapeutic Activity and PT Manual Therapy             Goal: LTG-By discharge, patient will transfer one surface to another     Dates: Start: 09/28/19       Description: 1) Individualized goal:  Patient will transfer with FWW mod I  2) Interventions:  PT Group Therapy, PT E Stim Attended, PT Gait Training, PT Therapeutic Exercises, PT Neuro Re-Ed/Balance, PT Aquatic Therapy, PT Therapeutic Activity and PT Manual Therapy

## 2019-09-28 NOTE — CARE PLAN
Problem: Safety  Goal: Will remain free from injury  Outcome: PROGRESSING AS EXPECTED  Note:   Call light with in reach. Redirection to use call light for assistance. Non skid socks. Upper siderails up x2 while in bed. Family stays in room.      Problem: Pain Management  Goal: Pain level will decrease to patient's comfort goal  Outcome: PROGRESSING AS EXPECTED  Note:   Educate patient of non-pharmacological comfort measures: repositioning, relaxation/breathing technique, cold compress and activities. Complains of upper back pain, repositioned self in bed and as needed medication given with relief. No respiratory distress, on room air. Able to make needs known. Assisted as needed. Will continue to monitor.

## 2019-09-28 NOTE — PROGRESS NOTES
1915 received report from day shift nurse and assume patient care. Pt is calm and stable .  No s/sx of distress. Safety precautions in place. Call light with in reach. Will continue to monitor.

## 2019-09-29 PROCEDURE — 700102 HCHG RX REV CODE 250 W/ 637 OVERRIDE(OP): Performed by: PHYSICAL MEDICINE & REHABILITATION

## 2019-09-29 PROCEDURE — 700111 HCHG RX REV CODE 636 W/ 250 OVERRIDE (IP): Performed by: PHYSICAL MEDICINE & REHABILITATION

## 2019-09-29 PROCEDURE — 700101 HCHG RX REV CODE 250: Performed by: PHYSICAL MEDICINE & REHABILITATION

## 2019-09-29 PROCEDURE — G0515 COGNITIVE SKILLS DEVELOPMENT: HCPCS

## 2019-09-29 PROCEDURE — A9270 NON-COVERED ITEM OR SERVICE: HCPCS | Performed by: PHYSICAL MEDICINE & REHABILITATION

## 2019-09-29 PROCEDURE — 770010 HCHG ROOM/CARE - REHAB SEMI PRIVAT*

## 2019-09-29 RX ADMIN — GABAPENTIN 900 MG: 300 CAPSULE ORAL at 20:44

## 2019-09-29 RX ADMIN — VITAMIN D, TAB 1000IU (100/BT) 5000 UNITS: 25 TAB at 08:58

## 2019-09-29 RX ADMIN — GABAPENTIN 900 MG: 300 CAPSULE ORAL at 08:58

## 2019-09-29 RX ADMIN — GLIPIZIDE 5 MG: 5 TABLET ORAL at 20:44

## 2019-09-29 RX ADMIN — OXYCODONE HYDROCHLORIDE 10 MG: 10 TABLET ORAL at 20:41

## 2019-09-29 RX ADMIN — METHOCARBAMOL TABLETS 750 MG: 750 TABLET, COATED ORAL at 20:45

## 2019-09-29 RX ADMIN — GABAPENTIN 900 MG: 300 CAPSULE ORAL at 14:13

## 2019-09-29 RX ADMIN — LEVOTHYROXINE SODIUM 75 MCG: 75 TABLET ORAL at 20:45

## 2019-09-29 RX ADMIN — SITAGLIPTIN 100 MG: 50 TABLET, FILM COATED ORAL at 20:42

## 2019-09-29 RX ADMIN — METHOCARBAMOL TABLETS 750 MG: 750 TABLET, COATED ORAL at 05:05

## 2019-09-29 RX ADMIN — METHOCARBAMOL TABLETS 750 MG: 750 TABLET, COATED ORAL at 14:13

## 2019-09-29 RX ADMIN — SENNOSIDES,DOCUSATE SODIUM 2 TABLET: 8.6; 5 TABLET, FILM COATED ORAL at 08:59

## 2019-09-29 RX ADMIN — ROPINIROLE HYDROCHLORIDE 8 MG: 1 TABLET, FILM COATED ORAL at 20:43

## 2019-09-29 RX ADMIN — TEMAZEPAM 30 MG: 15 CAPSULE ORAL at 20:42

## 2019-09-29 RX ADMIN — OXYCODONE HYDROCHLORIDE 10 MG: 10 TABLET ORAL at 14:25

## 2019-09-29 RX ADMIN — VENLAFAXINE HYDROCHLORIDE 150 MG: 37.5 TABLET ORAL at 20:44

## 2019-09-29 RX ADMIN — SENNOSIDES,DOCUSATE SODIUM 2 TABLET: 8.6; 5 TABLET, FILM COATED ORAL at 20:45

## 2019-09-29 RX ADMIN — OMEPRAZOLE 20 MG: 20 CAPSULE, DELAYED RELEASE ORAL at 08:59

## 2019-09-29 RX ADMIN — LIDOCAINE 2 PATCH: 50 PATCH TOPICAL at 08:59

## 2019-09-29 RX ADMIN — ATORVASTATIN CALCIUM 20 MG: 10 TABLET, FILM COATED ORAL at 20:42

## 2019-09-29 RX ADMIN — ENOXAPARIN SODIUM 40 MG: 100 INJECTION SUBCUTANEOUS at 20:46

## 2019-09-29 RX ADMIN — OXYCODONE HYDROCHLORIDE 10 MG: 10 TABLET ORAL at 09:05

## 2019-09-29 NOTE — CARE PLAN
Problem: Communication  Goal: The ability to communicate needs accurately and effectively will improve  Outcome: PROGRESSING AS EXPECTED  Note:   Patient uses call light consistently and appropriately this shift.  Waits for assistance when needed and does not attempt self transfer.  Able to verbalize needs.  Will continue to monitor.      Problem: Infection  Goal: Will remain free from infection  Outcome: PROGRESSING AS EXPECTED  Note:   Patient remains free from s/s infection; afebrile. R forearm PIV removed today per order. Will continue to monitor.

## 2019-09-29 NOTE — THERAPY
Speech Language Pathology  Daily Treatment     Patient Name: Valentina Lu  Age:  63 y.o., Sex:  female  Medical Record #: [id number]  Today's Date: 9/29/2019     Subjective    Pt pleasant and cooperative during tx.  Pt reported decreased memory, and occasional difficulty finding the right words to say.       Objective       09/29/19 0901   Outcome Measures   Outcome Measures Utilized SCCAN   SCCAN (Scales of Cognitive and Communicative Ability for Neurorehabilitation)   Oral Expression - Raw Score 18   Oral Expression - Scale Performance Score 95   Orientation - Raw Score 12   Orientation - Scale Performance Score 100   Memory - Raw Score 14   Memory - Scale Performance Score 74   Speech Comprehension - Raw Score 12   Speech Comprehension - Scale Performance Score 92   Reading Comprehension - Raw Score 12   Reading Comprehension - Scale Performance Score 100   Writing - Raw Score 5   Writing - Scale Performance Score 71   Attention - Raw Score 13   Attention - Scale Performance Score 81   Problem Solving - Raw Score 22   Problem Solving - Scale Performance Score 96   SCCAN Total Raw Score 84   SCCAN Degree of Severity Mild Impairment   SLP Total Time Spent   SLP Individual Total Time Spent (Mins) 60   Charge Group   SLP Cognitive Skill Development 4       FIM Comprehension Score:  5 - Stand-by Prompting/Supervision or Set-up  Comprehension Description:  Verbal cues    FIM Expression Score:  7 - Independent  Expression Description:       FIM Social Interaction Score:  7 - Independent  Social Interaction Description:       FIM Problem Solving Score:  5 - Standby Prompting/Supervision or Set-up  Problem Solving Description:  Verbal cueing, Therapy schedule, Bed/chair alarm    FIM Memory Score:  4 - Minimal Assistance  Memory Description:  Verbal cueing, Therapy schedule, Bed/chair alarm      Assessment    Pt scored 84 on the SCCAN, which is characteristic of mild cognitive deficits.  Pt presented with most  deficit in attn and short term memory.      Plan    Target attn, memory, introduce memory book.

## 2019-09-30 ENCOUNTER — APPOINTMENT (OUTPATIENT)
Dept: PAIN MANAGEMENT | Facility: REHABILITATION | Age: 64
DRG: 561 | End: 2019-09-30
Attending: PHYSICAL MEDICINE & REHABILITATION
Payer: MEDICARE

## 2019-09-30 ENCOUNTER — APPOINTMENT (OUTPATIENT)
Dept: RADIOLOGY | Facility: REHABILITATION | Age: 64
DRG: 561 | End: 2019-09-30
Attending: PHYSICAL MEDICINE & REHABILITATION
Payer: MEDICARE

## 2019-09-30 PROCEDURE — 97110 THERAPEUTIC EXERCISES: CPT

## 2019-09-30 PROCEDURE — 770010 HCHG ROOM/CARE - REHAB SEMI PRIVAT*

## 2019-09-30 PROCEDURE — 700101 HCHG RX REV CODE 250: Performed by: PHYSICAL MEDICINE & REHABILITATION

## 2019-09-30 PROCEDURE — 92611 MOTION FLUOROSCOPY/SWALLOW: CPT

## 2019-09-30 PROCEDURE — 97530 THERAPEUTIC ACTIVITIES: CPT

## 2019-09-30 PROCEDURE — 97116 GAIT TRAINING THERAPY: CPT

## 2019-09-30 PROCEDURE — 92526 ORAL FUNCTION THERAPY: CPT

## 2019-09-30 PROCEDURE — 74230 X-RAY XM SWLNG FUNCJ C+: CPT

## 2019-09-30 PROCEDURE — A9270 NON-COVERED ITEM OR SERVICE: HCPCS | Performed by: PHYSICAL MEDICINE & REHABILITATION

## 2019-09-30 PROCEDURE — 700111 HCHG RX REV CODE 636 W/ 250 OVERRIDE (IP): Performed by: PHYSICAL MEDICINE & REHABILITATION

## 2019-09-30 PROCEDURE — 700102 HCHG RX REV CODE 250 W/ 637 OVERRIDE(OP): Performed by: PHYSICAL MEDICINE & REHABILITATION

## 2019-09-30 PROCEDURE — 99232 SBSQ HOSP IP/OBS MODERATE 35: CPT | Performed by: PHYSICAL MEDICINE & REHABILITATION

## 2019-09-30 PROCEDURE — 97535 SELF CARE MNGMENT TRAINING: CPT

## 2019-09-30 RX ADMIN — OXYCODONE HYDROCHLORIDE 10 MG: 10 TABLET ORAL at 12:55

## 2019-09-30 RX ADMIN — OXYCODONE HYDROCHLORIDE 10 MG: 10 TABLET ORAL at 20:36

## 2019-09-30 RX ADMIN — GABAPENTIN 900 MG: 300 CAPSULE ORAL at 14:00

## 2019-09-30 RX ADMIN — SENNOSIDES,DOCUSATE SODIUM 2 TABLET: 8.6; 5 TABLET, FILM COATED ORAL at 20:33

## 2019-09-30 RX ADMIN — LEVOTHYROXINE SODIUM 75 MCG: 75 TABLET ORAL at 20:36

## 2019-09-30 RX ADMIN — METHOCARBAMOL TABLETS 750 MG: 750 TABLET, COATED ORAL at 05:17

## 2019-09-30 RX ADMIN — ROPINIROLE HYDROCHLORIDE 8 MG: 1 TABLET, FILM COATED ORAL at 20:33

## 2019-09-30 RX ADMIN — VENLAFAXINE HYDROCHLORIDE 150 MG: 37.5 TABLET ORAL at 20:34

## 2019-09-30 RX ADMIN — OXYCODONE HYDROCHLORIDE 10 MG: 10 TABLET ORAL at 05:19

## 2019-09-30 RX ADMIN — ATORVASTATIN CALCIUM 20 MG: 10 TABLET, FILM COATED ORAL at 20:34

## 2019-09-30 RX ADMIN — GABAPENTIN 900 MG: 300 CAPSULE ORAL at 09:07

## 2019-09-30 RX ADMIN — LIDOCAINE 2 PATCH: 50 PATCH TOPICAL at 09:06

## 2019-09-30 RX ADMIN — VITAMIN D, TAB 1000IU (100/BT) 5000 UNITS: 25 TAB at 09:06

## 2019-09-30 RX ADMIN — GLIPIZIDE 5 MG: 5 TABLET ORAL at 20:36

## 2019-09-30 RX ADMIN — ENOXAPARIN SODIUM 40 MG: 100 INJECTION SUBCUTANEOUS at 20:32

## 2019-09-30 RX ADMIN — METHOCARBAMOL TABLETS 750 MG: 750 TABLET, COATED ORAL at 20:36

## 2019-09-30 RX ADMIN — OMEPRAZOLE 20 MG: 20 CAPSULE, DELAYED RELEASE ORAL at 09:06

## 2019-09-30 RX ADMIN — SENNOSIDES,DOCUSATE SODIUM 2 TABLET: 8.6; 5 TABLET, FILM COATED ORAL at 09:07

## 2019-09-30 RX ADMIN — METHOCARBAMOL TABLETS 750 MG: 750 TABLET, COATED ORAL at 14:00

## 2019-09-30 RX ADMIN — TEMAZEPAM 30 MG: 15 CAPSULE ORAL at 20:35

## 2019-09-30 RX ADMIN — GABAPENTIN 900 MG: 300 CAPSULE ORAL at 20:35

## 2019-09-30 RX ADMIN — SITAGLIPTIN 100 MG: 50 TABLET, FILM COATED ORAL at 20:36

## 2019-09-30 ASSESSMENT — GAIT ASSESSMENTS: ASSISTIVE DEVICE: 4 WHEEL WALKER

## 2019-09-30 ASSESSMENT — 6 MINUTE WALK TEST (6MWT)
TOTAL DISTANCE WALKED (FT): 849
GAIT SPEED - METERS PER SECOND: .72
LEVEL OF ASSISTANCE: SUPERVISION
NUMBER OF RESTS: 0

## 2019-09-30 NOTE — THERAPY
Speech Language Pathology  Daily Treatment     Patient Name: Valentina Lu  Age:  63 y.o., Sex:  female  Medical Record #: [id number]  Today's Date: 9/30/2019     Subjective    Patient seen for lunch meal.  Patient pleasant and cooperative.     Objective       09/30/19 1131   Dysphagia    Positioning / Behavior Modification Modulate Rate or Bite Size;Multiple Swallows;Other (see Comments)  (Alternate liquids and solids.)   Other Treatments Therapeutic trial of regular diet texture and thin liquids   Diet / Liquid Recommendation Thin Liquid;Regular   Nutritional Liquid Intake Rating Scale 3   Nutritional Food Intake Rating Scale 7   Speech Language Pathologist Assigned   Assigned SLP / Pager # [phone]   SLP Total Time Spent   SLP Individual Total Time Spent (Mins) 30   Charge Group   SLP Swallowing Dysfunction Treatment Swallowing Dysfunction Treatment       FIM Eating Score:  5 - Standby Prompting/Supervision or Set-up  Eating Description:  Modified diet    FIM Comprehension Score:  5 - Stand-by Prompting/Supervision or Set-up  Comprehension Description:       FIM Expression Score:  7 - Independent  Expression Description:       FIM Social Interaction Score:  7 - Independent  Social Interaction Description:       FIM Problem Solving Score:  5 - Standby Prompting/Supervision or Set-up  Problem Solving Description:  Verbal cueing, Therapy schedule, Increased time    FIM Memory Score:  4 - Minimal Assistance  Memory Description:  Verbal cueing, Therapy schedule      Assessment    Patient tolerated regular texture trials and thin liquids with no overt s/sx of aspiration.  She utilized slow rate and alternating liquids and solids effectively with set up    Plan    Recommend regular diet textures and thin liquids. Discontinue t-dine.  Recommend GI consultation to follow up on  And assess GI symptoms.

## 2019-09-30 NOTE — THERAPY
"Physical Therapy   Daily Treatment     Patient Name: Valentina Lu  Age:  63 y.o., Sex:  female  Medical Record #: [id number]  Today's Date: 9/30/2019     Precautions  Precautions: (P) Cervical Collar  , Spinal / Back Precautions , Fall Risk    Subjective    Patient reported that is was ready to D/C \"yesterday\".  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 Her daughter lives in the same apartment complex, \"upstairs\".  Patient prefers HH therapy post D/C from rehab.  Patient preferred 4WW over FWW, but current model at home is not the correct size for her. Grab bars were installed today.      Objective       09/30/19 1301   Precautions   Precautions Cervical Collar  ;Spinal / Back Precautions ;Fall Risk   Sitting Lower Body Exercises   Other Exercises MotoMed LE cycle 3:39min SPV, fwd, 0.49 miles, 49%L, 51%R.    Bed Mobility    Supine to Sit Modified Independent   Sit to Supine Modified Independent   Sit to Stand Supervised   Scooting Modified Independent   Rolling Modified Independent   Neuro-Muscular Treatments   Neuro-Muscular Treatments Weight Shift Right;Weight Shift Left   Lower Extremity Outcome Measures   Assistive Device 4 Wheel Walker   6 Minute Walk Test   Distance (feet) 849   Gait Speed (meters per second) 0.72   Number of Rests 0   Level of Assistance 5   Interdisciplinary Plan of Care Collaboration   IDT Collaboration with  Family / Caregiver   Patient Position at End of Therapy In Bed;Tray Table within Reach;Call Light within Reach;Phone within Reach;Family / Friend in Room   Collaboration Comments POC   PT Total Time Spent   PT Individual Total Time Spent (Mins) 60   PT Charge Group   PT Gait Training 2   PT Therapeutic Activities 2       FIM Bed/Chair/Wheelchair Transfers Score: 5 - Standby Prompting/Supervision or Set-up  Bed/Chair/Wheelchair Transfers Description:  Supervision for safety(SPV with 4WW SPT)    FIM Walking Score:  5 - Standby " "Prompting/Supervision or Set-up  Walking Description:  Supervision for safety, Extra time(200 - 850 ft x 5 with 4WW indoors SPV)    FIM Wheelchair Score:  5 - Standby Prompting/Supervision or Set-up  Wheelchair Description:  Supervision for safety    FIM Stairs Score:  5 - Standby Prompting/Supervision or Set-up  Stairs Description:  Ascends/descends 12 to 14 steps, Safety concerns, Supervision for safety(12 6\" stairs with BHR, step through)      Assessment    Patient demonstrated progression in both self awareness and environmental awareness.  Patient's safety with mobility has improved, and activity tolerance.  Patient is motivated to return home, with 4WW. Patient scored 0.72 on 6MWT= likely D/C home, continued fall prevention/ balance intervention, and limited community ambulation.     Plan    Assess safety in room with 4WW for mod I privileges.  Continue progressing indep with functional mobility. HEP, and endurance training.       "

## 2019-09-30 NOTE — THERAPY
Physical Therapy   Daily Treatment     Patient Name: Valentina Lu  Age:  63 y.o., Sex:  female  Medical Record #: [id number]  Today's Date: 9/30/2019     Precautions  Precautions: (P) Fall Risk, Cervical Collar  , Spinal / Back Precautions     Subjective    Pt is agreeable to therapy.She reported that the tape on her neck might be too tight and causing pain     Objective       09/30/19 1001   Precautions   Precautions Fall Risk;Cervical Collar  ;Spinal / Back Precautions    Sitting Lower Body Exercises   Nustep Resistance Level 2;Time (See Comments)  (10' B LE's)   Bed Mobility    Sit to Stand Supervised   Interdisciplinary Plan of Care Collaboration   Patient Position at End of Therapy Seated;Self Releasing Lap Belt Applied   PT Total Time Spent   PT Individual Total Time Spent (Mins) 30   PT Charge Group   PT Gait Training 1   PT Therapeutic Exercise 1   Supervising Physical Therapist Carlos Hamilton       FIM Walking Score:  5 - Standby Prompting/Supervision or Set-up  Walking Description:  Walker, Supervision for safety, Extra time, Requires incidental assist(220' x 2 FWW SBA, indoorlbevel surfaces)    Assessment    Pt tolerated nustep activity with no rest breaks. She was able to amb an increased distance today and decreased level of assistance with a FWW    Plan    Gait with LRAD, endurance, Le strengthning

## 2019-09-30 NOTE — PROGRESS NOTES
Patient medicated with Roxicodone 10 mg po for posterior neck pain, will continue to monitor.  Had shower today with OT, dressing to posterior neck incision changed no drainage, staples intact.

## 2019-09-30 NOTE — REHAB-CM IDT TEAM NOTE
Case Management    DC Planning  DC destination/dispostion:  Patient lives in a fully accessible condo with her significant other and her daughter next door.    DC Needs:  She has used [facility] for dme and ramp.  She wants to resume [facility].  She has 4ww from [facility] and it is too big.  I will order one with her insurance.  She has a fww, comode and power chair.    Barriers to discharge:   None    Strengths: Her S.O. Has medical issues.    Section completed by:  Rachael Avalos R.N.

## 2019-09-30 NOTE — CARE PLAN
Problem: Safety  Goal: Will remain free from injury  Outcome: PROGRESSING AS EXPECTED  Intervention: Provide assistance with mobility  Note:   Patient demonstrates good safety technique this shift.  Asks for assistance when needed and does not attempt self transfer.  Able to verbalize needs.  Will continue to monitor.      Problem: Pain Management  Goal: Pain level will decrease to patient's comfort goal  Outcome: PROGRESSING AS EXPECTED  Intervention: Follow pain managment plan developed in collaboration with patient and Interdisciplinary Team  Note:   Roxicodone 1 tab given PRN per MAR for c/o posterior neck pain with adequate relief. Pt sleeps good with scheduled restoril. Will continue to monitor.

## 2019-09-30 NOTE — DISCHARGE PLANNING
CASE MANAGEMENT INITIAL ASSESSMENT    Admit Date:  9/27/2019     I met with patient to discuss role of case management / discharge planning / team conference.  Her significant other was also present.  Patient is a  63 y.o. female transferred from [facility].  She is alert and able to provide all information.  She is anxious to go home.  Her admitting physician for rehab is Dr. Ivy.    Diagnosis: 03.9 other neurologic  Status post cervical spinal fusion    Co-morbidities:   Patient Active Problem List    Diagnosis Date Noted   • CVA (cerebral vascular accident) ([facility]) 07/07/2019     Priority: High   • Cervical spondylosis with myelopathy 01/22/2016     Priority: High   • Thyroid disorder 09/27/2019   • S/P laminectomy with spinal fusion 09/27/2019   • Dysphagia 07/08/2019   • Anxiety 07/07/2019   • Hypokalemia 07/07/2019   • Head ache 07/07/2019   • Hypotension 04/20/2016   • Normocytic anemia 04/20/2016   • Acute renal failure ([facility]) 04/20/2016   • Weakness 04/20/2016   • Bradycardia 01/25/2016   • Chest pain 01/25/2016   • Diabetic polyneuropathy ([facility])    • BPPV (benign paroxysmal positional vertigo)    • Restless legs syndrome (RLS)    • Migraine with aura    • Essential and other specified forms of tremor    • Type 2 diabetes mellitus, without long-term current use of insulin ([facility]) 02/09/2012   • FH: diabetes mellitus 09/19/2011   • Blood glucose elevated 09/19/2011   • COPD (chronic obstructive pulmonary disease) ([facility]) 11/28/2010   • Tremor 11/28/2010   • Eustachian tube dysfunction 09/22/2010   • Sinusitis, chronic 09/22/2010   • HTN (hypertension) 09/22/2010   • Fibromyalgia syndrome 09/22/2010   • Chronic pain syndrome 09/22/2010     Prior Living Situation:  Housing / Facility: 1 Story Apartment / Condo  Lives with - Patient's Self Care Capacity: Significant Other    Prior Level of Function:  Medication Management: Independent  Finances: Independent  Home Management: Independent  Shopping: Independent  Prior  Level Of Mobility: Independent With Device in Community  Driving / Transportation: Driving Independent    Support Systems:  Primary : Cesar Hodge at [phone]  Other support systems: Daughter Estee Wong at [phone], daughter Mohini Curiel at [phone]     Previous Services Utilized:   Equipment Owned: Front-Wheel Walker, 4-Wheel Walker, Power Wheel Chair, Bed Side Commode(4ww is too big, needs new one.)  Prior Services: Home-Independent    Other Information:  Occupation (Pre-Hospital Vocational): Retired Due To Age  Primary Payor Source: Medicare A, Medicare B  Secondary Payor Source: Supplemental Insurance(Medicaid)  Primary Care Practitioner : Clarisa Winchester  Other MDs: Dr. Lechuga for neurosurgery    Additional Case Management Questions:  Have you ever received case management services for yourself or a family member? yes    Do you feel you have and an understanding of what services  provide? yes    Do you have any additional questions regarding case management?  no        CASE MANAGEMENT PLAN OF CARE   Individualized Goals:   1. Patient would like to go home as soon as possible.  2. Patient would like a new 4ww that fits her.  3. Patient wants to resume [facility].    Barriers:   1. Significant other has MS    Patient / Family Goal:  Patient / Family Goal: Patient will return to her current home which is handicapped accessible.  Her significant other has medical issues also.  She has a 4ww from [facility] that is too big for her.  She would like a appropriate sized one.  I will obtain an order for this and follow.  She is current with [facility] and would like to resume this.  I will follow to assist.  She wants to go home by tomorrow.  Her daughter lives next door also and is supportive.    Plan:  1. Continue to follow patient through hospitalization and provide discharge planning in collaboration with patient, family, physicians and ancillary services.      2. Utilize community resources to ensure a safe discharge.

## 2019-09-30 NOTE — THERAPY
"Occupational Therapy  Daily Treatment     Patient Name: Valentina Lu  Age:  63 y.o., Sex:  female  Medical Record #: [id number]  Today's Date: 2019     Precautions  Precautions: (P) Fall Risk, Cervical Collar  , Spinal / Back Precautions     Safety   ADL Safety : (P) Requires Supervision for Safety, Requires Cueing for Safety  Bathroom Safety: (P) Requires Supervision for Safety, Requires Cuing for Safety  Comments: (P) Requires verbal cues to maintain spinal precautions    Subjective    \"I really need a shower\"     Objective       19 1031   Precautions   Precautions Fall Risk;Cervical Collar  ;Spinal / Back Precautions    Safety    ADL Safety  Requires Supervision for Safety;Requires Cueing for Safety   Bathroom Safety Requires Supervision for Safety;Requires Cuing for Safety   Comments Requires verbal cues to maintain spinal precautions   Interdisciplinary Plan of Care Collaboration   IDT Collaboration with  Nursing   Patient Position at End of Therapy Seated  (in dining room)   Collaboration Comments re: need for new dressing   OT Total Time Spent   OT Individual Total Time Spent (Mins) 60   OT Charge Group   OT Self Care / ADL 4     FIM Bathing Score:  5 - Standby Prompting/Supervision or Set-up  Bathing Description:       FIM Upper Body Dressin - Standby Prompting/Supervision or Set-up  Upper Body Dressing Description:  (setup for t-shirt, verbal cues and increased time for c-collar management)    FIM Lower Body Dressing Score:  5 - Standby Prompting/Supervsion or Set-up  Lower Body Dressing Description:  Reacher, Sock aid, Increased time, Verbal cueing, Supervision for safety(verabl cues for mechanics to maintain spinal precautions, educated on use of sock aid (pt has different version at home) and reacher (pt has one at home) and issued for in-room use)    FIM Tub/Shower Transfers Score:  4 - Minimal Assistance  Tub/Shower Transfers Description:  Grab bar, Supervision for safety, " Increased time(FWW into bathroom, CGA for transition to GB)      Assessment    Pt receptive to education on strategies to maintain spinal precautions during ADLs, able to return demo use of AE for dressing and to don/doff cervical collar with min verbal cues. Educated on pad placement which pt verbalized understanding of but time limited return demo. Continues to require verbal cues for adherence to cervical precautions    Plan    Spinal precautions into ADLs/IADLs, cervical collar management, standing balance, functional mobility, strength/endurance

## 2019-09-30 NOTE — THERAPY
Speech Language Pathology  Video Swallow     Patient Name:  Valentina Lu  AGE:  63 y.o., SEX:  female  Medical Record #:  [id number]  Today's Date: 9/30/2019     Objective       09/30/19 0831   History / Background Information   Prior Level of Function for Eating / Swallowing Oral pharyngeal swallow reported to be WFL.  Patient does report a history of GERD and esophageal symptoms and dilitation several years ago.   Diagnosis Revision of C3-4 ACDF, and C2-T1 PCF   Onset Date Of Dysphagia 9/18/19   Dysphagia Symptoms [place] Video Swallow Patient currently receives a dysphagia 2 diet texture with thin liquids.   General Anatomy / Physiology Cervical hardware observed.  Patient is wearing a c-collar.  Edema  of posterior  pharyngeal wall.   Dentition Edentulous   Procedure   Patient Seated in  wheel chair   Seated at (Degrees) 90   Views Completed Lateral;Anterior / Posterior   Fluoroscopy Time 144.0 sec   Consistencies / Presentation Method   Consistencies / Presentation Method Tested   Puree Teaspoon   Nectar Thick Liquids Cup   Thin Liquids Cup   Mechanical Soft / Mixed Teaspoon   Solid Teaspoon   Barium Tablet Cup   Oral Phase   Oral Phase X   Puree Delayed Oral Transit;Premature Spillage Into Valleculae   [place] Thick Liquids Delayed Oral Transit;Premature Spillage Into Valleculae   Thin Liquids Delayed Oral Transit;Premature Spillage Into Valleculea   Mechanical Soft / Mixed Delayed Oral Transit;Oral Residue After the Swallow;Premature Spillage Into  Pyriform Sinus   Solid Impaired Anterior / Posterior Bolus Movement;Delayed Oral Transit;Oral Residue After the Swallow;Premature Spillage Into Valleculea   Barium Tablet Delayed Oral Transit;Oral Residue After the Swallow;Premature Spillage Into Valleculea   Additional Comments Mild oral stage dysphagia characterized by increased mastication time, mild oral residue clearing with extra swallow, premature spillage to pyriforms for mixed texture ( canned  fruit with juice).     Pharyngeal Phase   Pharyngeal Phase X   Puree Residue In Valleculae   Nectar Thick Liquids Residue In Valleculae   Thin Liquids Residue In Valleculae   Mechanical Soft / Mixed Residue In Valleculae   Solid Residue In Valleculae   Additional Comments Mild pharyngeal stage of dysphagia characterized by mild residue in the valleculae which cleared with extra swallows.  Patient displayed premature spillage to the pyriforms with mixed texture, but no penetration nor aspiration was seen during this study.   Esophageal Phase   Esophageal Phase X   Barium Tablet Esophageal Dysmotility    Additional Comments  Stasis in the upper esophagus seen from prior solid swallows.  Upper esophagus visualized during a liquid wash with barium capsule.  Patient  did show some slow emptying of upper esophagus, but liquid wash appeared to aid transport through esophagus.   Compensatory Strategies Attempted   Compensatory Strategies Attempted Yes   Multiple Swallows Effective for clearing mild oral residue and vallecular retention.   Liquid Wash After Swallow Effective for facilitate slow but improved transport through upper esophagus.   Impression   Dysphagia Present Yes   Oral Mild Impairment   Pharyngeal Mild Impairment   Prognosis   Prognosis for Improvement Excellent   Barriers to Improvement Patient has tendency to eat quickly.   Positive Indicators for Improvement Patient able to follow directions.   Recommendations   Diet / Liquid Recommendation Thin Liquid;Regular   Medication Administration  Whole with Liquid Wash   Strategies / Precautions Multiple Swallows;Alternate Solids / Liquids   Interventions Dysphagia Therapy by SLP;Therapeutic Dining Program for Meals   SLP Contact Information (Name / Extension) Jackie Aviles M.S. [facility] ext. [phone]   Video Tape Number [facility] DVD #[id number]   SLP Total Time Spent   SLP Individual Total Time Spent (Mins) 30   Charge Group   SLP Video Swallow / FEES Videofluoroscopic  Evaluation       Assessment    Mild oral and pharyngeal stage dysphagia.  Patient is demonstrating adequate oral pharyngeal swallow function with regular diet textures.  No aspiration or penetration seen during study.  Patient does display abnormal esophageal motility.  Patient complains of sensation of food in upper esophagus.    Plan    Advance to regular diet textures and thin liquids.  Alternate liquids and solids, with slow rate of eating to decrease esophageal symptoms.   Recommend consult with GI to assess esophageal symptoms.

## 2019-09-30 NOTE — THERAPY
Speech Language Pathology  Daily Treatment     Patient Name: Valentina Lu  Age:  63 y.o., Sex:  female  Medical Record #: [id number]  Today's Date: 9/30/2019     Subjective    Patient pleasant and cooperative.  She was accompanied by her spouse to breakfast meal.     Objective     09/30/19 0801   Dysphagia    Diet / Liquid Recommendation Thin Liquid;Dysphagia II   Nutritional Liquid Intake Rating Scale 3   Nutritional Food Intake Rating Scale 5   SLP Total Time Spent   SLP Individual Total Time Spent (Mins) 30   Charge Group   SLP Swallowing Dysfunction Treatment Swallowing Dysfunction Treatment         Assessment    Patient reports that she does feel some sensation in esophageal area.  She is a fast eater.  She tolerated her dysphagia 2 textures with no overt s/sx of aspiration.    Plan    MBSS scheduled to follow.  Recommend therapeutic trial of regular diet textures at lunch.

## 2019-10-01 VITALS
BODY MASS INDEX: 30.87 KG/M2 | HEART RATE: 83 BPM | TEMPERATURE: 98.1 F | SYSTOLIC BLOOD PRESSURE: 121 MMHG | RESPIRATION RATE: 18 BRPM | OXYGEN SATURATION: 90 % | HEIGHT: 67 IN | DIASTOLIC BLOOD PRESSURE: 75 MMHG | WEIGHT: 196.65 LBS

## 2019-10-01 PROBLEM — I63.9 CVA (CEREBRAL VASCULAR ACCIDENT) (HCC): Status: RESOLVED | Noted: 2019-07-07 | Resolved: 2019-10-01

## 2019-10-01 PROCEDURE — 97110 THERAPEUTIC EXERCISES: CPT

## 2019-10-01 PROCEDURE — A9270 NON-COVERED ITEM OR SERVICE: HCPCS | Performed by: PHYSICAL MEDICINE & REHABILITATION

## 2019-10-01 PROCEDURE — 97535 SELF CARE MNGMENT TRAINING: CPT

## 2019-10-01 PROCEDURE — 97530 THERAPEUTIC ACTIVITIES: CPT

## 2019-10-01 PROCEDURE — 700101 HCHG RX REV CODE 250: Performed by: PHYSICAL MEDICINE & REHABILITATION

## 2019-10-01 PROCEDURE — 700102 HCHG RX REV CODE 250 W/ 637 OVERRIDE(OP): Performed by: PHYSICAL MEDICINE & REHABILITATION

## 2019-10-01 PROCEDURE — 99233 SBSQ HOSP IP/OBS HIGH 50: CPT | Performed by: PHYSICAL MEDICINE & REHABILITATION

## 2019-10-01 PROCEDURE — G0515 COGNITIVE SKILLS DEVELOPMENT: HCPCS

## 2019-10-01 RX ORDER — GABAPENTIN 800 MG/1
800 TABLET ORAL 3 TIMES DAILY
Qty: 270 TAB | Refills: 2 | Status: ON HOLD | OUTPATIENT
Start: 2019-10-01 | End: 2021-06-11

## 2019-10-01 RX ORDER — ROPINIROLE 4 MG/1
8 TABLET, FILM COATED ORAL
Qty: 180 TAB | Refills: 2 | Status: SHIPPED | OUTPATIENT
Start: 2019-10-01

## 2019-10-01 RX ORDER — ATORVASTATIN CALCIUM 20 MG/1
20 TABLET, FILM COATED ORAL
Qty: 90 TAB | Refills: 2 | Status: SHIPPED | OUTPATIENT
Start: 2019-10-01

## 2019-10-01 RX ORDER — HYDROCODONE BITARTRATE AND ACETAMINOPHEN 10; 325 MG/1; MG/1
1 TABLET ORAL EVERY 4 HOURS PRN
Qty: 84 TAB | Refills: 0 | Status: SHIPPED | OUTPATIENT
Start: 2019-10-01 | End: 2019-10-15

## 2019-10-01 RX ORDER — VENLAFAXINE HYDROCHLORIDE 150 MG/1
150 CAPSULE, EXTENDED RELEASE ORAL EVERY EVENING
Qty: 90 CAP | Refills: 2 | Status: SHIPPED | OUTPATIENT
Start: 2019-10-01

## 2019-10-01 RX ORDER — LEVOTHYROXINE SODIUM 0.07 MG/1
75 TABLET ORAL
Qty: 90 TAB | Refills: 2 | Status: SHIPPED | OUTPATIENT
Start: 2019-10-01

## 2019-10-01 RX ORDER — GLIPIZIDE 5 MG/1
5 TABLET ORAL EVERY EVENING
Qty: 90 TAB | Refills: 2 | Status: ON HOLD | OUTPATIENT
Start: 2019-10-01 | End: 2021-06-11

## 2019-10-01 RX ORDER — METHOCARBAMOL 750 MG/1
750 TABLET, FILM COATED ORAL 3 TIMES DAILY
Qty: 90 TAB | Refills: 0 | Status: SHIPPED | OUTPATIENT
Start: 2019-10-01 | End: 2019-10-31

## 2019-10-01 RX ORDER — OXYCODONE AND ACETAMINOPHEN 10; 325 MG/1; MG/1
1 TABLET ORAL EVERY 4 HOURS PRN
Qty: 84 TAB | Refills: 0 | Status: SHIPPED | OUTPATIENT
Start: 2019-10-01 | End: 2019-10-01

## 2019-10-01 RX ORDER — TEMAZEPAM 30 MG/1
30 CAPSULE ORAL
Qty: 30 CAP | Refills: 0 | Status: SHIPPED | OUTPATIENT
Start: 2019-10-01 | End: 2019-10-31

## 2019-10-01 RX ADMIN — OXYCODONE HYDROCHLORIDE 10 MG: 10 TABLET ORAL at 14:24

## 2019-10-01 RX ADMIN — GABAPENTIN 900 MG: 300 CAPSULE ORAL at 08:15

## 2019-10-01 RX ADMIN — METHOCARBAMOL TABLETS 750 MG: 750 TABLET, COATED ORAL at 14:24

## 2019-10-01 RX ADMIN — LIDOCAINE 2 PATCH: 50 PATCH TOPICAL at 09:04

## 2019-10-01 RX ADMIN — OMEPRAZOLE 20 MG: 20 CAPSULE, DELAYED RELEASE ORAL at 08:15

## 2019-10-01 RX ADMIN — METHOCARBAMOL TABLETS 750 MG: 750 TABLET, COATED ORAL at 05:44

## 2019-10-01 RX ADMIN — SENNOSIDES,DOCUSATE SODIUM 2 TABLET: 8.6; 5 TABLET, FILM COATED ORAL at 08:15

## 2019-10-01 RX ADMIN — VITAMIN D, TAB 1000IU (100/BT) 5000 UNITS: 25 TAB at 08:15

## 2019-10-01 RX ADMIN — OXYCODONE HYDROCHLORIDE 10 MG: 10 TABLET ORAL at 11:27

## 2019-10-01 RX ADMIN — GABAPENTIN 900 MG: 300 CAPSULE ORAL at 14:24

## 2019-10-01 RX ADMIN — OXYCODONE HYDROCHLORIDE 10 MG: 10 TABLET ORAL at 05:44

## 2019-10-01 NOTE — CARE PLAN
Problem: Venous Thromboembolism (VTW)/Deep Vein Thrombosis (DVT) Prevention:  Goal: Patient will participate in Venous Thrombosis (VTE)/Deep Vein Thrombosis (DVT)Prevention Measures  Outcome: PROGRESSING AS EXPECTED  Intervention: Assess and monitor for anticoagulation complications  Note:   Pt receiving lovenox injection daily for DVT prevention. Will continue to monitor.     Problem: Bowel/Gastric:  Goal: Normal bowel function is maintained or improved  Outcome: PROGRESSING AS EXPECTED  Intervention: Educate patient and significant other/support system about diet, fluid intake, medications and activity to promote bowel function  Note:   Pt continent of bowel. On bowel meds. LBM 9/30/19.

## 2019-10-01 NOTE — PROGRESS NOTES
Patient discharged to home per order.  Discharge instructions reviewed with patient and spouse; they verbalize understanding and signed copies placed in chart.  Patient has all belongings; signed copy of form in chart.  Patient left facility at 1610.

## 2019-10-01 NOTE — CARE PLAN
Problem: PT-Long Term Goals  Goal: LTG-By discharge, patient will ambulate  Description  1) Individualized goal:  Patient will amb with FWW >300 ft over various surfaces mod I   2) Interventions:  PT Group Therapy, PT E Stim Attended, PT Gait Training, PT Therapeutic Exercises, PT Neuro Re-Ed/Balance, PT Aquatic Therapy, PT Therapeutic Activity and PT Manual Therapy     Outcome: MET     Problem: PT-Long Term Goals  Goal: LTG-By discharge, patient will transfer one surface to another  Description  1) Individualized goal:  Patient will transfer with FWW mod I  2) Interventions:  PT Group Therapy, PT E Stim Attended, PT Gait Training, PT Therapeutic Exercises, PT Neuro Re-Ed/Balance, PT Aquatic Therapy, PT Therapeutic Activity and PT Manual Therapy     Outcome: MET

## 2019-10-01 NOTE — CARE PLAN
Problem: Problem Solving STGs  Goal: STG-Within one week, patient will  Description  1) Individualized goal:  Complete executive function tasks related to reasoning, scheduling, finance, meds, with 80% accuracy given min verbal cues.    2) Interventions:  SLP Cognitive Skill Development and SLP Group Treatment     Outcome: NOT MET  Note:   Not yet addressed.     Problem: Memory STGs  Goal: STG-Within one week, patient will  Description  1) Individualized goal:  Recall daily events, and safety strategies, with use of memory book, with 80% accuracy given min verbal cues.    2) Interventions:  SLP Cognitive Skill Development and SLP Group Treatment     Outcome: NOT MET  Note:   Not yet addressed.

## 2019-10-01 NOTE — REHAB-NURSING IDT TEAM NOTE
Nursing   Nursing  Diet/Nutrition:  Diabetic and Thin Liquids  Medication Administration:  Whole with Liquid Wash  % consumed at meals in last 24 hours:  Consumed % of meals per documentation.  Meal/Snack Consumption for the past 24 hrs:   Oral Nutrition   09/30/19 1800 Dinner;Between % Consumed   09/30/19 1200 Lunch;Between % Consumed   09/30/19 0900 Breakfast;Between % Consumed     Snack schedule:  HS  Supplement:  None  Appetite:  Excellent  Fluid Intake/Output in past 24 hours: In: 700 [P.O.:700]  Out: -   Admit Weight:  Weight: 85.7 kg (189 lb)  Weight Last 7 Days   [85.7 kg (189 lb)-89.2 kg (196 lb 10.4 oz)] 89.2 kg (196 lb 10.4 oz) (09/29 0630)    Pain Issues:    Location:  Neck (09/30 2032)  Posterior (09/30 2032)         Severity:  Moderate   Scheduled pain medications:  gabapentin (NEURONTIN)  ; robaxin    PRN pain medications used in last 24 hours:  oxycodone immediate release (ROXICODONE)    Non Pharmacologic Interventions:  warm blanket, distraction, emotional support, food, heat and rest  Effectiveness of pain management plan:  good=patient states acceptable comfort after interventions    Bowel:    Bowel Assist Initial Score:  5 - Standby Prompting/Supervision or Set-up  Bowel Assist Current Score:  5 - Standby Prompting/Supervision or Set-up  Bowl Accidents in last 7 days:  0  Last bowel movement: 09/30/19(per pt)  Stool Description: Large, Loose, Brown     Usual bowel pattern:  daily  Scheduled bowel medications:  senna-docusate (PERICOLACE or SENOKOT S)   PRN bowel medications used in last 24 hours:  None  Nursing Interventions:  Increased time, Scheduled medication, Supervision  Effectiveness of bowel program:   good=regular, predictable, controlled emptying of bowel  Bladder:    Bladder Assist Initial Score:  5 - Standby Prompting/Supervision or Set-up  Bladder Assist Current Score:  5 - Standby Prompting/Supervision or Set-up  Bladder Accidents in last 7 days:  0  PVR  range for past 24-48 hours: -- ()  Intermittent Catheterization:  0  Medications affecting bladder:  None    Time void schedule/voiding pattern:  Voiding every 2-4 hours  Interventions:  Increased time, Supervision  Effectiveness of bladder training:  Good=regular, predictable, emptying of bladder, patient initiates time voiding        Diabetes:  Blood Sugar Frequency:  None    Range of BS for last 48 hours:       Scheduled diabetic medications:  Other glucotrol and januvia  Sliding scale usage in past 24 hours:  No        Wound: Anterior and posterior surgical incisions     Interventions: anterior neck with steri strips; posterior neck with staples covered with island dressing  Effectiveness of intervention:  wound is improving       Sleep/wake cycle:   Average hours slept:  Sleeps 4-6 hours without waking  Sleep medication usage:  temazepam (RESTORIL) capsule 15 mg nightly    Patient/Family Training/Education:  Diabetes Management, Diet/Nutrition, Fall Prevention, General Self Care, Medication Management, Pain Management, Safe Transfers, Safety and Wound Care  Discharge Supply Recommendations:  Glucometer and Strips and Blood Pressure Monitor  Strengths: Alert and oriented, Willingly participates in therapeutic activities, Able to follow instructions, Supportive family, Pleasant and cooperative, Motivated for self care and independence, Good endurance and Manages pain appropriately   Barriers:   Constipation, Fatigue and Generalized weakness       Nursing Problems     Problem: Bowel/Gastric:     Description:     Goal: Normal bowel function is maintained or improved     Description:           Goal: Will not experience complications related to bowel motility     Description:                 Problem: Communication     Description:     Goal: The ability to communicate needs accurately and effectively will improve     Description:                 Problem: Discharge Barriers/Planning     Description:     Goal: Patient's  continuum of care needs will be met     Description:                 Problem: Infection     Description:     Goal: Will remain free from infection     Description:                 Problem: Knowledge Deficit     Description:     Goal: Knowledge of disease process/condition, treatment plan, diagnostic tests, and medications will improve     Description:           Goal: Knowledge of the prescribed therapeutic regimen will improve     Description:                 Problem: Pain Management     Description:     Goal: Pain level will decrease to patient's comfort goal     Description:                 Problem: Respiratory:     Description:     Goal: Respiratory status will improve     Description:                 Problem: Safety     Description:     Goal: Will remain free from injury     Description:           Goal: Will remain free from falls     Description:                 Problem: Skin Integrity     Description:     Goal: Risk for impaired skin integrity will decrease     Description:                 Problem: Venous Thromboembolism (VTW)/Deep Vein Thrombosis (DVT) Prevention:     Description:     Goal: Patient will participate in Venous Thrombosis (VTE)/Deep Vein Thrombosis (DVT)Prevention Measures     Description:                        Long Term Goals:   At discharge patient will be able to function safely at home and in the community with support.    Section completed by:  Jessie Monae R.N.

## 2019-10-01 NOTE — REHAB-SLP IDT TEAM NOTE
Speech Therapy   Cognitive Linquistic Functions  Comprehension Initial:  5 - Stand-by Prompting/Supervision or Set-up  Comprehension Current:  5 - Stand-by Prompting/Supervision or Set-up   Comprehension Description:  Verbal cues  Expression Initial:  7 - Independent  Expression Current:  7 - Independent   Expression Description:     Social Interaction Initial:  7 - Independent  Social Interaction Current:  7 - Independent   Social Interaction Description:     Problem Solving Initial:  5 - Standby Prompting/Supervision or Set-up  Problem Solving Current:  5 - Standby Prompting/Supervision or Set-up   Problem Solving Description:  Verbal cueing, Therapy schedule, Increased time  Memory Initial:  4 - Minimal Assistance  Memory Current:  4 - Minimal Assistance   Memory Description:  Verbal cueing, Therapy schedule  Executive Functioning / Organization Initial:     Executive Functioning / Organization Current:      Executive Functioning Desciption: TBA  Swallowing  Swallowing Status:  Patient participated in MBSS on 9/30/19 revealing mild oral/pharyngeal dysphagia with functional swallow with regular diet textures and thin liquids.   Patient does display abnormal esophageal motility and reports sensation of food material slow to move in upper esophagus.  She benefits from slow rate of intake, and alternating liquids and solids which she performs mod I -spv.  Recommended advance diet to regular textures and thin liquids, discontinue therapeutic dinning.  Orders Placed This Encounter   Procedures   • Diet Order Diabetic     Standing Status:   Standing     Number of Occurrences:   1     Order Specific Question:   Diet:     Answer:   Diabetic [3]     Order Specific Question:   Consistency/Fluid modifications:     Answer:   Thin Liquids [3]     Behavior Modification Plan  Keep instructions simple/concrete, Give clear feedback, Set clear goals and Analyze tasks (break down into smaller steps)  Assistive Technology  Low tech:  Calendar, planner, schedule, alarms/timers, pill organizer, post-it notes, lists  Family Training/Education:  To be scheduled.  DC Recommendations:  Anticipate that patient may benefit from additional ST services upon discharge from this facility.  Strengths:  Able to follow instructions, Adequate strength, Alert and oriented, Effective communication skills, Making steady progress towards goals, Motivated for self care and independence, Pleasant and cooperative, Supportive family and Willingly participates in therapeutic activities  Barriers:  Other: impaired carry over of new training, she assists her  spouse with some IADLs  # of short term goals set=  5  # of short term goals met= 3  Speech Therapy Problems     Problem: Memory STGs     Dates: Start: 09/29/19       Description:     Goal: STG-Within one week, patient will     Dates: Start: 09/29/19       Description: 1) Individualized goal:  Recall daily events, and safety strategies, with use of memory book, with 80% accuracy given min verbal cues.    2) Interventions:  SLP Cognitive Skill Development and SLP Group Treatment       Note:     Goal Note filed on 09/30/19 1806 by Jackie Aviles MS,CCC-SLP    Not yet addressed.                        Problem: Problem Solving STGs     Dates: Start: 09/28/19       Description:     Goal: STG-Within one week, patient will     Dates: Start: 09/29/19       Description: 1) Individualized goal:  Complete executive function tasks related to reasoning, scheduling, finance, meds, with 80% accuracy given min verbal cues.    2) Interventions:  SLP Cognitive Skill Development and SLP Group Treatment       Note:     Goal Note filed on 09/30/19 1806 by Jackie Aviles MS,CCC-SLP    Not yet addressed.                        Problem: Speech/Swallowing LTGs     Dates: Start: 09/28/19       Description:     Goal: LTG-By discharge, patient will safely swallow     Dates: Start: 09/28/19       Description: 1) Individualized goal:  Regular  textures and thin liquids to maintain nutrition and hydration free from evidence of prandial pneumonia  2) Interventions:  SLP Swallowing Dysfunction Treatment, SLP Video Swallow Evaluation and SLP Group Treatment           Goal: LTG-By discharge, patient will solve complex problem     Dates: Start: 09/28/19       Description: 1) Individualized goal:  With mod I at the home/community level with 90% accuracy or greater  2) Interventions:  SLP Speech Language Treatment, SLP Self Care / ADL Training  and SLP Cognitive Skill Development                          Section completed by:  Jackie Aviles MS,CCC-SLP

## 2019-10-01 NOTE — PROGRESS NOTES
"Rehab Progress Note     Encounter Date: 10/1/2019    CC: Neck pain, back spasms    Interval Events (Subjective)  Patient sitting up in room. She reports she slept well this morning. Patient continues to want to go home today. Discussed need to have IDT and discuss it. Patient reports she thinks her  needs her. He is driving and she is usually the .  Discussed she cannot drive and she reports they would stay at home with home health.  Discussed would follow-up after IDT.      IDT Team Meeting 10/1/2019    Deedee MELLO M.D., was present and led the interdisciplinary team conference on 10/1/2019.  I led the IDT conference and agree with the IDT conference documentation and plan of care as noted below.     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   Reinforced C collar and not driving  HEP for fine motor     SLP:  Cognitive - mild cognitive deficits in memory/executive function  Did medications and finances.   Regular diet thin liquids  Need GI referral     CM:  Continues to work on disposition and DME needs.      DC/Disposition:  10/1/19  -> Patient very adamant about going home tonight.    Objective:  VITAL SIGNS: /88   Pulse 71   Temp 36.2 °C (97.2 °F) (Temporal)   Resp 17   Ht 1.702 m (5' 7\")   Wt 89.2 kg (196 lb 10.4 oz)   LMP  (LMP Unknown)   SpO2 93%   BMI 30.80 kg/m²   Gen: NAD  Psych: Mood and affect appropriate  CV: RRR, no edema  Resp: CTAB, no upper airway sounds  Abd: NTND  Neuro: AOx4, 5/5 BUE    No results found for this or any previous visit (from the past 72 hour(s)).    Current Facility-Administered Medications   Medication Frequency   • " methocarbamol (ROBAXIN) tablet 750 mg Q8HRS   • lidocaine (LIDODERM) 5 % 2 Patch Q24HR   • Respiratory Care per Protocol Continuous RT   • oxyCODONE immediate-release (ROXICODONE) tablet 5 mg Q3HRS PRN   • oxyCODONE immediate release (ROXICODONE) tablet 10 mg Q3HRS PRN   • tramadol (ULTRAM) 50 MG tablet 50 mg Q4HRS PRN   • hydrALAZINE (APRESOLINE) tablet 25 mg Q8HRS PRN   • acetaminophen (TYLENOL) tablet 650 mg Q4HRS PRN   • senna-docusate (PERICOLACE or SENOKOT S) 8.6-50 MG per tablet 2 Tab BID    And   • polyethylene glycol/lytes (MIRALAX) PACKET 1 Packet QDAY PRN    And   • magnesium hydroxide (MILK OF MAGNESIA) suspension 30 mL QDAY PRN    And   • bisacodyl (DULCOLAX) suppository 10 mg QDAY PRN   • artificial tears ophthalmic solution 1 Drop PRN   • benzocaine-menthol (CEPACOL) lozenge 1 Lozenge Q2HRS PRN   • mag hydrox-al hydrox-simeth (MAALOX PLUS ES or MYLANTA DS) suspension 20 mL Q2HRS PRN   • ondansetron (ZOFRAN ODT) dispertab 4 mg 4X/DAY PRN    Or   • ondansetron (ZOFRAN) syringe/vial injection 4 mg 4X/DAY PRN   • traZODone (DESYREL) tablet 50 mg QHS PRN   • sodium chloride (OCEAN) 0.65 % nasal spray 2 Spray PRN   • enoxaparin (LOVENOX) inj 40 mg DAILY   • vitamin D (cholecalciferol) tablet 5,000 Units DAILY   • atorvastatin (LIPITOR) tablet 20 mg QHS   • furosemide (LASIX) tablet 20 mg QDAY PRN   • glipiZIDE (GLUCOTROL) tablet 5 mg Q EVENING   • levothyroxine (SYNTHROID) tablet 75 mcg QHS   • ROPINIRole (REQUIP) tablet 8 mg QHS   • SITagliptin (JANUVIA) tablet 100 mg Q EVENING   • temazepam (RESTORIL) capsule 30 mg QHS   • venlafaxine (EFFEXOR) tablet 150 mg QHS   • gabapentin (NEURONTIN) capsule 900 mg TID   • omeprazole (PRILOSEC) capsule 20 mg DAILY       Orders Placed This Encounter   Procedures   • Diet Order Diabetic     Standing Status:   Standing     Number of Occurrences:   1     Order Specific Question:   Diet:     Answer:   Diabetic [3]     Order Specific Question:   Consistency/Fluid  modifications:     Answer:   Thin Liquids [3]       Assessment:  Active Hospital Problems    Diagnosis   • *S/P laminectomy with spinal fusion   • CVA (cerebral vascular accident) ([facility])   • Thyroid disorder   • Diabetic polyneuropathy ([facility])   • Restless legs syndrome (RLS)   • Type 2 diabetes mellitus, without long-term current use of insulin ([facility])   • COPD (chronic obstructive pulmonary disease) ([facility])   • Tremor   • HTN (hypertension)       Medical Decision Making and Plan:  Cervical stenosis - s/p  revision of C3-4 and C4-C5 ACDF on 9/18/19 and C2-T1 PCF on 9/23/19 with Dr. Lechuga.  -PT and OT for mobility and ADLs  -C collar - when OOB due to not fitting in bed. OK off for eating and showers. Cleared for shower     Dysphagia - Patient with choking episode after surgical fixation which is common after anterior approach.    -SLP for swallow. Dysphagia 2 on transfer  -Esophageal motility issues, GI referral recommended by SLP     Pain - Patient on APAP/Oxycodone PRN and Gabapentin 800 mg TID on transfer  -Continue Gabapentin   -Continue Robaxin TID. Increase to 750 mg TID  I discussed the risks and benefits of using opiate medications for pain control.  I discussed the risk of addiction, potential for overdose, and respiratory depression (and the potential need for opiate antagonist therapy if this occurs).  I encouraged the patient to take this medication sparingly with the expressed goal of weaning off the medication as soon as is clinically appropriate.  I informed the patient that we are only able to provide a 14 day supply of these medications at discharge and that they will be responsible for requesting any refills needed from their primary care provider or their surgeon.  We discussed the need to safely secure these medications to prevent theft, inadvertent ingestion, or misuse.  Any unused medication should be immediately disposed of through a sanctioned medication disposal program.  We discussed adjunctive  pain medications and conservative therapies at length.  I answered the patient's questions regarding this treatment, and the patient indicated understanding and willingness to proceed.    DM - Patient on Glipizide 5 mg, Sitagliptin  100 mg daily,  -Monitor, most recent A1c 6.3 on 9/10/19.      HLD - Patient on Atorvastatin 20 mg.     Hypothyroidism - On 75 mcg QHS.      Hypokalemia - On 10 mEq daily. Check AM CMP - wnl. Stop K supplement     Anemia - Mild post-operative blood loss anemia. Check CBC in AM - wnl.     RLS - Patient on Ropinirole 8 mg QHS     GI Ppx - Start omeprazole while on dysphagia diet. On protonix at baseline      DVT Ppx - Patient on Lovenox on transfer.  Ambulating > 250 feet discontinue     Total time:  37 minutes.  I spent greater than 50% of the time for patient care, counseling, and coordination on this date, including unit/floor time, and face-to-face time with the patient as per interval events and assessment and plan above. Topics discussed included discharge planning, opiate training, and ongoing discussion about discharge today vs tomorrow. Patient was discussed separately in IDT today; please see details above.    Deedee Ivy M.D.

## 2019-10-01 NOTE — DISCHARGE INSTRUCTIONS
[facility] NURSING DISCHARGE INSTRUCTIONS    Blood Pressure : 121/75  Weight: 89.2 kg (196 lb 10.4 oz)  Nursing recommendations for Valentina Lu at time of discharge are as follows:  Client and Family Member verbalized understanding of all discharge instructions and prescriptions.     Review all your home medications and newly ordered medications with your doctor and/or pharmacist. Follow medication instructions as directed by your doctor and/or pharmacist.    Pain Management:   Discharge Pain Medication Instructions:  Comfort Goal: 1  Notify your primary care provider if pain is unrelieved with these measures, if the pain is new, or increased in intensity.    Discharge Skin Characteristics: Warm, Dry  Discharge Skin Exam: Clear     Skin / Wound Care Instructions: Please contact your primary care physician for any change in skin integrity.     If You Have Surgical Incisions / Wounds:  Monitor surgical site(s) for signs of increased swelling, redness or symptoms of drainage from the site or fever as this could indicate signs and symptoms of infection. If these symptoms are noted, notifiy your primary care provider.      Discharge Safety Instructions: No Supervision Needed     Discharge Safety Concerns: No Concerns Noted  The interdisciplinary team has made recommendation that you do not require supervision in the house due to demonstration of safety with ADL's and IADLS and problem solving skills  Anti-embolic stockings are required during the day and off at night to increase circulation to the lower extremities.    Discharge Diet: Diabetic     Discharge Liquids: Thin  Discharge Bowel Function:    Please contact your primary care physician for any changes in bowel habits.  Discharge Bowel Program:    Discharge Bladder Function: Continent  Discharge Urinary Devices: None      Fall Prevention in the Home  Introduction  Falls can cause injuries. They can happen to people of all ages.  There are many things you can do to make your home safe and to help prevent falls.  What can I do on the outside of my home?  · Regularly fix the edges of walkways and driveways and fix any cracks.  · Remove anything that might make you trip as you walk through a door, such as a raised step or threshold.  · Trim any bushes or trees on the path to your home.  · Use bright outdoor lighting.  · Clear any walking paths of anything that might make someone trip, such as rocks or tools.  · Regularly check to see if handrails are loose or broken. Make sure that both sides of any steps have handrails.  · Any raised decks and porches should have guardrails on the edges.  · Have any leaves, snow, or ice cleared regularly.  · Use sand or salt on walking paths during winter.  · Clean up any spills in your garage right away. This includes oil or grease spills.  What can I do in the bathroom?  · Use night lights.  · Install grab bars by the toilet and in the tub and shower. Do not use towel bars as grab bars.  · Use non-skid mats or decals in the tub or shower.  · If you need to sit down in the shower, use a plastic, non-slip stool.  · Keep the floor dry. Clean up any water that spills on the floor as soon as it happens.  · Remove soap buildup in the tub or shower regularly.  · Attach bath mats securely with double-sided non-slip rug tape.  · Do not have throw rugs and other things on the floor that can make you trip.  What can I do in the bedroom?  · Use night lights.  · Make sure that you have a light by your bed that is easy to reach.  · Do not use any sheets or blankets that are too big for your bed. They should not hang down onto the floor.  · Have a firm chair that has side arms. You can use this for support while you get dressed.  · Do not have throw rugs and other things on the floor that can make you trip.  What can I do in the kitchen?  · Clean up any spills right away.  · Avoid walking on wet floors.  · Keep items that  you use a lot in easy-to-reach places.  · If you need to reach something above you, use a strong step stool that has a grab bar.  · Keep electrical cords out of the way.  · Do not use floor polish or wax that makes floors slippery. If you must use wax, use non-skid floor wax.  · Do not have throw rugs and other things on the floor that can make you trip.  What can I do with my stairs?  · Do not leave any items on the stairs.  · Make sure that there are handrails on both sides of the stairs and use them. Fix handrails that are broken or loose. Make sure that handrails are as long as the stairways.  · Check any carpeting to make sure that it is firmly attached to the stairs. Fix any carpet that is loose or worn.  · Avoid having throw rugs at the top or bottom of the stairs. If you do have throw rugs, attach them to the floor with carpet tape.  · Make sure that you have a light switch at the top of the stairs and the bottom of the stairs. If you do not have them, ask someone to add them for you.  What else can I do to help prevent falls?  · Wear shoes that:  ¨ Do not have high heels.  ¨ Have rubber bottoms.  ¨ Are comfortable and fit you well.  ¨ Are closed at the toe. Do not wear sandals.  · If you use a stepladder:  ¨ Make sure that it is fully opened. Do not climb a closed stepladder.  ¨ Make sure that both sides of the stepladder are locked into place.  ¨ Ask someone to hold it for you, if possible.  · Clearly calixto and make sure that you can see:  ¨ Any grab bars or handrails.  ¨ First and last steps.  ¨ Where the edge of each step is.  · Use tools that help you move around (mobility aids) if they are needed. These include:  ¨ Canes.  ¨ Walkers.  ¨ Scooters.  ¨ Crutches.  · Turn on the lights when you go into a dark area. Replace any light bulbs as soon as they burn out.  · Set up your furniture so you have a clear path. Avoid moving your furniture around.  · If any of your floors are uneven, fix them.  · If there  are any pets around you, be aware of where they are.  · Review your medicines with your doctor. Some medicines can make you feel dizzy. This can increase your chance of falling.  Ask your doctor what other things that you can do to help prevent falls.  This information is not intended to replace advice given to you by your health care provider. Make sure you discuss any questions you have with your health care provider.  Document Released: 10/14/2010 Document Revised: 05/25/2017 Document Reviewed: 01/22/2016  © 2017 Elsevier      Depression / Suicide Risk    As you are discharged from this [facility] at the [facility] facility, it is important to learn how to keep safe from harming yourself.    Recognize the warning signs:  · Abrupt changes in personality, positive or negative- including increase in energy   · Giving away possessions  · Change in eating patterns- significant weight changes-  positive or negative  · Change in sleeping patterns- unable to sleep or sleeping all the time   · Unwillingness or inability to communicate  · Depression  · Unusual sadness, discouragement and loneliness  · Talk of wanting to die  · Neglect of personal appearance   · Rebelliousness- reckless behavior  · Withdrawal from people/activities they love  · Confusion- inability to concentrate     If you or a loved one observes any of these behaviors or has concerns about self-harm, here's what you can do:  · Talk about it- your feelings and reasons for harming yourself  · Remove any means that you might use to hurt yourself (examples: pills, rope, extension cords, firearm)  · Get professional help from the community (Mental Health, Substance Abuse, psychological counseling)  · Do not be alone:Call your Safe Contact- someone whom you trust who will be there for you.  · Call your local CRISIS HOTLINE 918-4152 or 641-260-2520  · Call your local Children's Mobile Crisis Response Team [place] (029) 834-0073 or www.AisleFinder  · Call the toll  free National Suicide Prevention Hotlines   · National Suicide Prevention Lifeline 993-855-VYQE (6092)  · National Hope Line Network 800-SUICIDE (524-2323)        Nursing Discharge Plan:        Case Management Discharge Instructions:   Discharge Location: Home with Home Health  Agency Name/Address/Phone: [facility] at the [facility] at [phone] (they will call you to schedule visits)  Home Health: Registered Nurse, Occupational Therapist, Physical Therapist  Outpatient Services:    DME Provider/Phone: [facility] at [phone] (please check with them to exchange your 4ww)  Medical Equipment Ordered:    Prescription Faxed to:        Discharge Medication Instructions:  Below are the medications your physician expects you to take upon discharge:

## 2019-10-01 NOTE — THERAPY
Physical Therapy   Daily Treatment     Patient Name: Valentina Lu  Age:  63 y.o., Sex:  female  Medical Record #: [id number]  Today's Date: 10/1/2019     Precautions  Precautions: (P) Cervical Collar  , Spinal / Back Precautions , Fall Risk  Comments: (P) Mod I in room/ on unit with 4WW    Subjective    Patient hopes to D/C this afternoon after conference. Spouse and patient feel ready to D/C. No further questions.      Objective       10/01/19 0831   Precautions   Precautions Cervical Collar  ;Spinal / Back Precautions ;Fall Risk   Comments Mod I in room/ on unit with 4WW   Pain 0 - 10 Group   Location Neck   Location Orientation Left;Posterior   Therapist Pain Assessment Nurse Notified   Bed Mobility    Supine to Sit Modified Independent   Sit to Supine Modified Independent   Sit to Stand Modified Independent   Scooting Modified Independent   Rolling Modified Independent   Neuro-Muscular Treatments   Neuro-Muscular Treatments Weight Shift Right;Weight Shift Left   Comments Up/down curb with 4WW 2x CGA-SBA. Review of home safety/ fall prevention handouts.  Review of D/C recommendations. In room mod I assessment- wrist band provided. Mod I on unit.  Toileting/ toilet transfer/ grooming mod I with 4WW.   object with reacher (spinal safety) mod I.    Interdisciplinary Plan of Care Collaboration   IDT Collaboration with  Family / Caregiver;Nursing;Certified Nursing Assistant;Physical Therapist   Patient Position at End of Therapy Edge of Bed;Family / Friend in Room   Collaboration Comments POC, bandage checked, patch applied (RN)   PT Total Time Spent   PT Individual Total Time Spent (Mins) 60   PT Charge Group   PT Therapeutic Activities 4     Review of spinal precautions/ body mechanics.     FIM Bed/Chair/Wheelchair Transfers Score: 6 - Modified Independent  Bed/Chair/Wheelchair Transfers Description:  Adaptive equipment(4WW)    FIM Walking Score:  6 - Modified Independent  Walking Description:   (4WW)      Assessment    Patient progressed to mod I on unit and in room with 4WW.  Patient demonstrated good safety and use of 4WW breaks during session. Patient struggles with spinal precaution compliance, latanya twisting, requiring vc.     Plan    Car transfer with 4WW, standing HEP, prepare for D/C

## 2019-10-01 NOTE — PROGRESS NOTES
"Rehab Progress Note     Encounter Date: 9/30/2019    CC: Neck pain, back spasms    Interval Events (Subjective)  Patient sitting up in bed. She reports the weekend went well and she feels like she is almost ready for home. Patient jokingly trying to negotiate on when she can go home. Discussed will have IDT tomorrow. Discussed pain control. She reports she follows with [facility] and has went there for 17 years.  She reports otherwise no NVD.     Objective:  VITAL SIGNS: /75   Pulse 83   Temp 36.7 °C (98.1 °F) (Temporal)   Resp 18   Ht 1.702 m (5' 7\")   Wt 89.2 kg (196 lb 10.4 oz)   LMP  (LMP Unknown)   SpO2 90%   BMI 30.80 kg/m²   Gen: NAD  Psych: Mood and affect appropriate  CV: RRR, no edema  Resp: CTAB, no upper airway sounds  Abd: NTND  Neuro: AOx4, walking with FWW hunched forward    No results found for this or any previous visit (from the past 72 hour(s)).    Current Facility-Administered Medications   Medication Frequency   • methocarbamol (ROBAXIN) tablet 750 mg Q8HRS   • lidocaine (LIDODERM) 5 % 2 Patch Q24HR   • Respiratory Care per Protocol Continuous RT   • oxyCODONE immediate-release (ROXICODONE) tablet 5 mg Q3HRS PRN   • oxyCODONE immediate release (ROXICODONE) tablet 10 mg Q3HRS PRN   • tramadol (ULTRAM) 50 MG tablet 50 mg Q4HRS PRN   • hydrALAZINE (APRESOLINE) tablet 25 mg Q8HRS PRN   • acetaminophen (TYLENOL) tablet 650 mg Q4HRS PRN   • senna-docusate (PERICOLACE or SENOKOT S) 8.6-50 MG per tablet 2 Tab BID    And   • polyethylene glycol/lytes (MIRALAX) PACKET 1 Packet QDAY PRN    And   • magnesium hydroxide (MILK OF MAGNESIA) suspension 30 mL QDAY PRN    And   • bisacodyl (DULCOLAX) suppository 10 mg QDAY PRN   • artificial tears ophthalmic solution 1 Drop PRN   • benzocaine-menthol (CEPACOL) lozenge 1 Lozenge Q2HRS PRN   • mag hydrox-al hydrox-simeth (MAALOX PLUS ES or MYLANTA DS) suspension 20 mL Q2HRS PRN   • ondansetron (ZOFRAN ODT) dispertab 4 mg 4X/DAY PRN    Or   • " ondansetron (ZOFRAN) syringe/vial injection 4 mg 4X/DAY PRN   • traZODone (DESYREL) tablet 50 mg QHS PRN   • sodium chloride (OCEAN) 0.65 % nasal spray 2 Spray PRN   • enoxaparin (LOVENOX) inj 40 mg DAILY   • vitamin D (cholecalciferol) tablet 5,000 Units DAILY   • atorvastatin (LIPITOR) tablet 20 mg QHS   • furosemide (LASIX) tablet 20 mg QDAY PRN   • glipiZIDE (GLUCOTROL) tablet 5 mg Q EVENING   • levothyroxine (SYNTHROID) tablet 75 mcg QHS   • ROPINIRole (REQUIP) tablet 8 mg QHS   • SITagliptin (JANUVIA) tablet 100 mg Q EVENING   • temazepam (RESTORIL) capsule 30 mg QHS   • venlafaxine (EFFEXOR) tablet 150 mg QHS   • gabapentin (NEURONTIN) capsule 900 mg TID   • omeprazole (PRILOSEC) capsule 20 mg DAILY       Orders Placed This Encounter   Procedures   • Diet Order Diabetic     Standing Status:   Standing     Number of Occurrences:   1     Order Specific Question:   Diet:     Answer:   Diabetic [3]     Order Specific Question:   Consistency/Fluid modifications:     Answer:   Thin Liquids [3]       Assessment:  Active Hospital Problems    Diagnosis   • *S/P laminectomy with spinal fusion   • CVA (cerebral vascular accident) ([facility])   • Thyroid disorder   • Diabetic polyneuropathy ([facility])   • Restless legs syndrome (RLS)   • Type 2 diabetes mellitus, without long-term current use of insulin ([facility])   • COPD (chronic obstructive pulmonary disease) ([facility])   • Tremor   • HTN (hypertension)       Medical Decision Making and Plan:  Cervical stenosis - s/p  revision of C3-4 and C4-C5 ACDF on 9/18/19 and C2-T1 PCF on 9/23/19 with Dr. Lechuga.  -PT and OT for mobility and ADLs  -C collar - when OOB due to not fitting in bed. OK off for eating and showers. Cleared for shower     Dysphagia - Patient with choking episode after surgical fixation which is common after anterior approach.    -SLP for swallow. Dysphagia 2 on transfer - > advanced to regular.      Pain - Patient on APAP/Oxycodone PRN and Gabapentin 800 mg TID on  transfer  -Continue Gabapentin   -Continue Robaxin TID. Increase to 750 mg TID  -Continue Percocet PRN.  Discussed will need follow-up with [facility]    DM - Patient on Glipizide 5 mg, Sitagliptin  100 mg daily,  -Monitor, most recent A1c 6.3 on 9/10/19.      HLD - Patient on Atorvastatin 20 mg.     Hypothyroidism - On 75 mcg QHS.      Hypokalemia - On 10 mEq daily. Check AM CMP - wnl. Stop K supplement     Anemia - Mild post-operative blood loss anemia. Check CBC in AM - wnl.     RLS - Patient on Ropinirole 8 mg QHS     GI Ppx - Start omeprazole while on dysphagia diet. On protonix at baseline      DVT Ppx - Patient on Lovenox on transfer.  Ambulating > 250 feet discontinue     Total time:  25 minutes.  I spent greater than 50% of the time for patient care, counseling, and coordination on this date, including unit/floor time, and face-to-face time with the patient as per interval events and assessment and plan above. Topics discussed included discharge planning, pain control, and advanced diet.     Deedee Ivy M.D.

## 2019-10-01 NOTE — DOCUMENTATION QUERY
[facility]                                                                       Query Response Note      PATIENT:               YURI CASTRO  ACCT #:                  [id number]  MRN:                     [id number]  :                      1955  ADMIT DATE:       2019 4:48 PM  DISCH DATE:        2019 11:59 PM  RESPONDING  PROVIDER #:        [id number]           QUERY TEXT:        Query created by: Keshia Turner on 2019 7:11 AM    RESPONSE TEXT:    Unknown type of depression          Electronically signed by:  GAEL CONWAY III 10/1/2019 4:37 PM

## 2019-10-01 NOTE — THERAPY
"Occupational Therapy  Daily Treatment     Patient Name: Valentina Lu  Age:  63 y.o., Sex:  female  Medical Record #: [id number]  Today's Date: 10/1/2019     Precautions  Precautions: (P) Cervical Collar  , Spinal / Back Precautions , Fall Risk    Safety   ADL Safety : (P) Requires Supervision for Safety, Requires Cueing for Safety  Bathroom Safety: (P) Requires Supervision for Safety, Requires Cuing for Safety  Comments: Requires verbal cues to maintain spinal precautions    Subjective    Pt was side lying in bed and agreeable to therapy. \" Can we go get some coffee first.\"     Objective       10/01/19 0701   Precautions   Precautions Cervical Collar  ;Spinal / Back Precautions ;Fall Risk   Safety    ADL Safety  Requires Supervision for Safety;Requires Cueing for Safety   Bathroom Safety Requires Supervision for Safety;Requires Cuing for Safety   Pain   Pain Scales 0 to 10 Scale    Intervention Repositioned  (Adjusted bandage)   Pain 0 - 10 Group   Location Neck   Location Orientation Left;Lower   Pain Rating Scale (NPRS) 5   Description Shooting   Comfort Goal Comfort with Movement   Therapist Pain Assessment During Activity;5   Active ROM Upper Body   Active ROM Upper Body  WDL   Strength Upper Body   Upper Body Strength  X   Right  Impaired   Left  Impaired   Comments   (impaired gross grasp)   Hand Strengthening   Hand Strengthening Left ;Right ;Right Finger Extension;Right Pinch;Left Pinch;Left Finger Extension;Left Thumb Opposition;Right Thumb Opposition;Theraputty (Comment on Resistance)  (Medium resistance theraputty. pt has light at home)   IADL Treatments   Kitchen Mobility Education Pt was able to acess upper and lower cabinets using a reacher, access refrigerator, sink and stove. Pt did not access  and oven due to 5lb lifting limit. Pt maintained cervical precautions during kitchen mobility with SBA using 4WW   Home Management Pt demonstrated using reacher to complete " laundry tasks with SBA using 4WW   Balance   Sitting Balance (Static) Good   Sitting Balance (Dynamic) Fair +   Standing Balance (Static) Good   Standing Balance (Dynamic) Fair +   Weight Shift Sitting Fair   Weight Shift Standing Good   Bed Mobility    Supine to Sit Modified Independent   Scooting Modified Independent   Rolling Modified Independent   Interdisciplinary Plan of Care Collaboration   IDT Collaboration with  Nursing   Patient Position at End of Therapy Seated;Family / Friend in Room  (Seated in dining room with instruction to walk back with CNA)   Collaboration Comments pain in hemovac incision on L lower neck   OT Total Time Spent   OT Individual Total Time Spent (Mins) 60   OT Charge Group   OT Self Care / ADL 1   OT Therapy Activity 2   OT Therapeutic Exercise  1     FIM Upper Body Dressin - Standby Prompting/Supervision or Set-up  Upper Body Dressing Description:  Supervision for safety, Verbal cueing(VCs for maintaining cervical precuations while donning c-collar)    Pt walked from room to dining room, obtained coffee, then to therapy kitchen and back to dining room using 4WW with SBA for safety.   Hand strengthening HEP with theraputty and handout provided. Pt demonstrated each exercise.     Assessment    Pt demonstrated safety awareness using 4WW, requiring only one verbal cue to lock 4WW when sitting down. Pt was able to access kitchen and laundry facility using AE while maintaining cervical precautions. Pt demonstrated hand strengthening HEP,     Plan    Spinal precautions into ADLs/IADLs, cervical collar management, standing balance, functional mobility, strength/endurance

## 2019-10-01 NOTE — REHAB-PHARMACY IDT TEAM NOTE
Pharmacy   Pharmacy  Antibiotics/Antifungals/Antivirals:  Medication:      Active Orders (From admission, onward)    None        Route:        NA  Stop Date:  NA  Reason:      NA  Antihypertensives/Cardiac:  Medication:    Orders (72h ago, onward)     Start     Ordered    09/27/19 2100  atorvastatin (LIPITOR) tablet 20 mg  EVERY BEDTIME      09/27/19 1114    09/27/19 1114  furosemide (LASIX) tablet 20 mg  1 TIME DAILY PRN      09/27/19 1114    09/27/19 1114  hydrALAZINE (APRESOLINE) tablet 25 mg  EVERY 8 HOURS PRN      09/27/19 1114              Patient Vitals for the past 24 hrs:   BP Pulse   09/30/19 1420 106/74 72   09/30/19 0620 102/69 68   09/29/19 1910 100/66 69     Anticoagulation:  Medication: Lovenox                               Other key medications: A review of the medication list reveals no issues at this time. Patient is currently on antihypertensive(s). Recommend home blood pressure monitoring/recording if antihypertensive(s) regimen(s) continue. Patient is currently on diabetic medication(s) and/or Insulin(s). Recommend home blood glucose monitoring/recording if these regimen(s) continue.    Section completed by: Varun Gonzalez [facility]

## 2019-10-01 NOTE — REHAB-OT IDT TEAM NOTE
Occupational Therapy   Activities of Daily Living  Eating Initial:  5 - Standby Prompting/Supervision or Set-up  Eating Current:  5 - Standby Prompting/Supervision or Set-up   Eating Description:  Modified diet  Grooming Initial:  5 - Standby Prompting/Supervision or Set-up  Grooming Current:  6 - Modified Independent   Grooming Description:  Increased time(seated in w/c)  Bathing Initial:  4 - Minimal Assistance  Bathing Current:  5 - Standby Prompting/Supervision or Set-up   Bathing Description:  Long handled bath tool, Verbal cueing, Supervision for safety, Grab bar(verbal cues to maintain cervical spine alignment, completed primarily in sitting position with GB use for standing components)  Upper Body Dressing Initial:  5 - Standby Prompting/Supervision or Set-up  Upper Body Dressing Current:  5 - Standby Prompting/Supervision or Set-up   Upper Body Dressing Description:  Supervision for safety, Verbal cueing(VCs for maintaining cervical precuations while donning c-collar)  Lower Body Dressing Initial:  4 - Minimal Assistance  Lower Body Dressing Current:  5 - Standby Prompting/Supervsion or Set-up   Lower Body Dressing Description:  5 - Standby Prompting/Supervsion or Set-up  Toileting Initial:  4 - Minimal Assistance  Toileting Current:  7 - Independent   Toileting Description:  Grab bar, Increased time, Supervision for safety(CGA to don pants with FWW support)  Toilet Transfer Initial:  4 - Minimal Assistance  Toilet Transfer Current:  4 - Minimal Assistance   Toilet Transfer Description:  4 - Minimal Assistance  Tub / Shower Transfer Initial:  4 - Minimal Assistance  Tub / Shower Transfer Current:  4 - Minimal Assistance   Tub / Shower Transfer Description:  Grab bar, Supervision for safety, Increased time(FWW into bathroom, CGA for transition to GB)  IADL:  Pt able to complete kitchen mobility with AE/DME   Family Training/Education:  Family training with fall prevention in kitchen and bathroom, FMC and  hand strengthening  HEP  DME/DC Recommendations:  Reacher, sock aid     Strengths:  Able to follow instructions, Alert and oriented, Effective communication skills, Good balance, Good carryover of learning, Good endurance, Good insight into deficits/needs, Making steady progress towards goals, Manages pain appropriately, Motivated for self care and independence, Pleasant and cooperative, Supportive family and Willingly participates in therapeutic activities  Barriers:  Limited mobility and Other: cervical precuations     # of short term goals set= 4    # of short term goals met= 3     Occupational Therapy Goals     Problem: IADL's     Dates: Start: 09/28/19       Description:     Goal: STG-Within one week, patient will prepare a meal     Dates: Start: 09/28/19   Expected End: 10/05/19       Description: 1) Individualized Goal:  At a SUP level with least restrictive AD.   2) Interventions:  OT Self Care/ADL, OT Neuro Re-Ed/Balance, OT Therapeutic Activity, OT Evaluation and OT Therapeutic Exercise                   Problem: OT Long Term Goals     Dates: Start: 09/28/19       Description:     Goal: LTG-By discharge, patient will complete basic self care tasks     Dates: Start: 09/28/19   Expected End: 10/12/19       Description: 1) Individualized Goal:  At a Mod I level.   2) Interventions:  OT Self Care/ADL, OT Community Reintegration, OT Neuro Re-Ed/Balance, OT Therapeutic Activity, OT Evaluation and OT Therapeutic Exercise             Goal: LTG-By discharge, patient will perform bathroom transfers     Dates: Start: 09/28/19   Expected End: 10/12/19       Description: 1) Individualized Goal:  At a Mod I level with least restrictive AD.   2) Interventions:  OT Self Care/ADL, OT Community Reintegration, OT Neuro Re-Ed/Balance, OT Therapeutic Activity, OT Evaluation and OT Therapeutic Exercise             Goal: LTG-By discharge, patient will complete basic home management     Dates: Start: 09/28/19   Expected End:  10/12/19       Description: 1) Individualized Goal:  At a Mod I level with least restrictive AD.   2) Interventions:  OT Self Care/ADL, OT Community Reintegration, OT Neuro Re-Ed/Balance, OT Therapeutic Activity, OT Evaluation and OT Therapeutic Exercise                         Section completed by:  Amanda Solomon MS, OTR/L, CHT

## 2019-10-01 NOTE — THERAPY
10/01/19 1529   Precautions   Precautions Cervical Collar  ;Spinal / Back Precautions ;Fall Risk;Other (See Comments)   Comments Modified independent transfers and gait 4ww   Pain 0 - 10 Group   Therapist Pain Assessment 0   Cognition    Cognition / Consciousness WDL   Level of Consciousness Alert   Bed Mobility    Supine to Sit Modified Independent   Sit to Supine Modified Independent   Sit to Stand Modified Independent   Scooting Modified Independent   Therapy Missed   Missed Therapy (Minutes) 30   Reason For Missed Therapy Non-Medical - Other (Please Comment)  (The patient is being discharged home today rather than tomorrow.)   PT Total Time Spent   PT Individual Total Time Spent (Mins) 30   PT Charge Group   PT Therapeutic Exercise 1   PT Therapeutic Activities 1   Physical Therapy   Daily Treatment     Patient Name: Valentina Lu  Age:  63 y.o., Sex:  female  Medical Record #: [id number]  Today's Date: 10/1/2019     Precautions  Precautions: Cervical Collar  , Spinal / Back Precautions , Fall Risk, Other (See Comments)  Comments: Modified independent transfers and gait 4ww    Subjective    The patient agreed to PT.  She said she wanted to go home today rather than tomorrow.     Objective    Patient participated in transfer training from the bed to the four-wheel walker.  She also walked from her room to the transfer vehicle outside and demonstrated an appropriate transfer with cervical precautions.  She tolerated gait 275 FT x2.    FIM Bed/Chair/Wheelchair Transfers Score: 6 - Modified Independent  Bed/Chair/Wheelchair Transfers Description:  Adaptive equipment(FWW/4WW)    FIM Walking Score:  6 - Modified Independent  Walking Description:  Walker(275 ft x2)    FIM Wheelchair Score:     Wheelchair Description:       FIM Stairs Score:     Stairs Description:         Assessment    The patient demonstrated appropriate safety awareness and spine precautions for transfers from the bed to the floor with a  walker and a car transfer.  Review of the therapeutic exercises for the lower extremities was provided.    Plan    Discharge to home 10/1/2019

## 2019-10-01 NOTE — CARE PLAN
Problem: Mobility  Goal: STG-Within one week, patient will ambulate community distances  Description  1) Individualized goal:  Patient will amb with FWW over various surfaces >150 ft SBA  2) Interventions:  PT Group Therapy, PT E Stim Attended, PT Gait Training, PT Therapeutic Exercises, PT Neuro Re-Ed/Balance, PT Aquatic Therapy, PT Therapeutic Activity and PT Manual Therapy     Outcome: MET     Problem: Mobility Transfers  Goal: STG-Within one week, patient will sit to stand  Description  1) Individualized goal:  Patient will transfer SPV with FWW SPT/ STS  2) Interventions:  PT Group Therapy, PT E Stim Attended, PT Gait Training, PT Therapeutic Exercises, PT Neuro Re-Ed/Balance, PT Aquatic Therapy, PT Therapeutic Activity and PT Manual Therapy     Outcome: MET

## 2019-10-01 NOTE — DISCHARGE PLANNING
10/01/19  1451   Discharge Instructions - Completed by Case Mgmt   Discharge Location Home with Home Health   Agency Name / Address / Phone [facility] at [phone] (they will call you to schedule visits)   Home Health Registered Nurse; Occupational Therapist; Physical Therapist   Medical equipment Provider / Phone [facility] at [phone] (please check with them to exchange your 4ww)   Comments   You have had a walker billed to insurance last year so we cannot get another one covered for 5 years.            Follow-up With  Details  Why  Contact Info   Remi Lechuga III, M.D. (Neurosurgery)  On 10/2/2019  Weds at 10:30 am  [address]  [place] [address]  [phone]     Clarisa Winchester M.D. (Primary Care)  On 10/7/2019  Monday at 11:15 am (please check in at 11:00 am)(records will be sent)        [address]  [address] [place]  [place] [address]  [phone]     Remi Lechuga III, M.D.(Neurosurgery)  On 10/30/2019  Weds at 10:30 am  [address]  [place] [address]  [phone]

## 2019-10-01 NOTE — DISCHARGE PLANNING
Per Anshul at [facility], DME referral declined as patient received a walker through insurance last year.  CM notified.

## 2019-10-01 NOTE — REHAB-PT IDT TEAM NOTE
Physical Therapy   Mobility  Bed mobility:  Mod I  Bed /Chair/Wheelchair Transfer Initial:  4 - Minimal Assistance  Bed /Chair/Wheelchair Transfer Current:  6 - Modified Independent   Bed/Chair/Wheelchair Transfer Description:  Adaptive equipment(4WW)  Walk Initial:  2 - Max Assistance  Walk Current:  6 - Modified Independent   Walk Description:  (4WW)  Wheelchair Initial:  1 - Total Assistance  Wheelchair Current:  5 - Standby Prompting/Supervision or Set-up   Wheelchair Description:  Supervision for safety  Stairs Initial:  2 - Max Assistance  Stairs Current: 5 - Standby Prompting/Supervision or Set-up   Stairs Description: Ascends/descends 12 to 14 steps, Supervision for safety  Patient/Family Training/Education: Patient educated on home safety/ fall prevention, safety with mobility, spinal precautions, and body mechanics   DME/DC Recommendations:  4WW, home health   Strengths:  Independent PLOF, Making steady progress towards goals, Manages pain appropriately, Motivated for self care and independence, Pleasant and cooperative, Supportive family and Willingly participates in therapeutic activities  Barriers:   Decreased endurance, Limited mobility and Other: Pain, c collar, spinal precautions, primary caregiver to spouse, 6MWT 0.72 m/s   # of short term goals set= 2  # of short term goals met= 2, 2 LTG met (1 new set)  Physical Therapy Problems     Problem: Mobility     Dates: Start: 10/01/19       Description:     Goal: STG-Within one week, patient will     Dates: Start: 10/01/19       Description: 1) Individualized goal:  Indep with HEP   2) Interventions:  PT Group Therapy, PT E Stim Attended, PT Gait Training, PT Therapeutic Exercises, PT Neuro Re-Ed/Balance, PT Therapeutic Activity and PT Manual Therapy                   Problem: PT-Long Term Goals     Dates: Start: 10/01/19       Description:     Goal: LTG-By discharge, patient will     Dates: Start: 10/01/19       Description: 1) Individualized goal:   Indep with HEP  2) Interventions:  PT Group Therapy, PT E Stim Attended, PT Gait Training, PT Therapeutic Exercises, PT Neuro Re-Ed/Balance, PT Therapeutic Activity and PT Manual Therapy                           Section completed by:  Ana Salgado PT, DPT

## 2019-10-01 NOTE — THERAPY
Speech Language Pathology  Daily Treatment     Patient Name: Valentina Lu  Age:  63 y.o., Sex:  female  Medical Record #: [id number]  Today's Date: 10/1/2019     Subjective    Pt pleasant and cooperative, SO  Present and supportive      Objective       10/01/19 1033   SLP Total Time Spent   SLP Individual Total Time Spent (Mins) 60   Charge Group   SLP Cognitive Skill Development 4       Assessment    Functional ps related to d/c planning completed with supervision for additional details. Pt completed deductive reasoning task with 100% accuracy provided verbal prompt X2. Pt to d/c home tomorrow.     Plan    Pt to d/c home tomorrow with SO

## 2019-10-01 NOTE — DISCHARGE PLANNING
Met with patient to review team conference discussion.  Plan was for d/c home tomorrow.  When discussed patient became very tearful and said that she wanted to go home tonight and that they have already packed the car.  I told her I would check back with the physician.  I have done this and he will go ahead and prepare for d/c tonight.  I have referred patient to [facility].  We attempted to order a 4ww but she has had a walker billed to insurance last year so this will not be covered.  I will recommend that she exchange this at [facility] as she has one of theirs that is too big.

## 2019-10-01 NOTE — REHAB-COLLABORATIVE ONGOING IDT TEAM NOTE
Weekly Interdisciplinary Team Conference Note    Weekly Interdisciplinary Team Conference # 1  Date:  10/1/2019    Clinicians present and reporting at team conference include the following:   MD: CHELO Ivy MD    RN:  Cesilia Valentine RN   PT:   Ana Salgado PT, DPT  OT:  Yale Juanito OTD, OTR/L   ST:  Irlanda Alejandro MS, CCC-SLP  CM:  Rachael Avalos RN [facility]  REC:  None  RT:  None  RPh:  Any Aguirre [facility]  Other:   None  All reporting clinicians have a working knowledge of this patient's plan of care.    Targeted DC Date:  10/2/19     Medical    Patient Active Problem List    Diagnosis Date Noted   • CVA (cerebral vascular accident) (Formerly [facility]) 07/07/2019     Priority: High   • Cervical spondylosis with myelopathy 01/22/2016     Priority: High   • Thyroid disorder 09/27/2019   • S/P laminectomy with spinal fusion 09/27/2019   • Dysphagia 07/08/2019   • Anxiety 07/07/2019   • Hypokalemia 07/07/2019   • Head ache 07/07/2019   • Hypotension 04/20/2016   • Normocytic anemia 04/20/2016   • Acute renal failure (Formerly [facility]) 04/20/2016   • Weakness 04/20/2016   • Bradycardia 01/25/2016   • Chest pain 01/25/2016   • Diabetic polyneuropathy (Formerly [facility])    • BPPV (benign paroxysmal positional vertigo)    • Restless legs syndrome (RLS)    • Migraine with aura    • Essential and other specified forms of tremor    • Type 2 diabetes mellitus, without long-term current use of insulin (Formerly [facility]) 02/09/2012   • FH: diabetes mellitus 09/19/2011   • Blood glucose elevated 09/19/2011   • COPD (chronic obstructive pulmonary disease) (Formerly [facility]) 11/28/2010   • Tremor 11/28/2010   • Eustachian tube dysfunction 09/22/2010   • Sinusitis, chronic 09/22/2010   • HTN (hypertension) 09/22/2010   • Fibromyalgia syndrome 09/22/2010   • Chronic pain syndrome 09/22/2010     Results     ** No results found for the last 24 hours. **        Nursing  Diet/Nutrition:  Diabetic and Thin Liquids  Medication Administration:  Whole with Liquid Wash  % consumed at meals in last 24  hours:  Consumed % of meals per documentation.  Meal/Snack Consumption for the past 24 hrs:   Oral Nutrition   09/30/19 1800 Dinner;Between % Consumed   09/30/19 1200 Lunch;Between % Consumed   09/30/19 0900 Breakfast;Between % Consumed     Snack schedule:  HS  Supplement:  None  Appetite:  Excellent  Fluid Intake/Output in past 24 hours: In: 700 [P.O.:700]  Out: -   Admit Weight:  Weight: 85.7 kg (189 lb)  Weight Last 7 Days   [85.7 kg (189 lb)-89.2 kg (196 lb 10.4 oz)] 89.2 kg (196 lb 10.4 oz) (09/29 0630)    Pain Issues:    Location:  Neck (09/30 2032)  Posterior (09/30 2032)         Severity:  Moderate   Scheduled pain medications:  gabapentin (NEURONTIN)  ; robaxin    PRN pain medications used in last 24 hours:  oxycodone immediate release (ROXICODONE)    Non Pharmacologic Interventions:  warm blanket, distraction, emotional support, food, heat and rest  Effectiveness of pain management plan:  good=patient states acceptable comfort after interventions    Bowel:    Bowel Assist Initial Score:  5 - Standby Prompting/Supervision or Set-up  Bowel Assist Current Score:  5 - Standby Prompting/Supervision or Set-up  Bowl Accidents in last 7 days:  0  Last bowel movement: 09/30/19(per pt)  Stool Description: Large, Loose, Brown     Usual bowel pattern:  daily  Scheduled bowel medications:  senna-docusate (PERICOLACE or SENOKOT S)   PRN bowel medications used in last 24 hours:  None  Nursing Interventions:  Increased time, Scheduled medication, Supervision  Effectiveness of bowel program:   good=regular, predictable, controlled emptying of bowel  Bladder:    Bladder Assist Initial Score:  5 - Standby Prompting/Supervision or Set-up  Bladder Assist Current Score:  5 - Standby Prompting/Supervision or Set-up  Bladder Accidents in last 7 days:  0  PVR range for past 24-48 hours: -- ()  Intermittent Catheterization:  0  Medications affecting bladder:  None    Time void schedule/voiding pattern:   Voiding every 2-4 hours  Interventions:  Increased time, Supervision  Effectiveness of bladder training:  Good=regular, predictable, emptying of bladder, patient initiates time voiding        Diabetes:  Blood Sugar Frequency:  None    Range of BS for last 48 hours:       Scheduled diabetic medications:  Other glucotrol and januvia  Sliding scale usage in past 24 hours:  No        Wound: Anterior and posterior surgical incisions     Interventions: anterior neck with steri strips; posterior neck with staples covered with island dressing  Effectiveness of intervention:  wound is improving       Sleep/wake cycle:   Average hours slept:  Sleeps 4-6 hours without waking  Sleep medication usage:  temazepam (RESTORIL) capsule 15 mg nightly    Patient/Family Training/Education:  Diabetes Management, Diet/Nutrition, Fall Prevention, General Self Care, Medication Management, Pain Management, Safe Transfers, Safety and Wound Care  Discharge Supply Recommendations:  Glucometer and Strips and Blood Pressure Monitor  Strengths: Alert and oriented, Willingly participates in therapeutic activities, Able to follow instructions, Supportive family, Pleasant and cooperative, Motivated for self care and independence, Good endurance and Manages pain appropriately   Barriers:   Constipation, Fatigue and Generalized weakness       Nursing Problems     Problem: Bowel/Gastric:     Description:     Goal: Normal bowel function is maintained or improved     Description:           Goal: Will not experience complications related to bowel motility     Description:                 Problem: Communication     Description:     Goal: The ability to communicate needs accurately and effectively will improve     Description:                 Problem: Discharge Barriers/Planning     Description:     Goal: Patient's continuum of care needs will be met     Description:                 Problem: Infection     Description:     Goal: Will remain free from  infection     Description:                 Problem: Knowledge Deficit     Description:     Goal: Knowledge of disease process/condition, treatment plan, diagnostic tests, and medications will improve     Description:           Goal: Knowledge of the prescribed therapeutic regimen will improve     Description:                 Problem: Pain Management     Description:     Goal: Pain level will decrease to patient's comfort goal     Description:                 Problem: Respiratory:     Description:     Goal: Respiratory status will improve     Description:                 Problem: Safety     Description:     Goal: Will remain free from injury     Description:           Goal: Will remain free from falls     Description:                 Problem: Skin Integrity     Description:     Goal: Risk for impaired skin integrity will decrease     Description:                 Problem: Venous Thromboembolism (VTW)/Deep Vein Thrombosis (DVT) Prevention:     Description:     Goal: Patient will participate in Venous Thrombosis (VTE)/Deep Vein Thrombosis (DVT)Prevention Measures     Description:                        Long Term Goals:   At discharge patient will be able to function safely at home and in the community with support.    Section completed by:  Jessie Monae R.N.           Mobility  Bed mobility:  Mod I  Bed /Chair/Wheelchair Transfer Initial:  4 - Minimal Assistance  Bed /Chair/Wheelchair Transfer Current:  6 - Modified Independent   Bed/Chair/Wheelchair Transfer Description:  Adaptive equipment(4WW)  Walk Initial:  2 - Max Assistance  Walk Current:  6 - Modified Independent   Walk Description:  (4WW)  Wheelchair Initial:  1 - Total Assistance  Wheelchair Current:  5 - Standby Prompting/Supervision or Set-up   Wheelchair Description:  Supervision for safety  Stairs Initial:  2 - Max Assistance  Stairs Current: 5 - Standby Prompting/Supervision or Set-up   Stairs Description: Ascends/descends 12 to 14 steps, Supervision  for safety  Patient/Family Training/Education: Patient educated on home safety/ fall prevention, safety with mobility, spinal precautions, and body mechanics   DME/DC Recommendations:  4WW, home health   Strengths:  Independent PLOF, Making steady progress towards goals, Manages pain appropriately, Motivated for self care and independence, Pleasant and cooperative, Supportive family and Willingly participates in therapeutic activities  Barriers:   Decreased endurance, Limited mobility and Other: Pain, c collar, spinal precautions, primary caregiver to spouse, 6MWT 0.72 m/s   # of short term goals set= 2  # of short term goals met= 2, 2 LTG met (1 new set)  Physical Therapy Problems     Problem: Mobility     Dates: Start: 10/01/19       Description:     Goal: STG-Within one week, patient will     Dates: Start: 10/01/19       Description: 1) Individualized goal:  Indep with HEP   2) Interventions:  PT Group Therapy, PT E Stim Attended, PT Gait Training, PT Therapeutic Exercises, PT Neuro Re-Ed/Balance, PT Therapeutic Activity and PT Manual Therapy                   Problem: PT-Long Term Goals     Dates: Start: 10/01/19       Description:     Goal: LTG-By discharge, patient will     Dates: Start: 10/01/19       Description: 1) Individualized goal:  Indep with HEP  2) Interventions:  PT Group Therapy, PT E Stim Attended, PT Gait Training, PT Therapeutic Exercises, PT Neuro Re-Ed/Balance, PT Therapeutic Activity and PT Manual Therapy                           Section completed by:  Ana Salgado PT, DPT    Activities of Daily Living  Eating Initial:  5 - Standby Prompting/Supervision or Set-up  Eating Current:  5 - Standby Prompting/Supervision or Set-up   Eating Description:  Modified diet  Grooming Initial:  5 - Standby Prompting/Supervision or Set-up  Grooming Current:  6 - Modified Independent   Grooming Description:  Increased time(seated in w/c)  Bathing Initial:  4 - Minimal Assistance  Bathing Current:  5 -  Standby Prompting/Supervision or Set-up   Bathing Description:  Long handled bath tool, Verbal cueing, Supervision for safety, Grab bar(verbal cues to maintain cervical spine alignment, completed primarily in sitting position with GB use for standing components)  Upper Body Dressing Initial:  5 - Standby Prompting/Supervision or Set-up  Upper Body Dressing Current:  5 - Standby Prompting/Supervision or Set-up   Upper Body Dressing Description:  Supervision for safety, Verbal cueing(VCs for maintaining cervical precuations while donning c-collar)  Lower Body Dressing Initial:  4 - Minimal Assistance  Lower Body Dressing Current:  5 - Standby Prompting/Supervsion or Set-up   Lower Body Dressing Description:  5 - Standby Prompting/Supervsion or Set-up  Toileting Initial:  4 - Minimal Assistance  Toileting Current:  7 - Independent   Toileting Description:  Grab bar, Increased time, Supervision for safety(CGA to don pants with FWW support)  Toilet Transfer Initial:  4 - Minimal Assistance  Toilet Transfer Current:  4 - Minimal Assistance   Toilet Transfer Description:  4 - Minimal Assistance  Tub / Shower Transfer Initial:  4 - Minimal Assistance  Tub / Shower Transfer Current:  4 - Minimal Assistance   Tub / Shower Transfer Description:  Grab bar, Supervision for safety, Increased time(FWW into bathroom, CGA for transition to GB)  IADL:  Pt able to complete kitchen mobility with AE/DME   Family Training/Education:  Family training with fall prevention in kitchen and bathroom, FMC and hand strengthening  HEP  DME/DC Recommendations:  Reacher, sock aid     Strengths:  Able to follow instructions, Alert and oriented, Effective communication skills, Good balance, Good carryover of learning, Good endurance, Good insight into deficits/needs, Making steady progress towards goals, Manages pain appropriately, Motivated for self care and independence, Pleasant and cooperative, Supportive family and Willingly participates in  therapeutic activities  Barriers:  Limited mobility and Other: cervical precuations     # of short term goals set= 4    # of short term goals met= 3     Occupational Therapy Goals     Problem: IADL's     Dates: Start: 09/28/19       Description:     Goal: STG-Within one week, patient will prepare a meal     Dates: Start: 09/28/19   Expected End: 10/05/19       Description: 1) Individualized Goal:  At a SUP level with least restrictive AD.   2) Interventions:  OT Self Care/ADL, OT Neuro Re-Ed/Balance, OT Therapeutic Activity, OT Evaluation and OT Therapeutic Exercise                   Problem: OT Long Term Goals     Dates: Start: 09/28/19       Description:     Goal: LTG-By discharge, patient will complete basic self care tasks     Dates: Start: 09/28/19   Expected End: 10/12/19       Description: 1) Individualized Goal:  At a Mod I level.   2) Interventions:  OT Self Care/ADL, OT Community Reintegration, OT Neuro Re-Ed/Balance, OT Therapeutic Activity, OT Evaluation and OT Therapeutic Exercise             Goal: LTG-By discharge, patient will perform bathroom transfers     Dates: Start: 09/28/19   Expected End: 10/12/19       Description: 1) Individualized Goal:  At a Mod I level with least restrictive AD.   2) Interventions:  OT Self Care/ADL, OT Community Reintegration, OT Neuro Re-Ed/Balance, OT Therapeutic Activity, OT Evaluation and OT Therapeutic Exercise             Goal: LTG-By discharge, patient will complete basic home management     Dates: Start: 09/28/19   Expected End: 10/12/19       Description: 1) Individualized Goal:  At a Mod I level with least restrictive AD.   2) Interventions:  OT Self Care/ADL, OT Community Reintegration, OT Neuro Re-Ed/Balance, OT Therapeutic Activity, OT Evaluation and OT Therapeutic Exercise                         Section completed by:  Amanda Soloomn, MS, OTR/L, CHT    Cognitive Linquistic Functions  Comprehension Initial:  5 - Stand-by Prompting/Supervision or  Set-up  Comprehension Current:  5 - Stand-by Prompting/Supervision or Set-up   Comprehension Description:  Verbal cues  Expression Initial:  7 - Independent  Expression Current:  7 - Independent   Expression Description:     Social Interaction Initial:  7 - Independent  Social Interaction Current:  7 - Independent   Social Interaction Description:     Problem Solving Initial:  5 - Standby Prompting/Supervision or Set-up  Problem Solving Current:  5 - Standby Prompting/Supervision or Set-up   Problem Solving Description:  Verbal cueing, Therapy schedule, Increased time  Memory Initial:  4 - Minimal Assistance  Memory Current:  4 - Minimal Assistance   Memory Description:  Verbal cueing, Therapy schedule  Executive Functioning / Organization Initial:     Executive Functioning / Organization Current:      Executive Functioning Desciption: TBA  Swallowing  Swallowing Status:  Patient participated in MBSS on 9/30/19 revealing mild oral/pharyngeal dysphagia with functional swallow with regular diet textures and thin liquids.   Patient does display abnormal esophageal motility and reports sensation of food material slow to move in upper esophagus.  She benefits from slow rate of intake, and alternating liquids and solids which she performs mod I -spv.  Recommended advance diet to regular textures and thin liquids, discontinue therapeutic dinning.  Orders Placed This Encounter   Procedures   • Diet Order Diabetic     Standing Status:   Standing     Number of Occurrences:   1     Order Specific Question:   Diet:     Answer:   Diabetic [3]     Order Specific Question:   Consistency/Fluid modifications:     Answer:   Thin Liquids [3]     Behavior Modification Plan  Keep instructions simple/concrete, Give clear feedback, Set clear goals and Analyze tasks (break down into smaller steps)  Assistive Technology  Low tech: Calendar, planner, schedule, alarms/timers, pill organizer, post-it notes, lists  Family Training/Education:   To be scheduled.  DC Recommendations:  Anticipate that patient may benefit from additional ST services upon discharge from this facility.  Strengths:  Able to follow instructions, Adequate strength, Alert and oriented, Effective communication skills, Making steady progress towards goals, Motivated for self care and independence, Pleasant and cooperative, Supportive family and Willingly participates in therapeutic activities  Barriers:  Other: impaired carry over of new training, she assists her  spouse with some IADLs  # of short term goals set=  5  # of short term goals met= 3  Speech Therapy Problems     Problem: Memory STGs     Dates: Start: 09/29/19       Description:     Goal: STG-Within one week, patient will     Dates: Start: 09/29/19       Description: 1) Individualized goal:  Recall daily events, and safety strategies, with use of memory book, with 80% accuracy given min verbal cues.    2) Interventions:  SLP Cognitive Skill Development and SLP Group Treatment       Note:     Goal Note filed on 09/30/19 1806 by Jackie Aviles MS,CCC-SLP    Not yet addressed.                        Problem: Problem Solving STGs     Dates: Start: 09/28/19       Description:     Goal: STG-Within one week, patient will     Dates: Start: 09/29/19       Description: 1) Individualized goal:  Complete executive function tasks related to reasoning, scheduling, finance, meds, with 80% accuracy given min verbal cues.    2) Interventions:  SLP Cognitive Skill Development and SLP Group Treatment       Note:     Goal Note filed on 09/30/19 1806 by Jackie Aviles MS,CCC-SLP    Not yet addressed.                        Problem: Speech/Swallowing LTGs     Dates: Start: 09/28/19       Description:     Goal: LTG-By discharge, patient will safely swallow     Dates: Start: 09/28/19       Description: 1) Individualized goal:  Regular textures and thin liquids to maintain nutrition and hydration free from evidence of prandial pneumonia  2)  Interventions:  SLP Swallowing Dysfunction Treatment, SLP Video Swallow Evaluation and SLP Group Treatment           Goal: LTG-By discharge, patient will solve complex problem     Dates: Start: 09/28/19       Description: 1) Individualized goal:  With mod I at the home/community level with 90% accuracy or greater  2) Interventions:  SLP Speech Language Treatment, SLP Self Care / ADL Training  and SLP Cognitive Skill Development                          Section completed by:  Jackie Aviles MS,CCC-SLP          REHAB-Pharmacy IDT Team Note by Varun Gonzalez RPH at 9/30/2019  5:00 PM  Version 1 of 1    Author:  Varun Gonzalez RPH Service:  -- Author Type:  Pharmacist    Filed:  9/30/2019  5:01 PM Date of Service:  9/30/2019  5:00 PM Status:  Signed    :  Varun Gonzalez RPH (Pharmacist)         Pharmacy   Pharmacy  Antibiotics/Antifungals/Antivirals:  Medication:      Active Orders (From admission, onward)    None        Route:        NA  Stop Date:  NA  Reason:      NA  Antihypertensives/Cardiac:  Medication:    Orders (72h ago, onward)     Start     Ordered    09/27/19 2100  atorvastatin (LIPITOR) tablet 20 mg  EVERY BEDTIME      09/27/19 1114    09/27/19 1114  furosemide (LASIX) tablet 20 mg  1 TIME DAILY PRN      09/27/19 1114    09/27/19 1114  hydrALAZINE (APRESOLINE) tablet 25 mg  EVERY 8 HOURS PRN      09/27/19 1114              Patient Vitals for the past 24 hrs:   BP Pulse   09/30/19 1420 106/74 72   09/30/19 0620 102/69 68   09/29/19 1910 100/66 69     Anticoagulation:  Medication: Lovenox                               Other key medications: A review of the medication list reveals no issues at this time. Patient is currently on antihypertensive(s). Recommend home blood pressure monitoring/recording if antihypertensive(s) regimen(s) continue. Patient is currently on diabetic medication(s) and/or Insulin(s). Recommend home blood glucose monitoring/recording if these regimen(s)  continue.    Section completed by: Varun Gonzalez [facility][AW.1]     Attribution Key     AW.1 - Varun CASTANON Carlos, [facility] on 9/30/2019  5:00 PM                  DC Planning  DC destination/dispostion:  Patient lives in a fully accessible condo with her significant other and her daughter next door.    DC Needs:  She has used [facility] for dme and ramp.  She wants to resume [facility].  She has 4ww from [facility] and it is too big.  I will order one with her insurance.  She has a fww, comode and power chair.    Barriers to discharge:   None    Strengths: Her S.O. Has medical issues.    Section completed by:  Rachael Avalos R.N.      Physician Summary  CHELO Ivy MD  participated and led team conference discussion.

## 2019-10-02 ASSESSMENT — ACTIVITIES OF DAILY LIVING (ADL)
SHOWER_TRANSFER_LEVEL_OF_ASSIST: ABLE TO COMPLETE SHOWER TRANSFER WITHOUT ASSIST
TOILET_TRANSFER_LEVEL_OF_ASSIST: ABLE TO COMPLETE TOILET TRANSFER WITHOUT ASSIST
TOILETING_LEVEL_OF_ASSIST: ABLE TO COMPLETE TOILETING WITHOUT ASSIST

## 2019-10-02 NOTE — DISCHARGE PLANNING
Case management Summary:   Met with patient and her significant other prior to discharge. Reviewed all follow up appointments.   Referral made to [facility] and they are have accepted referral and are ready to follow.    No additional dme required.  I have advised patient to exchange her loaner walker from [facility].  During hospitalization, I have provided support and education and have been available for questions and information during hours of operation, communicated with therapy team and MD along with providing links/resources  to outside services.    Patient verbalizes agreement with all plans and has an understanding of the next steps within the post acute services.     CASE MANAGEMENT PLAN OF CARE   Individualized Goals:   1. Patient would like to go home as soon as possible.  2. Patient would like a new 4ww that fits her.  3. Patient wants to resume [facility].    Outcome:   1. Met: Patient discharged after 4 days.  2. Not Met: Patient had fww covered last year by insurance so she will have to check with [facility] for exchange.  3. Met: [facility] and ready to follow.

## 2019-10-09 NOTE — DISCHARGE SUMMARY
Rehab Discharge Summary    Admission Date: 9/27/2019    Discharge Date: 10/1/2019    Attending Provider: Deedee Ivy MD/PhD    Admission Diagnosis:   Active Hospital Problems    Diagnosis   • *S/P laminectomy with spinal fusion   • Thyroid disorder   • Diabetic polyneuropathy (HCC)   • Restless legs syndrome (RLS)   • Type 2 diabetes mellitus, without long-term current use of insulin (HCC)   • COPD (chronic obstructive pulmonary disease) (HCC)   • Tremor   • HTN (hypertension)       Discharge Diagnosis:  Active Hospital Problems    Diagnosis   • *S/P laminectomy with spinal fusion   • Thyroid disorder   • Diabetic polyneuropathy (HCC)   • Restless legs syndrome (RLS)   • Type 2 diabetes mellitus, without long-term current use of insulin (HCC)   • COPD (chronic obstructive pulmonary disease) (HCC)   • Tremor   • HTN (hypertension)       HPI per H&P:  Adapted from Dr. Gordon's PM&R chart review from 9/26/19:   Valentina Lu is a 63 y.o.  female with a PMH of diabetes, hypertension, hyperlipidemia, GERD, arthritis; who is admitted for cervical hardware failure and is currently status post two-stage ACDF revision and PCF.  Per chart patient underwent revision of C3-4 and C4-C5 ACDF on 9/18/19 and C2-T1 PCF on 9/23/19 with Dr. Lechuga. Post-operative course with only mild anemia.  Patient switched from IV pain medications to PO.    9/25/2019 physical therapy: Mod assist with bed mobility, min assist with sit to stand.  9/24/2019 occupational therapy: Mod assist toilet transfer to bedside commode, max assist lower body dressing, max assist upper body dressing    Patient was admitted to [facility] on 9/27/2019.     Hospital Course by Problem List:  Cervical stenosis - s/p  revision of C3-4 and C4-C5 ACDF on 9/18/19 and C2-T1 PCF on 9/23/19 with Dr. Lechuga.  Patient underwent acute inpatient rehabilitation from 9/27/19 to 10/1/19 with good improvement in mobility and ADLs. Patient had  family emergency and needed to discharge early on 10/1/19 instead of 10/2/19.   -C collar - when OOB due to not fitting in bed. OK off for eating and showers. Cleared for shower     Dysphagia - Patient with choking episode after surgical fixation which is common after anterior approach.  Advanced to Dysphagia 3 with thins   -Esophageal motility issues, GI referral recommended by SLP     Pain - Patient on APAP/Oxycodone PRN and Gabapentin 800 mg TID on transfer. Continue Gabapentin. Continue Robaxin TID. Increase to 750 mg TID  -Patient provided 2 week supply of Oxycodone.   I discussed the risks and benefits of using opiate medications for pain control.  I discussed the risk of addiction, potential for overdose, and respiratory depression (and the potential need for opiate antagonist therapy if this occurs).  I encouraged the patient to take this medication sparingly with the expressed goal of weaning off the medication as soon as is clinically appropriate.  I informed the patient that we are only able to provide a 14 day supply of these medications at discharge and that they will be responsible for requesting any refills needed from their primary care provider or their surgeon.  We discussed the need to safely secure these medications to prevent theft, inadvertent ingestion, or misuse.  Any unused medication should be immediately disposed of through a sanctioned medication disposal program.  We discussed adjunctive pain medications and conservative therapies at length.  I answered the patient's questions regarding this treatment, and the patient indicated understanding and willingness to proceed.     DM - Patient on Glipizide 5 mg, Sitagliptin  100 mg daily,  -Monitor, most recent A1c 6.3 on 9/10/19.      HLD - Patient on Atorvastatin 20 mg.     Hypothyroidism - On 75 mcg QHS.      Hypokalemia - On 10 mEq daily. Check AM CMP - wnl. Stop K supplement     Anemia - Mild post-operative blood loss anemia. Check CBC in  AM - wnl.     RLS - Patient on Ropinirole 8 mg QHS     GI Ppx - Start omeprazole while on dysphagia diet. On protonix at baseline      DVT Ppx - Patient on Lovenox on transfer.  Ambulating > 250 feet discontinue     Functional Status at Discharge  Eatin - Standby Prompting/Supervision or Set-up  Eating Description:  Modified diet  Groomin - Modified Independent  Grooming Description:  Increased time(seated in w/c)  Bathin - Modified Independent  Bathing Description:  Grab bar, Tub bench, Hand held shower, Increased time(Per Pt report with CNA)  Upper Body Dressin - Standby Prompting/Supervision or Set-up  Upper Body Dressing Description:  Supervision for safety, Verbal cueing(VCs for maintaining cervical precuations while donning c-collar)  Lower Body Dressin - Modified Independent  Lower Body Dressing Description:  6 - Modified Independent  Discharge Location : Home  Patient Discharging with Assist of: Family   Level of Supervision Required: No Supervision  Recommended Equipment for Discharge: Reacher;Sock Aid;Grab Bars by Toilet;Grab Bars in Tub / Shower  Recommended Services Upon Discharge: No Follow-Up Occupational Therapy Recommended  Criteria for Termination of Services: Maximum Function Achieved for Inpatient Rehabilitation  Walk:  6 - Modified Independent  Distance Walked:  Walks a minimum of 150 feet  Walk Description:  Walker(275 ft x2)  Wheelchair:  5 - Standby Prompting/Supervision or Set-up  Distance Propelled:  Propels a minimum of 150 feet   Wheelchair Description:  Supervision for safety  Stairs 5 - Standby Prompting/Supervision or Set-up  Stairs DescriptionAscends/descends 12 to 14 steps, Supervision for safety  Discharge Location: Home  Patient Discharging with Assist of: Spouse / Significant Other;Family  Level of Supervision Required Upon Discharge: Intermittent Supervision  Recommended Equipment for Discharge: 4-Wheeled Walker;Front-Wheeled Walker  Recommeded Services  Upon Discharge: Home Health Physical Therapy  Long Term Goals Met: Mod I transfers, mod I gait   Long Term Goals Not Met: HEP not Indep - new goal set 10/01  Reason(s) for Goals Not Met: D/C 10/01  Criteria for Termination of Services: Maximum Function Achieved for Inpatient Rehabilitation  Comprehension Mode:  Auditory  Comprehension:  6 - Modified Independent  Comprehension Description:  Verbal cues  Expression Mode:  Vocal  Expression:  7 - Independent  Expression Description:     Social Interaction:  7 - Independent  Social Interaction Description:     Problem Solvin - Standby Prompting/Supervision or Set-up  Problem Solving Description:  Verbal cueing, Therapy schedule, Increased time  Memory:  5 - Standby Prompting/Supervision or Set-up  Memory Description:  Verbal cueing, Therapy schedule       I, Deedee Ivy M.D., personally performed a complete drug regimen review and no potential clinically significant medication issues were identified.   Discharge Medication:     Medication List      START taking these medications      Instructions   HYDROcodone/acetaminophen  MG Tabs  Commonly known as:  NORCO   Take 1 Tab by mouth every four hours as needed for up to 14 days.  Dose:  1 Tab        CONTINUE taking these medications      Instructions   atorvastatin 20 MG Tabs  Commonly known as:  LIPITOR   Take 1 Tab by mouth every bedtime.  Dose:  20 mg     Cinnamon 500 MG Tabs   Take 500 mg by mouth every evening.  Dose:  500 mg     gabapentin 800 MG tablet  Commonly known as:  NEURONTIN   Take 1 Tab by mouth 3 times a day.  Dose:  800 mg     glipiZIDE 5 MG Tabs  Commonly known as:  GLUCOTROL   Take 1 Tab by mouth every evening. Pt was told to take half a tablet (2.5MG) for today surgery (2019)  Dose:  5 mg     levothyroxine 75 MCG Tabs  Commonly known as:  SYNTHROID   Take 1 Tab by mouth every bedtime.  Dose:  75 mcg     methocarbamol 750 MG Tabs  Commonly known as:  ROBAXIN   Take 1 Tab by  mouth 3 times a day for 30 days.  Dose:  750 mg     pantoprazole 20 MG tablet  Commonly known as:  PROTONIX   Take 20 mg by mouth every bedtime.  Dose:  20 mg     ropinirole 4 MG tablet  Commonly known as:  REQUIP   Take 2 Tabs by mouth every bedtime.  Dose:  8 mg     SITagliptin 100 MG Tabs  Commonly known as:  JANUVIA   Take 1 Tab by mouth every evening. Pt was told to take half a tablet (50MG) for today surgery (9/18/2019)  Dose:  100 mg     STOOL SOFTENER PO   Take 1 Cap by mouth every bedtime.  Dose:  1 Cap     temazepam 30 MG capsule  Commonly known as:  RESTORIL   Take 1 Cap by mouth every bedtime for 30 days.  Dose:  30 mg     venlafaxine 150 MG extended-release capsule  Commonly known as:  EFFEXOR-XR   Take 1 Cap by mouth every evening.  Dose:  150 mg        STOP taking these medications    aspirin EC 81 MG Tbec  Commonly known as:  ECOTRIN     furosemide 20 MG Tabs  Commonly known as:  LASIX     oxyCODONE-acetaminophen  MG Tabs  Commonly known as:  PERCOCET-10     potassium chloride 10 MEQ capsule  Commonly known as:  MICRO-K            Discharge Diet:  Dysphagia 3    Discharge Activity:  As tolerated in C collar     Disposition:  Patient to discharge home with family support and community resources.     Equipment:  FWW    Follow-up & Discharge Instructions:  Follow up with your primary care provider (PCP) within 7-10 days of discharge to review your medications and take over your care.     If you develop chest pain, fever, chills, change in neurologic function (weakness, sensation changes, vision changes), or other concerning sxs, seek immediate medical attention or call 911.      Condition on Discharge:  Samson Ivy M.D.    This note is for documentation purposes only.

## 2019-10-11 NOTE — CONSULTS
DATE OF SERVICE:  10/01/2019    BEHAVIORAL MEDICINE EVALUATION    BRIEF HISTORY OF PRESENTING COMPLAINTS:  The patient is a 63-year-old white    female who is referred for behavioral medicine evaluation by Dr. Ivy.  The patient was transferred to rehab from [facility].  She underwent a   revision of a C3-C4 and C4-C5 ACDF on 09/18/2019 with Dr. Lechuga.  The patient   experienced no significant postoperative complications.  She was stabilized   acutely and then sent to rehab to address her general debility.    PAST MEDICAL HISTORY:  Significant for anemia, arrhythmia, arthritis, asthma,   benign paroxysmal positional vertigo, breast mass, ovarian cancer, diabetes,   disorder of thyroid, essential tremor, fibromyalgia, GERD, heartburn, heart   valve disease, Helicobacter pylori, hemorrhagic disorder, hiatal hernia,   hypercholesterolemia, hyperlipidemia, hypertension, IBS, indigestion,   influenza, migraines, MRSA, oxygen dependent, diffuse body pain,   polyneuropathy related to diabetes, renal disorder, restless leg syndrome,   sleep apnea, stroke, snoring, syncope, TIAs, urinary incontinence, and vision   disturbance.    PSYCHOLOGICAL STATUS:  MENTAL STATUS EXAMINATION:  The patient is a well-nourished, obese female of   medium stature, who appeared older than her stated age of 63.  At   presentation, the patient was alert.  She was sitting in a wheelchair next to   her bed, talking to her friend when approached.  The patient oriented well to   my presence.  The patient was kempt in appearance.  She was dressed casually   in street attire that was appropriate to her age and setting.  The patient's   appearance was remarkable for purple streaks in her hair.  The patient's   manner of presentation was cooperative and friendly.    The patient was grossly oriented to time, place, and person.  Her language was   logical and goal oriented.  Her speech was normal for rate and rhythm.  The   patient's concentration  and memory functioning appear generally intact.    The patient's affect was slightly constricted, stable, and mildly intense.    She related well.  Her mood appeared dysphoric and somewhat anxious, but   appropriate to the context.    There was no evidence of delusional or perceptual disturbance.  Also, the   patient showed no unusual pain or motor behavior during the interview.    SPECIFIC BEHAVIORAL COMPLAINTS:  The patient reported some symptoms of   generalized mood disturbance.  She reported in the past several days, she has   felt restless, sad, somewhat discouraged about the future and nervous.  She   denied any strong feelings of guilt, worthlessness, or hopelessness.  She also   reported no thoughts of wanting to die.    The patient said her energy level is starting to return.  She described her   appetite as good at least for the past few days.  She says she is sleeping   well as long as she takes temazepam.  The only significant problem the patient   reported was mild-to-severe back pain depending on her activity levels.    The patient also reported no ETOH or other drug abuse or any use of tobacco.    PSYCHIATRIC HISTORY:  The patient reported that she has had some outpatient   experience with counseling.  She currently sees a therapist approximately   every other week.  She reported she has never had a history of any psychiatric   hospitalizations.  The patient reported treatments for adjustment   difficulties and she has experienced problems with anxiety and depression.    PSYCHOMETRIC TESTING:  The patient was administered 2 psychometric tests and 2   screening instruments.  The PS/PC-R revealed mild symptoms of generalized   mood disturbance.  Her CDR survey showed no problems with level of   consciousness, attention, thinking, perception, speech or memory.  She did   report some problems with behavioral activation, basic self-care, loss of   vigor and chronic back pain.    The patient was screened  for any elder abuse or risk of suicide.  There is no   strong evidence for either problem.    SOCIAL HISTORY:  The patient is  and retired.  She lives in [place], [place].    IMPRESSIONS:  Chronic back pain; adjustment disorder with mixed emotional   features.    RECOMMENDATIONS:  The patient will be followed for status and supportive care.       ____________________________________     DANISHA JEFFERSON, PHD    PARAG / SATHISH    DD:  10/11/2019 13:39:05  DT:  10/11/2019 16:10:37    D#:  [id number]  Job#:  [id number]

## 2019-11-12 ENCOUNTER — HOSPITAL ENCOUNTER (OUTPATIENT)
Dept: RADIOLOGY | Facility: MEDICAL CENTER | Age: 64
End: 2019-11-12
Attending: PHYSICIAN ASSISTANT
Payer: MEDICARE

## 2019-11-12 DIAGNOSIS — M54.2 CERVICALGIA: ICD-10-CM

## 2019-11-12 PROCEDURE — 72125 CT NECK SPINE W/O DYE: CPT

## 2019-11-12 PROCEDURE — 72040 X-RAY EXAM NECK SPINE 2-3 VW: CPT

## 2019-11-20 ENCOUNTER — APPOINTMENT (OUTPATIENT)
Dept: RADIOLOGY | Facility: MEDICAL CENTER | Age: 64
End: 2019-11-20
Attending: PHYSICIAN ASSISTANT
Payer: MEDICARE

## 2019-11-27 ENCOUNTER — TELEPHONE (OUTPATIENT)
Dept: RADIOLOGY | Facility: MEDICAL CENTER | Age: 64
End: 2019-11-27

## 2020-02-11 ENCOUNTER — HOSPITAL ENCOUNTER (OUTPATIENT)
Dept: RADIOLOGY | Facility: MEDICAL CENTER | Age: 65
End: 2020-02-11
Attending: NURSE PRACTITIONER
Payer: MEDICARE

## 2020-02-11 DIAGNOSIS — M25.571 PAIN IN JOINT INVOLVING ANKLE AND FOOT, RIGHT: ICD-10-CM

## 2020-02-11 PROCEDURE — 73700 CT LOWER EXTREMITY W/O DYE: CPT | Mod: RT

## 2020-08-06 NOTE — PROGRESS NOTES
RN MOBILITY NOTE    Surgery patient?: Y  Date of surgery: 9/18  Ambulated 50 ft on day of surgery? (N/A if today is not date of surgery): N/A  Number of times ambulated 50 feet or greater today: 3  Patient has been up to chair, edge of bed or HOB 90 degrees for all meals?: Y  Goal met? (goal is ambulating at least 50 feet 2 times on day shift, one time on night shift): Y  If patient did not meet mobility goal, why?: N/A           This is a class  appointment with limited persons allowed in class due to [id number] public health emergency. Social distancing and mandatory precautions are in place and utilized. Attended nutrition diabetes #2 group session today. Topics included: plate method for portion control; fiber and sodium guidelines; sugar substitutes; alcohol; eating out; recipe modification; label reading. Participant's goal: To lower A1C she will limit carbohydrates to 45 grams or less at most meals and re-evaluate in 6 months. Participant's diabetes support plan: Use the daily diabetes planning guide and week of menu ideas. Encouraged pt to use the 3 hrs of MNT as part of her medicare benefit this year to help with menu planning. Barriers identified: Depression and poor memory. Voiced/demonstrated understanding of material covered. Pt reports blood sugars closer to target ranges in the past week. Anticipated adherence is good. Plan for follow up is: will attend diabetes class.

## 2021-01-12 ENCOUNTER — HOSPITAL ENCOUNTER (OUTPATIENT)
Facility: MEDICAL CENTER | Age: 66
End: 2021-01-12
Attending: NEUROLOGICAL SURGERY | Admitting: NEUROLOGICAL SURGERY
Payer: MEDICARE

## 2021-03-03 DIAGNOSIS — Z23 NEED FOR VACCINATION: ICD-10-CM

## 2021-05-20 ENCOUNTER — HOSPITAL ENCOUNTER (OUTPATIENT)
Dept: RADIOLOGY | Facility: MEDICAL CENTER | Age: 66
DRG: 455 | End: 2021-05-20
Attending: NEUROLOGICAL SURGERY | Admitting: NEUROLOGICAL SURGERY
Payer: MEDICARE

## 2021-05-20 ENCOUNTER — PRE-ADMISSION TESTING (OUTPATIENT)
Dept: ADMISSIONS | Facility: MEDICAL CENTER | Age: 66
DRG: 455 | End: 2021-05-20
Attending: NEUROLOGICAL SURGERY
Payer: MEDICARE

## 2021-05-20 DIAGNOSIS — Z01.810 PRE-OPERATIVE CARDIOVASCULAR EXAMINATION: ICD-10-CM

## 2021-05-20 DIAGNOSIS — R94.31 NONSPECIFIC ABNORMAL ELECTROCARDIOGRAM (ECG) (EKG): ICD-10-CM

## 2021-05-20 DIAGNOSIS — M51.36 DEGENERATION OF LUMBAR INTERVERTEBRAL DISC: ICD-10-CM

## 2021-05-20 DIAGNOSIS — R82.90 NONSPECIFIC FINDING ON EXAMINATION OF URINE: ICD-10-CM

## 2021-05-20 DIAGNOSIS — M43.06 SPONDYLOLYSIS, LUMBAR REGION: ICD-10-CM

## 2021-05-20 DIAGNOSIS — M48.061 SPINAL STENOSIS OF LUMBAR REGION, UNSPECIFIED WHETHER NEUROGENIC CLAUDICATION PRESENT: ICD-10-CM

## 2021-05-20 DIAGNOSIS — Z01.812 PRE-OPERATIVE LABORATORY EXAMINATION: ICD-10-CM

## 2021-05-20 DIAGNOSIS — Z01.811 PRE-OPERATIVE RESPIRATORY EXAMINATION: ICD-10-CM

## 2021-05-20 DIAGNOSIS — R79.1 ABNORMAL COAGULATION PROFILE: ICD-10-CM

## 2021-05-20 LAB
ANION GAP SERPL CALC-SCNC: 14 MMOL/L (ref 7–16)
APPEARANCE UR: CLEAR
APTT PPP: 28.3 SEC (ref 24.7–36)
BASOPHILS # BLD AUTO: 0.9 % (ref 0–1.8)
BASOPHILS # BLD: 0.06 K/UL (ref 0–0.12)
BILIRUB UR QL STRIP.AUTO: NEGATIVE
BUN SERPL-MCNC: 20 MG/DL (ref 8–22)
CALCIUM SERPL-MCNC: 10.5 MG/DL (ref 8.5–10.5)
CHLORIDE SERPL-SCNC: 93 MMOL/L (ref 96–112)
CO2 SERPL-SCNC: 32 MMOL/L (ref 20–33)
COLOR UR: YELLOW
CREAT SERPL-MCNC: 1.02 MG/DL (ref 0.5–1.4)
EKG IMPRESSION: NORMAL
EOSINOPHIL # BLD AUTO: 0.11 K/UL (ref 0–0.51)
EOSINOPHIL NFR BLD: 1.7 % (ref 0–6.9)
ERYTHROCYTE [DISTWIDTH] IN BLOOD BY AUTOMATED COUNT: 47.9 FL (ref 35.9–50)
EST. AVERAGE GLUCOSE BLD GHB EST-MCNC: 235 MG/DL
GLUCOSE SERPL-MCNC: 197 MG/DL (ref 65–99)
GLUCOSE UR STRIP.AUTO-MCNC: >=1000 MG/DL
HBA1C MFR BLD: 9.8 % (ref 4–5.6)
HCT VFR BLD AUTO: 47.5 % (ref 37–47)
HGB BLD-MCNC: 15 G/DL (ref 12–16)
IMM GRANULOCYTES # BLD AUTO: 0.03 K/UL (ref 0–0.11)
IMM GRANULOCYTES NFR BLD AUTO: 0.5 % (ref 0–0.9)
INR PPP: 1.02 (ref 0.87–1.13)
KETONES UR STRIP.AUTO-MCNC: NEGATIVE MG/DL
LEUKOCYTE ESTERASE UR QL STRIP.AUTO: NEGATIVE
LYMPHOCYTES # BLD AUTO: 2.15 K/UL (ref 1–4.8)
LYMPHOCYTES NFR BLD: 33.2 % (ref 22–41)
MCH RBC QN AUTO: 28.1 PG (ref 27–33)
MCHC RBC AUTO-ENTMCNC: 31.6 G/DL (ref 33.6–35)
MCV RBC AUTO: 89 FL (ref 81.4–97.8)
MICRO URNS: ABNORMAL
MONOCYTES # BLD AUTO: 0.59 K/UL (ref 0–0.85)
MONOCYTES NFR BLD AUTO: 9.1 % (ref 0–13.4)
NEUTROPHILS # BLD AUTO: 3.53 K/UL (ref 2–7.15)
NEUTROPHILS NFR BLD: 54.6 % (ref 44–72)
NITRITE UR QL STRIP.AUTO: NEGATIVE
NRBC # BLD AUTO: 0 K/UL
NRBC BLD-RTO: 0 /100 WBC
PH UR STRIP.AUTO: 5.5 [PH] (ref 5–8)
PLATELET # BLD AUTO: 319 K/UL (ref 164–446)
PMV BLD AUTO: 11.4 FL (ref 9–12.9)
POTASSIUM SERPL-SCNC: 3.3 MMOL/L (ref 3.6–5.5)
PROT UR QL STRIP: NEGATIVE MG/DL
PROTHROMBIN TIME: 13.7 SEC (ref 12–14.6)
RBC # BLD AUTO: 5.34 M/UL (ref 4.2–5.4)
RBC UR QL AUTO: NEGATIVE
SODIUM SERPL-SCNC: 139 MMOL/L (ref 135–145)
SP GR UR STRIP.AUTO: 1.03
UROBILINOGEN UR STRIP.AUTO-MCNC: 0.2 MG/DL
WBC # BLD AUTO: 6.5 K/UL (ref 4.8–10.8)

## 2021-05-20 PROCEDURE — U0005 INFEC AGEN DETEC AMPLI PROBE: HCPCS

## 2021-05-20 PROCEDURE — 72110 X-RAY EXAM L-2 SPINE 4/>VWS: CPT

## 2021-05-20 PROCEDURE — 36415 COLL VENOUS BLD VENIPUNCTURE: CPT

## 2021-05-20 PROCEDURE — C9803 HOPD COVID-19 SPEC COLLECT: HCPCS

## 2021-05-20 PROCEDURE — 80048 BASIC METABOLIC PNL TOTAL CA: CPT

## 2021-05-20 PROCEDURE — 71046 X-RAY EXAM CHEST 2 VIEWS: CPT

## 2021-05-20 PROCEDURE — 85025 COMPLETE CBC W/AUTO DIFF WBC: CPT

## 2021-05-20 PROCEDURE — 85610 PROTHROMBIN TIME: CPT

## 2021-05-20 PROCEDURE — 81003 URINALYSIS AUTO W/O SCOPE: CPT

## 2021-05-20 PROCEDURE — 93010 ELECTROCARDIOGRAM REPORT: CPT | Performed by: INTERNAL MEDICINE

## 2021-05-20 PROCEDURE — U0003 INFECTIOUS AGENT DETECTION BY NUCLEIC ACID (DNA OR RNA); SEVERE ACUTE RESPIRATORY SYNDROME CORONAVIRUS 2 (SARS-COV-2) (CORONAVIRUS DISEASE [COVID-19]), AMPLIFIED PROBE TECHNIQUE, MAKING USE OF HIGH THROUGHPUT TECHNOLOGIES AS DESCRIBED BY CMS-2020-01-R: HCPCS

## 2021-05-20 PROCEDURE — 85730 THROMBOPLASTIN TIME PARTIAL: CPT

## 2021-05-20 PROCEDURE — 93005 ELECTROCARDIOGRAM TRACING: CPT

## 2021-05-20 PROCEDURE — 83036 HEMOGLOBIN GLYCOSYLATED A1C: CPT

## 2021-05-20 RX ORDER — HYDROCODONE BITARTRATE AND ACETAMINOPHEN 10; 325 MG/1; MG/1
1-2 TABLET ORAL EVERY 8 HOURS PRN
Status: ON HOLD | COMMUNITY
End: 2021-06-01

## 2021-05-20 RX ORDER — ASPIRIN 81 MG
TABLET, DELAYED RELEASE (ENTERIC COATED) ORAL DAILY
Status: ON HOLD | COMMUNITY
End: 2021-05-25

## 2021-05-20 RX ORDER — METHOCARBAMOL 750 MG/1
750 TABLET, FILM COATED ORAL 3 TIMES DAILY PRN
Status: ON HOLD | COMMUNITY
End: 2021-06-11

## 2021-05-20 ASSESSMENT — FIBROSIS 4 INDEX: FIB4 SCORE: 1.26

## 2021-05-21 LAB
SARS-COV-2 RNA RESP QL NAA+PROBE: NOTDETECTED
SPECIMEN SOURCE: NORMAL

## 2021-05-21 RX ORDER — SPIRONOLACTONE 25 MG/1
25 TABLET ORAL EVERY EVENING
Status: ON HOLD | COMMUNITY
End: 2021-06-11

## 2021-05-21 RX ORDER — FUROSEMIDE 40 MG/1
40 TABLET ORAL DAILY
Status: ON HOLD | COMMUNITY
End: 2021-06-11

## 2021-05-21 RX ORDER — EMPAGLIFLOZIN 25 MG/1
1 TABLET, FILM COATED ORAL EVERY EVENING
Status: ON HOLD | COMMUNITY
End: 2021-06-11

## 2021-05-21 RX ORDER — TEMAZEPAM 30 MG/1
30 CAPSULE ORAL NIGHTLY PRN
COMMUNITY

## 2021-05-21 RX ORDER — VENLAFAXINE 25 MG/1
25 TABLET ORAL EVERY EVENING
COMMUNITY

## 2021-05-21 RX ORDER — FLUCONAZOLE 150 MG/1
150 TABLET ORAL DAILY
Status: ON HOLD | COMMUNITY
End: 2021-06-11

## 2021-05-24 NOTE — OR NURSING
COVID-19 Pre-surgery screenin. Do you have an undiagnosed respiratory illness or symptoms such as coughing or sneezing?  No  a. Onset of Sx   b. Acute vs. chronic respiratory illness    2. Do you have an unexplained fever greater than 100.4 degrees Fahrenheit or 38 degrees Celsius?   No    3. Have you had direct exposure to a patient who tested positive for Covid-19?No  4.     5. Have you had any loss of your sense of taste or smell? Have you had N/V or sore throat? No    Patient has been informed of visitor policy and asked to wear a mask upon entering the hospital   yes

## 2021-05-25 ENCOUNTER — ANESTHESIA (OUTPATIENT)
Dept: SURGERY | Facility: MEDICAL CENTER | Age: 66
DRG: 455 | End: 2021-05-25
Payer: MEDICARE

## 2021-05-25 ENCOUNTER — APPOINTMENT (OUTPATIENT)
Dept: RADIOLOGY | Facility: MEDICAL CENTER | Age: 66
DRG: 455 | End: 2021-05-25
Attending: NEUROLOGICAL SURGERY
Payer: MEDICARE

## 2021-05-25 ENCOUNTER — ANESTHESIA EVENT (OUTPATIENT)
Dept: SURGERY | Facility: MEDICAL CENTER | Age: 66
DRG: 455 | End: 2021-05-25
Payer: MEDICARE

## 2021-05-25 ENCOUNTER — HOSPITAL ENCOUNTER (INPATIENT)
Facility: MEDICAL CENTER | Age: 66
LOS: 6 days | DRG: 455 | End: 2021-06-01
Attending: NEUROLOGICAL SURGERY | Admitting: NEUROLOGICAL SURGERY
Payer: MEDICARE

## 2021-05-25 DIAGNOSIS — Z98.1 S/P LAMINECTOMY WITH SPINAL FUSION: ICD-10-CM

## 2021-05-25 DIAGNOSIS — G89.4 CHRONIC PAIN SYNDROME: ICD-10-CM

## 2021-05-25 DIAGNOSIS — G89.18 ACUTE POST-OPERATIVE PAIN: ICD-10-CM

## 2021-05-25 LAB
GLUCOSE BLD-MCNC: 158 MG/DL (ref 65–99)
GLUCOSE BLD-MCNC: 198 MG/DL (ref 65–99)
POTASSIUM SERPL-SCNC: 3.1 MMOL/L (ref 3.6–5.5)

## 2021-05-25 PROCEDURE — 160042 HCHG SURGERY MINUTES - EA ADDL 1 MIN LEVEL 5: Performed by: NEUROLOGICAL SURGERY

## 2021-05-25 PROCEDURE — 4A11X4G MONITORING OF PERIPHERAL NERVOUS ELECTRICAL ACTIVITY, INTRAOPERATIVE, EXTERNAL APPROACH: ICD-10-PCS | Performed by: NEUROLOGICAL SURGERY

## 2021-05-25 PROCEDURE — 160009 HCHG ANES TIME/MIN: Performed by: NEUROLOGICAL SURGERY

## 2021-05-25 PROCEDURE — C1713 ANCHOR/SCREW BN/BN,TIS/BN: HCPCS | Performed by: NEUROLOGICAL SURGERY

## 2021-05-25 PROCEDURE — 500885 HCHG PACK, JACKSON TABLE: Performed by: NEUROLOGICAL SURGERY

## 2021-05-25 PROCEDURE — 502000 HCHG MISC OR IMPLANTS RC 0278: Performed by: NEUROLOGICAL SURGERY

## 2021-05-25 PROCEDURE — 160002 HCHG RECOVERY MINUTES (STAT): Performed by: NEUROLOGICAL SURGERY

## 2021-05-25 PROCEDURE — 160048 HCHG OR STATISTICAL LEVEL 1-5: Performed by: NEUROLOGICAL SURGERY

## 2021-05-25 PROCEDURE — 160035 HCHG PACU - 1ST 60 MINS PHASE I: Performed by: NEUROLOGICAL SURGERY

## 2021-05-25 PROCEDURE — 500512 HCHG ENDO PEANUT: Performed by: NEUROLOGICAL SURGERY

## 2021-05-25 PROCEDURE — 700101 HCHG RX REV CODE 250: Performed by: ANESTHESIOLOGY

## 2021-05-25 PROCEDURE — 110454 HCHG SHELL REV 250: Performed by: NEUROLOGICAL SURGERY

## 2021-05-25 PROCEDURE — 700101 HCHG RX REV CODE 250: Performed by: NEUROLOGICAL SURGERY

## 2021-05-25 PROCEDURE — A9270 NON-COVERED ITEM OR SERVICE: HCPCS | Performed by: NEUROLOGICAL SURGERY

## 2021-05-25 PROCEDURE — A9270 NON-COVERED ITEM OR SERVICE: HCPCS | Performed by: ANESTHESIOLOGY

## 2021-05-25 PROCEDURE — 700111 HCHG RX REV CODE 636 W/ 250 OVERRIDE (IP): Performed by: NEUROLOGICAL SURGERY

## 2021-05-25 PROCEDURE — 95861 NEEDLE EMG 2 EXTREMITIES: CPT | Performed by: NEUROLOGICAL SURGERY

## 2021-05-25 PROCEDURE — 502240 HCHG MISC OR SUPPLY RC 0272: Performed by: NEUROLOGICAL SURGERY

## 2021-05-25 PROCEDURE — A9270 NON-COVERED ITEM OR SERVICE: HCPCS | Performed by: PHYSICIAN ASSISTANT

## 2021-05-25 PROCEDURE — 700105 HCHG RX REV CODE 258: Performed by: NEUROLOGICAL SURGERY

## 2021-05-25 PROCEDURE — 700111 HCHG RX REV CODE 636 W/ 250 OVERRIDE (IP): Performed by: ANESTHESIOLOGY

## 2021-05-25 PROCEDURE — 501838 HCHG SUTURE GENERAL: Performed by: NEUROLOGICAL SURGERY

## 2021-05-25 PROCEDURE — 160031 HCHG SURGERY MINUTES - 1ST 30 MINS LEVEL 5: Performed by: NEUROLOGICAL SURGERY

## 2021-05-25 PROCEDURE — 82962 GLUCOSE BLOOD TEST: CPT

## 2021-05-25 PROCEDURE — 84132 ASSAY OF SERUM POTASSIUM: CPT

## 2021-05-25 PROCEDURE — 700102 HCHG RX REV CODE 250 W/ 637 OVERRIDE(OP): Performed by: ANESTHESIOLOGY

## 2021-05-25 PROCEDURE — G0378 HOSPITAL OBSERVATION PER HR: HCPCS

## 2021-05-25 PROCEDURE — 700105 HCHG RX REV CODE 258: Performed by: ANESTHESIOLOGY

## 2021-05-25 PROCEDURE — 95940 IONM IN OPERATNG ROOM 15 MIN: CPT | Performed by: NEUROLOGICAL SURGERY

## 2021-05-25 PROCEDURE — 72100 X-RAY EXAM L-S SPINE 2/3 VWS: CPT

## 2021-05-25 PROCEDURE — 0SB20ZZ EXCISION OF LUMBAR VERTEBRAL DISC, OPEN APPROACH: ICD-10-PCS | Performed by: NEUROLOGICAL SURGERY

## 2021-05-25 PROCEDURE — 160036 HCHG PACU - EA ADDL 30 MINS PHASE I: Performed by: NEUROLOGICAL SURGERY

## 2021-05-25 PROCEDURE — C1821 INTERSPINOUS IMPLANT: HCPCS | Performed by: NEUROLOGICAL SURGERY

## 2021-05-25 PROCEDURE — 95907 NVR CNDJ TST 1-2 STUDIES: CPT | Performed by: NEUROLOGICAL SURGERY

## 2021-05-25 PROCEDURE — 700105 HCHG RX REV CODE 258: Performed by: PHYSICIAN ASSISTANT

## 2021-05-25 PROCEDURE — 95937 NEUROMUSCULAR JUNCTION TEST: CPT | Performed by: NEUROLOGICAL SURGERY

## 2021-05-25 PROCEDURE — 700102 HCHG RX REV CODE 250 W/ 637 OVERRIDE(OP): Performed by: PHYSICIAN ASSISTANT

## 2021-05-25 PROCEDURE — 0SG00A0 FUSION OF LUMBAR VERTEBRAL JOINT WITH INTERBODY FUSION DEVICE, ANTERIOR APPROACH, ANTERIOR COLUMN, OPEN APPROACH: ICD-10-PCS | Performed by: NEUROLOGICAL SURGERY

## 2021-05-25 PROCEDURE — 95925 SOMATOSENSORY TESTING: CPT | Performed by: NEUROLOGICAL SURGERY

## 2021-05-25 PROCEDURE — 700111 HCHG RX REV CODE 636 W/ 250 OVERRIDE (IP)

## 2021-05-25 DEVICE — PUTTY 10CC NANOBONE (1/EA): Type: IMPLANTABLE DEVICE | Site: SPINE LUMBAR | Status: FUNCTIONAL

## 2021-05-25 DEVICE — IMPLANTABLE DEVICE: Type: IMPLANTABLE DEVICE | Site: SPINE LUMBAR | Status: FUNCTIONAL

## 2021-05-25 RX ORDER — ALBUTEROL SULFATE 90 UG/1
2 AEROSOL, METERED RESPIRATORY (INHALATION) EVERY 6 HOURS PRN
Status: DISCONTINUED | OUTPATIENT
Start: 2021-05-25 | End: 2021-06-01 | Stop reason: HOSPADM

## 2021-05-25 RX ORDER — SODIUM CHLORIDE, SODIUM LACTATE, POTASSIUM CHLORIDE, CALCIUM CHLORIDE 600; 310; 30; 20 MG/100ML; MG/100ML; MG/100ML; MG/100ML
INJECTION, SOLUTION INTRAVENOUS CONTINUOUS
Status: ACTIVE | OUTPATIENT
Start: 2021-05-25 | End: 2021-05-25

## 2021-05-25 RX ORDER — SUCCINYLCHOLINE/SOD CL,ISO/PF 200MG/10ML
SYRINGE (ML) INTRAVENOUS PRN
Status: DISCONTINUED | OUTPATIENT
Start: 2021-05-25 | End: 2021-05-25 | Stop reason: SURG

## 2021-05-25 RX ORDER — DIPHENHYDRAMINE HCL 25 MG
25 TABLET ORAL EVERY 6 HOURS PRN
Status: DISCONTINUED | OUTPATIENT
Start: 2021-05-25 | End: 2021-05-28

## 2021-05-25 RX ORDER — CLONIDINE HYDROCHLORIDE 0.1 MG/1
0.1 TABLET ORAL EVERY 4 HOURS PRN
Status: DISCONTINUED | OUTPATIENT
Start: 2021-05-25 | End: 2021-05-28

## 2021-05-25 RX ORDER — ENEMA 19; 7 G/133ML; G/133ML
1 ENEMA RECTAL
Status: DISCONTINUED | OUTPATIENT
Start: 2021-05-25 | End: 2021-05-28

## 2021-05-25 RX ORDER — PHENYLEPHRINE HCL IN 0.9% NACL 0.5 MG/5ML
SYRINGE (ML) INTRAVENOUS PRN
Status: DISCONTINUED | OUTPATIENT
Start: 2021-05-25 | End: 2021-05-25 | Stop reason: SURG

## 2021-05-25 RX ORDER — DIPHENHYDRAMINE HYDROCHLORIDE 50 MG/ML
25 INJECTION INTRAMUSCULAR; INTRAVENOUS EVERY 6 HOURS PRN
Status: DISCONTINUED | OUTPATIENT
Start: 2021-05-25 | End: 2021-05-28

## 2021-05-25 RX ORDER — BUPIVACAINE HYDROCHLORIDE AND EPINEPHRINE 5; 5 MG/ML; UG/ML
INJECTION, SOLUTION EPIDURAL; INTRACAUDAL; PERINEURAL
Status: DISCONTINUED | OUTPATIENT
Start: 2021-05-25 | End: 2021-05-25 | Stop reason: HOSPADM

## 2021-05-25 RX ORDER — CEFAZOLIN SODIUM 1 G/3ML
INJECTION, POWDER, FOR SOLUTION INTRAMUSCULAR; INTRAVENOUS
Status: DISCONTINUED | OUTPATIENT
Start: 2021-05-25 | End: 2021-05-25 | Stop reason: HOSPADM

## 2021-05-25 RX ORDER — AMOXICILLIN 250 MG
1 CAPSULE ORAL NIGHTLY
Status: DISCONTINUED | OUTPATIENT
Start: 2021-05-25 | End: 2021-05-28

## 2021-05-25 RX ORDER — OMEPRAZOLE 20 MG/1
20 CAPSULE, DELAYED RELEASE ORAL
Status: DISCONTINUED | OUTPATIENT
Start: 2021-05-25 | End: 2021-06-01 | Stop reason: HOSPADM

## 2021-05-25 RX ORDER — DEXAMETHASONE SODIUM PHOSPHATE 4 MG/ML
INJECTION, SOLUTION INTRA-ARTICULAR; INTRALESIONAL; INTRAMUSCULAR; INTRAVENOUS; SOFT TISSUE PRN
Status: DISCONTINUED | OUTPATIENT
Start: 2021-05-25 | End: 2021-05-25 | Stop reason: SURG

## 2021-05-25 RX ORDER — POTASSIUM CHLORIDE 750 MG/1
10 TABLET, FILM COATED, EXTENDED RELEASE ORAL EVERY EVENING
Status: DISCONTINUED | OUTPATIENT
Start: 2021-05-25 | End: 2021-06-01 | Stop reason: HOSPADM

## 2021-05-25 RX ORDER — ONDANSETRON 2 MG/ML
INJECTION INTRAMUSCULAR; INTRAVENOUS PRN
Status: DISCONTINUED | OUTPATIENT
Start: 2021-05-25 | End: 2021-05-25 | Stop reason: SURG

## 2021-05-25 RX ORDER — HYDROMORPHONE HYDROCHLORIDE 1 MG/ML
0.4 INJECTION, SOLUTION INTRAMUSCULAR; INTRAVENOUS; SUBCUTANEOUS
Status: DISCONTINUED | OUTPATIENT
Start: 2021-05-25 | End: 2021-05-25 | Stop reason: HOSPADM

## 2021-05-25 RX ORDER — POLYETHYLENE GLYCOL 3350 17 G/17G
1 POWDER, FOR SOLUTION ORAL 2 TIMES DAILY PRN
Status: DISCONTINUED | OUTPATIENT
Start: 2021-05-25 | End: 2021-05-28

## 2021-05-25 RX ORDER — DEXTROSE MONOHYDRATE 25 G/50ML
50 INJECTION, SOLUTION INTRAVENOUS
Status: DISCONTINUED | OUTPATIENT
Start: 2021-05-25 | End: 2021-05-25 | Stop reason: HOSPADM

## 2021-05-25 RX ORDER — DIAZEPAM 2 MG/1
2 TABLET ORAL
Status: COMPLETED | OUTPATIENT
Start: 2021-05-25 | End: 2021-05-25

## 2021-05-25 RX ORDER — OXYCODONE HYDROCHLORIDE 5 MG/1
5 TABLET ORAL
Status: DISCONTINUED | OUTPATIENT
Start: 2021-05-25 | End: 2021-05-28

## 2021-05-25 RX ORDER — ONDANSETRON 4 MG/1
4 TABLET, ORALLY DISINTEGRATING ORAL EVERY 4 HOURS PRN
Status: DISCONTINUED | OUTPATIENT
Start: 2021-05-25 | End: 2021-05-28

## 2021-05-25 RX ORDER — REMIFENTANIL HYDROCHLORIDE 1 MG/ML
INJECTION, POWDER, LYOPHILIZED, FOR SOLUTION INTRAVENOUS
Status: DISCONTINUED | OUTPATIENT
Start: 2021-05-25 | End: 2021-05-25 | Stop reason: SURG

## 2021-05-25 RX ORDER — FLUCONAZOLE 50 MG/1
150 TABLET ORAL DAILY
Status: DISCONTINUED | OUTPATIENT
Start: 2021-05-26 | End: 2021-06-01 | Stop reason: HOSPADM

## 2021-05-25 RX ORDER — SODIUM CHLORIDE 9 MG/ML
INJECTION, SOLUTION INTRAVENOUS CONTINUOUS
Status: DISCONTINUED | OUTPATIENT
Start: 2021-05-25 | End: 2021-05-28

## 2021-05-25 RX ORDER — VENLAFAXINE 25 MG/1
25 TABLET ORAL EVERY EVENING
Status: DISCONTINUED | OUTPATIENT
Start: 2021-05-25 | End: 2021-06-01 | Stop reason: HOSPADM

## 2021-05-25 RX ORDER — HYDROMORPHONE HYDROCHLORIDE 1 MG/ML
0.1 INJECTION, SOLUTION INTRAMUSCULAR; INTRAVENOUS; SUBCUTANEOUS
Status: DISCONTINUED | OUTPATIENT
Start: 2021-05-25 | End: 2021-05-25 | Stop reason: HOSPADM

## 2021-05-25 RX ORDER — GLIPIZIDE 5 MG/1
5 TABLET ORAL EVERY EVENING
Status: DISCONTINUED | OUTPATIENT
Start: 2021-05-25 | End: 2021-05-25

## 2021-05-25 RX ORDER — FUROSEMIDE 40 MG/1
40 TABLET ORAL DAILY
Status: DISCONTINUED | OUTPATIENT
Start: 2021-05-26 | End: 2021-06-01 | Stop reason: HOSPADM

## 2021-05-25 RX ORDER — ALPRAZOLAM 0.25 MG/1
0.25 TABLET ORAL 3 TIMES DAILY PRN
Status: DISCONTINUED | OUTPATIENT
Start: 2021-05-25 | End: 2021-05-28

## 2021-05-25 RX ORDER — CEFAZOLIN SODIUM 1 G/3ML
INJECTION, POWDER, FOR SOLUTION INTRAMUSCULAR; INTRAVENOUS PRN
Status: DISCONTINUED | OUTPATIENT
Start: 2021-05-25 | End: 2021-05-25 | Stop reason: SURG

## 2021-05-25 RX ORDER — LIDOCAINE HYDROCHLORIDE 20 MG/ML
INJECTION, SOLUTION EPIDURAL; INFILTRATION; INTRACAUDAL; PERINEURAL PRN
Status: DISCONTINUED | OUTPATIENT
Start: 2021-05-25 | End: 2021-05-25 | Stop reason: SURG

## 2021-05-25 RX ORDER — BISACODYL 10 MG
10 SUPPOSITORY, RECTAL RECTAL
Status: DISCONTINUED | OUTPATIENT
Start: 2021-05-25 | End: 2021-05-28

## 2021-05-25 RX ORDER — AMOXICILLIN 250 MG
1 CAPSULE ORAL
Status: DISCONTINUED | OUTPATIENT
Start: 2021-05-25 | End: 2021-05-28

## 2021-05-25 RX ORDER — OXYCODONE HYDROCHLORIDE 10 MG/1
10 TABLET ORAL
Status: DISCONTINUED | OUTPATIENT
Start: 2021-05-25 | End: 2021-05-28

## 2021-05-25 RX ORDER — DOCUSATE SODIUM 50 MG/5ML
100 LIQUID ORAL DAILY
Status: DISCONTINUED | OUTPATIENT
Start: 2021-05-26 | End: 2021-05-25

## 2021-05-25 RX ORDER — TEMAZEPAM 15 MG/1
30 CAPSULE ORAL NIGHTLY PRN
Status: DISCONTINUED | OUTPATIENT
Start: 2021-05-25 | End: 2021-06-01 | Stop reason: HOSPADM

## 2021-05-25 RX ORDER — POTASSIUM CHLORIDE 750 MG/1
10 TABLET, FILM COATED, EXTENDED RELEASE ORAL EVERY EVENING
Status: ON HOLD | COMMUNITY
End: 2021-06-11

## 2021-05-25 RX ORDER — ALBUTEROL SULFATE 90 UG/1
2 AEROSOL, METERED RESPIRATORY (INHALATION) EVERY 6 HOURS PRN
Status: ON HOLD | COMMUNITY
End: 2021-06-11

## 2021-05-25 RX ORDER — GABAPENTIN 300 MG/1
300 CAPSULE ORAL
Status: DISCONTINUED | OUTPATIENT
Start: 2021-05-25 | End: 2021-05-25 | Stop reason: HOSPADM

## 2021-05-25 RX ORDER — SPIRONOLACTONE 25 MG/1
25 TABLET ORAL EVERY EVENING
Status: DISCONTINUED | OUTPATIENT
Start: 2021-05-25 | End: 2021-06-01 | Stop reason: HOSPADM

## 2021-05-25 RX ORDER — VENLAFAXINE HYDROCHLORIDE 75 MG/1
150 CAPSULE, EXTENDED RELEASE ORAL EVERY EVENING
Status: DISCONTINUED | OUTPATIENT
Start: 2021-05-25 | End: 2021-06-01 | Stop reason: HOSPADM

## 2021-05-25 RX ORDER — ACETAMINOPHEN 500 MG
1000 TABLET ORAL ONCE
Status: COMPLETED | OUTPATIENT
Start: 2021-05-25 | End: 2021-05-25

## 2021-05-25 RX ORDER — VITAMIN B COMPLEX
5000 TABLET ORAL DAILY
Status: DISCONTINUED | OUTPATIENT
Start: 2021-05-26 | End: 2021-05-28

## 2021-05-25 RX ORDER — HYDROMORPHONE HYDROCHLORIDE 1 MG/ML
0.2 INJECTION, SOLUTION INTRAMUSCULAR; INTRAVENOUS; SUBCUTANEOUS
Status: DISCONTINUED | OUTPATIENT
Start: 2021-05-25 | End: 2021-05-25 | Stop reason: HOSPADM

## 2021-05-25 RX ORDER — ROPINIROLE 2 MG/1
8 TABLET, FILM COATED ORAL
Status: DISCONTINUED | OUTPATIENT
Start: 2021-05-25 | End: 2021-06-01 | Stop reason: HOSPADM

## 2021-05-25 RX ORDER — OXYCODONE HCL 5 MG/5 ML
10 SOLUTION, ORAL ORAL
Status: COMPLETED | OUTPATIENT
Start: 2021-05-25 | End: 2021-05-25

## 2021-05-25 RX ORDER — ATORVASTATIN CALCIUM 20 MG/1
20 TABLET, FILM COATED ORAL
Status: DISCONTINUED | OUTPATIENT
Start: 2021-05-25 | End: 2021-06-01 | Stop reason: HOSPADM

## 2021-05-25 RX ORDER — GABAPENTIN 400 MG/1
800 CAPSULE ORAL 3 TIMES DAILY
Status: DISCONTINUED | OUTPATIENT
Start: 2021-05-25 | End: 2021-06-01 | Stop reason: HOSPADM

## 2021-05-25 RX ORDER — ONDANSETRON 2 MG/ML
4 INJECTION INTRAMUSCULAR; INTRAVENOUS EVERY 4 HOURS PRN
Status: DISCONTINUED | OUTPATIENT
Start: 2021-05-25 | End: 2021-05-28

## 2021-05-25 RX ORDER — DOCUSATE SODIUM 100 MG/1
100 CAPSULE, LIQUID FILLED ORAL 2 TIMES DAILY
Status: DISCONTINUED | OUTPATIENT
Start: 2021-05-25 | End: 2021-05-28

## 2021-05-25 RX ORDER — METHOCARBAMOL 750 MG/1
750 TABLET, FILM COATED ORAL 4 TIMES DAILY
Status: DISCONTINUED | OUTPATIENT
Start: 2021-05-25 | End: 2021-05-28

## 2021-05-25 RX ORDER — SODIUM CHLORIDE, SODIUM LACTATE, POTASSIUM CHLORIDE, CALCIUM CHLORIDE 600; 310; 30; 20 MG/100ML; MG/100ML; MG/100ML; MG/100ML
INJECTION, SOLUTION INTRAVENOUS CONTINUOUS
Status: DISCONTINUED | OUTPATIENT
Start: 2021-05-25 | End: 2021-05-25 | Stop reason: HOSPADM

## 2021-05-25 RX ORDER — OXYCODONE HCL 5 MG/5 ML
5 SOLUTION, ORAL ORAL
Status: COMPLETED | OUTPATIENT
Start: 2021-05-25 | End: 2021-05-25

## 2021-05-25 RX ORDER — KETAMINE HYDROCHLORIDE 50 MG/ML
INJECTION, SOLUTION INTRAMUSCULAR; INTRAVENOUS PRN
Status: DISCONTINUED | OUTPATIENT
Start: 2021-05-25 | End: 2021-05-25 | Stop reason: SURG

## 2021-05-25 RX ORDER — HALOPERIDOL 5 MG/ML
1 INJECTION INTRAMUSCULAR
Status: DISCONTINUED | OUTPATIENT
Start: 2021-05-25 | End: 2021-05-25 | Stop reason: HOSPADM

## 2021-05-25 RX ORDER — LEVOTHYROXINE SODIUM 0.07 MG/1
75 TABLET ORAL
Status: DISCONTINUED | OUTPATIENT
Start: 2021-05-25 | End: 2021-06-01 | Stop reason: HOSPADM

## 2021-05-25 RX ORDER — MIDAZOLAM HYDROCHLORIDE 1 MG/ML
INJECTION INTRAMUSCULAR; INTRAVENOUS PRN
Status: DISCONTINUED | OUTPATIENT
Start: 2021-05-25 | End: 2021-05-25 | Stop reason: SURG

## 2021-05-25 RX ORDER — ONDANSETRON 2 MG/ML
4 INJECTION INTRAMUSCULAR; INTRAVENOUS
Status: DISCONTINUED | OUTPATIENT
Start: 2021-05-25 | End: 2021-05-25 | Stop reason: HOSPADM

## 2021-05-25 RX ORDER — HYDROMORPHONE HYDROCHLORIDE 1 MG/ML
0.5 INJECTION, SOLUTION INTRAMUSCULAR; INTRAVENOUS; SUBCUTANEOUS
Status: DISCONTINUED | OUTPATIENT
Start: 2021-05-25 | End: 2021-05-28

## 2021-05-25 RX ADMIN — SODIUM CHLORIDE, POTASSIUM CHLORIDE, SODIUM LACTATE AND CALCIUM CHLORIDE: 600; 310; 30; 20 INJECTION, SOLUTION INTRAVENOUS at 15:54

## 2021-05-25 RX ADMIN — FENTANYL CITRATE 50 MCG: 50 INJECTION, SOLUTION INTRAMUSCULAR; INTRAVENOUS at 16:27

## 2021-05-25 RX ADMIN — HYDROMORPHONE HYDROCHLORIDE 0.4 MG: 1 INJECTION, SOLUTION INTRAMUSCULAR; INTRAVENOUS; SUBCUTANEOUS at 16:37

## 2021-05-25 RX ADMIN — Medication 100 MG: at 14:35

## 2021-05-25 RX ADMIN — Medication 100 MCG: at 15:13

## 2021-05-25 RX ADMIN — LEVOTHYROXINE SODIUM 75 MCG: 0.07 TABLET ORAL at 20:28

## 2021-05-25 RX ADMIN — LIDOCAINE HYDROCHLORIDE 0.5 ML: 10 INJECTION, SOLUTION EPIDURAL; INFILTRATION; INTRACAUDAL at 13:30

## 2021-05-25 RX ADMIN — HYDROMORPHONE HYDROCHLORIDE 0.4 MG: 1 INJECTION, SOLUTION INTRAMUSCULAR; INTRAVENOUS; SUBCUTANEOUS at 18:05

## 2021-05-25 RX ADMIN — Medication 200 MCG: at 14:47

## 2021-05-25 RX ADMIN — SODIUM CHLORIDE, POTASSIUM CHLORIDE, SODIUM LACTATE AND CALCIUM CHLORIDE: 600; 310; 30; 20 INJECTION, SOLUTION INTRAVENOUS at 13:29

## 2021-05-25 RX ADMIN — EPHEDRINE SULFATE 10 MG: 50 INJECTION, SOLUTION INTRAVENOUS at 14:58

## 2021-05-25 RX ADMIN — HYDROMORPHONE HYDROCHLORIDE 0.2 MG: 1 INJECTION, SOLUTION INTRAMUSCULAR; INTRAVENOUS; SUBCUTANEOUS at 16:47

## 2021-05-25 RX ADMIN — HYDROMORPHONE HYDROCHLORIDE 0.4 MG: 1 INJECTION, SOLUTION INTRAMUSCULAR; INTRAVENOUS; SUBCUTANEOUS at 17:05

## 2021-05-25 RX ADMIN — DIAZEPAM 2 MG: 2 TABLET ORAL at 16:46

## 2021-05-25 RX ADMIN — ONDANSETRON 4 MG: 2 INJECTION INTRAMUSCULAR; INTRAVENOUS at 14:35

## 2021-05-25 RX ADMIN — LIDOCAINE HYDROCHLORIDE 50 MG: 20 INJECTION, SOLUTION EPIDURAL; INFILTRATION; INTRACAUDAL at 14:35

## 2021-05-25 RX ADMIN — DOCUSATE SODIUM 100 MG: 100 CAPSULE, LIQUID FILLED ORAL at 20:28

## 2021-05-25 RX ADMIN — KETAMINE HYDROCHLORIDE 40 MG: 50 INJECTION INTRAMUSCULAR; INTRAVENOUS at 14:47

## 2021-05-25 RX ADMIN — HYDROMORPHONE HYDROCHLORIDE 0.4 MG: 1 INJECTION, SOLUTION INTRAMUSCULAR; INTRAVENOUS; SUBCUTANEOUS at 16:42

## 2021-05-25 RX ADMIN — CEFAZOLIN 2 G: 330 INJECTION, POWDER, FOR SOLUTION INTRAMUSCULAR; INTRAVENOUS at 14:35

## 2021-05-25 RX ADMIN — ROPINIROLE HYDROCHLORIDE 8 MG: 2 TABLET, FILM COATED ORAL at 20:27

## 2021-05-25 RX ADMIN — SODIUM CHLORIDE: 9 INJECTION, SOLUTION INTRAVENOUS at 20:32

## 2021-05-25 RX ADMIN — VENLAFAXINE 25 MG: 25 TABLET ORAL at 20:27

## 2021-05-25 RX ADMIN — FENTANYL CITRATE 50 MCG: 50 INJECTION, SOLUTION INTRAMUSCULAR; INTRAVENOUS at 16:52

## 2021-05-25 RX ADMIN — POVIDONE IODINE 15 ML: 100 SOLUTION TOPICAL at 13:30

## 2021-05-25 RX ADMIN — ATORVASTATIN CALCIUM 20 MG: 20 TABLET, FILM COATED ORAL at 20:28

## 2021-05-25 RX ADMIN — GABAPENTIN 800 MG: 400 CAPSULE ORAL at 20:28

## 2021-05-25 RX ADMIN — FENTANYL CITRATE 50 MCG: 50 INJECTION, SOLUTION INTRAMUSCULAR; INTRAVENOUS at 16:15

## 2021-05-25 RX ADMIN — METHOCARBAMOL 750 MG: 750 TABLET ORAL at 20:28

## 2021-05-25 RX ADMIN — POTASSIUM CHLORIDE 10 MEQ: 750 TABLET, FILM COATED, EXTENDED RELEASE ORAL at 20:28

## 2021-05-25 RX ADMIN — OXYCODONE HYDROCHLORIDE 10 MG: 10 TABLET ORAL at 23:45

## 2021-05-25 RX ADMIN — ACETAMINOPHEN 1000 MG: 500 TABLET ORAL at 16:37

## 2021-05-25 RX ADMIN — DEXAMETHASONE SODIUM PHOSPHATE 8 MG: 4 INJECTION, SOLUTION INTRA-ARTICULAR; INTRALESIONAL; INTRAMUSCULAR; INTRAVENOUS; SOFT TISSUE at 14:47

## 2021-05-25 RX ADMIN — ONDANSETRON 4 MG: 2 INJECTION INTRAMUSCULAR; INTRAVENOUS at 15:53

## 2021-05-25 RX ADMIN — REMIFENTANIL HYDROCHLORIDE 0.1 MCG/KG/MIN: 1 INJECTION, POWDER, LYOPHILIZED, FOR SOLUTION INTRAVENOUS at 14:56

## 2021-05-25 RX ADMIN — HYDROMORPHONE HYDROCHLORIDE 0.4 MG: 1 INJECTION, SOLUTION INTRAMUSCULAR; INTRAVENOUS; SUBCUTANEOUS at 16:57

## 2021-05-25 RX ADMIN — FENTANYL CITRATE 50 MCG: 50 INJECTION, SOLUTION INTRAMUSCULAR; INTRAVENOUS at 14:35

## 2021-05-25 RX ADMIN — MIDAZOLAM HYDROCHLORIDE 2 MG: 1 INJECTION, SOLUTION INTRAMUSCULAR; INTRAVENOUS at 14:35

## 2021-05-25 RX ADMIN — METHOCARBAMOL 750 MG: 750 TABLET ORAL at 17:34

## 2021-05-25 RX ADMIN — HYDROMORPHONE HYDROCHLORIDE 0.2 MG: 1 INJECTION, SOLUTION INTRAMUSCULAR; INTRAVENOUS; SUBCUTANEOUS at 17:10

## 2021-05-25 RX ADMIN — FENTANYL CITRATE 150 MCG: 50 INJECTION, SOLUTION INTRAMUSCULAR; INTRAVENOUS at 14:44

## 2021-05-25 RX ADMIN — OXYCODONE 5 MG: 5 TABLET ORAL at 20:10

## 2021-05-25 RX ADMIN — OXYCODONE HYDROCHLORIDE 10 MG: 5 SOLUTION ORAL at 16:37

## 2021-05-25 RX ADMIN — VENLAFAXINE HYDROCHLORIDE 150 MG: 75 CAPSULE, EXTENDED RELEASE ORAL at 20:29

## 2021-05-25 RX ADMIN — PROPOFOL 200 MG: 10 INJECTION, EMULSION INTRAVENOUS at 14:35

## 2021-05-25 RX ADMIN — PHENYLEPHRINE HYDROCHLORIDE 50 MCG/MIN: 10 INJECTION INTRAVENOUS at 15:18

## 2021-05-25 RX ADMIN — EPHEDRINE SULFATE 10 MG: 50 INJECTION, SOLUTION INTRAVENOUS at 14:53

## 2021-05-25 RX ADMIN — FENTANYL CITRATE 50 MCG: 50 INJECTION, SOLUTION INTRAMUSCULAR; INTRAVENOUS at 16:34

## 2021-05-25 RX ADMIN — OMEPRAZOLE 20 MG: 20 CAPSULE, DELAYED RELEASE ORAL at 20:27

## 2021-05-25 RX ADMIN — EPHEDRINE SULFATE 25 MG: 50 INJECTION, SOLUTION INTRAVENOUS at 14:35

## 2021-05-25 RX ADMIN — Medication 100 MCG: at 15:07

## 2021-05-25 RX ADMIN — FENTANYL CITRATE 50 MCG: 50 INJECTION, SOLUTION INTRAMUSCULAR; INTRAVENOUS at 16:49

## 2021-05-25 RX ADMIN — Medication 100 MCG: at 14:51

## 2021-05-25 RX ADMIN — Medication 200 MCG: at 15:00

## 2021-05-25 RX ADMIN — GABAPENTIN 300 MG: 300 CAPSULE ORAL at 16:41

## 2021-05-25 ASSESSMENT — COGNITIVE AND FUNCTIONAL STATUS - GENERAL
MOBILITY SCORE: 12
STANDING UP FROM CHAIR USING ARMS: A LOT
DRESSING REGULAR UPPER BODY CLOTHING: A LITTLE
TURNING FROM BACK TO SIDE WHILE IN FLAT BAD: A LOT
EATING MEALS: A LITTLE
SUGGESTED CMS G CODE MODIFIER MOBILITY: CL
MOVING TO AND FROM BED TO CHAIR: A LOT
TOILETING: A LITTLE
SUGGESTED CMS G CODE MODIFIER DAILY ACTIVITY: CK
WALKING IN HOSPITAL ROOM: A LOT
DAILY ACTIVITIY SCORE: 17
DRESSING REGULAR LOWER BODY CLOTHING: A LOT
MOVING FROM LYING ON BACK TO SITTING ON SIDE OF FLAT BED: A LOT
HELP NEEDED FOR BATHING: A LITTLE
PERSONAL GROOMING: A LITTLE
CLIMB 3 TO 5 STEPS WITH RAILING: A LOT

## 2021-05-25 ASSESSMENT — PAIN DESCRIPTION - PAIN TYPE
TYPE: SURGICAL PAIN

## 2021-05-25 ASSESSMENT — FIBROSIS 4 INDEX
FIB4 SCORE: 1.11
FIB4 SCORE: 1.11

## 2021-05-25 ASSESSMENT — PAIN SCALES - GENERAL: PAIN_LEVEL: 6

## 2021-05-25 NOTE — OR SURGEON
Immediate Post OP Note    PreOp Diagnosis: Lumbar spondylosis with junctional stenosis and neurogenic claudication    PostOp Diagnosis: Same.      Procedure(s):  FUSION, SPINE, XLIF - STAGE #1 L2-3. - Wound Class: Clean    Surgeon(s):  Remi eLchuga III, M.D.    Anesthesiologist/Type of Anesthesia:  Anesthesiologist: Tico Pires M.D.; Macarena Vealzquez M.D./General    Surgical Staff:  Assistant: Reymundo Farfan P.A.-C.  Circulator: Harry Bee R.N.  Scrub Person: Dipti Franco  Radiology Technologist: Beckie Arnold    Specimens removed if any:  * No specimens in log *    Estimated Blood Loss: 25 cc    Findings: See op note.    Complications: None        5/25/2021 4:35 PM Reymundo Farfan P.A.-C.

## 2021-05-25 NOTE — ANESTHESIA PREPROCEDURE EVALUATION
Relevant Problems   PULMONARY   (positive) COPD (chronic obstructive pulmonary disease) (HCC)      NEURO   (positive) Head ache      CARDIAC   (positive) HTN (hypertension)   (positive) Migraine with aura         (positive) Acute renal failure (HCC)      ENDO   (positive) Type 2 diabetes mellitus, without long-term current use of insulin (HCC)       Physical Exam    Airway   Mallampati: II  TM distance: >3 FB  Neck ROM: full       Cardiovascular - normal exam  Rhythm: regular  Rate: normal  (-) murmur     Dental - normal exam           Pulmonary - normal exam  Breath sounds clear to auscultation     Abdominal    Neurological - normal exam                 Anesthesia Plan    ASA 3   ASA physical status 3 criteria: COPD    Plan - general       Airway plan will be ETT          Induction: intravenous    Postoperative Plan: Postoperative administration of opioids is intended.    Pertinent diagnostic labs and testing reviewed    Informed Consent:    Anesthetic plan and risks discussed with patient.    Use of blood products discussed with: patient whom consented to blood products.

## 2021-05-25 NOTE — ANESTHESIA TIME REPORT
Anesthesia Start and Stop Event Times     Date Time Event    5/25/2021 1414 Ready for Procedure     1429 Anesthesia Start     1621 Anesthesia Stop        Responsible Staff  05/25/21    Name Role Begin End    Tico Pires M.D. Anesth 1429 1437    Macarena Velazquez M.D. Anesth 1437 1621        Preop Diagnosis (Free Text):  Pre-op Diagnosis     LUMBAR STENOSIS, SPONDYLOSIS, DISC DISPLACEMENT        Preop Diagnosis (Codes):    Post op Diagnosis  Lumbar stenosis      Premium Reason  A. 3PM - 7AM    Comments:

## 2021-05-25 NOTE — ANESTHESIA PROCEDURE NOTES
Airway    Date/Time: 5/25/2021 2:35 PM  Performed by: Tico Pires M.D.  Authorized by: Macarena Velazquez M.D.     Location:  OR  Urgency:  Elective  Indications for Airway Management:  Anesthesia      Spontaneous Ventilation: absent    Sedation Level:  Deep  Preoxygenated: Yes    Patient Position:  Sniffing  Mask Difficulty Assessment:  0 - not attempted  Final Airway Type:  Endotracheal airway  Final Endotracheal Airway:  ETT  Cuffed: Yes    Technique Used for Successful ETT Placement:  Video laryngoscopy  Devices/Methods Used in Placement:  Cricoid pressure and intubating stylet    Insertion Site:  Oral  Blade Type:  Tati  Laryngoscope Blade/Videolaryngoscope Blade Size:  3  ETT Size (mm):  7.0  Measured from:  Teeth  ETT to Teeth (cm):  21  Placement Verified by: capnometry and palpation of cuff    Cormack-Lehane Classification:  Grade I - full view of glottis  Number of Attempts at Approach:  1

## 2021-05-26 PROCEDURE — 700102 HCHG RX REV CODE 250 W/ 637 OVERRIDE(OP): Performed by: PHYSICIAN ASSISTANT

## 2021-05-26 PROCEDURE — 700111 HCHG RX REV CODE 636 W/ 250 OVERRIDE (IP): Performed by: PHYSICIAN ASSISTANT

## 2021-05-26 PROCEDURE — A9270 NON-COVERED ITEM OR SERVICE: HCPCS | Performed by: PHYSICIAN ASSISTANT

## 2021-05-26 PROCEDURE — 770006 HCHG ROOM/CARE - MED/SURG/GYN SEMI*

## 2021-05-26 PROCEDURE — 96372 THER/PROPH/DIAG INJ SC/IM: CPT

## 2021-05-26 RX ADMIN — OXYCODONE HYDROCHLORIDE 10 MG: 10 TABLET ORAL at 08:23

## 2021-05-26 RX ADMIN — GABAPENTIN 800 MG: 400 CAPSULE ORAL at 05:06

## 2021-05-26 RX ADMIN — VENLAFAXINE HYDROCHLORIDE 150 MG: 75 CAPSULE, EXTENDED RELEASE ORAL at 17:19

## 2021-05-26 RX ADMIN — ENOXAPARIN SODIUM 40 MG: 40 INJECTION SUBCUTANEOUS at 17:19

## 2021-05-26 RX ADMIN — DOCUSATE SODIUM 50 MG AND SENNOSIDES 8.6 MG 1 TABLET: 8.6; 5 TABLET, FILM COATED ORAL at 20:20

## 2021-05-26 RX ADMIN — METHOCARBAMOL 750 MG: 750 TABLET ORAL at 12:58

## 2021-05-26 RX ADMIN — SPIRONOLACTONE 25 MG: 25 TABLET ORAL at 17:19

## 2021-05-26 RX ADMIN — OXYCODONE HYDROCHLORIDE 10 MG: 10 TABLET ORAL at 23:48

## 2021-05-26 RX ADMIN — METHOCARBAMOL 750 MG: 750 TABLET ORAL at 08:16

## 2021-05-26 RX ADMIN — GABAPENTIN 800 MG: 400 CAPSULE ORAL at 17:19

## 2021-05-26 RX ADMIN — FUROSEMIDE 40 MG: 40 TABLET ORAL at 05:06

## 2021-05-26 RX ADMIN — GABAPENTIN 800 MG: 400 CAPSULE ORAL at 12:58

## 2021-05-26 RX ADMIN — OXYCODONE HYDROCHLORIDE 10 MG: 10 TABLET ORAL at 20:21

## 2021-05-26 RX ADMIN — OXYCODONE HYDROCHLORIDE 10 MG: 10 TABLET ORAL at 13:01

## 2021-05-26 RX ADMIN — POTASSIUM CHLORIDE 10 MEQ: 750 TABLET, FILM COATED, EXTENDED RELEASE ORAL at 17:19

## 2021-05-26 RX ADMIN — METHOCARBAMOL 750 MG: 750 TABLET ORAL at 20:21

## 2021-05-26 RX ADMIN — VENLAFAXINE 25 MG: 25 TABLET ORAL at 17:18

## 2021-05-26 RX ADMIN — OXYCODONE HYDROCHLORIDE 10 MG: 10 TABLET ORAL at 16:33

## 2021-05-26 RX ADMIN — DIPHENHYDRAMINE HYDROCHLORIDE 25 MG: 25 TABLET ORAL at 17:23

## 2021-05-26 RX ADMIN — DOCUSATE SODIUM 100 MG: 100 CAPSULE, LIQUID FILLED ORAL at 17:18

## 2021-05-26 RX ADMIN — OXYCODONE HYDROCHLORIDE 10 MG: 10 TABLET ORAL at 05:06

## 2021-05-26 RX ADMIN — Medication 5000 UNITS: at 05:06

## 2021-05-26 RX ADMIN — DOCUSATE SODIUM 100 MG: 100 CAPSULE, LIQUID FILLED ORAL at 05:07

## 2021-05-26 RX ADMIN — LEVOTHYROXINE SODIUM 75 MCG: 0.07 TABLET ORAL at 20:21

## 2021-05-26 RX ADMIN — ATORVASTATIN CALCIUM 20 MG: 20 TABLET, FILM COATED ORAL at 20:21

## 2021-05-26 RX ADMIN — ROPINIROLE HYDROCHLORIDE 8 MG: 2 TABLET, FILM COATED ORAL at 20:22

## 2021-05-26 RX ADMIN — METHOCARBAMOL 750 MG: 750 TABLET ORAL at 16:33

## 2021-05-26 RX ADMIN — OMEPRAZOLE 20 MG: 20 CAPSULE, DELAYED RELEASE ORAL at 20:21

## 2021-05-26 RX ADMIN — FLUCONAZOLE 150 MG: 50 TABLET ORAL at 05:07

## 2021-05-26 ASSESSMENT — PAIN DESCRIPTION - PAIN TYPE
TYPE: SURGICAL PAIN

## 2021-05-26 ASSESSMENT — LIFESTYLE VARIABLES
TOTAL SCORE: 0
ON A TYPICAL DAY WHEN YOU DRINK ALCOHOL HOW MANY DRINKS DO YOU HAVE: 0
TOTAL SCORE: 0
CONSUMPTION TOTAL: NEGATIVE
EVER FELT BAD OR GUILTY ABOUT YOUR DRINKING: NO
AVERAGE NUMBER OF DAYS PER WEEK YOU HAVE A DRINK CONTAINING ALCOHOL: 0
HAVE PEOPLE ANNOYED YOU BY CRITICIZING YOUR DRINKING: NO
TOTAL SCORE: 0
EVER HAD A DRINK FIRST THING IN THE MORNING TO STEADY YOUR NERVES TO GET RID OF A HANGOVER: NO
HAVE YOU EVER FELT YOU SHOULD CUT DOWN ON YOUR DRINKING: NO
ALCOHOL_USE: NO
DOES PATIENT WANT TO STOP DRINKING: NO
HOW MANY TIMES IN THE PAST YEAR HAVE YOU HAD 5 OR MORE DRINKS IN A DAY: 0

## 2021-05-26 NOTE — ANESTHESIA POSTPROCEDURE EVALUATION
Patient: Valentina Lu    Procedure Summary     Date: 05/25/21 Room / Location: [facility] 05 / SURGERY [facility]    Anesthesia Start: 1429 Anesthesia Stop: 1621    Procedure: FUSION, SPINE, XLIF - STAGE #1 L2-3. (Flank) Diagnosis: (LUMBAR STENOSIS, SPONDYLOSIS, DISC DISPLACEMENT)    Surgeons: Remi Lechuga III, M.D. Responsible Provider: Macarena Velazquez M.D.    Anesthesia Type: general ASA Status: 3          Final Anesthesia Type: general  Last vitals  BP   Blood Pressure : 117/57    Temp   36.3 °C (97.3 °F)    Pulse   94   Resp   13    SpO2   93 %      Anesthesia Post Evaluation    Patient location during evaluation: PACU  Patient participation: complete - patient participated  Level of consciousness: awake  Pain score: 6    Airway patency: patent  Anesthetic complications: no  Cardiovascular status: adequate  Respiratory status: acceptable  Hydration status: acceptable    PONV: none          No complications documented.     Nurse Pain Score: 9 (NPRS)

## 2021-05-26 NOTE — PROGRESS NOTES
Patient is AO x 4, RASS -1, and had severe post-op pain treated with IV/PO analgesics (see MAR).  Patient still stating pain level is very high after medication but is drifting off to sleep after ~1 minutes without stimulation.  Communicated medication safety vs. Pain tolerance with patient.  Patient agreeable to pain treatment plan.  VSS on 4L NC, RR 14.  Patient is 3L PRN at home due to COPD.  Surgical site CDI.  Neuro exam WNL.  POC reviewed, PIV verified, and safety protocols in place.  Unable to reach daughter Ashlie over phone.  Secure texts x 2 sent via Epic including room number.  Report to Carmen BEYER T3.  Transported on 4L with tank at 75%.  Moderate / high fall risk until further assessed after mobilization.

## 2021-05-26 NOTE — THERAPY
Physical Therapy Contact Note    PT consult received and acknowledged. Chart indicates patient is s/p stage 1 of multi stage lumbar surgery. Will initiate PT eval after surgical intervention complete as able and appropriate.    Kayla Jackson, PT, DPT  [phone]

## 2021-05-26 NOTE — PROGRESS NOTES
Neurosurgery Progress Note    Subjective:  POD# 1 L2/3 XLIF.  Reports right groin pain today, worse with mobilization, and mild left hip pain.  Pain controlled.  Ambulating, voiding.  Planning for stage 2 surgery on Friday.    Surgery / completed.     Exam:  Motor 4/5 to right hip flexor, remainder 5/5.    BP  Min: 99/50  Max: 135/76  Pulse  Av.8  Min: 71  Max: 99  Resp  Av.8  Min: 13  Max: 21  Temp  Av.3 °C (97.3 °F)  Min: 35.9 °C (96.6 °F)  Max: 36.7 °C (98 °F)  SpO2  Av.6 %  Min: 91 %  Max: 100 %    No data recorded        Recent Labs     21  1322   POTASSIUM 3.1*               Intake/Output                       21 0700 - 21 0659 21 0700 - 21 0659     [id number] 5570-3918 Total 9268-9693 9752-8071 Total                 Intake    P.O.  --  240 240  --  -- --    P.O. -- 240 240 -- -- --    I.V.  1700  -- 1700  --  -- --    Volume (mL) (lactated ringers infusion) 1700 -- 1700 -- -- --    Total Intake 3203 358 2481 -- -- --       Output    Urine  --  -- --  --  -- --    Number of Times Voided -- 1 x 1 x 1 x -- 1 x    Blood  50  -- 50  --  -- --    Est. Blood Loss 50 -- 50 -- -- --    Total Output 50 -- 50 -- -- --       Net I/O     6759 386 9958 -- -- --            Intake/Output Summary (Last 24 hours) at 2021 0830  Last data filed at 2021  Gross per 24 hour   Intake 1940 ml   Output 50 ml   Net 1890 ml            • albuterol  2 Puff Q6HRS PRN   • methocarbamol  750 mg 4X/DAY   • levothyroxine  75 mcg QHS   • gabapentin  800 mg TID   • atorvastatin  20 mg QHS   • fluconazole  150 mg DAILY   • furosemide  40 mg DAILY   • venlafaxine  25 mg Q EVENING   • temazepam  30 mg HS PRN   • spironolactone  25 mg Q EVENING   • ROPINIRole  8 mg QHS   • potassium chloride ER  10 mEq Q EVENING   • omeprazole  20 mg QHS   • venlafaxine XR  150 mg Q EVENING   • Pharmacy Consult Request  1 Each PHARMACY TO DOSE   • MD ALERT...DO NOT ADMINISTER NSAIDS or ASPIRIN unless  ORDERED By Neurosurgery  1 Each PRN   • docusate sodium  100 mg BID   • senna-docusate  1 tablet Nightly   • senna-docusate  1 tablet Q24HRS PRN   • polyethylene glycol/lytes  1 Packet BID PRN   • magnesium hydroxide  30 mL QDAY PRN   • bisacodyl  10 mg Q24HRS PRN   • fleet  1 Each Once PRN   • NS   Continuous   • enoxaparin  40 mg DAILY AT 1800   • oxyCODONE immediate-release  5 mg Q3HRS PRN    Or   • oxyCODONE immediate-release  10 mg Q3HRS PRN    Or   • HYDROmorphone  0.5 mg Q3HRS PRN   • diphenhydrAMINE  25 mg Q6HRS PRN    Or   • diphenhydrAMINE  25 mg Q6HRS PRN   • ondansetron  4 mg Q4HRS PRN   • ondansetron  4 mg Q4HRS PRN   • ALPRAZolam  0.25 mg TID PRN   • cloNIDine  0.1 mg Q4HRS PRN   • benzocaine-menthol  1 Lozenge Q2HRS PRN   • vitamin D  5,000 Units DAILY       Assessment and Plan:  Hospital day #2  POD #1  Ambulate as tolerated today.  Will need NPO at midnight Thursday for stage 2 on Friday.    Prophylactic anticoagulation: yes         Start date/time: today for one dose.

## 2021-05-26 NOTE — PROGRESS NOTES
2 RN Skin Check: Complete with RN Trell    Devices in place:SCD, , nasal cannula    Skin assessed under devices:skin intact    Dressing on left side is CD&I    Bony prominences intact    Confirmed pressure ulcers:N/A    Wound consult:N/A    Interventions:Observed skin

## 2021-05-26 NOTE — PROGRESS NOTES
Mobility Progress Note    Surgery patient: Yes  Date of surgery: 05/25/2021  Ambulated 50 ft on day of surgery? (N/A if patient did not undergo surgery today): Yes  Number of times ambulated 50 feet or greater today: 1  Patient has been up to chair, edge of bed or HOB 90 degrees for all meals?: Yes  Goal met? (goal is ambulating at least 50 feet 2 times on day shift, one time on night shift): Yes  If patient did not meet mobility goal, why?: n/a

## 2021-05-26 NOTE — RESPIRATORY CARE
COPD EDUCATION by COPD CLINICAL EDUCATOR  5/26/2021 at 4:45 PM by Erna Hamm, RRT     Patient not available -our team will revisit

## 2021-05-26 NOTE — OP REPORT
DATE OF SERVICE:  05/25/2021     PREOPERATIVE DIAGNOSES:  1.  Lumbar spondylosis.  2.  Lumbar stenosis.  3.  Lumbar spondylolisthesis.  4.  Adult degenerative scoliosis.  5.  Status post L3-L4 XLIF by myself several years ago.     POSTOPERATIVE DIAGNOSES:  1.  Lumbar spondylosis.  2.  Lumbar stenosis.  3.  Lumbar spondylolisthesis.  4.  Adult degenerative scoliosis.  5.  Status post L3-L4 XLIF by myself several years ago.     PROCEDURES PERFORMED:    1.  Left lateral retroperitoneal approach to the L2-L3 disk space.  2.  L2-L3 diskectomy and titanium interbody cage placement.  3.  L2-L3 interbody/allograft fusion.  4.  L2-L3 lateral plating fixation.  5.  Intraoperative monitoring.     SURGEON:  Remi Lechuga III, MD     ASSISTANT:  Michele Farfan PA-C     ANESTHESIA:  General.     ESTIMATED BLOOD LOSS:  10 mL.     FINDINGS:  Solid bony fixation, 50% reduction of spondylolisthesis and   scoliosis in preparation for stage II on Friday.     COMPLICATIONS:  None.     DRAINS LEFT:  None.     DISPOSITION:  Extubated to recovery and the floor.     HISTORY OF PRESENT ILLNESS:  This is a 65-year-old woman who had junctional   spondylosis and stenosis with worsening of scoliosis.  I explained the risks,   benefits and alternatives of XLIF initial deformity correction, followed by 2   days rest, followed by a posterior decompression and fusion.  This includes   pain, infection, bleeding, CSF leak, failure to completely resolve symptoms,   neurologic deficit, weakness, numbness, bladder or bowel difficulties, failure   of fixation, failure of fusion, need for rostral extension due to junctional   stenosis, injury to the bowel contents, great vessels, ureter and temporary or   permanent sensory motor neuropathy is due to the transpsoas approach.  She   understood the risks, benefits and agreed to consent.     SUMMARY OF OPERATIVE PROCEDURE:  The patient was brought to the operating   suite, placed under general anesthesia  on a regular bed supine.  Rolled into   right lateral decubitus position.  Arms in [place] position and taped   orthogonally to the bed, bed broken at the hips to try to release the ribs and   open up more for approach.  X-ray fluoroscopy was brought into draw a lateral   L2-L3 incision.  This was infiltrated with Marcaine with epinephrine.    Preoperative antibiotics were given.  After proper timeout was performed, the   patient was prepped and draped in sterile fashion.     Linear incision was made and soft tissues dissected with monopolar   electrocautery.  Once I got to the dorsal fascia and finding the rib, we did   all blunt dissection techniques.  We needed to dissect out a piece of the tip   of the rib and morcellized for autograft later.  We did not get into the lung.    We did find the attachment to the diaphragm, which was good for us for   approach.  There was negative Valsalva during the entire approach for any lung   tissue being exposed.  We then used the Lattus retractors dilators to do   sweeping motions to elevate the diaphragm, but it was stuck down.  There was   obviously multiple leaflets of it, so we punctured one.  When they found the   retroperitoneal fat, using x-ray, orthogonal in AP and lateral, we located the   midline or the posterior part of the L2-L3 disk space.  We put a K-wire in.    We did circumferential dilation and stimulation without any known significant   nervous tissue dilating through the psoas.  We ____ Lattus retractor, opened   it up under fluoroscopic guidance, put a posterior tarik and anterior ALL blade   and made sure the psoas was dilated well before we proceeded.     L2-L3 diskectomy and titanium interbody cage placement using the 4WEB LSTS   surface technology cages.  We performed a radical diskectomy by breaking the   ipsilateral and contralateral annulus with a Toney.  It was very degenerated   and barely any disk left.  We then cracked the contralateral annulus.   We did   a .  We did increasing pituitary rongeurs and curettes and a    to try to release the spine.  We were not able to get it much larger   than the 8 mm high, 6-degree lordotic cage.  This was trialled out, [id number].    It had a nice reduction of the spondylolisthesis and scoliosis in AP and   lateral projections.  It was placed orthogonally into the vertebral body and   there was no change in EMG.     L2-L3 interbody/allograft fusion.  Before introduction of graft,  it was   packed tightly with NanoBone to assist in fusion across the disk space;   however, the surface technology of the [employer] system was designed to cause   direct vertebral body ____ vertebral body above and below helping to assure   fusion.     L2-L3 lateral plating fixation.  Using the attached integrated 2-hole plate   appropriately sized, we drilled parallel to the endplates confirmed by x-ray.    We placed 6.5x45 mm screws with excellent bony purchase.  We tightened them   down and tightened down the locking nut the  provided to prevent   backing out of the screws.  We were very happy with the final construct.     We copiously irrigated with bacitracin and saline.  We laid down fibrillar.    Slowly we withdrew the retractor and assured hemostasis.  We again ensured   there was no invasion in the lung tissue.  We closed the wound in anatomic   layers, 0 Vicryl for the fascia, 2-0 Vicryls to the dermis and Steri-Strips   for the skin.  Sterile dressing was applied.  The patient was extubated and   will go to recovery.     There were no complications.  Instrument, needle and sponge count were correct   at the end of the case.        ______________________________  Remi Lechuga III, MD    ECP/SUB/[facility] [place]    DD:  05/26/2021 10:25  DT:  05/26/2021 11:29    Job#:  [id number]

## 2021-05-26 NOTE — CARE PLAN
The patient is Watcher - Medium risk of patient condition declining or worsening    Shift Goals  Clinical Goals: Free of post-surgical complications  Patient Goals: Pain Control    Progress made toward(s) clinical / shift goals:    Problem: Pain - Standard  Goal: Alleviation of pain or a reduction in pain to the patient’s comfort goal  Outcome: Progressing   Available pain medications reviewed with patient. Pain managed by PRN and scheduled medications. Encouraged to call if pain is no longer managed.     Problem: Fall Risk  Goal: Patient will remain free from falls  Outcome: Progressing   Fall precautions in place. Patient rounded on hourly. Clutter-free environment maintained. Bed alarm on, bed locked and in lowest position. Call light in reach.     Problem: Knowledge Deficit - Standard  Goal: Patient and family/care givers will demonstrate understanding of plan of care, disease process/condition, diagnostic tests and medications  Outcome: Progressing   Plan of care discussed with patient, verbalizes understanding. Encouraged to voice feelings or concerns.

## 2021-05-26 NOTE — PROGRESS NOTES
1736 Received report from PACU KAYLEEN Cai, POC discussed    5799 Received pt via bed, A&Ox4, 4lpm via nasal cannula, dressing on left side is CD&I

## 2021-05-26 NOTE — THERAPY
Missed Therapy     Patient Name: Valentina Lu  Age:  65 y.o., Sex:  female  Medical Record #: [id number]  Today's Date: 5/26/2021 05/26/21 0836   Interdisciplinary Plan of Care Collaboration   Collaboration Comments  OT eval received. Pt is s/p stage 1 lumbar sx. Will initiate OT after surgical intervention complete and pt is appropriate

## 2021-05-26 NOTE — CARE PLAN
The patient is Stable - Low risk of patient condition declining or worsening    Shift Goals  Clinical Goals: pain management  Patient Goals: pain management  Family Goals: no family present    Progress made toward(s) clinical / shift goals:  Pain medication given (see MAR). Pt educated about non-pharmacological pain control interventions.    Patient is not progressing towards the following goals: none      Problem: Pain - Standard  Goal: Alleviation of pain or a reduction in pain to the patient’s comfort goal  Outcome: Progressing     Problem: Fall Risk  Goal: Patient will remain free from falls  Outcome: Progressing     Problem: Knowledge Deficit - Standard  Goal: Patient and family/care givers will demonstrate understanding of plan of care, disease process/condition, diagnostic tests and medications  Outcome: Progressing

## 2021-05-27 PROCEDURE — 700111 HCHG RX REV CODE 636 W/ 250 OVERRIDE (IP): Performed by: PHYSICIAN ASSISTANT

## 2021-05-27 PROCEDURE — 87426 SARSCOV CORONAVIRUS AG IA: CPT

## 2021-05-27 PROCEDURE — U0003 INFECTIOUS AGENT DETECTION BY NUCLEIC ACID (DNA OR RNA); SEVERE ACUTE RESPIRATORY SYNDROME CORONAVIRUS 2 (SARS-COV-2) (CORONAVIRUS DISEASE [COVID-19]), AMPLIFIED PROBE TECHNIQUE, MAKING USE OF HIGH THROUGHPUT TECHNOLOGIES AS DESCRIBED BY CMS-2020-01-R: HCPCS

## 2021-05-27 PROCEDURE — U0005 INFEC AGEN DETEC AMPLI PROBE: HCPCS

## 2021-05-27 PROCEDURE — 700102 HCHG RX REV CODE 250 W/ 637 OVERRIDE(OP): Performed by: PHYSICIAN ASSISTANT

## 2021-05-27 PROCEDURE — 770006 HCHG ROOM/CARE - MED/SURG/GYN SEMI*

## 2021-05-27 PROCEDURE — 94664 DEMO&/EVAL PT USE INHALER: CPT

## 2021-05-27 PROCEDURE — A9270 NON-COVERED ITEM OR SERVICE: HCPCS | Performed by: PHYSICIAN ASSISTANT

## 2021-05-27 RX ORDER — METOCLOPRAMIDE HYDROCHLORIDE 5 MG/ML
10 INJECTION INTRAMUSCULAR; INTRAVENOUS EVERY 6 HOURS
Status: DISCONTINUED | OUTPATIENT
Start: 2021-05-27 | End: 2021-05-30

## 2021-05-27 RX ADMIN — DOCUSATE SODIUM 100 MG: 100 CAPSULE, LIQUID FILLED ORAL at 16:50

## 2021-05-27 RX ADMIN — OXYCODONE HYDROCHLORIDE 10 MG: 10 TABLET ORAL at 20:28

## 2021-05-27 RX ADMIN — VENLAFAXINE 25 MG: 25 TABLET ORAL at 16:50

## 2021-05-27 RX ADMIN — Medication 5000 UNITS: at 05:34

## 2021-05-27 RX ADMIN — GABAPENTIN 800 MG: 400 CAPSULE ORAL at 12:48

## 2021-05-27 RX ADMIN — OXYCODONE HYDROCHLORIDE 10 MG: 10 TABLET ORAL at 12:48

## 2021-05-27 RX ADMIN — POTASSIUM CHLORIDE 10 MEQ: 750 TABLET, FILM COATED, EXTENDED RELEASE ORAL at 16:50

## 2021-05-27 RX ADMIN — METHOCARBAMOL 750 MG: 750 TABLET ORAL at 20:28

## 2021-05-27 RX ADMIN — ATORVASTATIN CALCIUM 20 MG: 20 TABLET, FILM COATED ORAL at 20:28

## 2021-05-27 RX ADMIN — OMEPRAZOLE 20 MG: 20 CAPSULE, DELAYED RELEASE ORAL at 20:28

## 2021-05-27 RX ADMIN — FUROSEMIDE 40 MG: 40 TABLET ORAL at 05:34

## 2021-05-27 RX ADMIN — LEVOTHYROXINE SODIUM 75 MCG: 0.07 TABLET ORAL at 20:28

## 2021-05-27 RX ADMIN — METOCLOPRAMIDE 10 MG: 5 INJECTION, SOLUTION INTRAMUSCULAR; INTRAVENOUS at 09:15

## 2021-05-27 RX ADMIN — ROPINIROLE HYDROCHLORIDE 8 MG: 2 TABLET, FILM COATED ORAL at 21:33

## 2021-05-27 RX ADMIN — METOCLOPRAMIDE 10 MG: 5 INJECTION, SOLUTION INTRAMUSCULAR; INTRAVENOUS at 21:40

## 2021-05-27 RX ADMIN — OXYCODONE HYDROCHLORIDE 10 MG: 10 TABLET ORAL at 03:21

## 2021-05-27 RX ADMIN — OXYCODONE HYDROCHLORIDE 10 MG: 10 TABLET ORAL at 09:38

## 2021-05-27 RX ADMIN — METHOCARBAMOL 750 MG: 750 TABLET ORAL at 12:48

## 2021-05-27 RX ADMIN — DOCUSATE SODIUM 100 MG: 100 CAPSULE, LIQUID FILLED ORAL at 05:34

## 2021-05-27 RX ADMIN — GABAPENTIN 800 MG: 400 CAPSULE ORAL at 16:51

## 2021-05-27 RX ADMIN — METHOCARBAMOL 750 MG: 750 TABLET ORAL at 09:15

## 2021-05-27 RX ADMIN — METOCLOPRAMIDE 10 MG: 5 INJECTION, SOLUTION INTRAMUSCULAR; INTRAVENOUS at 16:03

## 2021-05-27 RX ADMIN — GABAPENTIN 800 MG: 400 CAPSULE ORAL at 05:34

## 2021-05-27 RX ADMIN — FLUCONAZOLE 150 MG: 50 TABLET ORAL at 05:34

## 2021-05-27 RX ADMIN — DOCUSATE SODIUM 50 MG AND SENNOSIDES 8.6 MG 1 TABLET: 8.6; 5 TABLET, FILM COATED ORAL at 20:28

## 2021-05-27 RX ADMIN — SPIRONOLACTONE 25 MG: 25 TABLET ORAL at 16:51

## 2021-05-27 RX ADMIN — OXYCODONE HYDROCHLORIDE 10 MG: 10 TABLET ORAL at 16:04

## 2021-05-27 RX ADMIN — METHOCARBAMOL 750 MG: 750 TABLET ORAL at 16:51

## 2021-05-27 RX ADMIN — VENLAFAXINE HYDROCHLORIDE 150 MG: 75 CAPSULE, EXTENDED RELEASE ORAL at 16:50

## 2021-05-27 ASSESSMENT — PAIN DESCRIPTION - PAIN TYPE
TYPE: SURGICAL PAIN

## 2021-05-27 ASSESSMENT — COPD QUESTIONNAIRES
HAVE YOU SMOKED AT LEAST 100 CIGARETTES IN YOUR ENTIRE LIFE: YES
COPD SCREENING SCORE: 5
DO YOU EVER COUGH UP ANY MUCUS OR PHLEGM?: NO/ONLY WITH OCCASIONAL COLDS OR INFECTIONS
DURING THE PAST 4 WEEKS HOW MUCH DID YOU FEEL SHORT OF BREATH: SOME OF THE TIME

## 2021-05-27 NOTE — CARE PLAN
The patient is Stable - Low risk of patient condition declining or worsening    Shift Goals  Clinical Goals: Pain management  Patient Goals: Pain management  Family Goals: no family present    Progress made toward(s) clinical / shift goals:    Problem: Pain - Standard  Goal: Alleviation of pain or a reduction in pain to the patient’s comfort goal  Outcome: Progressing   Available pain medications reviewed with patient. Pain managed by PRN and scheduled medications. Encouraged to call if pain is no longer managed.     Problem: Fall Risk  Goal: Patient will remain free from falls  Outcome: Progressing  Fall precautions in place. Patient rounded on hourly. Clutter-free environment maintained. Bed alarm on, bed locked and in lowest position. Call light in reach.     Problem: Knowledge Deficit - Standard  Goal: Patient and family/care givers will demonstrate understanding of plan of care, disease process/condition, diagnostic tests and medications  Outcome: Progressing   Plan of care discussed with patient, verbalizes understanding. Encouraged to voice feelings or concerns.

## 2021-05-27 NOTE — CARE PLAN
The patient is Stable - Low risk of patient condition declining or worsening    Shift Goals  Clinical Goals: Pain management  Patient Goals: Pain management  Family Goals: No family present    Progress made toward(s) clinical / shift goals:  Pain medication given per MAR. Pt educated about non-pharmacological pain control interventions. Ice pack given to pt.    Patient is not progressing towards the following goals: none      Problem: Pain - Standard  Goal: Alleviation of pain or a reduction in pain to the patient’s comfort goal  Outcome: Progressing     Problem: Fall Risk  Goal: Patient will remain free from falls  Outcome: Progressing     Problem: Knowledge Deficit - Standard  Goal: Patient and family/care givers will demonstrate understanding of plan of care, disease process/condition, diagnostic tests and medications  Outcome: Progressing

## 2021-05-27 NOTE — PROGRESS NOTES
Mobility Progress Note    Surgery patient: Yes  Date of surgery: 05/25/2021  Ambulated 50 ft on day of surgery? (N/A if patient did not undergo surgery today): n/a  Number of times ambulated 50 feet or greater today: 2  Patient has been up to chair, edge of bed or HOB 90 degrees for all meals?: Yes  Goal met? (goal is ambulating at least 50 feet 2 times on day shift, one time on night shift): Yes  If patient did not meet mobility goal, why?: n/a

## 2021-05-27 NOTE — RESPIRATORY CARE
"  COPD EDUCATION by COPD CLINICAL EDUCATOR  5/27/2021  at  3:05 PM by Rut Marsh, RRT     Patient interviewed by COPD education team.  Patient unable to participate in full program.  Short intervention has been conducted.  A comprehensive packet including information about COPD, treatments, and smoking cessation given.    COPD Screen  COPD Risk Screening  Do you have a history of COPD?: Yes  Do you have a Pulmonologist?: No  COPD Population Screener  During the past 4 weeks, how much did you feel short of breath?: Some of the time  Do you ever cough up any mucus or phlegm?: No/only with occasional colds or infections  In the past 12 months, you do less than you used to because of your breathing problems: Disagree/unsure  Have you smoked at least 100 cigarettes in your entire life?: Yes  How old are you?: 60+  COPD Screening Score: 5    COPD Assessment  COPD Clinical Specialists ONLY  COPD Education Initiated: Yes--Short Intervention  Is this a COPD exacerbation patient?: No  $ Demo/Eval of SVN's, MDI's and Aerosols: Yes    Meds to Beds  Would the patient like to opt in for Bedside Medication Delivery at Discharge?: Yes, interested     MY COPD ACTION PLAN     It is recommended that patients and physicians /healthcare providers complete this action plan together. This plan should be discussed at each physician visit and updated as needed.    The green, yellow and red zones show groups of symptoms of COPD. This list of symptoms is not comprehensive, and you may experience other symptoms. In the \"Actions\" column, your healthcare provider has recommended actions for you to take based on your symptoms.    Patient Name: Valentina Lu   YOB: 1955   Last Updated on:     Green Zone:  I am doing well today Actions   •  Usual activitiy and exercise level •  Take daily medications   •  Usual amounts of cough and phlegm/mucus •  Use oxygen as prescribed   •  Sleep well at night •  Continue " "regular exercise/diet plan   •  Appetite is good •  At all times avoid cigarette smoke, inhaled irritants     Daily Medications (these medications are taken every day):                Yellow Zone:  I am having a bad day or a COPD flare Actions   •  More breathless than usual •  Continue daily medications   •  I have less energy for my daily activities •  Use quick relief inhaler as ordered   •  Increased or thicker phlegm/mucus •  Use oxygen as prescribed   •  Using quick relief inhaler/nebulizer more often •  Get plenty of rest   •  Swelling of ankles more than usual •  Use pursed lip breathing   •  More coughing than usual •  At all times avoid cigarette smoke, inhaled irritants   •  I feel like I have a \"chest cold\"   •  Poor sleep and my symptoms woke me up   •  My appetite is not good   •  My medicine is not helping    •  Call provider immediately if symptoms don’t improve     Continue daily medications, add rescue medications:   Albuterol 2 Puffs Every 4 hours PRN       Medications to be used during a flare up, (as Discussed with Provider):              Red Zone:  I need urgent medical care Actions   •  Severe shortness of breath even at rest •  Call 911 or seek medical care immediately   •  Not able to do any activity because of breathing    •  Fever or shaking chills    •  Feeling confused or very drowsy     •  Chest pains    •  Coughing up blood              "

## 2021-05-27 NOTE — PROGRESS NOTES
Neurosurgery Progress Note    Subjective:  POD# 2 L2/3 XLIF.  Reports bilateral groin pain today, worse with mobilization, and mild left hip pain.  Pain controlled.  Ambulating, voiding.  Has some nausea with abdominal distention, passing flatus.   Planning for stage 2 surgery on Friday.    Surgery 1/ completed.     Exam:  Motor 4/5 to right hip flexor, remainder 5/5.  Abdomen distended, non-tender.     BP  Min: 101/65  Max: 114/74  Pulse  Av.8  Min: 67  Max: 100  Resp  Av.4  Min: 16  Max: 17  Temp  Av.6 °C (97.9 °F)  Min: 36.1 °C (97 °F)  Max: 37 °C (98.6 °F)  SpO2  Av.4 %  Min: 95 %  Max: 96 %    No data recorded        Recent Labs     21  1322   POTASSIUM 3.1*               Intake/Output                       21 0700 - 21 0659 21 0700 - 21 0659     4960-2525 8570-3729 Total 4800-3012 8762-3816 Total                 Intake    P.O.  720  240 960  --  -- --    P.O. 720 240 960 -- -- --    Total Intake 720 240 960 -- -- --       Output    Urine  --  -- --  --  -- --    Number of Times Voided 6 x 1 x 7 x -- -- --    Emesis  0  -- 0  --  -- --    Emesis 0 -- 0 -- -- --    Stool  --  -- --  --  -- --    Number of Times Stooled 0 x -- 0 x -- -- --    Total Output 0 -- 0 -- -- --       Net I/O     720 240 960 -- -- --            Intake/Output Summary (Last 24 hours) at 2021 0814  Last data filed at 2021  Gross per 24 hour   Intake 960 ml   Output --   Net 960 ml            • metoclopramide  10 mg Q6HRS   • albuterol  2 Puff Q6HRS PRN   • methocarbamol  750 mg 4X/DAY   • levothyroxine  75 mcg QHS   • gabapentin  800 mg TID   • atorvastatin  20 mg QHS   • fluconazole  150 mg DAILY   • furosemide  40 mg DAILY   • venlafaxine  25 mg Q EVENING   • temazepam  30 mg HS PRN   • spironolactone  25 mg Q EVENING   • ROPINIRole  8 mg QHS   • potassium chloride ER  10 mEq Q EVENING   • omeprazole  20 mg QHS   • venlafaxine XR  150 mg Q EVENING   • Pharmacy Consult  Request  1 Each PHARMACY TO DOSE   • MD ALERT...DO NOT ADMINISTER NSAIDS or ASPIRIN unless ORDERED By Neurosurgery  1 Each PRN   • docusate sodium  100 mg BID   • senna-docusate  1 tablet Nightly   • senna-docusate  1 tablet Q24HRS PRN   • polyethylene glycol/lytes  1 Packet BID PRN   • magnesium hydroxide  30 mL QDAY PRN   • bisacodyl  10 mg Q24HRS PRN   • fleet  1 Each Once PRN   • NS   Continuous   • enoxaparin  40 mg DAILY AT 1800   • oxyCODONE immediate-release  5 mg Q3HRS PRN    Or   • oxyCODONE immediate-release  10 mg Q3HRS PRN    Or   • HYDROmorphone  0.5 mg Q3HRS PRN   • diphenhydrAMINE  25 mg Q6HRS PRN    Or   • diphenhydrAMINE  25 mg Q6HRS PRN   • ondansetron  4 mg Q4HRS PRN   • ondansetron  4 mg Q4HRS PRN   • ALPRAZolam  0.25 mg TID PRN   • cloNIDine  0.1 mg Q4HRS PRN   • benzocaine-menthol  1 Lozenge Q2HRS PRN   • vitamin D  5,000 Units DAILY       Assessment and Plan:  Hospital day #3  POD #2  Ambulate as tolerated today.  Reglan added.   Will need NPO at midnight tonight for stage 2 on Friday.    Prophylactic anticoagulation: no, hold now.

## 2021-05-28 ENCOUNTER — APPOINTMENT (OUTPATIENT)
Dept: RADIOLOGY | Facility: MEDICAL CENTER | Age: 66
DRG: 455 | End: 2021-05-28
Attending: NEUROLOGICAL SURGERY
Payer: MEDICARE

## 2021-05-28 ENCOUNTER — ANESTHESIA (OUTPATIENT)
Dept: SURGERY | Facility: MEDICAL CENTER | Age: 66
DRG: 455 | End: 2021-05-28
Payer: MEDICARE

## 2021-05-28 ENCOUNTER — APPOINTMENT (OUTPATIENT)
Dept: RADIOLOGY | Facility: MEDICAL CENTER | Age: 66
DRG: 455 | End: 2021-05-28
Attending: PHYSICIAN ASSISTANT
Payer: MEDICARE

## 2021-05-28 ENCOUNTER — ANESTHESIA EVENT (OUTPATIENT)
Dept: SURGERY | Facility: MEDICAL CENTER | Age: 66
DRG: 455 | End: 2021-05-28
Payer: MEDICARE

## 2021-05-28 LAB
ANION GAP SERPL CALC-SCNC: 10 MMOL/L (ref 7–16)
BASOPHILS # BLD AUTO: 0.1 % (ref 0–1.8)
BASOPHILS # BLD: 0.01 K/UL (ref 0–0.12)
BUN SERPL-MCNC: 19 MG/DL (ref 8–22)
CALCIUM SERPL-MCNC: 8.2 MG/DL (ref 8.5–10.5)
CHLORIDE SERPL-SCNC: 98 MMOL/L (ref 96–112)
CO2 SERPL-SCNC: 26 MMOL/L (ref 20–33)
CREAT SERPL-MCNC: 0.75 MG/DL (ref 0.5–1.4)
EOSINOPHIL # BLD AUTO: 0 K/UL (ref 0–0.51)
EOSINOPHIL NFR BLD: 0 % (ref 0–6.9)
ERYTHROCYTE [DISTWIDTH] IN BLOOD BY AUTOMATED COUNT: 50 FL (ref 35.9–50)
GLUCOSE BLD-MCNC: 153 MG/DL (ref 65–99)
GLUCOSE SERPL-MCNC: 361 MG/DL (ref 65–99)
HCT VFR BLD AUTO: 33.8 % (ref 37–47)
HGB BLD-MCNC: 10.6 G/DL (ref 12–16)
IMM GRANULOCYTES # BLD AUTO: 0.07 K/UL (ref 0–0.11)
IMM GRANULOCYTES NFR BLD AUTO: 0.7 % (ref 0–0.9)
LYMPHOCYTES # BLD AUTO: 0.53 K/UL (ref 1–4.8)
LYMPHOCYTES NFR BLD: 5 % (ref 22–41)
MCH RBC QN AUTO: 29.3 PG (ref 27–33)
MCHC RBC AUTO-ENTMCNC: 31.4 G/DL (ref 33.6–35)
MCV RBC AUTO: 93.4 FL (ref 81.4–97.8)
MONOCYTES # BLD AUTO: 0.23 K/UL (ref 0–0.85)
MONOCYTES NFR BLD AUTO: 2.2 % (ref 0–13.4)
NEUTROPHILS # BLD AUTO: 9.66 K/UL (ref 2–7.15)
NEUTROPHILS NFR BLD: 92 % (ref 44–72)
NRBC # BLD AUTO: 0 K/UL
NRBC BLD-RTO: 0 /100 WBC
PLATELET # BLD AUTO: 265 K/UL (ref 164–446)
PMV BLD AUTO: 11.5 FL (ref 9–12.9)
POTASSIUM SERPL-SCNC: 5.1 MMOL/L (ref 3.6–5.5)
RBC # BLD AUTO: 3.62 M/UL (ref 4.2–5.4)
SARS-COV+SARS-COV-2 AG RESP QL IA.RAPID: NOTDETECTED
SARS-COV-2 RNA RESP QL NAA+PROBE: NOTDETECTED
SODIUM SERPL-SCNC: 134 MMOL/L (ref 135–145)
SPECIMEN SOURCE: NORMAL
SPECIMEN SOURCE: NORMAL
WBC # BLD AUTO: 10.5 K/UL (ref 4.8–10.8)

## 2021-05-28 PROCEDURE — 160002 HCHG RECOVERY MINUTES (STAT): Performed by: NEUROLOGICAL SURGERY

## 2021-05-28 PROCEDURE — 110454 HCHG SHELL REV 250: Performed by: NEUROLOGICAL SURGERY

## 2021-05-28 PROCEDURE — 70450 CT HEAD/BRAIN W/O DYE: CPT | Mod: MG

## 2021-05-28 PROCEDURE — C1713 ANCHOR/SCREW BN/BN,TIS/BN: HCPCS | Performed by: NEUROLOGICAL SURGERY

## 2021-05-28 PROCEDURE — 501838 HCHG SUTURE GENERAL: Performed by: NEUROLOGICAL SURGERY

## 2021-05-28 PROCEDURE — 700111 HCHG RX REV CODE 636 W/ 250 OVERRIDE (IP): Performed by: PHYSICIAN ASSISTANT

## 2021-05-28 PROCEDURE — 770006 HCHG ROOM/CARE - MED/SURG/GYN SEMI*

## 2021-05-28 PROCEDURE — 700106 HCHG RX REV CODE 271: Performed by: NEUROLOGICAL SURGERY

## 2021-05-28 PROCEDURE — 8E0WXBZ COMPUTER ASSISTED PROCEDURE OF TRUNK REGION: ICD-10-PCS | Performed by: NEUROLOGICAL SURGERY

## 2021-05-28 PROCEDURE — 700101 HCHG RX REV CODE 250: Performed by: ANESTHESIOLOGY

## 2021-05-28 PROCEDURE — L0464 TLSO 4MOD SACRO-SCAP PRE: HCPCS

## 2021-05-28 PROCEDURE — 72100 X-RAY EXAM L-S SPINE 2/3 VWS: CPT

## 2021-05-28 PROCEDURE — 01NB0ZZ RELEASE LUMBAR NERVE, OPEN APPROACH: ICD-10-PCS | Performed by: NEUROLOGICAL SURGERY

## 2021-05-28 PROCEDURE — 700105 HCHG RX REV CODE 258: Performed by: ANESTHESIOLOGY

## 2021-05-28 PROCEDURE — 700102 HCHG RX REV CODE 250 W/ 637 OVERRIDE(OP): Performed by: PHYSICIAN ASSISTANT

## 2021-05-28 PROCEDURE — 502000 HCHG MISC OR IMPLANTS RC 0278: Performed by: NEUROLOGICAL SURGERY

## 2021-05-28 PROCEDURE — 160048 HCHG OR STATISTICAL LEVEL 1-5: Performed by: NEUROLOGICAL SURGERY

## 2021-05-28 PROCEDURE — 160009 HCHG ANES TIME/MIN: Performed by: NEUROLOGICAL SURGERY

## 2021-05-28 PROCEDURE — 95937 NEUROMUSCULAR JUNCTION TEST: CPT | Performed by: NEUROLOGICAL SURGERY

## 2021-05-28 PROCEDURE — 07DR3ZZ EXTRACTION OF ILIAC BONE MARROW, PERCUTANEOUS APPROACH: ICD-10-PCS | Performed by: NEUROLOGICAL SURGERY

## 2021-05-28 PROCEDURE — 160036 HCHG PACU - EA ADDL 30 MINS PHASE I: Performed by: NEUROLOGICAL SURGERY

## 2021-05-28 PROCEDURE — 80048 BASIC METABOLIC PNL TOTAL CA: CPT

## 2021-05-28 PROCEDURE — 160035 HCHG PACU - 1ST 60 MINS PHASE I: Performed by: NEUROLOGICAL SURGERY

## 2021-05-28 PROCEDURE — 95940 IONM IN OPERATNG ROOM 15 MIN: CPT | Performed by: NEUROLOGICAL SURGERY

## 2021-05-28 PROCEDURE — 4A11X4G MONITORING OF PERIPHERAL NERVOUS ELECTRICAL ACTIVITY, INTRAOPERATIVE, EXTERNAL APPROACH: ICD-10-PCS | Performed by: NEUROLOGICAL SURGERY

## 2021-05-28 PROCEDURE — 160042 HCHG SURGERY MINUTES - EA ADDL 1 MIN LEVEL 5: Performed by: NEUROLOGICAL SURGERY

## 2021-05-28 PROCEDURE — 306637 HCHG MISC ORTHO ITEM RC 0274

## 2021-05-28 PROCEDURE — 95925 SOMATOSENSORY TESTING: CPT | Performed by: NEUROLOGICAL SURGERY

## 2021-05-28 PROCEDURE — 700105 HCHG RX REV CODE 258: Performed by: PHYSICIAN ASSISTANT

## 2021-05-28 PROCEDURE — A9270 NON-COVERED ITEM OR SERVICE: HCPCS | Performed by: ANESTHESIOLOGY

## 2021-05-28 PROCEDURE — 82962 GLUCOSE BLOOD TEST: CPT

## 2021-05-28 PROCEDURE — 700111 HCHG RX REV CODE 636 W/ 250 OVERRIDE (IP): Performed by: ANESTHESIOLOGY

## 2021-05-28 PROCEDURE — 00NY0ZZ RELEASE LUMBAR SPINAL CORD, OPEN APPROACH: ICD-10-PCS | Performed by: NEUROLOGICAL SURGERY

## 2021-05-28 PROCEDURE — 700111 HCHG RX REV CODE 636 W/ 250 OVERRIDE (IP): Performed by: NEUROLOGICAL SURGERY

## 2021-05-28 PROCEDURE — A9270 NON-COVERED ITEM OR SERVICE: HCPCS | Performed by: PHYSICIAN ASSISTANT

## 2021-05-28 PROCEDURE — 95861 NEEDLE EMG 2 EXTREMITIES: CPT | Performed by: NEUROLOGICAL SURGERY

## 2021-05-28 PROCEDURE — 94760 N-INVAS EAR/PLS OXIMETRY 1: CPT

## 2021-05-28 PROCEDURE — 85025 COMPLETE CBC W/AUTO DIFF WBC: CPT

## 2021-05-28 PROCEDURE — 700101 HCHG RX REV CODE 250: Performed by: NEUROLOGICAL SURGERY

## 2021-05-28 PROCEDURE — 36415 COLL VENOUS BLD VENIPUNCTURE: CPT

## 2021-05-28 PROCEDURE — 700102 HCHG RX REV CODE 250 W/ 637 OVERRIDE(OP): Performed by: NEUROLOGICAL SURGERY

## 2021-05-28 PROCEDURE — 500368 HCHG DRAIN, 7MM FLAT-FLUTED: Performed by: NEUROLOGICAL SURGERY

## 2021-05-28 PROCEDURE — A9270 NON-COVERED ITEM OR SERVICE: HCPCS | Performed by: NEUROLOGICAL SURGERY

## 2021-05-28 PROCEDURE — 160031 HCHG SURGERY MINUTES - 1ST 30 MINS LEVEL 5: Performed by: NEUROLOGICAL SURGERY

## 2021-05-28 PROCEDURE — 700102 HCHG RX REV CODE 250 W/ 637 OVERRIDE(OP): Performed by: ANESTHESIOLOGY

## 2021-05-28 PROCEDURE — 0SG0071 FUSION OF LUMBAR VERTEBRAL JOINT WITH AUTOLOGOUS TISSUE SUBSTITUTE, POSTERIOR APPROACH, POSTERIOR COLUMN, OPEN APPROACH: ICD-10-PCS | Performed by: NEUROLOGICAL SURGERY

## 2021-05-28 PROCEDURE — 502240 HCHG MISC OR SUPPLY RC 0272: Performed by: NEUROLOGICAL SURGERY

## 2021-05-28 DEVICE — GRAFT CANCELLOUS CUBES 6-10MM 30CC: Type: IMPLANTABLE DEVICE | Site: BACK | Status: FUNCTIONAL

## 2021-05-28 DEVICE — SCREW SET SPINE NONSTERILE STREAMLINE: Type: IMPLANTABLE DEVICE | Site: BACK | Status: FUNCTIONAL

## 2021-05-28 RX ORDER — ONDANSETRON 4 MG/1
4 TABLET, ORALLY DISINTEGRATING ORAL EVERY 4 HOURS PRN
Status: DISCONTINUED | OUTPATIENT
Start: 2021-05-28 | End: 2021-06-01 | Stop reason: HOSPADM

## 2021-05-28 RX ORDER — ONDANSETRON 2 MG/ML
4 INJECTION INTRAMUSCULAR; INTRAVENOUS
Status: DISCONTINUED | OUTPATIENT
Start: 2021-05-28 | End: 2021-05-28 | Stop reason: HOSPADM

## 2021-05-28 RX ORDER — CEFAZOLIN SODIUM 1 G/3ML
INJECTION, POWDER, FOR SOLUTION INTRAMUSCULAR; INTRAVENOUS
Status: DISCONTINUED | OUTPATIENT
Start: 2021-05-28 | End: 2021-05-28 | Stop reason: HOSPADM

## 2021-05-28 RX ORDER — CLONIDINE HYDROCHLORIDE 0.1 MG/1
0.1 TABLET ORAL EVERY 4 HOURS PRN
Status: DISCONTINUED | OUTPATIENT
Start: 2021-05-28 | End: 2021-06-01 | Stop reason: HOSPADM

## 2021-05-28 RX ORDER — DEXAMETHASONE SODIUM PHOSPHATE 4 MG/ML
INJECTION, SOLUTION INTRA-ARTICULAR; INTRALESIONAL; INTRAMUSCULAR; INTRAVENOUS; SOFT TISSUE PRN
Status: DISCONTINUED | OUTPATIENT
Start: 2021-05-28 | End: 2021-05-28 | Stop reason: SURG

## 2021-05-28 RX ORDER — BUPIVACAINE HYDROCHLORIDE 5 MG/ML
INJECTION, SOLUTION EPIDURAL; INTRACAUDAL
Status: DISCONTINUED | OUTPATIENT
Start: 2021-05-28 | End: 2021-05-28 | Stop reason: HOSPADM

## 2021-05-28 RX ORDER — AMOXICILLIN 250 MG
1 CAPSULE ORAL NIGHTLY
Status: DISCONTINUED | OUTPATIENT
Start: 2021-05-28 | End: 2021-06-01 | Stop reason: HOSPADM

## 2021-05-28 RX ORDER — OXYCODONE HCL 5 MG/5 ML
5 SOLUTION, ORAL ORAL
Status: COMPLETED | OUTPATIENT
Start: 2021-05-28 | End: 2021-05-28

## 2021-05-28 RX ORDER — HYDROMORPHONE HYDROCHLORIDE 1 MG/ML
0.2 INJECTION, SOLUTION INTRAMUSCULAR; INTRAVENOUS; SUBCUTANEOUS
Status: DISCONTINUED | OUTPATIENT
Start: 2021-05-28 | End: 2021-05-28 | Stop reason: HOSPADM

## 2021-05-28 RX ORDER — PHENYLEPHRINE HYDROCHLORIDE 10 MG/ML
INJECTION, SOLUTION INTRAMUSCULAR; INTRAVENOUS; SUBCUTANEOUS PRN
Status: DISCONTINUED | OUTPATIENT
Start: 2021-05-28 | End: 2021-05-28 | Stop reason: SURG

## 2021-05-28 RX ORDER — BACITRACIN ZINC 500 [USP'U]/G
OINTMENT TOPICAL
Status: DISCONTINUED | OUTPATIENT
Start: 2021-05-28 | End: 2021-05-28 | Stop reason: HOSPADM

## 2021-05-28 RX ORDER — CEFAZOLIN SODIUM 2 G/100ML
2 INJECTION, SOLUTION INTRAVENOUS EVERY 8 HOURS
Status: COMPLETED | OUTPATIENT
Start: 2021-05-28 | End: 2021-05-29

## 2021-05-28 RX ORDER — ROCURONIUM BROMIDE 10 MG/ML
INJECTION, SOLUTION INTRAVENOUS PRN
Status: DISCONTINUED | OUTPATIENT
Start: 2021-05-28 | End: 2021-05-28 | Stop reason: SURG

## 2021-05-28 RX ORDER — HYDROMORPHONE HYDROCHLORIDE 1 MG/ML
0.1 INJECTION, SOLUTION INTRAMUSCULAR; INTRAVENOUS; SUBCUTANEOUS
Status: DISCONTINUED | OUTPATIENT
Start: 2021-05-28 | End: 2021-05-28 | Stop reason: HOSPADM

## 2021-05-28 RX ORDER — ALPRAZOLAM 0.25 MG/1
0.25 TABLET ORAL 2 TIMES DAILY PRN
Status: DISCONTINUED | OUTPATIENT
Start: 2021-05-28 | End: 2021-06-01 | Stop reason: HOSPADM

## 2021-05-28 RX ORDER — ONDANSETRON 2 MG/ML
4 INJECTION INTRAMUSCULAR; INTRAVENOUS EVERY 4 HOURS PRN
Status: DISCONTINUED | OUTPATIENT
Start: 2021-05-28 | End: 2021-06-01 | Stop reason: HOSPADM

## 2021-05-28 RX ORDER — SUCCINYLCHOLINE/SOD CL,ISO/PF 200MG/10ML
SYRINGE (ML) INTRAVENOUS PRN
Status: DISCONTINUED | OUTPATIENT
Start: 2021-05-28 | End: 2021-05-28 | Stop reason: SURG

## 2021-05-28 RX ORDER — VITAMIN B COMPLEX
5000 TABLET ORAL DAILY
Status: DISCONTINUED | OUTPATIENT
Start: 2021-05-28 | End: 2021-06-01 | Stop reason: HOSPADM

## 2021-05-28 RX ORDER — SODIUM CHLORIDE, SODIUM LACTATE, POTASSIUM CHLORIDE, CALCIUM CHLORIDE 600; 310; 30; 20 MG/100ML; MG/100ML; MG/100ML; MG/100ML
INJECTION, SOLUTION INTRAVENOUS
Status: DISCONTINUED | OUTPATIENT
Start: 2021-05-28 | End: 2021-05-28 | Stop reason: SURG

## 2021-05-28 RX ORDER — TIZANIDINE 4 MG/1
4 TABLET ORAL 4 TIMES DAILY
Status: DISCONTINUED | OUTPATIENT
Start: 2021-05-28 | End: 2021-05-29

## 2021-05-28 RX ORDER — BISACODYL 10 MG
10 SUPPOSITORY, RECTAL RECTAL
Status: DISCONTINUED | OUTPATIENT
Start: 2021-05-28 | End: 2021-06-01 | Stop reason: HOSPADM

## 2021-05-28 RX ORDER — SODIUM CHLORIDE 9 MG/ML
INJECTION, SOLUTION INTRAVENOUS CONTINUOUS
Status: DISCONTINUED | OUTPATIENT
Start: 2021-05-28 | End: 2021-05-30

## 2021-05-28 RX ORDER — AMOXICILLIN 250 MG
1 CAPSULE ORAL
Status: DISCONTINUED | OUTPATIENT
Start: 2021-05-28 | End: 2021-06-01 | Stop reason: HOSPADM

## 2021-05-28 RX ORDER — OXYCODONE HYDROCHLORIDE 5 MG/1
5 TABLET ORAL
Status: DISCONTINUED | OUTPATIENT
Start: 2021-05-28 | End: 2021-06-01 | Stop reason: HOSPADM

## 2021-05-28 RX ORDER — HALOPERIDOL 5 MG/ML
1 INJECTION INTRAMUSCULAR
Status: DISCONTINUED | OUTPATIENT
Start: 2021-05-28 | End: 2021-05-28 | Stop reason: HOSPADM

## 2021-05-28 RX ORDER — HYDROMORPHONE HYDROCHLORIDE 1 MG/ML
0.4 INJECTION, SOLUTION INTRAMUSCULAR; INTRAVENOUS; SUBCUTANEOUS
Status: DISCONTINUED | OUTPATIENT
Start: 2021-05-28 | End: 2021-05-28 | Stop reason: HOSPADM

## 2021-05-28 RX ORDER — KETAMINE HYDROCHLORIDE 50 MG/ML
INJECTION, SOLUTION INTRAMUSCULAR; INTRAVENOUS PRN
Status: DISCONTINUED | OUTPATIENT
Start: 2021-05-28 | End: 2021-05-28

## 2021-05-28 RX ORDER — BUPIVACAINE HYDROCHLORIDE AND EPINEPHRINE 5; 5 MG/ML; UG/ML
INJECTION, SOLUTION EPIDURAL; INTRACAUDAL; PERINEURAL
Status: DISCONTINUED | OUTPATIENT
Start: 2021-05-28 | End: 2021-05-28 | Stop reason: HOSPADM

## 2021-05-28 RX ORDER — SODIUM CHLORIDE, SODIUM LACTATE, POTASSIUM CHLORIDE, AND CALCIUM CHLORIDE .6; .31; .03; .02 G/100ML; G/100ML; G/100ML; G/100ML
IRRIGANT IRRIGATION
Status: DISCONTINUED | OUTPATIENT
Start: 2021-05-28 | End: 2021-05-28 | Stop reason: HOSPADM

## 2021-05-28 RX ORDER — DOCUSATE SODIUM 100 MG/1
100 CAPSULE, LIQUID FILLED ORAL 2 TIMES DAILY
Status: DISCONTINUED | OUTPATIENT
Start: 2021-05-28 | End: 2021-06-01 | Stop reason: HOSPADM

## 2021-05-28 RX ORDER — DIPHENHYDRAMINE HYDROCHLORIDE 50 MG/ML
12.5 INJECTION INTRAMUSCULAR; INTRAVENOUS
Status: DISCONTINUED | OUTPATIENT
Start: 2021-05-28 | End: 2021-05-28 | Stop reason: HOSPADM

## 2021-05-28 RX ORDER — VANCOMYCIN HYDROCHLORIDE 1 G/20ML
INJECTION, POWDER, LYOPHILIZED, FOR SOLUTION INTRAVENOUS
Status: COMPLETED | OUTPATIENT
Start: 2021-05-28 | End: 2021-05-28

## 2021-05-28 RX ORDER — ENEMA 19; 7 G/133ML; G/133ML
1 ENEMA RECTAL
Status: DISCONTINUED | OUTPATIENT
Start: 2021-05-28 | End: 2021-06-01 | Stop reason: HOSPADM

## 2021-05-28 RX ORDER — SODIUM CHLORIDE, SODIUM LACTATE, POTASSIUM CHLORIDE, CALCIUM CHLORIDE 600; 310; 30; 20 MG/100ML; MG/100ML; MG/100ML; MG/100ML
INJECTION, SOLUTION INTRAVENOUS CONTINUOUS
Status: DISCONTINUED | OUTPATIENT
Start: 2021-05-28 | End: 2021-05-28 | Stop reason: HOSPADM

## 2021-05-28 RX ORDER — HYDROMORPHONE HYDROCHLORIDE 1 MG/ML
0.5 INJECTION, SOLUTION INTRAMUSCULAR; INTRAVENOUS; SUBCUTANEOUS
Status: DISCONTINUED | OUTPATIENT
Start: 2021-05-28 | End: 2021-06-01 | Stop reason: HOSPADM

## 2021-05-28 RX ORDER — OXYCODONE HYDROCHLORIDE 10 MG/1
10 TABLET ORAL
Status: DISCONTINUED | OUTPATIENT
Start: 2021-05-28 | End: 2021-06-01 | Stop reason: HOSPADM

## 2021-05-28 RX ORDER — CALCIUM CARBONATE 500 MG/1
500 TABLET, CHEWABLE ORAL 2 TIMES DAILY
Status: DISCONTINUED | OUTPATIENT
Start: 2021-05-28 | End: 2021-06-01 | Stop reason: HOSPADM

## 2021-05-28 RX ORDER — CEFAZOLIN SODIUM 1 G/3ML
INJECTION, POWDER, FOR SOLUTION INTRAMUSCULAR; INTRAVENOUS PRN
Status: DISCONTINUED | OUTPATIENT
Start: 2021-05-28 | End: 2021-05-28 | Stop reason: SURG

## 2021-05-28 RX ORDER — DIPHENHYDRAMINE HYDROCHLORIDE 50 MG/ML
25 INJECTION INTRAMUSCULAR; INTRAVENOUS EVERY 6 HOURS PRN
Status: DISCONTINUED | OUTPATIENT
Start: 2021-05-28 | End: 2021-06-01 | Stop reason: HOSPADM

## 2021-05-28 RX ORDER — LIDOCAINE HYDROCHLORIDE 40 MG/ML
SOLUTION TOPICAL PRN
Status: DISCONTINUED | OUTPATIENT
Start: 2021-05-28 | End: 2021-05-28 | Stop reason: SURG

## 2021-05-28 RX ORDER — POLYETHYLENE GLYCOL 3350 17 G/17G
1 POWDER, FOR SOLUTION ORAL 2 TIMES DAILY PRN
Status: DISCONTINUED | OUTPATIENT
Start: 2021-05-28 | End: 2021-06-01 | Stop reason: HOSPADM

## 2021-05-28 RX ORDER — OXYCODONE HCL 5 MG/5 ML
10 SOLUTION, ORAL ORAL
Status: COMPLETED | OUTPATIENT
Start: 2021-05-28 | End: 2021-05-28

## 2021-05-28 RX ORDER — DIPHENHYDRAMINE HCL 25 MG
25 TABLET ORAL EVERY 6 HOURS PRN
Status: DISCONTINUED | OUTPATIENT
Start: 2021-05-28 | End: 2021-06-01 | Stop reason: HOSPADM

## 2021-05-28 RX ORDER — SUFENTANIL CITRATE 50 UG/ML
INJECTION EPIDURAL; INTRAVENOUS PRN
Status: DISCONTINUED | OUTPATIENT
Start: 2021-05-28 | End: 2021-05-28 | Stop reason: SURG

## 2021-05-28 RX ADMIN — VENLAFAXINE 25 MG: 25 TABLET ORAL at 18:29

## 2021-05-28 RX ADMIN — PROPOFOL 100 MG: 10 INJECTION, EMULSION INTRAVENOUS at 09:50

## 2021-05-28 RX ADMIN — OXYCODONE HYDROCHLORIDE 10 MG: 10 TABLET ORAL at 08:19

## 2021-05-28 RX ADMIN — GABAPENTIN 800 MG: 400 CAPSULE ORAL at 18:20

## 2021-05-28 RX ADMIN — POTASSIUM CHLORIDE 10 MEQ: 750 TABLET, FILM COATED, EXTENDED RELEASE ORAL at 18:29

## 2021-05-28 RX ADMIN — Medication 80 MG: at 09:50

## 2021-05-28 RX ADMIN — SPIRONOLACTONE 25 MG: 25 TABLET ORAL at 18:20

## 2021-05-28 RX ADMIN — SUFENTANIL CITRATE 10 MCG: 50 INJECTION, SOLUTION EPIDURAL; INTRAVENOUS at 12:03

## 2021-05-28 RX ADMIN — SODIUM CHLORIDE, POTASSIUM CHLORIDE, SODIUM LACTATE AND CALCIUM CHLORIDE: 600; 310; 30; 20 INJECTION, SOLUTION INTRAVENOUS at 10:52

## 2021-05-28 RX ADMIN — CEFAZOLIN 2 G: 330 INJECTION, POWDER, FOR SOLUTION INTRAMUSCULAR; INTRAVENOUS at 09:57

## 2021-05-28 RX ADMIN — Medication 5000 UNITS: at 05:06

## 2021-05-28 RX ADMIN — FENTANYL CITRATE 50 MCG: 50 INJECTION, SOLUTION INTRAMUSCULAR; INTRAVENOUS at 14:38

## 2021-05-28 RX ADMIN — GABAPENTIN 800 MG: 400 CAPSULE ORAL at 05:06

## 2021-05-28 RX ADMIN — HYDROMORPHONE HYDROCHLORIDE 0.4 MG: 1 INJECTION, SOLUTION INTRAMUSCULAR; INTRAVENOUS; SUBCUTANEOUS at 15:18

## 2021-05-28 RX ADMIN — FENTANYL CITRATE 50 MCG: 50 INJECTION, SOLUTION INTRAMUSCULAR; INTRAVENOUS at 14:53

## 2021-05-28 RX ADMIN — DOCUSATE SODIUM 100 MG: 100 CAPSULE, LIQUID FILLED ORAL at 18:21

## 2021-05-28 RX ADMIN — SUFENTANIL CITRATE 10 MCG: 50 INJECTION, SOLUTION EPIDURAL; INTRAVENOUS at 12:40

## 2021-05-28 RX ADMIN — SUFENTANIL CITRATE 10 MCG: 50 INJECTION, SOLUTION EPIDURAL; INTRAVENOUS at 12:33

## 2021-05-28 RX ADMIN — METHOCARBAMOL 750 MG: 750 TABLET ORAL at 08:18

## 2021-05-28 RX ADMIN — ROCURONIUM BROMIDE 20 MG: 10 INJECTION, SOLUTION INTRAVENOUS at 09:53

## 2021-05-28 RX ADMIN — PROPOFOL 100 MG: 10 INJECTION, EMULSION INTRAVENOUS at 12:38

## 2021-05-28 RX ADMIN — ANTACID TABLETS 500 MG: 500 TABLET, CHEWABLE ORAL at 18:20

## 2021-05-28 RX ADMIN — Medication 5000 UNITS: at 16:22

## 2021-05-28 RX ADMIN — FLUCONAZOLE 150 MG: 50 TABLET ORAL at 05:06

## 2021-05-28 RX ADMIN — SODIUM CHLORIDE, POTASSIUM CHLORIDE, SODIUM LACTATE AND CALCIUM CHLORIDE: 600; 310; 30; 20 INJECTION, SOLUTION INTRAVENOUS at 09:45

## 2021-05-28 RX ADMIN — SUFENTANIL CITRATE 10 MCG: 50 INJECTION, SOLUTION EPIDURAL; INTRAVENOUS at 09:50

## 2021-05-28 RX ADMIN — CEFAZOLIN SODIUM 2 G: 2 INJECTION, SOLUTION INTRAVENOUS at 18:21

## 2021-05-28 RX ADMIN — SUFENTANIL CITRATE 10 MCG: 50 INJECTION, SOLUTION EPIDURAL; INTRAVENOUS at 10:21

## 2021-05-28 RX ADMIN — OXYCODONE HYDROCHLORIDE 10 MG: 10 TABLET ORAL at 18:44

## 2021-05-28 RX ADMIN — SUFENTANIL CITRATE 0.25 MCG/KG/HR: 50 INJECTION EPIDURAL; INTRAVENOUS at 09:48

## 2021-05-28 RX ADMIN — METOCLOPRAMIDE 10 MG: 5 INJECTION, SOLUTION INTRAMUSCULAR; INTRAVENOUS at 03:19

## 2021-05-28 RX ADMIN — METOCLOPRAMIDE 10 MG: 5 INJECTION, SOLUTION INTRAMUSCULAR; INTRAVENOUS at 08:29

## 2021-05-28 RX ADMIN — PHENYLEPHRINE HYDROCHLORIDE 100 MCG: 10 INJECTION INTRAVENOUS at 09:57

## 2021-05-28 RX ADMIN — LIDOCAINE HYDROCHLORIDE 4 ML: 40 SOLUTION TOPICAL at 09:51

## 2021-05-28 RX ADMIN — DEXAMETHASONE SODIUM PHOSPHATE 8 MG: 4 INJECTION, SOLUTION INTRA-ARTICULAR; INTRALESIONAL; INTRAMUSCULAR; INTRAVENOUS; SOFT TISSUE at 09:50

## 2021-05-28 RX ADMIN — SODIUM CHLORIDE: 9 INJECTION, SOLUTION INTRAVENOUS at 03:29

## 2021-05-28 RX ADMIN — POLYETHYLENE GLYCOL 3350 1 PACKET: 17 POWDER, FOR SOLUTION ORAL at 18:21

## 2021-05-28 RX ADMIN — ALPRAZOLAM 0.25 MG: 0.25 TABLET ORAL at 20:43

## 2021-05-28 RX ADMIN — SODIUM CHLORIDE: 9 INJECTION, SOLUTION INTRAVENOUS at 16:25

## 2021-05-28 RX ADMIN — OXYCODONE HYDROCHLORIDE 10 MG: 10 TABLET ORAL at 03:20

## 2021-05-28 RX ADMIN — TIZANIDINE 4 MG: 4 TABLET ORAL at 18:21

## 2021-05-28 RX ADMIN — VENLAFAXINE HYDROCHLORIDE 150 MG: 75 CAPSULE, EXTENDED RELEASE ORAL at 18:20

## 2021-05-28 RX ADMIN — OXYCODONE HYDROCHLORIDE 10 MG: 5 SOLUTION ORAL at 15:16

## 2021-05-28 RX ADMIN — DOCUSATE SODIUM 100 MG: 100 CAPSULE, LIQUID FILLED ORAL at 05:06

## 2021-05-28 RX ADMIN — PHENYLEPHRINE HYDROCHLORIDE 200 MCG: 10 INJECTION INTRAVENOUS at 10:27

## 2021-05-28 RX ADMIN — SODIUM CHLORIDE 267.56 MCG/KG/HR: 900 INJECTION INTRAVENOUS at 09:47

## 2021-05-28 RX ADMIN — PHENYLEPHRINE HYDROCHLORIDE 200 MCG: 10 INJECTION INTRAVENOUS at 10:03

## 2021-05-28 ASSESSMENT — PAIN DESCRIPTION - PAIN TYPE
TYPE: SURGICAL PAIN
TYPE: ACUTE PAIN;SURGICAL PAIN
TYPE: SURGICAL PAIN

## 2021-05-28 ASSESSMENT — PAIN SCALES - GENERAL: PAIN_LEVEL: 6

## 2021-05-28 NOTE — PROGRESS NOTES
Do to patient size custom TLSO off the shell has been faxed to [facility].  If you have any questions or need an ETA please call [facility] at [phone]

## 2021-05-28 NOTE — PROGRESS NOTES
Neurosurgery Progress Note    Subjective:  POD# 3 L2/3 XLIF.  Reports bilateral groin pain, right greater than left.Pain controlled.  Ambulating, voiding.  Nausea improved since yesterday with some abdominal distention still, passing flatus.   Planning for stage 2 surgery today.       Surgery 1/ completed.     Exam:  Motor 4+/5 to right hip flexor, remainder 5/5.  Abdomen distended, non-tender.     BP  Min: 100/54  Max: 113/63  Pulse  Av  Min: 62  Max: 91  Resp  Av.6  Min: 16  Max: 18  Temp  Av °C (98.6 °F)  Min: 36.6 °C (97.8 °F)  Max: 37.2 °C (98.9 °F)  SpO2  Av.7 %  Min: 90 %  Max: 97 %    No data recorded        Recent Labs     21  1322   POTASSIUM 3.1*               Intake/Output                       21 0700 - 21 0659 21 0700 - 21 0659     3700-6074 3191-3788 Total 5090-4002 3119-6423 Total                 Intake    P.O.  480  240 720  --  -- --    P.O. 480 240 720 -- -- --    Total Intake 480 240 720 -- -- --       Output    Urine  --  -- --  --  -- --    Number of Times Voided 5 x 2 x 7 x -- -- --    Emesis  0  -- 0  --  -- --    Emesis 0 -- 0 -- -- --    Stool  --  -- --  --  -- --    Number of Times Stooled 0 x -- 0 x -- -- --    Total Output 0 -- 0 -- -- --       Net I/O     480 240 720 -- -- --            Intake/Output Summary (Last 24 hours) at 2021 0806  Last data filed at 2021  Gross per 24 hour   Intake 720 ml   Output --   Net 720 ml            • metoclopramide  10 mg Q6HRS   • albuterol  2 Puff Q6HRS PRN   • methocarbamol  750 mg 4X/DAY   • levothyroxine  75 mcg QHS   • gabapentin  800 mg TID   • atorvastatin  20 mg QHS   • fluconazole  150 mg DAILY   • furosemide  40 mg DAILY   • venlafaxine  25 mg Q EVENING   • temazepam  30 mg HS PRN   • spironolactone  25 mg Q EVENING   • ROPINIRole  8 mg QHS   • potassium chloride ER  10 mEq Q EVENING   • omeprazole  20 mg QHS   • venlafaxine XR  150 mg Q EVENING   • Pharmacy Consult Request  1  Each PHARMACY TO DOSE   • MD ALERT...DO NOT ADMINISTER NSAIDS or ASPIRIN unless ORDERED By Neurosurgery  1 Each PRN   • docusate sodium  100 mg BID   • senna-docusate  1 tablet Nightly   • senna-docusate  1 tablet Q24HRS PRN   • polyethylene glycol/lytes  1 Packet BID PRN   • magnesium hydroxide  30 mL QDAY PRN   • bisacodyl  10 mg Q24HRS PRN   • fleet  1 Each Once PRN   • NS   Continuous   • [Held by provider] enoxaparin  40 mg DAILY AT 1800   • oxyCODONE immediate-release  5 mg Q3HRS PRN    Or   • oxyCODONE immediate-release  10 mg Q3HRS PRN    Or   • HYDROmorphone  0.5 mg Q3HRS PRN   • diphenhydrAMINE  25 mg Q6HRS PRN    Or   • diphenhydrAMINE  25 mg Q6HRS PRN   • ondansetron  4 mg Q4HRS PRN   • ondansetron  4 mg Q4HRS PRN   • ALPRAZolam  0.25 mg TID PRN   • cloNIDine  0.1 mg Q4HRS PRN   • benzocaine-menthol  1 Lozenge Q2HRS PRN   • vitamin D  5,000 Units DAILY       Assessment and Plan:  Hospital day #4  POD #3  NPO now.   surgery today.    Prophylactic anticoagulation: no, hold now.

## 2021-05-28 NOTE — ANESTHESIA TIME REPORT
Anesthesia Start and Stop Event Times     Date Time Event    5/28/2021 0915 Ready for Procedure     0945 Anesthesia Start     1403 Anesthesia Stop        Responsible Staff  05/28/21    Name Role Begin End    Reed Brock M.D. Anesth 0945 1406        Preop Diagnosis (Free Text):  Pre-op Diagnosis     LUMBAR STENOSIS, SPONDYLOSIS, DISC DISPLACEMENT        Preop Diagnosis (Codes):    Post op Diagnosis  Lumbar spinal stenosis      Premium Reason  Non-Premium    Comments:

## 2021-05-28 NOTE — CARE PLAN
The patient is Watcher - Medium risk of patient condition declining or worsening    Progress made toward(s) clinical / shift goals:  Pain control    Patient is not progressing towards the following goals: having a BM      Problem: Bowel Elimination - Post Surgical  Goal: Patient will resume regular bowel sounds and function with no discomfort or distention  Outcome: Not Progressing  Last BM 5/24/21. Denies any abdominal pain/discomfort. Abdomen is round, semi-firm, normoactive bowel sounds on all 4 quadrants. Reports that she is passing gas. Stool softeners given. Encouraged increase fluid intake.    Problem: Pain - Post Surgery  Goal: Alleviation or reduction of pain post surgery  Outcome: Progressing   Reports pain, medicated with PRN pain med with relief. Non pharmacologic comfort measure includes: repositioning, rest, distraction.

## 2021-05-28 NOTE — ANESTHESIA PROCEDURE NOTES
Airway    Date/Time: 5/28/2021 9:51 AM  Performed by: Reed Brock M.D.  Authorized by: Reed Brock M.D.     Location:  OR  Urgency:  Elective  Difficult Airway: No    Indications for Airway Management:  Anesthesia      Spontaneous Ventilation: absent    Sedation Level:  Deep  Preoxygenated: Yes    Patient Position:  Sniffing  Mask Difficulty Assessment:  0 - not attempted  Final Airway Type:  Endotracheal airway  Final Endotracheal Airway:  ETT  Cuffed: Yes    Technique Used for Successful ETT Placement:  Direct laryngoscopy    Insertion Site:  Oral  Blade Type:  Tati  Laryngoscope Blade/Videolaryngoscope Blade Size:  3  ETT Size (mm):  7.0  Measured from:  Teeth  ETT to Teeth (cm):  21  Placement Verified by: auscultation and capnometry    Cormack-Lehane Classification:  Grade I - full view of glottis  Number of Attempts at Approach:  1

## 2021-05-28 NOTE — ANESTHESIA PREPROCEDURE EVALUATION
"    Relevant Problems   PULMONARY   (positive) COPD (chronic obstructive pulmonary disease) (HCC)      NEURO   (positive) Head ache      CARDIAC   (positive) HTN (hypertension)   (positive) Migraine with aura         (positive) Acute renal failure (HCC)      ENDO   (positive) Type 2 diabetes mellitus, without long-term current use of insulin (HCC)     BP (!) 96/46 Comment: Simultaneous filing. User may not have seen previous data.  Pulse 81 Comment: Simultaneous filing. User may not have seen previous data.  Temp 36.4 °C (97.6 °F) (Temporal) Comment: Simultaneous filing. User may not have seen previous data. Comment (Src): Simultaneous filing. User may not have seen previous data.  Resp 16 Comment: Simultaneous filing. User may not have seen previous data.  Ht 1.651 m (5' 5\")   Wt 89.7 kg (197 lb 12 oz)   LMP  (LMP Unknown)   SpO2 94% Comment: Simultaneous filing. User may not have seen previous data.  BMI 32.91 kg/m²     Physical Exam    Airway   Mallampati: II  TM distance: >3 FB  Neck ROM: full       Cardiovascular - normal exam  Rhythm: regular  Rate: normal  (-) murmur     Dental - normal exam           Pulmonary - normal exam  Breath sounds clear to auscultation     Abdominal    Neurological - normal exam                 Anesthesia Plan    ASA 3       Plan - general       Airway plan will be ETT          Induction: intravenous    Postoperative Plan: Postoperative administration of opioids is intended.    Pertinent diagnostic labs and testing reviewed    Informed Consent:    Anesthetic plan and risks discussed with patient.    Use of blood products discussed with: patient whom consented to blood products.         "

## 2021-05-28 NOTE — OR NURSING
Patient tolerating fluids well; no complaints of nausea  Neuro checks as ordered; push pull equal and strong; knee resistance equal and strong.  Denies numbness or tingling  Patient on O2 @ 3 l/m via nasal cannula  Medicated for pain as ordered; pain level 4/10 on 10 scale  Transferred to room via bed  O2 tank > 50% full  Pertinent post op orders reviewed with receiving RN

## 2021-05-28 NOTE — ANESTHESIA POSTPROCEDURE EVALUATION
Patient: Valentina Lu    Procedure Summary     Date: 05/28/21 Room / Location: [facility] OR 07 / SURGERY [facility]    Anesthesia Start: 0945 Anesthesia Stop: 1403    Procedures:       FUSION, SPINE, LUMBAR, WITH O-ARM IMAGING GUIDANCE - STAGE#2 L2-5 STEALTH GUIDED WITH HIP BONE MARROW AUTOGRAFT ASPIRATE (N/A Back)      LAMINECTOMY, SPINE, LUMBAR, WITH DISCECTOMY - L2 LAMI AND REDO L3 LAMI. (N/A Back) Diagnosis: (LUMBAR STENOSIS, SPONDYLOSIS, DISC DISPLACEMENT)    Surgeons: Remi Lechuga III, M.D. Responsible Provider: Reed Brock M.D.    Anesthesia Type: general ASA Status: 3          Final Anesthesia Type: general  Last vitals  BP   Blood Pressure : 106/69    Temp   (!) 35.8 °C (96.4 °F)    Pulse   90   Resp   14    SpO2   96 %      Anesthesia Post Evaluation    Patient location during evaluation: PACU  Patient participation: complete - patient participated  Level of consciousness: awake and alert  Pain score: 6    Airway patency: patent  Anesthetic complications: no  Cardiovascular status: hemodynamically stable  Respiratory status: acceptable  Hydration status: euvolemic    PONV: none          No complications documented.     Nurse Pain Score: 6 (NPRS)

## 2021-05-28 NOTE — OR SURGEON
Immediate Post OP Note    PreOp Diagnosis: Lumbar Stenosis with radiculopathy and Instability.      PostOp Diagnosis: Same      Procedure(s):  FUSION, SPINE, LUMBAR, WITH O-ARM IMAGING GUIDANCE - STAGE#2 L2-5 STEALTH GUIDED WITH HIP BONE MARROW AUTOGRAFT ASPIRATE - Wound Class: Clean  LAMINECTOMY, SPINE, LUMBAR, WITH DISCECTOMY - L2 LAMI AND REDO L3 LAMI. - Wound Class: Clean    Surgeon(s):  Remi Lechuga III, M.D.    Anesthesiologist/Type of Anesthesia:  Anesthesiologist: Tico Pires M.D.; Macarena Velazquez M.D./General    Surgical Staff:  Assistant: Reymundo Farfan P.A.-C.  Circulator: Harry eBe R.N.  Scrub Person: Dipti Franco  Radiology Technologist: Beckie Arnold    Specimens removed if any:  * No specimens in log *    Estimated Blood Loss: 250 cc    Findings: See op Note    Complications: None        5/28/2021 2:05 PM Reymundo Farfan P.A.-C.

## 2021-05-29 LAB
ANION GAP SERPL CALC-SCNC: 10 MMOL/L (ref 7–16)
APPEARANCE UR: CLEAR
BILIRUB UR QL STRIP.AUTO: NEGATIVE
BUN SERPL-MCNC: 19 MG/DL (ref 8–22)
CALCIUM SERPL-MCNC: 8.9 MG/DL (ref 8.5–10.5)
CHLORIDE SERPL-SCNC: 98 MMOL/L (ref 96–112)
CO2 SERPL-SCNC: 29 MMOL/L (ref 20–33)
COLOR UR: YELLOW
CREAT SERPL-MCNC: 0.71 MG/DL (ref 0.5–1.4)
ERYTHROCYTE [DISTWIDTH] IN BLOOD BY AUTOMATED COUNT: 49.2 FL (ref 35.9–50)
GLUCOSE BLD-MCNC: 292 MG/DL (ref 65–99)
GLUCOSE SERPL-MCNC: 256 MG/DL (ref 65–99)
GLUCOSE UR STRIP.AUTO-MCNC: >=1000 MG/DL
HCT VFR BLD AUTO: 34.9 % (ref 37–47)
HGB BLD-MCNC: 10.8 G/DL (ref 12–16)
KETONES UR STRIP.AUTO-MCNC: NEGATIVE MG/DL
LEUKOCYTE ESTERASE UR QL STRIP.AUTO: NEGATIVE
MCH RBC QN AUTO: 28.6 PG (ref 27–33)
MCHC RBC AUTO-ENTMCNC: 30.9 G/DL (ref 33.6–35)
MCV RBC AUTO: 92.6 FL (ref 81.4–97.8)
MICRO URNS: ABNORMAL
NITRITE UR QL STRIP.AUTO: NEGATIVE
PH UR STRIP.AUTO: 7 [PH] (ref 5–8)
PLATELET # BLD AUTO: 252 K/UL (ref 164–446)
PMV BLD AUTO: 11.1 FL (ref 9–12.9)
POTASSIUM SERPL-SCNC: 4.4 MMOL/L (ref 3.6–5.5)
PROT UR QL STRIP: NEGATIVE MG/DL
RBC # BLD AUTO: 3.77 M/UL (ref 4.2–5.4)
RBC UR QL AUTO: NEGATIVE
SODIUM SERPL-SCNC: 137 MMOL/L (ref 135–145)
SP GR UR STRIP.AUTO: 1.03
UROBILINOGEN UR STRIP.AUTO-MCNC: 1 MG/DL
WBC # BLD AUTO: 11.4 K/UL (ref 4.8–10.8)

## 2021-05-29 PROCEDURE — A9270 NON-COVERED ITEM OR SERVICE: HCPCS | Performed by: PHYSICIAN ASSISTANT

## 2021-05-29 PROCEDURE — 302135 SEQUENTIAL COMPRESSION MACHINE: Performed by: NEUROLOGICAL SURGERY

## 2021-05-29 PROCEDURE — 770006 HCHG ROOM/CARE - MED/SURG/GYN SEMI*

## 2021-05-29 PROCEDURE — 81003 URINALYSIS AUTO W/O SCOPE: CPT

## 2021-05-29 PROCEDURE — 700105 HCHG RX REV CODE 258: Performed by: PHYSICIAN ASSISTANT

## 2021-05-29 PROCEDURE — 700111 HCHG RX REV CODE 636 W/ 250 OVERRIDE (IP): Performed by: PHYSICIAN ASSISTANT

## 2021-05-29 PROCEDURE — 700102 HCHG RX REV CODE 250 W/ 637 OVERRIDE(OP): Performed by: PHYSICIAN ASSISTANT

## 2021-05-29 PROCEDURE — 97161 PT EVAL LOW COMPLEX 20 MIN: CPT

## 2021-05-29 PROCEDURE — 80048 BASIC METABOLIC PNL TOTAL CA: CPT

## 2021-05-29 PROCEDURE — 97166 OT EVAL MOD COMPLEX 45 MIN: CPT

## 2021-05-29 PROCEDURE — 94760 N-INVAS EAR/PLS OXIMETRY 1: CPT

## 2021-05-29 PROCEDURE — 85027 COMPLETE CBC AUTOMATED: CPT

## 2021-05-29 PROCEDURE — 36415 COLL VENOUS BLD VENIPUNCTURE: CPT

## 2021-05-29 RX ORDER — TIZANIDINE 4 MG/1
2 TABLET ORAL EVERY 8 HOURS PRN
Status: DISCONTINUED | OUTPATIENT
Start: 2021-05-29 | End: 2021-06-01 | Stop reason: HOSPADM

## 2021-05-29 RX ADMIN — LEVOTHYROXINE SODIUM 75 MCG: 0.07 TABLET ORAL at 20:59

## 2021-05-29 RX ADMIN — METOCLOPRAMIDE 10 MG: 5 INJECTION, SOLUTION INTRAMUSCULAR; INTRAVENOUS at 08:05

## 2021-05-29 RX ADMIN — VENLAFAXINE 25 MG: 25 TABLET ORAL at 17:29

## 2021-05-29 RX ADMIN — MAGNESIUM HYDROXIDE 30 ML: 400 SUSPENSION ORAL at 08:12

## 2021-05-29 RX ADMIN — DOCUSATE SODIUM 50 MG AND SENNOSIDES 8.6 MG 1 TABLET: 8.6; 5 TABLET, FILM COATED ORAL at 20:59

## 2021-05-29 RX ADMIN — POTASSIUM CHLORIDE 10 MEQ: 750 TABLET, FILM COATED, EXTENDED RELEASE ORAL at 17:30

## 2021-05-29 RX ADMIN — SODIUM CHLORIDE: 9 INJECTION, SOLUTION INTRAVENOUS at 04:17

## 2021-05-29 RX ADMIN — TIZANIDINE 4 MG: 4 TABLET ORAL at 00:59

## 2021-05-29 RX ADMIN — FLUCONAZOLE 150 MG: 50 TABLET ORAL at 04:22

## 2021-05-29 RX ADMIN — OMEPRAZOLE 20 MG: 20 CAPSULE, DELAYED RELEASE ORAL at 00:59

## 2021-05-29 RX ADMIN — OMEPRAZOLE 20 MG: 20 CAPSULE, DELAYED RELEASE ORAL at 20:59

## 2021-05-29 RX ADMIN — Medication 5000 UNITS: at 04:22

## 2021-05-29 RX ADMIN — LEVOTHYROXINE SODIUM 75 MCG: 0.07 TABLET ORAL at 00:59

## 2021-05-29 RX ADMIN — DOCUSATE SODIUM 100 MG: 100 CAPSULE, LIQUID FILLED ORAL at 17:30

## 2021-05-29 RX ADMIN — OXYCODONE HYDROCHLORIDE 10 MG: 10 TABLET ORAL at 00:59

## 2021-05-29 RX ADMIN — ENOXAPARIN SODIUM 40 MG: 40 INJECTION SUBCUTANEOUS at 14:06

## 2021-05-29 RX ADMIN — ROPINIROLE HYDROCHLORIDE 8 MG: 2 TABLET, FILM COATED ORAL at 00:59

## 2021-05-29 RX ADMIN — TIZANIDINE 4 MG: 4 TABLET ORAL at 08:05

## 2021-05-29 RX ADMIN — GABAPENTIN 800 MG: 400 CAPSULE ORAL at 11:04

## 2021-05-29 RX ADMIN — VENLAFAXINE HYDROCHLORIDE 150 MG: 75 CAPSULE, EXTENDED RELEASE ORAL at 17:30

## 2021-05-29 RX ADMIN — TIZANIDINE 2 MG: 4 TABLET ORAL at 20:58

## 2021-05-29 RX ADMIN — GABAPENTIN 800 MG: 400 CAPSULE ORAL at 17:30

## 2021-05-29 RX ADMIN — METOCLOPRAMIDE 10 MG: 5 INJECTION, SOLUTION INTRAMUSCULAR; INTRAVENOUS at 00:59

## 2021-05-29 RX ADMIN — OXYCODONE 5 MG: 5 TABLET ORAL at 14:06

## 2021-05-29 RX ADMIN — DOCUSATE SODIUM 50 MG AND SENNOSIDES 8.6 MG 1 TABLET: 8.6; 5 TABLET, FILM COATED ORAL at 00:59

## 2021-05-29 RX ADMIN — SPIRONOLACTONE 25 MG: 25 TABLET ORAL at 17:30

## 2021-05-29 RX ADMIN — ANTACID TABLETS 500 MG: 500 TABLET, CHEWABLE ORAL at 04:22

## 2021-05-29 RX ADMIN — FUROSEMIDE 40 MG: 40 TABLET ORAL at 04:22

## 2021-05-29 RX ADMIN — GABAPENTIN 800 MG: 400 CAPSULE ORAL at 04:22

## 2021-05-29 RX ADMIN — METOCLOPRAMIDE 10 MG: 5 INJECTION, SOLUTION INTRAMUSCULAR; INTRAVENOUS at 20:58

## 2021-05-29 RX ADMIN — ATORVASTATIN CALCIUM 20 MG: 20 TABLET, FILM COATED ORAL at 00:59

## 2021-05-29 RX ADMIN — ROPINIROLE HYDROCHLORIDE 8 MG: 2 TABLET, FILM COATED ORAL at 20:59

## 2021-05-29 RX ADMIN — METOCLOPRAMIDE 10 MG: 5 INJECTION, SOLUTION INTRAMUSCULAR; INTRAVENOUS at 14:06

## 2021-05-29 RX ADMIN — CEFAZOLIN SODIUM 2 G: 2 INJECTION, SOLUTION INTRAVENOUS at 04:08

## 2021-05-29 RX ADMIN — OXYCODONE HYDROCHLORIDE 10 MG: 10 TABLET ORAL at 22:58

## 2021-05-29 RX ADMIN — OXYCODONE HYDROCHLORIDE 10 MG: 10 TABLET ORAL at 04:35

## 2021-05-29 RX ADMIN — ANTACID TABLETS 500 MG: 500 TABLET, CHEWABLE ORAL at 17:30

## 2021-05-29 RX ADMIN — ATORVASTATIN CALCIUM 20 MG: 20 TABLET, FILM COATED ORAL at 20:58

## 2021-05-29 RX ADMIN — DOCUSATE SODIUM 100 MG: 100 CAPSULE, LIQUID FILLED ORAL at 04:22

## 2021-05-29 ASSESSMENT — COGNITIVE AND FUNCTIONAL STATUS - GENERAL
TOILETING: A LOT
DRESSING REGULAR UPPER BODY CLOTHING: A LOT
EATING MEALS: A LITTLE
HELP NEEDED FOR BATHING: A LOT
SUGGESTED CMS G CODE MODIFIER DAILY ACTIVITY: CK
MOBILITY SCORE: 13
MOVING FROM LYING ON BACK TO SITTING ON SIDE OF FLAT BED: A LITTLE
SUGGESTED CMS G CODE MODIFIER MOBILITY: CL
TURNING FROM BACK TO SIDE WHILE IN FLAT BAD: A LOT
CLIMB 3 TO 5 STEPS WITH RAILING: TOTAL
MOVING TO AND FROM BED TO CHAIR: A LOT
DAILY ACTIVITIY SCORE: 14
DRESSING REGULAR LOWER BODY CLOTHING: A LOT
WALKING IN HOSPITAL ROOM: A LOT
STANDING UP FROM CHAIR USING ARMS: A LITTLE
PERSONAL GROOMING: A LITTLE

## 2021-05-29 ASSESSMENT — ACTIVITIES OF DAILY LIVING (ADL): TOILETING: INDEPENDENT

## 2021-05-29 ASSESSMENT — PAIN DESCRIPTION - PAIN TYPE
TYPE: SURGICAL PAIN
TYPE: ACUTE PAIN;SURGICAL PAIN
TYPE: SURGICAL PAIN
TYPE: SURGICAL PAIN
TYPE: ACUTE PAIN
TYPE: ACUTE PAIN;SURGICAL PAIN
TYPE: ACUTE PAIN

## 2021-05-29 ASSESSMENT — PATIENT HEALTH QUESTIONNAIRE - PHQ9
SUM OF ALL RESPONSES TO PHQ9 QUESTIONS 1 AND 2: 0
2. FEELING DOWN, DEPRESSED, IRRITABLE, OR HOPELESS: NOT AT ALL
1. LITTLE INTEREST OR PLEASURE IN DOING THINGS: NOT AT ALL

## 2021-05-29 ASSESSMENT — GAIT ASSESSMENTS
GAIT LEVEL OF ASSIST: MINIMAL ASSIST
DISTANCE (FEET): 3
ASSISTIVE DEVICE: FRONT WHEEL WALKER

## 2021-05-29 ASSESSMENT — PAIN SCALES - WONG BAKER: WONGBAKER_NUMERICALRESPONSE: HURTS A LITTLE MORE

## 2021-05-29 NOTE — CARE PLAN
The patient is Watcher - Medium risk of patient condition declining or worsening    Shift Goals  Clinical Goals: Pain management  Patient Goals: Pain management  Family Goals: No family present    Progress made toward(s) clinical / shift goals:  Pain medication given per MAR. Pt given ice pack. Pt educated about non-pharmacological pain control interventions.    Patient is not progressing towards the following goals: none      Problem: Pain - Standard  Goal: Alleviation of pain or a reduction in pain to the patient’s comfort goal  Outcome: Progressing     Problem: Fall Risk  Goal: Patient will remain free from falls  Outcome: Progressing

## 2021-05-29 NOTE — PROGRESS NOTES
Pt arrived back to unit from PACU. Pt AAOx4, VSS on 3L O2 via NC. Pt denies nausea, shortness of breath, or chest pain. Pt rating pain 6/10, however pt drowsy, easy to arouse, so will hold off pain medications for now. Pt updated on POC. Bed locked and in lowest position. Call light and belongings within reach.

## 2021-05-29 NOTE — PROGRESS NOTES
Neurosurgery Progress Note    Subjective:  POD# 3 L2/3 XLIF.  Postop day 0 status post L2-S1 open laminectomy with L2-L5 [employer] guided fusion.    Patient currently resting comfortably however, a rapid was called on her earlier this evening the patient having confusion right facial droop and some weakness.  CT head was normal and labs were essentially unremarkable.  Patient symptoms resolved on their own.  Patient currently awake alert and responding appropriately.  Moving all extremities well with good strength.  Has had history of TIAs in the past--last MRI brain in 2019.  Leg pain better since surgery.  Back expectedly sore but tolerable  Pain controlled.  Drain output only 180 cc since surgery  Patient has ambulated with nursing to the bathroom  No significant abdominal pain or nausea.  No BM yet, but passing gas       Exam:  Motor 4+/5 to right hip flexor, remainder 5/5.  Abdomen distended, non-tender.   Patient currently with no obvious facial droop.    She is fully alert and oriented x3.  Speech and language clear and fluent,   Memory seems to be intact.   Patient has good 5/5 strength in upper extremities with good symmetry.  Lower extremity strength is also 5/5 other than slight weakness in right motor but this is minimal 4+/5    BP  Min: 89/57  Max: 142/87  Pulse  Av.6  Min: 63  Max: 109  Resp  Av.2  Min: 12  Max: 20  Temp  Av.4 °C (97.6 °F)  Min: 35.8 °C (96.4 °F)  Max: 37.2 °C (98.9 °F)  SpO2  Av.8 %  Min: 93 %  Max: 100 %    No data recorded    Recent Labs     21   WBC 10.5   RBC 3.62*   HEMOGLOBIN 10.6*   HEMATOCRIT 33.8*   MCV 93.4   MCH 29.3   MCHC 31.4*   RDW 50.0   PLATELETCT 265   MPV 11.5     Recent Labs     21   SODIUM 134*   POTASSIUM 5.1   CHLORIDE 98   CO2 26   GLUCOSE 361*   BUN 19   CREATININE 0.75   CALCIUM 8.2*               Intake/Output                       21 0700 - 21 0659 21 07 - 21 0659     [id number] 5964-1917  Total 6143-8273 6447-8453 Total                 Intake    P.O.  150  -- 150  --  -- --    P.O. 150 -- 150 -- -- --    I.V.  3400  -- 3400  --  -- --    Volume (mL) (Lactated Ringers) 2800 -- 2800 -- -- --    Volume (mL) (lactated ringers infusion) 600 -- 600 -- -- --    Total Intake 3550 -- 3550 -- -- --       Output    Urine  300  -- 300  --  -- --    Urine 300 -- 300 -- -- --    Number of Times Voided 2 x 1 x 3 x -- -- --    Emesis  0  -- 0  --  -- --    Emesis 0 -- 0 -- -- --    Drains  90  90 180  --  -- --    Output (mL) (Closed/Suction Drain 1 Inferior;Midline Back Carlos Wynn 7 Fr.) 90 90 180 -- -- --    Stool  --  -- --  --  -- --    Number of Times Stooled 0 x -- 0 x -- -- --    Blood  250  -- 250  --  -- --    Est. Blood Loss 250 -- 250 -- -- --    Total Output 640 90 730 -- -- --       Net I/O     2910 -90 2820 -- -- --            Intake/Output Summary (Last 24 hours) at 5/29/2021 0106  Last data filed at 5/28/2021 1930  Gross per 24 hour   Intake 3550 ml   Output 730 ml   Net 2820 ml            • Pharmacy Consult Request  1 Each PHARMACY TO DOSE   • MD ALERT...DO NOT ADMINISTER NSAIDS or ASPIRIN unless ORDERED By Neurosurgery  1 Each PRN   • docusate sodium  100 mg BID   • senna-docusate  1 tablet Nightly   • senna-docusate  1 tablet Q24HRS PRN   • polyethylene glycol/lytes  1 Packet BID PRN   • magnesium hydroxide  30 mL QDAY PRN   • bisacodyl  10 mg Q24HRS PRN   • fleet  1 Each Once PRN   • NS   Continuous   • enoxaparin  40 mg DAILY AT 1800   • oxyCODONE immediate-release  5 mg Q3HRS PRN    Or   • oxyCODONE immediate-release  10 mg Q3HRS PRN    Or   • HYDROmorphone  0.5 mg Q3HRS PRN   • ceFAZolin  2 g Q8HR   • diphenhydrAMINE  25 mg Q6HRS PRN    Or   • diphenhydrAMINE  25 mg Q6HRS PRN   • ondansetron  4 mg Q4HRS PRN   • ondansetron  4 mg Q4HRS PRN   • tizanidine  4 mg 4X/DAY   • ALPRAZolam  0.25 mg BID PRN   • cloNIDine  0.1 mg Q4HRS PRN   • benzocaine-menthol  1 Lozenge Q2HRS PRN   • vitamin D   5,000 Units DAILY   • calcium carbonate  500 mg BID   • metoclopramide  10 mg Q6HRS   • albuterol  2 Puff Q6HRS PRN   • levothyroxine  75 mcg QHS   • gabapentin  800 mg TID   • atorvastatin  20 mg QHS   • fluconazole  150 mg DAILY   • furosemide  40 mg DAILY   • venlafaxine  25 mg Q EVENING   • temazepam  30 mg HS PRN   • spironolactone  25 mg Q EVENING   • ROPINIRole  8 mg QHS   • potassium chloride ER  10 mEq Q EVENING   • omeprazole  20 mg QHS   • venlafaxine XR  150 mg Q EVENING       Assessment and Plan:  Hospital day #4  POD #3/POD#0  Patient appears to have cleared from her neurologic deficits that were occurring when a rapid was called earlier this evening.  She is currently fully alert and awake and responding to questions well.  She is moving all extremities well without obvious weakness.  Previously described facial droop has resolved.  Patient CT scan of the head was normal and labs were normal.  Has had a history of TIA so we will get an MRI of her brain in the morning just to make sure that there is nothing obvious.  Nursing will contact me if there is a problem overnight.  Prophylactic anticoagulation: Yes starting tomorrow morning.

## 2021-05-29 NOTE — CODE DOCUMENTATION
Per primary RN patient was AOx2, disoriented to place and event. S/S previously noted by RN appear to be improving on current assessment. Patient currently AOx4. Patient states it's hard to open her right eye, no other complaints.

## 2021-05-29 NOTE — PROGRESS NOTES
Michele SANTANA was notified of BP of 97/63.   New orders received. See ABDIRASHID Martinez RN and Michele SANTANA at the bedside.

## 2021-05-29 NOTE — CARE PLAN
The patient is Stable - Low risk of patient condition declining or worsening    Shift Goals  Clinical Goals: MRI and pain management  Patient Goals: pain management   Family Goals: No family present    Progress made toward(s) clinical / shift goals:  Yes    Patient is not progressing towards the following goals:        Problem: Pain - Standard  Goal: Alleviation of pain or a reduction in pain to the patient’s comfort goal  Outcome: Progressing  Pt received Oxycodone and Zanaflex. Sleeping comfortably in bed right now.     Problem: Fall Risk  Goal: Patient will remain free from falls  Outcome: Progressing     Problem: Knowledge Deficit - Standard  Goal: Patient and family/care givers will demonstrate understanding of plan of care, disease process/condition, diagnostic tests and medications  Outcome: Progressing     Problem: Lumbar/Thoracic Spine Surgery  Goal: Post-Operative Lumbar/Thoracic Spine Surgery: Patient will achieve optimal post-surgical outcomes  Outcome: Progressing

## 2021-05-29 NOTE — THERAPY
Occupational Therapy   Initial Evaluation     Patient Name: Valentina Lu  Age:  65 y.o., Sex:  female  Medical Record #: [id number]  Today's Date: 5/29/2021     Precautions  Precautions: (P) Fall Risk, TLSO (Thoracolumbosacral orthosis), Spinal / Back Precautions     Assessment  Patient is 65 y.o. female with a diagnosis of L2-S1 lami/ fusion.  Additional factors influencing patient status / progress: good family support available at home. Will benefit from OT to focus on ADLs, transfers, spinal precautions during ADl completion.      Plan    Recommend Occupational Therapy 3 times per week until therapy goals are met for the following treatments:  Adaptive Equipment, Self Care/Activities of Daily Living, Therapeutic Activities and Therapeutic Exercises.    DC Equipment Recommendations: (P) Unable to determine at this time  Discharge Recommendations: (P) Recommend post-acute placement for additional occupational therapy services prior to discharge home     Subjective    Lethargic at time, cooperative once roused. Fatigues quickly     Objective       05/29/21 1105   Total Time Spent   Total Time Spent (Mins)    Charge Group   OT Evaluation OT Evaluation Mod   Initial Contact Note    Initial Contact Note Order Received and Verified, Occupational Therapy Evaluation in Progress with Full Report to Follow.   Prior Living Situation   Prior Services None   Housing / Facility 1 Story House   Steps Into Home 2   Steps In Home 0   Elevator No   Bathroom Set up Bathtub / Shower Combination   Lives with - Patient's Self Care Capacity Adult Children  (daughter and son in law)   Comments lethargic, appear well medicated,, unsure of accuracy of PLOF info   Prior Level of ADL Function   Self Feeding Independent   Grooming / Hygiene Independent   Bathing Independent   Dressing Independent   Toileting Independent   Prior Level of IADL Function   Medication Management Independent   Laundry Requires Assist   Kitchen Mobility  Independent   Finances Requires Assist   Home Management Requires Assist   Shopping Requires Assist   Prior Level Of Mobility Independent Without Device in Home   Precautions   Precautions Fall Risk;TLSO (Thoracolumbosacral orthosis);Spinal / Back Precautions    Vitals   O2 (LPM) 2   O2 Delivery Device Silicone Nasal Cannula   Pain 0 - 10 Group   Therapist Pain Assessment During Activity;Post Activity Pain Same as Prior to Activity;Nurse Notified  (none reported)   Cognition    Level of Consciousness Alert   Comments initial lethergy, improved once up OOB   Passive ROM Upper Body   Passive ROM Upper Body WDL   Active ROM Upper Body   Active ROM Upper Body  WDL   Dominant Hand Right   Strength Upper Body   Upper Body Strength  WDL   Sensation Upper Body   Upper Extremity Sensation  WDL   Upper Body Muscle Tone   Upper Body Muscle Tone  WDL   Coordination Upper Body   Coordination WDL   Balance Assessment   Sitting Balance (Static) Fair   Sitting Balance (Dynamic) Fair -   Standing Balance (Static) Fair -   Standing Balance (Dynamic) Poor +   Weight Shift Sitting Fair   Weight Shift Standing Poor   Bed Mobility    Supine to Sit Moderate Assist   Sit to Supine   (left seated in bedside chair)   Scooting Moderate Assist   Comments function limited by delay/ lethargy   ADL Assessment   Eating Supervision   Grooming Supervision;Seated   Bathing   (NT)   Upper Body Dressing Maximal Assist  (mostly for brace)   Lower Body Dressing Maximal Assist   Toileting Moderate Assist   How much help from another person does the patient currently need...   Putting on and taking off regular lower body clothing? 2   Bathing (including washing, rinsing, and drying)? 2   Toileting, which includes using a toilet, bedpan, or urinal? 2   Putting on and taking off regular upper body clothing? 2   Taking care of personal grooming such as brushing teeth? 3   Eating meals? 3   6 Clicks Daily Activity Score 14   Functional Mobility   Sit to Stand  Minimal Assist  (2 people for safety)   Bed, Chair, Wheelchair Transfer Moderate Assist  (2 people for safety)   Visual Perception   Visual Perception  WDL   Comments eyes mostly closed during session   Patient / Family Goals   Patient / Family Goal #1 get better   Short Term Goals   Short Term Goal # 1 supervised level for toileting tasks   Short Term Goal # 2 set up to nany/ doff TLSO   Short Term Goal # 3 set up for LB dressing, using AE as necessary   Education Group   Education Provided Back Safety;Activities of Daily Living   Role of Occupational Therapist Patient Response Patient;Acceptance;Explanation;Demonstration;Reinforcement Needed   Back Safety Patient Response Patient;Acceptance;Explanation;Demonstration;Reinforcement Needed   ADL Patient Response Patient;Acceptance;Explanation;Demonstration;Reinforcement Needed   Problem List   Problem List Decreased Active Daily Living Skills;Decreased Functional Mobility;Decreased Activity Tolerance;Safety Awareness Deficits / Cognition;Limited Knowledge of Post Op Precautions   Anticipated Discharge Equipment and Recommendations   DC Equipment Recommendations Unable to determine at this time   Discharge Recommendations Recommend post-acute placement for additional occupational therapy services prior to discharge home   Interdisciplinary Plan of Care Collaboration   IDT Collaboration with  Nursing;Physical Therapist   Patient Position at End of Therapy Seated;Chair Alarm On;Call Light within Reach;Tray Table within Reach;Phone within Reach   Collaboration Comments report given   Session Information   Date / Session Number  5/29,1 ( 1/3, 6/4)   Priority 2

## 2021-05-29 NOTE — PROGRESS NOTES
Mobility Note    Surgery patient?: Yes  Date of surgery: 5/28/2021  Ambulateft od 50 n day of surgery? Yes  Number of times ambulated 50 feet or greater today: 1  Patient has been up to chair, edge of bed or HOB 90 degrees for all meals?: Yes   Goal met? Yes

## 2021-05-29 NOTE — OP REPORT
DATE OF SERVICE:  05/28/2021     PREOPERATIVE DIAGNOSES:  1.  Lumbar spondylosis.  2.  Lumbar stenosis.  3.  Lumbar spondylolisthesis.  4.  Adult degenerative scoliosis.  5.  Previous L3-L4 laminotomy by myself several years ago.  6. Status post L2-L3 XLIF and scoliotic correction two days ago by myself.     POSTOPERATIVE DIAGNOSES:    1.  Lumbar spondylosis.  2.  Lumbar stenosis.  3.  Lumbar spondylolisthesis.  4.  Adult degenerative scoliosis.  5.  Previous L3-L4 laminotomy by myself several years ago.  6. Status post L2-L3 XLIF and scoliotic correction two days ago by myself.     PROCEDURES PERFORMED:   1.  Stealth guidance for the spine.  2.  Hip bone marrow autograft aspirate via separate incision.  3.  L2, L3, L4, L5 stealth-guided pedicle screw fixation.  4.  Redo bilateral L3 laminotomy, decompression of thecal sac and nerve roots.  5.  Redo bilateral L4 laminotomy, decompression of thecal sac and nerve roots.  6.  L2, L5 laminectomy.  7.  Bilateral L2 transpedicular approach, 59 modifier.  8.  Bilateral L3 transpedicular approach, 59 modifier.  9.  Bilateral L5 transpedicular approach, 59 modifier.  10.  L2, L3, L4, L5 posterolateral allograft autograft bere fusion.  11.  Intraoperative monitoring.     SURGEON:  Remi Lechuga III, MD     ASSISTANT:  Reymundo Farfan PA-C     ANESTHESIA:  General.     COMPLICATIONS:  None.     ESTIMATED BLOOD LOSS:  250 mL.     FINDINGS:  Soft bony fixation, but good position of the screws, slight   improvement in the scoliosis overall from the correction 2 days before.    Severe scar noted around the previous L3-L4 laminotomy, lots of floating   scarred bone left stuck down bone and scar left on thecal sac, unable to fully   resect particularly at right L3-L4.     DRAINS LEFT:  A subfascial ARAYN.     DISPOSITION:  Extubated to recovery and the floor.     HISTORY OF PRESENT ILLNESS:  A 65-year-old woman who has had a previous L3-L4   XLIF and laminotomy with good  fusion result, but developed scoliosis due to   poor bone quality, etc.  She had just undergone an L2-L3 XLIF for partial   scoliotic correction and went to tolerate that well.  I explained the risks,   benefits and alternatives of redo decompression and the long segment posterior   fusion including pain, infection, bleeding, CSF leak, failure to completely   resolve symptoms, neurologic deficit including pain, weakness, numbness,   bladder or bowel difficulties, failure of fixation, failure of fusion, need   for rostral caudal extension due to junctional stenosis.  She understood the   risks, benefits and agreed to consent.     SUMMARY OF OPERATIVE PROCEDURE:  The patient was brought to the operating   suite, placed under general anesthesia.  Duncan catheter was placed, lines   secured.  Upper extremity SSEP and lower extremity EMG needles were hooked up   by NMA monitoring.  The patient was rolled in prone position on chest, hip,   thigh Carlos frame, arms up in superman position, all padded pressure points   secured.  X-ray fluoroscopy was brought in to localize a midline L2-L5   incision after some closed reduction of the scoliosis was done by manipulating   the body as well as a separate incision for the right PSIS Stealth post.    These were both infiltrated with Marcaine with epinephrine.  Preoperative   antibiotics were given.  Proper timeout was performed.  The patient was   prepped and draped in sterile fashion.     A linear incision was made and soft tissues dissected with bipolar and   monopolar electrocautery.  We found the dorsal fascia, incised it sharply   using a subperiosteal technique, stripped the muscles off the L2, L3, L4, L5   lamina over the scar at L3-L4, careful to spare the L1-L2 facet.  We advanced   our retractors, infiltrated the muscle with Marcaine, verified levels with   x-ray and turned our attention to Stealth guidance.     Stealth guidance for the spine with hip bone marrow  autograft aspirate via   separate incision:  We made a small stab incision over the right PSIS,   dissected down the bony prominence and then in the appropriate angle, placed a   trocar and then aspirated out 60 mL of stem cell rich bone marrow blood for   use later.  This was spun down with special centrifuge yielding a 9 mL   mesenchymal stem cell slurry for use for autograft later, and then using   Inveni XR augmented reality vision spinous process clamp on L1, we placed   the reference frame, cover the patient, draped the patient appropriately, ran   an O-arm spin and all registration points for the screws were excellent.     L2, L3, L4, L5 stealth-guided pedicle screw fixation:  Using the RTI   streamline Stealth screws and all the [employer] XVision augmented reality   tools, we made a small corticectomy guided by our registered drill all under   Stealth, undertapped under stealth and noting soft bone everywhere and   bilaterally at L2, placed 4.5x45 mm screws; at L3 bilaterally, 4.5x45 mm   screws; at L4 bilaterally, 5.5x40 mm screws and bilaterally at L5 on the left,   placed 6.5x45 mm screw; on the right a 6.5x50 mm screw.  We stimulated the   screws.  There was abnormal stimulation of left L5, but when we re-spun the   patient with another O-arm spin, all the trajectories were excellent, so it   was very soft bone that likelt accounted for registering the 9 milliamp stimulation reading on the left L5.     Redo L3 bilateral laminotomy, redo L4 bilateral laminotomy, and L2, L5   laminectomy:  We used a Apolinar rongeur and Bettina bone cutter, we removed   the spinous process of L2, L3, L4, L5, morselized for autograft later. We used   Midas Jose Armando drill, drilled off the lamina widely of L2, L3, L4 to perform   bilateral redo laminotomies, L3-L4 noting severe scar and L2 and L5 were   virgin levels.  We used sweeping with a [employer] dental, we were able to resect   the scar off with bilateral laminotomies,  more on the left than the right.    Because of this, it was completely stuck down on the right side. We got a   completely decompressed lateral trench and then carried out at the L2 and L5   levels, making sure we undercut L1 and S1 to prevent junctional stenosis.  We   waxed the bony edges.  We are going to perform transpedicular approaches to   give us the additional decompression.     Bilateral L2, bilateral L3, bilateral L5 transpedicular approach, 59 modifier:    This required additional level of skill and expertise do so without   completely exposing the screws with the transpedicular approaches and   destabilizing the construct, justifying 59 modifier coding.  With my assistant   holding retraction on that delicate thecal sac at all 6 points, I drilled   along the pedicles of L2, L3 and L5 and this gave an extra decompression and   we removed a more degenerated tissue to make up for the less aggressive scar debridement that was   performed at the right L3-L4 area.  We decided we had excellent decompression.    Her main complaint was L2 and L3.  We were able to get with these   transpedicular approaches nice decompression of the foramen as well as L5 and   then we deemed this to be sufficient.  We waxed the bony edges, put down   Surgiflo.     L2, L3, L4, L5 posterolateral allograft autograft bere fusion:  We decorticated   the transverse process and lateral facets of L2, L3, L4, L5.  To that, laid   the mixture of patient's morselized autograft with bone chip allograft soaking   in bone marrow aspirate autograft for onlay fusion.  This was supplemented   with a 5.5 lordotic titanium alloy bere, bent with lordosis, laid neutrally and   the setscrews using  torque device to tighten down the locking   caps while performing compression bilaterally across L2-L3 and L3-L4, careful   to take AP x-rays to make sure we were not causing more worsening scoliosis.    We did a crosslink.  We were happy with the  final construct.     We copiously irrigated with bacitracin and saline.  We tunneled out a 7.5   fluted ARYAN and secured to skin with stitch.  We laid down vancomycin powder.    We closed the wound in anatomic layers with 0 Vicryl for muscle, 0 Vicryl for   the fascia, 2-0 Vicryls for the dermis and staples for the skin.  Sterile   dressing was applied.  The separate PSIS incision was closed similarly.  The   patient was rolled from prone to supine, Duncan removed and extubated.     There were no complications.  Needle and sponge count correct at the end of   the case.        ______________________________  MD JOHANA Fisher III/RODRIGUE/ALEKSEY    DD:  05/28/2021 14:22  DT:  05/28/2021 16:40    Job#:  [id number]

## 2021-05-29 NOTE — THERAPY
Physical Therapy   Initial Evaluation     Patient Name: Valentina Lu  Age:  65 y.o., Sex:  female  Medical Record #: [id number]  Today's Date: 5/29/2021     Precautions: Fall Risk, TLSO (Thoracolumbosacral orthosis), Spinal / Back Precautions     Assessment  Patient is 65 y.o. female s/p POD# 4 L2/3 XLIF, POD #1 S/p L2-S1 open laminectomy with L2-L5 Augmedics guided fusion presenting to PT most limited by lethargy. Pt responsive to commands but mostly kept eyes closed and required mod A to transition from supine to sit. Once upright, she sustained at min A to prevent posterior lean. TLSO is of poor fit and barely fits across pt's midsection. RN notified. Pt able to perform sit to stand and take a few steps, but ultimately, was transferred to recliner in the hopes of increasing her level of alertness. Once she began eating breakfast, pt appeared somewhat more awake, but still lethargic. Anticipate mobility progression once cognition clears. As she is now, recommend placement.       Plan    Recommend Physical Therapy 4 times per week until therapy goals are met for the following treatments:  Bed Mobility, Equipment, Gait Training, Neuro Re-Education / Balance, Orthotics Training, Self Care/Home Evaluation, Stair Training, Therapeutic Activities and Therapeutic Exercises    DC Equipment Recommendations: Unable to determine at this time  Discharge Recommendations: Other - (At current level, placement. Anticipate improvement with cog)          Objective       05/29/21 1007   Prior Living Situation   Prior Services None   Housing / Facility 1 Story House   Steps Into Home 2   Equipment Owned Front-Wheel Walker   Lives with - Patient's Self Care Capacity Adult Children   Comments unclear if hx accurate   Prior Level of Functional Mobility   Bed Mobility Independent   Transfer Status Independent   Ambulation Independent   Distance Ambulation (Feet)   (community?)   Assistive Devices Used Front-Wheel Walker   Comments  unclear if pt an accurate historian given lethargy   Balance Assessment   Comments Seated EOB at min A due to instability posteriorly. Standing at min A.    Gait Analysis   Gait Level Of Assist Minimal Assist   Assistive Device Front Wheel Walker   Distance (Feet) 3   Weight Bearing Status FWB   Comments stepping anteriorly and laterally. Min A. Required cues for foot plaCement.    Bed Mobility    Supine to Sit Moderate Assist   Scooting Moderate Assist   Functional Mobility   Sit to Stand Minimal Assist   Bed, Chair, Wheelchair Transfer Minimal Assist   Transfer Method Stand Step   Comments poor sequencing.    Short Term Goals    Short Term Goal # 1 Pt will perform bed mob via log rolla t spv in 6tx to improve functional independence.    Short Term Goal # 2 Pt will perform tranfsers with FWW at spv in 6tx to improve functional mobility.    Short Term Goal # 3 Pt will perform gait x150' wtih FWW at spv in 6tx to improve functional mobility.    Anticipated Discharge Equipment and Recommendations   DC Equipment Recommendations Unable to determine at this time   Discharge Recommendations Other -  (At current level, placement. Anticipate improvement with cog)

## 2021-05-29 NOTE — PROGRESS NOTES
Neurosurgery Progress Note    Subjective:  POD# 4 L2/3 XLIF.  Postop day #1 S/p L2-S1 open laminectomy with L2-L5 Augmedics guided fusion.    Patient had some confusion and possibly a TIA type symptoms last night but these are fully resolved today.  Rapid code was called.  Lab work and CT scan of the head normal.  She is still having some hallucinations which appear to be coming from medication--reduce dose of tizanidine to 2 mg and made PRN.  Patient much more coherent today and feeling more herself.  Considered an MRI of the brain but patient refuses it due to claustrophobia.  Remote history of TIA.  We will not push the issue as she is looking much better.  Patient currently awake alert and responding appropriately.  Moving all extremities well with good strength.  Leg pain better since surgery.  Back expectedly sore but tolerable  Pain controlled.  Drain output only 70 cc/8 hours.  Patient has ambulated with nursing to the bathroom  No significant abdominal pain or nausea.  No BM yet, but passing gas       Exam:  Motor 4+/5 to right hip flexor, remainder 5/5.  Abdomen distended, non-tender.   Patient currently with no obvious facial droop.    She is fully alert and oriented x3.  Speech and language clear and fluent,   Memory seems to be intact.   Patient has good 5/5 strength in upper extremities with good symmetry.  Lower extremity strength is also 5/5 other than slight weakness in right motor but this is minimal 4+/5    BP  Min: 89/57  Max: 142/87  Pulse  Av.7  Min: 63  Max: 109  Resp  Av.3  Min: 12  Max: 20  Temp  Av.3 °C (97.4 °F)  Min: 35.8 °C (96.4 °F)  Max: 36.7 °C (98.1 °F)  SpO2  Av %  Min: 93 %  Max: 100 %    No data recorded    Recent Labs     21  2106 21  0530   WBC 10.5 11.4*   RBC 3.62* 3.77*   HEMOGLOBIN 10.6* 10.8*   HEMATOCRIT 33.8* 34.9*   MCV 93.4 92.6   MCH 29.3 28.6   MCHC 31.4* 30.9*   RDW 50.0 49.2   PLATELETCT 265 252   MPV 11.5 11.1     Recent Labs      05/28/21 2106 05/29/21  0530   SODIUM 134* 137   POTASSIUM 5.1 4.4   CHLORIDE 98 98   CO2 26 29   GLUCOSE 361* 256*   BUN 19 19   CREATININE 0.75 0.71   CALCIUM 8.2* 8.9               Intake/Output                       05/28/21 0700 - 05/29/21 0659 05/29/21 0700 - 05/30/21 0659     0700-1859 1900-0659 Total 0700-1859 1900-0659 Total                 Intake    P.O.  150  -- 150  --  -- --    P.O. 150 -- 150 -- -- --    I.V.  3400  -- 3400  --  -- --    Volume (mL) (Lactated Ringers) 2800 -- 2800 -- -- --    Volume (mL) (lactated ringers infusion) 600 -- 600 -- -- --    Total Intake 3550 -- 3550 -- -- --       Output    Urine  300  -- 300  --  -- --    Urine 300 -- 300 -- -- --    Number of Times Voided 2 x 1 x 3 x -- -- --    Emesis  0  -- 0  --  -- --    Emesis 0 -- 0 -- -- --    Drains  90  240 330  --  -- --    Output (mL) (Closed/Suction Drain 1 Inferior;Midline Back Carlos Wynn 7 Fr.) 90 240 330 -- -- --    Stool  --  -- --  --  -- --    Number of Times Stooled 0 x -- 0 x -- -- --    Blood  250  -- 250  --  -- --    Est. Blood Loss 250 -- 250 -- -- --    Total Output 640 240 880 -- -- --       Net I/O     2910 -240 2670 -- -- --            Intake/Output Summary (Last 24 hours) at 5/29/2021 0916  Last data filed at 5/29/2021 0526  Gross per 24 hour   Intake 3550 ml   Output 880 ml   Net 2670 ml            • tizanidine  2 mg Q8HRS PRN   • Pharmacy Consult Request  1 Each PHARMACY TO DOSE   • MD BARR...DO NOT ADMINISTER NSAIDS or ASPIRIN unless ORDERED By Neurosurgery  1 Each PRN   • docusate sodium  100 mg BID   • senna-docusate  1 tablet Nightly   • senna-docusate  1 tablet Q24HRS PRN   • polyethylene glycol/lytes  1 Packet BID PRN   • magnesium hydroxide  30 mL QDAY PRN   • bisacodyl  10 mg Q24HRS PRN   • fleet  1 Each Once PRN   • NS   Continuous   • enoxaparin  40 mg DAILY AT 1800   • oxyCODONE immediate-release  5 mg Q3HRS PRN    Or   • oxyCODONE immediate-release  10 mg Q3HRS PRN    Or   • HYDROmorphone   0.5 mg Q3HRS PRN   • diphenhydrAMINE  25 mg Q6HRS PRN    Or   • diphenhydrAMINE  25 mg Q6HRS PRN   • ondansetron  4 mg Q4HRS PRN   • ondansetron  4 mg Q4HRS PRN   • ALPRAZolam  0.25 mg BID PRN   • cloNIDine  0.1 mg Q4HRS PRN   • benzocaine-menthol  1 Lozenge Q2HRS PRN   • vitamin D  5,000 Units DAILY   • calcium carbonate  500 mg BID   • metoclopramide  10 mg Q6HRS   • albuterol  2 Puff Q6HRS PRN   • levothyroxine  75 mcg QHS   • gabapentin  800 mg TID   • atorvastatin  20 mg QHS   • fluconazole  150 mg DAILY   • furosemide  40 mg DAILY   • venlafaxine  25 mg Q EVENING   • temazepam  30 mg HS PRN   • spironolactone  25 mg Q EVENING   • ROPINIRole  8 mg QHS   • potassium chloride ER  10 mEq Q EVENING   • omeprazole  20 mg QHS   • venlafaxine XR  150 mg Q EVENING       Assessment and Plan:  Hospital day #5  POD #4/1  Patient looks much better today and is fully cognizant .  She has been ambulating to the bathroom without difficulty.  PT is to work with her more today.  CT scan of the head and labs were normal after her RAPID last night.  Patient refuses MRI due to claustrophobia, but appears much better today so we will cancel it for now.  Has had a history of TIA.  Patient was having some hallucinations of seeing monkeys and snakes this morning.  Adjusted her tizanidine and pain medications.  Continue pain management, drain monitoring, and PT/OT.  Hopefully home by Monday  Prophylactic anticoagulation: Yes ,  Today

## 2021-05-29 NOTE — PROGRESS NOTES
"2258- assisting patient to bathroom when pt began complaining of difficulty seeing out of her right eye. Pt was able to slowly ambulate back to bed with 1x assist and FWW. Neurological assessment performed at 2300 as pt demonstrated right sided facial drooping along with minimal right arm weakness and difficultly speaking full sentences. Charge nurse notified at 2301; rapid response called. MAX Reina paged at 2315 and updated on pt situation and current status; \"will be at bedside\" to further assess the pt. FSBG 292, BP 95/59, HR 63, RR 18 at 2334. Facial drooping slowly diminishing and pt now able to open right eye; A&Ox4. No actions performed at this time; RRT left unit at 2350 and \"will round on the patient in a few hours.\" Pt encouraged to verbalize any needs/concerns. All questions answered at this time. Call light and personal belongings within reach.   "

## 2021-05-29 NOTE — CODE DOCUMENTATION
"Patient states she's had a TIA in the past with \"a little bit\" different symptoms. Previous TIA she became \"really hot\" and \"went to the floor\" with trouble speaking and weakness.   "

## 2021-05-29 NOTE — PROGRESS NOTES
Patient's daughter expressed concerns regarding new onset of confusion and anxiousness. Neurological assessment performed and vital signs taken; pt disoriented to place and situation. No pt complaints of new onset numbness/tingling. BP 90/52, HR 68, 98% O2 on 3L NC. Dr. Ankush brandon; spoke with Michele Farfan (Cameron); updated on pt status and requested orders for STAT CT of head, CBC & BMP, and UA. Pt updated on new orders; transport arriving to unit shortly to escort pt to imaging.

## 2021-05-29 NOTE — PROGRESS NOTES
2 RN Skin Check    2 RN skin check completed with KAYLEEN Lewis.   Devices in place: SCDs, Nasal Cannula and ARYAN drain.  Skin assessed under devices: yes.  Confirmed pressure ulcers found on: none.  New potential pressure ulcers noted on none. Wound consult placed No.  The following interventions in place Pillows.    Skin overall intact, no skin breakdown noted. Surgical dressings to L flank and to midline back, both CDI. Pt able to turn self from side to side.

## 2021-05-29 NOTE — CARE PLAN
Problem: Fall Risk  Goal: Patient will remain free from falls  Outcome: Progressing     Problem: Neuro Status  Goal: Neuro status will remain stable or improve  Outcome: Progressing     The patient is Watcher - Medium risk of patient condition declining or worsening    Shift Goals  Clinical Goals: Improving neuro status/orientation  Patient Goals: Comfort  Family Goals: No family present    Progress made toward(s) clinical / shift goals: Patient becoming more oriented to place and situation after call to rapid response; currently stable and comfortable.     Patient is not progressing towards the following goals:

## 2021-05-30 LAB
ANION GAP SERPL CALC-SCNC: 9 MMOL/L (ref 7–16)
BUN SERPL-MCNC: 18 MG/DL (ref 8–22)
CALCIUM SERPL-MCNC: 9.1 MG/DL (ref 8.5–10.5)
CHLORIDE SERPL-SCNC: 102 MMOL/L (ref 96–112)
CO2 SERPL-SCNC: 32 MMOL/L (ref 20–33)
CREAT SERPL-MCNC: 0.62 MG/DL (ref 0.5–1.4)
ERYTHROCYTE [DISTWIDTH] IN BLOOD BY AUTOMATED COUNT: 51.2 FL (ref 35.9–50)
GLUCOSE BLD-MCNC: 166 MG/DL (ref 65–99)
GLUCOSE BLD-MCNC: 177 MG/DL (ref 65–99)
GLUCOSE SERPL-MCNC: 208 MG/DL (ref 65–99)
HCT VFR BLD AUTO: 33.5 % (ref 37–47)
HGB BLD-MCNC: 10.3 G/DL (ref 12–16)
MCH RBC QN AUTO: 28.7 PG (ref 27–33)
MCHC RBC AUTO-ENTMCNC: 30.7 G/DL (ref 33.6–35)
MCV RBC AUTO: 93.3 FL (ref 81.4–97.8)
PLATELET # BLD AUTO: 235 K/UL (ref 164–446)
PMV BLD AUTO: 11.1 FL (ref 9–12.9)
POTASSIUM SERPL-SCNC: 3.8 MMOL/L (ref 3.6–5.5)
RBC # BLD AUTO: 3.59 M/UL (ref 4.2–5.4)
SODIUM SERPL-SCNC: 143 MMOL/L (ref 135–145)
WBC # BLD AUTO: 8.9 K/UL (ref 4.8–10.8)

## 2021-05-30 PROCEDURE — 700111 HCHG RX REV CODE 636 W/ 250 OVERRIDE (IP): Performed by: PHYSICIAN ASSISTANT

## 2021-05-30 PROCEDURE — 94760 N-INVAS EAR/PLS OXIMETRY 1: CPT

## 2021-05-30 PROCEDURE — 82962 GLUCOSE BLOOD TEST: CPT

## 2021-05-30 PROCEDURE — 80048 BASIC METABOLIC PNL TOTAL CA: CPT

## 2021-05-30 PROCEDURE — 770006 HCHG ROOM/CARE - MED/SURG/GYN SEMI*

## 2021-05-30 PROCEDURE — 94660 CPAP INITIATION&MGMT: CPT

## 2021-05-30 PROCEDURE — 85027 COMPLETE CBC AUTOMATED: CPT

## 2021-05-30 PROCEDURE — 700105 HCHG RX REV CODE 258: Performed by: PHYSICIAN ASSISTANT

## 2021-05-30 PROCEDURE — 700102 HCHG RX REV CODE 250 W/ 637 OVERRIDE(OP): Performed by: PHYSICIAN ASSISTANT

## 2021-05-30 PROCEDURE — 36415 COLL VENOUS BLD VENIPUNCTURE: CPT

## 2021-05-30 PROCEDURE — A9270 NON-COVERED ITEM OR SERVICE: HCPCS | Performed by: PHYSICIAN ASSISTANT

## 2021-05-30 RX ORDER — METOCLOPRAMIDE HYDROCHLORIDE 5 MG/ML
10 INJECTION INTRAMUSCULAR; INTRAVENOUS EVERY 6 HOURS PRN
Status: DISCONTINUED | OUTPATIENT
Start: 2021-05-30 | End: 2021-06-01 | Stop reason: HOSPADM

## 2021-05-30 RX ORDER — DEXTROSE MONOHYDRATE 25 G/50ML
50 INJECTION, SOLUTION INTRAVENOUS
Status: DISCONTINUED | OUTPATIENT
Start: 2021-05-30 | End: 2021-06-01 | Stop reason: HOSPADM

## 2021-05-30 RX ADMIN — OXYCODONE 5 MG: 5 TABLET ORAL at 04:21

## 2021-05-30 RX ADMIN — GABAPENTIN 800 MG: 400 CAPSULE ORAL at 17:27

## 2021-05-30 RX ADMIN — OXYCODONE HYDROCHLORIDE 10 MG: 10 TABLET ORAL at 23:55

## 2021-05-30 RX ADMIN — ROPINIROLE HYDROCHLORIDE 8 MG: 2 TABLET, FILM COATED ORAL at 21:02

## 2021-05-30 RX ADMIN — TIZANIDINE 2 MG: 4 TABLET ORAL at 09:47

## 2021-05-30 RX ADMIN — POTASSIUM CHLORIDE 10 MEQ: 750 TABLET, FILM COATED, EXTENDED RELEASE ORAL at 17:28

## 2021-05-30 RX ADMIN — DOCUSATE SODIUM 100 MG: 100 CAPSULE, LIQUID FILLED ORAL at 04:23

## 2021-05-30 RX ADMIN — OXYCODONE 5 MG: 5 TABLET ORAL at 15:40

## 2021-05-30 RX ADMIN — ATORVASTATIN CALCIUM 20 MG: 20 TABLET, FILM COATED ORAL at 21:02

## 2021-05-30 RX ADMIN — OXYCODONE HYDROCHLORIDE 10 MG: 10 TABLET ORAL at 19:47

## 2021-05-30 RX ADMIN — GABAPENTIN 800 MG: 400 CAPSULE ORAL at 11:57

## 2021-05-30 RX ADMIN — ANTACID TABLETS 500 MG: 500 TABLET, CHEWABLE ORAL at 17:27

## 2021-05-30 RX ADMIN — SPIRONOLACTONE 25 MG: 25 TABLET ORAL at 17:27

## 2021-05-30 RX ADMIN — TIZANIDINE 2 MG: 4 TABLET ORAL at 19:47

## 2021-05-30 RX ADMIN — FLUCONAZOLE 150 MG: 50 TABLET ORAL at 04:25

## 2021-05-30 RX ADMIN — OXYCODONE 5 MG: 5 TABLET ORAL at 11:44

## 2021-05-30 RX ADMIN — ANTACID TABLETS 500 MG: 500 TABLET, CHEWABLE ORAL at 04:23

## 2021-05-30 RX ADMIN — VENLAFAXINE 25 MG: 25 TABLET ORAL at 17:28

## 2021-05-30 RX ADMIN — ENOXAPARIN SODIUM 40 MG: 40 INJECTION SUBCUTANEOUS at 17:27

## 2021-05-30 RX ADMIN — METOCLOPRAMIDE 10 MG: 5 INJECTION, SOLUTION INTRAMUSCULAR; INTRAVENOUS at 16:36

## 2021-05-30 RX ADMIN — INSULIN HUMAN 2 UNITS: 100 INJECTION, SOLUTION PARENTERAL at 16:52

## 2021-05-30 RX ADMIN — DOCUSATE SODIUM 50 MG AND SENNOSIDES 8.6 MG 1 TABLET: 8.6; 5 TABLET, FILM COATED ORAL at 21:02

## 2021-05-30 RX ADMIN — DOCUSATE SODIUM 100 MG: 100 CAPSULE, LIQUID FILLED ORAL at 17:27

## 2021-05-30 RX ADMIN — Medication 5000 UNITS: at 04:23

## 2021-05-30 RX ADMIN — METOCLOPRAMIDE 10 MG: 5 INJECTION, SOLUTION INTRAMUSCULAR; INTRAVENOUS at 04:17

## 2021-05-30 RX ADMIN — SODIUM CHLORIDE 1000 ML: 9 INJECTION, SOLUTION INTRAVENOUS at 04:17

## 2021-05-30 RX ADMIN — OMEPRAZOLE 20 MG: 20 CAPSULE, DELAYED RELEASE ORAL at 21:02

## 2021-05-30 RX ADMIN — METOCLOPRAMIDE 10 MG: 5 INJECTION, SOLUTION INTRAMUSCULAR; INTRAVENOUS at 09:56

## 2021-05-30 RX ADMIN — LEVOTHYROXINE SODIUM 75 MCG: 0.07 TABLET ORAL at 21:02

## 2021-05-30 RX ADMIN — VENLAFAXINE HYDROCHLORIDE 150 MG: 75 CAPSULE, EXTENDED RELEASE ORAL at 17:27

## 2021-05-30 RX ADMIN — GABAPENTIN 800 MG: 400 CAPSULE ORAL at 04:23

## 2021-05-30 RX ADMIN — INSULIN HUMAN 2 UNITS: 100 INJECTION, SOLUTION PARENTERAL at 21:09

## 2021-05-30 RX ADMIN — FUROSEMIDE 40 MG: 40 TABLET ORAL at 04:23

## 2021-05-30 ASSESSMENT — PAIN DESCRIPTION - PAIN TYPE
TYPE: SURGICAL PAIN
TYPE: ACUTE PAIN;SURGICAL PAIN
TYPE: ACUTE PAIN;SURGICAL PAIN
TYPE: SURGICAL PAIN

## 2021-05-30 NOTE — CARE PLAN
Problem: Pain - Standard  Goal: Alleviation of pain or a reduction in pain to the patient’s comfort goal  Outcome: Progressing   PRN zanaflex and oxycodone in use    Problem: Fall Risk  Goal: Patient will remain free from falls  Outcome: Progressing  Universal fall precautions in place and bed alarm in use     The patient is Watcher - Medium risk of patient condition declining or worsening    Shift Goals  Clinical Goals: pain management and increase mobility safely.  Patient Goals: pain management       Progress made toward(s) clinical / shift goals:  Yes. Pt ambulated with staff using FWW; pain is under control using PRN meds, and pt has maintained orientation x 4 along with safety precautions.    Mobility Progress Note  Surgery patient: *yes  Date of surgery: *5/28/2021  Ambulated 50 ft on day of surgery? (N/A if patient did not undergo surgery today): *n/a    Patient has been up to chair, edge of bed or HOB 90 degrees for all meals?: *yes

## 2021-05-30 NOTE — PROGRESS NOTES
Neurosurgery Progress Note    Subjective:  POD# 5 L2/3 XLIF.  Postop day #2 S/p L2-S1 open laminectomy with L2-L5 [employer] guided fusion.    Patient had a better night with no further confusion after adjustments of muscle relaxants and pain medicines .  Had TIA type symptoms Friday night but these are fully resolved today.  Rapid code was called.  Lab work and CT scan of the head normal.  Considered an MRI of the brain but patient refuses it due to claustrophobia.  Remote history of TIA.  Patient much more coherent today and feeling more herself.  Moving all extremities well with good strength.  Leg pain better since surgery.  Back expectedly sore but tolerable  Pain controlled.  Drain output only 80 cc/8 hours.  Patient has ambulated with nursing to the bathroom  No significant abdominal pain or nausea.  No BM yet, but passing gas       Exam:  Motor 4+/5 to right hip flexor, remainder 5/5.  Abdomen distended, non-tender.   Patient currently with no obvious facial droop.    She is fully alert and oriented x3.  Speech and language clear and fluent,   Memory seems to be intact.   Patient has good 5/5 strength in upper extremities with good symmetry.  Lower extremity strength is also 5/5 other than slight weakness in right motor but this is minimal 4+/5    BP  Min: 97/63  Max: 110/55  Pulse  Av.7  Min: 63  Max: 83  Resp  Av  Min: 15  Max: 17  Temp  Av.6 °C (97.8 °F)  Min: 36.1 °C (97 °F)  Max: 36.8 °C (98.3 °F)  SpO2  Av.3 %  Min: 93 %  Max: 98 %    No data recorded    Recent Labs     21  0530 21  0645   WBC 10.5 11.4* 8.9   RBC 3.62* 3.77* 3.59*   HEMOGLOBIN 10.6* 10.8* 10.3*   HEMATOCRIT 33.8* 34.9* 33.5*   MCV 93.4 92.6 93.3   MCH 29.3 28.6 28.7   MCHC 31.4* 30.9* 30.7*   RDW 50.0 49.2 51.2*   PLATELETCT 265 252 235   MPV 11.5 11.1 11.1     Recent Labs     21/29/21  0530 21  0645   SODIUM 134* 137 143   POTASSIUM 5.1 4.4 3.8   CHLORIDE 98 98 102    CO2 26 29 32   GLUCOSE 361* 256* 208*   BUN 19 19 18   CREATININE 0.75 0.71 0.62   CALCIUM 8.2* 8.9 9.1               Intake/Output                       05/29/21 0700 - 05/30/21 0659 05/30/21 0700 - 05/31/21 0659     9467-3165 1763-1466 Total 0700-1859 1900-0659 Total                 Intake    P.O.  240  180 420  --  -- --    P.O. 240 180 420 -- -- --    Total Intake 240 180 420 -- -- --       Output    Urine  --  1200 1200  --  -- --    Number of Times Voided 3 x 5 x 8 x -- -- --    Urine Void (mL) -- 1200 1200 -- -- --    Drains  140  160 300  --  -- --    Output (mL) (Closed/Suction Drain 1 Inferior;Midline Back Carlos Wynn 7 Fr.) 140 160 300 -- -- --    Total Output 140 1360 1500 -- -- --       Net I/O     100 -1180 -1080 -- -- --            Intake/Output Summary (Last 24 hours) at 5/30/2021 0926  Last data filed at 5/30/2021 0437  Gross per 24 hour   Intake 420 ml   Output 1500 ml   Net -1080 ml            • tizanidine  2 mg Q8HRS PRN   • Pharmacy Consult Request  1 Each PHARMACY TO DOSE   • MD ALERT...DO NOT ADMINISTER NSAIDS or ASPIRIN unless ORDERED By Neurosurgery  1 Each PRN   • docusate sodium  100 mg BID   • senna-docusate  1 tablet Nightly   • senna-docusate  1 tablet Q24HRS PRN   • polyethylene glycol/lytes  1 Packet BID PRN   • magnesium hydroxide  30 mL QDAY PRN   • bisacodyl  10 mg Q24HRS PRN   • fleet  1 Each Once PRN   • NS   Continuous   • enoxaparin  40 mg DAILY AT 1800   • oxyCODONE immediate-release  5 mg Q3HRS PRN    Or   • oxyCODONE immediate-release  10 mg Q3HRS PRN    Or   • HYDROmorphone  0.5 mg Q3HRS PRN   • diphenhydrAMINE  25 mg Q6HRS PRN    Or   • diphenhydrAMINE  25 mg Q6HRS PRN   • ondansetron  4 mg Q4HRS PRN   • ondansetron  4 mg Q4HRS PRN   • ALPRAZolam  0.25 mg BID PRN   • cloNIDine  0.1 mg Q4HRS PRN   • benzocaine-menthol  1 Lozenge Q2HRS PRN   • vitamin D  5,000 Units DAILY   • calcium carbonate  500 mg BID   • metoclopramide  10 mg Q6HRS   • albuterol  2 Puff Q6HRS PRN    • levothyroxine  75 mcg QHS   • gabapentin  800 mg TID   • atorvastatin  20 mg QHS   • fluconazole  150 mg DAILY   • furosemide  40 mg DAILY   • venlafaxine  25 mg Q EVENING   • temazepam  30 mg HS PRN   • spironolactone  25 mg Q EVENING   • ROPINIRole  8 mg QHS   • potassium chloride ER  10 mEq Q EVENING   • omeprazole  20 mg QHS   • venlafaxine XR  150 mg Q EVENING       Assessment and Plan:  POD #5/2  Patient looks much better today and is fully cognizant .  She has been ambulating to the bathroom without difficulty.  PT is to work with her more today and encourage oob.  CT scan of the head and labs were normal after her RAPID Friday night.  Patient refuses MRI due to claustrophobia, but appears much better.  Has had a history of TIA.  No further hallucinations or after medication adjustment  Continue pain management, drain monitoring, and PT/OT.  Hopefully home by Monday  We will have PT reassess her today for rehab vs home health..  Prophylactic anticoagulation: Yes ,  Today

## 2021-05-30 NOTE — CARE PLAN
The patient is Stable - Low risk of patient condition declining or worsening    Shift Goals  Clinical Goals:Pain mgt, work w/ therapy  Patient Goals: pain management   Family Goals: No family present    Progress made toward(s) clinical / shift goals:  Offer adjunct pain mgt    Patient is not progressing towards the following goals:

## 2021-05-30 NOTE — PROGRESS NOTES
"Pt upright in bed for meal, has ambulated several times to  for voids, calls appropriately. RN reviewed POC which is for continued abx tx, work w/ therapy, eventual discharge planning. RN encouraged CDB w/ \"I.S.\" will reinforce t/o day. Fall and safety precautions in place, bed alarm on and activated. . Pt is independent w/ turns and is ambulatory for skin integrity. Pt has declined SCDs, has Lovenox for VTE, EDU done on \"ankle pumps\" to promote circulation. Hourly rounds ongoing, call light w/in reach.   "

## 2021-05-31 LAB
GLUCOSE BLD-MCNC: 135 MG/DL (ref 65–99)
GLUCOSE BLD-MCNC: 165 MG/DL (ref 65–99)
GLUCOSE BLD-MCNC: 235 MG/DL (ref 65–99)

## 2021-05-31 PROCEDURE — 700102 HCHG RX REV CODE 250 W/ 637 OVERRIDE(OP): Performed by: PHYSICIAN ASSISTANT

## 2021-05-31 PROCEDURE — 99358 PROLONG SERVICE W/O CONTACT: CPT | Performed by: PHYSICAL MEDICINE & REHABILITATION

## 2021-05-31 PROCEDURE — 97535 SELF CARE MNGMENT TRAINING: CPT

## 2021-05-31 PROCEDURE — 82962 GLUCOSE BLOOD TEST: CPT | Mod: 91

## 2021-05-31 PROCEDURE — A9270 NON-COVERED ITEM OR SERVICE: HCPCS | Performed by: PHYSICIAN ASSISTANT

## 2021-05-31 PROCEDURE — 770006 HCHG ROOM/CARE - MED/SURG/GYN SEMI*

## 2021-05-31 PROCEDURE — 700111 HCHG RX REV CODE 636 W/ 250 OVERRIDE (IP): Performed by: PHYSICIAN ASSISTANT

## 2021-05-31 RX ADMIN — ANTACID TABLETS 500 MG: 500 TABLET, CHEWABLE ORAL at 17:49

## 2021-05-31 RX ADMIN — INSULIN HUMAN 2 UNITS: 100 INJECTION, SOLUTION PARENTERAL at 14:21

## 2021-05-31 RX ADMIN — FLUCONAZOLE 150 MG: 50 TABLET ORAL at 05:08

## 2021-05-31 RX ADMIN — DOCUSATE SODIUM 100 MG: 100 CAPSULE, LIQUID FILLED ORAL at 17:49

## 2021-05-31 RX ADMIN — OXYCODONE HYDROCHLORIDE 10 MG: 10 TABLET ORAL at 20:12

## 2021-05-31 RX ADMIN — TIZANIDINE 2 MG: 4 TABLET ORAL at 17:49

## 2021-05-31 RX ADMIN — OXYCODONE HYDROCHLORIDE 10 MG: 10 TABLET ORAL at 07:31

## 2021-05-31 RX ADMIN — VENLAFAXINE HYDROCHLORIDE 150 MG: 75 CAPSULE, EXTENDED RELEASE ORAL at 17:49

## 2021-05-31 RX ADMIN — ATORVASTATIN CALCIUM 20 MG: 20 TABLET, FILM COATED ORAL at 20:12

## 2021-05-31 RX ADMIN — FUROSEMIDE 40 MG: 40 TABLET ORAL at 05:08

## 2021-05-31 RX ADMIN — INSULIN HUMAN 2 UNITS: 100 INJECTION, SOLUTION PARENTERAL at 18:01

## 2021-05-31 RX ADMIN — DOCUSATE SODIUM 100 MG: 100 CAPSULE, LIQUID FILLED ORAL at 05:08

## 2021-05-31 RX ADMIN — DIPHENHYDRAMINE HYDROCHLORIDE 25 MG: 25 TABLET ORAL at 11:12

## 2021-05-31 RX ADMIN — INSULIN HUMAN 3 UNITS: 100 INJECTION, SOLUTION PARENTERAL at 20:15

## 2021-05-31 RX ADMIN — HYDROMORPHONE HYDROCHLORIDE 0.5 MG: 1 INJECTION, SOLUTION INTRAMUSCULAR; INTRAVENOUS; SUBCUTANEOUS at 02:05

## 2021-05-31 RX ADMIN — SPIRONOLACTONE 25 MG: 25 TABLET ORAL at 17:49

## 2021-05-31 RX ADMIN — POTASSIUM CHLORIDE 10 MEQ: 750 TABLET, FILM COATED, EXTENDED RELEASE ORAL at 18:59

## 2021-05-31 RX ADMIN — VENLAFAXINE 25 MG: 25 TABLET ORAL at 18:58

## 2021-05-31 RX ADMIN — GABAPENTIN 800 MG: 400 CAPSULE ORAL at 11:11

## 2021-05-31 RX ADMIN — DOCUSATE SODIUM 50 MG AND SENNOSIDES 8.6 MG 1 TABLET: 8.6; 5 TABLET, FILM COATED ORAL at 20:12

## 2021-05-31 RX ADMIN — OXYCODONE HYDROCHLORIDE 10 MG: 10 TABLET ORAL at 04:06

## 2021-05-31 RX ADMIN — GABAPENTIN 800 MG: 400 CAPSULE ORAL at 17:49

## 2021-05-31 RX ADMIN — ENOXAPARIN SODIUM 40 MG: 40 INJECTION SUBCUTANEOUS at 17:49

## 2021-05-31 RX ADMIN — LEVOTHYROXINE SODIUM 75 MCG: 0.07 TABLET ORAL at 20:12

## 2021-05-31 RX ADMIN — Medication 5000 UNITS: at 05:08

## 2021-05-31 RX ADMIN — OXYCODONE HYDROCHLORIDE 10 MG: 10 TABLET ORAL at 11:12

## 2021-05-31 RX ADMIN — TEMAZEPAM 30 MG: 15 CAPSULE ORAL at 21:10

## 2021-05-31 RX ADMIN — OXYCODONE HYDROCHLORIDE 10 MG: 10 TABLET ORAL at 16:09

## 2021-05-31 RX ADMIN — ANTACID TABLETS 500 MG: 500 TABLET, CHEWABLE ORAL at 05:08

## 2021-05-31 RX ADMIN — GABAPENTIN 800 MG: 400 CAPSULE ORAL at 05:08

## 2021-05-31 RX ADMIN — MAGNESIUM HYDROXIDE 30 ML: 400 SUSPENSION ORAL at 07:32

## 2021-05-31 RX ADMIN — OMEPRAZOLE 20 MG: 20 CAPSULE, DELAYED RELEASE ORAL at 20:12

## 2021-05-31 ASSESSMENT — COGNITIVE AND FUNCTIONAL STATUS - GENERAL
DRESSING REGULAR UPPER BODY CLOTHING: A LITTLE
PERSONAL GROOMING: A LITTLE
DRESSING REGULAR LOWER BODY CLOTHING: A LOT
HELP NEEDED FOR BATHING: A LOT
SUGGESTED CMS G CODE MODIFIER DAILY ACTIVITY: CK
DAILY ACTIVITIY SCORE: 17
TOILETING: A LITTLE

## 2021-05-31 ASSESSMENT — PAIN DESCRIPTION - PAIN TYPE
TYPE: ACUTE PAIN
TYPE: ACUTE PAIN;SURGICAL PAIN
TYPE: ACUTE PAIN

## 2021-05-31 ASSESSMENT — GAIT ASSESSMENTS: DISTANCE (FEET): 20

## 2021-05-31 NOTE — CARE PLAN
Problem: Pain - Standard  Goal: Alleviation of pain or a reduction in pain to the patient’s comfort goal  Outcome: Progressing     Problem: Fall Risk  Goal: Patient will remain free from falls  Outcome: Progressing   The patient is Stable - Low risk of patient condition declining or worsening    Shift Goals  Clinical Goals: increase mobility  Patient Goals: pain management  Family Goals: n/a    Progress made toward(s) clinical / shift goals:  pain well managed on current pain medications, pt calls appropriately, bed alarm in place.     Patient is not progressing towards the following goals:

## 2021-05-31 NOTE — CONSULTS
Medical chart review completed.     Patient is a 65 y.o. female with a past medical history significant for DM, Hypothyroidism, GERD, ALONZO, and Hx of TIA admitted to [facility] on 5/25/2021 12:09 PM with scheduled back surgery. Patient reportedly has retrospondylolisthesis at L2-L3 causing stenosis above her previous L4-L5 fusion. Patient was previously admitted to [facility] in 2019 after her cervical fusion. Patient underwent L2-L3 XLIF with Dr. Lechuga on 5/25/21. Patient underwent her second stage L2-S1 laminectomy/laminotomy on 5/28/21.  Post-operatively patient had confusion and stroke like symptoms with Rapid response. CT head was negative and patient refused MRI due to claustrophobia. Patient has since improved cognitively.  She had active hallucinations at one point which have resolved.  UA was negative.  Patient with mild anemia and hyperglycemia.     Patient was previously living in a 1 story home with 2 steps to enter; living with adult children.  Patient was last evaluated by PT on 5/29/21 and was Gregg for gait and modA for bed mobility. Patient was last evaluated by OT on 5/29/21 and was mod-maxA for ADLs      PMH:  Past Medical History:   Diagnosis Date   • Anemia 01/05/2018   • Arrhythmia     MVP   • Arthritis     all over   • Asthma 15-18    HX of, not on meds   • BPPV (benign paroxysmal positional vertigo)    • Breast lump or mass     Sourav breasts- had removed   • Breath shortness    • Cancer (HCC)     ovarian, caught early with hysterectomy   • Chickenpox    • Dental disorder 01/05/2018    upper dentures, lower missing teeth   • depression     taking venlafaxine   • Diabetes (HCC)     oral medication   • Disorder of thyroid     low thyroid   • Emphysema of lung (HCC)    • Essential and other specified forms of tremor    • Fibromyalgia     neuropathy   • Fibromyalgia    • Foot fracture, right 12/13/2017   • GERD (gastroesophageal reflux disease)    • Urdu measles    • Heart burn    • Heart valve  "disease     mitral valve prolapse   • Helicobacter pylori    • Hemorrhagic disorder (HCC)     bruises easily   • Hiatus hernia syndrome     pt. denies 1-5-18   • High cholesterol 01/05/2018    \"Controlled with medication\"   • Hyperlipidemia    • Hypertension 09/10/2019    HX of, not currently on meds   • IBS (irritable bowel syndrome) 01/05/2018   • Indigestion    • Influenza    • Migraine with aura, without mention of intractable migraine without mention of status migrainosus    • MRSA (methicillin resistant Staphylococcus aureus) 2005   • Other specified disorder of intestines     IBS   • Oxygen dependent 09/10/2019    3 liter PRN   • Pain 09/10/2019    entire body,pain scale 6-7/10   • Pneumonia 01/05/2018    \"HX OF\"     • Polyneuropathy in diabetes(357.2)    • Renal disorder 01/05/2018    \"Due to a medication I was taking\". Name unknown   • Restless legs syndrome (RLS)    • Sleep apnea     sleep study done, states it was only for 2 hours, not the whole time   • Sleep apnea 01/05/2018    doesn't tolerate CPAP   • Sleep apnea     uses CPAP   • Snoring 01/05/2018   • Stroke (HCC) 2007    short term memory loss   • Stroke (HCC) 2018    NO RESIDUAL   • Syncope     September '08 [place]   • TIA (transient ischemic attack)     per pt has had TIAs reports last    • Urinary bladder disorder    • Urinary incontinence 01/05/2018    occ. leaking   • Vision disturbance     flashes of light right/floaters on left eye       PSH:  Past Surgical History:   Procedure Laterality Date   • PB LAMINOTOMY,LUMBAR DISK,1 INTRSP N/A 5/28/2021    Procedure: LAMINECTOMY, SPINE, LUMBAR, WITH DISCECTOMY - L2 LAMI AND REDO L3 LAMI.;  Surgeon: Remi Lechuga III, M.D.;  Location: SURGERY [facility];  Service: Neurosurgery   • LUMBAR FUSION O-ARM N/A 5/28/2021    Procedure: FUSION, SPINE, LUMBAR, WITH O-ARM IMAGING GUIDANCE - STAGE#2 L2-5 STEALTH GUIDED WITH HIP BONE MARROW AUTOGRAFT ASPIRATE;  Surgeon: Remi Lechuga III, M.D.;  " [place];  Service: Neurosurgery   • EXTREME LATERAL INTERBODY FUSION  5/25/2021    Procedure: FUSION, SPINE, XLIF - STAGE #1 L2-3.;  Surgeon: Remi Lechuga III, M.D.;  Location[place];  Service: Neurosurgery   • CERVICAL LAMINECTOMY POSTERIOR  9/23/2019    Procedure: LAMINECTOMY, SPINE, CERVICAL, POSTERIOR APPROACH- C3-4;  Surgeon: Remi Lechuga III, M.D.;  Location: [facility];  Service: Neurosurgery   • CERVICAL FUSION POSTERIOR N/A 9/23/2019    Procedure: FUSION, SPINE, CERVICAL, POSTERIOR APPROACH- C2-t1 STEALTH FUSION;  Surgeon: Remi Lechuga III, M.D.;  Location: [facility];  Service: Neurosurgery   • CERVICAL DISK AND FUSION ANTERIOR  9/18/2019    Procedure: EXPLORATION OF CERVICAL FUSION and ANTERIOR CERVICAL HARDWARE REMOVAL, ANTERIOR CERVICAL 3 - 4 INTERBODY FUSION, CERVICAL 3 - 5 ANTERIOR FIXATION;  Surgeon: Remi Lechuga III, M.D.;  Location: [facility];  Service: Neurosurgery   • CORPECTOMY  9/18/2019    Procedure: PARTIAL CERVICAL 3 - 4 CORPECTOMY;  Surgeon: Remi Lechuga III, M.D.;  Location: [facility];  Service: Neurosurgery   • CERVICAL DISK AND FUSION ANTERIOR  6/21/2019    Procedure: DISCECTOMY, SPINE, CERVICAL, ANTERIOR APPROACH, WITH FUSION - C3-5;  Surgeon: Remi Lechuga III, M.D.;  Location: [facility];  Service: Neurosurgery   • HARDWARE REMOVAL NEURO  6/21/2019    Procedure: REMOVAL, HARDWARE - C5-7;  Surgeon: Remi Lechuga III, M.D.;  Location: [facility];  Service: Neurosurgery   • LAMINOTOMY Bilateral 1/12/2018    Procedure: Bilateral L3-L4 laminectomy with right L4  transpedicular decompression;  Surgeon: Remi Lechuga III, M.D.;  Location: [facility];  Service: Neurosurgery   • EXTREME LATERAL INTERBODY FUSION Left 1/11/2018    Procedure: EXTREME LATERAL INTERBODY FUSION-STAGE #1 L3-4 XLIF;  Surgeon: Remi Lechuga III, M.D.;  Location: [facility];  Service: Neurosurgery   • CERVICAL DISK AND FUSION ANTERIOR  1/22/2016    Procedure: CERVICAL DISK AND FUSION ANTERIOR C5-7;  Surgeon: Jerry Flores M.D.;  Location: SURGERY [facility];  Service:    • MYRINGOTOMY  9/17/2010    Performed by CARY BANUELOS at SURGERY [facility]   • NASAL ENDOSCOPY  8/12/2010    Performed by CARY BANUELOS at SURGERY SAME DAY [facility]   • ETHMOIDECTOMY  8/12/2010    Performed by CARY BANUELOS at SURGERY SAME DAY [facility]   • ABDOMINAL HYSTERECTOMY TOTAL      Hysterectomy, Total Abdominal   • BREAST BIOPSY      X2 bilateral   • CHEST TUBE SUCTION      spontaneous pneumothorax - age 14   • CHOLECYSTECTOMY     • TONSILLECTOMY         FAMILY HISTORY:  Family History   Problem Relation Age of Onset   • Diabetes Other    • Heart Disease Other    • Hypertension Other    • Lung Disease Other        MEDICATIONS:  Current Facility-Administered Medications   Medication Dose   • insulin regular (HumuLIN R,NovoLIN R) injection  2-9 Units    And   • glucose 4 g chewable tablet 16 g  16 g    And   • dextrose 50% (D50W) injection 50 mL  50 mL   • metoclopramide (REGLAN) injection 10 mg  10 mg   • tizanidine (ZANAFLEX) tablet 2 mg  2 mg   • Pharmacy Consult Request ...Pain Management Review 1 Each  1 Each   • MD ALERT...DO NOT ADMINISTER NSAIDS or ASPIRIN unless ORDERED By Neurosurgery 1 Each  1 Each   • docusate sodium (COLACE) capsule 100 mg  100 mg   • senna-docusate (PERICOLACE or SENOKOT S) 8.6-50 MG per tablet 1 tablet  1 tablet   • senna-docusate (PERICOLACE or SENOKOT S) 8.6-50 MG per tablet 1 tablet  1 tablet   • polyethylene glycol/lytes (MIRALAX) PACKET 1 Packet  1 Packet   • magnesium hydroxide (MILK OF MAGNESIA) suspension 30 mL  30 mL   • bisacodyl (DULCOLAX) suppository 10 mg  10 mg   • fleet enema 133 mL  1 Each   • enoxaparin (LOVENOX) inj 40 mg  40 mg   • oxyCODONE immediate-release (ROXICODONE) tablet 5 mg  5 mg    Or   • oxyCODONE immediate  release (ROXICODONE) tablet 10 mg  10 mg    Or   • HYDROmorphone (Dilaudid) injection 0.5 mg  0.5 mg   • diphenhydrAMINE (BENADRYL) tablet/capsule 25 mg  25 mg    Or   • diphenhydrAMINE (BENADRYL) injection 25 mg  25 mg   • ondansetron (ZOFRAN) syringe/vial injection 4 mg  4 mg   • ondansetron (ZOFRAN ODT) dispertab 4 mg  4 mg   • ALPRAZolam (XANAX) tablet 0.25 mg  0.25 mg   • cloNIDine (CATAPRES) tablet 0.1 mg  0.1 mg   • benzocaine-menthol (CEPACOL) lozenge 1 Lozenge  1 Lozenge   • vitamin D (cholecalciferol) tablet 5,000 Units  5,000 Units   • calcium carbonate (TUMS) chewable tab 500 mg  500 mg   • albuterol inhaler 2 Puff  2 Puff   • levothyroxine (SYNTHROID) tablet 75 mcg  75 mcg   • gabapentin (NEURONTIN) capsule 800 mg  800 mg   • atorvastatin (LIPITOR) tablet 20 mg  20 mg   • fluconazole (DIFLUCAN) tablet 150 mg  150 mg   • furosemide (LASIX) tablet 40 mg  40 mg   • venlafaxine (EFFEXOR) tablet 25 mg  25 mg   • temazepam (RESTORIL) capsule 30 mg  30 mg   • spironolactone (ALDACTONE) tablet 25 mg  25 mg   • ROPINIRole (REQUIP) tablet 8 mg  8 mg   • potassium chloride ER (KLOR-CON) tablet 10 mEq  10 mEq   • omeprazole (PRILOSEC) capsule 20 mg  20 mg   • venlafaxine XR (EFFEXOR XR) capsule 150 mg  150 mg       ALLERGIES:  Pcn [penicillins], Demerol, Imitrex [sumatriptan succinate], Metformin, Morphine, Stadol [butorphanol tartrate], Tape, Vistaril [hyzine], and Aspartame    PSYCHOSOCIAL HISTORY:  Living Site:  Home  Living With:  family  Caregiver's availability:  Not Applicable  Number of stairs:  2  Substance use history:  Unknown      The patient presents functional deficits in mobility and self-care, and Minimal  de-conditioning. Pre-morbidly, this patient lived in a single level home with Two steps to enter,with family  The patient was evaluated by acute care Physical Therapy and Occupational Therapy; currently requiring minimal assistance for mobility and maximal assistance for ADLs. The patient's  current diet is Regular with Thin liquids.     Patient is s/p L2-L3 XLIF and Redo laminectomies with decreased mobility and ADLs post-operatively. Patient may progress rapidly and no longer need Rehab care but currently the patient is   a Good candidate for an acute inpatient rehabilitation program with a coordinated program of care at an intensity and frequency not available at a lower level of care.     Note: This recommendation requires that patient has at least CGA/Minimal Assistance needs in at least two therapy disciplines.  If patient progresses to no longer need CGA/Ady with at least two therapy disciplines they may be more appropriate for Skilled nursing facility versus home with home health.      This recommendation is substantiated by the patient's current medical condition with intervention and assessment of medical issues requiring an acute level of care for patient's safety and maximum outcome. A coordinated program of care will be provided by an interdisciplinary team including physical therapy, occupational therapy, physiatry, rehab nursing and rehab psychology. Rehab goals include improved mobility, self-care management, strength and conditioning/endurance, pain management, bowel and bladder management, mood and affect, and safety with independent home management including caregiver training. Estimated length of stay is approximately 7-10 days. Rehab potential: Good. Disposition: to pre-morbid independent living setting with supportive care of patient's family. We will continue to follow with you in anticipation of discharge to acute inpatient rehabilitation when medically stable to do so at the discretion of the attending physician. Thank you for allowing us to participate in this patient's care. Please call with any questions regarding this recommendation.    Deedee Ivy M.D.

## 2021-05-31 NOTE — CARE PLAN
Problem: Fall Risk  Goal: Patient will remain free from falls  Outcome: Progressing     Problem: Neuro Status  Goal: Neuro status will remain stable or improve  Outcome: Progressing     Problem: Neurovascular Monitoring  Goal: Patient's neurovascular status will be maintained or improve  Outcome: Progressing     Problem: Early Mobilization - Post Surgery  Goal: Early mobilization post surgery  Outcome: Met   The patient is resting in bed, eyes closed, respirations quiet and easy. Safety maintained, call bell within reach.    Shift Goals  Clinical Goals: rest and comfort  Patient Goals: pain mgt  Family Goals: No family present    Progress made toward(s) clinical / shift goals:      Patient is not progressing towards the following goals:

## 2021-05-31 NOTE — THERAPY
"Occupational Therapy  Daily Treatment     Patient Name: Valentina Lu  Age:  65 y.o., Sex:  female  Medical Record #: [id number]  Today's Date: 5/31/2021     Precautions  Precautions: Fall Risk, Spinal / Back Precautions , TLSO (Thoracolumbosacral orthosis)  Comments: TLSO OOB     Assessment    Pt is seen for OT treatment today. Pt is progressing towards OT goals. Pt is more alert and motivated to work with OT. Pt is Mod Assist for donning/doffing TLSO. Pt ed/trained on TLSO management while EOB. Pt completed functional mobility at Min Assist w/ FWW w/in room. Pt is primarily limited by decreased pain tolerance, activity tolerance, and knowledge of post-op precautions. Will continue to follow for OT services while in house.     Plan    Continue current treatment plan.    DC Equipment Recommendations: None  Discharge Recommendations: Recommend home health for continued occupational therapy services w/ family assist. If family cannot assist, then recommend placement.    Subjective    \"When I was on meds I thought I saw monkeys in my room.\"     Objective     05/31/21 0932   Total Time Spent   Total Time Spent (Mins) 30   Precautions   Precautions Fall Risk;Spinal / Back Precautions ;TLSO (Thoracolumbosacral orthosis)   Comments TLSO OOB    Pain 0 - 10 Group   Therapist Pain Assessment Post Activity Pain Same as Prior to Activity;Nurse Notified  (Pain in lower back, not quantified)   Cognition    Cognition / Consciousness   (WFL)   Level of Consciousness Alert   Comments pleasant, motivated, cooperative, appears to internalize ed, did not recall previous therapy session   Other Treatments   Other Treatments Provided Pt was ed/trained on how to nany/doff TLSO   Balance   Sitting Balance (Static) Fair +   Sitting Balance (Dynamic) Fair   Standing Balance (Static) Fair -   Standing Balance (Dynamic) Fair -   Weight Shift Sitting Fair   Weight Shift Standing Fair   Skilled Intervention Compensatory Strategies;Tactile " Cuing;Verbal Cuing;Postural Facilitation   Bed Mobility    Supine to Sit Minimal Assist  (HOB raised, reports she can sleep in recliner at home)   Sit to Supine   (NT up in chair post)   Scooting Minimal Assist   Rolling Minimal Assist to Rt.   Skilled Intervention Compensatory Strategies;Verbal Cuing;Tactile Cuing;Postural Facilitation;Sequencing   Comments Pt needed 1 set of hands to assist w/ rolling from supine to EOB   Activities of Daily Living   Eating Supervision   Grooming Supervision;Standing   Upper Body Dressing Moderate Assist  (to nany TLSO)   Lower Body Dressing Maximal Assist  (Pt reports she does not wear socks at baseline)   Toileting   (declined)   Skilled Intervention Compensatory Strategies;Tactile Cuing;Verbal Cuing;Sequencing;Postural Facilitation   Comments Intermittent assist w/ donning TLSO   How much help from another person does the patient currently need...   Putting on and taking off regular lower body clothing? 2   Bathing (including washing, rinsing, and drying)? 2   Toileting, which includes using a toilet, bedpan, or urinal? 3   Putting on and taking off regular upper body clothing? 3   Taking care of personal grooming such as brushing teeth? 3   Eating meals? 4   6 Clicks Daily Activity Score 17   Functional Mobility   Sit to Stand Minimal Assist  (w/ FWW)   Bed, Chair, Wheelchair Transfer Minimal Assist   Transfer Method Stand Step   Distance (Feet) 20   # of Times Distance was Traveled 2   Skilled Intervention Compensatory Strategies;Tactile Cuing;Verbal Cuing;Sequencing;Postural Facilitation;Facilitation   Comments Pt walked w/in room > door (household distances) w/ FWW   Activity Tolerance   Sitting in Chair 10+ min (up in chair post)   Sitting Edge of Bed 5 min   Standing 6 min   Patient / Family Goals   Patient / Family Goal #1 get better   Goal #1 Outcome Goal not met   Short Term Goals   Short Term Goal # 1 supervised level for toileting tasks   Goal Outcome # 1 Goal not  met   Short Term Goal # 2 set up to nany/ doff TLSO   Goal Outcome # 2 Goal not met   Short Term Goal # 3 set up for LB dressing, using AE as necessary   Goal Outcome # 3 Goal not met   Education Group   Role of Occupational Therapist Patient Response Patient;Eager;Acceptance;Explanation;Demonstration   Back Safety Patient Response Patient;Eager;Acceptance;Explanation;Demonstration;Verbal Demonstration;Action Demonstration   Energy Conservation Patient Response Patient;Eager;Acceptance;Explanation;Demonstration;Verbal Demonstration;Action Demonstration   ADL Patient Response Patient;Eager;Acceptance;Explanation;Demonstration;Action Demonstration;Verbal Demonstration   Pathology of Bedrest Patient Response Patient;Eager;Acceptance;Explanation;Demonstration;Action Demonstration;Verbal Demonstration   Anticipated Discharge Equipment and Recommendations   DC Equipment Recommendations None   Discharge Recommendations Recommend home health for continued occupational therapy services   Interdisciplinary Plan of Care Collaboration   IDT Collaboration with  Nursing   Patient Position at End of Therapy Seated;Chair Alarm On;Call Light within Reach;Tray Table within Reach;Phone within Reach   Collaboration Comments Report given   Session Information   Date / Session Number  5/31, 2 (2/3, 6/4)   Priority 2

## 2021-05-31 NOTE — DISCHARGE PLANNING
[facility] Transitional Care Coordination    Referral from:  Dr. Lechuga   Insurance Provider on Facesheet: Medicare   Potential Rehab Diagnosis: Other ortho    Chart review indicates patient has on going medical management and therapy needs to possibly meet inpatient rehab facility criteria with the goal of returning to community.    D/C support:Daughter Catrina and SO  Cesar. Spoke with Daughter Mohini Munoz lives with Jack. Call to Cesar waiting on return call to determine discharge support to return to the community.      Physiatry consultation  per protocol.     Last Covid test:    Results for YURI CASTRO (MRN [id number]) as of 5/31/2021 11:07   Ref. Range 5/27/2021 22:00   SARS-CoV-2 by PCR Unknown NotDetected   SARS-CoV-2 Source Unknown NP Swab        Thank you for the referral.

## 2021-05-31 NOTE — PROGRESS NOTES
Neurosurgery Progress Note    Subjective:  POD# 6 L2/3 XLIF.  Postop day #3 S/p L2-S1 open laminectomy with L2-L5 [employer] guided fusion.    Patient still slow to mobilize but mentally clear and with pain under better control.  No further confusion or hallucinations since medicines adjusted.  Moving all extremities well with good strength.  Leg pain better since surgery.  Back expectedly sore but tolerable  Pain controlled.  Drain output only 30 cc/8 hours--we will recheck at noon and DC if <30 .  Physical therapy not in to see her yesterday.  Ambulating just to the bathroom and back with walker.  Put in physiatry consult for rehab  Had TIA type symptoms Friday night but these are fully resolved today.  Rapid code was called.  Lab work and CT scan of the head normal.  Considered an MRI of the brain but patient refuses it due to claustrophobia.  Remote history of TIA.      Exam:  Motor 4+/5 to right hip flexor, remainder 5/5.  Abdomen distended, non-tender.   Patient currently with no obvious facial droop.    She is fully alert and oriented x3.  Speech and language clear and fluent,   Memory seems to be intact.   Patient has good 5/5 strength in upper extremities with good symmetry.  Lower extremity strength is also 5/5 other than slight weakness in right motor but this is minimal 4+/5    BP  Min: 108/73  Max: 137/88  Pulse  Av  Min: 68  Max: 85  Resp  Av.3  Min: 15  Max: 17  Temp  Av.3 °C (97.3 °F)  Min: 35.8 °C (96.5 °F)  Max: 36.7 °C (98 °F)  SpO2  Av.3 %  Min: 92 %  Max: 96 %    No data recorded    Recent Labs     21  2106 21  0530 21  0645   WBC 10.5 11.4* 8.9   RBC 3.62* 3.77* 3.59*   HEMOGLOBIN 10.6* 10.8* 10.3*   HEMATOCRIT 33.8* 34.9* 33.5*   MCV 93.4 92.6 93.3   MCH 29.3 28.6 28.7   MCHC 31.4* 30.9* 30.7*   RDW 50.0 49.2 51.2*   PLATELETCT 265 252 235   MPV 11.5 11.1 11.1     Recent Labs     21  2106 21  0530 21  0645   SODIUM 134* 137 143   POTASSIUM  5.1 4.4 3.8   CHLORIDE 98 98 102   CO2 26 29 32   GLUCOSE 361* 256* 208*   BUN 19 19 18   CREATININE 0.75 0.71 0.62   CALCIUM 8.2* 8.9 9.1               Intake/Output                       05/30/21 0700 - 05/31/21 0659 05/31/21 0700 - 06/01/21 0659     0700-1859 1900-0659 Total 0700-1859 1900-0659 Total                 Intake    I.V.  260  -- 260  --  -- --    Volume (mL) (NS infusion) 260 -- 260 -- -- --    Total Intake 260 -- 260 -- -- --       Output    Urine  --  -- --  --  -- --    Number of Times Voided 5 x 3 x 8 x -- -- --    Drains  110  120 230  --  -- --    Output (mL) (Closed/Suction Drain 1 Inferior;Midline Back Carlos Wynn 7 Fr.) 110 120 230 -- -- --    Stool  --  -- --  --  -- --    Number of Times Stooled 0 x -- 0 x -- -- --    Total Output 110 120 230 -- -- --       Net I/O     150 -120 30 -- -- --            Intake/Output Summary (Last 24 hours) at 5/31/2021 0900  Last data filed at 5/31/2021 0600  Gross per 24 hour   Intake --   Output 230 ml   Net -230 ml            • insulin regular  2-9 Units 4X/DAY ACHS    And   • glucose  16 g Q15 MIN PRN    And   • dextrose 50%  50 mL Q15 MIN PRN   • metoclopramide  10 mg Q6HRS PRN   • tizanidine  2 mg Q8HRS PRN   • Pharmacy Consult Request  1 Each PHARMACY TO DOSE   • MD ALERT...DO NOT ADMINISTER NSAIDS or ASPIRIN unless ORDERED By Neurosurgery  1 Each PRN   • docusate sodium  100 mg BID   • senna-docusate  1 tablet Nightly   • senna-docusate  1 tablet Q24HRS PRN   • polyethylene glycol/lytes  1 Packet BID PRN   • magnesium hydroxide  30 mL QDAY PRN   • bisacodyl  10 mg Q24HRS PRN   • fleet  1 Each Once PRN   • enoxaparin  40 mg DAILY AT 1800   • oxyCODONE immediate-release  5 mg Q3HRS PRN    Or   • oxyCODONE immediate-release  10 mg Q3HRS PRN    Or   • HYDROmorphone  0.5 mg Q3HRS PRN   • diphenhydrAMINE  25 mg Q6HRS PRN    Or   • diphenhydrAMINE  25 mg Q6HRS PRN   • ondansetron  4 mg Q4HRS PRN   • ondansetron  4 mg Q4HRS PRN   • ALPRAZolam  0.25 mg BID  PRN   • cloNIDine  0.1 mg Q4HRS PRN   • benzocaine-menthol  1 Lozenge Q2HRS PRN   • vitamin D  5,000 Units DAILY   • calcium carbonate  500 mg BID   • albuterol  2 Puff Q6HRS PRN   • levothyroxine  75 mcg QHS   • gabapentin  800 mg TID   • atorvastatin  20 mg QHS   • fluconazole  150 mg DAILY   • furosemide  40 mg DAILY   • venlafaxine  25 mg Q EVENING   • temazepam  30 mg HS PRN   • spironolactone  25 mg Q EVENING   • ROPINIRole  8 mg QHS   • potassium chloride ER  10 mEq Q EVENING   • omeprazole  20 mg QHS   • venlafaxine XR  150 mg Q EVENING       Assessment and Plan:  POD #6/3  Patient looks much better today and is fully cognizant .  She has been ambulating to the bathroom without difficulty.  Physical therapy did not see her yesterday so did not get their assessment on rehab versus home health yet.    Consulted physiatry today to evaluate for rehab.  PT is to work with her more today and encourage oob.  No further hallucinations or after medication adjustment  Continue pain management and PT/OT.  Will DC drain today.  Hopefully to rehab on Tuesday.  Prophylactic anticoagulation: Yes ,  Today

## 2021-06-01 ENCOUNTER — HOSPITAL ENCOUNTER (INPATIENT)
Facility: REHABILITATION | Age: 66
LOS: 11 days | DRG: 950 | End: 2021-06-12
Attending: PHYSICAL MEDICINE & REHABILITATION | Admitting: PHYSICAL MEDICINE & REHABILITATION
Payer: MEDICARE

## 2021-06-01 ENCOUNTER — PATIENT OUTREACH (OUTPATIENT)
Dept: HEALTH INFORMATION MANAGEMENT | Facility: OTHER | Age: 66
End: 2021-06-01

## 2021-06-01 VITALS
HEART RATE: 78 BPM | OXYGEN SATURATION: 91 % | HEIGHT: 65 IN | RESPIRATION RATE: 16 BRPM | SYSTOLIC BLOOD PRESSURE: 92 MMHG | TEMPERATURE: 97.7 F | DIASTOLIC BLOOD PRESSURE: 67 MMHG | BODY MASS INDEX: 32.95 KG/M2 | WEIGHT: 197.75 LBS

## 2021-06-01 DIAGNOSIS — Z98.1 S/P LAMINECTOMY WITH SPINAL FUSION: ICD-10-CM

## 2021-06-01 LAB
GLUCOSE BLD-MCNC: 178 MG/DL (ref 65–99)
GLUCOSE BLD-MCNC: 189 MG/DL (ref 65–99)
GLUCOSE BLD-MCNC: 191 MG/DL (ref 65–99)
GLUCOSE BLD-MCNC: 237 MG/DL (ref 65–99)

## 2021-06-01 PROCEDURE — 700102 HCHG RX REV CODE 250 W/ 637 OVERRIDE(OP): Performed by: PHYSICIAN ASSISTANT

## 2021-06-01 PROCEDURE — 700111 HCHG RX REV CODE 636 W/ 250 OVERRIDE (IP): Performed by: PHYSICAL MEDICINE & REHABILITATION

## 2021-06-01 PROCEDURE — U0005 INFEC AGEN DETEC AMPLI PROBE: HCPCS

## 2021-06-01 PROCEDURE — A9270 NON-COVERED ITEM OR SERVICE: HCPCS | Performed by: PHYSICAL MEDICINE & REHABILITATION

## 2021-06-01 PROCEDURE — 94760 N-INVAS EAR/PLS OXIMETRY 1: CPT

## 2021-06-01 PROCEDURE — 770010 HCHG ROOM/CARE - REHAB SEMI PRIVAT*

## 2021-06-01 PROCEDURE — 700102 HCHG RX REV CODE 250 W/ 637 OVERRIDE(OP): Performed by: PHYSICAL MEDICINE & REHABILITATION

## 2021-06-01 PROCEDURE — 82962 GLUCOSE BLOOD TEST: CPT | Mod: 91

## 2021-06-01 PROCEDURE — U0003 INFECTIOUS AGENT DETECTION BY NUCLEIC ACID (DNA OR RNA); SEVERE ACUTE RESPIRATORY SYNDROME CORONAVIRUS 2 (SARS-COV-2) (CORONAVIRUS DISEASE [COVID-19]), AMPLIFIED PROBE TECHNIQUE, MAKING USE OF HIGH THROUGHPUT TECHNOLOGIES AS DESCRIBED BY CMS-2020-01-R: HCPCS

## 2021-06-01 PROCEDURE — A9270 NON-COVERED ITEM OR SERVICE: HCPCS | Performed by: PHYSICIAN ASSISTANT

## 2021-06-01 PROCEDURE — 82962 GLUCOSE BLOOD TEST: CPT

## 2021-06-01 PROCEDURE — 94660 CPAP INITIATION&MGMT: CPT

## 2021-06-01 PROCEDURE — 99223 1ST HOSP IP/OBS HIGH 75: CPT | Mod: AI | Performed by: PHYSICAL MEDICINE & REHABILITATION

## 2021-06-01 RX ORDER — OMEPRAZOLE 20 MG/1
20 CAPSULE, DELAYED RELEASE ORAL
Status: CANCELLED | OUTPATIENT
Start: 2021-06-01

## 2021-06-01 RX ORDER — VENLAFAXINE 25 MG/1
25 TABLET ORAL EVERY EVENING
Status: DISCONTINUED | OUTPATIENT
Start: 2021-06-01 | End: 2021-06-12 | Stop reason: HOSPADM

## 2021-06-01 RX ORDER — VENLAFAXINE HYDROCHLORIDE 75 MG/1
150 CAPSULE, EXTENDED RELEASE ORAL EVERY EVENING
Status: CANCELLED | OUTPATIENT
Start: 2021-06-01

## 2021-06-01 RX ORDER — POTASSIUM CHLORIDE 20 MEQ/1
10 TABLET, EXTENDED RELEASE ORAL
Status: DISCONTINUED | OUTPATIENT
Start: 2021-06-01 | End: 2021-06-12 | Stop reason: HOSPADM

## 2021-06-01 RX ORDER — ALUMINA, MAGNESIA, AND SIMETHICONE 2400; 2400; 240 MG/30ML; MG/30ML; MG/30ML
20 SUSPENSION ORAL
Status: DISCONTINUED | OUTPATIENT
Start: 2021-06-01 | End: 2021-06-12 | Stop reason: HOSPADM

## 2021-06-01 RX ORDER — TRAZODONE HYDROCHLORIDE 50 MG/1
50 TABLET ORAL
Status: DISCONTINUED | OUTPATIENT
Start: 2021-06-01 | End: 2021-06-12 | Stop reason: HOSPADM

## 2021-06-01 RX ORDER — AMOXICILLIN 250 MG
2 CAPSULE ORAL 2 TIMES DAILY
Status: DISCONTINUED | OUTPATIENT
Start: 2021-06-01 | End: 2021-06-07

## 2021-06-01 RX ORDER — DEXTROSE MONOHYDRATE 25 G/50ML
50 INJECTION, SOLUTION INTRAVENOUS
Status: DISCONTINUED | OUTPATIENT
Start: 2021-06-01 | End: 2021-06-12 | Stop reason: HOSPADM

## 2021-06-01 RX ORDER — HYDRALAZINE HYDROCHLORIDE 25 MG/1
25 TABLET, FILM COATED ORAL EVERY 8 HOURS PRN
Status: DISCONTINUED | OUTPATIENT
Start: 2021-06-01 | End: 2021-06-12 | Stop reason: HOSPADM

## 2021-06-01 RX ORDER — CALCIUM CARBONATE 500 MG/1
500 TABLET, CHEWABLE ORAL 2 TIMES DAILY
Status: DISCONTINUED | OUTPATIENT
Start: 2021-06-01 | End: 2021-06-12 | Stop reason: HOSPADM

## 2021-06-01 RX ORDER — FUROSEMIDE 40 MG/1
40 TABLET ORAL DAILY
Status: CANCELLED | OUTPATIENT
Start: 2021-06-02

## 2021-06-01 RX ORDER — ATORVASTATIN CALCIUM 20 MG/1
20 TABLET, FILM COATED ORAL
Status: CANCELLED | OUTPATIENT
Start: 2021-06-01

## 2021-06-01 RX ORDER — POTASSIUM CHLORIDE 750 MG/1
10 TABLET, FILM COATED, EXTENDED RELEASE ORAL EVERY EVENING
Status: DISCONTINUED | OUTPATIENT
Start: 2021-06-01 | End: 2021-06-01

## 2021-06-01 RX ORDER — FUROSEMIDE 40 MG/1
40 TABLET ORAL DAILY
Status: DISCONTINUED | OUTPATIENT
Start: 2021-06-02 | End: 2021-06-10

## 2021-06-01 RX ORDER — GABAPENTIN 400 MG/1
800 CAPSULE ORAL 3 TIMES DAILY
Status: DISCONTINUED | OUTPATIENT
Start: 2021-06-01 | End: 2021-06-12 | Stop reason: HOSPADM

## 2021-06-01 RX ORDER — SPIRONOLACTONE 25 MG/1
25 TABLET ORAL EVERY EVENING
Status: CANCELLED | OUTPATIENT
Start: 2021-06-01

## 2021-06-01 RX ORDER — LEVOTHYROXINE SODIUM 0.07 MG/1
75 TABLET ORAL
Status: DISCONTINUED | OUTPATIENT
Start: 2021-06-01 | End: 2021-06-12 | Stop reason: HOSPADM

## 2021-06-01 RX ORDER — ROPINIROLE 2 MG/1
8 TABLET, FILM COATED ORAL
Status: CANCELLED | OUTPATIENT
Start: 2021-06-01

## 2021-06-01 RX ORDER — CALCIUM CARBONATE 500 MG/1
500 TABLET, CHEWABLE ORAL 2 TIMES DAILY
Status: CANCELLED | OUTPATIENT
Start: 2021-06-01

## 2021-06-01 RX ORDER — GABAPENTIN 400 MG/1
800 CAPSULE ORAL 3 TIMES DAILY
Status: CANCELLED | OUTPATIENT
Start: 2021-06-01

## 2021-06-01 RX ORDER — POTASSIUM CHLORIDE 750 MG/1
10 TABLET, FILM COATED, EXTENDED RELEASE ORAL EVERY EVENING
Status: CANCELLED | OUTPATIENT
Start: 2021-06-01

## 2021-06-01 RX ORDER — ONDANSETRON 4 MG/1
4 TABLET, ORALLY DISINTEGRATING ORAL 4 TIMES DAILY PRN
Status: DISCONTINUED | OUTPATIENT
Start: 2021-06-01 | End: 2021-06-12 | Stop reason: HOSPADM

## 2021-06-01 RX ORDER — TEMAZEPAM 15 MG/1
30 CAPSULE ORAL NIGHTLY PRN
Status: CANCELLED | OUTPATIENT
Start: 2021-06-01

## 2021-06-01 RX ORDER — OXYCODONE HYDROCHLORIDE 10 MG/1
10 TABLET ORAL
Status: DISCONTINUED | OUTPATIENT
Start: 2021-06-01 | End: 2021-06-09

## 2021-06-01 RX ORDER — ONDANSETRON 2 MG/ML
4 INJECTION INTRAMUSCULAR; INTRAVENOUS 4 TIMES DAILY PRN
Status: DISCONTINUED | OUTPATIENT
Start: 2021-06-01 | End: 2021-06-12 | Stop reason: HOSPADM

## 2021-06-01 RX ORDER — TEMAZEPAM 15 MG/1
30 CAPSULE ORAL NIGHTLY PRN
Status: DISCONTINUED | OUTPATIENT
Start: 2021-06-01 | End: 2021-06-12 | Stop reason: HOSPADM

## 2021-06-01 RX ORDER — VENLAFAXINE HYDROCHLORIDE 75 MG/1
150 CAPSULE, EXTENDED RELEASE ORAL EVERY EVENING
Status: DISCONTINUED | OUTPATIENT
Start: 2021-06-01 | End: 2021-06-12 | Stop reason: HOSPADM

## 2021-06-01 RX ORDER — ATORVASTATIN CALCIUM 10 MG/1
20 TABLET, FILM COATED ORAL
Status: DISCONTINUED | OUTPATIENT
Start: 2021-06-01 | End: 2021-06-12 | Stop reason: HOSPADM

## 2021-06-01 RX ORDER — OXYCODONE HYDROCHLORIDE 5 MG/1
5 TABLET ORAL
Status: DISCONTINUED | OUTPATIENT
Start: 2021-06-01 | End: 2021-06-09

## 2021-06-01 RX ORDER — POLYETHYLENE GLYCOL 3350 17 G/17G
1 POWDER, FOR SOLUTION ORAL
Status: DISCONTINUED | OUTPATIENT
Start: 2021-06-01 | End: 2021-06-07

## 2021-06-01 RX ORDER — VITAMIN B COMPLEX
5000 TABLET ORAL DAILY
Status: DISCONTINUED | OUTPATIENT
Start: 2021-06-02 | End: 2021-06-12 | Stop reason: HOSPADM

## 2021-06-01 RX ORDER — LEVOTHYROXINE SODIUM 0.07 MG/1
75 TABLET ORAL
Status: CANCELLED | OUTPATIENT
Start: 2021-06-01

## 2021-06-01 RX ORDER — FLUCONAZOLE 100 MG/1
150 TABLET ORAL DAILY
Status: COMPLETED | OUTPATIENT
Start: 2021-06-02 | End: 2021-06-08

## 2021-06-01 RX ORDER — ACETAMINOPHEN 325 MG/1
650 TABLET ORAL EVERY 4 HOURS PRN
Status: DISCONTINUED | OUTPATIENT
Start: 2021-06-01 | End: 2021-06-12 | Stop reason: HOSPADM

## 2021-06-01 RX ORDER — VITAMIN B COMPLEX
5000 TABLET ORAL DAILY
Status: CANCELLED | OUTPATIENT
Start: 2021-06-02

## 2021-06-01 RX ORDER — OMEPRAZOLE 20 MG/1
20 CAPSULE, DELAYED RELEASE ORAL
Status: DISCONTINUED | OUTPATIENT
Start: 2021-06-01 | End: 2021-06-12 | Stop reason: HOSPADM

## 2021-06-01 RX ORDER — BISACODYL 10 MG
10 SUPPOSITORY, RECTAL RECTAL
Status: DISCONTINUED | OUTPATIENT
Start: 2021-06-01 | End: 2021-06-07

## 2021-06-01 RX ORDER — DEXTROSE MONOHYDRATE 25 G/50ML
50 INJECTION, SOLUTION INTRAVENOUS
Status: CANCELLED | OUTPATIENT
Start: 2021-06-01

## 2021-06-01 RX ORDER — ECHINACEA PURPUREA EXTRACT 125 MG
2 TABLET ORAL PRN
Status: DISCONTINUED | OUTPATIENT
Start: 2021-06-01 | End: 2021-06-12 | Stop reason: HOSPADM

## 2021-06-01 RX ORDER — OXYCODONE HYDROCHLORIDE 5 MG/1
5-10 TABLET ORAL
Qty: 30 TABLET | Refills: 0 | Status: ON HOLD
Start: 2021-06-01 | End: 2021-06-11

## 2021-06-01 RX ORDER — ROPINIROLE 1 MG/1
8 TABLET, FILM COATED ORAL
Status: DISCONTINUED | OUTPATIENT
Start: 2021-06-01 | End: 2021-06-12 | Stop reason: HOSPADM

## 2021-06-01 RX ORDER — ALBUTEROL SULFATE 90 UG/1
2 AEROSOL, METERED RESPIRATORY (INHALATION) EVERY 6 HOURS PRN
Status: DISCONTINUED | OUTPATIENT
Start: 2021-06-01 | End: 2021-06-12 | Stop reason: HOSPADM

## 2021-06-01 RX ORDER — ALBUTEROL SULFATE 90 UG/1
2 AEROSOL, METERED RESPIRATORY (INHALATION) EVERY 6 HOURS PRN
Status: CANCELLED | OUTPATIENT
Start: 2021-06-01

## 2021-06-01 RX ORDER — TIZANIDINE 4 MG/1
2 TABLET ORAL EVERY 8 HOURS PRN
Status: CANCELLED | OUTPATIENT
Start: 2021-06-01

## 2021-06-01 RX ORDER — VENLAFAXINE 25 MG/1
25 TABLET ORAL EVERY EVENING
Status: CANCELLED | OUTPATIENT
Start: 2021-06-01

## 2021-06-01 RX ORDER — POLYVINYL ALCOHOL 14 MG/ML
1 SOLUTION/ DROPS OPHTHALMIC PRN
Status: DISCONTINUED | OUTPATIENT
Start: 2021-06-01 | End: 2021-06-12 | Stop reason: HOSPADM

## 2021-06-01 RX ORDER — TIZANIDINE 4 MG/1
2 TABLET ORAL EVERY 8 HOURS PRN
Status: DISCONTINUED | OUTPATIENT
Start: 2021-06-01 | End: 2021-06-07

## 2021-06-01 RX ORDER — SPIRONOLACTONE 25 MG/1
25 TABLET ORAL EVERY EVENING
Status: DISCONTINUED | OUTPATIENT
Start: 2021-06-01 | End: 2021-06-03

## 2021-06-01 RX ADMIN — OXYCODONE HYDROCHLORIDE 10 MG: 10 TABLET ORAL at 04:17

## 2021-06-01 RX ADMIN — INSULIN HUMAN 2 UNITS: 100 INJECTION, SOLUTION PARENTERAL at 17:48

## 2021-06-01 RX ADMIN — OXYCODONE HYDROCHLORIDE 10 MG: 10 TABLET ORAL at 14:24

## 2021-06-01 RX ADMIN — VENLAFAXINE 25 MG: 25 TABLET ORAL at 21:19

## 2021-06-01 RX ADMIN — INSULIN HUMAN 2 UNITS: 100 INJECTION, SOLUTION PARENTERAL at 08:49

## 2021-06-01 RX ADMIN — GABAPENTIN 800 MG: 400 CAPSULE ORAL at 04:17

## 2021-06-01 RX ADMIN — Medication 5000 UNITS: at 04:17

## 2021-06-01 RX ADMIN — VENLAFAXINE HYDROCHLORIDE 150 MG: 75 CAPSULE, EXTENDED RELEASE ORAL at 21:17

## 2021-06-01 RX ADMIN — OXYCODONE HYDROCHLORIDE 10 MG: 10 TABLET ORAL at 17:49

## 2021-06-01 RX ADMIN — ROPINIROLE HYDROCHLORIDE 8 MG: 1 TABLET, FILM COATED ORAL at 21:16

## 2021-06-01 RX ADMIN — BISACODYL 10 MG: 10 SUPPOSITORY RECTAL at 08:22

## 2021-06-01 RX ADMIN — GABAPENTIN 800 MG: 400 CAPSULE ORAL at 14:24

## 2021-06-01 RX ADMIN — TIZANIDINE 2 MG: 4 TABLET ORAL at 22:09

## 2021-06-01 RX ADMIN — OXYCODONE HYDROCHLORIDE 10 MG: 10 TABLET ORAL at 22:11

## 2021-06-01 RX ADMIN — DOCUSATE SODIUM 100 MG: 100 CAPSULE, LIQUID FILLED ORAL at 04:17

## 2021-06-01 RX ADMIN — SENNOSIDES AND DOCUSATE SODIUM 2 TABLET: 8.6; 5 TABLET ORAL at 14:24

## 2021-06-01 RX ADMIN — POTASSIUM CHLORIDE 10 MEQ: 1500 TABLET, EXTENDED RELEASE ORAL at 21:17

## 2021-06-01 RX ADMIN — ANTACID TABLETS 500 MG: 500 TABLET, CHEWABLE ORAL at 04:17

## 2021-06-01 RX ADMIN — ENOXAPARIN SODIUM 40 MG: 40 INJECTION SUBCUTANEOUS at 17:40

## 2021-06-01 RX ADMIN — GABAPENTIN 800 MG: 400 CAPSULE ORAL at 11:21

## 2021-06-01 RX ADMIN — LEVOTHYROXINE SODIUM 75 MCG: 75 TABLET ORAL at 21:20

## 2021-06-01 RX ADMIN — CALCIUM CARBONATE (ANTACID) CHEW TAB 500 MG 500 MG: 500 CHEW TAB at 21:16

## 2021-06-01 RX ADMIN — FUROSEMIDE 40 MG: 40 TABLET ORAL at 04:17

## 2021-06-01 RX ADMIN — FLUCONAZOLE 150 MG: 50 TABLET ORAL at 04:17

## 2021-06-01 RX ADMIN — SENNOSIDES AND DOCUSATE SODIUM 2 TABLET: 8.6; 5 TABLET ORAL at 21:17

## 2021-06-01 RX ADMIN — OMEPRAZOLE 20 MG: 20 CAPSULE, DELAYED RELEASE ORAL at 21:17

## 2021-06-01 RX ADMIN — SPIRONOLACTONE 25 MG: 25 TABLET, FILM COATED ORAL at 21:17

## 2021-06-01 RX ADMIN — GABAPENTIN 800 MG: 400 CAPSULE ORAL at 21:18

## 2021-06-01 RX ADMIN — ATORVASTATIN CALCIUM 20 MG: 10 TABLET, FILM COATED ORAL at 21:18

## 2021-06-01 RX ADMIN — OXYCODONE HYDROCHLORIDE 10 MG: 10 TABLET ORAL at 00:02

## 2021-06-01 RX ADMIN — TIZANIDINE 2 MG: 4 TABLET ORAL at 15:08

## 2021-06-01 RX ADMIN — INSULIN HUMAN 3 UNITS: 100 INJECTION, SOLUTION PARENTERAL at 21:24

## 2021-06-01 ASSESSMENT — PATIENT HEALTH QUESTIONNAIRE - PHQ9
8. MOVING OR SPEAKING SO SLOWLY THAT OTHER PEOPLE COULD HAVE NOTICED. OR THE OPPOSITE, BEING SO FIGETY OR RESTLESS THAT YOU HAVE BEEN MOVING AROUND A LOT MORE THAN USUAL: NOT AT ALL
3. TROUBLE FALLING OR STAYING ASLEEP OR SLEEPING TOO MUCH: NOT AT ALL
4. FEELING TIRED OR HAVING LITTLE ENERGY: NOT AT ALL
SUM OF ALL RESPONSES TO PHQ9 QUESTIONS 1 AND 2: 2
SUM OF ALL RESPONSES TO PHQ QUESTIONS 1-9: 2
1. LITTLE INTEREST OR PLEASURE IN DOING THINGS: SEVERAL DAYS
9. THOUGHTS THAT YOU WOULD BE BETTER OFF DEAD, OR OF HURTING YOURSELF: NOT AT ALL
2. FEELING DOWN, DEPRESSED, IRRITABLE, OR HOPELESS: SEVERAL DAYS
5. POOR APPETITE OR OVEREATING: NOT AT ALL
7. TROUBLE CONCENTRATING ON THINGS, SUCH AS READING THE NEWSPAPER OR WATCHING TELEVISION: NOT AT ALL
6. FEELING BAD ABOUT YOURSELF - OR THAT YOU ARE A FAILURE OR HAVE LET YOURSELF OR YOUR FAMILY DOWN: NOT AL ALL

## 2021-06-01 ASSESSMENT — LIFESTYLE VARIABLES
TOTAL SCORE: 0
EVER_SMOKED: YES
EVER FELT BAD OR GUILTY ABOUT YOUR DRINKING: NO
TOTAL SCORE: 0
TOTAL SCORE: 0
CONSUMPTION TOTAL: NEGATIVE
HAVE PEOPLE ANNOYED YOU BY CRITICIZING YOUR DRINKING: NO
ALCOHOL_USE: NO
HOW MANY TIMES IN THE PAST YEAR HAVE YOU HAD 5 OR MORE DRINKS IN A DAY: 0
EVER HAD A DRINK FIRST THING IN THE MORNING TO STEADY YOUR NERVES TO GET RID OF A HANGOVER: NO
ON A TYPICAL DAY WHEN YOU DRINK ALCOHOL HOW MANY DRINKS DO YOU HAVE: 0
AVERAGE NUMBER OF DAYS PER WEEK YOU HAVE A DRINK CONTAINING ALCOHOL: 0
HAVE YOU EVER FELT YOU SHOULD CUT DOWN ON YOUR DRINKING: NO

## 2021-06-01 ASSESSMENT — PAIN DESCRIPTION - PAIN TYPE
TYPE: SURGICAL PAIN
TYPE: ACUTE PAIN
TYPE: SURGICAL PAIN
TYPE: ACUTE PAIN

## 2021-06-01 ASSESSMENT — FIBROSIS 4 INDEX
FIB4 SCORE: 1.5
FIB4 SCORE: 1.5

## 2021-06-01 NOTE — DISCHARGE SUMMARY
DATE OF ADMISSION:  05/25/2021   DATE OF DISCHARGE/TRANSFER:  06/01/2021     ADMISSION DIAGNOSES:  1.  Lumbar spondylosis.  2.  Lumbar stenosis.  3.  Lumbar spondylolisthesis.  4.  Adult degenerative scoliosis.  5.  Status post L3-4 XLIF by Dr. Lechuga several years prior.     DISCHARGE DIAGNOSES:  1.  Lumbar spondylosis.  2.  Lumbar stenosis.  3.  Lumbar spondylolisthesis.  4.  Adult degenerative scoliosis.  5.  Status post L3-4 XLIF by Dr. Lechuga several years prior.  6.  Status post left-sided L2-3 XLIF followed by staged L2-5 laminectomy and   fusion.     HOSPITAL COURSE:  The patient is a very pleasant 65-year-old female who had   junctional spondylosis and stenosis with worsening of scoliosis above a prior   fusion construct.  After much discussion and failure of nonoperative measures,   she elected to proceed with surgical intervention.  For details of surgery,   please see Dr. Lechuga's operative report.  She underwent an L2-3 XLIF on date   of admission.  Postoperatively, she reported some right groin pain and mild   left hip pain.  She was scheduled for stage II surgery on Friday.  She   underwent stage II surgery on 05/28/2021.  Postoperatively, she has had   significant amount of pain.  She did develop some confusion and TIA-type   symptoms, but these fully resolved, although rapid code was called.  Lab work   and CT scan of the head were normal.  She did have some hallucinations, which   were presumed to be related to medications, which were adjusted.  Overall, her   motor strength remained strong other than a trace right hip flexor weakness.    Her vital signs remained stable.  She remained afebrile.  She has been slow   to mobilize here and felt to be a good candidate for inpatient rehab.    Podiatry has evaluated her and does feel she is an excellent candidate.  We   are hopeful that she will be able to be transferred today.  She has not had a   bowel movement since admission, but is passing flatus.  She  will receive a   suppository and possibly magnesium citrate today.     TRANSFER INSTRUCTIONS:  The patient is to be mobilized as tolerated with PT   and persistent OT.  She is to take pain medication as prescribed.  Ice the   incision frequently.  Stool softeners p.r.n.  May shower, no bathtub.  No   lifting greater than 5 pounds.  Return to [facility] in 2 weeks' time for   staple removal.     TRANSFER MEDICATIONS:  As per medication reconciliation.        ______________________________________________  KARYNA Guadarrama/POPEYE/LAWANDA    DD:  06/01/2021 08:34  DT:  06/01/2021 09:17    Job#:  [id number]    CC:Clarisa Winchester MD

## 2021-06-01 NOTE — THERAPY
Physical Therapy Contact Note    Discussed with RN. Patient planned for DC to rehab today. Will hold PT treatment and resume tomorrow if DC unsuccessful.    Kayla Jackson, PT, DPT  [phone]

## 2021-06-01 NOTE — PROGRESS NOTES
-----------Non-Face-to-Face------------  Prolonged non-face-to-face time was spent on 5/31/21 from 1100 to 1132 by this reviewer.  This time included medical chart review, medication review, and decision making for the appropriateness of transfer to inpatient rehabilitation.  Please see PM&R Chart Review Consult from 5/31/21 by this provider for detailed summation of the medical chart and the reasoning for current recommendations. In addition to the above, time was spent coordinating care with case management as well as transitional care coordination team in the acceptance and transfer of the patient to the inpatient rehabilitation facility setting.  Per NSG may be able to transfer tomorrow.

## 2021-06-01 NOTE — CARE PLAN
The patient is Stable - Low risk of patient condition declining or worsening    Shift Goals  Clinical Goals: increase mobility, pain control  Patient Goals: pain control  Family Goals: n/a    Progress made toward(s) clinical / shift goals:  Pt educated about non-pharmacological pain control interventions. Pain medication given per MAR. Pt up to chair for breakfast, pt ambulated up to BR with x1 assist with FWW.    Patient is not progressing towards the following goals: none      Problem: Pain - Standard  Goal: Alleviation of pain or a reduction in pain to the patient’s comfort goal  Outcome: Progressing     Problem: Fall Risk  Goal: Patient will remain free from falls  Outcome: Progressing

## 2021-06-01 NOTE — PROGRESS NOTES
Neurosurgery Progress Note    Subjective:  POD#7 L2/3 XLIF.  Postop day #4 S/p L2-S1 open laminectomy with L2-L5 [employer] guided fusion.    Patient still slow to mobilize but mentally clear and with pain under better control.  No further confusion or hallucinations since medicines adjusted.  Mild b/l groin pain, no radicular leg pain  Pain controlled.  Rehab has seen and feels patient is good candidate  Drain   No BM    Exam:  Motor 4+/5 to right hip flexor, remainder 5/5.  Abdomen distended, non-tender.   She is fully alert and oriented x3.  Speech and language clear and fluent,   Memory seems to be intact.   Patient has good 5/5 strength in upper extremities with good symmetry.  Lower extremity strength is also 5/5 other than slight weakness in right motor but this is minimal 4+/5    BP  Min: 83/57  Max: 126/80  Pulse  Av.8  Min: 71  Max: 91  Resp  Av.3  Min: 16  Max: 17  Temp  Av.4 °C (97.5 °F)  Min: 36.1 °C (97 °F)  Max: 37.1 °C (98.8 °F)  SpO2  Av.5 %  Min: 94 %  Max: 99 %    No data recorded    Recent Labs     21  0645   WBC 8.9   RBC 3.59*   HEMOGLOBIN 10.3*   HEMATOCRIT 33.5*   MCV 93.3   MCH 28.7   MCHC 30.7*   RDW 51.2*   PLATELETCT 235   MPV 11.1     Recent Labs     21  0645   SODIUM 143   POTASSIUM 3.8   CHLORIDE 102   CO2 32   GLUCOSE 208*   BUN 18   CREATININE 0.62   CALCIUM 9.1               Intake/Output                       21 07 - 21 0659 21 07 - 21 0659     6712-1518 9511-3256 Total 0622-7384 5282-5389 Total                 Intake    Total Intake -- -- -- -- -- --       Output    Drains  50  65 115  --  -- --    Output (mL) (Closed/Suction Drain 1 Inferior;Midline Back Carlos Wynn 7 Fr.) 50 65 115 -- -- --    Total Output 50 65 115 -- -- --       Net I/O     -50 -65 -115 -- -- --            Intake/Output Summary (Last 24 hours) at 2021 0813  Last data filed at 2021 0400  Gross per 24 hour   Intake --   Output 115 ml   Net  -115 ml            • insulin regular  2-9 Units 4X/DAY ACHS    And   • glucose  16 g Q15 MIN PRN    And   • dextrose 50%  50 mL Q15 MIN PRN   • metoclopramide  10 mg Q6HRS PRN   • tizanidine  2 mg Q8HRS PRN   • Pharmacy Consult Request  1 Each PHARMACY TO DOSE   • MD ALERT...DO NOT ADMINISTER NSAIDS or ASPIRIN unless ORDERED By Neurosurgery  1 Each PRN   • docusate sodium  100 mg BID   • senna-docusate  1 tablet Nightly   • senna-docusate  1 tablet Q24HRS PRN   • polyethylene glycol/lytes  1 Packet BID PRN   • magnesium hydroxide  30 mL QDAY PRN   • bisacodyl  10 mg Q24HRS PRN   • fleet  1 Each Once PRN   • enoxaparin  40 mg DAILY AT 1800   • oxyCODONE immediate-release  5 mg Q3HRS PRN    Or   • oxyCODONE immediate-release  10 mg Q3HRS PRN    Or   • HYDROmorphone  0.5 mg Q3HRS PRN   • diphenhydrAMINE  25 mg Q6HRS PRN    Or   • diphenhydrAMINE  25 mg Q6HRS PRN   • ondansetron  4 mg Q4HRS PRN   • ondansetron  4 mg Q4HRS PRN   • ALPRAZolam  0.25 mg BID PRN   • cloNIDine  0.1 mg Q4HRS PRN   • benzocaine-menthol  1 Lozenge Q2HRS PRN   • vitamin D  5,000 Units DAILY   • calcium carbonate  500 mg BID   • albuterol  2 Puff Q6HRS PRN   • levothyroxine  75 mcg QHS   • gabapentin  800 mg TID   • atorvastatin  20 mg QHS   • fluconazole  150 mg DAILY   • furosemide  40 mg DAILY   • venlafaxine  25 mg Q EVENING   • temazepam  30 mg HS PRN   • spironolactone  25 mg Q EVENING   • ROPINIRole  8 mg QHS   • potassium chloride ER  10 mEq Q EVENING   • omeprazole  20 mg QHS   • venlafaxine XR  150 mg Q EVENING       Assessment and Plan:  POD #7/4  Rehab feels she is good candidate  Patient would like to go to rehab  Dc drain  Hopefully to rehab today  Needs aggressive bowel regimen  Prophylactic anticoagulation: Yes

## 2021-06-01 NOTE — FLOWSHEET NOTE
06/01/21 1408   Events/Summary/Plan   Events/Summary/Plan RT assessment   Vital Signs   Pulse 100   Respiration 18   Pulse Oximetry 96 %   $ Pulse Oximetry (Spot Check) Yes   Respiratory Assessment   Level of Consciousness Alert   Chest Exam   Work Of Breathing / Effort Within Normal Limits   Oxygen   O2 (LPM) 3   O2 Delivery Device Silicone Nasal Cannula   Non-Invasive Ventilation ALONZO Group   Nocturnal CPAP or BIPAP CPAP - [facility]   $ System Evaluation Yes   FiO2 or LPM 3   Smoking History   Have you ever smoked Yes   Have you smoked in the last 12 months No   Confirm Quit Date 06/01/15

## 2021-06-01 NOTE — FLOWSHEET NOTE
06/01/21 1404   Patient History   Pulmonary Diagnosis COPD, ALONZO   Procedures Relevant to Respiratory Status none   Home O2 Yes   Oxygen liter flow 3   Oxygen at night only Yes  (and PRN)   Nocturnal CPAP Yes   Nocturnal CPAP Oxygen liter flow 3   Home Treatments/Frequency Yes   MDI 1/Frequency Albuterol PRN   Sleep Apnea Screening   Have you had a sleep study? Yes   Have you been diagnosed with sleep apnea? Yes   Do you use a CPAP/BIPAP/AUTOPAP? Yes   COPD Risk Screening   Do you have a history of COPD? Yes   Protocol Pathways   Protocol Pathways Bronchodilator Protocol   Bronchodilator Protocol   Med Order With RCP No   Is this an exacerbation of COPD/Asthma? No   Bronchodilator Indications History / Diagnosis   Breath Sounds Criteria 1    Benefit Criteria 1    Pulse Criteria 1    Respiratory Rate Criteria 0    S.O.B. Criteria 0    Criteria Total 3   Point Values 0-3 - PRN   Bronchodilator Goals/Outcome Patient at Stable Baseline

## 2021-06-01 NOTE — H&P
REHABILITATION HISTORY AND PHYSICAL/POST ADMISSION PHYSICAL EVALUATION    Date of Admission: 2021  Valentina Lu  RH/    [facility] Code / Diagnosis to Support: 0008.9 - Orthopaedic Disorders: Other Orthopaedic  Etiologic Diagnosis: S/P laminectomy with spinal fusion    CC: back pain, decreased mobility    HPI:  Patient is a 65 y.o. female with a past medical history significant for DM, Hypothyroidism, GERD, ALONZO, and Hx of TIA admitted to [facility] on 2021 12:09 PM with scheduled back surgery. Patient reportedly has retrospondylolisthesis at L2-L3 causing stenosis above her previous L4-L5 fusion. Patient was previously admitted to [facility] in 2019 after her cervical fusion. Patient underwent L2-L3 XLIF with Dr. Lechuga on 21. Patient underwent her second stage L2-S1 laminectomy/laminotomy on 21.  Post-operatively patient had confusion and stroke like symptoms with Rapid response. CT head was negative and patient refused MRI due to claustrophobia. Patient has since improved cognitively.  She had active hallucinations at one point which have resolved.  UA was negative.  Patient with mild anemia and hyperglycemia.      Patient was previously living in a 1 story home with 2 steps to enter; living with adult children.  Patient was last evaluated by PT on 21 and was Gregg for gait and modA for bed mobility. Patient was last evaluated by OT on 21 and was mod-maxA for ADLs     Patient tolerated transfer to [facility]. She reports she is motivated to get more independent. She reports her back pain is slightly improved from prior to the surgery but has ongoing post-operative pain. She reports she was hallucinating due to one of the pain medications but has now been taking Oxycodone. She reports she remembers being in here 2019 but she had to discharge early as a family friends son . She is motivated this time to stay as long as required. Denies SOB. Denies NVD. Denies fever or chills. No  "numbness. No new focal weakness     REVIEW OF SYSTEMS:     Comprehensive 14 point ROS was reviewed and all were negative except as noted elsewhere in this document.     PMH:  Past Medical History:   Diagnosis Date   • Anemia 01/05/2018   • Arrhythmia     MVP   • Arthritis     all over   • Asthma 15-18    HX of, not on meds   • BPPV (benign paroxysmal positional vertigo)    • Breast lump or mass     Sourav breasts- had removed   • Breath shortness    • Cancer (HCC)     ovarian, caught early with hysterectomy   • Chickenpox    • Dental disorder 01/05/2018    upper dentures, lower missing teeth   • depression     taking venlafaxine   • Diabetes (HCC)     oral medication   • Disorder of thyroid     low thyroid   • Emphysema of lung (HCC)    • Essential and other specified forms of tremor    • Fibromyalgia     neuropathy   • Fibromyalgia    • Foot fracture, right 12/13/2017   • GERD (gastroesophageal reflux disease)    • [place] measles    • Heart burn    • Heart valve disease     mitral valve prolapse   • Helicobacter pylori    • Hemorrhagic disorder (HCC)     bruises easily   • Hiatus hernia syndrome     pt. denies 1-5-18   • High cholesterol 01/05/2018    \"Controlled with medication\"   • Hyperlipidemia    • Hypertension 09/10/2019    HX of, not currently on meds   • IBS (irritable bowel syndrome) 01/05/2018   • Indigestion    • Influenza    • Migraine with aura, without mention of intractable migraine without mention of status migrainosus    • MRSA (methicillin resistant Staphylococcus aureus) 2005   • Other specified disorder of intestines     IBS   • Oxygen dependent 09/10/2019    3 liter PRN   • Pain 09/10/2019    entire body,pain scale 6-7/10   • Pneumonia 01/05/2018    \"HX OF\"     • Polyneuropathy in diabetes(357.2)    • Renal disorder 01/05/2018    \"Due to a medication I was taking\". Name unknown   • Restless legs syndrome (RLS)    • Sleep apnea     sleep study done, states it was only for 2 hours, not the whole " time   • Sleep apnea 01/05/2018    doesn't tolerate CPAP   • Sleep apnea     uses CPAP   • Snoring 01/05/2018   • Stroke (HCC) 2007    short term memory loss   • Stroke (HCC) 2018    NO RESIDUAL   • Syncope     September '08 [place]'s   • TIA (transient ischemic attack)     per pt has had TIAs reports last    • Urinary bladder disorder    • Urinary incontinence 01/05/2018    occ. leaking   • Vision disturbance     flashes of light right/floaters on left eye       PSH:  Past Surgical History:   Procedure Laterality Date   • PB LAMINOTOMY,LUMBAR DISK,1 INTRSP N/A 5/28/2021    Procedure: LAMINECTOMY, SPINE, LUMBAR, WITH DISCECTOMY - L2 LAMI AND REDO L3 LAMI.;  Surgeon: Remi Lechuga III, M.D.;  Location: [facility];  Service: Neurosurgery   • LUMBAR FUSION O-ARM N/A 5/28/2021    Procedure: FUSION, SPINE, LUMBAR, WITH O-ARM IMAGING GUIDANCE - STAGE#2 L2-5 STEALTH GUIDED WITH HIP BONE MARROW AUTOGRAFT ASPIRATE;  Surgeon: Remi Lechuga III, M.D.;  Location: [facility];  Service: Neurosurgery   • EXTREME LATERAL INTERBODY FUSION  5/25/2021    Procedure: FUSION, SPINE, XLIF - STAGE #1 L2-3.;  Surgeon: Remi Lechuga III, M.D.;  Location: [facility];  Service: Neurosurgery   • CERVICAL LAMINECTOMY POSTERIOR  9/23/2019    Procedure: LAMINECTOMY, SPINE, CERVICAL, POSTERIOR APPROACH- C3-4;  Surgeon: Remi Lechuga III, M.D.;  Location: [facility];  Service: Neurosurgery   • CERVICAL FUSION POSTERIOR N/A 9/23/2019    Procedure: FUSION, SPINE, CERVICAL, POSTERIOR APPROACH- C2-t1 STEALTH FUSION;  Surgeon: Remi Lechuga III, M.D.;  Location: [facility];  Service: Neurosurgery   • CERVICAL DISK AND FUSION ANTERIOR  9/18/2019    Procedure: EXPLORATION OF CERVICAL FUSION and ANTERIOR CERVICAL HARDWARE REMOVAL, ANTERIOR CERVICAL 3 - 4 INTERBODY FUSION, CERVICAL 3 - 5 ANTERIOR FIXATION;  Surgeon: Remi Lechuga III, M.D.;  Location: [facility];  Service:  Neurosurgery   • CORPECTOMY  9/18/2019    Procedure: PARTIAL CERVICAL 3 - 4 CORPECTOMY;  Surgeon: Remi Lechuga III, M.D.;  Location: [facility];  Service: Neurosurgery   • CERVICAL DISK AND FUSION ANTERIOR  6/21/2019    Procedure: DISCECTOMY, SPINE, CERVICAL, ANTERIOR APPROACH, WITH FUSION - C3-5;  Surgeon: Remi Lechuga III, M.D.;  Location: [facility];  Service: Neurosurgery   • HARDWARE REMOVAL NEURO  6/21/2019    Procedure: REMOVAL, HARDWARE - C5-7;  Surgeon: Remi Lechuga III, M.D.;  Location: [facility];  Service: Neurosurgery   • LAMINOTOMY Bilateral 1/12/2018    Procedure: Bilateral L3-L4 laminectomy with right L4  transpedicular decompression;  Surgeon: Remi Lechuga III, M.D.;  Location: [facility];  Service: Neurosurgery   • EXTREME LATERAL INTERBODY FUSION Left 1/11/2018    Procedure: EXTREME LATERAL INTERBODY FUSION-STAGE #1 L3-4 XLIF;  Surgeon: Remi Lechuga III, M.D.;  Location: [facility];  Service: Neurosurgery   • CERVICAL DISK AND FUSION ANTERIOR  1/22/2016    Procedure: CERVICAL DISK AND FUSION ANTERIOR C5-7;  Surgeon: Jerry Flores M.D.;  Location: [facility];  Service:    • MYRINGOTOMY  9/17/2010    Performed by CARY BANUELOS at [facility]   • NASAL ENDOSCOPY  8/12/2010    Performed by CARY BANUELOS at SURGERY SAME DAY [facility]   • ETHMOIDECTOMY  8/12/2010    Performed by CARY BANUELOS at SURGERY SAME DAY [facility]   • ABDOMINAL HYSTERECTOMY TOTAL      Hysterectomy, Total Abdominal   • BREAST BIOPSY      X2 bilateral   • CHEST TUBE SUCTION      spontaneous pneumothorax - age 14   • CHOLECYSTECTOMY     • TONSILLECTOMY         FAMILY HISTORY:  Family History   Problem Relation Age of Onset   • Diabetes Other    • Heart Disease Other    • Hypertension Other    • Lung Disease Other        MEDICATIONS:  Current Facility-Administered Medications   Medication Dose   • Respiratory Therapy  Consult     • Pharmacy Consult Request ...Pain Management Review 1 Each  1 Each   • oxyCODONE immediate-release (ROXICODONE) tablet 5 mg  5 mg    Or   • oxyCODONE immediate release (ROXICODONE) tablet 10 mg  10 mg   • hydrALAZINE (APRESOLINE) tablet 25 mg  25 mg   • acetaminophen (Tylenol) tablet 650 mg  650 mg   • senna-docusate (PERICOLACE or SENOKOT S) 8.6-50 MG per tablet 2 tablet  2 tablet    And   • polyethylene glycol/lytes (MIRALAX) PACKET 1 Packet  1 Packet    And   • magnesium hydroxide (MILK OF MAGNESIA) suspension 30 mL  30 mL    And   • bisacodyl (DULCOLAX) suppository 10 mg  10 mg   • artificial tears ophthalmic solution 1 Drop  1 Drop   • benzocaine-menthol (CEPACOL) lozenge 1 Lozenge  1 Lozenge   • mag hydrox-al hydrox-simeth (MAALOX PLUS ES or MYLANTA DS) suspension 20 mL  20 mL   • ondansetron (ZOFRAN ODT) dispertab 4 mg  4 mg    Or   • ondansetron (ZOFRAN) syringe/vial injection 4 mg  4 mg   • traZODone (DESYREL) tablet 50 mg  50 mg   • sodium chloride (OCEAN) 0.65 % nasal spray 2 Spray  2 Spray   • insulin regular (HumuLIN R,NovoLIN R) injection  2-9 Units    And   • glucose 4 g chewable tablet 16 g  16 g    And   • dextrose 50% (D50W) injection 50 mL  50 mL   • enoxaparin (LOVENOX) inj 40 mg  40 mg   • calcium carbonate (TUMS) chewable tab 500 mg  500 mg   • tizanidine (ZANAFLEX) tablet 2 mg  2 mg   • [START ON 6/2/2021] vitamin D (cholecalciferol) tablet 5,000 Units  5,000 Units   • albuterol inhaler 2 Puff  2 Puff   • atorvastatin (LIPITOR) tablet 20 mg  20 mg   • [START ON 6/2/2021] fluconazole (DIFLUCAN) tablet 150 mg  150 mg   • [START ON 6/2/2021] furosemide (LASIX) tablet 40 mg  40 mg   • gabapentin (NEURONTIN) capsule 800 mg  800 mg   • levothyroxine (SYNTHROID) tablet 75 mcg  75 mcg   • omeprazole (PRILOSEC) capsule 20 mg  20 mg   • ROPINIRole (REQUIP) tablet 8 mg  8 mg   • spironolactone (ALDACTONE) tablet 25 mg  25 mg   • temazepam (Restoril) capsule 30 mg  30 mg   • venlafaxine  (EFFEXOR) tablet 25 mg  25 mg   • venlafaxine XR (EFFEXOR XR) capsule 150 mg  150 mg   • potassium chloride SA (Kdur) tablet 10 mEq  10 mEq       ALLERGIES:  Pcn [penicillins], Demerol, Imitrex [sumatriptan succinate], Metformin, Morphine, Stadol [butorphanol tartrate], Tape, Vistaril [hyzine], and Aspartame    PSYCHOSOCIAL HISTORY:  Housing / Facility: 1 Story House  Steps Into Home: 2  Steps In Home: 0  Lives with - Patient's Self Care Capacity: Adult Children (daughter and son in law)  Equipment Owned: Front-Wheel Walker     Prior Level of Function / Living Situation:   Physical Therapy: Prior Services: None  Housing / Facility: 1 Story House  Steps Into Home: 2  Steps In Home: 0  Elevator: No  Bathroom Set up: Bathtub / Shower Combination  Equipment Owned: Front-Wheel Walker  Lives with - Patient's Self Care Capacity: Adult Children (daughter and son in law)  Bed Mobility: Independent  Transfer Status: Independent  Ambulation: Independent  Distance Ambulation (Feet):  (community?)  Assistive Devices Used: Front-Wheel Walker  Current Level of Function:   Gait Level Of Assist: Minimal Assist  Assistive Device: Front Wheel Walker  Distance (Feet): 20  Weight Bearing Status: FWB  Supine to Sit: Minimal Assist (HOB raised, reports she can sleep in recliner at home)  Sit to Supine:  (NT up in chair post)  Scooting: Minimal Assist  Rolling: Minimal Assist to Rt.  Skilled Intervention: Compensatory Strategies, Verbal Cuing, Tactile Cuing, Postural Facilitation, Sequencing  Comments: Pt needed 1 set of hands to assist w/ rolling from supine to EOB  Sit to Stand: Minimal Assist (w/ FWW)  Bed, Chair, Wheelchair Transfer: Minimal Assist  Transfer Method: Stand Step  Skilled Intervention: Compensatory Strategies, Tactile Cuing, Verbal Cuing, Sequencing, Postural Facilitation, Facilitation  Sitting in Chair: 10+ min (up in chair post)  Sitting Edge of Bed: 5 min  Standin min  Occupational Therapy:   Self Feeding:  "Independent  Grooming / Hygiene: Independent  Bathing: Independent  Dressing: Independent  Toileting: Independent  Medication Management: Independent  Laundry: Requires Assist  Kitchen Mobility: Independent  Finances: Requires Assist  Home Management: Requires Assist  Shopping: Requires Assist  Prior Level Of Mobility: Independent Without Device in Home  Prior Services: None  Housing / Facility: [address]  Current Level of Function:   Eating: Supervision  Bathing:  (NT)  Upper Body Dressing: Moderate Assist (to nany TLSO)  Lower Body Dressing: Maximal Assist (Pt reports she does not wear socks at baseline)  Toileting:  (declined)  Skilled Intervention: Compensatory Strategies, Tactile Cuing, Verbal Cuing, Sequencing, Postural Facilitation  Comments: Intermittent assist w/ donning TLSO    CURRENT LEVEL OF FUNCTION:   Same as level of function prior to admission to [facility]    PHYSICAL EXAM:     VITAL SIGNS:   height is 1.651 m (5' 5\") and weight is 89.7 kg (197 lb 12 oz). Her temporal temperature is 36.8 °C (98.2 °F). Her blood pressure is 101/58 and her pulse is 92. Her respiration is 18 and oxygen saturation is 96%.     GENERAL: No apparent distress  HEENT: Normocephalic/atraumatic, EOMI and PERRL  CARDIAC: Regular rate and rhythm, normal S1, S2   LUNGS: Clear to auscultation   ABDOMINAL: bowel sounds present, soft, nontender and nondistended    EXTREMITIES: no contractures, spasticity, or edema.  No calf tenderness bilaterally  NEURO:  Mental status:  A&Ox4 (person, place, date, situation) answers questions appropriately  Speech: fluent, no aphasia or dysarthria  CRANIAL NERVES: CN 2-12 intact  Motor:  4+/5 BUE  4/5 R HF and KE, 4+ L, otherwise 4+/5 BADF/APF  Sensory: intact to light touch through out  DTRs: 1+ patellar tendons      RADIOLOGY:  CT Head 5/28/21  IMPRESSION:     No acute intracranial abnormality is identified.     Similar mild white matter hypodensity is present.  This is a " nonspecific finding which usually is found to represent chronic microvascular disease in patient's of this demographic.  Demyelination, age indeterminant ischemia and gliosis are also common   possibilities.     Diminished mild sinus inflammatory disease    LABS:  Recent Labs     05/30/21  0645   SODIUM 143   POTASSIUM 3.8   CHLORIDE 102   CO2 32   GLUCOSE 208*   BUN 18   CREATININE 0.62   CALCIUM 9.1     Recent Labs     05/30/21  0645   WBC 8.9   RBC 3.59*   HEMOGLOBIN 10.3*   HEMATOCRIT 33.5*   MCV 93.3   MCH 28.7   MCHC 30.7*   RDW 51.2*   PLATELETCT 235   MPV 11.1             PRIMARY REHAB DIAGNOSIS:    This patient is a 65 y.o. female admitted for acute inpatient rehabilitation with S/P laminectomy with spinal fusion.    IMPAIRMENTS:   ADLs/IADLs  Mobility    SECONDARY DIAGNOSIS/MEDICAL CO-MORBIDITIES AFFECTING FUNCTION:  DM2  HTN  COPD  HLD  Anemia  Hypothyroidism  GERD  RLS  Depression    RELEVANT CHANGES SINCE PREADMISSION EVALUATION:    Status unchanged    The patient's rehabilitation potential is Good  The patient's medical prognosis is good    PLAN:   Discussion and Recommendations:   1. The patient requires an acute inpatient rehabilitation program with a coordinated program of care at an intensity and frequency not available at a lower level of care. This recommendation is substantiated by the patient's medical physicians who recommend that the patient's intervention and assessment of medical issues needs to be done at an acute level of care for patient's safety and maximum outcome.   2. A coordinated program of care will be supplied by an interdisciplinary team of physical therapy, occupational therapy, rehab physician, rehab nursing, and, if needed, speech therapy and rehab psychology. Rehab team presents a patient-specific rehabilitation and education program concentrating on prevention of future problems related to accessibility, mobility, skin, bowel, bladder, sexuality, and psychosocial and  medical/surgical problems.   3. Need for Rehabilitation Physician: The rehab physician will be evaluating the patient on a multi-weekly basis to help coordinate the program of care. The rehab physician communicates between medical physicians, therapists, and nurses to maximize the patient's potential outcome. Specific areas in which the rehab physician will be providing daily assessment include the following:   A. Assessing the patient's heart rate and blood pressure response (vitals monitoring) to activity and making adjustments in medications or conservative measures as needed.   B. The rehab physician will be assessing the frequency at which the program can be increased to allow the patient to reach optimal functional outcome.   C. The rehab physician will also provide assessments in daily skin care, especially in light of patient's impairments in mobility.   D. The rehab physician will provide special expertise in understanding how to work with functional impairment and recommend appropriate interventions, compensatory techniques, and education that will facilitate the patient's outcome.   4. Rehab R.N.   The rehab RN will be working with patient to carry over in room mobility and activities of daily living when the patient is not in 3 hours of skilled therapy. Rehab nursing will be working in conjunction with rehab physician to address all the medical issues above and continue to assess laboratory work and discuss abnormalities with the treating physicians, assess vitals, and response to activity, and discuss and report abnormalities with the rehab physician. Rehab RN will also continue daily skin care, supervise bladder/bowel program, instruct in medication administration, and ensure patient safety.   5. Rehab Therapy: Therapies to treat at intensity and frequency of (may change after completion of evaluation by all therapeutic disciplines):       PT:  Physical therapy to address mobility, transfer, gait  training and evaluation for adaptive equipment needs 1 and 1/2 hour/day at least 5 days/week for the duration of the ELOS (see below)       OT:  Occupational therapy to address ADLs, self-care, home management training, functional mobility/transfers and assistive device evaluation, and community re-integration 1 and 1/2 hour/day at least 5 days/week for the duration of the ELOS (see below).     Medical management / Rehabilitation Issues/ Adverse Potential as part of rehabilitation plan     REHABILITATION ISSUES/ADVERSE POTENTIAL:  1. Lumbar retrospondylolisthesis - Patient with significant back pain caused by retrospondy of her L2 on her previous back surgery. Patient is now s/p L2-L3 XLIF and then L2-S1 redo laminectomies with Dr. Lechuga on 5/25/21 and 5/28/21. Patient demonstrates functional deficits in strength, balance, coordination, and ADL's. Patient is admitted to [facility] for comprehensive rehabilitation therapy as described below.   Rehabilitation nursing monitors bowel and bladder control, educates on medication administration, co-morbidities and monitors patient safety.    2.  Neurostimulants: None at this time but continue to assess daily for need to initiate should status change.    3.  DVT prophylaxis:  Patient is on Lovenox for anticoagulation upon transfer. Encourage OOB. Monitor daily for signs and symptoms of DVT including but not limited to swelling and pain to prevent the development of DVT that may interfere with therapies.    4.  GI prophylaxis:  On prilosec to prevent gastritis/dyspepsia which may interfere with therapies.    5.  Pain: No issues with pain currently / Controlled with APAP/Oxycodone/Gabapentin    6.  Nutrition/Dysphagia: Dietician monitors nutrient intake, recommend supplements prn and provide nutrition education to pt/family to promote optimal nutrition for wound healing/recovery.     7.  Bladder/bowel:  Start bowel and bladder program as described below,  to prevent constipation, urinary retention (which may lead to UTI), and urinary incontinence (which will impact upon pt's functional independence).   - Post void bladder scans, I&O cath for PVRs >400  - up to commode after meal     8.  Skin/dermal ulcer prophylaxis: Monitor for new skin conditions with q.2 h. turns as required to prevent the development of skin breakdown.     9.  Cognition/Behavior: As needed psychologist provides adjustment counseling to illness and psychosocial barriers that may be potential barriers to rehabilitation.     10. Respiratory therapy: RT performs O2 management prn, breathing retraining, pulmonary hygiene and bronchospasm management prn to optimize participation in therapies.     MEDICAL CO-MORBIDITIES/ADVERSE POTENTIAL AFFECTING FUNCTION:  Lumbar retrospondylolisthesis - Patient with significant back pain caused by retrospondy of her L2 on her previous back surgery. Patient is now s/p L2-L3 XLIF and then L2-S1 redo laminectomies with Dr. Lechuga on 5/25/21 and 5/28/21  -PT and OT for mobility and ADLs. TLSO   -Follow-up with NSG.     Pain Control - Patient with post-operative and neuropathic pain. Continue PRN Tylenol, Oxycodone, and scheduled Gabapentin.     DM2 - Patient on SSI on transfer. Will monitor.     HTN - Patient on Spironolactone and Lasix on transfer.     COPD - Patient on PRN Albuterol. On 2L on transfer. RT to consult    HLD - Patient on Atorvastatin 20 mg daily.     Anemia - Check AM CBC    Hypothyroidism - On 75 mcg Levothyroxine, check TSH    GERD - Patient on Prilosec QHS    RLS - On Requip QHS.    Depression - Patient on Effexor  mg and 25 mg short acting    DVT ppx - Patient on Lovenox on transfer.      I personally performed a complete drug regimen review and no potential clinically significant medication issues were identified.     Pt was seen today for 72 min, and entire time spent in face-to-face contact was >50% in counseling and coordination of care as  detailed in A/P above.        GOALS/EXPECTED LEVEL OF FUNCTION BASED ON CURRENT MEDICAL AND FUNCTIONAL STATUS (may change based on patient's medical status and rate of impairment recovery):  Transfers:   Modified Independent  Mobility/Gait:   Modified Independent  ADL's:   Modified Independent    DISPOSITION: Discharge to pre-morbid independent living setting with the supportive care of patient's family.    ELOS: 10-14 days

## 2021-06-01 NOTE — CARE PLAN
Problem: Knowledge Deficit - Standard  Goal: Patient and family/care givers will demonstrate understanding of plan of care, disease process/condition, diagnostic tests and medications  Note: Pt educated to use call light when in need of assistance, call light and personal belongings within reach. Bed alarm and chair alarm in used.       The patient is Watcher - Medium risk of patient condition declining or worsening

## 2021-06-01 NOTE — PROGRESS NOTES
"BP (!) 92/67   Pulse 78   Temp 36.5 °C (97.7 °F) (Temporal)   Resp 16   Ht 1.651 m (5' 5\")   Wt 89.7 kg (197 lb 12 oz)   SpO2 91%     Pt AAOx4, VSS on 3L O2. Discharge education provided, discussed follow-up appointments and medications. No questions or concerns at this time. Pt discharged to [facility] with belongings and Cobra packet, IV removed. AVS and consent for controlled substance signed by pt.  "

## 2021-06-01 NOTE — PROGRESS NOTES
Patient admitted to facility at 1230 via wheelchair; accompanied by hospital transport.  Patient assisted to room and positioned in bed for comfort and safety; call light within reach.  Patient assisted with stowing belongings and oriented to room and facility.  Admission assessment performed and documented in computer.  Admission paperwork completed; signed copies placed in chart.  Will continue to monitor.    2 RN skin check done with admitting RN and Tiffany RN. Face photo and skin photos documented in media. Appropriate LDAs opened.   Pt with Azk score of 18, RN wound protocol not needed at this time.

## 2021-06-01 NOTE — PREADMISSION SCREENING NOTE
Pre-Admission Screening Form    Patient Information:   Name: Valentina Lu     MRN: [id number]       : 1955      Age: 65 y.o.   Gender: female      Race: White [7]       Marital Status:  [4]  Family Contact: Mohini Curiel,Ashlie Young        Relationship: Daughter [2]  Significant other [13]  Daughter [2]  Home Phone: [phone]             Cell Phone: [phone]  Advanced Directives: None  Code Status:  FULL  Current Attending Provider: Remi Lechuga III, M.D.  Referring Physician: Dr. Lechuga Neurosurgeon     Physiatrist Consult: Dr. Ed Whitt      Referral Date: 2021   Primary Payor Source:  MEDICARE  Secondary Payor Source:  MEDICAID S    Medical Information:   Date of Admission to Acute Care [place]2021  Room Number: T314/02  Rehabilitation Diagnosis: 0008.9 - Orthopaedic Disorders: Other Orthopaedic    Immunization History   Administered Date(s) Administered   • INFLUENZA TIV (IM) 10/01/2015   • Influenza Vaccine Quad Inj (Pf) 2018   • Pneumococcal Conjugate Vaccine (Prevnar/PCV-13) 2016   • Pneumococcal polysaccharide vaccine (PPSV-23) 2010   • ZZZInfluenza Vaccine Pediatric Preserv Free 2010     Allergies   Allergen Reactions   • Pcn [Penicillins] Anaphylaxis     Tolerated Cefazolin 14  RXN=age 6   • Demerol Itching     VUF=0957   • Imitrex [Sumatriptan Succinate] Unspecified     Heart beats fast  IGG=9169   • Metformin Vomiting and Nausea     ERE=8662   • Morphine Itching, Vomiting and Cough     voniting headaches. Tolerates dilaudid,oxycodone 16   GIY=3399   • Stadol [Butorphanol Tartrate] Itching     RXN=    • Tape Unspecified     RXN=ongoing Paper ok   • Vistaril [Hyzine] Itching     HBR=2445   • Aspartame Hives     Past Medical History:   Diagnosis Date   • Anemia 2018   • Arrhythmia     MVP   • Arthritis     all over   • Asthma 15-18    HX of, not on  "meds   • BPPV (benign paroxysmal positional vertigo)    • Breast lump or mass     Sourav breasts- had removed   • Breath shortness    • Cancer ([facility])     ovarian, caught early with hysterectomy   • Chickenpox    • Dental disorder 01/05/2018    upper dentures, lower missing teeth   • depression     taking venlafaxine   • Diabetes (HCC)     oral medication   • Disorder of thyroid     low thyroid   • Emphysema of lung ([facility])    • Essential and other specified forms of tremor    • Fibromyalgia     neuropathy   • Fibromyalgia    • Foot fracture, right 12/13/2017   • GERD (gastroesophageal reflux disease)    • French measles    • Heart burn    • Heart valve disease     mitral valve prolapse   • Helicobacter pylori    • Hemorrhagic disorder ([facility])     bruises easily   • Hiatus hernia syndrome     pt. denies 1-5-18   • High cholesterol 01/05/2018    \"Controlled with medication\"   • Hyperlipidemia    • Hypertension 09/10/2019    HX of, not currently on meds   • IBS (irritable bowel syndrome) 01/05/2018   • Indigestion    • Influenza    • Migraine with aura, without mention of intractable migraine without mention of status migrainosus    • MRSA (methicillin resistant Staphylococcus aureus) 2005   • Other specified disorder of intestines     IBS   • Oxygen dependent 09/10/2019    3 liter PRN   • Pain 09/10/2019    entire body,pain scale 6-7/10   • Pneumonia 01/05/2018    \"HX OF\"     • Polyneuropathy in diabetes(357.2)    • Renal disorder 01/05/2018    \"Due to a medication I was taking\". Name unknown   • Restless legs syndrome (RLS)    • Sleep apnea     sleep study done, states it was only for 2 hours, not the whole time   • Sleep apnea 01/05/2018    doesn't tolerate CPAP   • Sleep apnea     uses CPAP   • Snoring 01/05/2018   • Stroke ([facility]) 2007    short term memory loss   • Stroke ([facility]) 2018    NO RESIDUAL   • Syncope     September '08 [place]   • TIA (transient ischemic attack)     per pt has had TIAs reports last    • " Urinary bladder disorder    • Urinary incontinence 01/05/2018    occ. leaking   • Vision disturbance     flashes of light right/floaters on left eye     Past Surgical History:   Procedure Laterality Date   • PB LAMINOTOMY,LUMBAR DISK,1 INTRSP N/A 5/28/2021    Procedure: LAMINECTOMY, SPINE, LUMBAR, WITH DISCECTOMY - L2 LAMI AND REDO L3 LAMI.;  Surgeon: Remi Lechuga III, M.D.;  Location: [facility];  Service: Neurosurgery   • LUMBAR FUSION O-ARM N/A 5/28/2021    Procedure: FUSION, SPINE, LUMBAR, WITH O-ARM IMAGING GUIDANCE - STAGE#2 L2-5 STEALTH GUIDED WITH HIP BONE MARROW AUTOGRAFT ASPIRATE;  Surgeon: Remi Lechuga III, M.D.;  Location: [facility];  Service: Neurosurgery   • EXTREME LATERAL INTERBODY FUSION  5/25/2021    Procedure: FUSION, SPINE, XLIF - STAGE #1 L2-3.;  Surgeon: Remi Lechgua III, M.D.;  Location: [facility];  Service: Neurosurgery   • CERVICAL LAMINECTOMY POSTERIOR  9/23/2019    Procedure: LAMINECTOMY, SPINE, CERVICAL, POSTERIOR APPROACH- C3-4;  Surgeon: Remi Lechuga III, M.D.;  Location: [facility];  Service: Neurosurgery   • CERVICAL FUSION POSTERIOR N/A 9/23/2019    Procedure: FUSION, SPINE, CERVICAL, POSTERIOR APPROACH- C2-t1 STEALTH FUSION;  Surgeon: Remi Lechuga III, M.D.;  Location: [facility];  Service: Neurosurgery   • CERVICAL DISK AND FUSION ANTERIOR  9/18/2019    Procedure: EXPLORATION OF CERVICAL FUSION and ANTERIOR CERVICAL HARDWARE REMOVAL, ANTERIOR CERVICAL 3 - 4 INTERBODY FUSION, CERVICAL 3 - 5 ANTERIOR FIXATION;  Surgeon: Remi Lechuga III, M.D.;  Location: [facility];  Service: Neurosurgery   • CORPECTOMY  9/18/2019    Procedure: PARTIAL CERVICAL 3 - 4 CORPECTOMY;  Surgeon: Remi Lechuga III, M.D.;  Location: [facility];  Service: Neurosurgery   • CERVICAL DISK AND FUSION ANTERIOR  6/21/2019    Procedure: DISCECTOMY, SPINE, CERVICAL, ANTERIOR APPROACH, WITH FUSION - C3-5;  Surgeon: Remi HENRY  Ankush MALDONADO M.D.;  Location: [facility];  Service: Neurosurgery   • HARDWARE REMOVAL NEURO  6/21/2019    Procedure: REMOVAL, HARDWARE - C5-7;  Surgeon: Remi Lechuga III, M.D.;  Location: [facility];  Service: Neurosurgery   • LAMINOTOMY Bilateral 1/12/2018    Procedure: Bilateral L3-L4 laminectomy with right L4  transpedicular decompression;  Surgeon: Remi Lechuga III, M.D.;  Location: SURGERY [facility];  Service: Neurosurgery   • EXTREME LATERAL INTERBODY FUSION Left 1/11/2018    Procedure: EXTREME LATERAL INTERBODY FUSION-STAGE #1 L3-4 XLIF;  Surgeon: Remi Lechuga III, M.D.;  Location: [facility];  Service: Neurosurgery   • CERVICAL DISK AND FUSION ANTERIOR  1/22/2016    Procedure: CERVICAL DISK AND FUSION ANTERIOR C5-7;  Surgeon: Jerry Flores M.D.;  Location: [facility];  Service:    • MYRINGOTOMY  9/17/2010    Performed by CARY BANUELOS at SURGERY [facility]   • NASAL ENDOSCOPY  8/12/2010    Performed by CARY BANUELOS at SURGERY SAME DAY [facility]   • ETHMOIDECTOMY  8/12/2010    Performed by CARY BANUELOS at SURGERY SAME DAY [facility]   • ABDOMINAL HYSTERECTOMY TOTAL      Hysterectomy, Total Abdominal   • BREAST BIOPSY      X2 bilateral   • CHEST TUBE SUCTION      spontaneous pneumothorax - age 14   • CHOLECYSTECTOMY     • TONSILLECTOMY          History Leading to Admission, Conditions that Caused the Need for Rehab (CMS):    H and P scanned into EPIC  DX:  Lumbar Stenosis with radiculopathy and Instability.         Deedee Ivy M.D.   Physician   Physical Medicine & Rehab     Date of service: 5/31/2021   Medical chart review completed.      Patient is a 65 y.o. female with a past medical history significant for DM, Hypothyroidism, GERD, ALONZO, and Hx of TIA admitted to [facility] on 5/25/2021 12:09 PM with scheduled back surgery. Patient reportedly has retrospondylolisthesis at L2-L3 causing stenosis above  her previous L4-L5 fusion. Patient was previously admitted to [facility] in 2019 after her cervical fusion. Patient underwent L2-L3 XLIF with Dr. Lechuga on 5/25/21. Patient underwent her second stage L2-S1 laminectomy/laminotomy on 5/28/21.  Post-operatively patient had confusion and stroke like symptoms with Rapid response. CT head was negative and patient refused MRI due to claustrophobia. Patient has since improved cognitively.  She had active hallucinations at one point which have resolved.  UA was negative.  Patient with mild anemia and hyperglycemia.      Patient was previously living in a 1 story home with 2 steps to enter; living with adult children.  Patient was last evaluated by PT on 5/29/21 and was Gregg for gait and modA for bed mobility. Patient was last evaluated by OT on 5/29/21 and was mod-maxA for ADLs          MEDICATIONS:       Current Facility-Administered Medications   Medication Dose   • insulin regular (HumuLIN R,NovoLIN R) injection  2-9 Units     And   • glucose 4 g chewable tablet 16 g  16 g     And   • dextrose 50% (D50W) injection 50 mL  50 mL   • metoclopramide (REGLAN) injection 10 mg  10 mg   • tizanidine (ZANAFLEX) tablet 2 mg  2 mg   • Pharmacy Consult Request ...Pain Management Review 1 Each  1 Each   • MD ALERT...DO NOT ADMINISTER NSAIDS or ASPIRIN unless ORDERED By Neurosurgery 1 Each  1 Each   • docusate sodium (COLACE) capsule 100 mg  100 mg   • senna-docusate (PERICOLACE or SENOKOT S) 8.6-50 MG per tablet 1 tablet  1 tablet   • senna-docusate (PERICOLACE or SENOKOT S) 8.6-50 MG per tablet 1 tablet  1 tablet   • polyethylene glycol/lytes (MIRALAX) PACKET 1 Packet  1 Packet   • magnesium hydroxide (MILK OF MAGNESIA) suspension 30 mL  30 mL   • bisacodyl (DULCOLAX) suppository 10 mg  10 mg   • fleet enema 133 mL  1 Each   • enoxaparin (LOVENOX) inj 40 mg  40 mg   • oxyCODONE immediate-release (ROXICODONE) tablet 5 mg  5 mg     Or   • oxyCODONE immediate release (ROXICODONE) tablet 10 mg   10 mg     Or   • HYDROmorphone (Dilaudid) injection 0.5 mg  0.5 mg   • diphenhydrAMINE (BENADRYL) tablet/capsule 25 mg  25 mg     Or   • diphenhydrAMINE (BENADRYL) injection 25 mg  25 mg   • ondansetron (ZOFRAN) syringe/vial injection 4 mg  4 mg   • ondansetron (ZOFRAN ODT) dispertab 4 mg  4 mg   • ALPRAZolam (XANAX) tablet 0.25 mg  0.25 mg   • cloNIDine (CATAPRES) tablet 0.1 mg  0.1 mg   • benzocaine-menthol (CEPACOL) lozenge 1 Lozenge  1 Lozenge   • vitamin D (cholecalciferol) tablet 5,000 Units  5,000 Units   • calcium carbonate (TUMS) chewable tab 500 mg  500 mg   • albuterol inhaler 2 Puff  2 Puff   • levothyroxine (SYNTHROID) tablet 75 mcg  75 mcg   • gabapentin (NEURONTIN) capsule 800 mg  800 mg   • atorvastatin (LIPITOR) tablet 20 mg  20 mg   • fluconazole (DIFLUCAN) tablet 150 mg  150 mg   • furosemide (LASIX) tablet 40 mg  40 mg   • venlafaxine (EFFEXOR) tablet 25 mg  25 mg   • temazepam (RESTORIL) capsule 30 mg  30 mg   • spironolactone (ALDACTONE) tablet 25 mg  25 mg   • ROPINIRole (REQUIP) tablet 8 mg  8 mg   • potassium chloride ER (KLOR-CON) tablet 10 mEq  10 mEq   • omeprazole (PRILOSEC) capsule 20 mg  20 mg   • venlafaxine XR (EFFEXOR XR) capsule 150 mg  150 mg         ALLERGIES:  Pcn [penicillins], Demerol, Imitrex [sumatriptan succinate], Metformin, Morphine, Stadol [butorphanol tartrate], Tape, Vistaril [hyzine], and Aspartame     PSYCHOSOCIAL HISTORY:  Living Site:  Home  Living With:  family  Caregiver's availability:  Not Applicable  Number of stairs:  2  Substance use history:  Unknown        The patient presents functional deficits in mobility and self-care, and Minimal  de-conditioning. Pre-morbidly, this patient lived in a single level home with Two steps to enter,with family  The patient was evaluated by acute care Physical Therapy and Occupational Therapy; currently requiring minimal assistance for mobility and maximal assistance for ADLs. The patient's current diet is Regular with Thin  liquids.      Patient is s/p L2-L3 XLIF and Redo laminectomies with decreased mobility and ADLs post-operatively. Patient may progress rapidly and no longer need Rehab care but currently the patient is   a Good candidate for an acute inpatient rehabilitation program with a coordinated program of care at an intensity and frequency not available at a lower level of care.      Note: This recommendation requires that patient has at least CGA/Minimal Assistance needs in at least two therapy disciplines.  If patient progresses to no longer need CGA/Ady with at least two therapy disciplines they may be more appropriate for Skilled nursing facility versus home with home health.       This recommendation is substantiated by the patient's current medical condition with intervention and assessment of medical issues requiring an acute level of care for patient's safety and maximum outcome. A coordinated program of care will be provided by an interdisciplinary team including physical therapy, occupational therapy, physiatry, rehab nursing and rehab psychology. Rehab goals include improved mobility, self-care management, strength and conditioning/endurance, pain management, bowel and bladder management, mood and affect, and safety with independent home management including caregiver training. Estimated length of stay is approximately 7-10 days. Rehab potential: Good. Disposition: to pre-morbid independent living setting with supportive care of patient's family. We will continue to follow with you in anticipation of discharge to acute inpatient rehabilitation when medically stable to do so at the discretion of the attending physician. Thank you for allowing us to participate in this patient's care. Please call with any questions regarding this recommendation.     Deedee Ivy M.D.                  Procedures:    Remi Lechuga III, M.D.   Physician   Surgery Neurosurgery       DATE OF SERVICE:  05/25/2021       PREOPERATIVE DIAGNOSES:  1.  Lumbar spondylosis.  2.  Lumbar stenosis.  3.  Lumbar spondylolisthesis.  4.  Adult degenerative scoliosis.  5.  Status post L3-L4 XLIF by myself several years ago.     POSTOPERATIVE DIAGNOSES:  1.  Lumbar spondylosis.  2.  Lumbar stenosis.  3.  Lumbar spondylolisthesis.  4.  Adult degenerative scoliosis.  5.  Status post L3-L4 XLIF by myself several years ago.     PROCEDURES PERFORMED:    1.  Left lateral retroperitoneal approach to the L2-L3 disk space.  2.  L2-L3 diskectomy and titanium interbody cage placement.  3.  L2-L3 interbody/allograft fusion.  4.  L2-L3 lateral plating fixation.  5.  Intraoperative monitoring.          DATE OF SERVICE:  05/28/2021      PREOPERATIVE DIAGNOSES:  1.  Lumbar spondylosis.  2.  Lumbar stenosis.  3.  Lumbar spondylolisthesis.  4.  Adult degenerative scoliosis.  5.  Previous L3-L4 laminotomy by myself several years ago.  6. Status post L2-L3 XLIF and scoliotic correction two days ago by myself.     POSTOPERATIVE DIAGNOSES:    1.  Lumbar spondylosis.  2.  Lumbar stenosis.  3.  Lumbar spondylolisthesis.  4.  Adult degenerative scoliosis.  5.  Previous L3-L4 laminotomy by myself several years ago.  6. Status post L2-L3 XLIF and scoliotic correction two days ago by myself.     PROCEDURES PERFORMED:   1.  Stealth guidance for the spine.  2.  Hip bone marrow autograft aspirate via separate incision.  3.  L2, L3, L4, L5 stealth-guided pedicle screw fixation.  4.  Redo bilateral L3 laminotomy, decompression of thecal sac and nerve roots.  5.  Redo bilateral L4 laminotomy, decompression of thecal sac and nerve roots.  6.  L2, L5 laminectomy.  7.  Bilateral L2 transpedicular approach, 59 modifier.  8.  Bilateral L3 transpedicular approach, 59 modifier.  9.  Bilateral L5 transpedicular approach, 59 modifier.  10.  L2, L3, L4, L5 posterolateral allograft autograft bere fusion.  11.  Intraoperative monitoring.         ______________________________  Remi DUVAL  "Ankush MALDONADO MD          Co-morbidities: Please see below.  Potential Risk - Complications: Contractures, Deep Vein Thrombosis, Pain, Pressure Ulcer and Urinary Tract Infection  Level of Risk: High    Ongoing Medical Management Needed (Medical/Nursing Needs):   Patient Active Problem List    Diagnosis Date Noted   • Thyroid disorder 09/27/2019   • S/P laminectomy with spinal fusion 09/27/2019   • Dysphagia 07/08/2019   • Anxiety 07/07/2019   • Hypokalemia 07/07/2019   • Head ache 07/07/2019   • Hypotension 04/20/2016   • Normocytic anemia 04/20/2016   • Acute renal failure (HCC) 04/20/2016   • Weakness 04/20/2016   • Bradycardia 01/25/2016   • Chest pain 01/25/2016   • Cervical spondylosis with myelopathy 01/22/2016   • Diabetic polyneuropathy ([facility])    • BPPV (benign paroxysmal positional vertigo)    • Restless legs syndrome (RLS)    • Migraine with aura    • Essential and other specified forms of tremor    • Type 2 diabetes mellitus, without long-term current use of insulin ([facility]) 02/09/2012   • FH: diabetes mellitus 09/19/2011   • Blood glucose elevated 09/19/2011   • COPD (chronic obstructive pulmonary disease) ([facility]) 11/28/2010   • Tremor 11/28/2010   • Eustachian tube dysfunction 09/22/2010   • Sinusitis, chronic 09/22/2010   • HTN (hypertension) 09/22/2010   • Fibromyalgia syndrome 09/22/2010   • Chronic pain syndrome 09/22/2010       Current Vital Signs:   Temperature: 36.5 °C (97.7 °F) Pulse: 78 Respiration: 16 Blood Pressure : (!) 90/66  Weight: 89.7 kg (197 lb 12 oz) Height: 165.1 cm (5' 5\")  Pulse Oximetry: 91 % O2 (LPM): 0      Completed Laboratory Reports:  Recent Labs     05/30/21  0645 05/30/21  1651 05/30/21  2106 05/31/21  0933 05/31/21  1419 05/31/21  1752 05/31/21 2013   WBC 8.9  --   --   --   --   --   --    HEMOGLOBIN 10.3*  --   --   --   --   --   --    HEMATOCRIT 33.5*  --   --   --   --   --   --    PLATELETCT 235  --   --   --   --   --   --    SODIUM 143  --   --   --   --   --   --  "   POTASSIUM 3.8  --   --   --   --   --   --    BUN 18  --   --   --   --   --   --    CREATININE 0.62  --   --   --   --   --   --    GLUCOSE 208*  --   --   --   --   --   --    POCGLUCOSE  --  166* 177* 135* 165* 189* 235*     Additional Labs:  Results for YURI CASTRO (MRN [id number]) as of 6/1/2021 09:46   Ref. Range 5/27/2021 22:00   SARS-CoV-2 by PCR Unknown NotDetected   SARS-CoV-2 Source Unknown NP Swab       Prior Living Situation:   Housing / Facility: 1 Story House  Steps Into Home: 2  Steps In Home: 0  Lives with - Patient's Self Care Capacity: Adult Children (daughter and son in law)  Equipment Owned: Front-Wheel Walker    Prior Level of Function / Living Situation:   Physical Therapy: Prior Services: None  Housing / Facility: 1 Story House  Steps Into Home: 2  Steps In Home: 0  Elevator: No  Bathroom Set up: Bathtub / Shower Combination  Equipment Owned: Front-Wheel Walker  Lives with - Patient's Self Care Capacity: Adult Children (daughter and son in law)  Bed Mobility: Independent  Transfer Status: Independent  Ambulation: Independent  Distance Ambulation (Feet):  (community?)  Assistive Devices Used: Front-Wheel Walker  Current Level of Function:   Gait Level Of Assist: Minimal Assist  Assistive Device: Front Wheel Walker  Distance (Feet): 20  Weight Bearing Status: FWB  Supine to Sit: Minimal Assist (HOB raised, reports she can sleep in recliner at home)  Sit to Supine:  (NT up in chair post)  Scooting: Minimal Assist  Rolling: Minimal Assist to Rt.  Skilled Intervention: Compensatory Strategies, Verbal Cuing, Tactile Cuing, Postural Facilitation, Sequencing  Comments: Pt needed 1 set of hands to assist w/ rolling from supine to EOB  Sit to Stand: Minimal Assist (w/ FWW)  Bed, Chair, Wheelchair Transfer: Minimal Assist  Transfer Method: Stand Step  Skilled Intervention: Compensatory Strategies, Tactile Cuing, Verbal Cuing, Sequencing, Postural Facilitation, Facilitation  Sitting in Chair:  10+ min (up in chair post)  Sitting Edge of Bed: 5 min  Standin min  Occupational Therapy:   Self Feeding: Independent  Grooming / Hygiene: Independent  Bathing: Independent  Dressing: Independent  Toileting: Independent  Medication Management: Independent  Laundry: Requires Assist  Kitchen Mobility: Independent  Finances: Requires Assist  Home Management: Requires Assist  Shopping: Requires Assist  Prior Level Of Mobility: Independent Without Device in Home  Prior Services: None  Housing / Facility: [address]  Current Level of Function:   Eating: Supervision  Bathing:  (NT)  Upper Body Dressing: Moderate Assist (to nany TLSO)  Lower Body Dressing: Maximal Assist (Pt reports she does not wear socks at baseline)  Toileting:  (declined)  Skilled Intervention: Compensatory Strategies, Tactile Cuing, Verbal Cuing, Sequencing, Postural Facilitation  Comments: Intermittent assist w/ donning TLSO  Speech Language Pathology:  Not following       Rehabilitation Prognosis/Potential: Good  Estimated Length of Stay: 10 - 12 days    Nursing:    Continent      Scope/Intensity of Services Recommended:  Physical Therapy: 1.5  hr / day  5 days / week. Therapeutic Interventions Required: Maximize Endurance, Mobility, Strength and Safety  Occupational Therapy: 1. 5 hr / day 5 days / week. Therapeutic Interventions Required: Maximize Self Care, ADLs, IADLs, Energy Conservation and family training  Rehabilitation Nursin/7:  Please see below.   Rehabilitation Physician: 3 - 5 days / week. Therapeutic Interventions Required: Medical Management  Respiratory Care: [place] and Tx. Therapeutic Interventions Required: Per Protocol.  Dietician: Eval and Tx. Therapeutic Interventions Required: Per protocol    He/She requires 24-hour rehabilitation nursing to manage bowel and bladder function, skin care, surgical incision, nutrition and fluid intake, pain control, safety, medication management and patient/family goals. In addition,  rehabilitation nursing will reiterate and reinforce therapy skills and equipment use, including ADLs, as well as provide education to the patient and family. He/sheis willing to participate in and is able to tolerate the proposed plan of care.    Rehabilitation Goals and Plan (Expected frequency & duration of treatment in the IRF):   Return to the Community  Anticipated Date of Rehabilitation Admission: 6/1/2021   Patient/Family oriented IRF level of care/facility/plan: Yes  Patient/Family willing to participate in IRF care/facility/plan: YES  Patient able to tolerate IRF level of care proposed: YES  Patient has potential to benefit IRF level of care proposed: yes  Special Needs or Precautions - Medical Necessity: Fall Risk, Spinal / Back Precautions , TLSO (Thoracolumbosacral orthosis)  Comments: TLSO OOB     Diet:   DIET ORDERS (From admission to next 24h)     Start     Ordered    05/28/21 1615  Diet Order Diet: Consistent CHO (Diabetic)  ALL MEALS     Question:  Diet:  Answer:  Consistent CHO (Diabetic)    05/28/21 1614                Anticipated Discharge Destination / Patient/Family Goal:  Destination: Home with Assistance Support System: Family  and SO.    Pt lives with her dtr Jalyn and her .  Jalyn does work.  PT's son in law will be at home w/ pt.  Pt's other dtr Mohini is also employed.   Anticipated home health services: OT, PT, Nursing and Aide  Previously used HH service/ provider: Not Applicable  Anticipated DME Needs: Walker  Outpatient Services: To be determined.   Alternative resources to address additional identified needs:     Pre-Screen Completed: 6/1/2021 9:40 AM Kierra Noble

## 2021-06-01 NOTE — CARE PLAN
Problem: Pain - Standard  Goal: Alleviation of pain or a reduction in pain to the patient’s comfort goal  Outcome: Progressing     Problem: Fall Risk  Goal: Patient will remain free from falls  Outcome: Progressing     Problem: Knowledge Deficit - Standard  Goal: Patient and family/care givers will demonstrate understanding of plan of care, disease process/condition, diagnostic tests and medications  Outcome: Progressing   The patient is Watcher - Medium risk of patient condition declining or worsening    Shift Goals  Clinical Goals: increase mobility  Patient Goals: pain management  Family Goals: n/a

## 2021-06-01 NOTE — DISCHARGE PLANNING
[facility] Transitional Care Coordination  Tc to pt's SO Cesar - not available.     Tc to pt's dtr Shelli.  She confirms pt lives with dtr Catrina/spouse and SO Cesar who uses fww for mobilty.   Shelli confirms pt will have 24 hr care available from family.   Catrina's SO is not working - he will be available to assist when Catrina is at work.   [facility] w/ 2 steps to enter;  +bath shower combo.  No dme @ home.     Pt /family in agreement with in pt rehab.   I informed pt / family a  Covid test will be done @ Rehab; no visitor allowed until negative test obtained which is usually within 24 hours /only 2 visitors (only 1 at a time/1 per day)  permitted during Rehab stay.       [facility] is Dr Ed Ivy.   Transport set up for 1130   I have updated NICKOLAS Higgins re above.   Rolan Barajas RN / Viry BUSH Neurosurgery updated of time also.     Plan:  Transfer to IRF today @ 1130.

## 2021-06-02 LAB
ALBUMIN SERPL BCP-MCNC: 3.2 G/DL (ref 3.2–4.9)
ALBUMIN/GLOB SERPL: 1 G/DL
ALP SERPL-CCNC: 82 U/L (ref 30–99)
ALT SERPL-CCNC: 26 U/L (ref 2–50)
ANION GAP SERPL CALC-SCNC: 9 MMOL/L (ref 7–16)
AST SERPL-CCNC: 17 U/L (ref 12–45)
BASOPHILS # BLD AUTO: 0.5 % (ref 0–1.8)
BASOPHILS # BLD: 0.04 K/UL (ref 0–0.12)
BILIRUB SERPL-MCNC: 0.5 MG/DL (ref 0.1–1.5)
BUN SERPL-MCNC: 15 MG/DL (ref 8–22)
CALCIUM SERPL-MCNC: 9.7 MG/DL (ref 8.5–10.5)
CHLORIDE SERPL-SCNC: 94 MMOL/L (ref 96–112)
CO2 SERPL-SCNC: 33 MMOL/L (ref 20–33)
CREAT SERPL-MCNC: 0.74 MG/DL (ref 0.5–1.4)
EOSINOPHIL # BLD AUTO: 0.18 K/UL (ref 0–0.51)
EOSINOPHIL NFR BLD: 2.3 % (ref 0–6.9)
ERYTHROCYTE [DISTWIDTH] IN BLOOD BY AUTOMATED COUNT: 51 FL (ref 35.9–50)
EST. AVERAGE GLUCOSE BLD GHB EST-MCNC: 217 MG/DL
GLOBULIN SER CALC-MCNC: 3.2 G/DL (ref 1.9–3.5)
GLUCOSE BLD-MCNC: 137 MG/DL (ref 65–99)
GLUCOSE BLD-MCNC: 147 MG/DL (ref 65–99)
GLUCOSE BLD-MCNC: 152 MG/DL (ref 65–99)
GLUCOSE BLD-MCNC: 171 MG/DL (ref 65–99)
GLUCOSE SERPL-MCNC: 137 MG/DL (ref 65–99)
HBA1C MFR BLD: 9.2 % (ref 4–5.6)
HCT VFR BLD AUTO: 33.7 % (ref 37–47)
HGB BLD-MCNC: 10.6 G/DL (ref 12–16)
IMM GRANULOCYTES # BLD AUTO: 0.14 K/UL (ref 0–0.11)
IMM GRANULOCYTES NFR BLD AUTO: 1.8 % (ref 0–0.9)
LYMPHOCYTES # BLD AUTO: 1.38 K/UL (ref 1–4.8)
LYMPHOCYTES NFR BLD: 17.3 % (ref 22–41)
MCH RBC QN AUTO: 28.7 PG (ref 27–33)
MCHC RBC AUTO-ENTMCNC: 31.5 G/DL (ref 33.6–35)
MCV RBC AUTO: 91.3 FL (ref 81.4–97.8)
MONOCYTES # BLD AUTO: 0.7 K/UL (ref 0–0.85)
MONOCYTES NFR BLD AUTO: 8.8 % (ref 0–13.4)
NEUTROPHILS # BLD AUTO: 5.54 K/UL (ref 2–7.15)
NEUTROPHILS NFR BLD: 69.3 % (ref 44–72)
NRBC # BLD AUTO: 0 K/UL
NRBC BLD-RTO: 0 /100 WBC
PLATELET # BLD AUTO: 312 K/UL (ref 164–446)
PMV BLD AUTO: 10.3 FL (ref 9–12.9)
POTASSIUM SERPL-SCNC: 4.3 MMOL/L (ref 3.6–5.5)
PROT SERPL-MCNC: 6.4 G/DL (ref 6–8.2)
RBC # BLD AUTO: 3.69 M/UL (ref 4.2–5.4)
SARS-COV-2 RNA RESP QL NAA+PROBE: NOTDETECTED
SODIUM SERPL-SCNC: 136 MMOL/L (ref 135–145)
SPECIMEN SOURCE: NORMAL
TSH SERPL DL<=0.005 MIU/L-ACNC: 1.49 UIU/ML (ref 0.38–5.33)
WBC # BLD AUTO: 8 K/UL (ref 4.8–10.8)

## 2021-06-02 PROCEDURE — 82962 GLUCOSE BLOOD TEST: CPT | Mod: 91

## 2021-06-02 PROCEDURE — 99233 SBSQ HOSP IP/OBS HIGH 50: CPT | Performed by: PHYSICAL MEDICINE & REHABILITATION

## 2021-06-02 PROCEDURE — 85025 COMPLETE CBC W/AUTO DIFF WBC: CPT

## 2021-06-02 PROCEDURE — 97166 OT EVAL MOD COMPLEX 45 MIN: CPT

## 2021-06-02 PROCEDURE — 84443 ASSAY THYROID STIM HORMONE: CPT

## 2021-06-02 PROCEDURE — 770010 HCHG ROOM/CARE - REHAB SEMI PRIVAT*

## 2021-06-02 PROCEDURE — 94640 AIRWAY INHALATION TREATMENT: CPT

## 2021-06-02 PROCEDURE — 82306 VITAMIN D 25 HYDROXY: CPT

## 2021-06-02 PROCEDURE — 97535 SELF CARE MNGMENT TRAINING: CPT

## 2021-06-02 PROCEDURE — A9270 NON-COVERED ITEM OR SERVICE: HCPCS | Performed by: PHYSICAL MEDICINE & REHABILITATION

## 2021-06-02 PROCEDURE — 94660 CPAP INITIATION&MGMT: CPT

## 2021-06-02 PROCEDURE — 97162 PT EVAL MOD COMPLEX 30 MIN: CPT

## 2021-06-02 PROCEDURE — 94760 N-INVAS EAR/PLS OXIMETRY 1: CPT

## 2021-06-02 PROCEDURE — 80053 COMPREHEN METABOLIC PANEL: CPT

## 2021-06-02 PROCEDURE — 97530 THERAPEUTIC ACTIVITIES: CPT

## 2021-06-02 PROCEDURE — 83036 HEMOGLOBIN GLYCOSYLATED A1C: CPT

## 2021-06-02 PROCEDURE — 36415 COLL VENOUS BLD VENIPUNCTURE: CPT

## 2021-06-02 PROCEDURE — 700102 HCHG RX REV CODE 250 W/ 637 OVERRIDE(OP): Performed by: PHYSICAL MEDICINE & REHABILITATION

## 2021-06-02 PROCEDURE — 700111 HCHG RX REV CODE 636 W/ 250 OVERRIDE (IP): Performed by: PHYSICAL MEDICINE & REHABILITATION

## 2021-06-02 RX ORDER — GLIPIZIDE 2.5 MG/1
2.5 TABLET, EXTENDED RELEASE ORAL
Status: DISCONTINUED | OUTPATIENT
Start: 2021-06-02 | End: 2021-06-04

## 2021-06-02 RX ORDER — BUDESONIDE AND FORMOTEROL FUMARATE DIHYDRATE 160; 4.5 UG/1; UG/1
2 AEROSOL RESPIRATORY (INHALATION)
Status: DISCONTINUED | OUTPATIENT
Start: 2021-06-02 | End: 2021-06-12 | Stop reason: HOSPADM

## 2021-06-02 RX ADMIN — SPIRONOLACTONE 25 MG: 25 TABLET, FILM COATED ORAL at 21:41

## 2021-06-02 RX ADMIN — SENNOSIDES AND DOCUSATE SODIUM 2 TABLET: 8.6; 5 TABLET ORAL at 21:41

## 2021-06-02 RX ADMIN — ROPINIROLE HYDROCHLORIDE 8 MG: 1 TABLET, FILM COATED ORAL at 21:41

## 2021-06-02 RX ADMIN — FLUCONAZOLE 150 MG: 100 TABLET ORAL at 08:21

## 2021-06-02 RX ADMIN — INSULIN HUMAN 2 UNITS: 100 INJECTION, SOLUTION PARENTERAL at 11:33

## 2021-06-02 RX ADMIN — POTASSIUM CHLORIDE 10 MEQ: 1500 TABLET, EXTENDED RELEASE ORAL at 21:41

## 2021-06-02 RX ADMIN — BUDESONIDE AND FORMOTEROL FUMARATE DIHYDRATE 2 PUFF: 160; 4.5 AEROSOL RESPIRATORY (INHALATION) at 21:44

## 2021-06-02 RX ADMIN — INSULIN HUMAN 2 UNITS: 100 INJECTION, SOLUTION PARENTERAL at 17:02

## 2021-06-02 RX ADMIN — CALCIUM CARBONATE (ANTACID) CHEW TAB 500 MG 500 MG: 500 CHEW TAB at 21:42

## 2021-06-02 RX ADMIN — BUDESONIDE AND FORMOTEROL FUMARATE DIHYDRATE 2 PUFF: 160; 4.5 AEROSOL RESPIRATORY (INHALATION) at 14:05

## 2021-06-02 RX ADMIN — GLIPIZIDE 2.5 MG: 2.5 TABLET, FILM COATED, EXTENDED RELEASE ORAL at 14:44

## 2021-06-02 RX ADMIN — SENNOSIDES AND DOCUSATE SODIUM 2 TABLET: 8.6; 5 TABLET ORAL at 08:23

## 2021-06-02 RX ADMIN — OXYCODONE HYDROCHLORIDE 10 MG: 10 TABLET ORAL at 22:08

## 2021-06-02 RX ADMIN — OXYCODONE HYDROCHLORIDE 10 MG: 10 TABLET ORAL at 07:11

## 2021-06-02 RX ADMIN — ENOXAPARIN SODIUM 40 MG: 40 INJECTION SUBCUTANEOUS at 17:00

## 2021-06-02 RX ADMIN — CALCIUM CARBONATE (ANTACID) CHEW TAB 500 MG 500 MG: 500 CHEW TAB at 08:21

## 2021-06-02 RX ADMIN — OXYCODONE HYDROCHLORIDE 10 MG: 10 TABLET ORAL at 17:28

## 2021-06-02 RX ADMIN — GABAPENTIN 800 MG: 400 CAPSULE ORAL at 14:42

## 2021-06-02 RX ADMIN — TIZANIDINE 2 MG: 4 TABLET ORAL at 07:11

## 2021-06-02 RX ADMIN — LEVOTHYROXINE SODIUM 75 MCG: 75 TABLET ORAL at 21:42

## 2021-06-02 RX ADMIN — ATORVASTATIN CALCIUM 20 MG: 10 TABLET, FILM COATED ORAL at 21:42

## 2021-06-02 RX ADMIN — TIZANIDINE 2 MG: 4 TABLET ORAL at 17:28

## 2021-06-02 RX ADMIN — GABAPENTIN 800 MG: 400 CAPSULE ORAL at 07:11

## 2021-06-02 RX ADMIN — GABAPENTIN 800 MG: 400 CAPSULE ORAL at 21:42

## 2021-06-02 RX ADMIN — VENLAFAXINE 25 MG: 25 TABLET ORAL at 21:41

## 2021-06-02 RX ADMIN — CHOLECALCIFEROL TAB 25 MCG (1000 UNIT) 5000 UNITS: 25 TAB at 08:22

## 2021-06-02 RX ADMIN — OXYCODONE HYDROCHLORIDE 10 MG: 10 TABLET ORAL at 14:43

## 2021-06-02 RX ADMIN — OMEPRAZOLE 20 MG: 20 CAPSULE, DELAYED RELEASE ORAL at 21:41

## 2021-06-02 RX ADMIN — VENLAFAXINE HYDROCHLORIDE 150 MG: 75 CAPSULE, EXTENDED RELEASE ORAL at 21:54

## 2021-06-02 RX ADMIN — OXYCODONE HYDROCHLORIDE 10 MG: 10 TABLET ORAL at 10:51

## 2021-06-02 RX ADMIN — FUROSEMIDE 40 MG: 40 TABLET ORAL at 08:22

## 2021-06-02 ASSESSMENT — GAIT ASSESSMENTS
GAIT LEVEL OF ASSIST: CONTACT GUARD ASSIST
ASSISTIVE DEVICE: FRONT WHEEL WALKER
DISTANCE (FEET): 15
DEVIATION: BRADYKINETIC;DECREASED HEEL STRIKE;DECREASED TOE OFF

## 2021-06-02 ASSESSMENT — PATIENT HEALTH QUESTIONNAIRE - PHQ9
SUM OF ALL RESPONSES TO PHQ9 QUESTIONS 1 AND 2: 2
4. FEELING TIRED OR HAVING LITTLE ENERGY: NOT AT ALL
7. TROUBLE CONCENTRATING ON THINGS, SUCH AS READING THE NEWSPAPER OR WATCHING TELEVISION: NOT AT ALL
2. FEELING DOWN, DEPRESSED, IRRITABLE, OR HOPELESS: SEVERAL DAYS
SUM OF ALL RESPONSES TO PHQ QUESTIONS 1-9: 2
9. THOUGHTS THAT YOU WOULD BE BETTER OFF DEAD, OR OF HURTING YOURSELF: NOT AT ALL
6. FEELING BAD ABOUT YOURSELF - OR THAT YOU ARE A FAILURE OR HAVE LET YOURSELF OR YOUR FAMILY DOWN: NOT AL ALL
8. MOVING OR SPEAKING SO SLOWLY THAT OTHER PEOPLE COULD HAVE NOTICED. OR THE OPPOSITE, BEING SO FIGETY OR RESTLESS THAT YOU HAVE BEEN MOVING AROUND A LOT MORE THAN USUAL: NOT AT ALL
5. POOR APPETITE OR OVEREATING: NOT AT ALL
3. TROUBLE FALLING OR STAYING ASLEEP OR SLEEPING TOO MUCH: NOT AT ALL
1. LITTLE INTEREST OR PLEASURE IN DOING THINGS: SEVERAL DAYS

## 2021-06-02 ASSESSMENT — BRIEF INTERVIEW FOR MENTAL STATUS (BIMS)
WHAT YEAR IS IT: CORRECT
BIMS SUMMARY SCORE: 11
ASKED TO RECALL SOCK: NO, COULD NOT RECALL
ASKED TO RECALL BED: NO, COULD NOT RECALL
INITIAL REPETITION OF BED BLUE SOCK - FIRST ATTEMPT: 3
WHAT MONTH IS IT: ACCURATE WITHIN 5 DAYS
WHAT DAY OF THE WEEK IS IT: CORRECT
ASKED TO RECALL BLUE: YES, NO CUE REQUIRED

## 2021-06-02 ASSESSMENT — ACTIVITIES OF DAILY LIVING (ADL)
BED_CHAIR_WHEELCHAIR_TRANSFER_DESCRIPTION: ADAPTIVE EQUIPMENT;SET-UP OF EQUIPMENT;SUPERVISION FOR SAFETY;VERBAL CUEING
TOILETING: INDEPENDENT
TOILET_TRANSFER_DESCRIPTION: ADAPTIVE EQUIPMENT;SUPERVISION FOR SAFETY;VERBAL CUEING;SET-UP OF EQUIPMENT

## 2021-06-02 ASSESSMENT — PAIN DESCRIPTION - PAIN TYPE: TYPE: ACUTE PAIN

## 2021-06-02 NOTE — PROGRESS NOTES
"Rehab Progress Note     Encounter Date: 6/2/2021    CC: Lumbar surgery, decreased mobility    Interval Events (Subjective)  Patient sitting up in room. She reports therapy is going well. She reports she had poor sleep but she plans on sleeping throughout the day today. She reports pain is under OK control, was difficult to get to restroom. Reviewed AM labs including anemia. Elevated A1c. She reports her PCP changed her from Januvia and Glipizide to another medication and has not had as good. Discussed will transition to PO while she is here. No reported changes in bowel or bladder. TLSO poorly fitting, riding up too high.     Objective:  VITAL SIGNS: /77   Pulse 87   Temp 36.6 °C (97.8 °F) (Temporal)   Resp 17   Ht 1.651 m (5' 5\")   Wt 89.7 kg (197 lb 12 oz)   LMP  (LMP Unknown)   SpO2 (!) 83% Comment: pt initially refusing to wear O2  BMI 32.91 kg/m²   Gen: NAD  Psych: Mood and affect appropriate  CV: RRR, no edema  Resp: CTAB, no upper airway sounds  Abd: NTND  Neuro: AOx4, standing with FWW      Recent Results (from the past 72 hour(s))   POCT glucose device results    Collection Time: 05/30/21  4:51 PM   Result Value Ref Range    Glucose - Accu-Ck 166 (H) 65 - 99 mg/dL   POCT glucose device results    Collection Time: 05/30/21  9:06 PM   Result Value Ref Range    Glucose - Accu-Ck 177 (H) 65 - 99 mg/dL   POCT glucose device results    Collection Time: 05/31/21  9:33 AM   Result Value Ref Range    Glucose - Accu-Ck 135 (H) 65 - 99 mg/dL   POCT glucose device results    Collection Time: 05/31/21  2:19 PM   Result Value Ref Range    Glucose - Accu-Ck 165 (H) 65 - 99 mg/dL   POCT glucose device results    Collection Time: 05/31/21  5:52 PM   Result Value Ref Range    Glucose - Accu-Ck 189 (H) 65 - 99 mg/dL   POCT glucose device results    Collection Time: 05/31/21  8:13 PM   Result Value Ref Range    Glucose - Accu-Ck 235 (H) 65 - 99 mg/dL   POCT glucose device results    Collection Time: 06/01/21  " 8:49 AM   Result Value Ref Range    Glucose - Accu-Ck 178 (H) 65 - 99 mg/dL   SARS-CoV-2 PCR (24 hour In-House): Collect NP swab in [facility]    Collection Time: 06/01/21  2:20 PM    Specimen: Nasopharyngeal; Respirate   Result Value Ref Range    SARS-CoV-2 Source NP Swab    POCT glucose device results    Collection Time: 06/01/21  5:39 PM   Result Value Ref Range    Glucose - Accu-Ck 191 (H) 65 - 99 mg/dL   POCT glucose device results    Collection Time: 06/01/21  9:23 PM   Result Value Ref Range    Glucose - Accu-Ck 237 (H) 65 - 99 mg/dL   CBC with Differential    Collection Time: 06/02/21  5:52 AM   Result Value Ref Range    WBC 8.0 4.8 - 10.8 K/uL    RBC 3.69 (L) 4.20 - 5.40 M/uL    Hemoglobin 10.6 (L) 12.0 - 16.0 g/dL    Hematocrit 33.7 (L) 37.0 - 47.0 %    MCV 91.3 81.4 - 97.8 fL    MCH 28.7 27.0 - 33.0 pg    MCHC 31.5 (L) 33.6 - 35.0 g/dL    RDW 51.0 (H) 35.9 - 50.0 fL    Platelet Count 312 164 - 446 K/uL    MPV 10.3 9.0 - 12.9 fL    Neutrophils-Polys 69.30 44.00 - 72.00 %    Lymphocytes 17.30 (L) 22.00 - 41.00 %    Monocytes 8.80 0.00 - 13.40 %    Eosinophils 2.30 0.00 - 6.90 %    Basophils 0.50 0.00 - 1.80 %    Immature Granulocytes 1.80 (H) 0.00 - 0.90 %    Nucleated RBC 0.00 /100 WBC    Neutrophils (Absolute) 5.54 2.00 - 7.15 K/uL    Lymphs (Absolute) 1.38 1.00 - 4.80 K/uL    Monos (Absolute) 0.70 0.00 - 0.85 K/uL    Eos (Absolute) 0.18 0.00 - 0.51 K/uL    Baso (Absolute) 0.04 0.00 - 0.12 K/uL    Immature Granulocytes (abs) 0.14 (H) 0.00 - 0.11 K/uL    NRBC (Absolute) 0.00 K/uL   Comp Metabolic Panel (CMP)    Collection Time: 06/02/21  5:52 AM   Result Value Ref Range    Sodium 136 135 - 145 mmol/L    Potassium 4.3 3.6 - 5.5 mmol/L    Chloride 94 (L) 96 - 112 mmol/L    Co2 33 20 - 33 mmol/L    Anion Gap 9.0 7.0 - 16.0    Glucose 137 (H) 65 - 99 mg/dL    Bun 15 8 - 22 mg/dL    Creatinine 0.74 0.50 - 1.40 mg/dL    Calcium 9.7 8.5 - 10.5 mg/dL    AST(SGOT) 17 12 - 45 U/L    ALT(SGPT) 26 2 - 50 U/L    Alkaline  Phosphatase 82 30 - 99 U/L    Total Bilirubin 0.5 0.1 - 1.5 mg/dL    Albumin 3.2 3.2 - 4.9 g/dL    Total Protein 6.4 6.0 - 8.2 g/dL    Globulin 3.2 1.9 - 3.5 g/dL    A-G Ratio 1.0 g/dL   HEMOGLOBIN A1C    Collection Time: 06/02/21  5:52 AM   Result Value Ref Range    Glycohemoglobin 9.2 (H) 4.0 - 5.6 %    Est Avg Glucose 217 mg/dL   TSH with Reflex to FT4    Collection Time: 06/02/21  5:52 AM   Result Value Ref Range    TSH 1.490 0.380 - 5.330 uIU/mL   ESTIMATED GFR    Collection Time: 06/02/21  5:52 AM   Result Value Ref Range    GFR If African American >60 >60 mL/min/1.73 m 2    GFR If Non African American >60 >60 mL/min/1.73 m 2   POCT glucose device results    Collection Time: 06/02/21  7:14 AM   Result Value Ref Range    Glucose - Accu-Ck 137 (H) 65 - 99 mg/dL   POCT glucose device results    Collection Time: 06/02/21 11:27 AM   Result Value Ref Range    Glucose - Accu-Ck 152 (H) 65 - 99 mg/dL       Current Facility-Administered Medications   Medication Frequency   • budesonide-formoterol (SYMBICORT) 160-4.5 MCG/ACT inhaler 2 Puff BID (RT)   • Respiratory Therapy Consult Continuous RT   • Pharmacy Consult Request ...Pain Management Review 1 Each PHARMACY TO DOSE   • oxyCODONE immediate-release (ROXICODONE) tablet 5 mg Q3HRS PRN    Or   • oxyCODONE immediate release (ROXICODONE) tablet 10 mg Q3HRS PRN   • hydrALAZINE (APRESOLINE) tablet 25 mg Q8HRS PRN   • acetaminophen (Tylenol) tablet 650 mg Q4HRS PRN   • senna-docusate (PERICOLACE or SENOKOT S) 8.6-50 MG per tablet 2 tablet BID    And   • polyethylene glycol/lytes (MIRALAX) PACKET 1 Packet QDAY PRN    And   • magnesium hydroxide (MILK OF MAGNESIA) suspension 30 mL QDAY PRN    And   • bisacodyl (DULCOLAX) suppository 10 mg QDAY PRN   • artificial tears ophthalmic solution 1 Drop PRN   • benzocaine-menthol (CEPACOL) lozenge 1 Lozenge Q2HRS PRN   • mag hydrox-al hydrox-simeth (MAALOX PLUS ES or MYLANTA DS) suspension 20 mL Q2HRS PRN   • ondansetron (ZOFRAN ODT)  dispertab 4 mg 4X/DAY PRN    Or   • ondansetron (ZOFRAN) syringe/vial injection 4 mg 4X/DAY PRN   • traZODone (DESYREL) tablet 50 mg QHS PRN   • sodium chloride (OCEAN) 0.65 % nasal spray 2 Spray PRN   • insulin regular (HumuLIN R,NovoLIN R) injection 4X/DAY ACHS    And   • glucose 4 g chewable tablet 16 g Q15 MIN PRN    And   • dextrose 50% (D50W) injection 50 mL Q15 MIN PRN   • enoxaparin (LOVENOX) inj 40 mg DAILY AT 1800   • calcium carbonate (TUMS) chewable tab 500 mg BID   • tizanidine (ZANAFLEX) tablet 2 mg Q8HRS PRN   • vitamin D (cholecalciferol) tablet 5,000 Units DAILY   • albuterol inhaler 2 Puff Q6HRS PRN   • atorvastatin (LIPITOR) tablet 20 mg QHS   • fluconazole (DIFLUCAN) tablet 150 mg DAILY   • furosemide (LASIX) tablet 40 mg DAILY   • gabapentin (NEURONTIN) capsule 800 mg TID   • levothyroxine (SYNTHROID) tablet 75 mcg QHS   • omeprazole (PRILOSEC) capsule 20 mg QHS   • ROPINIRole (REQUIP) tablet 8 mg QHS   • spironolactone (ALDACTONE) tablet 25 mg Q EVENING   • temazepam (Restoril) capsule 30 mg HS PRN   • venlafaxine (EFFEXOR) tablet 25 mg Q EVENING   • venlafaxine XR (EFFEXOR XR) capsule 150 mg Q EVENING   • potassium chloride SA (Kdur) tablet 10 mEq QHS       Orders Placed This Encounter   Procedures   • Diet Order Diet: Consistent CHO (Diabetic)     Standing Status:   Standing     Number of Occurrences:   1     Order Specific Question:   Diet:     Answer:   Consistent CHO (Diabetic) [4]       Assessment:  Active Hospital Problems    Diagnosis    • *S/P laminectomy with spinal fusion    • Thyroid disorder    • Dysphagia    • Diabetic polyneuropathy (HCC)    • Restless legs syndrome (RLS)    • Type 2 diabetes mellitus, without long-term current use of insulin (HCC)    • COPD (chronic obstructive pulmonary disease) (HCC)    • Tremor    • HTN (hypertension)        Medical Decision Making and Plan:  Lumbar retrospondylolisthesis - Patient with significant back pain caused by retrospondy of her L2 on  her previous back surgery. Patient is now s/p L2-L3 XLIF and then L2-S1 redo laminectomies with Dr. Lechuga on 5/25/21 and 5/28/21  -PT and OT for mobility and ADLs. TLSO - poorly fitting, may need vendor to reassess.  -Follow-up with NSG.      Pain Control - Patient with post-operative and neuropathic pain. Continue PRN Tylenol, Oxycodone, and scheduled Gabapentin.   -Gabapentin 800 mg TID     DM2 - Patient on SSI on transfer. Will monitor. Previously on Glipizide and Januvia. Recent changes with PCP were causing higher A1c.  A1c 9.2 on admission.   -Will restart Glipizide 2.5 mg daily      HTN - Patient on Spironolactone and Lasix on transfer.   -SBP labile, down into 80s     COPD - Patient on PRN Albuterol. On 2L on transfer. RT to consult     HLD - Patient on Atorvastatin 20 mg daily.      Anemia - Check AM CBC. 10.6, will continue to monitor, Check Fe panel at next check      Hypothyroidism - On 75 mcg Levothyroxine, check TSH - wnl     GERD - Patient on Prilosec QHS     RLS - On Requip QHS.     Depression - Patient on Effexor  mg and 25 mg short acting     DVT ppx - Patient on Lovenox on transfer.     Total time:  36 minutes.  I spent greater than 50% of the time for patient care and coordination on this date, including unit/floor time, and face-to-face time with the patient as per assessment and plan above.  Discussion included admission labs, stable anemia, elevated A1c, restart Glipizide, low SBP, and monitor fluid intake.      Deedee Ivy M.D.

## 2021-06-02 NOTE — THERAPY
Occupational Therapy   Initial Evaluation     Patient Name: Valentina Lu  Age:  65 y.o., Sex:  female  Medical Record #: [id number]  Today's Date: 6/2/2021     Subjective    Pt supine in bed awake with complaints of back pain upon arrival, pleasant and cooperative, agreeable to therapy and shower. Pt received pain meds at start of session     Objective       06/02/21 0701   Prior Living Situation   Prior Services Home-Independent   Housing / Facility 1 Story House   Steps Into Home 2   Bathroom Set up Shower Chair;Tub Transfer Bench;Grab Bars;Bathtub / Shower Combination   Equipment Owned Front-Wheel Walker;Grab Bar(s) In Tub / Shower;Tub / Shower Seat;Tub Transfer Bench;Oxygen   Lives with - Patient's Self Care Capacity Spouse;Adult Children   Comments pt reports living in [place] with spouse - who has MS and she provides care for and daughter/ARABELLA   Prior Level of ADL Function   Self Feeding Independent   Grooming / Hygiene Independent   Bathing Independent   Dressing Independent   Toileting Independent   Prior Level of IADL Function   Medication Management Independent   Laundry Independent   Kitchen Mobility Independent   Finances Independent   Home Management Independent   Shopping Independent   Prior Level Of Mobility Independent With Device in Community   Driving / Transportation Driving Independent   Occupation (Pre-Hospital Vocational) Retired Due To Age   Leisure Interests Pets   Prior Functioning: Everyday Activities   Self Care Independent   Indoor Mobility (Ambulation) Independent   Stairs Independent   Functional Cognition Independent   Prior Device Use None of the given options   Vitals   Pulse Oximetry (!) 83 %  (pt initially refusing to wear O2)   Pain 0 - 10 Group   Location Back   Description Aching   Therapist Pain Assessment Prior to Activity;6  (RN had just provided pain med)   Cognition    Cognition / Consciousness X   Level of Consciousness Alert   New Learning Impaired   Attention  "Impaired   Comments impaired memory   ABS (Agitated Behavior Scale)   Agitated Behavior Scale Performed No   Cognitive Pattern Assessment   Cognitive Pattern Assessment Used BIMS   Brief Interview for Mental Status (BIMS)   Repetition of Three Words (First Attempt) 3   Temporal Orientation: Year Correct   Temporal Orientation: Month Accurate within 5 days   Temporal Orientation: Day Correct   Recall: \"Sock\" No, could not recall   Recall: \"Blue\" Yes, no cue required   Recall: \"Bed\" No, could not recall   BIMS Summary Score 11   Passive ROM Upper Body   Passive ROM Upper Body WDL   Active ROM Upper Body   Active ROM Upper Body  WDL   Dominant Hand Right   Strength Upper Body   Upper Body Strength  WDL   Sensation Upper Body   Upper Extremity Sensation  WDL   Upper Body Muscle Tone   Upper Body Muscle Tone  WDL   Balance Assessment   Sitting Balance (Static) Fair +   Sitting Balance (Dynamic) Fair   Standing Balance (Static) Fair -   Standing Balance (Dynamic) Poor +   Weight Shift Sitting Fair   Weight Shift Standing Poor   Bed Mobility    Supine to Sit Minimal Assist   Sit to Supine Minimal Assist   Sit to Stand Minimal Assist   Scooting Minimal Assist   Coordination Upper Body   Coordination WDL   Eating   Assistance Needed Set-up / clean-up   Physical Assistance Level No physical assistance   CARE Score 5   Eating Discharge Goal   Discharge Goal 6   Oral Hygiene   Assistance Needed Set-up / clean-up   Physical Assistance Level No physical assistance   CARE Score 5   Oral Hygiene Discharge Goal   Discharge Goal 6   Shower/Bathe Self   Assistance Needed Physical assistance   Physical Assistance Level 25% or less   CARE Score 3   Shower/Bathe Self Discharge Goal   Discharge Goal 6   Upper Body Dressing   Assistance Needed Physical assistance   Physical Assistance Level 25% or less   CARE Score 3   Upper Body Dressing Discharge Goal   Discharge Goal 6   Lower Body Dressing   Assistance Needed Physical assistance "   Physical Assistance Level 76% or more   CARE Score 2   Lower Body Dressing Discharge Goal   Discharge Goal 6   Putting On/Taking Off Footwear   Assistance Needed Physical assistance   Physical Assistance Level Total assistance   CARE Score 1   Putting On/Taking Off Footwear Discharge Goal   Discharge Goal 6   Toileting Hygiene   Assistance Needed Physical assistance   Physical Assistance Level 26%-50%   CARE Score 3   Toileting Hygiene Discharge Goal   Discharge Goal 6   Toilet Transfer   Assistance Needed Physical assistance   Physical Assistance Level 26%-50%   CARE Score 3   Toilet Transfer Discharge Goal   Discharge Goal 6   Hearing, Speech, and Vision   Expression of Ideas and Wants Without difficulty   Understanding Verbal and Non-Verbal Content Understands   Functional Level of Assist   Eating Supervision   Grooming Supervision;Seated   Bathing Minimal Assist  (assist with picking up items, CGA)   Bathing Description Grab bar;Hand rails;Increased time   Upper Body Dressing Minimal Assist  (assist with bra)   Lower Body Dressing Maximal Assist   Toileting Minimal Assist   Toileting Description Assist to pull pants down   Toilet Transfers Moderate Assist  (assist to stand, stand pivot w/c<>toilet with GB)   Tub / Shower Transfers Moderate Assist  (assist to stand, stand pivot w/c<>fold down bench with GB)   Comprehension Modified Independent   Expression Modified Independent   Problem Solving Supervision   Memory Supervision   Problem List   Problem List Decreased Active Daily Living Skills;Decreased Homemaking Skills;Decreased Functional Mobility;Decreased Activity Tolerance;Impaired Postural Control / Balance;Limited Knowledge of Post Op Precautions   Precautions   Precautions Fall Risk;TLSO (Thoracolumbosacral orthosis);Spinal / Back Precautions    Current Discharge Plan   Current Discharge Plan Return to Prior Living Situation   Benefit    Therapy Benefit Patient Would Benefit from Inpatient Rehab  Occupational Therapy to Maximize [place] with ADLs, IADLs and Functional Mobility.   Interdisciplinary Plan of Care Collaboration   IDT Collaboration with  Physical Therapist;Nursing   Patient Position at End of Therapy In Bed;Bed Alarm On;Call Light within Reach;Tray Table within Reach   Collaboration Comments re: CLOF   Equipment Needs   Assistive Device / DME Grab Bars By Toilet;Bedside Commode;Shower Chair   Adaptive Equipment Not Assessed   OT Total Time Spent   OT Individual Total Time Spent (Mins) 90   OT Charge Group   Charges Yes   OT Self Care / ADL 2   OT Therapy Activity 1   OT Evaluation OT Evaluation Mod     Reviewed spinal precautions: pt able to recall BLT with no cues, discuss POC/expectations - similar to her rehab process for when she had her cervical surgery 2 years ago    Reviewed home safety compensatory strategies latanya in regards with assisting spouse with tasks and managing body mechanics - pt verbalized good understanding    Assessment  Patient is 65 y.o. female with a diagnosis of  Lumbar retrospondylolisthesis. Patient underwent L2-L3 XLIF with Dr. Lechuga on 5/25/21. Patient underwent her second stage L2-S1 laminectomy/laminotomy on 5/28/21.  past medical history significant for DM, Hypothyroidism, GERD, ALONZO, and Hx of TIA.  Additional factors influencing patient status / progress (ie: cognitive factors, co-morbidities, social support, etc): Pt presents with poor standing balance, significant back pain, and decrease strength/activity tolerance/coordination that impacts full participation with ADL's and functional mobility.      Plan  Recommend Occupational Therapy  minutes per day 5-7 days per week for 2 weeks for the following treatments:  OT Group Therapy, OT Self Care/ADL, OT Community Reintegration, OT Manual Ther Technique, OT Neuro Re-Ed/Balance, OT Therapeutic Activity and OT Therapeutic Exercise.    Passport items to be completed:  Passport items to be completed:  Perform  bathroom transfers, complete dressing, complete feeding, get ready for the day, prepare a simple meal, participate in household tasks, adapt home for safety needs, demonstrate home exercise program, complete caregiver training     Goals:  Long term and short term goals have been discussed with patient and they are in agreement.    Occupational Therapy Goals (Active)     Problem: Dressing     Dates: Start: 06/02/21       Goal: STG-Within one week, patient will dress LB with Min A using DME/AE as needed     Dates: Start: 06/02/21             Problem: Functional Transfers     Dates: Start: 06/02/21       Goal: STG-Within one week, patient will transfer to toilet with SBA at w/c level     Dates: Start: 06/02/21             Problem: OT Long Term Goals     Dates: Start: 06/02/21       Goal: LTG-By discharge, patient will complete basic self care tasks at Mod I using DME/AE as needed     Dates: Start: 06/02/21          Goal: LTG-By discharge, patient will perform bathroom transfers at Mod I using DME/AE as needed     Dates: Start: 06/02/21             Problem: Toileting     Dates: Start: 06/02/21       Goal: STG-Within one week, patient will complete toileting tasks with SBA using DME/AE as needed     Dates: Start: 06/02/21

## 2021-06-02 NOTE — THERAPY
"Physical Therapy   Initial Evaluation     Patient Name: Valentina Lu  Age:  65 y.o., Sex:  female  Medical Record #: [id number]  Today's Date: 6/2/2021     Subjective    Pt was supine in bed upon arrival and agreeable to treatment.   Pt reported feeling very sleepy.  \"I have a sleeping disorder.  It's like narcolepsy.  I get it every once in a while and I have it today.\"     Objective       06/02/21 1001   Prior Living Situation   Prior Services Home-Independent   Housing / Facility 1 Story House   Steps Into Home 2   Steps In Home 0   Rail None  (Pt reports \"makeshift rail\" on L side)   Elevator No   Equipment Owned 4-Wheel Walker   Lives with - Patient's Self Care Capacity Adult Children;Spouse   Comments Pt lives with spouse, daughter, and son in law   Prior Level of Functional Mobility   Bed Mobility Independent   Transfer Status Independent   Ambulation Independent   Distance Ambulation (Feet)   (Community)   Assistive Devices Used None   Wheelchair Independent   Stairs Independent   Prior Functioning: Everyday Activities   Self Care Independent   Indoor Mobility (Ambulation) Independent   Stairs Independent   Functional Cognition Independent   Prior Device Use None of the given options   Vitals   O2 (LPM) 3   O2 Delivery Device Nasal Cannula   Pain 0 - 10 Group   Location Back   Therapist Pain Assessment Post Activity Pain Same as Prior to Activity;Nurse Notified;8   Cognition    Level of Consciousness Alert   Passive ROM Lower Body   Passive ROM Lower Body WDL   Active ROM Lower Body    Active ROM Lower Body  WDL   Strength Lower Body   Rt Hip Flexion Strength 4- (G-)   Rt Knee Flexion Strength 4 (G)   Rt Knee Extension Strength 4 (G)   Rt Ankle Dorsiflexion Strength 5 (N)   Lt Hip Flexion Strength 3+ (F+)   Lt Knee Flexion Strength 4 (G)   Lt Knee Extension Strength 4 (G)   Lt Ankle Dorsiflexion Strength 5 (N)   Sensation Lower Body   Lower Extremity Sensation   WDL   Comments Intact BLE light touch " and proprioception   Lower Body Muscle Tone   Lower Body Muscle Tone  WDL   Comments No tone or clonus noted   Bed Mobility    Supine to Sit Moderate Assist   Sit to Supine Moderate Assist   Sit to Stand Minimal Assist   Scooting Minimal Assist   Rolling Minimal Assist to Rt.;Minimum Assist to Lt.   Roll Left and Right   Assistance Needed Physical assistance   Physical Assistance Level 26%-50%   CARE Score 3   Roll Left and Right Discharge Goal   Discharge Goal 6   Sit to Lying   Assistance Needed Physical assistance   Physical Assistance Level 51%-75%   CARE Score 2   Sit to Lying Discharge Goal   Discharge Goal 6   Lying to Sitting on Side of Bed   Assistance Needed Physical assistance   Physical Assistance Level 51%-75%   CARE Score 2   Lying to Sitting on Side of Bed Discharge Goal   Discharge Goal 6   Sit to Stand   Assistance Needed Physical assistance   Physical Assistance Level 26%-50%   CARE Score 3   Sit to Stand Discharge Goal   Discharge Goal 6   Chair/Bed-to-Chair Transfer   Assistance Needed Physical assistance   Physical Assistance Level 26%-50%   CARE Score 3   Chair/Bed-to-Chair Transfer Discharge Goal   Discharge Goal 6   Car Transfer   Reason if not Attempted Environmental limitations  (d/t isolation precautions)   CARE Score 10   Car Transfer Discharge Goal   Discharge Goal 4   Walk 10 Feet   Assistance Needed Incidental touching;Supervision;Adaptive equipment   CARE Score 4   Walk 10 Feet Discharge Goal   Discharge Goal 6   Walk 50 Feet with Two Turns   Reason if not Attempted Safety concerns   CARE Score 88   Walk 50 Feet with Two Turns Discharge Goal   Discharge Goal 6   Walk 150 Feet   Reason if not Attempted Safety concerns   CARE Score 88   Walk 150 Feet Discharge Goal   Discharge Goal 6   Walking 10 Feet on Uneven Surfaces   Reason if not Attempted Environmental limitations  (d/t isolation precautions)   CARE Score 10   Walking 10 Feet on Uneven Surfaces Discharge Goal   Discharge Goal 4    1 Step (Curb)   Reason if not Attempted Safety concerns   CARE Score 88   1 Step (Curb) Discharge Goal   Discharge Goal 4   4 Steps   Reason if not Attempted Environmental limitations  (d/t isolation precautions)   CARE Score 10   12 Steps   Reason if not Attempted Environmental limitations  (d/t isolation precautions)   CARE Score 10   Picking Up Object   Reason if not Attempted Safety concerns   CARE Score 88   Picking Up Object Discharge Goal   Discharge Goal 4   Wheel 50 Feet with Two Turns   Assistance Needed Supervision;Adaptive equipment   CARE Score 4   Type of Wheelchair/Scooter Manual   Wheel 50 Feet with Two Turns Discharge Goal   Discharge Goal 6   Wheel 150 Feet   Reason if not Attempted Safety concerns   CARE Score 88   Type of Wheelchair/Scooter Manual   Wheel 150 Feet Discharge Goal   Discharge Goal 6   Gait Functional Level of Assist    Gait Level Of Assist Contact Guard Assist   Assistive Device Front Wheel Walker   Distance (Feet) 15   # of Times Distance was Traveled 2   Deviation Bradykinetic;Decreased Heel Strike;Decreased Toe Off   Wheelchair Functional Level of Assist   Wheelchair Assist Stand by Assist   Distance Wheelchair (Feet or Distance) 50   Wheelchair Description Extra time;Supervision for safety   Stairs Functional Level of Assist   Level of Assist with Stairs Unable to Participate   Transfer Functional Level of Assist   Bed, Chair, Wheelchair Transfer Minimal Assist   Bed Chair Wheelchair Transfer Description Adaptive equipment;Set-up of equipment;Supervision for safety;Verbal cueing   Toilet Transfers Contact Guard Assist   Toilet Transfer Description Adaptive equipment;Supervision for safety;Verbal cueing;Set-up of equipment   Problem List    Problems Pain;Impaired Bed Mobility;Impaired Transfers;Impaired Ambulation;Functional Strength Deficit;Impaired Balance;Decreased Activity Tolerance;Limited Knowledge of Post-Op Precautions   Precautions   Precautions Fall Risk;TLSO  (Thoracolumbosacral orthosis);Spinal / Back Precautions    Current Discharge Plan   Current Discharge Plan Return to Prior Living Situation   Interdisciplinary Plan of Care Collaboration   IDT Collaboration with  Nursing;Occupational Therapist   Patient Position at End of Therapy In Bed;Call Light within Reach;Bed Alarm On;Tray Table within Reach;Phone within Reach   Collaboration Comments CLOF   Benefit   Therapy Benefit Patient Would Benefit from Inpatient Rehabilitation Physical Therapy to Maximize Functional [place] with ADLs, IADLs and Mobility.   Strengths & Barriers   Strengths Effective communication skills;Independent prior level of function;Motivated for self care and independence;Pleasant and cooperative;Supportive family;Willingly participates in therapeutic activities   Barriers Fatigue;Generalized weakness;Home accessibility;Impaired balance;Impaired activity tolerance;Limited mobility;Pain;Pain poorly managed   PT Total Time Spent   PT Individual Total Time Spent (Mins) 90   PT Charge Group   PT Therapeutic Activities 3   PT Evaluation PT Evaluation Mod     Collaboration with RN on pt request for pain meds and pt's CLOF.   Pt educated on PT role, PT POC, rehab orientation.     Assessment  Patient is 65 y.o. female with a diagnosis of s/p  L2-L3 XLIF with Dr. Lechuga on 5/25/21. Patient underwent her second stage L2-S1 laminectomy/laminotomy on 5/28/21.  Additional factors influencing patient status / progress (ie: cognitive factors, co-morbidities, social support, etc): independent PLOF, good social support, PMH of DM, Hypothyroidism, GERD, ALONZO, and Hx of TIA.      Plan  Recommend Physical Therapy  minutes per day 5-7 days per week for 10-14 days for the following treatments:  PT Group Therapy, PT E Stim Attended, PT Gait Training, PT Therapeutic Exercises, PT Neuro Re-Ed/Balance, PT Aquatic Therapy, PT Therapeutic Activity, PT Manual Therapy and PT Evaluation.    Passport items to be  completed:  Get in/out of bed safely, in/out of a vehicle, safely use mobility device, walk or wheel around home/community, navigate up and down stairs, show how to get up/down from the ground, ensure home is accessible, demonstrate HEP, complete caregiver training    Goals:  Long term and short term goals have been discussed with patient and they are in agreement.    Physical Therapy Problems (Active)     Problem: Mobility     Dates: Start: 06/02/21       Goal: STG-Within one week, patient will ambulate household distance     Dates: Start: 06/02/21       Description: AMB x 50 feet with FWW and SBA          Problem: Mobility Transfers     Dates: Start: 06/02/21       Goal: STG-Within one week, patient will perform bed mobility     Dates: Start: 06/02/21       Description: Supine </> sit with min A using bed rail       Goal: STG-Within one week, patient will transfer bed to chair     Dates: Start: 06/02/21       Description: SPT with FWW and SBA          Problem: PT-Long Term Goals     Dates: Start: 06/02/21       Goal: LTG-By discharge, patient will ambulate     Dates: Start: 06/02/21       Description: AMB x 150 feet with FWW, mod I        Goal: LTG-By discharge, patient will transfer one surface to another     Dates: Start: 06/02/21       Description: SPT with FWW, mod I        Goal: LTG-By discharge, patient will transfer in/out of a car     Dates: Start: 06/02/21       Description: Car transfer with FWW with SPV       Goal: LTG-By discharge, patient will     Dates: Start: 06/02/21       Description: Up/down 2 steps with one HR and CGA

## 2021-06-02 NOTE — FLOWSHEET NOTE
06/02/21 1409   Events/Summary/Plan   Events/Summary/Plan Symbicort given.  CPAP set up at bedside after mask fit.  02 spot check   Vital Signs   Pulse 89   Respiration 18   Pulse Oximetry 95 %   $ Pulse Oximetry (Spot Check) Yes   Respiratory Assessment   Level of Consciousness Alert   Chest Exam   Work Of Breathing / Effort Within Normal Limits   Oxygen   O2 (LPM) 3   O2 Delivery Device Silicone Nasal Cannula   Room Air Challenge Fail  (83% on RA)   Non-Invasive Ventilation ALONZO Group   Nocturnal CPAP or BIPAP CPAP - [facility]   $ System Evaluation Yes   Settings (If Known) Auto 8-14   FiO2 or LPM 3

## 2021-06-02 NOTE — CARE PLAN
Problem: Pain - Standard  Goal: Alleviation of pain or a reduction in pain to the patient’s comfort goal  Note: Pt has received PRN oxycodone and PRN Zanaflex this shift for moderate to severe back pain. Pt able to participate in therapy.      Problem: Bowel Elimination  Goal: Patient will participate in bowel management program  Note: Pt's last BM 6/1/21. Denies any abdomen discomfort.

## 2021-06-02 NOTE — CARE PLAN
Problem: Knowledge Deficit - Standard  Goal: Patient and family/care givers will demonstrate understanding of plan of care, disease process/condition, diagnostic tests and medications  Outcome: Progressing  Note: Plan of care , meds uses and indications reviewed, pain mgt, Glucose monitoring etc reviewed. Verbalized understanding . Stressed importance of calling for assistance at all times.     Problem: Pain - Standard  Goal: Alleviation of pain or a reduction in pain to the patient’s comfort goal  Outcome: Progressing  Note: Assessed for pain and discomfort, medicated with oxycodone 10 mg for back pain with relief.[ see mar]    The patient is Stable - Low risk of patient condition declining or worsening         Progress made toward(s) clinical / shift goals:  Pain managed with oxycodone     Patient is not progressing towards the following goals:Finger stick cont monitored .

## 2021-06-03 LAB
GLUCOSE BLD-MCNC: 120 MG/DL (ref 65–99)
GLUCOSE BLD-MCNC: 123 MG/DL (ref 65–99)
GLUCOSE BLD-MCNC: 148 MG/DL (ref 65–99)
GLUCOSE BLD-MCNC: 161 MG/DL (ref 65–99)

## 2021-06-03 PROCEDURE — 97116 GAIT TRAINING THERAPY: CPT

## 2021-06-03 PROCEDURE — 700101 HCHG RX REV CODE 250: Performed by: PHYSICAL MEDICINE & REHABILITATION

## 2021-06-03 PROCEDURE — A9270 NON-COVERED ITEM OR SERVICE: HCPCS | Performed by: PHYSICAL MEDICINE & REHABILITATION

## 2021-06-03 PROCEDURE — 94760 N-INVAS EAR/PLS OXIMETRY 1: CPT

## 2021-06-03 PROCEDURE — 770010 HCHG ROOM/CARE - REHAB SEMI PRIVAT*

## 2021-06-03 PROCEDURE — 97535 SELF CARE MNGMENT TRAINING: CPT

## 2021-06-03 PROCEDURE — 94640 AIRWAY INHALATION TREATMENT: CPT

## 2021-06-03 PROCEDURE — 97530 THERAPEUTIC ACTIVITIES: CPT

## 2021-06-03 PROCEDURE — 82962 GLUCOSE BLOOD TEST: CPT | Mod: 91

## 2021-06-03 PROCEDURE — 700111 HCHG RX REV CODE 636 W/ 250 OVERRIDE (IP): Performed by: PHYSICAL MEDICINE & REHABILITATION

## 2021-06-03 PROCEDURE — 97110 THERAPEUTIC EXERCISES: CPT

## 2021-06-03 PROCEDURE — 700102 HCHG RX REV CODE 250 W/ 637 OVERRIDE(OP): Performed by: PHYSICAL MEDICINE & REHABILITATION

## 2021-06-03 PROCEDURE — 99233 SBSQ HOSP IP/OBS HIGH 50: CPT | Performed by: PHYSICAL MEDICINE & REHABILITATION

## 2021-06-03 PROCEDURE — 94660 CPAP INITIATION&MGMT: CPT

## 2021-06-03 RX ORDER — LIDOCAINE 50 MG/G
2 PATCH TOPICAL DAILY
Status: DISCONTINUED | OUTPATIENT
Start: 2021-06-03 | End: 2021-06-12 | Stop reason: HOSPADM

## 2021-06-03 RX ORDER — DIPHENHYDRAMINE HYDROCHLORIDE, ZINC ACETATE 2; .1 G/100G; G/100G
CREAM TOPICAL 3 TIMES DAILY PRN
Status: DISCONTINUED | OUTPATIENT
Start: 2021-06-03 | End: 2021-06-12 | Stop reason: HOSPADM

## 2021-06-03 RX ORDER — DIPHENHYDRAMINE HCL 25 MG
25 TABLET ORAL EVERY 6 HOURS PRN
Status: DISCONTINUED | OUTPATIENT
Start: 2021-06-03 | End: 2021-06-12 | Stop reason: HOSPADM

## 2021-06-03 RX ORDER — SPIRONOLACTONE 25 MG/1
12.5 TABLET ORAL EVERY EVENING
Status: DISCONTINUED | OUTPATIENT
Start: 2021-06-03 | End: 2021-06-12 | Stop reason: HOSPADM

## 2021-06-03 RX ADMIN — ROPINIROLE HYDROCHLORIDE 8 MG: 1 TABLET, FILM COATED ORAL at 20:44

## 2021-06-03 RX ADMIN — LIDOCAINE 2 PATCH: 50 PATCH TOPICAL at 13:36

## 2021-06-03 RX ADMIN — SENNOSIDES AND DOCUSATE SODIUM 2 TABLET: 8.6; 5 TABLET ORAL at 20:43

## 2021-06-03 RX ADMIN — GLIPIZIDE 2.5 MG: 2.5 TABLET, FILM COATED, EXTENDED RELEASE ORAL at 07:15

## 2021-06-03 RX ADMIN — POTASSIUM CHLORIDE 10 MEQ: 1500 TABLET, EXTENDED RELEASE ORAL at 20:43

## 2021-06-03 RX ADMIN — GABAPENTIN 800 MG: 400 CAPSULE ORAL at 20:42

## 2021-06-03 RX ADMIN — SPIRONOLACTONE 12.5 MG: 25 TABLET, FILM COATED ORAL at 20:46

## 2021-06-03 RX ADMIN — INSULIN HUMAN 2 UNITS: 100 INJECTION, SOLUTION PARENTERAL at 20:53

## 2021-06-03 RX ADMIN — OXYCODONE HYDROCHLORIDE 10 MG: 10 TABLET ORAL at 17:00

## 2021-06-03 RX ADMIN — OMEPRAZOLE 20 MG: 20 CAPSULE, DELAYED RELEASE ORAL at 20:43

## 2021-06-03 RX ADMIN — LEVOTHYROXINE SODIUM 75 MCG: 75 TABLET ORAL at 20:42

## 2021-06-03 RX ADMIN — OXYCODONE HYDROCHLORIDE 10 MG: 10 TABLET ORAL at 13:32

## 2021-06-03 RX ADMIN — GABAPENTIN 800 MG: 400 CAPSULE ORAL at 07:15

## 2021-06-03 RX ADMIN — CALCIUM CARBONATE (ANTACID) CHEW TAB 500 MG 500 MG: 500 CHEW TAB at 20:58

## 2021-06-03 RX ADMIN — TIZANIDINE 2 MG: 4 TABLET ORAL at 07:13

## 2021-06-03 RX ADMIN — FUROSEMIDE 40 MG: 40 TABLET ORAL at 07:14

## 2021-06-03 RX ADMIN — CHOLECALCIFEROL TAB 25 MCG (1000 UNIT) 5000 UNITS: 25 TAB at 07:12

## 2021-06-03 RX ADMIN — ENOXAPARIN SODIUM 40 MG: 40 INJECTION SUBCUTANEOUS at 17:00

## 2021-06-03 RX ADMIN — BUDESONIDE AND FORMOTEROL FUMARATE DIHYDRATE 2 PUFF: 160; 4.5 AEROSOL RESPIRATORY (INHALATION) at 11:05

## 2021-06-03 RX ADMIN — OXYCODONE HYDROCHLORIDE 10 MG: 10 TABLET ORAL at 10:46

## 2021-06-03 RX ADMIN — CALCIUM CARBONATE (ANTACID) CHEW TAB 500 MG 500 MG: 500 CHEW TAB at 07:12

## 2021-06-03 RX ADMIN — OXYCODONE HYDROCHLORIDE 10 MG: 10 TABLET ORAL at 07:15

## 2021-06-03 RX ADMIN — SENNOSIDES AND DOCUSATE SODIUM 2 TABLET: 8.6; 5 TABLET ORAL at 07:14

## 2021-06-03 RX ADMIN — ATORVASTATIN CALCIUM 20 MG: 10 TABLET, FILM COATED ORAL at 20:42

## 2021-06-03 RX ADMIN — OXYCODONE HYDROCHLORIDE 10 MG: 10 TABLET ORAL at 20:55

## 2021-06-03 RX ADMIN — VENLAFAXINE 25 MG: 25 TABLET ORAL at 20:45

## 2021-06-03 RX ADMIN — GABAPENTIN 800 MG: 400 CAPSULE ORAL at 15:45

## 2021-06-03 RX ADMIN — FLUCONAZOLE 150 MG: 100 TABLET ORAL at 07:13

## 2021-06-03 RX ADMIN — VENLAFAXINE HYDROCHLORIDE 150 MG: 75 CAPSULE, EXTENDED RELEASE ORAL at 20:45

## 2021-06-03 RX ADMIN — BUDESONIDE AND FORMOTEROL FUMARATE DIHYDRATE 2 PUFF: 160; 4.5 AEROSOL RESPIRATORY (INHALATION) at 20:56

## 2021-06-03 ASSESSMENT — GAIT ASSESSMENTS
GAIT LEVEL OF ASSIST: CONTACT GUARD ASSIST
ASSISTIVE DEVICE: FRONT WHEEL WALKER
DEVIATION: BRADYKINETIC;SHUFFLED GAIT

## 2021-06-03 ASSESSMENT — 10 METER WALK TEST (10METWT)
AVERAGE VELOCITY - METERS PER SECOND: .16
AVERAGE TIME - SECONDS: 37.98
TRIAL 3: TIME TO WALK 10 METERS: 35.08
TRIAL 2: TIME TO WALK 10 METERS: 49.12
TRIAL 1: TIME TO WALK 10 METERS: 29.74

## 2021-06-03 ASSESSMENT — ACTIVITIES OF DAILY LIVING (ADL)
TUB_SHOWER_TRANSFER_DESCRIPTION: GRAB BAR;SHOWER BENCH;SUPERVISION FOR SAFETY;VERBAL CUEING
BED_CHAIR_WHEELCHAIR_TRANSFER_DESCRIPTION: INCREASED TIME;SUPERVISION FOR SAFETY;VERBAL CUEING
TOILET_TRANSFER_DESCRIPTION: GRAB BAR;ADAPTIVE EQUIPMENT;SUPERVISION FOR SAFETY;VERBAL CUEING
BED_CHAIR_WHEELCHAIR_TRANSFER_DESCRIPTION: INCREASED TIME;SUPERVISION FOR SAFETY;VERBAL CUEING
TOILETING_LEVEL_OF_ASSIST_DESCRIPTION: GRAB BAR;INCREASED TIME;SUPERVISION FOR SAFETY;VERBAL CUEING

## 2021-06-03 NOTE — CARE PLAN
Problem: Dressing  Goal: STG-Within one week, patient will dress LB with Min A using DME/AE as needed  Outcome: Progressing     Problem: Toileting  Goal: STG-Within one week, patient will complete toileting tasks with SBA using DME/AE as needed  Outcome: Progressing     Problem: Functional Transfers  Goal: STG-Within one week, patient will transfer to toilet with SBA at w/c level  Outcome: Progressing

## 2021-06-03 NOTE — FLOWSHEET NOTE
06/03/21 1105   Events/Summary/Plan   Events/Summary/Plan MDI, SpO2 check, S/u POC for portable use. pt used cpap last night.   Vital Signs   Pulse 69   Respiration 18   Pulse Oximetry 96 %   $ Pulse Oximetry (Spot Check) Yes   Oxygen   O2 (LPM) 3   O2 Delivery Device Nasal Cannula   Non-Invasive Ventilation ALONZO Group   Nocturnal CPAP or BIPAP CPAP - [facility]   $ System Evaluation Yes   Settings (If Known) Auto 8-14   FiO2 or LPM 3
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
## 2021-06-03 NOTE — THERAPY
Occupational Therapy  Daily Treatment     Patient Name: Valentina Lu  Age:  65 y.o., Sex:  female  Medical Record #: [id number]  Today's Date: 6/3/2021     Precautions  Precautions: (P) Fall Risk, TLSO (Thoracolumbosacral orthosis), Spinal / Back Precautions   Comments: (P) TLSO OOB, on 3L wean         Subjective    Pt received supine in bed, requested bathroom, reported that her lunch was not received earlier today however per RN pt did receive and eat lunch     Objective       06/03/21 1401   Precautions   Precautions Fall Risk;TLSO (Thoracolumbosacral orthosis);Spinal / Back Precautions    Comments TLSO OOB, on 3L wean   Functional Level of Assist   Bathing Contact Guard Assist   Bathing Description Grab bar;Hand held shower;Supervision for safety;Verbal cueing  (cues for spinal precautions, CGA standing)   Upper Body Dressing Minimal Assist   Upper Body Dressing Description Other (comment)  (assist with TLSO)   Lower Body Dressing Minimal Assist   Lower Body Dressing Description Increased time;Supervision for safety;Verbal cueing;Reacher;Sock aid  (CGA standing, min/mod cues for AE use and spinal prec)   Toileting Minimal Assist   Toileting Description Grab bar;Increased time;Supervision for safety;Verbal cueing  (CGA standing)   Toilet Transfers Minimal Assist   Toilet Transfer Description Grab bar;Adaptive equipment;Supervision for safety;Verbal cueing  (FWW into bathroom, GB for transition)   Tub / Shower Transfers Minimal Assist   Tub Shower Transfer Description Grab bar;Shower bench;Supervision for safety;Verbal cueing  (FWW from toilet to shower, GB for transition)   OT Total Time Spent   OT Individual Total Time Spent (Mins) 60   OT Charge Group   OT Self Care / ADL 4       Assessment    Pt tolerated session well, completed ADL routine at primarily CGA with FWW for mobility and transfers, required mod cues for correct AE use and spinal precautions compliance during ADLs with tendency to reach for  feet and twist, receptive to education, anticipate need for repetitive education as pt appears to have some short term memory deficits.        Plan    Integration of spinal precautions into ADLs/IADLs/household mobility, AE education and AE/DME planning for d/c (pt has slightly broken reacher, sock aid that is different from ours), general strength/endurance, TLSO management    Occupational Therapy Goals (Active)     Problem: Dressing     Dates: Start: 06/02/21       Goal: STG-Within one week, patient will dress LB with Min A using DME/AE as needed     Dates: Start: 06/02/21             Problem: Functional Transfers     Dates: Start: 06/02/21       Goal: STG-Within one week, patient will transfer to toilet with SBA at w/c level     Dates: Start: 06/02/21             Problem: OT Long Term Goals     Dates: Start: 06/02/21       Goal: LTG-By discharge, patient will complete basic self care tasks at Mod I using DME/AE as needed     Dates: Start: 06/02/21          Goal: LTG-By discharge, patient will perform bathroom transfers at Mod I using DME/AE as needed     Dates: Start: 06/02/21             Problem: Toileting     Dates: Start: 06/02/21       Goal: STG-Within one week, patient will complete toileting tasks with SBA using DME/AE as needed     Dates: Start: 06/02/21

## 2021-06-03 NOTE — THERAPY
Physical Therapy   Daily Treatment     Patient Name: Valentina Lu  Age:  65 y.o., Sex:  female  Medical Record #: [id number]  Today's Date: 6/3/2021     Precautions  Precautions: Fall Risk, TLSO (Thoracolumbosacral orthosis), Spinal / Back Precautions   Comments: TLSO OOB, on 3L wean    Subjective    Patient in bed and agreeable to therapy, requesting to shower. Explained to patient therapy expectations and discussed therapy schedule.     Objective       06/03/21 0831   Precautions   Precautions Fall Risk;TLSO (Thoracolumbosacral orthosis);Spinal / Back Precautions    Comments TLSO OOB, on 3L wean   Pain 0 - 10 Group   Location Back   Location Orientation Posterior   Pain Rating Scale (NPRS) 8   Description Aching   Gait Functional Level of Assist    Gait Level Of Assist Contact Guard Assist   Assistive Device Front Wheel Walker   Distance (Feet)   (30 ftx2, 60 ftx1)   Deviation Bradykinetic;Shuffled Gait   Wheelchair Functional Level of Assist   Wheelchair Assist Stand by Assist   Distance Wheelchair (Feet or Distance) 100   Wheelchair Description Extra time;Supervision for safety   Transfer Functional Level of Assist   Bed, Chair, Wheelchair Transfer Minimal Assist   Bed Chair Wheelchair Transfer Description Increased time;Supervision for safety;Verbal cueing  (stand step transfer with FWW)   Bed Mobility    Supine to Sit Moderate Assist  (Min A with HOB elevated)   Sit to Stand Minimal Assist   Scooting Minimal Assist   Rolling Supervised  (with bed rail)   10 Meter Walk Test   Normal - Trial 1 29.74 seconds   Normal - Trial 2 49.12 seconds   Normal - Trial 3 35.08 seconds   Normal Average Time 37.98 seconds   Normal Average Velocity (m/s) 0.16   Interdisciplinary Plan of Care Collaboration   IDT Collaboration with  Certified Nursing Assistant   Patient Position at End of Therapy Seated;Other (Comments)   Collaboration Comments handoff of care to CNA for toileting   PT Total Time Spent   PT Individual  Total Time Spent (Mins) 60   PT Charge Group   PT Gait Training 1   PT Therapeutic Activities 3     LB dressing in supine and seated with Min-Mod A, initially attempted to don skinny jeans however was unable to fully don requiring hospital pants. Donning of TLSO seated EOB.    10MWT performed with FWW and CGA.    Assessment    Patient tolerated session well, requiring max extra time for participation in activities, benefits from reinforcement of education. Gait speed calculated to be 0.16 m/s with FWW and CGA.    Strengths: Effective communication skills, Independent prior level of function, Motivated for self care and independence, Pleasant and cooperative, Supportive family, Willingly participates in therapeutic activities  Barriers: Fatigue, Generalized weakness, Home accessibility, Impaired balance, Impaired activity tolerance, Limited mobility, Pain, Pain poorly managed    Plan    Gait training, bed mobility and transfers maintaining spinal precautions, activity tolerance, strengthening, stair negotiation, family training.    Passport items to be completed:  Get in/out of bed safely, in/out of a vehicle, safely use mobility device, walk or wheel around home/community, navigate up and down stairs, show how to get up/down from the ground, ensure home is accessible, demonstrate HEP, complete caregiver training    Physical Therapy Problems (Active)     Problem: Mobility     Dates: Start: 06/02/21       Goal: STG-Within one week, patient will ambulate household distance     Dates: Start: 06/02/21       Description: AMB x 50 feet with FWW and SBA          Problem: Mobility Transfers     Dates: Start: 06/02/21       Goal: STG-Within one week, patient will perform bed mobility     Dates: Start: 06/02/21       Description: Supine </> sit with min A using bed rail       Goal: STG-Within one week, patient will transfer bed to chair     Dates: Start: 06/02/21       Description: SPT with FWW and SBA          Problem:  PT-Long Term Goals     Dates: Start: 06/02/21       Goal: LTG-By discharge, patient will ambulate     Dates: Start: 06/02/21       Description: AMB x 150 feet with FWW, mod I        Goal: LTG-By discharge, patient will transfer one surface to another     Dates: Start: 06/02/21       Description: SPT with FWW, mod I        Goal: LTG-By discharge, patient will transfer in/out of a car     Dates: Start: 06/02/21       Description: Car transfer with FWW with SPV       Goal: LTG-By discharge, patient will     Dates: Start: 06/02/21       Description: Up/down 2 steps with one HR and CGA

## 2021-06-03 NOTE — CARE PLAN
Problem: Knowledge Deficit - Standard  Goal: Patient and family/care givers will demonstrate understanding of plan of care, disease process/condition, diagnostic tests and medications  Outcome: Progressing  Note: Plan of care , meds, safety measures reviewed, verbalized understanding. Cpap plus 3 L o2 in use.      Problem: Pain - Standard  Goal: Alleviation of pain or a reduction in pain to the patient’s comfort goal  Outcome: Progressing  Note: Assessed for pain and discomfort, pt back incision with staples , well approximated , Island dressing changed.Medicated woth oxycodone 10 mg for back pain with relief.   The patient is Stable - Low risk of patient condition declining or worsening    Shift Goals  Clinical Goals: pain control  Patient Goals: pain control    Progress made toward(s) clinical / shift goals:  Finger stick at HS wnl [ see mar]

## 2021-06-03 NOTE — PROGRESS NOTES
"Rehab Progress Note     Encounter Date: 6/3/2021    CC: Lumbar surgery, decreased mobility    Interval Events (Subjective)  Patient sitting up in room. She reports therapy is going well. She reports her back pain is severe this morning and the tape is causing some burning. Discussed with staff and trial of lidocaine patches.  Discussed will monitor as lidocaine patches have adhesive as well. Otherwise discussed would have IDT later today. Denies NVD. Denies SOB.     IDT Team Meeting 6/3/2021    IDeedee M.D., was present and led the interdisciplinary team conference on 6/3/2021.  I led the IDT conference and agree with the IDT conference documentation and plan of care as noted below.     RN:  Diet Regular   % Meal     Pain Oxycodone   Sleep    Bowel Continent   Bladder Continent   In's & Out's    Falling asleep at times    PT:  Bed Mobility Gregg   Transfers Gregg   Mobility CGA   Stairs    Limited by pain and activity tolerance    OT:  Eating    Grooming    Bathing Gregg   UB Dressing    LB Dressing Gregg   Toileting    Shower & Transfer Gregg   Cognitively not 100%    Resp:  Wearing oxygen; 83 on room air  CPAP at night    CM:  Continues to work on disposition and DME needs.      DC/Disposition:  6/12/21     Objective:  VITAL SIGNS: /56   Pulse 69   Temp 36.3 °C (97.3 °F) (Temporal)   Resp 18   Ht 1.651 m (5' 5\")   Wt 89.7 kg (197 lb 12 oz)   LMP  (LMP Unknown)   SpO2 96%   BMI 32.91 kg/m²   Gen: NAD  Psych: Mood and affect appropriate  CV: RRR, no edema  Resp: CTAB, no upper airway sounds  Abd: NTND  Neuro: AOx4, standing with FWW  Unchanged from 6/2/21     Recent Results (from the past 72 hour(s))   POCT glucose device results    Collection Time: 05/31/21  2:19 PM   Result Value Ref Range    Glucose - Accu-Ck 165 (H) 65 - 99 mg/dL   POCT glucose device results    Collection Time: 05/31/21  5:52 PM   Result Value Ref Range    Glucose - Accu-Ck 189 (H) 65 - 99 mg/dL   POCT glucose " device results    Collection Time: 05/31/21  8:13 PM   Result Value Ref Range    Glucose - Accu-Ck 235 (H) 65 - 99 mg/dL   POCT glucose device results    Collection Time: 06/01/21  8:49 AM   Result Value Ref Range    Glucose - Accu-Ck 178 (H) 65 - 99 mg/dL   SARS-CoV-2 PCR (24 hour In-House): Collect NP swab in [place]    Collection Time: 06/01/21  2:20 PM    Specimen: Nasopharyngeal; Respirate   Result Value Ref Range    SARS-CoV-2 Source NP Swab     SARS-CoV-2 by PCR NotDetected    POCT glucose device results    Collection Time: 06/01/21  5:39 PM   Result Value Ref Range    Glucose - Accu-Ck 191 (H) 65 - 99 mg/dL   POCT glucose device results    Collection Time: 06/01/21  9:23 PM   Result Value Ref Range    Glucose - Accu-Ck 237 (H) 65 - 99 mg/dL   CBC with Differential    Collection Time: 06/02/21  5:52 AM   Result Value Ref Range    WBC 8.0 4.8 - 10.8 K/uL    RBC 3.69 (L) 4.20 - 5.40 M/uL    Hemoglobin 10.6 (L) 12.0 - 16.0 g/dL    Hematocrit 33.7 (L) 37.0 - 47.0 %    MCV 91.3 81.4 - 97.8 fL    MCH 28.7 27.0 - 33.0 pg    MCHC 31.5 (L) 33.6 - 35.0 g/dL    RDW 51.0 (H) 35.9 - 50.0 fL    Platelet Count 312 164 - 446 K/uL    MPV 10.3 9.0 - 12.9 fL    Neutrophils-Polys 69.30 44.00 - 72.00 %    Lymphocytes 17.30 (L) 22.00 - 41.00 %    Monocytes 8.80 0.00 - 13.40 %    Eosinophils 2.30 0.00 - 6.90 %    Basophils 0.50 0.00 - 1.80 %    Immature Granulocytes 1.80 (H) 0.00 - 0.90 %    Nucleated RBC 0.00 /100 WBC    Neutrophils (Absolute) 5.54 2.00 - 7.15 K/uL    Lymphs (Absolute) 1.38 1.00 - 4.80 K/uL    Monos (Absolute) 0.70 0.00 - 0.85 K/uL    Eos (Absolute) 0.18 0.00 - 0.51 K/uL    Baso (Absolute) 0.04 0.00 - 0.12 K/uL    Immature Granulocytes (abs) 0.14 (H) 0.00 - 0.11 K/uL    NRBC (Absolute) 0.00 K/uL   Comp Metabolic Panel (CMP)    Collection Time: 06/02/21  5:52 AM   Result Value Ref Range    Sodium 136 135 - 145 mmol/L    Potassium 4.3 3.6 - 5.5 mmol/L    Chloride 94 (L) 96 - 112 mmol/L    Co2 33 20 - 33 mmol/L    Anion  Gap 9.0 7.0 - 16.0    Glucose 137 (H) 65 - 99 mg/dL    Bun 15 8 - 22 mg/dL    Creatinine 0.74 0.50 - 1.40 mg/dL    Calcium 9.7 8.5 - 10.5 mg/dL    AST(SGOT) 17 12 - 45 U/L    ALT(SGPT) 26 2 - 50 U/L    Alkaline Phosphatase 82 30 - 99 U/L    Total Bilirubin 0.5 0.1 - 1.5 mg/dL    Albumin 3.2 3.2 - 4.9 g/dL    Total Protein 6.4 6.0 - 8.2 g/dL    Globulin 3.2 1.9 - 3.5 g/dL    A-G Ratio 1.0 g/dL   HEMOGLOBIN A1C    Collection Time: 06/02/21  5:52 AM   Result Value Ref Range    Glycohemoglobin 9.2 (H) 4.0 - 5.6 %    Est Avg Glucose 217 mg/dL   TSH with Reflex to FT4    Collection Time: 06/02/21  5:52 AM   Result Value Ref Range    TSH 1.490 0.380 - 5.330 uIU/mL   ESTIMATED GFR    Collection Time: 06/02/21  5:52 AM   Result Value Ref Range    GFR If African American >60 >60 mL/min/1.73 m 2    GFR If Non African American >60 >60 mL/min/1.73 m 2   POCT glucose device results    Collection Time: 06/02/21  7:14 AM   Result Value Ref Range    Glucose - Accu-Ck 137 (H) 65 - 99 mg/dL   POCT glucose device results    Collection Time: 06/02/21 11:27 AM   Result Value Ref Range    Glucose - Accu-Ck 152 (H) 65 - 99 mg/dL   POCT glucose device results    Collection Time: 06/02/21  5:02 PM   Result Value Ref Range    Glucose - Accu-Ck 171 (H) 65 - 99 mg/dL   POCT glucose device results    Collection Time: 06/02/21  9:38 PM   Result Value Ref Range    Glucose - Accu-Ck 147 (H) 65 - 99 mg/dL   POCT glucose device results    Collection Time: 06/03/21  7:20 AM   Result Value Ref Range    Glucose - Accu-Ck 120 (H) 65 - 99 mg/dL   POCT glucose device results    Collection Time: 06/03/21 12:03 PM   Result Value Ref Range    Glucose - Accu-Ck 148 (H) 65 - 99 mg/dL       Current Facility-Administered Medications   Medication Frequency   • lidocaine (LIDODERM) 5 % 2 Patch DAILY   • budesonide-formoterol (SYMBICORT) 160-4.5 MCG/ACT inhaler 2 Puff BID (RT)   • glipiZIDE SR (GLUCOTROL) tablet 2.5 mg QAM AC   • Respiratory Therapy Consult  Continuous RT   • Pharmacy Consult Request ...Pain Management Review 1 Each PHARMACY TO DOSE   • oxyCODONE immediate-release (ROXICODONE) tablet 5 mg Q3HRS PRN    Or   • oxyCODONE immediate release (ROXICODONE) tablet 10 mg Q3HRS PRN   • hydrALAZINE (APRESOLINE) tablet 25 mg Q8HRS PRN   • acetaminophen (Tylenol) tablet 650 mg Q4HRS PRN   • senna-docusate (PERICOLACE or SENOKOT S) 8.6-50 MG per tablet 2 tablet BID    And   • polyethylene glycol/lytes (MIRALAX) PACKET 1 Packet QDAY PRN    And   • magnesium hydroxide (MILK OF MAGNESIA) suspension 30 mL QDAY PRN    And   • bisacodyl (DULCOLAX) suppository 10 mg QDAY PRN   • artificial tears ophthalmic solution 1 Drop PRN   • benzocaine-menthol (CEPACOL) lozenge 1 Lozenge Q2HRS PRN   • mag hydrox-al hydrox-simeth (MAALOX PLUS ES or MYLANTA DS) suspension 20 mL Q2HRS PRN   • ondansetron (ZOFRAN ODT) dispertab 4 mg 4X/DAY PRN    Or   • ondansetron (ZOFRAN) syringe/vial injection 4 mg 4X/DAY PRN   • traZODone (DESYREL) tablet 50 mg QHS PRN   • sodium chloride (OCEAN) 0.65 % nasal spray 2 Spray PRN   • insulin regular (HumuLIN R,NovoLIN R) injection 4X/DAY ACHS    And   • glucose 4 g chewable tablet 16 g Q15 MIN PRN    And   • dextrose 50% (D50W) injection 50 mL Q15 MIN PRN   • enoxaparin (LOVENOX) inj 40 mg DAILY AT 1800   • calcium carbonate (TUMS) chewable tab 500 mg BID   • tizanidine (ZANAFLEX) tablet 2 mg Q8HRS PRN   • vitamin D (cholecalciferol) tablet 5,000 Units DAILY   • albuterol inhaler 2 Puff Q6HRS PRN   • atorvastatin (LIPITOR) tablet 20 mg QHS   • fluconazole (DIFLUCAN) tablet 150 mg DAILY   • furosemide (LASIX) tablet 40 mg DAILY   • gabapentin (NEURONTIN) capsule 800 mg TID   • levothyroxine (SYNTHROID) tablet 75 mcg QHS   • omeprazole (PRILOSEC) capsule 20 mg QHS   • ROPINIRole (REQUIP) tablet 8 mg QHS   • spironolactone (ALDACTONE) tablet 25 mg Q EVENING   • temazepam (Restoril) capsule 30 mg HS PRN   • venlafaxine (EFFEXOR) tablet 25 mg Q EVENING   •  venlafaxine XR (EFFEXOR XR) capsule 150 mg Q EVENING   • potassium chloride SA (Kdur) tablet 10 mEq QHS       Orders Placed This Encounter   Procedures   • Diet Order Diet: Consistent CHO (Diabetic)     Standing Status:   Standing     Number of Occurrences:   1     Order Specific Question:   Diet:     Answer:   Consistent CHO (Diabetic) [4]       Assessment:  Active Hospital Problems    Diagnosis    • *S/P laminectomy with spinal fusion    • Thyroid disorder    • Dysphagia    • Diabetic polyneuropathy (HCC)    • Restless legs syndrome (RLS)    • Type 2 diabetes mellitus, without long-term current use of insulin (HCC)    • COPD (chronic obstructive pulmonary disease) (HCC)    • Tremor    • HTN (hypertension)        Medical Decision Making and Plan:  Lumbar retrospondylolisthesis - Patient with significant back pain caused by retrospondy of her L2 on her previous back surgery. Patient is now s/p L2-L3 XLIF and then L2-S1 redo laminectomies with Dr. Lechuga on 5/25/21 and 5/28/21  -PT and OT for mobility and ADLs. TLSO - poorly fitting, may need vendor to reassess.  -Follow-up with NSG.      Pain Control - Patient with post-operative and neuropathic pain. Continue PRN Tylenol, Oxycodone, and scheduled Gabapentin.   -Gabapentin 800 mg TID. Lidocaine patch bilaterally to low back     DM2 - Patient on SSI on transfer. Will monitor. Previously on Glipizide and Januvia. Recent changes with PCP were causing higher A1c.  A1c 9.2 on admission.   -Will restart Glipizide 2.5 mg daily. Improved on diabetic diet and Glipizide, will monitor.      HTN - Patient on Spironolactone and Lasix on transfer.   -SBP labile, down into 80s. 1/2 tab Spironolactone      COPD - Patient on PRN Albuterol. On 2L on transfer. RT to consult     HLD - Patient on Atorvastatin 20 mg daily.      Anemia - Check AM CBC. 10.6, will continue to monitor, Check Fe panel at next check      Hypothyroidism - On 75 mcg Levothyroxine, check TSH - wnl     GERD -  Patient on Prilosec QHS     RLS - On Requip QHS.     Depression - Patient on Effexor  mg and 25 mg short acting     DVT ppx - Patient on Lovenox on transfer.     Total time:  37 minutes.  I spent greater than 50% of the time for patient care, counseling, and coordination on this date, including unit/floor time, and face-to-face time with the patient as per interval events and assessment and plan above. Topics discussed included discharge planning, back pain, start lidocaine, low SBP, reduce diuretic, and check iron panel. Patient was discussed separately in IDT today; please see details above.       Deedee Ivy M.D.

## 2021-06-03 NOTE — DISCHARGE PLANNING
CASE MANAGEMENT INITIAL ASSESSMENT    Admit Date:  6/1/2021     I met with patient at bedside to discuss role of case management / discharge planning / team conference.   Patient sitting up in wheelchair, alert & cooperative w/ interview. Requested to f/u interview tomorrow as needed to contact daughter urgently.  Briefly reviewed IDT recommendations & targeted DC date 6.12.21.    Patient is a  65 y.o. female transferred from [facility] 6[facility]1.21 s/p elective laminectomy w/ spinal fusion, 2 stage procedure.    PMHx: DM2, HTN ALONZO uses CPAP & nocturnal O2, HLD, BPPV, IBS, GERD, ovarian cancer, COPD, fibromyalgia, obesity.    PLOF: lives w/ SO, daughter & ARABELLA in [place], [facility] w/ 2 SEBASTIEN. Need to verify home DME, previous services, care support & assistance.    PCP: Dr. Clarisa Winchester  NSGY: Dr. Remi Lechuga    Admitting MD: Dr. CHELO Ivy    Diagnosis: Lumbar stenosis without neurogenic claudication [M48.061]    Co-morbidities:   Patient Active Problem List    Diagnosis Date Noted   • Thyroid disorder 09/27/2019   • S/P laminectomy with spinal fusion 09/27/2019   • Dysphagia 07/08/2019   • Anxiety 07/07/2019   • Hypokalemia 07/07/2019   • Head ache 07/07/2019   • Hypotension 04/20/2016   • Normocytic anemia 04/20/2016   • Acute renal failure (HCC) 04/20/2016   • Weakness 04/20/2016   • Bradycardia 01/25/2016   • Chest pain 01/25/2016   • Cervical spondylosis with myelopathy 01/22/2016   • Diabetic polyneuropathy ([facility])    • BPPV (benign paroxysmal positional vertigo)    • Restless legs syndrome (RLS)    • Migraine with aura    • Essential and other specified forms of tremor    • Type 2 diabetes mellitus, without long-term current use of insulin ([facility]) 02/09/2012   • FH: diabetes mellitus 09/19/2011   • Blood glucose elevated 09/19/2011   • COPD (chronic obstructive pulmonary disease) ([facility]) 11/28/2010   • Tremor 11/28/2010   • Eustachian tube dysfunction 09/22/2010   • Sinusitis, chronic 09/22/2010   • HTN (hypertension)  09/22/2010   • Fibromyalgia syndrome 09/22/2010   • Chronic pain syndrome 09/22/2010     Prior Living Situation:  Housing / Facility: 1 Story House  Lives with - Patient's Self Care Capacity: Adult Children, Significant Other    Prior Level of Function:  Medication Management: Independent  Finances: Independent  Home Management: Independent  Shopping: Independent  Prior Level Of Mobility: Independent With Device in Community  Driving / Transportation: Driving Independent    Support Systems:  Primary : Cherelle sheikh [phone]  Other support systems: Cesar GARCIA [phone]  Advance Directives: No  Power of  (Name & Phone): none    Previous Services Utilized:   Equipment Owned: 4-Wheel Walker  Prior Services: Home-Independent    Other Information:  Occupation (Pre-Hospital Vocational): Retired Due To Age     Primary Payor Source: Medicare A, Medicare B  Secondary Payor Source: Supplemental Insurance (Medicaid FFS)  Primary Care Practitioner : Dr. Clarisa Winchester  Other MDs: Dr. Remi ALEJO    Patient / Family Goal:  Patient / Family Goal: To improve walking, To improve strength, To return home    Additional Case Management Questions:  Have you ever received case management services for yourself or a family member? yes    Do you feel you have and an understanding of what services  provide? yes    Do you have any additional questions regarding case management? Can we talk tomorrow?         CASE MANAGEMENT PLAN OF CARE   Individualized Goals:   1. To improve walking  2. To improve strength  3. To return home  4. Verify home assessment, DME, care support & assistance.    Barriers:   1. Functional mobility deficits.  2. Medical co-morbidities.      Plan:  1. Continue to follow patient through hospitalization and provide discharge planning in collaboration with patient, family, physicians and ancillary services.     2. Utilize community resources to ensure a safe  discharge.

## 2021-06-03 NOTE — THERAPY
"Physical Therapy   Daily Treatment     Patient Name: Valentina Lu  Age:  65 y.o., Sex:  female  Medical Record #: [id number]  Today's Date: 6/3/2021     Precautions  Precautions: Fall Risk, TLSO (Thoracolumbosacral orthosis), Spinal / Back Precautions   Comments: Gait training no AD, assess outdoor mobility, SL stance activities, transfer training with emphasis on safety and sequencing    Subjective    Pt found seated in w/c, \"My back is really itching\"     Objective       06/03/21 1031   Precautions   Precautions Fall Risk;TLSO (Thoracolumbosacral orthosis);Spinal / Back Precautions    Comments Gait training no AD, assess outdoor mobility, SL stance activities, transfer training with emphasis on safety and sequencing   Vitals   O2 (LPM) 3   Cognition    New Learning Impaired   Attention Impaired   Comments Requires cues for attention to task during Nu step   Sitting Lower Body Exercises   Nustep Resistance Level 3;Time (See Comments)  (x6min BUE's and BLE's av 30SPM)   Bed Mobility    Sit to Stand Minimal Assist   Neuro-Muscular Treatments   Neuro-Muscular Treatments Verbal Cuing;Tactile Cuing   Interdisciplinary Plan of Care Collaboration   IDT Collaboration with  Physician;Nursing   Patient Position at End of Therapy Seated;Self Releasing Lap Belt Applied;Other (Comments)  (with RT in gym)   Collaboration Comments re: pain meds given, itching over bandage on incision   PT Total Time Spent   PT Individual Total Time Spent (Mins) 30   PT Charge Group   PT Therapeutic Exercise 1   PT Therapeutic Activities 1     Assessed pt's back/itching, verbalizing discomfort with incisional coverage, RN notified & gave pt pain meds.  Pt rounded with MD regarding CLOF.    W/c<>nu step SPT tx's with FWW Gregg for STS, requires significant inc time to process and perform with set up of w/c and O2.    Assessment  Pt requires cont cues for attention to task and redirection to task.  Pt requires inc time to process and initiate " all transitions.    Strengths: Effective communication skills, Independent prior level of function, Motivated for self care and independence, Pleasant and cooperative, Supportive family, Willingly participates in therapeutic activities  Barriers: Fatigue, Generalized weakness, Home accessibility, Impaired balance, Impaired activity tolerance, Limited mobility, Pain, Pain poorly managed    Plan    Gait with FWW, LE strengthening, endurance, STS training, stair training    Passport items to be completed:  Get in/out of bed safely, in/out of a vehicle, safely use/maintain mobility device, navigate around home/community, show how to get up/down from the ground, demonstrate pressure relief/ perform skin check, ensure home is accessible, demonstrate HEP, complete caregiver training    Physical Therapy Problems (Active)     Problem: Mobility     Dates: Start: 06/02/21       Goal: STG-Within one week, patient will ambulate household distance     Dates: Start: 06/02/21       Description: AMB x 50 feet with FWW and SBA          Problem: Mobility Transfers     Dates: Start: 06/02/21       Goal: STG-Within one week, patient will perform bed mobility     Dates: Start: 06/02/21       Description: Supine </> sit with min A using bed rail       Goal: STG-Within one week, patient will transfer bed to chair     Dates: Start: 06/02/21       Description: SPT with FWW and SBA          Problem: PT-Long Term Goals     Dates: Start: 06/02/21       Goal: LTG-By discharge, patient will ambulate     Dates: Start: 06/02/21       Description: AMB x 150 feet with FWW, mod I        Goal: LTG-By discharge, patient will transfer one surface to another     Dates: Start: 06/02/21       Description: SPT with FWW, mod I        Goal: LTG-By discharge, patient will transfer in/out of a car     Dates: Start: 06/02/21       Description: Car transfer with FWW with SPV       Goal: LTG-By discharge, patient will     Dates: Start: 06/02/21       Description:  Up/down 2 steps with one HR and CGA

## 2021-06-03 NOTE — CARE PLAN
Problem: Knowledge Deficit - Standard  Goal: Patient and family/care givers will demonstrate understanding of plan of care, disease process/condition, diagnostic tests and medications  Outcome: Progressing  Note: Plan of care , meds, safety measures reviewed, verbalized understanding. Cpap plus 3 L o2 in use.      Problem: Pain - Standard  Goal: Alleviation of pain or a reduction in pain to the patient’s comfort goal  Outcome: Progressing  Note: Assessed for pain and discomfort, pt back incision with staples , well approximated , Island dressing changed.Medicated woth oxycodone 10 mg for back pain with relief.     Problem: Bladder / Voiding  Goal: Patient will establish and maintain regular urinary output  Outcome: Progressing  Note: Assisted oob to bathroom via walker voiding freely.   The patient is Stable - Low risk of patient condition declining or worsening    Shift Goals  Clinical Goals: pain control  Patient Goals: pain control    Progress made toward(s) clinical / shift goals:  Back incision line well approximated, , staples intact. F/S 147 , WNL.      Patient is not progressing towards the following goals:Cont monitored.

## 2021-06-03 NOTE — CARE PLAN
Problem: Mobility  Goal: STG-Within one week, patient will ambulate household distance  Description: AMB x 50 feet with FWW and SBA  Outcome: Progressing     Problem: Mobility Transfers  Goal: STG-Within one week, patient will perform bed mobility  Description: Supine </> sit with min A using bed rail  Outcome: Progressing  Goal: STG-Within one week, patient will transfer bed to chair  Description: SPT with FWW and SBA  Outcome: Progressing     Goals still appropriate, evaluation completed yesterday

## 2021-06-03 NOTE — THERAPY
Recreational Therapy   Initial Evaluation     Patient Name: Valentina Lu  Age:  65 y.o., Sex:  female  Medical Record #: [id number]  Today's Date: 6/3/2021     Subjective    Pt sharing that she would like to get stronger while she is here.     Objective       06/03/21 1101   Leisure History   Leisure Interests Pets;Hobbies  (sewing/ knitting)   Leisure Comments has dogs, gardening, roel painting, cleaning the house   Pre-Morbid Leisure Lifestyle Individual;Active   Prior Living Arrangements   Lives with - Patient's Self Care Capacity Adult Children;Spouse   Steps Into Home 2   Steps In Home 0   Ambulation Independent   Assistive Devices Used None   Driving / Transportation Driving Independent   Functional Ability Status - Physical   Endurance Low   Right  Strong   Left  Strong   Right Arm Strong   Left Arm Strong   Right Leg Weak   Left Leg Weak   Upper Extremity Gross Motor Uses Both Arms / Hands   Lower Extremity Gross Motor Uses Both Legs  (BLE weakness)   Fine Motor Manipulates Small Objects   Functional Ability Status - Cognitive   Attention Span Remains on Task   Comprehension Follows Two Step Commands   Judgment Able to Make Independent Decisions   Functional Ability Status - Emotional    Affect Appropriate   Mood Appropriate   Behavior Appropriate   Leisure Competence Measure   Leisure Awareness Independent   Leisure Attitude Independent   Leisure Skills Minimal Assist   Cultural / Social Behaviors Independent   Interpersonal Skills Independent   Community Integration Skills Minimal Assist   Social Contact Independent   Community Participation Minimal Assist   Current Discharge Plan   Current Discharge Plan Return to Prior Living Situation   Benefit    Benefit Patient would Benefit from Inpatient Recreational Therapy to Maximize Independent Leisure Functioning    Interdisciplinary Plan of Care Collaboration   IDT Collaboration with  Physical Therapist;Respiratory Therapist   Patient  Position at End of Therapy Seated;Tray Table within Reach;Call Light within Reach   Collaboration Comments recieved care from PT, RT changed Pt to a concentrator from tank O2   Strengths & Barriers   Strengths Able to follow instructions;Alert and oriented;Motivated for self care and independence;Pleasant and cooperative;Supportive family;Willingly participates in therapeutic activities   Barriers Decreased endurance;Fatigue;Impaired functional cognition   Treatment Time   Total Time Spent (mins) 30   Procedural Tracking   Procedural Tracking Community Re-Integration;Community Skills Development;Leisure Skills Awareness;Leisure Skills Development;Gross Motor Functional Leisure Skills;Cognitive Skills Training       Assessment  Patient is 65 y.o. female with a diagnosis of S/P laminectomy with spinal fusion.  Additional factors influencing patient status / progress (ie: cognitive factors, co-morbidities, social support, etc): past medical history significant for DM, Hypothyroidism, GERD, ALONZO, and Hx of TIA admitted to [facility] on 5/25/2021 12:09 PM with scheduled back surgery.    Findings from the Leisure Interest Measure (BYERS) are as follows: Physical domain: 3.75 , Outdoor domain: 4.75 , Mechanical domain: 4 , Artistic domain: 4.5 , Service domain: 2.75 , Social domain: 4.25 , Cultural domain: 3.25 , and Reading domain: 3 . The results indicate a high interest in: Outdoor, mechanical, artistic, social , a moderate interest in: physical, service, cultural, and reading , and a low interest in: none .          Plan  Recommend Recreational Therapy 30 minutes per day 2-3 days per week for 2 weeks for the following treatments:  Community Re-Integration, Community Skills Development, Leisure Skills Awareness and Leisure Skills Development    Passport items to be completed:  Passport items to be completed:  Verbalize two positive leisure activities, discuss returning to work, hobbies, community groups or  volunteer activities, explore community resources     Goals:  Long term and short term goals have been discussed with patient and they are in agreement.    Recreation Therapy Problems (Active)     Problem: Recreation Therapy     Dates: Start: 06/03/21       Goal: STG-Within one week, patient will demonstrate leisure problem solving     Dates: Start: 06/03/21       Description: by sharing on two positive lesiure activities they would like to participate in either in session or during her free time and any perceived barriers to their participation.           Goal: STG-Within one week, patient will     Dates: Start: 06/03/21       Description: Stand for 2 minutes x3 Min A while performing a cognitive leisure activity.        Goal: LTG-By discharge, patient will demonstrate leisure problem solving     Dates: Start: 06/03/21       Description: by sharing on two positive lesiure activities they would like to participate in following discharge and any perceived barriers to their participation.           Goal: LTG-By discharge, patient will     Dates: Start: 06/03/21       Description: Stand for 3 minutes x3 CGA while performing a cognitive leisure activity with no LOB.

## 2021-06-04 LAB
25(OH)D3 SERPL-MCNC: 30 NG/ML (ref 30–80)
ANION GAP SERPL CALC-SCNC: 11 MMOL/L (ref 7–16)
BUN SERPL-MCNC: 15 MG/DL (ref 8–22)
CALCIUM SERPL-MCNC: 9.7 MG/DL (ref 8.5–10.5)
CHLORIDE SERPL-SCNC: 94 MMOL/L (ref 96–112)
CO2 SERPL-SCNC: 32 MMOL/L (ref 20–33)
CREAT SERPL-MCNC: 0.72 MG/DL (ref 0.5–1.4)
ERYTHROCYTE [DISTWIDTH] IN BLOOD BY AUTOMATED COUNT: 53.2 FL (ref 35.9–50)
GLUCOSE BLD-MCNC: 143 MG/DL (ref 65–99)
GLUCOSE BLD-MCNC: 151 MG/DL (ref 65–99)
GLUCOSE BLD-MCNC: 158 MG/DL (ref 65–99)
GLUCOSE BLD-MCNC: 174 MG/DL (ref 65–99)
GLUCOSE SERPL-MCNC: 144 MG/DL (ref 65–99)
HCT VFR BLD AUTO: 34.5 % (ref 37–47)
HGB BLD-MCNC: 10.3 G/DL (ref 12–16)
HGB RETIC QN AUTO: 29.7 PG/CELL (ref 29–35)
IMM RETICS NFR: 31.8 % (ref 9.3–17.4)
IRON SATN MFR SERPL: 21 % (ref 15–55)
IRON SERPL-MCNC: 49 UG/DL (ref 40–170)
MCH RBC QN AUTO: 28.3 PG (ref 27–33)
MCHC RBC AUTO-ENTMCNC: 29.9 G/DL (ref 33.6–35)
MCV RBC AUTO: 94.8 FL (ref 81.4–97.8)
PLATELET # BLD AUTO: 346 K/UL (ref 164–446)
PMV BLD AUTO: 10 FL (ref 9–12.9)
POTASSIUM SERPL-SCNC: 4.1 MMOL/L (ref 3.6–5.5)
RBC # BLD AUTO: 3.64 M/UL (ref 4.2–5.4)
RETICS # AUTO: 0.16 M/UL (ref 0.04–0.06)
RETICS/RBC NFR: 4.3 % (ref 0.8–2.1)
SODIUM SERPL-SCNC: 137 MMOL/L (ref 135–145)
TIBC SERPL-MCNC: 233 UG/DL (ref 250–450)
UIBC SERPL-MCNC: 184 UG/DL (ref 110–370)
WBC # BLD AUTO: 7.2 K/UL (ref 4.8–10.8)

## 2021-06-04 PROCEDURE — 97535 SELF CARE MNGMENT TRAINING: CPT | Mod: CO

## 2021-06-04 PROCEDURE — A9270 NON-COVERED ITEM OR SERVICE: HCPCS | Performed by: PHYSICAL MEDICINE & REHABILITATION

## 2021-06-04 PROCEDURE — 83540 ASSAY OF IRON: CPT

## 2021-06-04 PROCEDURE — 97110 THERAPEUTIC EXERCISES: CPT | Mod: CQ

## 2021-06-04 PROCEDURE — 94760 N-INVAS EAR/PLS OXIMETRY 1: CPT

## 2021-06-04 PROCEDURE — 770010 HCHG ROOM/CARE - REHAB SEMI PRIVAT*

## 2021-06-04 PROCEDURE — 97530 THERAPEUTIC ACTIVITIES: CPT | Mod: CQ

## 2021-06-04 PROCEDURE — 36415 COLL VENOUS BLD VENIPUNCTURE: CPT

## 2021-06-04 PROCEDURE — 83550 IRON BINDING TEST: CPT

## 2021-06-04 PROCEDURE — 94660 CPAP INITIATION&MGMT: CPT

## 2021-06-04 PROCEDURE — 99232 SBSQ HOSP IP/OBS MODERATE 35: CPT | Performed by: PHYSICAL MEDICINE & REHABILITATION

## 2021-06-04 PROCEDURE — 85046 RETICYTE/HGB CONCENTRATE: CPT

## 2021-06-04 PROCEDURE — 700101 HCHG RX REV CODE 250: Performed by: PHYSICAL MEDICINE & REHABILITATION

## 2021-06-04 PROCEDURE — 700102 HCHG RX REV CODE 250 W/ 637 OVERRIDE(OP): Performed by: PHYSICAL MEDICINE & REHABILITATION

## 2021-06-04 PROCEDURE — 700111 HCHG RX REV CODE 636 W/ 250 OVERRIDE (IP): Performed by: PHYSICAL MEDICINE & REHABILITATION

## 2021-06-04 PROCEDURE — 82962 GLUCOSE BLOOD TEST: CPT | Mod: 91

## 2021-06-04 PROCEDURE — 97116 GAIT TRAINING THERAPY: CPT | Mod: CQ

## 2021-06-04 PROCEDURE — 85027 COMPLETE CBC AUTOMATED: CPT

## 2021-06-04 PROCEDURE — 80048 BASIC METABOLIC PNL TOTAL CA: CPT

## 2021-06-04 RX ORDER — GLIPIZIDE 2.5 MG/1
5 TABLET, EXTENDED RELEASE ORAL
Status: DISCONTINUED | OUTPATIENT
Start: 2021-06-05 | End: 2021-06-10

## 2021-06-04 RX ADMIN — OXYCODONE HYDROCHLORIDE 10 MG: 10 TABLET ORAL at 14:35

## 2021-06-04 RX ADMIN — ENOXAPARIN SODIUM 40 MG: 40 INJECTION SUBCUTANEOUS at 17:36

## 2021-06-04 RX ADMIN — CALCIUM CARBONATE (ANTACID) CHEW TAB 500 MG 500 MG: 500 CHEW TAB at 20:00

## 2021-06-04 RX ADMIN — POTASSIUM CHLORIDE 10 MEQ: 1500 TABLET, EXTENDED RELEASE ORAL at 19:58

## 2021-06-04 RX ADMIN — GABAPENTIN 800 MG: 400 CAPSULE ORAL at 14:24

## 2021-06-04 RX ADMIN — GLIPIZIDE 2.5 MG: 2.5 TABLET, FILM COATED, EXTENDED RELEASE ORAL at 08:24

## 2021-06-04 RX ADMIN — SENNOSIDES AND DOCUSATE SODIUM 2 TABLET: 8.6; 5 TABLET ORAL at 08:27

## 2021-06-04 RX ADMIN — FLUCONAZOLE 150 MG: 100 TABLET ORAL at 08:24

## 2021-06-04 RX ADMIN — DIPHENHYDRAMINE HCL 25 MG: 25 TABLET ORAL at 20:00

## 2021-06-04 RX ADMIN — GABAPENTIN 800 MG: 400 CAPSULE ORAL at 19:57

## 2021-06-04 RX ADMIN — INSULIN HUMAN 2 UNITS: 100 INJECTION, SOLUTION PARENTERAL at 17:32

## 2021-06-04 RX ADMIN — OXYCODONE HYDROCHLORIDE 10 MG: 10 TABLET ORAL at 05:28

## 2021-06-04 RX ADMIN — INSULIN HUMAN 2 UNITS: 100 INJECTION, SOLUTION PARENTERAL at 08:06

## 2021-06-04 RX ADMIN — VENLAFAXINE HYDROCHLORIDE 150 MG: 75 CAPSULE, EXTENDED RELEASE ORAL at 19:56

## 2021-06-04 RX ADMIN — OMEPRAZOLE 20 MG: 20 CAPSULE, DELAYED RELEASE ORAL at 19:59

## 2021-06-04 RX ADMIN — SPIRONOLACTONE 12.5 MG: 25 TABLET, FILM COATED ORAL at 19:58

## 2021-06-04 RX ADMIN — ATORVASTATIN CALCIUM 20 MG: 10 TABLET, FILM COATED ORAL at 19:59

## 2021-06-04 RX ADMIN — OXYCODONE HYDROCHLORIDE 10 MG: 10 TABLET ORAL at 10:55

## 2021-06-04 RX ADMIN — BUDESONIDE AND FORMOTEROL FUMARATE DIHYDRATE 2 PUFF: 160; 4.5 AEROSOL RESPIRATORY (INHALATION) at 08:23

## 2021-06-04 RX ADMIN — VENLAFAXINE 25 MG: 25 TABLET ORAL at 19:59

## 2021-06-04 RX ADMIN — BUDESONIDE AND FORMOTEROL FUMARATE DIHYDRATE 2 PUFF: 160; 4.5 AEROSOL RESPIRATORY (INHALATION) at 19:56

## 2021-06-04 RX ADMIN — GABAPENTIN 800 MG: 400 CAPSULE ORAL at 08:25

## 2021-06-04 RX ADMIN — CHOLECALCIFEROL TAB 25 MCG (1000 UNIT) 5000 UNITS: 25 TAB at 08:24

## 2021-06-04 RX ADMIN — SENNOSIDES AND DOCUSATE SODIUM 2 TABLET: 8.6; 5 TABLET ORAL at 19:59

## 2021-06-04 RX ADMIN — LIDOCAINE 2 PATCH: 50 PATCH TOPICAL at 08:27

## 2021-06-04 RX ADMIN — BENZOCAINE AND MENTHOL 1 LOZENGE: 15; 3.6 LOZENGE ORAL at 20:16

## 2021-06-04 RX ADMIN — MAGNESIUM HYDROXIDE 30 ML: 400 SUSPENSION ORAL at 05:24

## 2021-06-04 RX ADMIN — LEVOTHYROXINE SODIUM 75 MCG: 75 TABLET ORAL at 19:59

## 2021-06-04 RX ADMIN — ROPINIROLE HYDROCHLORIDE 8 MG: 1 TABLET, FILM COATED ORAL at 19:57

## 2021-06-04 RX ADMIN — CALCIUM CARBONATE (ANTACID) CHEW TAB 500 MG 500 MG: 500 CHEW TAB at 08:23

## 2021-06-04 RX ADMIN — FUROSEMIDE 40 MG: 40 TABLET ORAL at 08:26

## 2021-06-04 RX ADMIN — OXYCODONE HYDROCHLORIDE 10 MG: 10 TABLET ORAL at 17:40

## 2021-06-04 RX ADMIN — INSULIN HUMAN 2 UNITS: 100 INJECTION, SOLUTION PARENTERAL at 20:03

## 2021-06-04 ASSESSMENT — ACTIVITIES OF DAILY LIVING (ADL)
BED_CHAIR_WHEELCHAIR_TRANSFER_DESCRIPTION: ADAPTIVE EQUIPMENT;SUPERVISION FOR SAFETY;SET-UP OF EQUIPMENT
TOILET_TRANSFER_DESCRIPTION: GRAB BAR;INCREASED TIME;SUPERVISION FOR SAFETY;VERBAL CUEING
TUB_SHOWER_TRANSFER_DESCRIPTION: GRAB BAR
BED_CHAIR_WHEELCHAIR_TRANSFER_DESCRIPTION: ADAPTIVE EQUIPMENT;SUPERVISION FOR SAFETY;VERBAL CUEING;INCREASED TIME

## 2021-06-04 ASSESSMENT — GAIT ASSESSMENTS
DISTANCE (FEET): 120
DEVIATION: BRADYKINETIC;DECREASED BASE OF SUPPORT;DECREASED HEEL STRIKE
ASSISTIVE DEVICE: FRONT WHEEL WALKER
DISTANCE (FEET): 25
GAIT LEVEL OF ASSIST: STAND BY ASSIST
ASSISTIVE DEVICE: FRONT WHEEL WALKER
GAIT LEVEL OF ASSIST: CONTACT GUARD ASSIST

## 2021-06-04 NOTE — THERAPY
Recreational Therapy  Daily Treatment     Patient Name: Valentina Lu  AGE:  65 y.o., SEX:  female  Medical Record #: [id number]  Today's Date: 6/4/2021       Subjective    Pt reporting that she had just received her breakfast and required extra time to finish her breakfast. Pt also sharing that she needed to find her shampoo and conditioner.      Objective       06/04/21 0831   Functional Ability Status - Cognitive   Attention Span Remains on Task   Comprehension Follows Three Step Commands   Judgment Able to Make Independent Decisions   Functional Ability Status - Emotional    Affect Appropriate   Mood Appropriate   Behavior Appropriate   Skilled Intervention    Skilled Intervention Relaxation / Coping Skills   Interdisciplinary Plan of Care Collaboration   Patient Position at End of Therapy Seated;Tray Table within Reach;Call Light within Reach   Strengths & Barriers   Strengths Able to follow instructions;Alert and oriented;Motivated for self care and independence;Pleasant and cooperative;Supportive family;Willingly participates in therapeutic activities   Barriers Limited mobility;Impaired activity tolerance   Treatment Time   Total Time Spent (mins) 0   Total Time Missed 30   Procedural Tracking   Procedural Tracking Community Re-Integration;Community Skills Development;Leisure Skills Awareness;Leisure Skills Development       Assessment    Pt did not participate in planned activity. Pt was assisted in looking for her hygiene products.     Strengths: (P) Able to follow instructions, Alert and oriented, Motivated for self care and independence, Pleasant and cooperative, Supportive family, Willingly participates in therapeutic activities  Barriers: (P) Limited mobility, Impaired activity tolerance    Plan    Standing goals.     Passport items to be completed:  Passport items to be completed:  Verbalize two positive leisure activities, discuss returning to work, hobbies, community groups or volunteer  activities, explore community resources

## 2021-06-04 NOTE — FLOWSHEET NOTE
06/04/21 1033   Events/Summary/Plan   Events/Summary/Plan SpO2 check, pt did not use CPAP last night   Vital Signs   Pulse 80   Respiration 18   Pulse Oximetry 95 %   $ Pulse Oximetry (Spot Check) Yes   Oxygen   O2 (LPM) 3  (Decrease to 2 lpm)   O2 Delivery Device Nasal Cannula   Non-Invasive Ventilation [facility]   Nocturnal CPAP or BIPAP CPAP - [facility]   $ System Evaluation Yes   Settings (If Known) Auto 8-14   FiO2 or LPM 3

## 2021-06-04 NOTE — PROGRESS NOTES
"Rehab Progress Note     Encounter Date: 6/4/2021    CC: Lumbar surgery, decreased mobility    Interval Events (Subjective)  Patient sitting up in therapy gym. She reports her back pain is still her biggest complaint. She reports the medication does help. She did take a laxative this morning. Reviewed AM labs including ongoing anemia, no iron deficiency, and ongoing hyperglycemia. Denies chest pain. Denies SOB.     IDT Team Meeting 6/3/2021  DC/Disposition:  6/12/21     Objective:  VITAL SIGNS: /65   Pulse 80   Temp 36.4 °C (97.6 °F) (Oral)   Resp 18   Ht 1.651 m (5' 5\")   Wt 89.7 kg (197 lb 12 oz)   LMP  (LMP Unknown)   SpO2 95%   BMI 32.91 kg/m²   Gen: NAD  Psych: Mood and affect appropriate  CV: RRR, no edema  Resp: CTAB, no upper airway sounds  Abd: NTND  Neuro: AOx4, following commands    Recent Results (from the past 72 hour(s))   SARS-CoV-2 PCR (24 hour In-House): Collect NP swab in [facility]    Collection Time: 06/01/21  2:20 PM    Specimen: Nasopharyngeal; Respirate   Result Value Ref Range    SARS-CoV-2 Source NP Swab     SARS-CoV-2 by PCR NotDetected    POCT glucose device results    Collection Time: 06/01/21  5:39 PM   Result Value Ref Range    Glucose - Accu-Ck 191 (H) 65 - 99 mg/dL   POCT glucose device results    Collection Time: 06/01/21  9:23 PM   Result Value Ref Range    Glucose - Accu-Ck 237 (H) 65 - 99 mg/dL   CBC with Differential    Collection Time: 06/02/21  5:52 AM   Result Value Ref Range    WBC 8.0 4.8 - 10.8 K/uL    RBC 3.69 (L) 4.20 - 5.40 M/uL    Hemoglobin 10.6 (L) 12.0 - 16.0 g/dL    Hematocrit 33.7 (L) 37.0 - 47.0 %    MCV 91.3 81.4 - 97.8 fL    MCH 28.7 27.0 - 33.0 pg    MCHC 31.5 (L) 33.6 - 35.0 g/dL    RDW 51.0 (H) 35.9 - 50.0 fL    Platelet Count 312 164 - 446 K/uL    MPV 10.3 9.0 - 12.9 fL    Neutrophils-Polys 69.30 44.00 - 72.00 %    Lymphocytes 17.30 (L) 22.00 - 41.00 %    Monocytes 8.80 0.00 - 13.40 %    Eosinophils 2.30 0.00 - 6.90 %    Basophils 0.50 0.00 - 1.80 %    " Immature Granulocytes 1.80 (H) 0.00 - 0.90 %    Nucleated RBC 0.00 /100 WBC    Neutrophils (Absolute) 5.54 2.00 - 7.15 K/uL    Lymphs (Absolute) 1.38 1.00 - 4.80 K/uL    Monos (Absolute) 0.70 0.00 - 0.85 K/uL    Eos (Absolute) 0.18 0.00 - 0.51 K/uL    Baso (Absolute) 0.04 0.00 - 0.12 K/uL    Immature Granulocytes (abs) 0.14 (H) 0.00 - 0.11 K/uL    NRBC (Absolute) 0.00 K/uL   Comp Metabolic Panel (CMP)    Collection Time: 06/02/21  5:52 AM   Result Value Ref Range    Sodium 136 135 - 145 mmol/L    Potassium 4.3 3.6 - 5.5 mmol/L    Chloride 94 (L) 96 - 112 mmol/L    Co2 33 20 - 33 mmol/L    Anion Gap 9.0 7.0 - 16.0    Glucose 137 (H) 65 - 99 mg/dL    Bun 15 8 - 22 mg/dL    Creatinine 0.74 0.50 - 1.40 mg/dL    Calcium 9.7 8.5 - 10.5 mg/dL    AST(SGOT) 17 12 - 45 U/L    ALT(SGPT) 26 2 - 50 U/L    Alkaline Phosphatase 82 30 - 99 U/L    Total Bilirubin 0.5 0.1 - 1.5 mg/dL    Albumin 3.2 3.2 - 4.9 g/dL    Total Protein 6.4 6.0 - 8.2 g/dL    Globulin 3.2 1.9 - 3.5 g/dL    A-G Ratio 1.0 g/dL   HEMOGLOBIN A1C    Collection Time: 06/02/21  5:52 AM   Result Value Ref Range    Glycohemoglobin 9.2 (H) 4.0 - 5.6 %    Est Avg Glucose 217 mg/dL   TSH with Reflex to FT4    Collection Time: 06/02/21  5:52 AM   Result Value Ref Range    TSH 1.490 0.380 - 5.330 uIU/mL   ESTIMATED GFR    Collection Time: 06/02/21  5:52 AM   Result Value Ref Range    GFR If African American >60 >60 mL/min/1.73 m 2    GFR If Non African American >60 >60 mL/min/1.73 m 2   POCT glucose device results    Collection Time: 06/02/21  7:14 AM   Result Value Ref Range    Glucose - Accu-Ck 137 (H) 65 - 99 mg/dL   POCT glucose device results    Collection Time: 06/02/21 11:27 AM   Result Value Ref Range    Glucose - Accu-Ck 152 (H) 65 - 99 mg/dL   POCT glucose device results    Collection Time: 06/02/21  5:02 PM   Result Value Ref Range    Glucose - Accu-Ck 171 (H) 65 - 99 mg/dL   POCT glucose device results    Collection Time: 06/02/21  9:38 PM   Result Value Ref  Range    Glucose - Accu-Ck 147 (H) 65 - 99 mg/dL   POCT glucose device results    Collection Time: 06/03/21  7:20 AM   Result Value Ref Range    Glucose - Accu-Ck 120 (H) 65 - 99 mg/dL   POCT glucose device results    Collection Time: 06/03/21 12:03 PM   Result Value Ref Range    Glucose - Accu-Ck 148 (H) 65 - 99 mg/dL   POCT glucose device results    Collection Time: 06/03/21  4:59 PM   Result Value Ref Range    Glucose - Accu-Ck 123 (H) 65 - 99 mg/dL   POCT glucose device results    Collection Time: 06/03/21  8:50 PM   Result Value Ref Range    Glucose - Accu-Ck 161 (H) 65 - 99 mg/dL   Basic Metabolic Panel    Collection Time: 06/04/21  6:22 AM   Result Value Ref Range    Sodium 137 135 - 145 mmol/L    Potassium 4.1 3.6 - 5.5 mmol/L    Chloride 94 (L) 96 - 112 mmol/L    Co2 32 20 - 33 mmol/L    Glucose 144 (H) 65 - 99 mg/dL    Bun 15 8 - 22 mg/dL    Creatinine 0.72 0.50 - 1.40 mg/dL    Calcium 9.7 8.5 - 10.5 mg/dL    Anion Gap 11.0 7.0 - 16.0   CBC WITHOUT DIFFERENTIAL    Collection Time: 06/04/21  6:22 AM   Result Value Ref Range    WBC 7.2 4.8 - 10.8 K/uL    RBC 3.64 (L) 4.20 - 5.40 M/uL    Hemoglobin 10.3 (L) 12.0 - 16.0 g/dL    Hematocrit 34.5 (L) 37.0 - 47.0 %    MCV 94.8 81.4 - 97.8 fL    MCH 28.3 27.0 - 33.0 pg    MCHC 29.9 (L) 33.6 - 35.0 g/dL    RDW 53.2 (H) 35.9 - 50.0 fL    Platelet Count 346 164 - 446 K/uL    MPV 10.0 9.0 - 12.9 fL   RETICULOCYTES COUNT    Collection Time: 06/04/21  6:22 AM   Result Value Ref Range    Reticulocyte Count 4.3 (H) 0.8 - 2.1 %    Retic, Absolute 0.16 (H) 0.04 - 0.06 M/uL    Imm. Reticulocyte Fraction 31.8 (H) 9.3 - 17.4 %    Retic Hgb Equivalent 29.7 29.0 - 35.0 pg/cell   IRON/TOTAL IRON BIND    Collection Time: 06/04/21  6:22 AM   Result Value Ref Range    Iron 49 40 - 170 ug/dL    Total Iron Binding 233 (L) 250 - 450 ug/dL    Unsat Iron Binding 184 110 - 370 ug/dL    % Saturation 21 15 - 55 %   ESTIMATED GFR    Collection Time: 06/04/21  6:22 AM   Result Value Ref Range     GFR If African American >60 >60 mL/min/1.73 m 2    GFR If Non African American >60 >60 mL/min/1.73 m 2   POCT glucose device results    Collection Time: 06/04/21  8:02 AM   Result Value Ref Range    Glucose - Accu-Ck 158 (H) 65 - 99 mg/dL   POCT glucose device results    Collection Time: 06/04/21 10:57 AM   Result Value Ref Range    Glucose - Accu-Ck 143 (H) 65 - 99 mg/dL       Current Facility-Administered Medications   Medication Frequency   • lidocaine (LIDODERM) 5 % 2 Patch DAILY   • spironolactone (ALDACTONE) tablet 12.5 mg Q EVENING   • diphenhydrAMINE-zinc acetate (BENADRYL ITCH) topical cream TID PRN   • diphenhydrAMINE (BENADRYL) tablet/capsule 25 mg Q6HRS PRN   • budesonide-formoterol (SYMBICORT) 160-4.5 MCG/ACT inhaler 2 Puff BID (RT)   • glipiZIDE SR (GLUCOTROL) tablet 2.5 mg QAM AC   • Respiratory Therapy Consult Continuous RT   • Pharmacy Consult Request ...Pain Management Review 1 Each PHARMACY TO DOSE   • oxyCODONE immediate-release (ROXICODONE) tablet 5 mg Q3HRS PRN    Or   • oxyCODONE immediate release (ROXICODONE) tablet 10 mg Q3HRS PRN   • hydrALAZINE (APRESOLINE) tablet 25 mg Q8HRS PRN   • acetaminophen (Tylenol) tablet 650 mg Q4HRS PRN   • senna-docusate (PERICOLACE or SENOKOT S) 8.6-50 MG per tablet 2 tablet BID    And   • polyethylene glycol/lytes (MIRALAX) PACKET 1 Packet QDAY PRN    And   • magnesium hydroxide (MILK OF MAGNESIA) suspension 30 mL QDAY PRN    And   • bisacodyl (DULCOLAX) suppository 10 mg QDAY PRN   • artificial tears ophthalmic solution 1 Drop PRN   • benzocaine-menthol (CEPACOL) lozenge 1 Lozenge Q2HRS PRN   • mag hydrox-al hydrox-simeth (MAALOX PLUS ES or MYLANTA DS) suspension 20 mL Q2HRS PRN   • ondansetron (ZOFRAN ODT) dispertab 4 mg 4X/DAY PRN    Or   • ondansetron (ZOFRAN) syringe/vial injection 4 mg 4X/DAY PRN   • traZODone (DESYREL) tablet 50 mg QHS PRN   • sodium chloride (OCEAN) 0.65 % nasal spray 2 Spray PRN   • insulin regular (HumuLIN R,NovoLIN R) injection  4X/DAY ACHS    And   • glucose 4 g chewable tablet 16 g Q15 MIN PRN    And   • dextrose 50% (D50W) injection 50 mL Q15 MIN PRN   • enoxaparin (LOVENOX) inj 40 mg DAILY AT 1800   • calcium carbonate (TUMS) chewable tab 500 mg BID   • tizanidine (ZANAFLEX) tablet 2 mg Q8HRS PRN   • vitamin D (cholecalciferol) tablet 5,000 Units DAILY   • albuterol inhaler 2 Puff Q6HRS PRN   • atorvastatin (LIPITOR) tablet 20 mg QHS   • fluconazole (DIFLUCAN) tablet 150 mg DAILY   • furosemide (LASIX) tablet 40 mg DAILY   • gabapentin (NEURONTIN) capsule 800 mg TID   • levothyroxine (SYNTHROID) tablet 75 mcg QHS   • omeprazole (PRILOSEC) capsule 20 mg QHS   • ROPINIRole (REQUIP) tablet 8 mg QHS   • temazepam (Restoril) capsule 30 mg HS PRN   • venlafaxine (EFFEXOR) tablet 25 mg Q EVENING   • venlafaxine XR (EFFEXOR XR) capsule 150 mg Q EVENING   • potassium chloride SA (Kdur) tablet 10 mEq QHS       Orders Placed This Encounter   Procedures   • Diet Order Diet: Consistent CHO (Diabetic)     Standing Status:   Standing     Number of Occurrences:   1     Order Specific Question:   Diet:     Answer:   Consistent CHO (Diabetic) [4]       Assessment:  Active Hospital Problems    Diagnosis    • *S/P laminectomy with spinal fusion    • Thyroid disorder    • Dysphagia    • Diabetic polyneuropathy (HCC)    • Restless legs syndrome (RLS)    • Type 2 diabetes mellitus, without long-term current use of insulin (HCC)    • COPD (chronic obstructive pulmonary disease) (HCC)    • Tremor    • HTN (hypertension)        Medical Decision Making and Plan:  Lumbar retrospondylolisthesis - Patient with significant back pain caused by retrospondy of her L2 on her previous back surgery. Patient is now s/p L2-L3 XLIF and then L2-S1 redo laminectomies with Dr. Lechuga on 5/25/21 and 5/28/21  -PT and OT for mobility and ADLs. TLSO - poorly fitting, may need vendor to reassess.  -Follow-up with NSG.      Pain Control - Patient with post-operative and neuropathic  pain. Continue PRN Tylenol, Oxycodone, and scheduled Gabapentin.   -Gabapentin 800 mg TID. Lidocaine patch bilaterally to low back     DM2 - Patient on SSI on transfer. Will monitor. Previously on Glipizide and Januvia. Recent changes with PCP were causing higher A1c.  A1c 9.2 on admission.   -Will restart Glipizide 2.5 mg daily. Improved on diabetic diet and Glipizide, will monitor. Highs into 170s. Increase Glipizide and monitor over weekend.      HTN - Patient on Spironolactone and Lasix on transfer.   -SBP labile, down into 80s. 1/2 tab Spironolactone      COPD - Patient on PRN Albuterol. On 2L on transfer. RT to consult     HLD - Patient on Atorvastatin 20 mg daily.      Anemia - Check AM CBC. 10.6, will continue to monitor, Check Fe panel at next check      Hypothyroidism - On 75 mcg Levothyroxine, check TSH - wnl     GERD - Patient on Prilosec QHS     RLS - On Requip QHS.     Depression - Patient on Effexor  mg and 25 mg short acting     DVT ppx - Patient on Lovenox on transfer.     Total time:  26 minutes.  I spent greater than 50% of the time for patient care, counseling, and coordination on this date, including unit/floor time, and face-to-face time with the patient as per interval events and assessment and plan above. Topics discussed included pain control, ongoing borderline low SBP, elevated BSG, and increase glipizide.        Deedee Ivy M.D.

## 2021-06-04 NOTE — THERAPY
"Physical Therapy   Daily Treatment     Patient Name: Valentina Lu  Age:  65 y.o., Sex:  female  Medical Record #: [id number]  Today's Date: 6/4/2021     Precautions  Precautions: Fall Risk, TLSO (Thoracolumbosacral orthosis), Spinal / Back Precautions   Comments: TLSO OOB, on 3L wean    Subjective    Pt seated in wc upon arrival, she was willing to participate     Objective       06/04/21 0901   Vitals   Pulse 84   Patient BP Position Sitting   Pulse Oximetry 93 %   O2 (LPM) 2   O2 Delivery Device Nasal Cannula   Vitals Comments SP02 range 91-96% on 2L pulsed concentrator with activity   Pain   Intervention Emotional Support;Distraction   Pain 0 - 10 Group   Location Back   Location Orientation Lower   Therapist Pain Assessment Post Activity Pain Same as Prior to Activity  (did not quantify)   Cognition    Level of Consciousness Alert   Comments cues for attention to task with amb   Gait Functional Level of Assist    Gait Level Of Assist Contact Guard Assist   Assistive Device Front Wheel Walker   Distance (Feet) 120  (plus 90' x 1 )   # of Times Distance was Traveled 2   Transfer Functional Level of Assist   Bed, Chair, Wheelchair Transfer Stand by Assist   Bed Chair Wheelchair Transfer Description Adaptive equipment;Supervision for safety;Verbal cueing;Increased time  (assist to manage 02 line)   Sitting Lower Body Exercises   Sitting Lower Body Exercises Yes   Sit to Stand 1 set of 10;Other Resistance (See Comments)  (from 20\" mat height <> FWW with hand to push up)   Standing Lower Body Exercises   Standing Lower Body Exercises Yes   Hamstring Curl 1 set of 10;Bilateral    Hip Flexion 1 set of 10;Bilateral   Hip Abduction 1 set of 10;Bilateral   Marching 1 set of 10   Heel Rise 1 set of 10   Mini Squat Partial;1 set of 10   Comments CGA/SBA throughout demo and cues for proper form and control, B UE support on //   Bed Mobility    Sit to Supine Minimal Assist   Sit to Stand Contact Guard Assist "   Neuro-Muscular Treatments   Neuro-Muscular Treatments Sequencing;Verbal Cuing   Interdisciplinary Plan of Care Collaboration   Patient Position at End of Therapy In Bed;Call Light within Reach;Tray Table within Reach;Phone within Reach   PT Total Time Spent   PT Individual Total Time Spent (Mins) 60   PT Charge Group   PT Gait Training 2   PT Therapeutic Exercise 2   Supervising Physical Therapist Ruby Lopez       Assessment    The patient tolerated the session well with emphasis on LE strength and mobility. Pt increased her ambulation distance significantly this session, she amb with decreased kwaku and requires frequent redirection to task. The patient completed the therapy session on 2L with 02 sats > 90% throughout. Requires increased time to complete mobility tasks.  Strengths: Effective communication skills, Independent prior level of function, Motivated for self care and independence, Pleasant and cooperative, Supportive family, Willingly participates in therapeutic activities  Barriers: Fatigue, Generalized weakness, Home accessibility, Impaired balance, Impaired activity tolerance, Limited mobility, Pain, Pain poorly managed    Plan    Gait with FWW, LE strengthening, endurance, STS training, stair training    Passport items to be completed:  Get in/out of bed safely, in/out of a vehicle, safely use mobility device, walk or wheel around home/community, navigate up and down stairs, show how to get up/down from the ground, ensure home is accessible, demonstrate HEP, complete caregiver training    Physical Therapy Problems (Active)     Problem: Mobility     Dates: Start: 06/02/21       Goal: STG-Within one week, patient will ambulate household distance     Dates: Start: 06/02/21       Description: AMB x 50 feet with FWW and SBA          Problem: Mobility Transfers     Dates: Start: 06/02/21       Goal: STG-Within one week, patient will perform bed mobility     Dates: Start: 06/02/21       Description:  Supine </> sit with min A using bed rail       Goal: STG-Within one week, patient will transfer bed to chair     Dates: Start: 06/02/21       Description: SPT with FWW and SBA          Problem: PT-Long Term Goals     Dates: Start: 06/02/21       Goal: LTG-By discharge, patient will ambulate     Dates: Start: 06/02/21       Description: AMB x 150 feet with FWW, mod I        Goal: LTG-By discharge, patient will transfer one surface to another     Dates: Start: 06/02/21       Description: SPT with FWW, mod I        Goal: LTG-By discharge, patient will transfer in/out of a car     Dates: Start: 06/02/21       Description: Car transfer with FWW with SPV       Goal: LTG-By discharge, patient will     Dates: Start: 06/02/21       Description: Up/down 2 steps with one HR and CGA

## 2021-06-04 NOTE — THERAPY
Physical Therapy   Daily Treatment     Patient Name: Valentina Lu  Age:  65 y.o., Sex:  female  Medical Record #: [id number]  Today's Date: 6/4/2021     Precautions  Precautions: Fall Risk, TLSO (Thoracolumbosacral orthosis), Spinal / Back Precautions   Comments: TLSO OOB, on 3L wean    Subjective    Patient resting in bed, she reports anterior thigh pain B and LBP. Pt requested to go to the bathroom     Objective       06/04/21 1301   Gait Functional Level of Assist    Gait Level Of Assist Stand by Assist   Assistive Device Front Wheel Walker   Distance (Feet) 25   # of Times Distance was Traveled 2   Deviation Bradykinetic;Decreased Base Of Support;Decreased Heel Strike   Transfer Functional Level of Assist   Bed, Chair, Wheelchair Transfer Stand by Assist   Bed Chair Wheelchair Transfer Description Adaptive equipment;Supervision for safety;Set-up of equipment  (02 management)   Toilet Transfers Stand by Assist   Toilet Transfer Description Grab bar;Increased time;Supervision for safety;Verbal cueing  (from ambulatory level)   Bed Mobility    Sit to Stand Stand by Assist   Interdisciplinary Plan of Care Collaboration   Patient Position at End of Therapy Seated;Edge of Bed;Other (Comments)  (with CNA-vitals)   PT Total Time Spent   PT Individual Total Time Spent (Mins) 30   PT Charge Group   PT Therapeutic Activities 2   Supervising Physical Therapist Ruby Lopez     Assist to nany TLSO at EOB    Sit <> supine Gregg with HOB elevated    Assessment    tx session limited by increased time in the bathroom this session, patient requiring extensive time to complete all tasks. Pt amb from the bed <> toilet with close SBA and on 2L 02  Strengths: Effective communication skills, Independent prior level of function, Motivated for self care and independence, Pleasant and cooperative, Supportive family, Willingly participates in therapeutic activities  Barriers: Fatigue, Generalized weakness, Home accessibility,  Impaired balance, Impaired activity tolerance, Limited mobility, Pain, Pain poorly managed    Plan    Gait with FWW, LE strengthening, endurance, STS training, stair training    Passport items to be completed:  Get in/out of bed safely, in/out of a vehicle, safely use mobility device, walk or wheel around home/community, navigate up and down stairs, show how to get up/down from the ground, ensure home is accessible, demonstrate HEP, complete caregiver training    Physical Therapy Problems (Active)     Problem: Mobility     Dates: Start: 06/02/21       Goal: STG-Within one week, patient will ambulate household distance     Dates: Start: 06/02/21       Description: AMB x 50 feet with FWW and SBA          Problem: Mobility Transfers     Dates: Start: 06/02/21       Goal: STG-Within one week, patient will perform bed mobility     Dates: Start: 06/02/21       Description: Supine </> sit with min A using bed rail       Goal: STG-Within one week, patient will transfer bed to chair     Dates: Start: 06/02/21       Description: SPT with FWW and SBA          Problem: PT-Long Term Goals     Dates: Start: 06/02/21       Goal: LTG-By discharge, patient will ambulate     Dates: Start: 06/02/21       Description: AMB x 150 feet with FWW, mod I        Goal: LTG-By discharge, patient will transfer one surface to another     Dates: Start: 06/02/21       Description: SPT with FWW, mod I        Goal: LTG-By discharge, patient will transfer in/out of a car     Dates: Start: 06/02/21       Description: Car transfer with FWW with SPV       Goal: LTG-By discharge, patient will     Dates: Start: 06/02/21       Description: Up/down 2 steps with one HR and CGA

## 2021-06-04 NOTE — THERAPY
"Occupational Therapy  Daily Treatment     Patient Name: Valentina Lu  Age:  65 y.o., Sex:  female  Medical Record #: [id number]  Today's Date: 6/4/2021     Precautions  Precautions: (P) Fall Risk, TLSO (Thoracolumbosacral orthosis), Spinal / Back Precautions   Comments: (P) TLSO OOB, on 3L wean         Subjective    \"  I'd like to take a shower .\"     Objective       06/04/21 0701   Precautions   Precautions Fall Risk;TLSO (Thoracolumbosacral orthosis);Spinal / Back Precautions    Comments TLSO OOB, on 3L wean   Vitals   Pulse Oximetry 95 %   Room Air Oximetry 86  (to 88)   O2 (LPM) 2   O2 Delivery Device Nasal Cannula   Functional Level of Assist   Eating Independent   Grooming Supervision   Grooming Description Seated in wheelchair at sink   Bathing Supervision  ( SBA  )   Bathing Description Hand held shower;Long handled bath tool;Tub bench   Upper Body Dressing Minimal Assist  ( supervision bra and shirt  assist with TLSO )   Lower Body Dressing Stand by Assist  (doff/ don underwear tread socks)   Lower Body Dressing Description Dressing stick;Reacher;Sock aid   Bed, Chair, Wheelchair Transfer Stand by Assist  (bed to w/c )   Tub / Shower Transfers Stand by Assist   Tub Shower Transfer Description Grab bar   Interdisciplinary Plan of Care Collaboration   IDT Collaboration with  Nursing;Respiratory Therapist   Patient Position at End of Therapy Seated;Call Light within Reach;Tray Table within Reach   Collaboration Comments nursing provided new dressing  post showrer. collabroated with Resp. therapy regading room air trial and  spo2 reading on 2lpm  02    OT Total Time Spent   OT Individual Total Time Spent (Mins) 90   OT Charge Group   OT Self Care / ADL 6       Assessment     demonstrates improved independence and good carry over of AE for LB dressing tasks          Plan       ADL IADL  Don/ doff TLSO  Related mobility  Strength/endurance building   Standing endurance / balance   All incorporating  Back " precautions/ body mechanics       Occupational Therapy Goals (Active)     Problem: Dressing     Dates: Start: 06/02/21       Goal: STG-Within one week, patient will dress LB with Min A using DME/AE as needed     Dates: Start: 06/02/21             Problem: Functional Transfers     Dates: Start: 06/02/21       Goal: STG-Within one week, patient will transfer to toilet with SBA at w/c level     Dates: Start: 06/02/21             Problem: OT Long Term Goals     Dates: Start: 06/02/21       Goal: LTG-By discharge, patient will complete basic self care tasks at Mod I using DME/AE as needed     Dates: Start: 06/02/21          Goal: LTG-By discharge, patient will perform bathroom transfers at Mod I using DME/AE as needed     Dates: Start: 06/02/21             Problem: Toileting     Dates: Start: 06/02/21       Goal: STG-Within one week, patient will complete toileting tasks with SBA using DME/AE as needed     Dates: Start: 06/02/21

## 2021-06-05 LAB
GLUCOSE BLD-MCNC: 138 MG/DL (ref 65–99)
GLUCOSE BLD-MCNC: 144 MG/DL (ref 65–99)
GLUCOSE BLD-MCNC: 149 MG/DL (ref 65–99)
GLUCOSE BLD-MCNC: 91 MG/DL (ref 65–99)

## 2021-06-05 PROCEDURE — 94640 AIRWAY INHALATION TREATMENT: CPT

## 2021-06-05 PROCEDURE — 94660 CPAP INITIATION&MGMT: CPT

## 2021-06-05 PROCEDURE — 97116 GAIT TRAINING THERAPY: CPT

## 2021-06-05 PROCEDURE — 97535 SELF CARE MNGMENT TRAINING: CPT | Mod: CO

## 2021-06-05 PROCEDURE — 700101 HCHG RX REV CODE 250: Performed by: PHYSICAL MEDICINE & REHABILITATION

## 2021-06-05 PROCEDURE — 700102 HCHG RX REV CODE 250 W/ 637 OVERRIDE(OP): Performed by: PHYSICAL MEDICINE & REHABILITATION

## 2021-06-05 PROCEDURE — 94760 N-INVAS EAR/PLS OXIMETRY 1: CPT

## 2021-06-05 PROCEDURE — 82962 GLUCOSE BLOOD TEST: CPT

## 2021-06-05 PROCEDURE — 97110 THERAPEUTIC EXERCISES: CPT

## 2021-06-05 PROCEDURE — 97530 THERAPEUTIC ACTIVITIES: CPT

## 2021-06-05 PROCEDURE — A9270 NON-COVERED ITEM OR SERVICE: HCPCS | Performed by: PHYSICAL MEDICINE & REHABILITATION

## 2021-06-05 PROCEDURE — 770010 HCHG ROOM/CARE - REHAB SEMI PRIVAT*

## 2021-06-05 PROCEDURE — 700111 HCHG RX REV CODE 636 W/ 250 OVERRIDE (IP): Performed by: PHYSICAL MEDICINE & REHABILITATION

## 2021-06-05 RX ADMIN — VENLAFAXINE HYDROCHLORIDE 150 MG: 75 CAPSULE, EXTENDED RELEASE ORAL at 21:47

## 2021-06-05 RX ADMIN — GLIPIZIDE 5 MG: 2.5 TABLET, FILM COATED, EXTENDED RELEASE ORAL at 07:35

## 2021-06-05 RX ADMIN — OXYCODONE HYDROCHLORIDE 10 MG: 10 TABLET ORAL at 11:26

## 2021-06-05 RX ADMIN — TIZANIDINE 2 MG: 4 TABLET ORAL at 17:33

## 2021-06-05 RX ADMIN — GABAPENTIN 800 MG: 400 CAPSULE ORAL at 07:33

## 2021-06-05 RX ADMIN — CALCIUM CARBONATE (ANTACID) CHEW TAB 500 MG 500 MG: 500 CHEW TAB at 07:32

## 2021-06-05 RX ADMIN — LEVOTHYROXINE SODIUM 75 MCG: 75 TABLET ORAL at 21:49

## 2021-06-05 RX ADMIN — OXYCODONE HYDROCHLORIDE 10 MG: 10 TABLET ORAL at 07:35

## 2021-06-05 RX ADMIN — OXYCODONE HYDROCHLORIDE 10 MG: 10 TABLET ORAL at 15:41

## 2021-06-05 RX ADMIN — GABAPENTIN 800 MG: 400 CAPSULE ORAL at 21:50

## 2021-06-05 RX ADMIN — ONDANSETRON 4 MG: 4 TABLET, ORALLY DISINTEGRATING ORAL at 09:20

## 2021-06-05 RX ADMIN — OXYCODONE HYDROCHLORIDE 10 MG: 10 TABLET ORAL at 02:04

## 2021-06-05 RX ADMIN — FLUCONAZOLE 150 MG: 100 TABLET ORAL at 07:34

## 2021-06-05 RX ADMIN — BUDESONIDE AND FORMOTEROL FUMARATE DIHYDRATE 2 PUFF: 160; 4.5 AEROSOL RESPIRATORY (INHALATION) at 21:53

## 2021-06-05 RX ADMIN — FUROSEMIDE 40 MG: 40 TABLET ORAL at 07:33

## 2021-06-05 RX ADMIN — OXYCODONE HYDROCHLORIDE 10 MG: 10 TABLET ORAL at 21:59

## 2021-06-05 RX ADMIN — LIDOCAINE 2 PATCH: 50 PATCH TOPICAL at 07:30

## 2021-06-05 RX ADMIN — GABAPENTIN 800 MG: 400 CAPSULE ORAL at 15:26

## 2021-06-05 RX ADMIN — ATORVASTATIN CALCIUM 20 MG: 10 TABLET, FILM COATED ORAL at 21:50

## 2021-06-05 RX ADMIN — ENOXAPARIN SODIUM 40 MG: 40 INJECTION SUBCUTANEOUS at 17:34

## 2021-06-05 RX ADMIN — CHOLECALCIFEROL TAB 25 MCG (1000 UNIT) 5000 UNITS: 25 TAB at 07:33

## 2021-06-05 RX ADMIN — SENNOSIDES AND DOCUSATE SODIUM 2 TABLET: 8.6; 5 TABLET ORAL at 21:48

## 2021-06-05 RX ADMIN — POTASSIUM CHLORIDE 10 MEQ: 1500 TABLET, EXTENDED RELEASE ORAL at 21:50

## 2021-06-05 RX ADMIN — SENNOSIDES AND DOCUSATE SODIUM 2 TABLET: 8.6; 5 TABLET ORAL at 07:34

## 2021-06-05 RX ADMIN — VENLAFAXINE 25 MG: 25 TABLET ORAL at 21:49

## 2021-06-05 RX ADMIN — OMEPRAZOLE 20 MG: 20 CAPSULE, DELAYED RELEASE ORAL at 21:50

## 2021-06-05 RX ADMIN — BUDESONIDE AND FORMOTEROL FUMARATE DIHYDRATE 2 PUFF: 160; 4.5 AEROSOL RESPIRATORY (INHALATION) at 09:06

## 2021-06-05 RX ADMIN — ROPINIROLE HYDROCHLORIDE 8 MG: 1 TABLET, FILM COATED ORAL at 21:46

## 2021-06-05 RX ADMIN — CALCIUM CARBONATE (ANTACID) CHEW TAB 500 MG 500 MG: 500 CHEW TAB at 21:52

## 2021-06-05 RX ADMIN — SPIRONOLACTONE 12.5 MG: 25 TABLET, FILM COATED ORAL at 21:50

## 2021-06-05 ASSESSMENT — GAIT ASSESSMENTS
DEVIATION: DECREASED BASE OF SUPPORT;BRADYKINETIC;DECREASED HEEL STRIKE
DISTANCE (FEET): 25
DEVIATION: DECREASED BASE OF SUPPORT;DECREASED HEEL STRIKE;BRADYKINETIC
ASSISTIVE DEVICE: FRONT WHEEL WALKER
GAIT LEVEL OF ASSIST: STAND BY ASSIST
GAIT LEVEL OF ASSIST: CONTACT GUARD ASSIST
ASSISTIVE DEVICE: FRONT WHEEL WALKER

## 2021-06-05 ASSESSMENT — PAIN DESCRIPTION - PAIN TYPE
TYPE: ACUTE PAIN

## 2021-06-05 ASSESSMENT — ACTIVITIES OF DAILY LIVING (ADL): TUB_SHOWER_TRANSFER_DESCRIPTION: GRAB BAR;SHOWER BENCH

## 2021-06-05 NOTE — CARE PLAN
Problem: Pain - Standard  Goal: Alleviation of pain or a reduction in pain to the patient’s comfort goal  Flowsheets  Taken 6/5/2021 1557 by Jay Arnold R.N.  Pain Rating Scale (NPRS): 7  Taken 6/5/2021 0831 by Ruby Lopez DPT  Non Verbal Scale:   Grimacing   Tense Body Language  Note: Pt able to participate in therapies and activities this shift. Patient accepted prn Roxicodone 10 mg as needed. Repositioning and distraction used to help relieve pain. Will continue to monitor.      Problem: Diabetes Management  Goal: Patient's ability to maintain appropriate glucose levels will be maintained or improve  Note: Patient blood sugar this shift have been maintained within limits avoiding insulin administration. Breakfast blood sugar was 91, and lunch time blood sugar was 138. Will continue to monitor.    The patient is Stable - Low risk of patient condition declining or worsening    Shift Goals  Clinical Goals: pain control  Patient Goals: pain control    Progress made toward(s) clinical / shift goals: pain is being alleviated with prn pain medication and comfort techniques.

## 2021-06-05 NOTE — THERAPY
"Occupational Therapy  Daily Treatment     Patient Name: Valentina Lu  Age:  65 y.o., Sex:  female  Medical Record #: [id number]  Today's Date: 6/5/2021     Precautions  Precautions: (P) Fall Risk, TLSO (Thoracolumbosacral orthosis), Spinal / Back Precautions   Comments: (P) TLSO OOB, on 3L wean         Subjective    \"  I really don't feel like doing anything.\"  Agreeable to in bed  UE ther ex.  Upon return to room with weights patient  Asked  \" Hey what about a shower?\"          Objective       06/05/21 1001   Precautions   Precautions Fall Risk;TLSO (Thoracolumbosacral orthosis);Spinal / Back Precautions    Comments TLSO OOB, on 3L wean   Functional Level of Assist   Bathing Supervision  (occasional cues for back precautions/ body mechanics)   Bathing Description Hand held shower;Long handled bath tool;Tub bench  (LHsponge fabricated  washclothes dressing stick rubberband)   Upper Body Dressing Supervision  (cues for back precautions  cltohing retrieval )   Lower Body Dressing Supervision  (doff. don underwear and tread socks )   Lower Body Dressing Description Dressing stick;Sock aid  (cues for correct sock aid use )   Bed, Chair, Wheelchair Transfer Stand by Assist   Tub / Shower Transfers Stand by Assist   Tub Shower Transfer Description Grab bar;Shower bench   Bed Mobility    Supine to Sit Stand by Assist  (used mechanics of bed  raised HOB )   Sit to Supine Stand by Assist  (cues for technique )   Interdisciplinary Plan of Care Collaboration   IDT Collaboration with  Nursing   Patient Position at End of Therapy In Bed;Call Light within Reach;Tray Table within Reach   Collaboration Comments dressings changed as patient forgot to wait for waterproofing of area prior to starting shower    OT Total Time Spent   OT Individual Total Time Spent (Mins) 60   OT Charge Group   OT Self Care / ADL 4    UB dressing  Note   TLSO not donned  Due to going directly to the shower  And then back to bed. "       Assessment     cues for back precautions/ body mechanics  Needs to work on log roll in / out of bed.   Making gains with improving independence with dressing tasks     Maxine reported she felt better  After  Performing ADL routine this session .      Strengths: Able to follow instructions, Effective communication skills, Making steady progress towards goals, Manages pain appropriately, Motivated for self care and independence, Pleasant and cooperative, Willingly participates in therapeutic activities  Barriers: Decreased endurance, Fatigue, Generalized weakness, Impaired activity tolerance    Plan    ADL IADL  Don/ doff TLSO  Related mobility  Strength/endurance building   Standing endurance / balance   All incorporating  Back precautions/ body mechanics     Passport items to be completed:  Passport items to be completed:  Perform bathroom transfers, complete dressing,, prepare a simple meal, participate in household tasks, adapt home for safety needs, demonstrate home exercise program, complete caregiver training     Occupational Therapy Goals (Active)     Problem: Dressing     Dates: Start: 06/02/21       Goal: STG-Within one week, patient will dress LB with Min A using DME/AE as needed     Dates: Start: 06/02/21             Problem: Functional Transfers     Dates: Start: 06/02/21       Goal: STG-Within one week, patient will transfer to toilet with SBA at w/c level     Dates: Start: 06/02/21             Problem: OT Long Term Goals     Dates: Start: 06/02/21       Goal: LTG-By discharge, patient will complete basic self care tasks at Mod I using DME/AE as needed     Dates: Start: 06/02/21          Goal: LTG-By discharge, patient will perform bathroom transfers at Mod I using DME/AE as needed     Dates: Start: 06/02/21             Problem: Toileting     Dates: Start: 06/02/21       Goal: STG-Within one week, patient will complete toileting tasks with SBA using DME/AE as needed     Dates: Start: 06/02/21

## 2021-06-05 NOTE — THERAPY
"Physical Therapy   Daily Treatment     Patient Name: Valentina Lu  Age:  65 y.o., Sex:  female  Medical Record #: [id number]  Today's Date: 6/5/2021     Precautions  Precautions: Fall Risk, TLSO (Thoracolumbosacral orthosis), Spinal / Back Precautions   Comments: TLSO OOB, on 3L wean    Subjective    Pt found supine in bed, \"My head really hurts, it feels like it might be a migraine coming on.\"     Objective       06/05/21 0831   Precautions   Precautions Fall Risk;TLSO (Thoracolumbosacral orthosis);Spinal / Back Precautions    Comments TLSO OOB, on 3L wean   Vitals   Pulse 70   Patient BP Position Sitting   Blood Pressure  124/70   Pulse Oximetry 97 %   O2 (LPM) 2   Pain 0 - 10 Group   Therapist Pain Assessment Prior to Activity;During Activity;Nurse Notified  (pt c/o severe HA, requesting muscle relaxor, unable to give)   Non Verbal Descriptors   Non Verbal Scale  Grimacing;Tense Body Language   Gait Functional Level of Assist    Gait Level Of Assist Stand by Assist   Assistive Device Front Wheel Walker   Distance (Feet) 25   # of Times Distance was Traveled 1   Deviation Decreased Base Of Support;Bradykinetic;Decreased Heel Strike   Transfer Functional Level of Assist   Bed, Chair, Wheelchair Transfer Stand by Assist  (w/c<>bed SPT SBA)   Bed Chair Wheelchair Transfer Description Requires lift;Verbal cueing  (sup>sitting Gregg to move into sitting, cues for log rolling)   Bed Mobility    Supine to Sit Stand by Assist   Sit to Supine Minimal Assist   Interdisciplinary Plan of Care Collaboration   IDT Collaboration with  Nursing;Occupational Therapist   Patient Position at End of Therapy In Bed;Call Light within Reach   Collaboration Comments re: pt's HA, nausea   PT Total Time Spent   PT Individual Total Time Spent (Mins) 30   PT Charge Group   PT Gait Training 1   PT Therapeutic Activities 1     Pt agreeable to gait training, even with c/o severe HA.  RN notified of mm relaxor request, unable to give d/t " timing of pain meds.     Pt amb bed>doors SBA with w/c, pt limited by nausea.      Vitals taken all stable as per above.  Pt requesting to return to bed, refusing to sit in w/c.      Assessment    Pt limited by nausea and HA today.  Reporting having 1 small BM today but not having consistent bowel movements since 5/24, unsure of validating of report.  Pt having lots of gas with mobility.     Strengths: Effective communication skills, Independent prior level of function, Motivated for self care and independence, Pleasant and cooperative, Supportive family, Willingly participates in therapeutic activities  Barriers: Fatigue, Generalized weakness, Home accessibility, Impaired balance, Impaired activity tolerance, Limited mobility, Pain, Pain poorly managed    Plan    Gait with FWW, LE strengthening, endurance, STS training, stair training     Passport items to be completed:  Get in/out of bed safely, in/out of a vehicle, safely use mobility device, walk or wheel around home/community, navigate up and down stairs, show how to get up/down from the ground, ensure home is accessible, demonstrate HEP, complete caregiver training    Physical Therapy Problems (Active)     Problem: Mobility     Dates: Start: 06/02/21       Goal: STG-Within one week, patient will ambulate household distance     Dates: Start: 06/02/21       Description: AMB x 50 feet with FWW and SBA          Problem: Mobility Transfers     Dates: Start: 06/02/21       Goal: STG-Within one week, patient will perform bed mobility     Dates: Start: 06/02/21       Description: Supine </> sit with min A using bed rail       Goal: STG-Within one week, patient will transfer bed to chair     Dates: Start: 06/02/21       Description: SPT with FWW and SBA          Problem: PT-Long Term Goals     Dates: Start: 06/02/21       Goal: LTG-By discharge, patient will ambulate     Dates: Start: 06/02/21       Description: AMB x 150 feet with FWW, mod I        Goal: LTG-By  discharge, patient will transfer one surface to another     Dates: Start: 06/02/21       Description: SPT with FWW, mod I        Goal: LTG-By discharge, patient will transfer in/out of a car     Dates: Start: 06/02/21       Description: Car transfer with FWW with SPV       Goal: LTG-By discharge, patient will     Dates: Start: 06/02/21       Description: Up/down 2 steps with one HR and CGA

## 2021-06-05 NOTE — CARE PLAN
The patient is Stable - Low risk of patient condition declining or worsening      Problem: Bowel Elimination  Goal: Patient will participate in bowel management program  Outcome: Progressing  Note: Pt continent of bowel. On bowel meds. She was given PRN MOM early this am. She reported a large BM resulted after given MOM. LBM 6/4/21.     Problem: Diabetes Management  Goal: Patient's ability to maintain appropriate glucose levels will be maintained or improve  Outcome: Progressing  Note: HS [id number]. Humulin R 2units given per sliding scale per MAR. HS snack (bernadine pudding) provided and consumed.

## 2021-06-05 NOTE — FLOWSHEET NOTE
06/05/21 0900   Events/Summary/Plan   Events/Summary/Plan 02check/MDI/IS   Vital Signs   Pulse 74   Respiration 18   Pulse Oximetry 92 %   $ Pulse Oximetry (Spot Check) Yes   Respiratory Assessment   Level of Consciousness Alert   Breath Sounds   RUL Breath Sounds Clear   RML Breath Sounds Diminished   RLL Breath Sounds Diminished   WILEY Breath Sounds Clear   LLL Breath Sounds Diminished;Fine Crackles   Secretions   Cough Non Productive   Oxygen   O2 (LPM) 3   O2 Delivery Device Nasal Cannula   Non-Invasive Ventilation ALONZO Group   Nocturnal CPAP or BIPAP CPAP - [facility]   $ System Evaluation Yes   Settings (If Known) Auto-pap 8-14cm   FiO2 or LPM 3

## 2021-06-05 NOTE — THERAPY
"Occupational Therapy  Daily Treatment     Patient Name: Valentina Lu  Age:  65 y.o., Sex:  female  Medical Record #: [id number]  Today's Date: 6/5/2021     Precautions  Precautions: (P) Fall Risk, TLSO (Thoracolumbosacral orthosis), Spinal / Back Precautions   Comments: (P) TLSO OOB, on 3L wean         Subjective    \" Oh My head hurts!. \"  \" It's a 30  \" when asked to rate headache pain on 1 to 10 scale        Objective       06/05/21 0701   Precautions   Precautions Fall Risk;TLSO (Thoracolumbosacral orthosis);Spinal / Back Precautions    Comments TLSO OOB, on 3L wean   Functional Level of Assist   Grooming Supervision   Grooming Description Seated in wheelchair at sink   Toileting Supervision   Bed, Chair, Wheelchair Transfer Stand by Assist   Toilet Transfers Stand by Assist   Bed Mobility    Supine to Sit Stand by Assist  (utilized mechanics of bed ( raised HOB) )   Sit to Supine Moderate Assist  (assist both LEs into bed )   Interdisciplinary Plan of Care Collaboration   IDT Collaboration with  Nursing   Patient Position at End of Therapy In Bed;Call Light within Reach;Tray Table within Reach   Collaboration Comments patient reporting  \" 30\" of 10  headache  and reporting back pain as well  patient with Ice pack to lower back  in bed.    OT Total Time Spent   OT Individual Total Time Spent (Mins) 30   OT Charge Group   OT Self Care / ADL 2       Assessment     limited by headache pain declined to dress this session .  Nursing notified of pain issues.          Plan     ADL IADL  Don/ doff TLSO  Related mobility  Strength/endurance building   Standing endurance / balance   All incorporating  Back precautions/ body mechanics         Occupational Therapy Goals (Active)     Problem: Dressing     Dates: Start: 06/02/21       Goal: STG-Within one week, patient will dress LB with Min A using DME/AE as needed     Dates: Start: 06/02/21             Problem: Functional Transfers     Dates: Start: 06/02/21       " Goal: STG-Within one week, patient will transfer to toilet with SBA at w/c level     Dates: Start: 06/02/21             Problem: OT Long Term Goals     Dates: Start: 06/02/21       Goal: LTG-By discharge, patient will complete basic self care tasks at Mod I using DME/AE as needed     Dates: Start: 06/02/21          Goal: LTG-By discharge, patient will perform bathroom transfers at Mod I using DME/AE as needed     Dates: Start: 06/02/21             Problem: Toileting     Dates: Start: 06/02/21       Goal: STG-Within one week, patient will complete toileting tasks with SBA using DME/AE as needed     Dates: Start: 06/02/21

## 2021-06-05 NOTE — THERAPY
"Physical Therapy   Daily Treatment     Patient Name: Valentina Lu  Age:  65 y.o., Sex:  female  Medical Record #: [id number]  Today's Date: 6/5/2021     Precautions  Precautions: Fall Risk, TLSO (Thoracolumbosacral orthosis), Spinal / Back Precautions   Comments: TLSO OOB, on 3L wean    Subjective    Pt found seated on toilet with nursing, agreeable to PT.      Objective       06/05/21 1401   Precautions   Precautions Fall Risk;TLSO (Thoracolumbosacral orthosis);Spinal / Back Precautions    Comments TLSO OOB, on 3L wean   Gait Functional Level of Assist    Gait Level Of Assist Contact Guard Assist   Assistive Device Front Wheel Walker   Distance (Feet)   (x75', x100')   # of Times Distance was Traveled 2   Deviation Decreased Base Of Support;Decreased Heel Strike;Bradykinetic   Stairs Functional Level of Assist   Level of Assist with Stairs Contact Guard Assist   # of Stairs Climbed 8  (up/down 4 x 2, 6\" BUE's)   Stairs Description Extra time;Hand rails;Safety concerns;Verbal cueing;Requires incidental assist   Transfer Functional Level of Assist   Bed, Chair, Wheelchair Transfer Contact Guard Assist  (CGA, cues for not twisting with SPT)   Sitting Lower Body Exercises   Ankle Pumps 1 set of 10   Hip Abduction 1 set of 10   Hip Adduction 1 set of 10   Long Arc Quad 1 set of 10   Marching Reciprocal;1 set of 10   Hamstring Curl 1 set of 10   Other Exercises 1# ankle weights with pink tband, HO for HEP given and reinforced for performance in room    Neuro-Muscular Treatments   Neuro-Muscular Treatments Verbal Cuing;Tactile Cuing   Interdisciplinary Plan of Care Collaboration   IDT Collaboration with  Family / Caregiver   Patient Position at End of Therapy Seated;Self Releasing Lap Belt Applied;Family / Friend in Room   Collaboration Comments re:present for therapy, reporting pt has hx of tremors or spasms in UE's and LE's that pt was complaining today    PT Total Time Spent   PT Individual Total Time Spent " (Mins) 60   PT Charge Group   PT Gait Training 2   PT Therapeutic Exercise 1   PT Therapeutic Activities 1   Standing tolerance while folding clothes in adaptic kitchen, SPV, set up of clothes from dryer to on top, x20min in total standing.    Gait training indoors room<>gym with FWW, CGA consistent cues for hand placement for safety.    Stair training to mock in/out of home able to perform alternating stairs with 2 rails CGA.    Assessment    Pt with improved tolerance to therapy this afternoon.  Demo SBA/CGA with all mobility, requires cues for safety with STS & spinal prec.      Strengths: Effective communication skills, Independent prior level of function, Motivated for self care and independence, Pleasant and cooperative, Supportive family, Willingly participates in therapeutic activities  Barriers: Fatigue, Generalized weakness, Home accessibility, Impaired balance, Impaired activity tolerance, Limited mobility, Pain, Pain poorly managed    Plan    Gait with FWW, LE strengthening, endurance, STS training, stair training     Passport items to be completed:  Get in/out of bed safely, in/out of a vehicle, safely use mobility device, walk or wheel around home/community, navigate up and down stairs, show how to get up/down from the ground, ensure home is accessible, demonstrate HEP, complete caregiver training    Physical Therapy Problems (Active)     Problem: Mobility     Dates: Start: 06/02/21       Goal: STG-Within one week, patient will ambulate household distance     Dates: Start: 06/02/21       Description: AMB x 50 feet with FWW and SBA          Problem: Mobility Transfers     Dates: Start: 06/02/21       Goal: STG-Within one week, patient will perform bed mobility     Dates: Start: 06/02/21       Description: Supine </> sit with min A using bed rail       Goal: STG-Within one week, patient will transfer bed to chair     Dates: Start: 06/02/21       Description: SPT with FWW and SBA          Problem: PT-Long  Term Goals     Dates: Start: 06/02/21       Goal: LTG-By discharge, patient will ambulate     Dates: Start: 06/02/21       Description: AMB x 150 feet with FWW, mod I        Goal: LTG-By discharge, patient will transfer one surface to another     Dates: Start: 06/02/21       Description: SPT with FWW, mod I        Goal: LTG-By discharge, patient will transfer in/out of a car     Dates: Start: 06/02/21       Description: Car transfer with FWW with SPV       Goal: LTG-By discharge, patient will     Dates: Start: 06/02/21       Description: Up/down 2 steps with one HR and CGA

## 2021-06-06 LAB
GLUCOSE BLD-MCNC: 121 MG/DL (ref 65–99)
GLUCOSE BLD-MCNC: 122 MG/DL (ref 65–99)
GLUCOSE BLD-MCNC: 150 MG/DL (ref 65–99)
GLUCOSE BLD-MCNC: 186 MG/DL (ref 65–99)

## 2021-06-06 PROCEDURE — 700111 HCHG RX REV CODE 636 W/ 250 OVERRIDE (IP): Performed by: PHYSICAL MEDICINE & REHABILITATION

## 2021-06-06 PROCEDURE — 94760 N-INVAS EAR/PLS OXIMETRY 1: CPT

## 2021-06-06 PROCEDURE — 770010 HCHG ROOM/CARE - REHAB SEMI PRIVAT*

## 2021-06-06 PROCEDURE — A9270 NON-COVERED ITEM OR SERVICE: HCPCS | Performed by: PHYSICAL MEDICINE & REHABILITATION

## 2021-06-06 PROCEDURE — 700102 HCHG RX REV CODE 250 W/ 637 OVERRIDE(OP): Performed by: PHYSICAL MEDICINE & REHABILITATION

## 2021-06-06 PROCEDURE — 82962 GLUCOSE BLOOD TEST: CPT | Mod: 91

## 2021-06-06 PROCEDURE — 94640 AIRWAY INHALATION TREATMENT: CPT

## 2021-06-06 PROCEDURE — 700101 HCHG RX REV CODE 250: Performed by: PHYSICAL MEDICINE & REHABILITATION

## 2021-06-06 RX ADMIN — OXYCODONE HYDROCHLORIDE 10 MG: 10 TABLET ORAL at 08:18

## 2021-06-06 RX ADMIN — CHOLECALCIFEROL TAB 25 MCG (1000 UNIT) 5000 UNITS: 25 TAB at 08:16

## 2021-06-06 RX ADMIN — VENLAFAXINE HYDROCHLORIDE 150 MG: 75 CAPSULE, EXTENDED RELEASE ORAL at 20:27

## 2021-06-06 RX ADMIN — ACETAMINOPHEN 650 MG: 325 TABLET, FILM COATED ORAL at 20:26

## 2021-06-06 RX ADMIN — GABAPENTIN 800 MG: 400 CAPSULE ORAL at 20:30

## 2021-06-06 RX ADMIN — LEVOTHYROXINE SODIUM 75 MCG: 75 TABLET ORAL at 20:28

## 2021-06-06 RX ADMIN — ATORVASTATIN CALCIUM 20 MG: 10 TABLET, FILM COATED ORAL at 20:29

## 2021-06-06 RX ADMIN — TIZANIDINE 2 MG: 4 TABLET ORAL at 03:18

## 2021-06-06 RX ADMIN — ENOXAPARIN SODIUM 40 MG: 40 INJECTION SUBCUTANEOUS at 17:13

## 2021-06-06 RX ADMIN — GLIPIZIDE 5 MG: 2.5 TABLET, FILM COATED, EXTENDED RELEASE ORAL at 08:17

## 2021-06-06 RX ADMIN — SPIRONOLACTONE 12.5 MG: 25 TABLET, FILM COATED ORAL at 20:28

## 2021-06-06 RX ADMIN — OMEPRAZOLE 20 MG: 20 CAPSULE, DELAYED RELEASE ORAL at 20:30

## 2021-06-06 RX ADMIN — TIZANIDINE 2 MG: 4 TABLET ORAL at 20:28

## 2021-06-06 RX ADMIN — CALCIUM CARBONATE (ANTACID) CHEW TAB 500 MG 500 MG: 500 CHEW TAB at 20:25

## 2021-06-06 RX ADMIN — CALCIUM CARBONATE (ANTACID) CHEW TAB 500 MG 500 MG: 500 CHEW TAB at 08:18

## 2021-06-06 RX ADMIN — FLUCONAZOLE 150 MG: 100 TABLET ORAL at 08:17

## 2021-06-06 RX ADMIN — POTASSIUM CHLORIDE 10 MEQ: 1500 TABLET, EXTENDED RELEASE ORAL at 20:31

## 2021-06-06 RX ADMIN — FUROSEMIDE 40 MG: 40 TABLET ORAL at 08:15

## 2021-06-06 RX ADMIN — BUDESONIDE AND FORMOTEROL FUMARATE DIHYDRATE 2 PUFF: 160; 4.5 AEROSOL RESPIRATORY (INHALATION) at 14:21

## 2021-06-06 RX ADMIN — SENNOSIDES AND DOCUSATE SODIUM 2 TABLET: 8.6; 5 TABLET ORAL at 08:17

## 2021-06-06 RX ADMIN — GABAPENTIN 800 MG: 400 CAPSULE ORAL at 15:11

## 2021-06-06 RX ADMIN — INSULIN HUMAN 2 UNITS: 100 INJECTION, SOLUTION PARENTERAL at 20:32

## 2021-06-06 RX ADMIN — OXYCODONE HYDROCHLORIDE 10 MG: 10 TABLET ORAL at 15:21

## 2021-06-06 RX ADMIN — BUDESONIDE AND FORMOTEROL FUMARATE DIHYDRATE 2 PUFF: 160; 4.5 AEROSOL RESPIRATORY (INHALATION) at 20:25

## 2021-06-06 RX ADMIN — VENLAFAXINE 25 MG: 25 TABLET ORAL at 20:27

## 2021-06-06 RX ADMIN — GABAPENTIN 800 MG: 400 CAPSULE ORAL at 08:16

## 2021-06-06 RX ADMIN — OXYCODONE HYDROCHLORIDE 10 MG: 10 TABLET ORAL at 01:40

## 2021-06-06 RX ADMIN — ROPINIROLE HYDROCHLORIDE 8 MG: 1 TABLET, FILM COATED ORAL at 20:25

## 2021-06-06 RX ADMIN — SENNOSIDES AND DOCUSATE SODIUM 2 TABLET: 8.6; 5 TABLET ORAL at 20:29

## 2021-06-06 RX ADMIN — LIDOCAINE 2 PATCH: 50 PATCH TOPICAL at 08:12

## 2021-06-06 ASSESSMENT — PAIN DESCRIPTION - PAIN TYPE: TYPE: ACUTE PAIN

## 2021-06-06 ASSESSMENT — FIBROSIS 4 INDEX: FIB4 SCORE: 0.65

## 2021-06-06 NOTE — CARE PLAN
The patient is Stable - Low risk of patient condition declining or worsening    Problem: Pain - Standard  Goal: Alleviation of pain or a reduction in pain to the patient’s comfort goal  Outcome: Not Progressing  Note: Patient given PRN roxicodone 10 and tizanidine for pain. Patient states that her back pain is especially bad tonight and unable to get relief. Ice packs in place.      Problem: Skin Integrity  Goal: Skin integrity is maintained or improved  Outcome: Progressing     Shift Goals  Clinical Goals: pain control  Patient Goals: pain control

## 2021-06-06 NOTE — CARE PLAN
Problem: Bladder / Voiding  Goal: Patient will establish and maintain regular urinary output  Note: Patient is continent of bladder. No complaints of urination. She uses call light for staff transfer to bathroom.      Problem: Bowel Elimination  Goal: Patient will participate in bowel management program  Note: Patient is continent of bowels. Last bowel movement was 6/5. No complaints. She uses call light for staff transfer to bathroom.    The patient is Stable - Low risk of patient condition declining or worsening    Shift Goals  Clinical Goals: pain control  Patient Goals: pain control

## 2021-06-06 NOTE — FLOWSHEET NOTE
Respiratory Therapy Update    Interdisciplinary Plan of Care-Goals (Indications)  Bronchodilator Indications: History / Diagnosis (06/01/21 1404)                  Cough: Non Productive;Strong (06/06/21 1410)  Sputum Amount: Unable to Evaluate (06/06/21 1410)  Sputum Color: Unable to Evaluate (06/06/21 1410)  Sputum Consistency: Unable to Evaluate (06/06/21 1410)                  O2 (LPM): 3 (06/06/21 1410)       Breath Sounds  RUL Breath Sounds: Clear (06/06/21 1410)  RML Breath Sounds: Diminished (06/06/21 1410)  RLL Breath Sounds: Diminished (06/06/21 1410)  WILEY Breath Sounds: Clear (06/06/21 1410)  LLL Breath Sounds: Diminished (06/06/21 1410)        Events/Summary/Plan: SpO2 checked.  MDI done with spacer sitting in a wheelchair talking to family via phone outside the window. (06/06/21 1410)

## 2021-06-07 LAB
GLUCOSE BLD-MCNC: 148 MG/DL (ref 65–99)
GLUCOSE BLD-MCNC: 159 MG/DL (ref 65–99)
GLUCOSE BLD-MCNC: 163 MG/DL (ref 65–99)
GLUCOSE BLD-MCNC: 179 MG/DL (ref 65–99)

## 2021-06-07 PROCEDURE — 700111 HCHG RX REV CODE 636 W/ 250 OVERRIDE (IP): Performed by: PHYSICAL MEDICINE & REHABILITATION

## 2021-06-07 PROCEDURE — 97116 GAIT TRAINING THERAPY: CPT

## 2021-06-07 PROCEDURE — 97530 THERAPEUTIC ACTIVITIES: CPT

## 2021-06-07 PROCEDURE — 700101 HCHG RX REV CODE 250: Performed by: PHYSICAL MEDICINE & REHABILITATION

## 2021-06-07 PROCEDURE — 94640 AIRWAY INHALATION TREATMENT: CPT

## 2021-06-07 PROCEDURE — 82962 GLUCOSE BLOOD TEST: CPT | Mod: 91

## 2021-06-07 PROCEDURE — 97110 THERAPEUTIC EXERCISES: CPT

## 2021-06-07 PROCEDURE — 97535 SELF CARE MNGMENT TRAINING: CPT | Mod: CO

## 2021-06-07 PROCEDURE — 99233 SBSQ HOSP IP/OBS HIGH 50: CPT | Performed by: PHYSICAL MEDICINE & REHABILITATION

## 2021-06-07 PROCEDURE — A9270 NON-COVERED ITEM OR SERVICE: HCPCS | Performed by: PHYSICAL MEDICINE & REHABILITATION

## 2021-06-07 PROCEDURE — 770010 HCHG ROOM/CARE - REHAB SEMI PRIVAT*

## 2021-06-07 PROCEDURE — 700102 HCHG RX REV CODE 250 W/ 637 OVERRIDE(OP): Performed by: PHYSICAL MEDICINE & REHABILITATION

## 2021-06-07 PROCEDURE — 94760 N-INVAS EAR/PLS OXIMETRY 1: CPT

## 2021-06-07 RX ORDER — BISACODYL 10 MG
10 SUPPOSITORY, RECTAL RECTAL
Status: DISCONTINUED | OUTPATIENT
Start: 2021-06-07 | End: 2021-06-12 | Stop reason: HOSPADM

## 2021-06-07 RX ORDER — TIZANIDINE 4 MG/1
4 TABLET ORAL 3 TIMES DAILY
Status: DISCONTINUED | OUTPATIENT
Start: 2021-06-07 | End: 2021-06-12 | Stop reason: HOSPADM

## 2021-06-07 RX ORDER — AMOXICILLIN 250 MG
2 CAPSULE ORAL 2 TIMES DAILY PRN
Status: DISCONTINUED | OUTPATIENT
Start: 2021-06-07 | End: 2021-06-12 | Stop reason: HOSPADM

## 2021-06-07 RX ORDER — POLYETHYLENE GLYCOL 3350 17 G/17G
1 POWDER, FOR SOLUTION ORAL
Status: DISCONTINUED | OUTPATIENT
Start: 2021-06-07 | End: 2021-06-12 | Stop reason: HOSPADM

## 2021-06-07 RX ADMIN — FLUCONAZOLE 150 MG: 100 TABLET ORAL at 07:49

## 2021-06-07 RX ADMIN — VENLAFAXINE 25 MG: 25 TABLET ORAL at 20:30

## 2021-06-07 RX ADMIN — GABAPENTIN 800 MG: 400 CAPSULE ORAL at 20:29

## 2021-06-07 RX ADMIN — TIZANIDINE 4 MG: 4 TABLET ORAL at 20:31

## 2021-06-07 RX ADMIN — FUROSEMIDE 40 MG: 40 TABLET ORAL at 07:49

## 2021-06-07 RX ADMIN — ACETAMINOPHEN 650 MG: 325 TABLET, FILM COATED ORAL at 00:39

## 2021-06-07 RX ADMIN — ATORVASTATIN CALCIUM 20 MG: 10 TABLET, FILM COATED ORAL at 20:31

## 2021-06-07 RX ADMIN — CHOLECALCIFEROL TAB 25 MCG (1000 UNIT) 5000 UNITS: 25 TAB at 07:48

## 2021-06-07 RX ADMIN — TIZANIDINE 2 MG: 4 TABLET ORAL at 12:39

## 2021-06-07 RX ADMIN — SENNOSIDES AND DOCUSATE SODIUM 2 TABLET: 8.6; 5 TABLET ORAL at 07:48

## 2021-06-07 RX ADMIN — INSULIN HUMAN 2 UNITS: 100 INJECTION, SOLUTION PARENTERAL at 20:24

## 2021-06-07 RX ADMIN — OXYCODONE HYDROCHLORIDE 10 MG: 10 TABLET ORAL at 10:36

## 2021-06-07 RX ADMIN — OXYCODONE 5 MG: 5 TABLET ORAL at 05:53

## 2021-06-07 RX ADMIN — OXYCODONE HYDROCHLORIDE 10 MG: 10 TABLET ORAL at 15:46

## 2021-06-07 RX ADMIN — INSULIN HUMAN 2 UNITS: 100 INJECTION, SOLUTION PARENTERAL at 07:56

## 2021-06-07 RX ADMIN — BUDESONIDE AND FORMOTEROL FUMARATE DIHYDRATE 2 PUFF: 160; 4.5 AEROSOL RESPIRATORY (INHALATION) at 11:42

## 2021-06-07 RX ADMIN — ENOXAPARIN SODIUM 40 MG: 40 INJECTION SUBCUTANEOUS at 17:38

## 2021-06-07 RX ADMIN — CALCIUM CARBONATE (ANTACID) CHEW TAB 500 MG 500 MG: 500 CHEW TAB at 07:48

## 2021-06-07 RX ADMIN — LIDOCAINE 2 PATCH: 50 PATCH TOPICAL at 08:02

## 2021-06-07 RX ADMIN — OXYCODONE 5 MG: 5 TABLET ORAL at 00:39

## 2021-06-07 RX ADMIN — TRAZODONE HYDROCHLORIDE 50 MG: 50 TABLET ORAL at 22:06

## 2021-06-07 RX ADMIN — SPIRONOLACTONE 12.5 MG: 25 TABLET, FILM COATED ORAL at 20:31

## 2021-06-07 RX ADMIN — POTASSIUM CHLORIDE 10 MEQ: 1500 TABLET, EXTENDED RELEASE ORAL at 20:30

## 2021-06-07 RX ADMIN — ROPINIROLE HYDROCHLORIDE 8 MG: 1 TABLET, FILM COATED ORAL at 20:28

## 2021-06-07 RX ADMIN — GABAPENTIN 800 MG: 400 CAPSULE ORAL at 15:42

## 2021-06-07 RX ADMIN — GABAPENTIN 800 MG: 400 CAPSULE ORAL at 07:48

## 2021-06-07 RX ADMIN — ACETAMINOPHEN 650 MG: 325 TABLET, FILM COATED ORAL at 05:52

## 2021-06-07 RX ADMIN — TRAZODONE HYDROCHLORIDE 50 MG: 50 TABLET ORAL at 00:39

## 2021-06-07 RX ADMIN — CALCIUM CARBONATE (ANTACID) CHEW TAB 500 MG 500 MG: 500 CHEW TAB at 20:31

## 2021-06-07 RX ADMIN — GLIPIZIDE 5 MG: 2.5 TABLET, FILM COATED, EXTENDED RELEASE ORAL at 07:50

## 2021-06-07 RX ADMIN — VENLAFAXINE HYDROCHLORIDE 150 MG: 75 CAPSULE, EXTENDED RELEASE ORAL at 20:38

## 2021-06-07 RX ADMIN — BUDESONIDE AND FORMOTEROL FUMARATE DIHYDRATE 2 PUFF: 160; 4.5 AEROSOL RESPIRATORY (INHALATION) at 20:26

## 2021-06-07 RX ADMIN — OMEPRAZOLE 20 MG: 20 CAPSULE, DELAYED RELEASE ORAL at 20:30

## 2021-06-07 RX ADMIN — LEVOTHYROXINE SODIUM 75 MCG: 75 TABLET ORAL at 20:29

## 2021-06-07 RX ADMIN — OXYCODONE HYDROCHLORIDE 10 MG: 10 TABLET ORAL at 19:41

## 2021-06-07 RX ADMIN — INSULIN HUMAN 2 UNITS: 100 INJECTION, SOLUTION PARENTERAL at 11:42

## 2021-06-07 ASSESSMENT — GAIT ASSESSMENTS
ASSISTIVE DEVICE: FRONT WHEEL WALKER
GAIT LEVEL OF ASSIST: CONTACT GUARD ASSIST
DEVIATION: ANTALGIC;BRADYKINETIC
DISTANCE (FEET): 110

## 2021-06-07 ASSESSMENT — PAIN DESCRIPTION - PAIN TYPE
TYPE: ACUTE PAIN
TYPE: SURGICAL PAIN;ACUTE PAIN
TYPE: ACUTE PAIN

## 2021-06-07 ASSESSMENT — ACTIVITIES OF DAILY LIVING (ADL)
TUB_SHOWER_TRANSFER_DESCRIPTION: GRAB BAR;SHOWER BENCH
BED_CHAIR_WHEELCHAIR_TRANSFER_DESCRIPTION: SUPERVISION FOR SAFETY;SET-UP OF EQUIPMENT;INCREASED TIME
TOILET_TRANSFER_DESCRIPTION: GRAB BAR;SET-UP OF EQUIPMENT;SUPERVISION FOR SAFETY
BED_CHAIR_WHEELCHAIR_TRANSFER_DESCRIPTION: SET-UP OF EQUIPMENT;INCREASED TIME;SUPERVISION FOR SAFETY

## 2021-06-07 NOTE — THERAPY
"Physical Therapy   Daily Treatment     Patient Name: Valentina Lu  Age:  65 y.o., Sex:  female  Medical Record #: [id number]  Today's Date: 6/7/2021     Precautions  Precautions: Fall Risk, TLSO (Thoracolumbosacral orthosis), Spinal / Back Precautions   Comments: TLSO OOB, O2 3L wean    Subjective    Pt in bed resting, reports significant pain in L hip. \"I don't know how much I can do with this pain!\"     Objective       06/07/21 1501   Precautions   Precautions Fall Risk;TLSO (Thoracolumbosacral orthosis);Spinal / Back Precautions    Comments TLSO OOB, O2 3L wean   Transfer Functional Level of Assist   Bed, Chair, Wheelchair Transfer Stand by Assist   Bed Chair Wheelchair Transfer Description Set-up of equipment;Increased time;Supervision for safety  (bed <> WC without AD )   Toilet Transfers Stand by Assist   Toilet Transfer Description Grab bar;Set-up of equipment;Supervision for safety   Supine Lower Body Exercise   Bridges 1 set of 15   Hip Abduction 1 set of 15   Hip Adduction  1 set of 15   Straight Leg Raises 1 set of 15   Heel Slide 1 set of 15   Ankle Pumps 1 set of 15   Comments Above exercises completed in bed    Interdisciplinary Plan of Care Collaboration   Patient Position at End of Therapy Seated;Call Light within Reach;Tray Table within Reach;Family / Friend in Room   PT Total Time Spent   PT Individual Total Time Spent (Mins) 45   PT Charge Group   PT Therapeutic Exercise 1   PT Therapeutic Activities 2     Pt requires extra time to complete toileting, however is able to do with spv/ set-up only. q    Assessment    Pt limited by L hip pain this session. Participatory as able.     Strengths: Effective communication skills, Independent prior level of function, Motivated for self care and independence, Pleasant and cooperative, Supportive family, Willingly participates in therapeutic activities  Barriers: Fatigue, Generalized weakness, Home accessibility, Impaired balance, Impaired activity " tolerance, Limited mobility, Pain, Pain poorly managed    Plan    Gait with FWW, consider trial of 4WW, LE strength and endurance, pain management PRN, standing tolerance and balance      Physical Therapy Problems (Active)     Problem: Mobility     Dates: Start: 06/02/21       Goal: STG-Within one week, patient will ambulate household distance     Dates: Start: 06/02/21       Description: AMB x 50 feet with FWW and SBA          Problem: Mobility Transfers     Dates: Start: 06/02/21       Goal: STG-Within one week, patient will perform bed mobility     Dates: Start: 06/02/21       Description: Supine </> sit with min A using bed rail       Goal: STG-Within one week, patient will transfer bed to chair     Dates: Start: 06/02/21       Description: SPT with FWW and SBA          Problem: PT-Long Term Goals     Dates: Start: 06/02/21       Goal: LTG-By discharge, patient will ambulate     Dates: Start: 06/02/21       Description: AMB x 150 feet with FWW, mod I        Goal: LTG-By discharge, patient will transfer one surface to another     Dates: Start: 06/02/21       Description: SPT with FWW, mod I        Goal: LTG-By discharge, patient will transfer in/out of a car     Dates: Start: 06/02/21       Description: Car transfer with FWW with SPV       Goal: LTG-By discharge, patient will     Dates: Start: 06/02/21       Description: Up/down 2 steps with one HR and CGA

## 2021-06-07 NOTE — PROGRESS NOTES
Patient wore CPAP for 1 hr before taking it off and refusing to put back on. She states that it makes her feel claustrophobic.

## 2021-06-07 NOTE — CARE PLAN
Problem: Bowel Elimination  Goal: Patient will participate in bowel management program  Note: Patient receives schedule stool softener. Today she had episode of loose bowel movement; denies diarrhea. Order for stool softener was changed to prn per order received. Will continue to monitor      Problem: Diabetes Management  Goal: Patient's ability to maintain appropriate glucose levels will be maintained or improve  Note: Blood sugar check today was 159 and 163. Patient received 2 units of insulin per orders.    The patient is Stable - Low risk of patient condition declining or worsening

## 2021-06-07 NOTE — CARE PLAN
The patient is Stable - Low risk of patient condition declining or worsening    Problem: Pain - Standard  Goal: Alleviation of pain or a reduction in pain to the patient’s comfort goal  Outcome: Progressing  Note: Patient given PRN roxicodone,tizanidine, and tylenol per patient request.      Problem: Skin Integrity  Goal: Skin integrity is maintained or improved  Outcome: Progressing     Shift Goals  Clinical Goals: pain control  Patient Goals: pain control    Progress made toward(s) clinical / shift goals:  PRN medication given and ice packs applied.

## 2021-06-07 NOTE — THERAPY
"Occupational Therapy  Daily Treatment     Patient Name: Valentina Lu  Age:  65 y.o., Sex:  female  Medical Record #: [id number]  Today's Date: 6/7/2021     Precautions  Precautions: Fall Risk, TLSO (Thoracolumbosacral orthosis), Spinal / Back Precautions   Comments: TLSO OOB, on 3L wean         Subjective    \" This is so embarrassing \"     Objective       06/07/21 1301   Precautions   Precautions Fall Risk;TLSO (Thoracolumbosacral orthosis);Spinal / Back Precautions    Comments TLSO OOB, on 3L wean   Functional Level of Assist   Upper Body Dressing Minimal Assist  (to don TSLO  sitting edge of bed )   Lower Body Dressing Supervision  (doff. don pantd  doff underwear don pull up brief )   Lower Body Dressing Description Reacher   Toileting Moderate Assist  (clean up  post bowel accident )   Bed, Chair, Wheelchair Transfer Stand by Assist   Interdisciplinary Plan of Care Collaboration   Patient Position at End of Therapy Seated;Call Light within Reach  (on toilet   hand off to CNA )   OT Total Time Spent   OT Individual Total Time Spent (Mins) 30   OT Charge Group   OT Self Care / ADL 2       Assessment    Supervision / cues for AE carry over with LB dressing tasks     Strengths: Able to follow instructions, Effective communication skills, Making steady progress towards goals, Manages pain appropriately, Motivated for self care and independence, Pleasant and cooperative, Willingly participates in therapeutic activities  Barriers: Decreased endurance, Fatigue, Generalized weakness, Impaired activity tolerance    Plan      ADL IADL  Don/ doff TLSO  Related mobility  Strength/endurance building   Standing endurance / balance   All incorporating  Back precautions/ body mechanics      Passport items to be completed:  Passport items to be completed:  Perform bathroom transfers, complete dressing,, prepare a simple meal, participate in household tasks, adapt home for safety needs, demonstrate home exercise program, " complete caregiver training             Occupational Therapy Goals (Active)     Problem: Dressing     Dates: Start: 06/02/21       Goal: STG-Within one week, patient will dress LB with Min A using DME/AE as needed     Dates: Start: 06/02/21             Problem: Functional Transfers     Dates: Start: 06/02/21       Goal: STG-Within one week, patient will transfer to toilet with SBA at w/c level     Dates: Start: 06/02/21             Problem: OT Long Term Goals     Dates: Start: 06/02/21       Goal: LTG-By discharge, patient will complete basic self care tasks at Mod I using DME/AE as needed     Dates: Start: 06/02/21          Goal: LTG-By discharge, patient will perform bathroom transfers at Mod I using DME/AE as needed     Dates: Start: 06/02/21             Problem: Toileting     Dates: Start: 06/02/21       Goal: STG-Within one week, patient will complete toileting tasks with SBA using DME/AE as needed     Dates: Start: 06/02/21

## 2021-06-07 NOTE — THERAPY
Recreational Therapy  Daily Treatment     Patient Name: Valentina Lu  AGE:  65 y.o., SEX:  female  Medical Record #: [id number]  Today's Date: 6/7/2021       Subjective    Pt ready and willing for session.      Objective       06/07/21 0931   Functional Ability Status - Cognitive   Attention Span Remains on Task   Comprehension Follows Two Step Commands   Judgment Able to Make Independent Decisions   Functional Ability Status - Emotional    Affect Appropriate   Mood Appropriate   Behavior Appropriate   Skilled Intervention    Skilled Intervention Gross Motor Leisure;Relaxation / Coping Skills   Skilled Intervention Comments Standing while putting to gether a puzzle   Interdisciplinary Plan of Care Collaboration   IDT Collaboration with  Certified O.T. Assistant  (ROE);Physical Therapist   Patient Position at End of Therapy Seated   Collaboration Comments ROE gave location of Pt at start and PT given information on where the Pt was following recreation therapy.    Strengths & Barriers   Strengths Able to follow instructions;Motivated for self care and independence;Pleasant and cooperative;Supportive family;Willingly participates in therapeutic activities   Barriers Confused;Decreased endurance;Impaired functional cognition;Limited mobility   Treatment Time   Total Time Spent (mins) 30   Procedural Tracking   Procedural Tracking Community Re-Integration;Community Skills Development;Leisure Skills Awareness;Leisure Skills Development;Gross Motor Functional Leisure Skills       Assessment    Pt required cueing to maintain good safety awareness. Pt began a transfer to bed without letting the staff know after saying that she was going to remain in her chair. Pt stood for 3 and a half minutes x2.     Strengths: (P) Able to follow instructions, Motivated for self care and independence, Pleasant and cooperative, Supportive family, Willingly participates in therapeutic activities  Barriers: (P) Confused, Decreased  endurance, Impaired functional cognition, Limited mobility    Plan    Standing balance and endurance.     Passport items to be completed:  Passport items to be completed:  Verbalize two positive leisure activities, discuss returning to work, hobbies, community groups or volunteer activities, explore community resources

## 2021-06-07 NOTE — THERAPY
"Occupational Therapy  Daily Treatment     Patient Name: Valentina Lu  Age:  65 y.o., Sex:  female  Medical Record #: [id number]  Today's Date: 6/7/2021     Precautions  Precautions: (P) Fall Risk, TLSO (Thoracolumbosacral orthosis), Spinal / Back Precautions   Comments: (P) TLSO OOB, on 3L wean         Subjective    \" I used to have one of these .\" referring to reacher .  Reports she has a  Sock aid.        Objective       06/07/21 0831   Precautions   Precautions Fall Risk;TLSO (Thoracolumbosacral orthosis);Spinal / Back Precautions    Comments TLSO OOB, on 3L wean   Functional Level of Assist   Grooming Supervision   Grooming Description Seated in wheelchair at sink   Bathing Supervision  (setup waterproof dressings  cues back precautions AE use )   Bathing Description Hand held shower;Long handled bath tool;Tub bench  (utilized dressing stick washclotes rubberband for LS sponge)   Upper Body Dressing Minimal Assist  (supervision  bra and shirt  assist with TLSO )   Lower Body Dressing Supervision   Lower Body Dressing Description Dressing stick;Reacher;Sock aid;Verbal cueing  (cues to use AE  cues for correct use of AE )   Bed, Chair, Wheelchair Transfer Stand by Assist  ( Bed to w/c )   Tub / Shower Transfers Stand by Assist   Tub Shower Transfer Description Grab bar;Shower bench   Interdisciplinary Plan of Care Collaboration   IDT Collaboration with  Recreation Therapist   Patient Position at End of Therapy Seated  (at sink finishing grooming tasks )   Collaboration Comments Maxine is sitting at sink finishing groomin tasks    OT Total Time Spent   OT Individual Total Time Spent (Mins) 60   OT Charge Group   OT Self Care / ADL 4       Assessment     Cues for Back precautions/ body mechanics  Cues for recall to use AE for LB dressing and cues for technique using sock aid   anticipate she will need some supervision at d/c   Fair to good adherence to back precautions when performing  Tasks with TLSO off. "      She reports she no longer has a reacher and is established with [facility] for equipment needs. However, later in conversation she reported [facility] had assisted with obtaining a ramp for her spouse     Strengths: Able to follow instructions, Effective communication skills, Making steady progress towards goals, Manages pain appropriately, Motivated for self care and independence, Pleasant and cooperative, Willingly participates in therapeutic activities  Barriers: Decreased endurance, Fatigue, Generalized weakness, Impaired activity tolerance    Plan    ADL IADL  Don/ doff TLSO  Related mobility  Strength/endurance building   Standing endurance / balance   All incorporating  Back precautions/ body mechanics      Passport items to be completed:  Passport items to be completed:  Perform bathroom transfers, complete dressing,, prepare a simple meal, participate in household tasks, adapt home for safety needs, demonstrate home exercise program, complete caregiver training            Occupational Therapy Goals (Active)     Problem: Dressing     Dates: Start: 06/02/21       Goal: STG-Within one week, patient will dress LB with Min A using DME/AE as needed     Dates: Start: 06/02/21             Problem: Functional Transfers     Dates: Start: 06/02/21       Goal: STG-Within one week, patient will transfer to toilet with SBA at w/c level     Dates: Start: 06/02/21             Problem: OT Long Term Goals     Dates: Start: 06/02/21       Goal: LTG-By discharge, patient will complete basic self care tasks at Mod I using DME/AE as needed     Dates: Start: 06/02/21          Goal: LTG-By discharge, patient will perform bathroom transfers at Mod I using DME/AE as needed     Dates: Start: 06/02/21             Problem: Toileting     Dates: Start: 06/02/21       Goal: STG-Within one week, patient will complete toileting tasks with SBA using DME/AE as needed     Dates: Start: 06/02/21

## 2021-06-07 NOTE — PROGRESS NOTES
"Rehab Progress Note     Encounter Date: 6/7/2021    CC: Lumbar surgery, decreased mobility    Interval Events (Subjective)  Patient sitting up in room. She reports she had some bowel incontinence this morning. She reports it came on suddenly and had minimal sensation before it occurred.  No new weakness. No loss of sensation in her legs. No fever or chills.  Discussed would hold bowel medications. Otherwise she reports some left buttocks to knee pain. She reports it feels like a tight muscle. Discussed could schedule muscle relaxant if occurs again.     IDT Team Meeting 6/3/2021  DC/Disposition:  6/12/21     Objective:  VITAL SIGNS: /72   Pulse 77   Temp 36.6 °C (97.9 °F) (Oral)   Resp 18   Ht 1.651 m (5' 5\")   Wt 87.5 kg (192 lb 14.4 oz)   LMP  (LMP Unknown)   SpO2 93%   BMI 32.10 kg/m²   Gen: NAD  Psych: Mood and affect appropriate  CV: RRR, no edema  Resp: CTAB, no upper airway sounds  Abd: NTND  Neuro: AOx4, following commands, normal sensation BLE    Recent Results (from the past 72 hour(s))   POCT glucose device results    Collection Time: 06/04/21  5:31 PM   Result Value Ref Range    Glucose - Accu-Ck 174 (H) 65 - 99 mg/dL   POCT glucose device results    Collection Time: 06/04/21  8:02 PM   Result Value Ref Range    Glucose - Accu-Ck 151 (H) 65 - 99 mg/dL   POCT glucose device results    Collection Time: 06/05/21  7:40 AM   Result Value Ref Range    Glucose - Accu-Ck 91 65 - 99 mg/dL   POCT glucose device results    Collection Time: 06/05/21 10:52 AM   Result Value Ref Range    Glucose - Accu-Ck 138 (H) 65 - 99 mg/dL   POCT glucose device results    Collection Time: 06/05/21  4:47 PM   Result Value Ref Range    Glucose - Accu-Ck 149 (H) 65 - 99 mg/dL   POCT glucose device results    Collection Time: 06/05/21  9:57 PM   Result Value Ref Range    Glucose - Accu-Ck 144 (H) 65 - 99 mg/dL   POCT glucose device results    Collection Time: 06/06/21  7:32 AM   Result Value Ref Range    Glucose - " Accu-Ck 122 (H) 65 - 99 mg/dL   POCT glucose device results    Collection Time: 06/06/21 10:49 AM   Result Value Ref Range    Glucose - Accu-Ck 121 (H) 65 - 99 mg/dL   POCT glucose device results    Collection Time: 06/06/21  5:11 PM   Result Value Ref Range    Glucose - Accu-Ck 150 (H) 65 - 99 mg/dL   POCT glucose device results    Collection Time: 06/06/21  8:24 PM   Result Value Ref Range    Glucose - Accu-Ck 186 (H) 65 - 99 mg/dL   POCT glucose device results    Collection Time: 06/07/21  7:28 AM   Result Value Ref Range    Glucose - Accu-Ck 159 (H) 65 - 99 mg/dL   POCT glucose device results    Collection Time: 06/07/21 11:41 AM   Result Value Ref Range    Glucose - Accu-Ck 163 (H) 65 - 99 mg/dL       Current Facility-Administered Medications   Medication Frequency   • tizanidine (ZANAFLEX) tablet 4 mg TID   • glipiZIDE SR (GLUCOTROL) tablet 5 mg QAM AC   • lidocaine (LIDODERM) 5 % 2 Patch DAILY   • spironolactone (ALDACTONE) tablet 12.5 mg Q EVENING   • diphenhydrAMINE-zinc acetate (BENADRYL ITCH) topical cream TID PRN   • diphenhydrAMINE (BENADRYL) tablet/capsule 25 mg Q6HRS PRN   • budesonide-formoterol (SYMBICORT) 160-4.5 MCG/ACT inhaler 2 Puff BID (RT)   • Respiratory Therapy Consult Continuous RT   • oxyCODONE immediate-release (ROXICODONE) tablet 5 mg Q3HRS PRN    Or   • oxyCODONE immediate release (ROXICODONE) tablet 10 mg Q3HRS PRN   • hydrALAZINE (APRESOLINE) tablet 25 mg Q8HRS PRN   • acetaminophen (Tylenol) tablet 650 mg Q4HRS PRN   • senna-docusate (PERICOLACE or SENOKOT S) 8.6-50 MG per tablet 2 tablet BID    And   • polyethylene glycol/lytes (MIRALAX) PACKET 1 Packet QDAY PRN    And   • magnesium hydroxide (MILK OF MAGNESIA) suspension 30 mL QDAY PRN    And   • bisacodyl (DULCOLAX) suppository 10 mg QDAY PRN   • artificial tears ophthalmic solution 1 Drop PRN   • benzocaine-menthol (CEPACOL) lozenge 1 Lozenge Q2HRS PRN   • mag hydrox-al hydrox-simeth (MAALOX PLUS ES or MYLANTA DS) suspension 20  mL Q2HRS PRN   • ondansetron (ZOFRAN ODT) dispertab 4 mg 4X/DAY PRN    Or   • ondansetron (ZOFRAN) syringe/vial injection 4 mg 4X/DAY PRN   • traZODone (DESYREL) tablet 50 mg QHS PRN   • sodium chloride (OCEAN) 0.65 % nasal spray 2 Spray PRN   • insulin regular (HumuLIN R,NovoLIN R) injection 4X/DAY ACHS    And   • glucose 4 g chewable tablet 16 g Q15 MIN PRN    And   • dextrose 50% (D50W) injection 50 mL Q15 MIN PRN   • enoxaparin (LOVENOX) inj 40 mg DAILY AT 1800   • calcium carbonate (TUMS) chewable tab 500 mg BID   • vitamin D (cholecalciferol) tablet 5,000 Units DAILY   • albuterol inhaler 2 Puff Q6HRS PRN   • atorvastatin (LIPITOR) tablet 20 mg QHS   • fluconazole (DIFLUCAN) tablet 150 mg DAILY   • furosemide (LASIX) tablet 40 mg DAILY   • gabapentin (NEURONTIN) capsule 800 mg TID   • levothyroxine (SYNTHROID) tablet 75 mcg QHS   • omeprazole (PRILOSEC) capsule 20 mg QHS   • ROPINIRole (REQUIP) tablet 8 mg QHS   • temazepam (Restoril) capsule 30 mg HS PRN   • venlafaxine (EFFEXOR) tablet 25 mg Q EVENING   • venlafaxine XR (EFFEXOR XR) capsule 150 mg Q EVENING   • potassium chloride SA (Kdur) tablet 10 mEq QHS       Orders Placed This Encounter   Procedures   • Diet Order Diet: Consistent CHO (Diabetic)     Standing Status:   Standing     Number of Occurrences:   1     Order Specific Question:   Diet:     Answer:   Consistent CHO (Diabetic) [4]       Assessment:  Active Hospital Problems    Diagnosis    • *S/P laminectomy with spinal fusion    • Thyroid disorder    • Dysphagia    • Diabetic polyneuropathy (HCC)    • Restless legs syndrome (RLS)    • Type 2 diabetes mellitus, without long-term current use of insulin (HCC)    • COPD (chronic obstructive pulmonary disease) (HCC)    • Tremor    • HTN (hypertension)        Medical Decision Making and Plan:  Lumbar retrospondylolisthesis - Patient with significant back pain caused by retrospondy of her L2 on her previous back surgery. Patient is now s/p L2-L3 XLIF  and then L2-S1 redo laminectomies with Dr. Lechuga on 5/25/21 and 5/28/21  -PT and OT for mobility and ADLs. TLSO - poorly fitting, may need vendor to reassess.  -Follow-up with NSG.      Pain Control - Patient with post-operative and neuropathic pain. Continue PRN Tylenol, Oxycodone, and scheduled Gabapentin.   -Gabapentin 800 mg TID. Lidocaine patch bilaterally to low back  -Tizanidine TID for muscle spasms    Tremor - New tremor on 6/7/21, could be Gabapentin although home dose. Has a history of hypokalemia. Will check CMP     DM2 - Patient on SSI on transfer. Will monitor. Previously on Glipizide and Januvia. Recent changes with PCP were causing higher A1c.  A1c 9.2 on admission.   -Will restart Glipizide 2.5 mg daily. Improved on diabetic diet and Glipizide, will monitor. Highs into 170s. Increase Glipizide and monitor over weekend.      HTN - Patient on Spironolactone and Lasix on transfer.   -SBP labile, down into 80s. 1/2 tab Spironolactone      COPD - Patient on PRN Albuterol. On 2L on transfer. RT to consult     HLD - Patient on Atorvastatin 20 mg daily.      Anemia - Check AM CBC. 10.6, will continue to monitor, Check Fe panel at next check    -Iron panel normal. Elevated RDW, check B12/folate     Hypothyroidism - On 75 mcg Levothyroxine, check TSH - wnl     GERD - Patient on Prilosec QHS    GI Ppx - Patient with incontinence on 6/7, discontinue bowel medications. Low threshold for imaging of back as recent 2 stage.      RLS - On Requip QHS.     Depression - Patient on Effexor  mg and 25 mg short acting     DVT ppx - Patient on Lovenox on transfer.     Total time:  36 minutes.  I spent greater than 50% of the time for patient care, counseling, and coordination on this date, including unit/floor time, and face-to-face time with the patient as per interval events and assessment and plan above. Topics discussed included new tremor, check electrolytes, ongoing anemia, check B12/folate, left hip pain, and  start Tizanidine.        Deedee Ivy M.D.

## 2021-06-07 NOTE — THERAPY
"Physical Therapy   Daily Treatment     Patient Name: Valentina Lu  Age:  65 y.o., Sex:  female  Medical Record #: [id number]  Today's Date: 6/7/2021     Precautions  Precautions: Fall Risk, TLSO (Thoracolumbosacral orthosis), Spinal / Back Precautions   Comments: TLSO OOB, on 3L wean    Subjective    Pt in bed resting with  bedside, agreeable to PT. \"Can we go outside? I really want to get some fresh air!\"     Objective       06/07/21 1031   Precautions   Precautions Fall Risk;Spinal / Back Precautions ;TLSO (Thoracolumbosacral orthosis)   Comments TLSO on OOB, 3L O2 wean   Vitals   Pulse Oximetry 88 %  (post activity )   Gait Functional Level of Assist    Gait Level Of Assist Contact Guard Assist   Assistive Device Front Wheel Walker   Distance (Feet) 110  (outdoors )   # of Times Distance was Traveled 2   Deviation Antalgic;Bradykinetic   Transfer Functional Level of Assist   Bed, Chair, Wheelchair Transfer Stand by Assist   Bed Chair Wheelchair Transfer Description Supervision for safety;Set-up of equipment;Increased time  (bed > WC > EOB)   Sitting Lower Body Exercises   Ankle Pumps 1 set of 15   Hip Abduction 1 set of 15   Long Arc Quad 1 set of 15   Marching 1 set of 15   Hamstring Curl 1 set of 15   Comments Above exercises completed while outdoors sitting in WC    Interdisciplinary Plan of Care Collaboration   Patient Position at End of Therapy Seated;Edge of Bed;Call Light within Reach;Tray Table within Reach   PT Total Time Spent   PT Individual Total Time Spent (Mins) 60   PT Charge Group   PT Gait Training 1   PT Therapeutic Exercise 1   PT Therapeutic Activities 2     Pt education/ discussion on the following topics: pain management, visitor restrictions at rehab, use of supplemental O2 and current pulse oximeter readings, healing after spinal surgery and possible nerve damage secondary to surgery, CLOF, and POC.     Assessment    Pt fully participatory in session.  present and " supportive. Pt hyper verbose and easily distractible throughout.     Strengths: Effective communication skills, Independent prior level of function, Motivated for self care and independence, Pleasant and cooperative, Supportive family, Willingly participates in therapeutic activities  Barriers: Fatigue, Generalized weakness, Home accessibility, Impaired balance, Impaired activity tolerance, Limited mobility, Pain, Pain poorly managed    Plan    Gait with FWW, consider trial of 4WW, LE strength and endurance, pain management PRN, standing tolerance and balance.       Physical Therapy Problems (Active)     Problem: Mobility     Dates: Start: 06/02/21       Goal: STG-Within one week, patient will ambulate household distance     Dates: Start: 06/02/21       Description: AMB x 50 feet with FWW and SBA          Problem: Mobility Transfers     Dates: Start: 06/02/21       Goal: STG-Within one week, patient will perform bed mobility     Dates: Start: 06/02/21       Description: Supine </> sit with min A using bed rail       Goal: STG-Within one week, patient will transfer bed to chair     Dates: Start: 06/02/21       Description: SPT with FWW and SBA          Problem: PT-Long Term Goals     Dates: Start: 06/02/21       Goal: LTG-By discharge, patient will ambulate     Dates: Start: 06/02/21       Description: AMB x 150 feet with FWW, mod I        Goal: LTG-By discharge, patient will transfer one surface to another     Dates: Start: 06/02/21       Description: SPT with FWW, mod I        Goal: LTG-By discharge, patient will transfer in/out of a car     Dates: Start: 06/02/21       Description: Car transfer with FWW with SPV       Goal: LTG-By discharge, patient will     Dates: Start: 06/02/21       Description: Up/down 2 steps with one HR and CGA

## 2021-06-08 LAB
ANION GAP SERPL CALC-SCNC: 10 MMOL/L (ref 7–16)
BASOPHILS # BLD AUTO: 1 % (ref 0–1.8)
BASOPHILS # BLD: 0.1 K/UL (ref 0–0.12)
BUN SERPL-MCNC: 16 MG/DL (ref 8–22)
CALCIUM SERPL-MCNC: 9.2 MG/DL (ref 8.5–10.5)
CHLORIDE SERPL-SCNC: 99 MMOL/L (ref 96–112)
CO2 SERPL-SCNC: 27 MMOL/L (ref 20–33)
CREAT SERPL-MCNC: 0.8 MG/DL (ref 0.5–1.4)
EOSINOPHIL # BLD AUTO: 0.29 K/UL (ref 0–0.51)
EOSINOPHIL NFR BLD: 3 % (ref 0–6.9)
ERYTHROCYTE [DISTWIDTH] IN BLOOD BY AUTOMATED COUNT: 54 FL (ref 35.9–50)
FOLATE SERPL-MCNC: 13 NG/ML
GLUCOSE BLD-MCNC: 112 MG/DL (ref 65–99)
GLUCOSE BLD-MCNC: 131 MG/DL (ref 65–99)
GLUCOSE BLD-MCNC: 137 MG/DL (ref 65–99)
GLUCOSE BLD-MCNC: 171 MG/DL (ref 65–99)
GLUCOSE SERPL-MCNC: 115 MG/DL (ref 65–99)
HCT VFR BLD AUTO: 32.9 % (ref 37–47)
HGB BLD-MCNC: 9.9 G/DL (ref 12–16)
IMM GRANULOCYTES # BLD AUTO: 0.12 K/UL (ref 0–0.11)
IMM GRANULOCYTES NFR BLD AUTO: 1.2 % (ref 0–0.9)
LYMPHOCYTES # BLD AUTO: 1.92 K/UL (ref 1–4.8)
LYMPHOCYTES NFR BLD: 19.9 % (ref 22–41)
MCH RBC QN AUTO: 28.8 PG (ref 27–33)
MCHC RBC AUTO-ENTMCNC: 30.1 G/DL (ref 33.6–35)
MCV RBC AUTO: 95.6 FL (ref 81.4–97.8)
MONOCYTES # BLD AUTO: 0.71 K/UL (ref 0–0.85)
MONOCYTES NFR BLD AUTO: 7.3 % (ref 0–13.4)
NEUTROPHILS # BLD AUTO: 6.52 K/UL (ref 2–7.15)
NEUTROPHILS NFR BLD: 67.6 % (ref 44–72)
NRBC # BLD AUTO: 0 K/UL
NRBC BLD-RTO: 0 /100 WBC
PLATELET # BLD AUTO: 421 K/UL (ref 164–446)
PMV BLD AUTO: 9.5 FL (ref 9–12.9)
POTASSIUM SERPL-SCNC: 4.5 MMOL/L (ref 3.6–5.5)
RBC # BLD AUTO: 3.44 M/UL (ref 4.2–5.4)
SODIUM SERPL-SCNC: 136 MMOL/L (ref 135–145)
VIT B12 SERPL-MCNC: 943 PG/ML (ref 211–911)
WBC # BLD AUTO: 9.7 K/UL (ref 4.8–10.8)

## 2021-06-08 PROCEDURE — 700102 HCHG RX REV CODE 250 W/ 637 OVERRIDE(OP): Performed by: PHYSICAL MEDICINE & REHABILITATION

## 2021-06-08 PROCEDURE — 36415 COLL VENOUS BLD VENIPUNCTURE: CPT

## 2021-06-08 PROCEDURE — 82962 GLUCOSE BLOOD TEST: CPT

## 2021-06-08 PROCEDURE — 85025 COMPLETE CBC W/AUTO DIFF WBC: CPT

## 2021-06-08 PROCEDURE — 82746 ASSAY OF FOLIC ACID SERUM: CPT

## 2021-06-08 PROCEDURE — 97112 NEUROMUSCULAR REEDUCATION: CPT

## 2021-06-08 PROCEDURE — 97110 THERAPEUTIC EXERCISES: CPT

## 2021-06-08 PROCEDURE — A9270 NON-COVERED ITEM OR SERVICE: HCPCS | Performed by: PHYSICAL MEDICINE & REHABILITATION

## 2021-06-08 PROCEDURE — 97535 SELF CARE MNGMENT TRAINING: CPT

## 2021-06-08 PROCEDURE — 770010 HCHG ROOM/CARE - REHAB SEMI PRIVAT*

## 2021-06-08 PROCEDURE — 97116 GAIT TRAINING THERAPY: CPT

## 2021-06-08 PROCEDURE — 99232 SBSQ HOSP IP/OBS MODERATE 35: CPT | Performed by: PHYSICAL MEDICINE & REHABILITATION

## 2021-06-08 PROCEDURE — 80048 BASIC METABOLIC PNL TOTAL CA: CPT

## 2021-06-08 PROCEDURE — 700101 HCHG RX REV CODE 250: Performed by: PHYSICAL MEDICINE & REHABILITATION

## 2021-06-08 PROCEDURE — 82607 VITAMIN B-12: CPT

## 2021-06-08 PROCEDURE — 97530 THERAPEUTIC ACTIVITIES: CPT

## 2021-06-08 PROCEDURE — 94640 AIRWAY INHALATION TREATMENT: CPT

## 2021-06-08 PROCEDURE — 94760 N-INVAS EAR/PLS OXIMETRY 1: CPT

## 2021-06-08 PROCEDURE — 700111 HCHG RX REV CODE 636 W/ 250 OVERRIDE (IP): Performed by: PHYSICAL MEDICINE & REHABILITATION

## 2021-06-08 RX ADMIN — TIZANIDINE 4 MG: 4 TABLET ORAL at 15:55

## 2021-06-08 RX ADMIN — CHOLECALCIFEROL TAB 25 MCG (1000 UNIT) 5000 UNITS: 25 TAB at 07:32

## 2021-06-08 RX ADMIN — TIZANIDINE 4 MG: 4 TABLET ORAL at 07:33

## 2021-06-08 RX ADMIN — FLUCONAZOLE 150 MG: 100 TABLET ORAL at 07:33

## 2021-06-08 RX ADMIN — CALCIUM CARBONATE (ANTACID) CHEW TAB 500 MG 500 MG: 500 CHEW TAB at 07:37

## 2021-06-08 RX ADMIN — ROPINIROLE HYDROCHLORIDE 8 MG: 1 TABLET, FILM COATED ORAL at 20:55

## 2021-06-08 RX ADMIN — BUDESONIDE AND FORMOTEROL FUMARATE DIHYDRATE 2 PUFF: 160; 4.5 AEROSOL RESPIRATORY (INHALATION) at 21:04

## 2021-06-08 RX ADMIN — LIDOCAINE 2 PATCH: 50 PATCH TOPICAL at 07:38

## 2021-06-08 RX ADMIN — GABAPENTIN 800 MG: 400 CAPSULE ORAL at 20:58

## 2021-06-08 RX ADMIN — VENLAFAXINE HYDROCHLORIDE 150 MG: 75 CAPSULE, EXTENDED RELEASE ORAL at 20:56

## 2021-06-08 RX ADMIN — OXYCODONE 5 MG: 5 TABLET ORAL at 23:43

## 2021-06-08 RX ADMIN — VENLAFAXINE 25 MG: 25 TABLET ORAL at 20:56

## 2021-06-08 RX ADMIN — SPIRONOLACTONE 12.5 MG: 25 TABLET, FILM COATED ORAL at 20:57

## 2021-06-08 RX ADMIN — ACETAMINOPHEN 650 MG: 325 TABLET, FILM COATED ORAL at 12:02

## 2021-06-08 RX ADMIN — ACETAMINOPHEN 650 MG: 325 TABLET, FILM COATED ORAL at 20:57

## 2021-06-08 RX ADMIN — OXYCODONE HYDROCHLORIDE 10 MG: 10 TABLET ORAL at 07:33

## 2021-06-08 RX ADMIN — GABAPENTIN 800 MG: 400 CAPSULE ORAL at 15:55

## 2021-06-08 RX ADMIN — ATORVASTATIN CALCIUM 20 MG: 10 TABLET, FILM COATED ORAL at 20:57

## 2021-06-08 RX ADMIN — OXYCODONE 5 MG: 5 TABLET ORAL at 12:02

## 2021-06-08 RX ADMIN — INSULIN HUMAN 2 UNITS: 100 INJECTION, SOLUTION PARENTERAL at 07:38

## 2021-06-08 RX ADMIN — TIZANIDINE 4 MG: 4 TABLET ORAL at 20:57

## 2021-06-08 RX ADMIN — POTASSIUM CHLORIDE 10 MEQ: 1500 TABLET, EXTENDED RELEASE ORAL at 20:58

## 2021-06-08 RX ADMIN — GABAPENTIN 800 MG: 400 CAPSULE ORAL at 07:38

## 2021-06-08 RX ADMIN — GLIPIZIDE 5 MG: 2.5 TABLET, FILM COATED, EXTENDED RELEASE ORAL at 07:33

## 2021-06-08 RX ADMIN — OMEPRAZOLE 20 MG: 20 CAPSULE, DELAYED RELEASE ORAL at 20:58

## 2021-06-08 RX ADMIN — TRAZODONE HYDROCHLORIDE 50 MG: 50 TABLET ORAL at 23:43

## 2021-06-08 RX ADMIN — OXYCODONE HYDROCHLORIDE 10 MG: 10 TABLET ORAL at 02:03

## 2021-06-08 RX ADMIN — CALCIUM CARBONATE (ANTACID) CHEW TAB 500 MG 500 MG: 500 CHEW TAB at 20:59

## 2021-06-08 RX ADMIN — LEVOTHYROXINE SODIUM 75 MCG: 75 TABLET ORAL at 21:04

## 2021-06-08 RX ADMIN — ENOXAPARIN SODIUM 40 MG: 40 INJECTION SUBCUTANEOUS at 17:36

## 2021-06-08 RX ADMIN — FUROSEMIDE 40 MG: 40 TABLET ORAL at 07:33

## 2021-06-08 RX ADMIN — BUDESONIDE AND FORMOTEROL FUMARATE DIHYDRATE 2 PUFF: 160; 4.5 AEROSOL RESPIRATORY (INHALATION) at 07:56

## 2021-06-08 ASSESSMENT — ACTIVITIES OF DAILY LIVING (ADL): BED_CHAIR_WHEELCHAIR_TRANSFER_DESCRIPTION: INCREASED TIME;SET-UP OF EQUIPMENT;SUPERVISION FOR SAFETY

## 2021-06-08 ASSESSMENT — PATIENT HEALTH QUESTIONNAIRE - PHQ9
SUM OF ALL RESPONSES TO PHQ9 QUESTIONS 1 AND 2: 2
1. LITTLE INTEREST OR PLEASURE IN DOING THINGS: SEVERAL DAYS
2. FEELING DOWN, DEPRESSED, IRRITABLE, OR HOPELESS: SEVERAL DAYS
SUM OF ALL RESPONSES TO PHQ QUESTIONS 1-9: 2
3. TROUBLE FALLING OR STAYING ASLEEP OR SLEEPING TOO MUCH: NOT AT ALL
5. POOR APPETITE OR OVEREATING: NOT AT ALL
8. MOVING OR SPEAKING SO SLOWLY THAT OTHER PEOPLE COULD HAVE NOTICED. OR THE OPPOSITE, BEING SO FIGETY OR RESTLESS THAT YOU HAVE BEEN MOVING AROUND A LOT MORE THAN USUAL: NOT AT ALL
7. TROUBLE CONCENTRATING ON THINGS, SUCH AS READING THE NEWSPAPER OR WATCHING TELEVISION: NOT AT ALL
6. FEELING BAD ABOUT YOURSELF - OR THAT YOU ARE A FAILURE OR HAVE LET YOURSELF OR YOUR FAMILY DOWN: NOT AL ALL
4. FEELING TIRED OR HAVING LITTLE ENERGY: NOT AT ALL
9. THOUGHTS THAT YOU WOULD BE BETTER OFF DEAD, OR OF HURTING YOURSELF: NOT AT ALL

## 2021-06-08 ASSESSMENT — GAIT ASSESSMENTS
GAIT LEVEL OF ASSIST: STAND BY ASSIST
ASSISTIVE DEVICE: 4 WHEEL WALKER
DISTANCE (FEET): 110
DEVIATION: BRADYKINETIC;ANTALGIC;DECREASED HEEL STRIKE;DECREASED TOE OFF
DEVIATION: BRADYKINETIC;SHUFFLED GAIT
GAIT LEVEL OF ASSIST: CONTACT GUARD ASSIST
ASSISTIVE DEVICE: FRONT WHEEL WALKER

## 2021-06-08 ASSESSMENT — PAIN DESCRIPTION - PAIN TYPE
TYPE: SURGICAL PAIN;ACUTE PAIN
TYPE: ACUTE PAIN

## 2021-06-08 NOTE — THERAPY
Occupational Therapy  Daily Treatment     Patient Name: Valentina Lu  Age:  65 y.o., Sex:  female  Medical Record #: [id number]  Today's Date: 6/8/2021     Precautions  Precautions: Fall Risk, TLSO (Thoracolumbosacral orthosis), Spinal / Back Precautions   Comments: TLSO OOB    Subjective    Patient agreeable to participate in OT. Has tendency to be verbose.      Objective     06/08/21 1301   Precautions   Precautions Fall Risk;TLSO (Thoracolumbosacral orthosis);Spinal / Back Precautions    Comments TLSO OOB   Vitals   Room Air Oximetry 94   O2 Delivery Device None - Room Air   Cognition    Level of Consciousness Alert   Functional Level of Assist   Upper Body Dressing Minimal Assist  (to don TLSO in w/c )   Lower Body Dressing Supervision  (to don R sock w/ sock aid while seated in w/c )   Interdisciplinary Plan of Care Collaboration   Patient Position at End of Therapy Seated;Self Releasing Lap Belt Applied   OT Total Time Spent   OT Individual Total Time Spent (Mins) 30   OT Charge Group   OT Self Care / ADL 1   OT Neuromuscular Re-education / Balance 1     Fluidobike in standing x 7 mins, level 2 resistance (with 2 brief standing rest breaks).    O2 sats 94% on RA.     Assessment    Patient tolerated OT session well with focus on ADLs and standing balance/endurance. Demo's ability to don TLSO @ min assist level and socks w/ supervision (w/ use of sock aid). Good standing balance observed during fluidobike therex activity (however patient did require to brief standing rest breaks).   Strengths: Able to follow instructions, Effective communication skills, Making steady progress towards goals, Manages pain appropriately, Motivated for self care and independence, Pleasant and cooperative, Willingly participates in therapeutic activities  Barriers: Decreased endurance, Fatigue, Generalized weakness, Impaired activity tolerance    Plan    ADLs/IADLs,  Don/ doff TLSO,  Related mobility,  Strength/endurance  building, Standing endurance / balance (All incorporating back precautions/ body mechanics)    Occupational Therapy Goals (Active)     Problem: Dressing     Dates: Start: 06/02/21       Goal: STG-Within one week, patient will dress LB with Min A using DME/AE as needed     Dates: Start: 06/02/21             Problem: Functional Transfers     Dates: Start: 06/02/21       Goal: STG-Within one week, patient will transfer to toilet with SBA at w/c level     Dates: Start: 06/02/21             Problem: OT Long Term Goals     Dates: Start: 06/02/21       Goal: LTG-By discharge, patient will complete basic self care tasks at Mod I using DME/AE as needed     Dates: Start: 06/02/21          Goal: LTG-By discharge, patient will perform bathroom transfers at Mod I using DME/AE as needed     Dates: Start: 06/02/21             Problem: Toileting     Dates: Start: 06/02/21       Goal: STG-Within one week, patient will complete toileting tasks with SBA using DME/AE as needed     Dates: Start: 06/02/21

## 2021-06-08 NOTE — THERAPY
"Occupational Therapy  Daily Treatment     Patient Name: Valentina Lu  Age:  65 y.o., Sex:  female  Medical Record #: [id number]  Today's Date: 6/8/2021     Precautions  Precautions: Fall Risk, TLSO (Thoracolumbosacral orthosis), Spinal / Back Precautions   Comments: TLSO OOB         Subjective    \"can I take a shower today?\"     Objective       06/08/21 0831   Functional Level of Assist   Grooming Stand by Assist;Standing  (close SBA)   Bathing Contact Guard Assist  (poor safety/attention, unsteady)   Upper Body Dressing Supervision   Lower Body Dressing Supervision   Lower Body Dressing Description Reacher   Tub / Shower Transfers Contact Guard Assist  (with FWW)   IADL Treatments   IADL Treatments Home management   Home Management pt retrieved her own items from closet prior to shower. Pt unsteady in standing requiring close SBA - CGA, pt with 1 LOB into closet. pt requires cues for spinal precautions - awkward in her movements - pt attempted to avoid twisting but was actually not turning her whole body   Interdisciplinary Plan of Care Collaboration   Patient Position at End of Therapy Seated;Call Light within Reach;Tray Table within Reach   OT Total Time Spent   OT Individual Total Time Spent (Mins) 60   OT Charge Group   OT Self Care / ADL 4       Assessment    Pt completed shower routine at SBA - CGA overall using FWW, shower bench, GB's. Pt's functional performance was impacted by poor safety awareness - poor attention, hyperverbose, requires cues for spinal precautions, unsteady in standing without UE support    Strengths: Able to follow instructions, Effective communication skills, Making steady progress towards goals, Manages pain appropriately, Motivated for self care and independence, Pleasant and cooperative, Willingly participates in therapeutic activities  Barriers: Decreased endurance, Fatigue, Generalized weakness, Impaired activity tolerance    Plan    Refer to Primary OT " POC/goals    Occupational Therapy Goals (Active)     Problem: Dressing     Dates: Start: 06/02/21       Goal: STG-Within one week, patient will dress LB with Min A using DME/AE as needed     Dates: Start: 06/02/21             Problem: Functional Transfers     Dates: Start: 06/02/21       Goal: STG-Within one week, patient will transfer to toilet with SBA at w/c level     Dates: Start: 06/02/21             Problem: OT Long Term Goals     Dates: Start: 06/02/21       Goal: LTG-By discharge, patient will complete basic self care tasks at Mod I using DME/AE as needed     Dates: Start: 06/02/21          Goal: LTG-By discharge, patient will perform bathroom transfers at Mod I using DME/AE as needed     Dates: Start: 06/02/21             Problem: Toileting     Dates: Start: 06/02/21       Goal: STG-Within one week, patient will complete toileting tasks with SBA using DME/AE as needed     Dates: Start: 06/02/21

## 2021-06-08 NOTE — PROGRESS NOTES
"Rehab Progress Note     Encounter Date: 6/8/2021    CC: Lumbar surgery, decreased mobility    Interval Events (Subjective)  Patient sitting up in room. She reports she is doing OK. She reports she is taking the Tizanidine now and has not had a muscle spasm this morning. Reviewed AM labs and stable anemia with normal electrolytes. Discussed does not need K supplement at this time. Denies NVD, no recurrence of incontinence noted. Denies SOB.     IDT Team Meeting 6/3/2021  DC/Disposition:  6/12/21     Objective:  VITAL SIGNS: /65   Pulse 79   Temp 36.4 °C (97.6 °F) (Oral)   Resp 18   Ht 1.651 m (5' 5\")   Wt 87.5 kg (192 lb 14.4 oz)   LMP  (LMP Unknown)   SpO2 93%   BMI 32.10 kg/m²   Gen: NAD  Psych: Mood and affect appropriate  CV: RRR, no edema  Resp: CTAB, no upper airway sounds  Abd: NTND  Neuro: AOx4, following commands, normal sensation BLE  Unchanged from 6/7/21    Recent Results (from the past 72 hour(s))   POCT glucose device results    Collection Time: 06/05/21  4:47 PM   Result Value Ref Range    Glucose - Accu-Ck 149 (H) 65 - 99 mg/dL   POCT glucose device results    Collection Time: 06/05/21  9:57 PM   Result Value Ref Range    Glucose - Accu-Ck 144 (H) 65 - 99 mg/dL   POCT glucose device results    Collection Time: 06/06/21  7:32 AM   Result Value Ref Range    Glucose - Accu-Ck 122 (H) 65 - 99 mg/dL   POCT glucose device results    Collection Time: 06/06/21 10:49 AM   Result Value Ref Range    Glucose - Accu-Ck 121 (H) 65 - 99 mg/dL   POCT glucose device results    Collection Time: 06/06/21  5:11 PM   Result Value Ref Range    Glucose - Accu-Ck 150 (H) 65 - 99 mg/dL   POCT glucose device results    Collection Time: 06/06/21  8:24 PM   Result Value Ref Range    Glucose - Accu-Ck 186 (H) 65 - 99 mg/dL   POCT glucose device results    Collection Time: 06/07/21  7:28 AM   Result Value Ref Range    Glucose - Accu-Ck 159 (H) 65 - 99 mg/dL   POCT glucose device results    Collection Time: 06/07/21 " 11:41 AM   Result Value Ref Range    Glucose - Accu-Ck 163 (H) 65 - 99 mg/dL   POCT glucose device results    Collection Time: 06/07/21  5:11 PM   Result Value Ref Range    Glucose - Accu-Ck 148 (H) 65 - 99 mg/dL   POCT glucose device results    Collection Time: 06/07/21  8:21 PM   Result Value Ref Range    Glucose - Accu-Ck 179 (H) 65 - 99 mg/dL   VITAMIN B12    Collection Time: 06/08/21  5:42 AM   Result Value Ref Range    Vitamin B12 -True Cobalamin 943 (H) 211 - 911 pg/mL   FOLATE    Collection Time: 06/08/21  5:42 AM   Result Value Ref Range    Folate -Folic Acid 13.0 >4.0 ng/mL   CBC WITH DIFFERENTIAL    Collection Time: 06/08/21  5:42 AM   Result Value Ref Range    WBC 9.7 4.8 - 10.8 K/uL    RBC 3.44 (L) 4.20 - 5.40 M/uL    Hemoglobin 9.9 (L) 12.0 - 16.0 g/dL    Hematocrit 32.9 (L) 37.0 - 47.0 %    MCV 95.6 81.4 - 97.8 fL    MCH 28.8 27.0 - 33.0 pg    MCHC 30.1 (L) 33.6 - 35.0 g/dL    RDW 54.0 (H) 35.9 - 50.0 fL    Platelet Count 421 164 - 446 K/uL    MPV 9.5 9.0 - 12.9 fL    Neutrophils-Polys 67.60 44.00 - 72.00 %    Lymphocytes 19.90 (L) 22.00 - 41.00 %    Monocytes 7.30 0.00 - 13.40 %    Eosinophils 3.00 0.00 - 6.90 %    Basophils 1.00 0.00 - 1.80 %    Immature Granulocytes 1.20 (H) 0.00 - 0.90 %    Nucleated RBC 0.00 /100 WBC    Neutrophils (Absolute) 6.52 2.00 - 7.15 K/uL    Lymphs (Absolute) 1.92 1.00 - 4.80 K/uL    Monos (Absolute) 0.71 0.00 - 0.85 K/uL    Eos (Absolute) 0.29 0.00 - 0.51 K/uL    Baso (Absolute) 0.10 0.00 - 0.12 K/uL    Immature Granulocytes (abs) 0.12 (H) 0.00 - 0.11 K/uL    NRBC (Absolute) 0.00 K/uL   Basic Metabolic Panel    Collection Time: 06/08/21  5:42 AM   Result Value Ref Range    Sodium 136 135 - 145 mmol/L    Potassium 4.5 3.6 - 5.5 mmol/L    Chloride 99 96 - 112 mmol/L    Co2 27 20 - 33 mmol/L    Glucose 115 (H) 65 - 99 mg/dL    Bun 16 8 - 22 mg/dL    Creatinine 0.80 0.50 - 1.40 mg/dL    Calcium 9.2 8.5 - 10.5 mg/dL    Anion Gap 10.0 7.0 - 16.0   ESTIMATED GFR    Collection  Time: 06/08/21  5:42 AM   Result Value Ref Range    GFR If African American >60 >60 mL/min/1.73 m 2    GFR If Non African American >60 >60 mL/min/1.73 m 2   POCT glucose device results    Collection Time: 06/08/21  7:27 AM   Result Value Ref Range    Glucose - Accu-Ck 171 (H) 65 - 99 mg/dL   POCT glucose device results    Collection Time: 06/08/21 11:42 AM   Result Value Ref Range    Glucose - Accu-Ck 112 (H) 65 - 99 mg/dL       Current Facility-Administered Medications   Medication Frequency   • tizanidine (ZANAFLEX) tablet 4 mg TID   • senna-docusate (PERICOLACE or SENOKOT S) 8.6-50 MG per tablet 2 tablet BID PRN    And   • polyethylene glycol/lytes (MIRALAX) PACKET 1 Packet QDAY PRN    And   • magnesium hydroxide (MILK OF MAGNESIA) suspension 30 mL QDAY PRN    And   • bisacodyl (DULCOLAX) suppository 10 mg QDAY PRN   • glipiZIDE SR (GLUCOTROL) tablet 5 mg QAM AC   • lidocaine (LIDODERM) 5 % 2 Patch DAILY   • spironolactone (ALDACTONE) tablet 12.5 mg Q EVENING   • diphenhydrAMINE-zinc acetate (BENADRYL ITCH) topical cream TID PRN   • diphenhydrAMINE (BENADRYL) tablet/capsule 25 mg Q6HRS PRN   • budesonide-formoterol (SYMBICORT) 160-4.5 MCG/ACT inhaler 2 Puff BID (RT)   • Respiratory Therapy Consult Continuous RT   • oxyCODONE immediate-release (ROXICODONE) tablet 5 mg Q3HRS PRN    Or   • oxyCODONE immediate release (ROXICODONE) tablet 10 mg Q3HRS PRN   • hydrALAZINE (APRESOLINE) tablet 25 mg Q8HRS PRN   • acetaminophen (Tylenol) tablet 650 mg Q4HRS PRN   • artificial tears ophthalmic solution 1 Drop PRN   • benzocaine-menthol (CEPACOL) lozenge 1 Lozenge Q2HRS PRN   • mag hydrox-al hydrox-simeth (MAALOX PLUS ES or MYLANTA DS) suspension 20 mL Q2HRS PRN   • ondansetron (ZOFRAN ODT) dispertab 4 mg 4X/DAY PRN    Or   • ondansetron (ZOFRAN) syringe/vial injection 4 mg 4X/DAY PRN   • traZODone (DESYREL) tablet 50 mg QHS PRN   • sodium chloride (OCEAN) 0.65 % nasal spray 2 Spray PRN   • insulin regular (HumuLIN R,NovoLIN  R) injection 4X/DAY ACHS    And   • glucose 4 g chewable tablet 16 g Q15 MIN PRN    And   • dextrose 50% (D50W) injection 50 mL Q15 MIN PRN   • enoxaparin (LOVENOX) inj 40 mg DAILY AT 1800   • calcium carbonate (TUMS) chewable tab 500 mg BID   • vitamin D (cholecalciferol) tablet 5,000 Units DAILY   • albuterol inhaler 2 Puff Q6HRS PRN   • atorvastatin (LIPITOR) tablet 20 mg QHS   • furosemide (LASIX) tablet 40 mg DAILY   • gabapentin (NEURONTIN) capsule 800 mg TID   • levothyroxine (SYNTHROID) tablet 75 mcg QHS   • omeprazole (PRILOSEC) capsule 20 mg QHS   • ROPINIRole (REQUIP) tablet 8 mg QHS   • temazepam (Restoril) capsule 30 mg HS PRN   • venlafaxine (EFFEXOR) tablet 25 mg Q EVENING   • venlafaxine XR (EFFEXOR XR) capsule 150 mg Q EVENING   • potassium chloride SA (Kdur) tablet 10 mEq QHS       Orders Placed This Encounter   Procedures   • Diet Order Diet: Consistent CHO (Diabetic)     Standing Status:   Standing     Number of Occurrences:   1     Order Specific Question:   Diet:     Answer:   Consistent CHO (Diabetic) [4]       Assessment:  Active Hospital Problems    Diagnosis    • *S/P laminectomy with spinal fusion    • Thyroid disorder    • Dysphagia    • Diabetic polyneuropathy (HCC)    • Restless legs syndrome (RLS)    • Type 2 diabetes mellitus, without long-term current use of insulin (HCC)    • COPD (chronic obstructive pulmonary disease) (HCC)    • Tremor    • HTN (hypertension)        Medical Decision Making and Plan:  Lumbar retrospondylolisthesis - Patient with significant back pain caused by retrospondy of her L2 on her previous back surgery. Patient is now s/p L2-L3 XLIF and then L2-S1 redo laminectomies with Dr. Lechuga on 5/25/21 and 5/28/21  -PT and OT for mobility and ADLs. TLSO - poorly fitting, may need vendor to reassess.  -Follow-up with NSG.      Pain Control - Patient with post-operative and neuropathic pain. Continue PRN Tylenol, Oxycodone, and scheduled Gabapentin.   -Gabapentin 800 mg  TID. Lidocaine patch bilaterally to low back  -Tizanidine TID for muscle spasms    Tremor - New tremor on 6/7/21, could be Gabapentin although home dose. Has a history of hypokalemia. Will check CMP     DM2 - Patient on SSI on transfer. Will monitor. Previously on Glipizide and Januvia. Recent changes with PCP were causing higher A1c.  A1c 9.2 on admission.   -Will restart Glipizide 2.5 mg daily. Improved on diabetic diet and Glipizide, will monitor. Highs into 170s. Increase Glipizide 5mg, requriring SSI. Start Januvia.      HTN - Patient on Spironolactone and Lasix on transfer.   -SBP labile, down into 80s. 1/2 tab Spironolactone      COPD - Patient on PRN Albuterol. On 2L on transfer. RT to consult     HLD - Patient on Atorvastatin 20 mg daily.      Anemia - Check AM CBC. 10.6, will continue to monitor, Check Fe panel at next check    -Iron panel normal. Elevated RDW, check B12/folate - both within normal limits     Hypothyroidism - On 75 mcg Levothyroxine, check TSH - wnl     GERD - Patient on Prilosec QHS    GI Ppx - Patient with incontinence on 6/7, discontinue bowel medications. Low threshold for imaging of back as recent 2 stage.      RLS - On Requip QHS.     Depression - Patient on Effexor  mg and 25 mg short acting     DVT ppx - Patient on Lovenox on transfer.     Total time:  26 minutes.  I spent greater than 50% of the time for patient care, counseling, and coordination on this date, including unit/floor time, and face-to-face time with the patient as per interval events and assessment and plan above. Topics discussed included hyperglycemia, start Januvia, improved muscles spasms, and normal B12/folate.      Deedee Ivy M.D.

## 2021-06-08 NOTE — FLOWSHEET NOTE
06/08/21 0810   Events/Summary/Plan   Events/Summary/Plan Symbicort given, 02 spot check on RA, CPAP filled with sterile water   Vital Signs   Pulse 79   Respiration 18   Pulse Oximetry 93 %   $ Pulse Oximetry (Spot Check) Yes   Respiratory Assessment   Level of Consciousness Alert   Chest Exam   Work Of Breathing / Effort Within Normal Limits   Oxygen   O2 Delivery Device None - Room Air   Non-Invasive Ventilation [facility]   Nocturnal CPAP or BIPAP CPAP - [facility]   Settings (If Known) auto 8-14   FiO2 or LPM 3

## 2021-06-08 NOTE — THERAPY
06/08/21 0959   Precautions   Precautions Fall Risk;TLSO (Thoracolumbosacral orthosis);Spinal / Back Precautions    Comments TLSO OOB, O2 3 L wean   Pain 0 - 10 Group   Therapist Pain Assessment Prior to Activity;During Activity   Cognition    Level of Consciousness Alert   New Learning Impaired   Attention Impaired   Comments Impulsive   Gait Functional Level of Assist    Gait Level Of Assist Contact Guard Assist   Assistive Device Front Wheel Walker   Distance (Feet) 110   # of Times Distance was Traveled 3   Deviation Bradykinetic;Shuffled Gait   PT Total Time Spent   PT Individual Total Time Spent (Mins) 30   PT Charge Group   PT Gait Training 1   PT Therapeutic Activities 1   Physical Therapy   Daily Treatment     Patient Name: Valentina Lu  Age:  65 y.o., Sex:  female  Medical Record #: [id number]  Today's Date: 6/8/2021     Precautions  Precautions: Fall Risk, TLSO (Thoracolumbosacral orthosis), Spinal / Back Precautions   Comments: TLSO OOB, O2 3 L wean    Subjective    The patient agreed to PT.     Objective    Upon entering the room, PT found patient sitting in her wheelchair looking through her belongings. She did not have her TLSO on. It was on the chair next to the bed. PT assisted the patient with the TLSO and afterward she participated in gait training with a fww and tolerated 110 FT x3.    Assessment    The patient participated in gait training with a walker and was able to tolerate 110 FT x3. She is impulsive with impaired attention to task and is a fall risk. She would benefit from additional education regarding wearing the TLSO when she is out of bed.  Strengths: Effective communication skills, Independent prior level of function, Motivated for self care and independence, Pleasant and cooperative, Supportive family, Willingly participates in therapeutic activities  Barriers: Fatigue, Generalized weakness, Home accessibility, Impaired balance, Impaired activity tolerance, Limited  mobility, Pain, Pain poorly managed    Plan    Safety education, attention to task, TLSO education, therapeutic exercise, gait training as tolerated    Passport items to be completed:  Passport items to be completed:  Get in/out of bed safely, in/out of a vehicle, safely use mobility device, walk or wheel around home/community, navigate up and down stairs, show how to get up/down from the ground, ensure home is accessible, demonstrate HEP, complete caregiver training    Physical Therapy Problems (Active)     Problem: Mobility     Dates: Start: 06/02/21       Goal: STG-Within one week, patient will ambulate household distance     Dates: Start: 06/02/21       Description: AMB x 50 feet with FWW and SBA          Problem: Mobility Transfers     Dates: Start: 06/02/21       Goal: STG-Within one week, patient will perform bed mobility     Dates: Start: 06/02/21       Description: Supine </> sit with min A using bed rail       Goal: STG-Within one week, patient will transfer bed to chair     Dates: Start: 06/02/21       Description: SPT with FWW and SBA          Problem: PT-Long Term Goals     Dates: Start: 06/02/21       Goal: LTG-By discharge, patient will ambulate     Dates: Start: 06/02/21       Description: AMB x 150 feet with FWW, mod I        Goal: LTG-By discharge, patient will transfer one surface to another     Dates: Start: 06/02/21       Description: SPT with FWW, mod I        Goal: LTG-By discharge, patient will transfer in/out of a car     Dates: Start: 06/02/21       Description: Car transfer with FWW with SPV       Goal: LTG-By discharge, patient will     Dates: Start: 06/02/21       Description: Up/down 2 steps with one HR and CGA

## 2021-06-08 NOTE — PROGRESS NOTES
Received shift report and assumed care of patient.  Patient awake, calm and stable.  call light within reach. Medicated with Oxycodone 10 mg for C/O 8/10 lower back and hip with relief.

## 2021-06-08 NOTE — THERAPY
Physical Therapy   Daily Treatment     Patient Name: Valentina Lu  Age:  65 y.o., Sex:  female  Medical Record #: [id number]  Today's Date: 6/8/2021     Precautions  Precautions: Fall Risk, TLSO (Thoracolumbosacral orthosis), Spinal / Back Precautions   Comments: TLSO on OOB    Subjective    Pt in bed resting, agreeable to PT.      Objective       06/08/21 1431   Precautions   Precautions Fall Risk;TLSO (Thoracolumbosacral orthosis);Spinal / Back Precautions    Comments TLSO on OOB   Transfer Functional Level of Assist   Bed, Chair, Wheelchair Transfer Stand by Assist   Bed Chair Wheelchair Transfer Description Increased time;Set-up of equipment;Supervision for safety  (bed > WC > EOB)   Neuro-Muscular Treatments   Neuro-Muscular Treatments Postural Changes;Verbal Cuing;Weight Shift Right;Weight Shift Left   Comments Ladderball activity for 11 min in // bars with LUE support and throwing with RUE, spv throughout for standing tolerance and balance    Interdisciplinary Plan of Care Collaboration   Patient Position at End of Therapy Seated;Tray Table within Reach;Call Light within Reach;Edge of Bed   PT Total Time Spent   PT Individual Total Time Spent (Mins) 30   PT Charge Group   PT Neuromuscular Re-Education / Balance 1   PT Therapeutic Activities 1       Assessment    Pt cooperative with session. Good standing tolerance with ladderball activity.     Strengths: Effective communication skills, Independent prior level of function, Motivated for self care and independence, Pleasant and cooperative, Supportive family, Willingly participates in therapeutic activities  Barriers: Fatigue, Generalized weakness, Home accessibility, Impaired balance, Impaired activity tolerance, Limited mobility, Pain, Pain poorly managed    Plan    Gait with FWW, consider trial of 4WW, LE strength and endurance, pain management PRN, standing tolerance and balance.     Physical Therapy Problems (Active)     Problem: Mobility     Dates:  Start: 06/02/21       Goal: STG-Within one week, patient will ambulate household distance     Dates: Start: 06/02/21       Description: AMB x 50 feet with FWW and SBA          Problem: Mobility Transfers     Dates: Start: 06/02/21       Goal: STG-Within one week, patient will perform bed mobility     Dates: Start: 06/02/21       Description: Supine </> sit with min A using bed rail       Goal: STG-Within one week, patient will transfer bed to chair     Dates: Start: 06/02/21       Description: SPT with FWW and SBA          Problem: PT-Long Term Goals     Dates: Start: 06/02/21       Goal: LTG-By discharge, patient will ambulate     Dates: Start: 06/02/21       Description: AMB x 150 feet with FWW, mod I        Goal: LTG-By discharge, patient will transfer one surface to another     Dates: Start: 06/02/21       Description: SPT with FWW, mod I        Goal: LTG-By discharge, patient will transfer in/out of a car     Dates: Start: 06/02/21       Description: Car transfer with FWW with SPV       Goal: LTG-By discharge, patient will     Dates: Start: 06/02/21       Description: Up/down 2 steps with one HR and CGA

## 2021-06-08 NOTE — CARE PLAN
"The patient is Stable - Low risk of patient condition declining or worsening    Shift Goals  Clinical Goals: pain control   Patient Goals: pain control     Progress made toward(s) clinical / shift goals:        Problem: Bowel Elimination  Goal: Patient will participate in bowel management program  Outcome: Progressing  Note: Patient had one small pasty bowel movement. Offered PRN senna-docusate or Miralax, refused during NOC shift,  pt states \" not this time, I would take it in the morning \".        "

## 2021-06-08 NOTE — CARE PLAN
"  Problem: Skin Integrity  Goal: Skin integrity is maintained or improved  Note: No new skin problems with patient. Dressing changed to her back surgical site. Site improving. Patient states the site is \"itching.\" Will continue to monitor.      Problem: Diabetes Management  Goal: Patient's ability to maintain appropriate glucose levels will be maintained or improve  Note: Blood sugars today were 171 at breakfast which required 2 units of insulin, and blood sugar was 112 at lunch; no insulin required. Will continue to monitor    The patient is Stable - Low risk of patient condition declining or worsening      "

## 2021-06-08 NOTE — THERAPY
Physical Therapy   Daily Treatment     Patient Name: Valentina Lu  Age:  65 y.o., Sex:  female  Medical Record #: [id number]  Today's Date: 6/8/2021     Precautions  Precautions: Fall Risk, TLSO (Thoracolumbosacral orthosis), Spinal / Back Precautions   Comments: TLSO OOB    Subjective    Pt seated in hallway near room, agreeable to PT.      Objective       06/08/21 1031   Precautions   Precautions Fall Risk;TLSO (Thoracolumbosacral orthosis);Spinal / Back Precautions    Comments TLSO OOB   Gait Functional Level of Assist    Gait Level Of Assist Stand by Assist   Assistive Device 4 Wheel Walker   Distance (Feet)   (250 ft indoors, 400 ft outdoors, and 50 ft in/out )   Deviation Bradykinetic;Antalgic;Decreased Heel Strike;Decreased Toe Off   Sitting Lower Body Exercises   Nustep Resistance Level 3  (10 min, 0.17 miles)   Interdisciplinary Plan of Care Collaboration   IDT Collaboration with  Certified Nursing Assistant;Physician   Patient Position at End of Therapy Seated;Edge of Bed;Call Light within Reach   Collaboration Comments Dr Ivy spoke with pt regarding CLOF and POC during PT session. CNA with pt after PT session    PT Total Time Spent   PT Individual Total Time Spent (Mins) 60   PT Charge Group   PT Gait Training 1   PT Therapeutic Exercise 1   PT Therapeutic Activities 2     Pt education/ discussion regarding the following topics: DC plan, POC, safety with car transfers, home set-up, FWW vs 4WW.     Assessment    Pt participatory in session. Continues to be limited by decreased attention and distractibility with conversation.     Strengths: Effective communication skills, Independent prior level of function, Motivated for self care and independence, Pleasant and cooperative, Supportive family, Willingly participates in therapeutic activities  Barriers: Fatigue, Generalized weakness, Home accessibility, Impaired balance, Impaired activity tolerance, Limited mobility, Pain, Pain poorly  managed    Plan    Gait with FWW, consider trial of 4WW, LE strength and endurance, pain management PRN, standing t    Physical Therapy Problems (Active)     Problem: Mobility     Dates: Start: 06/02/21       Goal: STG-Within one week, patient will ambulate household distance     Dates: Start: 06/02/21       Description: AMB x 50 feet with FWW and SBA          Problem: Mobility Transfers     Dates: Start: 06/02/21       Goal: STG-Within one week, patient will perform bed mobility     Dates: Start: 06/02/21       Description: Supine </> sit with min A using bed rail       Goal: STG-Within one week, patient will transfer bed to chair     Dates: Start: 06/02/21       Description: SPT with FWW and SBA          Problem: PT-Long Term Goals     Dates: Start: 06/02/21       Goal: LTG-By discharge, patient will ambulate     Dates: Start: 06/02/21       Description: AMB x 150 feet with FWW, mod I        Goal: LTG-By discharge, patient will transfer one surface to another     Dates: Start: 06/02/21       Description: SPT with FWW, mod I        Goal: LTG-By discharge, patient will transfer in/out of a car     Dates: Start: 06/02/21       Description: Car transfer with FWW with SPV       Goal: LTG-By discharge, patient will     Dates: Start: 06/02/21       Description: Up/down 2 steps with one HR and CGA

## 2021-06-09 LAB
GLUCOSE BLD-MCNC: 227 MG/DL (ref 65–99)
GLUCOSE BLD-MCNC: 91 MG/DL (ref 65–99)
GLUCOSE BLD-MCNC: 99 MG/DL (ref 65–99)

## 2021-06-09 PROCEDURE — 82962 GLUCOSE BLOOD TEST: CPT | Mod: 91

## 2021-06-09 PROCEDURE — 97116 GAIT TRAINING THERAPY: CPT

## 2021-06-09 PROCEDURE — A9270 NON-COVERED ITEM OR SERVICE: HCPCS | Performed by: PHYSICAL MEDICINE & REHABILITATION

## 2021-06-09 PROCEDURE — 97112 NEUROMUSCULAR REEDUCATION: CPT

## 2021-06-09 PROCEDURE — 99232 SBSQ HOSP IP/OBS MODERATE 35: CPT | Performed by: PHYSICAL MEDICINE & REHABILITATION

## 2021-06-09 PROCEDURE — 700102 HCHG RX REV CODE 250 W/ 637 OVERRIDE(OP): Performed by: PHYSICAL MEDICINE & REHABILITATION

## 2021-06-09 PROCEDURE — 97110 THERAPEUTIC EXERCISES: CPT

## 2021-06-09 PROCEDURE — 94640 AIRWAY INHALATION TREATMENT: CPT

## 2021-06-09 PROCEDURE — 94760 N-INVAS EAR/PLS OXIMETRY 1: CPT

## 2021-06-09 PROCEDURE — 97530 THERAPEUTIC ACTIVITIES: CPT

## 2021-06-09 PROCEDURE — 700101 HCHG RX REV CODE 250: Performed by: PHYSICAL MEDICINE & REHABILITATION

## 2021-06-09 PROCEDURE — 97535 SELF CARE MNGMENT TRAINING: CPT

## 2021-06-09 PROCEDURE — 770010 HCHG ROOM/CARE - REHAB SEMI PRIVAT*

## 2021-06-09 RX ORDER — HYDROCODONE BITARTRATE AND ACETAMINOPHEN 10; 325 MG/1; MG/1
1 TABLET ORAL
Status: DISCONTINUED | OUTPATIENT
Start: 2021-06-09 | End: 2021-06-12 | Stop reason: HOSPADM

## 2021-06-09 RX ADMIN — GABAPENTIN 800 MG: 400 CAPSULE ORAL at 20:39

## 2021-06-09 RX ADMIN — TIZANIDINE 4 MG: 4 TABLET ORAL at 07:32

## 2021-06-09 RX ADMIN — LIDOCAINE 2 PATCH: 50 PATCH TOPICAL at 07:34

## 2021-06-09 RX ADMIN — INSULIN HUMAN 3 UNITS: 100 INJECTION, SOLUTION PARENTERAL at 11:00

## 2021-06-09 RX ADMIN — CALCIUM CARBONATE (ANTACID) CHEW TAB 500 MG 500 MG: 500 CHEW TAB at 20:40

## 2021-06-09 RX ADMIN — GLIPIZIDE 5 MG: 2.5 TABLET, FILM COATED, EXTENDED RELEASE ORAL at 07:32

## 2021-06-09 RX ADMIN — CHOLECALCIFEROL TAB 25 MCG (1000 UNIT) 5000 UNITS: 25 TAB at 07:32

## 2021-06-09 RX ADMIN — HYDROCODONE BITARTRATE AND ACETAMINOPHEN 1 TABLET: 10; 325 TABLET ORAL at 14:24

## 2021-06-09 RX ADMIN — ROPINIROLE HYDROCHLORIDE 8 MG: 1 TABLET, FILM COATED ORAL at 20:38

## 2021-06-09 RX ADMIN — FUROSEMIDE 40 MG: 40 TABLET ORAL at 07:32

## 2021-06-09 RX ADMIN — VENLAFAXINE HYDROCHLORIDE 150 MG: 75 CAPSULE, EXTENDED RELEASE ORAL at 20:37

## 2021-06-09 RX ADMIN — OMEPRAZOLE 20 MG: 20 CAPSULE, DELAYED RELEASE ORAL at 20:37

## 2021-06-09 RX ADMIN — BUDESONIDE AND FORMOTEROL FUMARATE DIHYDRATE 2 PUFF: 160; 4.5 AEROSOL RESPIRATORY (INHALATION) at 21:48

## 2021-06-09 RX ADMIN — LEVOTHYROXINE SODIUM 75 MCG: 75 TABLET ORAL at 20:40

## 2021-06-09 RX ADMIN — SPIRONOLACTONE 12.5 MG: 25 TABLET, FILM COATED ORAL at 20:39

## 2021-06-09 RX ADMIN — VENLAFAXINE 25 MG: 25 TABLET ORAL at 20:40

## 2021-06-09 RX ADMIN — OXYCODONE HYDROCHLORIDE 10 MG: 10 TABLET ORAL at 07:32

## 2021-06-09 RX ADMIN — TEMAZEPAM 30 MG: 15 CAPSULE ORAL at 21:49

## 2021-06-09 RX ADMIN — HYDROCODONE BITARTRATE AND ACETAMINOPHEN 1 TABLET: 10; 325 TABLET ORAL at 10:57

## 2021-06-09 RX ADMIN — GABAPENTIN 800 MG: 400 CAPSULE ORAL at 07:32

## 2021-06-09 RX ADMIN — SITAGLIPTIN 50 MG: 50 TABLET, FILM COATED ORAL at 10:37

## 2021-06-09 RX ADMIN — CALCIUM CARBONATE (ANTACID) CHEW TAB 500 MG 500 MG: 500 CHEW TAB at 07:32

## 2021-06-09 RX ADMIN — BUDESONIDE AND FORMOTEROL FUMARATE DIHYDRATE 2 PUFF: 160; 4.5 AEROSOL RESPIRATORY (INHALATION) at 07:58

## 2021-06-09 RX ADMIN — TIZANIDINE 4 MG: 4 TABLET ORAL at 14:24

## 2021-06-09 RX ADMIN — HYDROCODONE BITARTRATE AND ACETAMINOPHEN 1 TABLET: 10; 325 TABLET ORAL at 20:36

## 2021-06-09 RX ADMIN — POTASSIUM CHLORIDE 10 MEQ: 1500 TABLET, EXTENDED RELEASE ORAL at 20:40

## 2021-06-09 RX ADMIN — ATORVASTATIN CALCIUM 20 MG: 10 TABLET, FILM COATED ORAL at 20:39

## 2021-06-09 RX ADMIN — TIZANIDINE 4 MG: 4 TABLET ORAL at 20:37

## 2021-06-09 RX ADMIN — HYDROCODONE BITARTRATE AND ACETAMINOPHEN 1 TABLET: 10; 325 TABLET ORAL at 17:21

## 2021-06-09 RX ADMIN — GABAPENTIN 800 MG: 400 CAPSULE ORAL at 14:24

## 2021-06-09 ASSESSMENT — GAIT ASSESSMENTS
GAIT LEVEL OF ASSIST: SUPERVISED
ASSISTIVE DEVICE: 4 WHEEL WALKER
DISTANCE (FEET): 1000

## 2021-06-09 ASSESSMENT — ACTIVITIES OF DAILY LIVING (ADL)
BED_CHAIR_WHEELCHAIR_TRANSFER_DESCRIPTION: ADAPTIVE EQUIPMENT;INCREASED TIME
TUB_SHOWER_TRANSFER_DESCRIPTION: SHOWER BENCH
BED_CHAIR_WHEELCHAIR_TRANSFER_DESCRIPTION: ADAPTIVE EQUIPMENT;INCREASED TIME;SET-UP OF EQUIPMENT;SUPERVISION FOR SAFETY

## 2021-06-09 NOTE — THERAPY
"Occupational Therapy  Daily Treatment     Patient Name: Valentina Lu  Age:  65 y.o., Sex:  female  Medical Record #: [id number]  Today's Date: 6/9/2021     Precautions  Precautions: Fall Risk, TLSO (Thoracolumbosacral orthosis), Spinal / Back Precautions   Comments: TLSO on OOB    Safety   ADL Safety : Requires Supervision for Safety    Subjective    \"I'll just holler for help. My  will be right next to me and helping me.\"     Objective       06/09/21 1331   Functional Level of Assist   Bed, Chair, Wheelchair Transfer Stand by Assist   Bed Chair Wheelchair Transfer Description Non-hospital bed  (bed rail)   Tub / Shower Transfers Stand by Assist   Tub Shower Transfer Description Shower bench  (attempted shower chair, but pt unable. Successful w/ bench)   IADL Treatments   Kitchen Mobility Education Pt located and retrieved cup from high cabinet to fill with water. Pt walked 10 steps while sliding cup on counter. Emptied cup and placed in drying rack. Pt able to retrieve and return item from fridge. Pt trialed retrieving item from low cabinet and placing in/out of oven. Pt unable to remove from Oven. Reported increased discomfort with lifting object which was far below her spinal precautions limit. Reports she will ask spouse to help her at home. Pt also able to identify daughter and granddaughter as people who could assist her with retrieving items. Education on moving commonly used items to easier to reach locations.   Balance   Comments walked to/from session with FWW. no 4WW available for practice.   Interdisciplinary Plan of Care Collaboration   Patient Position at End of Therapy Call Light within Reach;Tray Table within Reach;Phone within Reach;Edge of Bed;Bed Alarm On   OT Total Time Spent   OT Individual Total Time Spent (Mins) 30   OT Charge Group   OT Self Care / ADL 1   OT Therapy Activity 1       Assessment    Pt needing cues throughout kitchen mobility task for safe walker mgmt and for " following her spinal precautions. Pt needs to get a tub transfer bench for home use. Pt reports she has a shower chair, but was not able to demonstrate ability to step over tub wall.    Strengths: Able to follow instructions, Effective communication skills, Making steady progress towards goals, Manages pain appropriately, Motivated for self care and independence, Pleasant and cooperative, Willingly participates in therapeutic activities  Barriers: Decreased endurance, Fatigue, Generalized weakness, Impaired activity tolerance    Plan    Showers with nursing, kitchen mobility, cooking task, laundry, HEP, family training     Passport items to be completed:  prepare a simple meal, participate in household tasks, adapt home for safety needs, demonstrate home exercise program, complete caregiver training        Occupational Therapy Goals (Active)     Problem: Dressing     Dates: Start: 06/02/21       Goal: STG-Within one week, patient will dress LB with Min A using DME/AE as needed     Dates: Start: 06/02/21             Problem: Functional Transfers     Dates: Start: 06/02/21       Goal: STG-Within one week, patient will transfer to toilet with SBA at w/c level     Dates: Start: 06/02/21             Problem: OT Long Term Goals     Dates: Start: 06/02/21       Goal: LTG-By discharge, patient will complete basic self care tasks at Mod I using DME/AE as needed     Dates: Start: 06/02/21          Goal: LTG-By discharge, patient will perform bathroom transfers at Mod I using DME/AE as needed     Dates: Start: 06/02/21             Problem: Toileting     Dates: Start: 06/02/21       Goal: STG-Within one week, patient will complete toileting tasks with SBA using DME/AE as needed     Dates: Start: 06/02/21

## 2021-06-09 NOTE — PROGRESS NOTES
"Rehab Progress Note     Encounter Date: 6/9/2021    CC: Lumbar surgery, decreased mobility    Interval Events (Subjective)  Patient sitting up in room. She reports she thinks that part of her pain control issue is that Oxycodone does not work as well for her as hydrocodone. Discussed will switch to Norco.  Otherwise she has not had return of severe muscle spasm in her hip. Blood sugars are doing well today. She has increase ambulation, will discontinue Lovenox.     IDT Team Meeting 6/3/2021  DC/Disposition:  6/12/21     Objective:  VITAL SIGNS: /65   Pulse 69   Temp 36.2 °C (97.2 °F) (Oral)   Resp 18   Ht 1.651 m (5' 5\")   Wt 87.5 kg (192 lb 14.4 oz)   LMP  (LMP Unknown)   SpO2 94%   BMI 32.10 kg/m²   Gen: NAD  Psych: Mood and affect appropriate  CV: RRR, no edema  Resp: CTAB, no upper airway sounds  Abd: NTND  Neuro: AOx4, walking with FWW      Recent Results (from the past 72 hour(s))   POCT glucose device results    Collection Time: 06/06/21  5:11 PM   Result Value Ref Range    Glucose - Accu-Ck 150 (H) 65 - 99 mg/dL   POCT glucose device results    Collection Time: 06/06/21  8:24 PM   Result Value Ref Range    Glucose - Accu-Ck 186 (H) 65 - 99 mg/dL   POCT glucose device results    Collection Time: 06/07/21  7:28 AM   Result Value Ref Range    Glucose - Accu-Ck 159 (H) 65 - 99 mg/dL   POCT glucose device results    Collection Time: 06/07/21 11:41 AM   Result Value Ref Range    Glucose - Accu-Ck 163 (H) 65 - 99 mg/dL   POCT glucose device results    Collection Time: 06/07/21  5:11 PM   Result Value Ref Range    Glucose - Accu-Ck 148 (H) 65 - 99 mg/dL   POCT glucose device results    Collection Time: 06/07/21  8:21 PM   Result Value Ref Range    Glucose - Accu-Ck 179 (H) 65 - 99 mg/dL   VITAMIN B12    Collection Time: 06/08/21  5:42 AM   Result Value Ref Range    Vitamin B12 -True Cobalamin 943 (H) 211 - 911 pg/mL   FOLATE    Collection Time: 06/08/21  5:42 AM   Result Value Ref Range    Folate " -Folic Acid 13.0 >4.0 ng/mL   CBC WITH DIFFERENTIAL    Collection Time: 06/08/21  5:42 AM   Result Value Ref Range    WBC 9.7 4.8 - 10.8 K/uL    RBC 3.44 (L) 4.20 - 5.40 M/uL    Hemoglobin 9.9 (L) 12.0 - 16.0 g/dL    Hematocrit 32.9 (L) 37.0 - 47.0 %    MCV 95.6 81.4 - 97.8 fL    MCH 28.8 27.0 - 33.0 pg    MCHC 30.1 (L) 33.6 - 35.0 g/dL    RDW 54.0 (H) 35.9 - 50.0 fL    Platelet Count 421 164 - 446 K/uL    MPV 9.5 9.0 - 12.9 fL    Neutrophils-Polys 67.60 44.00 - 72.00 %    Lymphocytes 19.90 (L) 22.00 - 41.00 %    Monocytes 7.30 0.00 - 13.40 %    Eosinophils 3.00 0.00 - 6.90 %    Basophils 1.00 0.00 - 1.80 %    Immature Granulocytes 1.20 (H) 0.00 - 0.90 %    Nucleated RBC 0.00 /100 WBC    Neutrophils (Absolute) 6.52 2.00 - 7.15 K/uL    Lymphs (Absolute) 1.92 1.00 - 4.80 K/uL    Monos (Absolute) 0.71 0.00 - 0.85 K/uL    Eos (Absolute) 0.29 0.00 - 0.51 K/uL    Baso (Absolute) 0.10 0.00 - 0.12 K/uL    Immature Granulocytes (abs) 0.12 (H) 0.00 - 0.11 K/uL    NRBC (Absolute) 0.00 K/uL   Basic Metabolic Panel    Collection Time: 06/08/21  5:42 AM   Result Value Ref Range    Sodium 136 135 - 145 mmol/L    Potassium 4.5 3.6 - 5.5 mmol/L    Chloride 99 96 - 112 mmol/L    Co2 27 20 - 33 mmol/L    Glucose 115 (H) 65 - 99 mg/dL    Bun 16 8 - 22 mg/dL    Creatinine 0.80 0.50 - 1.40 mg/dL    Calcium 9.2 8.5 - 10.5 mg/dL    Anion Gap 10.0 7.0 - 16.0   ESTIMATED GFR    Collection Time: 06/08/21  5:42 AM   Result Value Ref Range    GFR If African American >60 >60 mL/min/1.73 m 2    GFR If Non African American >60 >60 mL/min/1.73 m 2   POCT glucose device results    Collection Time: 06/08/21  7:27 AM   Result Value Ref Range    Glucose - Accu-Ck 171 (H) 65 - 99 mg/dL   POCT glucose device results    Collection Time: 06/08/21 11:42 AM   Result Value Ref Range    Glucose - Accu-Ck 112 (H) 65 - 99 mg/dL   POCT glucose device results    Collection Time: 06/08/21  5:34 PM   Result Value Ref Range    Glucose - Accu-Ck 137 (H) 65 - 99 mg/dL    POCT glucose device results    Collection Time: 06/08/21  9:02 PM   Result Value Ref Range    Glucose - Accu-Ck 131 (H) 65 - 99 mg/dL   POCT glucose device results    Collection Time: 06/09/21  7:31 AM   Result Value Ref Range    Glucose - Accu-Ck 91 65 - 99 mg/dL   POCT glucose device results    Collection Time: 06/09/21 10:59 AM   Result Value Ref Range    Glucose - Accu-Ck 227 (H) 65 - 99 mg/dL       Current Facility-Administered Medications   Medication Frequency   • HYDROcodone/acetaminophen (NORCO)  MG per tablet 1 tablet Q3HRS PRN   • SITagliptin (JANUVIA) tablet 50 mg DAILY   • tizanidine (ZANAFLEX) tablet 4 mg TID   • senna-docusate (PERICOLACE or SENOKOT S) 8.6-50 MG per tablet 2 tablet BID PRN    And   • polyethylene glycol/lytes (MIRALAX) PACKET 1 Packet QDAY PRN    And   • magnesium hydroxide (MILK OF MAGNESIA) suspension 30 mL QDAY PRN    And   • bisacodyl (DULCOLAX) suppository 10 mg QDAY PRN   • glipiZIDE SR (GLUCOTROL) tablet 5 mg QAM AC   • lidocaine (LIDODERM) 5 % 2 Patch DAILY   • spironolactone (ALDACTONE) tablet 12.5 mg Q EVENING   • diphenhydrAMINE-zinc acetate (BENADRYL ITCH) topical cream TID PRN   • diphenhydrAMINE (BENADRYL) tablet/capsule 25 mg Q6HRS PRN   • budesonide-formoterol (SYMBICORT) 160-4.5 MCG/ACT inhaler 2 Puff BID (RT)   • Respiratory Therapy Consult Continuous RT   • hydrALAZINE (APRESOLINE) tablet 25 mg Q8HRS PRN   • acetaminophen (Tylenol) tablet 650 mg Q4HRS PRN   • artificial tears ophthalmic solution 1 Drop PRN   • benzocaine-menthol (CEPACOL) lozenge 1 Lozenge Q2HRS PRN   • mag hydrox-al hydrox-simeth (MAALOX PLUS ES or MYLANTA DS) suspension 20 mL Q2HRS PRN   • ondansetron (ZOFRAN ODT) dispertab 4 mg 4X/DAY PRN    Or   • ondansetron (ZOFRAN) syringe/vial injection 4 mg 4X/DAY PRN   • traZODone (DESYREL) tablet 50 mg QHS PRN   • sodium chloride (OCEAN) 0.65 % nasal spray 2 Spray PRN   • insulin regular (HumuLIN R,NovoLIN R) injection 4X/DAY ACHS    And   •  glucose 4 g chewable tablet 16 g Q15 MIN PRN    And   • dextrose 50% (D50W) injection 50 mL Q15 MIN PRN   • enoxaparin (LOVENOX) inj 40 mg DAILY AT 1800   • calcium carbonate (TUMS) chewable tab 500 mg BID   • vitamin D (cholecalciferol) tablet 5,000 Units DAILY   • albuterol inhaler 2 Puff Q6HRS PRN   • atorvastatin (LIPITOR) tablet 20 mg QHS   • furosemide (LASIX) tablet 40 mg DAILY   • gabapentin (NEURONTIN) capsule 800 mg TID   • levothyroxine (SYNTHROID) tablet 75 mcg QHS   • omeprazole (PRILOSEC) capsule 20 mg QHS   • ROPINIRole (REQUIP) tablet 8 mg QHS   • temazepam (Restoril) capsule 30 mg HS PRN   • venlafaxine (EFFEXOR) tablet 25 mg Q EVENING   • venlafaxine XR (EFFEXOR XR) capsule 150 mg Q EVENING   • potassium chloride SA (Kdur) tablet 10 mEq QHS       Orders Placed This Encounter   Procedures   • Diet Order Diet: Consistent CHO (Diabetic)     Standing Status:   Standing     Number of Occurrences:   1     Order Specific Question:   Diet:     Answer:   Consistent CHO (Diabetic) [4]       Assessment:  Active Hospital Problems    Diagnosis    • *S/P laminectomy with spinal fusion    • Thyroid disorder    • Dysphagia    • Diabetic polyneuropathy (HCC)    • Restless legs syndrome (RLS)    • Type 2 diabetes mellitus, without long-term current use of insulin (HCC)    • COPD (chronic obstructive pulmonary disease) (HCC)    • Tremor    • HTN (hypertension)        Medical Decision Making and Plan:  Lumbar retrospondylolisthesis - Patient with significant back pain caused by retrospondy of her L2 on her previous back surgery. Patient is now s/p L2-L3 XLIF and then L2-S1 redo laminectomies with Dr. Lechuga on 5/25/21 and 5/28/21  -PT and OT for mobility and ADLs. TLSO - poorly fitting, may need vendor to reassess.  -Follow-up with NSG.      Pain Control - Patient with post-operative and neuropathic pain. Continue PRN Tylenol, Oxycodone, and scheduled Gabapentin.   -Gabapentin 800 mg TID. Lidocaine patch bilaterally to  low back. Switch Oxycodone to Norco    -Tizanidine TID for muscle spasms    Tremor - New tremor on 6/7/21, could be Gabapentin although home dose. Has a history of hypokalemia. Will check CMP - wnl     DM2 - Patient on SSI on transfer. Will monitor. Previously on Glipizide and Januvia. Recent changes with PCP were causing higher A1c.  A1c 9.2 on admission.   -Will restart Glipizide 2.5 mg daily. Improved on diabetic diet and Glipizide, will monitor. Highs into 170s. Increase Glipizide 5mg, requriring SSI. Start Januvia, improved.       HTN - Patient on Spironolactone and Lasix on transfer.   -SBP labile, down into 80s. 1/2 tab Spironolactone      COPD - Patient on PRN Albuterol. On 2L on transfer. RT to consult     HLD - Patient on Atorvastatin 20 mg daily.      Anemia - Check AM CBC. 10.6, will continue to monitor, Check Fe panel at next check    -Iron panel normal. Elevated RDW, check B12/folate - both within normal limits     Hypothyroidism - On 75 mcg Levothyroxine, check TSH - wnl     GERD - Patient on Prilosec QHS    GI Ppx - Patient with incontinence on 6/7, discontinue bowel medications. Low threshold for imaging of back as recent 2 stage.      RLS - On Requip QHS.     Depression - Patient on Effexor  mg and 25 mg short acting     DVT ppx - Patient on Lovenox on transfer.     Total time:  26 minutes.  I spent greater than 50% of the time for patient care, counseling, and coordination on this date, including unit/floor time, and face-to-face time with the patient as per interval events and assessment and plan above. Topics discussed included pain control, change to Norco, improved blood sugars on Januvia, and improved muscle spasms on Tizanidine.      Deedee Ivy M.D.

## 2021-06-09 NOTE — THERAPY
"Occupational Therapy  Daily Treatment     Patient Name: Valentina Lu  Age:  65 y.o., Sex:  female  Medical Record #: [id number]  Today's Date: 6/9/2021     Precautions  Precautions: Fall Risk, TLSO (Thoracolumbosacral orthosis), Spinal / Back Precautions   Comments: TLSO on OOB    Safety   ADL Safety : Requires Supervision for Safety    Subjective    \"I really need to shower. I typically shower 2x a day.\"    \"Thank you for letting me shower. It is important to me.\"     Objective       06/09/21 0831   Safety    ADL Safety  Requires Supervision for Safety   Cognition    Level of Consciousness Alert   Attention Impaired   Functional Level of Assist   Eating Independent   Grooming Modified Independent;Seated   Bathing Supervision   Bathing Description Grab bar;Hand held shower;Tub bench;Increased time   Upper Body Dressing Modified Independent   Upper Body Dressing Description Increased time   Lower Body Dressing Modified Independent   Lower Body Dressing Description Grab bar;Increased time;Set-up of equipment;Supervision for safety   Toileting Supervision   Bed, Chair, Wheelchair Transfer Stand by Assist   Toilet Transfers Stand by Assist   Tub / Shower Transfers Supervised   Interdisciplinary Plan of Care Collaboration   IDT Collaboration with  Physical Therapist;Family / Caregiver   Patient Position at End of Therapy Edge of Bed;Bed Alarm On;Call Light within Reach;Tray Table within Reach;Phone within Reach;Family / Friend in Room   Collaboration Comments Re: CLOF, 4WW, and passport to independence   OT Total Time Spent   OT Individual Total Time Spent (Mins) 60   OT Charge Group   OT Self Care / ADL 4       Assessment    Pt at SBA-mod I levels for ADLs. Recommending pt do showers with nursing for remaining days so that OT can focus on iADLs that pt will be completing at home. Pt with difficulty following through with spinal precautions, but is able to recite them without prompts.    Strengths: Able to follow " instructions, Effective communication skills, Making steady progress towards goals, Manages pain appropriately, Motivated for self care and independence, Pleasant and cooperative, Willingly participates in therapeutic activities  Barriers: Decreased endurance, Fatigue, Generalized weakness, Impaired activity tolerance    Plan    Showers with nursing, kitchen mobility, cooking task, laundry, HEP, family training    Passport items to be completed:  prepare a simple meal, participate in household tasks, adapt home for safety needs, demonstrate home exercise program, complete caregiver training     Occupational Therapy Goals (Active)     Problem: Dressing     Dates: Start: 06/02/21       Goal: STG-Within one week, patient will dress LB with Min A using DME/AE as needed     Dates: Start: 06/02/21             Problem: Functional Transfers     Dates: Start: 06/02/21       Goal: STG-Within one week, patient will transfer to toilet with SBA at w/c level     Dates: Start: 06/02/21             Problem: OT Long Term Goals     Dates: Start: 06/02/21       Goal: LTG-By discharge, patient will complete basic self care tasks at Mod I using DME/AE as needed     Dates: Start: 06/02/21          Goal: LTG-By discharge, patient will perform bathroom transfers at Mod I using DME/AE as needed     Dates: Start: 06/02/21             Problem: Toileting     Dates: Start: 06/02/21       Goal: STG-Within one week, patient will complete toileting tasks with SBA using DME/AE as needed     Dates: Start: 06/02/21

## 2021-06-09 NOTE — THERAPY
Recreational Therapy  Daily Treatment     Patient Name: Valentina Lu  AGE:  65 y.o., SEX:  female  Medical Record #: [id number]  Today's Date: 6/9/2021       Subjective    Pt ready and willing for session.      Objective       06/09/21 0931   Functional Ability Status - Cognitive   Attention Span Remains on Task   Comprehension Follows Three Step Commands   Judgment Able to Make Independent Decisions   Functional Ability Status - Emotional    Affect Appropriate   Mood Appropriate   Behavior Appropriate   Skilled Intervention    Skilled Intervention Gross Motor Leisure;Relaxation / Coping Skills   Skilled Intervention Comments Standing dual tasking while putting together a puzzle   Interdisciplinary Plan of Care Collaboration   Patient Position at End of Therapy Seated;Call Light within Reach;Tray Table within Reach   Strengths & Barriers   Strengths Able to follow instructions;Alert and oriented;Motivated for self care and independence;Pleasant and cooperative;Supportive family;Willingly participates in therapeutic activities   Barriers Decreased endurance;Impaired activity tolerance   Treatment Time   Total Time Spent (mins) 30   Procedural Tracking   Procedural Tracking Community Re-Integration;Community Skills Development;Leisure Skills Awareness;Leisure Skills Development;Gross Motor Functional Leisure Skills   Stood CGA 9 min x1 and 5 min x1    Assessment    No LOB.     Strengths: (P) Able to follow instructions, Alert and oriented, Motivated for self care and independence, Pleasant and cooperative, Supportive family, Willingly participates in therapeutic activities  Barriers: (P) Decreased endurance, Impaired activity tolerance    Plan    Standing endurance.     Passport items to be completed:  Passport items to be completed:  Verbalize two positive leisure activities, discuss returning to work, hobbies, community groups or volunteer activities, explore community resources

## 2021-06-09 NOTE — FLOWSHEET NOTE
06/09/21 0804   Events/Summary/Plan   Events/Summary/Plan Symbicort given, 02 spot check    Vital Signs   Pulse 69   Respiration 18   Pulse Oximetry 94 %   $ Pulse Oximetry (Spot Check) Yes   Respiratory Assessment   Level of Consciousness Alert   Chest Exam   Work Of Breathing / Effort Within Normal Limits   Oxygen   O2 Delivery Device None - Room Air   Non-Invasive Ventilation [facility]   Nocturnal CPAP or BIPAP CPAP - [facility] Unit   Settings (If Known) auto 8-14   FiO2 or LPM 3

## 2021-06-09 NOTE — CARE PLAN
The patient is Stable - Low risk of patient condition declining or worsening    Problem: Pain - Standard  Goal: Alleviation of pain or a reduction in pain to the patient’s comfort goal  Outcome: Progressing  Flowsheets (Taken 6/8/2021 2000)  Pain Rating Scale (NPRS): 6     Problem: Bladder / Voiding  Goal: Patient will establish and maintain regular urinary output  Outcome: Progressing     Problem: Skin Integrity  Goal: Skin integrity is maintained or improved  Outcome: Progressing     Shift Goals  Clinical Goals: pain control   Patient Goals: pain control

## 2021-06-09 NOTE — THERAPY
Recreational Therapy  Daily Treatment     Patient Name: Valentina Lu  AGE:  65 y.o., SEX:  female  Medical Record #: [id number]  Today's Date: 6/9/2021       Subjective    Pt ready and willing for session.      Objective       06/08/21 1231   Functional Ability Status - Cognitive   Attention Span Remains on Task   Comprehension Follows Three Step Commands   Judgment Able to Make Independent Decisions   Functional Ability Status - Emotional    Affect Appropriate;Bright   Mood Appropriate   Behavior Appropriate   Skilled Intervention    Skilled Intervention Gross Motor Leisure;Cognitive Leisure;Relaxation / Coping Skills   Skilled Intervention Comments Dual taasking standing while putting together a puzzle   Interdisciplinary Plan of Care Collaboration   Patient Position at End of Therapy Edge of Bed;Tray Table within Reach;Call Light within Reach   Strengths & Barriers   Strengths Able to follow instructions;Alert and oriented;Motivated for self care and independence;Pleasant and cooperative;Willingly participates in therapeutic activities   Barriers Decreased endurance;Impaired activity tolerance   Treatment Time   Total Time Spent (mins) 30   Procedural Tracking   Procedural Tracking Community Re-Integration;Community Skills Development;Leisure Skills Awareness;Leisure Skills Development;Gross Motor Functional Leisure Skills       Assessment    Pt stood for 12 minutes X1 while putting together a puzzle.     Strengths: (P) Able to follow instructions, Alert and oriented, Motivated for self care and independence, Pleasant and cooperative, Willingly participates in therapeutic activities  Barriers: (P) Decreased endurance, Impaired activity tolerance    Plan    Standing endurance and balance.     Passport items to be completed:  Passport items to be completed:  Verbalize two positive leisure activities, discuss returning to work, hobbies, community groups or volunteer activities, explore community resources

## 2021-06-09 NOTE — THERAPY
Physical Therapy   Daily Treatment     Patient Name: Valentina Lu  Age:  65 y.o., Sex:  female  Medical Record #: [id number]  Today's Date: 6/9/2021     Precautions  Precautions: Fall Risk, TLSO (Thoracolumbosacral orthosis), Spinal / Back Precautions   Comments: TLSO on OOB     Subjective    Pt reports fatigue after a long day of therapy with a lot of walking and standing today. Agreeable to PT.      Objective       06/09/21 1501   Precautions   Precautions Fall Risk;TLSO (Thoracolumbosacral orthosis);Spinal / Back Precautions    Comments TLSO on OOB    Wheelchair Functional Level of Assist   Wheelchair Assist Supervised   Distance Wheelchair (Feet or Distance) 150   Wheelchair Description Extra time;Supervision for safety   Transfer Functional Level of Assist   Bed, Chair, Wheelchair Transfer Supervised  (EOB > WC > supine in bed )   Bed Chair Wheelchair Transfer Description Adaptive equipment;Increased time;Set-up of equipment;Supervision for safety   Sitting Lower Body Exercises   Nustep Resistance Level 3  (10 min, 0.28 miles)   Bed Mobility    Supine to Sit Supervised   Sit to Supine Supervised   Sit to Stand Supervised   Scooting Supervised   Neuro-Muscular Treatments   Neuro-Muscular Treatments Postural Changes;Sequencing;Verbal Cuing   Comments Sitting balance/ tolerance with seated ladder ball activity; pt seated in WC without back support, ladder ball activity 12 min with spv    Interdisciplinary Plan of Care Collaboration   IDT Collaboration with  Family / Caregiver   Patient Position at End of Therapy In Bed;Call Light within Reach;Tray Table within Reach;Family / Friend in Room   Collaboration Comments pt's  present for PT session    PT Total Time Spent   PT Individual Total Time Spent (Mins) 60   PT Charge Group   PT Therapeutic Exercise 1   PT Neuromuscular Re-Education / Balance 1   PT Therapeutic Activities 2     Pt transferred WC <> passenger seat of [place] with no AD and SBA.      Pt's TLSO adjusted for comfort.     Assessment    Pt requested seated ladderball activity vs standing due to LE fatigue at end of day. Reported moderate back pain after nustep activity this session.     Strengths: Effective communication skills, Independent prior level of function, Motivated for self care and independence, Pleasant and cooperative, Supportive family, Willingly participates in therapeutic activities  Barriers: Fatigue, Generalized weakness, Home accessibility, Impaired balance, Impaired activity tolerance, Limited mobility, Pain, Pain poorly managed    Plan    Gait with FWW vs 4WW, LE strength and endurance, pain management PRN, standing tolerance and balance.     Physical Therapy Problems (Active)     Problem: Mobility     Dates: Start: 06/02/21       Goal: STG-Within one week, patient will ambulate household distance     Dates: Start: 06/02/21       Description: AMB x 50 feet with FWW and SBA          Problem: Mobility Transfers     Dates: Start: 06/02/21       Goal: STG-Within one week, patient will perform bed mobility     Dates: Start: 06/02/21       Description: Supine </> sit with min A using bed rail       Goal: STG-Within one week, patient will transfer bed to chair     Dates: Start: 06/02/21       Description: SPT with FWW and SBA          Problem: PT-Long Term Goals     Dates: Start: 06/02/21       Goal: LTG-By discharge, patient will ambulate     Dates: Start: 06/02/21       Description: AMB x 150 feet with FWW, mod I        Goal: LTG-By discharge, patient will transfer one surface to another     Dates: Start: 06/02/21       Description: SPT with FWW, mod I        Goal: LTG-By discharge, patient will transfer in/out of a car     Dates: Start: 06/02/21       Description: Car transfer with FWW with SPV       Goal: LTG-By discharge, patient will     Dates: Start: 06/02/21       Description: Up/down 2 steps with one HR and CGA

## 2021-06-09 NOTE — CARE PLAN
Problem: Pain - Standard  Goal: Alleviation of pain or a reduction in pain to the patient’s comfort goal  Flowsheets (Taken 6/9/2021 1502)  OB Pain Intervention:   Medication - See MAR   Relaxation technique   Rest   Repositioned  Note: Pt's PRN pain medication was switched to [place] from Oxycodone. Pt is also receiving scheduled Gabapentin and Flexeril.      Problem: Diabetes Management  Goal: Patient's ability to maintain appropriate glucose levels will be maintained or improve  Note: Pt is on blood sugar check ACHS. No acute hyper/hypoglycemia noted.

## 2021-06-10 LAB
GLUCOSE BLD-MCNC: 100 MG/DL (ref 65–99)
GLUCOSE BLD-MCNC: 114 MG/DL (ref 65–99)
GLUCOSE BLD-MCNC: 159 MG/DL (ref 65–99)
GLUCOSE BLD-MCNC: 93 MG/DL (ref 65–99)

## 2021-06-10 PROCEDURE — 82962 GLUCOSE BLOOD TEST: CPT | Mod: 91

## 2021-06-10 PROCEDURE — 94660 CPAP INITIATION&MGMT: CPT

## 2021-06-10 PROCEDURE — 97530 THERAPEUTIC ACTIVITIES: CPT

## 2021-06-10 PROCEDURE — 97112 NEUROMUSCULAR REEDUCATION: CPT

## 2021-06-10 PROCEDURE — 97110 THERAPEUTIC EXERCISES: CPT

## 2021-06-10 PROCEDURE — 770010 HCHG ROOM/CARE - REHAB SEMI PRIVAT*

## 2021-06-10 PROCEDURE — 97116 GAIT TRAINING THERAPY: CPT

## 2021-06-10 PROCEDURE — 700101 HCHG RX REV CODE 250: Performed by: PHYSICAL MEDICINE & REHABILITATION

## 2021-06-10 PROCEDURE — 94640 AIRWAY INHALATION TREATMENT: CPT

## 2021-06-10 PROCEDURE — 97535 SELF CARE MNGMENT TRAINING: CPT

## 2021-06-10 PROCEDURE — A9270 NON-COVERED ITEM OR SERVICE: HCPCS | Performed by: PHYSICAL MEDICINE & REHABILITATION

## 2021-06-10 PROCEDURE — 99233 SBSQ HOSP IP/OBS HIGH 50: CPT | Performed by: PHYSICAL MEDICINE & REHABILITATION

## 2021-06-10 PROCEDURE — 700102 HCHG RX REV CODE 250 W/ 637 OVERRIDE(OP): Performed by: PHYSICAL MEDICINE & REHABILITATION

## 2021-06-10 RX ORDER — GLIPIZIDE 2.5 MG/1
10 TABLET, EXTENDED RELEASE ORAL
Status: DISCONTINUED | OUTPATIENT
Start: 2021-06-11 | End: 2021-06-12 | Stop reason: HOSPADM

## 2021-06-10 RX ORDER — FUROSEMIDE 20 MG/1
20 TABLET ORAL DAILY
Status: DISCONTINUED | OUTPATIENT
Start: 2021-06-11 | End: 2021-06-12 | Stop reason: HOSPADM

## 2021-06-10 RX ADMIN — OMEPRAZOLE 20 MG: 20 CAPSULE, DELAYED RELEASE ORAL at 21:23

## 2021-06-10 RX ADMIN — HYDROCODONE BITARTRATE AND ACETAMINOPHEN 1 TABLET: 10; 325 TABLET ORAL at 14:19

## 2021-06-10 RX ADMIN — ROPINIROLE HYDROCHLORIDE 8 MG: 1 TABLET, FILM COATED ORAL at 21:24

## 2021-06-10 RX ADMIN — TEMAZEPAM 30 MG: 15 CAPSULE ORAL at 22:02

## 2021-06-10 RX ADMIN — CHOLECALCIFEROL TAB 25 MCG (1000 UNIT) 5000 UNITS: 25 TAB at 08:10

## 2021-06-10 RX ADMIN — VENLAFAXINE 25 MG: 25 TABLET ORAL at 21:23

## 2021-06-10 RX ADMIN — LEVOTHYROXINE SODIUM 75 MCG: 75 TABLET ORAL at 21:23

## 2021-06-10 RX ADMIN — GABAPENTIN 800 MG: 400 CAPSULE ORAL at 08:09

## 2021-06-10 RX ADMIN — CALCIUM CARBONATE (ANTACID) CHEW TAB 500 MG 500 MG: 500 CHEW TAB at 21:24

## 2021-06-10 RX ADMIN — GABAPENTIN 800 MG: 400 CAPSULE ORAL at 14:19

## 2021-06-10 RX ADMIN — SPIRONOLACTONE 12.5 MG: 25 TABLET, FILM COATED ORAL at 21:21

## 2021-06-10 RX ADMIN — FUROSEMIDE 40 MG: 40 TABLET ORAL at 08:10

## 2021-06-10 RX ADMIN — SITAGLIPTIN 50 MG: 50 TABLET, FILM COATED ORAL at 08:09

## 2021-06-10 RX ADMIN — TIZANIDINE 4 MG: 4 TABLET ORAL at 14:19

## 2021-06-10 RX ADMIN — CALCIUM CARBONATE (ANTACID) CHEW TAB 500 MG 500 MG: 500 CHEW TAB at 08:09

## 2021-06-10 RX ADMIN — HYDROCODONE BITARTRATE AND ACETAMINOPHEN 1 TABLET: 10; 325 TABLET ORAL at 11:03

## 2021-06-10 RX ADMIN — TIZANIDINE 4 MG: 4 TABLET ORAL at 08:10

## 2021-06-10 RX ADMIN — TIZANIDINE 4 MG: 4 TABLET ORAL at 21:24

## 2021-06-10 RX ADMIN — VENLAFAXINE HYDROCHLORIDE 150 MG: 75 CAPSULE, EXTENDED RELEASE ORAL at 21:20

## 2021-06-10 RX ADMIN — BUDESONIDE AND FORMOTEROL FUMARATE DIHYDRATE 2 PUFF: 160; 4.5 AEROSOL RESPIRATORY (INHALATION) at 08:23

## 2021-06-10 RX ADMIN — DIPHENHYDRAMINE HYDROCHLORIDE, ZINC ACETATE: 2; .1 CREAM TOPICAL at 18:29

## 2021-06-10 RX ADMIN — HYDROCODONE BITARTRATE AND ACETAMINOPHEN 1 TABLET: 10; 325 TABLET ORAL at 18:29

## 2021-06-10 RX ADMIN — LIDOCAINE 2 PATCH: 50 PATCH TOPICAL at 08:11

## 2021-06-10 RX ADMIN — GLIPIZIDE 5 MG: 2.5 TABLET, FILM COATED, EXTENDED RELEASE ORAL at 08:09

## 2021-06-10 RX ADMIN — HYDROCODONE BITARTRATE AND ACETAMINOPHEN 1 TABLET: 10; 325 TABLET ORAL at 04:29

## 2021-06-10 RX ADMIN — BUDESONIDE AND FORMOTEROL FUMARATE DIHYDRATE 2 PUFF: 160; 4.5 AEROSOL RESPIRATORY (INHALATION) at 21:18

## 2021-06-10 RX ADMIN — GABAPENTIN 800 MG: 400 CAPSULE ORAL at 21:20

## 2021-06-10 RX ADMIN — ATORVASTATIN CALCIUM 20 MG: 10 TABLET, FILM COATED ORAL at 21:22

## 2021-06-10 RX ADMIN — INSULIN HUMAN 2 UNITS: 100 INJECTION, SOLUTION PARENTERAL at 11:04

## 2021-06-10 RX ADMIN — POTASSIUM CHLORIDE 10 MEQ: 1500 TABLET, EXTENDED RELEASE ORAL at 21:22

## 2021-06-10 RX ADMIN — HYDROCODONE BITARTRATE AND ACETAMINOPHEN 1 TABLET: 10; 325 TABLET ORAL at 07:35

## 2021-06-10 ASSESSMENT — PATIENT HEALTH QUESTIONNAIRE - PHQ9
1. LITTLE INTEREST OR PLEASURE IN DOING THINGS: SEVERAL DAYS
4. FEELING TIRED OR HAVING LITTLE ENERGY: NOT AT ALL
5. POOR APPETITE OR OVEREATING: NOT AT ALL
8. MOVING OR SPEAKING SO SLOWLY THAT OTHER PEOPLE COULD HAVE NOTICED. OR THE OPPOSITE, BEING SO FIGETY OR RESTLESS THAT YOU HAVE BEEN MOVING AROUND A LOT MORE THAN USUAL: NOT AT ALL
2. FEELING DOWN, DEPRESSED, IRRITABLE, OR HOPELESS: SEVERAL DAYS
9. THOUGHTS THAT YOU WOULD BE BETTER OFF DEAD, OR OF HURTING YOURSELF: NOT AT ALL
7. TROUBLE CONCENTRATING ON THINGS, SUCH AS READING THE NEWSPAPER OR WATCHING TELEVISION: NOT AT ALL
3. TROUBLE FALLING OR STAYING ASLEEP OR SLEEPING TOO MUCH: NOT AT ALL
6. FEELING BAD ABOUT YOURSELF - OR THAT YOU ARE A FAILURE OR HAVE LET YOURSELF OR YOUR FAMILY DOWN: NOT AL ALL
SUM OF ALL RESPONSES TO PHQ QUESTIONS 1-9: 2
SUM OF ALL RESPONSES TO PHQ9 QUESTIONS 1 AND 2: 2

## 2021-06-10 ASSESSMENT — ACTIVITIES OF DAILY LIVING (ADL)
TOILET_TRANSFER_DESCRIPTION: GRAB BAR;INCREASED TIME
BED_CHAIR_WHEELCHAIR_TRANSFER_DESCRIPTION: INCREASED TIME
BED_CHAIR_WHEELCHAIR_TRANSFER_DESCRIPTION: INCREASED TIME;ADAPTIVE EQUIPMENT

## 2021-06-10 ASSESSMENT — GAIT ASSESSMENTS
DEVIATION: BRADYKINETIC;ANTALGIC;DECREASED HEEL STRIKE;DECREASED TOE OFF
ASSISTIVE DEVICE: 4 WHEEL WALKER
GAIT LEVEL OF ASSIST: SUPERVISED

## 2021-06-10 ASSESSMENT — PAIN SCALES - WONG BAKER: WONGBAKER_NUMERICALRESPONSE: DOESN'T HURT AT ALL

## 2021-06-10 ASSESSMENT — PAIN DESCRIPTION - PAIN TYPE
TYPE: CHRONIC PAIN
TYPE: CHRONIC PAIN

## 2021-06-10 NOTE — THERAPY
Physical Therapy   Daily Treatment     Patient Name: Valentina Lu  Age:  65 y.o., Sex:  female  Medical Record #: [id number]  Today's Date: 6/10/2021     Precautions  Precautions: Fall Risk, TLSO (Thoracolumbosacral orthosis), Spinal / Back Precautions   Comments: TLSO on OOB     Subjective    Pt reports she is agreeable to PT session and agreeable to trialing 4WW     Objective       06/09/21 1101   Gait Functional Level of Assist    Gait Level Of Assist Supervised   Assistive Device 4 Wheel Walker  (Cues for safety with 4WW handling)   Distance (Feet) 1000   # of Times Distance was Traveled 2   Transfer Functional Level of Assist   Bed, Chair, Wheelchair Transfer Supervised   Bed Chair Wheelchair Transfer Description Adaptive equipment;Increased time   Bed Mobility    Supine to Sit Supervised   Sit to Supine Supervised   Sit to Stand Supervised   Scooting Supervised   Rolling Supervised   Interdisciplinary Plan of Care Collaboration   Patient Position at End of Therapy Seated;Call Light within Reach;Tray Table within Reach   PT Total Time Spent   PT Individual Total Time Spent (Mins) 30   PT Charge Group   PT Gait Training 1   PT Therapeutic Activities 1       Assessment    Pt performs well with 4WW, no LOB and follows cues and demo on safety and sequencing with 4WW well.     Strengths: Effective communication skills, Independent prior level of function, Motivated for self care and independence, Pleasant and cooperative, Supportive family, Willingly participates in therapeutic activities  Barriers: Fatigue, Generalized weakness, Home accessibility, Impaired balance, Impaired activity tolerance, Limited mobility, Pain, Pain poorly managed    Plan    *Gait with FWW vs 4WW, LE strength and endurance, pain management PRN, standing tolerance and balance.     Physical Therapy Problems (Active)     Problem: PT-Long Term Goals     Dates: Start: 06/02/21       Goal: LTG-By discharge, patient will ambulate     Dates:  Start: 06/02/21       Description: AMB x 150 feet with FWW, mod I        Goal: LTG-By discharge, patient will transfer one surface to another     Dates: Start: 06/02/21       Description: SPT with FWW, mod I        Goal: LTG-By discharge, patient will transfer in/out of a car     Dates: Start: 06/02/21       Description: Car transfer with FWW with SPV       Goal: LTG-By discharge, patient will     Dates: Start: 06/02/21       Description: Up/down 2 steps with one HR and CGA

## 2021-06-10 NOTE — THERAPY
Occupational Therapy  Daily Treatment     Patient Name: Valentina Lu  Age:  65 y.o., Sex:  female  Medical Record #: [id number]  Today's Date: 6/10/2021     Precautions  Precautions: Fall Risk, TLSO (Thoracolumbosacral orthosis), Spinal / Back Precautions   Comments: TLSO on OOB     Safety   ADL Safety : Requires Supervision for Safety    Subjective    Pt explained that she was able to tolerate CPAP for 5 hours last night. Pt explained in detail that her past traumas make wearing the CPAP difficult. Pt was able to calmly reflect on past trauma and is motivated to continue to increase wear of CPAP at night.     Objective       06/10/21 0701   Functional Level of Assist   Eating Independent   Grooming Modified Independent   Upper Body Dressing Modified Independent   Lower Body Dressing Modified Independent   Toileting Modified Independent   Bed, Chair, Wheelchair Transfer Modified Independent   Toilet Transfers Modified Independent   IADL Treatments   Home Management Pt loaded, unloaded and folded towels and sheets from dryer with verbal cues only.   Interdisciplinary Plan of Care Collaboration   IDT Collaboration with  Nursing   Patient Position at End of Therapy Edge of Bed;Call Light within Reach;Tray Table within Reach;Phone within Reach   Collaboration Comments re: pain meds and blood sugar   OT Total Time Spent   OT Individual Total Time Spent (Mins) 60   OT Charge Group   OT Self Care / ADL 2   OT Therapy Activity 1   OT Therapeutic Exercise  1         Completed 1 set of 10 of all exercises except for chest press. Recommending 2 sets of 10, 2x a day for pt at home.      Assessment    Pt improved to mod I at FWW level today for dressing and toileting. Pt's balance and endurance were adequate during laundry task, however, required cues for spinal precautions/FWW mgmt.    Strengths: Able to follow instructions, Effective communication skills, Making steady progress towards goals, Manages pain appropriately,  Motivated for self care and independence, Pleasant and cooperative, Willingly participates in therapeutic activities  Barriers: Decreased endurance, Fatigue, Generalized weakness, Impaired activity tolerance    Plan    Showers with nursing, focus on functional/safe use of FWW during I/ADLs, expected d/c of 6-     Passport items to be completed:  prepare a simple meal, complete caregiver training     Occupational Therapy Goals (Active)     Problem: Dressing     Dates: Start: 06/02/21       Goal: STG-Within one week, patient will dress LB with Min A using DME/AE as needed     Dates: Start: 06/02/21             Problem: Functional Transfers     Dates: Start: 06/02/21       Goal: STG-Within one week, patient will transfer to toilet with SBA at w/c level     Dates: Start: 06/02/21             Problem: OT Long Term Goals     Dates: Start: 06/02/21       Goal: LTG-By discharge, patient will complete basic self care tasks at Mod I using DME/AE as needed     Dates: Start: 06/02/21          Goal: LTG-By discharge, patient will perform bathroom transfers at Mod I using DME/AE as needed     Dates: Start: 06/02/21             Problem: Toileting     Dates: Start: 06/02/21       Goal: STG-Within one week, patient will complete toileting tasks with SBA using DME/AE as needed     Dates: Start: 06/02/21

## 2021-06-10 NOTE — THERAPY
Physical Therapy   Daily Treatment     Patient Name: Valentina Lu  Age:  65 y.o., Sex:  female  Medical Record #: [id number]  Today's Date: 6/10/2021     Precautions  Precautions: Fall Risk, TLSO (Thoracolumbosacral orthosis), Spinal / Back Precautions   Comments: TLSO on OOB     Subjective    Pt in bed sleeping, reports she is very tired upon waking, however agreeable to PT.     Objective       06/10/21 1031   Precautions   Precautions Fall Risk;TLSO (Thoracolumbosacral orthosis);Spinal / Back Precautions    Comments TLSO on OOB    Wheelchair Functional Level of Assist   Wheelchair Assist Modified Independent   Distance Wheelchair (Feet or Distance) 100   Wheelchair Description Extra time   Transfer Functional Level of Assist   Bed, Chair, Wheelchair Transfer Modified Independent   Bed Chair Wheelchair Transfer Description Increased time   Toilet Transfers Modified Independent   Toilet Transfer Description Grab bar;Increased time   Sitting Lower Body Exercises   Nustep Resistance Level 4  (6 min, 0.19 miles)   Interdisciplinary Plan of Care Collaboration   IDT Collaboration with  Nursing   Patient Position at End of Therapy In Bed;Call Light within Reach;Tray Table within Reach   Collaboration Comments RN in room with pt after PT    PT Total Time Spent   PT Individual Total Time Spent (Mins) 30   PT Charge Group   PT Therapeutic Exercise 1   PT Therapeutic Activities 1     Pt education/ discussion of PT goals and DC plan.     Assessment    Pt limited by fatigue this session. Receptive to education provided and reports she feels ready for DC on Saturday.    Strengths: Effective communication skills, Independent prior level of function, Motivated for self care and independence, Pleasant and cooperative, Supportive family, Willingly participates in therapeutic activities  Barriers: Fatigue, Generalized weakness, Home accessibility, Impaired balance, Impaired activity tolerance, Limited mobility, Pain, Pain  poorly managed    Plan    Prepare for DC 6/12, complete passport and DC IRF THEO, issue seated there-ex HEP.       Physical Therapy Problems (Active)     Problem: PT-Long Term Goals     Dates: Start: 06/02/21       Goal: LTG-By discharge, patient will ambulate     Dates: Start: 06/02/21       Description: AMB x 150 feet with FWW, mod I        Goal: LTG-By discharge, patient will transfer one surface to another     Dates: Start: 06/02/21       Description: SPT with FWW, mod I        Goal: LTG-By discharge, patient will transfer in/out of a car     Dates: Start: 06/02/21       Description: Car transfer with FWW with SPV       Goal: LTG-By discharge, patient will     Dates: Start: 06/02/21       Description: Up/down 2 steps with one HR and CGA

## 2021-06-10 NOTE — CARE PLAN
Problem: Mobility  Goal: STG-Within one week, patient will ambulate household distance  Description: AMB x 50 feet with FWW and SBA  Outcome: Met     Problem: Mobility Transfers  Goal: STG-Within one week, patient will perform bed mobility  Description: Supine </> sit with min A using bed rail  Outcome: Met  Goal: STG-Within one week, patient will transfer bed to chair  Description: SPT with FWW and SBA  Outcome: Met

## 2021-06-10 NOTE — CARE PLAN
Controlled Substance Refill Request for CYCLOBENZAPRINE 10MG TABLET    Problem List Complete:  Yes    Last Written Prescription Date:  10/14/20  Last Fill Quantity: 30,   # refills: 0    THE MOST RECENT OFFICE VISIT MUST BE WITHIN THE PAST 3 MONTHS. AT LEAST ONE FACE TO FACE VISIT MUST OCCUR EVERY 6 MONTHS. ADDITIONAL VISITS CAN BE VIRTUAL.  (THIS STATEMENT SHOULD BE DELETED.)    [place] [facility]: 10/1/20    Future Office visit:     Controlled substance agreement:   Encounter-Level CSA:    There are no encounter-level csa.     Patient-Level CSA:    There are no patient-level csa.         Last Urine Drug Screen: No results found for: CDAUT, No results found for: COMDAT, No results found for: THC13, PCP13, COC13, MAMP13, OPI13, AMP13, BZO13, TCA13, MTD13, BAR13, OXY13, PPX13, BUP13     Processing:  Rx to be electronically transmitted to pharmacy by provider     [email or web address]   checked in past 3 months?           The patient is Stable - Low risk of patient condition declining or worsening  Problem: Pain - Standard  Goal: Alleviation of pain or a reduction in pain to the patient’s comfort goal  Flowsheets (Taken 6/10/2021 0224)  OB Pain Intervention:   Medication - See MAR   Education   Relaxation technique   Rest   Music  Note: Instructed on pain management.        Problem: Diabetes Management  Goal: Patient's ability to maintain appropriate glucose levels will be maintained or improve  Outcome: Progressing     Problem: Skin Integrity  Goal: Skin integrity is maintained or improved  Outcome: Progressing     Problem: Bladder / Voiding  Goal: Patient will establish and maintain regular urinary output  Outcome: Progressing

## 2021-06-10 NOTE — THERAPY
Physical Therapy   Daily Treatment     Patient Name: Valentina Lu  Age:  65 y.o., Sex:  female  Medical Record #: [id number]  Today's Date: 6/10/2021     Precautions  Precautions: Fall Risk, TLSO (Thoracolumbosacral orthosis), Spinal / Back Precautions   Comments: TLSO on OOB     Subjective    Pt seated EOB, ready for PT.     Objective       06/10/21 0831   Precautions   Precautions Fall Risk;TLSO (Thoracolumbosacral orthosis);Spinal / Back Precautions    Comments TLSO on OOB    Gait Functional Level of Assist    Gait Level Of Assist Supervised   Assistive Device 4 Wheel Walker   Distance (Feet)   (1500 ft, 450 ft outdoors, and 250 ft indoors)   Deviation Bradykinetic;Antalgic;Decreased Heel Strike;Decreased Toe Off   Transfer Functional Level of Assist   Bed, Chair, Wheelchair Transfer Modified Independent   Bed Chair Wheelchair Transfer Description Increased time;Adaptive equipment  (EOB > WC )   Sitting Lower Body Exercises   Ankle Pumps 1 set of 15   Hip Abduction 1 set of 15   Hip Adduction 1 set of 15   Long Arc Quad 1 set of 15   Marching 1 set of 15   Hamstring Curl 1 set of 15   Comments Above exercises completed while seated in WC with green tband for resistance   Interdisciplinary Plan of Care Collaboration   IDT Collaboration with  Recreation Therapist   Patient Position at End of Therapy Seated;Call Light within Reach;Tray Table within Reach   Collaboration Comments pt care passed to rec therapist    PT Total Time Spent   PT Individual Total Time Spent (Mins) 60   PT Charge Group   PT Gait Training 1   PT Therapeutic Exercise 1   PT Therapeutic Activities 2     Pt education/ discussion regarding safe use of FWW and home safety.     Assessment    Pt motivated for outdoor ambulation this session. Good endurance demonstrated with full lap around the building.    Strengths: Effective communication skills, Independent prior level of function, Motivated for self care and independence, Pleasant and  cooperative, Supportive family, Willingly participates in therapeutic activities  Barriers: Fatigue, Generalized weakness, Home accessibility, Impaired balance, Impaired activity tolerance, Limited mobility, Pain, Pain poorly managed    Plan    Prepare for DC 6/12, complete passport and DC IRF THEO, issue seated there-ex HEP.       Physical Therapy Problems (Active)     Problem: PT-Long Term Goals     Dates: Start: 06/02/21       Goal: LTG-By discharge, patient will ambulate     Dates: Start: 06/02/21       Description: AMB x 150 feet with FWW, mod I        Goal: LTG-By discharge, patient will transfer one surface to another     Dates: Start: 06/02/21       Description: SPT with FWW, mod I        Goal: LTG-By discharge, patient will transfer in/out of a car     Dates: Start: 06/02/21       Description: Car transfer with FWW with SPV       Goal: LTG-By discharge, patient will     Dates: Start: 06/02/21       Description: Up/down 2 steps with one HR and CGA

## 2021-06-10 NOTE — CARE PLAN
Problem: Recreation Therapy  Goal: STG-Within one week, patient will demonstrate leisure problem solving  Description: by sharing on two positive lesiure activities they would like to participate in either in session or during her free time and any perceived barriers to their participation.      Outcome: Met  Goal: STG-Within one week, patient will  Description: Stand for 2 minutes x3 Min A while performing a cognitive leisure activity.   Outcome: Met  Goal: LTG-By discharge, patient will demonstrate leisure problem solving  Description: by sharing on two positive lesiure activities they would like to participate in following discharge and any perceived barriers to their participation.      Outcome: Met  Goal: LTG-By discharge, patient will  Description: Stand for 3 minutes x3 CGA while performing a cognitive leisure activity with no LOB.   Outcome: Met

## 2021-06-10 NOTE — THERAPY
Recreational Therapy  Daily Treatment     Patient Name: Valentina Lu  AGE:  65 y.o., SEX:  female  Medical Record #: [id number]  Today's Date: 6/10/2021       Subjective    Pt speaking on how she was going to contact [facility] for equipment.      Objective       06/10/21 0931   Functional Ability Status - Cognitive   Attention Span Remains on Task   Comprehension Follows Three Step Commands   Judgment Able to Make Independent Decisions   Functional Ability Status - Emotional    Affect Appropriate   Mood Appropriate   Behavior Appropriate   Skilled Intervention    Skilled Intervention Gross Motor Leisure   Skilled Intervention Comments Standing while putting together a puzzle   Interdisciplinary Plan of Care Collaboration   IDT Collaboration with  Physician Assistant   Patient Position at End of Therapy Edge of Bed;Call Light within Reach;Tray Table within Reach   Collaboration Comments Recieved care from PT   Strengths & Barriers   Strengths Able to follow instructions;Alert and oriented;Motivated for self care and independence;Pleasant and cooperative;Supportive family;Willingly participates in therapeutic activities   Barriers Decreased endurance;Impaired balance   Treatment Time   Total Time Spent (mins) 30   Procedural Tracking   Procedural Tracking Community Re-Integration;Community Skills Development;Leisure Skills Awareness;Leisure Skills Development;Gross Motor Functional Leisure Skills       Assessment    Pt stood for 12 minutes while putting together a 4 foot side puzzle. Pt was albertina to both reach and side step without LOB CGA.     Strengths: (P) Able to follow instructions, Alert and oriented, Motivated for self care and independence, Pleasant and cooperative, Supportive family, Willingly participates in therapeutic activities  Barriers: (P) Decreased endurance, Impaired balance    Plan    Standing balance.     Passport items to be completed:     explore community resources

## 2021-06-10 NOTE — DISCHARGE PLANNING
"Met w/ patient & SO Cesar at bedside to review IDT recommendations & on track for DC Saturday, 6.12.21. Discussed DME referral for 4WW. Asking about shower bench. Informed not covered by Medicare. States \"I have an application on file with [facility]. I just need to finish up the financial part. And I'd like to get a short ramp or board to get into the house.\" Reviewed therapy not recommending ramp entry & encouraged patient to discuss w/ therapy team tomorrow. States \"I use to live close to [facility] at [facility]. I'll call him to see if he can get something built for us, maybe a handrail also.\" Will updated therapy team regarding patient's comments. Discussed therapy recommending OP therapy. States \"I don't have any transportation to get me there. I'd rather have home health again. I've used [facility].\"   Questions answered. Emotional support provided.  "

## 2021-06-10 NOTE — THERAPY
"Occupational Therapy  Daily Treatment     Patient Name: Valentina Lu  Age:  65 y.o., Sex:  female  Medical Record #: [id number]  Today's Date: 6/10/2021     Precautions  Precautions: Fall Risk, TLSO (Thoracolumbosacral orthosis), Spinal / Back Precautions   Comments: TLSO on OOB     Safety   ADL Safety : Requires Supervision for Safety    Subjective    \"I'm so mad right now. My children wouldn't drive my  here and so he walked.\" pt gave nearby landmark which is about 4 miles away. RN made aware of situation.     Objective       06/10/21 1431   Sitting Lower Body Exercises   Nustep Resistance Level 4  (10 minutes no rb)   Interdisciplinary Plan of Care Collaboration   IDT Collaboration with  Family / Caregiver;Nursing   Patient Position at End of Therapy Edge of Bed;Call Light within Reach;Tray Table within Reach;Phone within Reach  (mod I in room)   Collaboration Comments re: CLOF, POC, pt's  walking   OT Total Time Spent   OT Individual Total Time Spent (Mins) 30   OT Charge Group   OT Neuromuscular Re-education / Balance 1   OT Therapeutic Exercise  1       Assessment    Pt now mod I in the room. Pt feels comfortable and confident with transfers to/from bathroom. Pt knows her spinal precautions, but doesn't always follow them by choice. OT reinforced education    Strengths: Able to follow instructions, Effective communication skills, Making steady progress towards goals, Manages pain appropriately, Motivated for self care and independence, Pleasant and cooperative, Willingly participates in therapeutic activities  Barriers: Decreased endurance, Fatigue, Generalized weakness, Impaired activity tolerance    Plan    Showers with nursing, focus on functional/safe use of FWW during I/ADLs, expected d/c of 6-     Passport items to be completed:  prepare a simple meal, complete caregiver training     Occupational Therapy Goals (Active)     Problem: OT Long Term Goals     Dates: Start: 06/02/21 "       Goal: LTG-By discharge, patient will complete basic self care tasks at Mod I using DME/AE as needed     Dates: Start: 06/02/21          Goal: LTG-By discharge, patient will perform bathroom transfers at Mod I using DME/AE as needed     Dates: Start: 06/02/21

## 2021-06-10 NOTE — PROGRESS NOTES
"Rehab Progress Note     Encounter Date: 6/10/2021    CC: Lumbar surgery, decreased mobility    Interval Events (Subjective)  Patient sitting up in room. She reports she is doing well. Discussed staples are ready to be removed when she has a break today.  Discussed pain control, she reports it is getting better. Discussed that we will have IDT later today to verify discharge this weekend. Denies NVD. Denies SOB.     IDT Team Meeting 6/10/2021    IDeedee M.D., was present and led the interdisciplinary team conference on 6/10/2021.  I led the IDT conference and agree with the IDT conference documentation and plan of care as noted below.     RN:  Diet Regular   % Meal     Pain Improved control with norco   Sleep    Bowel Continent   Bladder Continent   In's & Out's      PT:  Bed Mobility    Transfers    Mobility 4WW -SBA to Annalise   Stairs supervs   Good to go Saturday    OT:  Eating    Grooming    Bathing supervs   UB Dressing    LB Dressing    Toileting supervs   Shower & Transfer    Working on bathing   HH OT? Poor spinal precautions    Resp:  ALONZO - CPAP 5 hours last night    Rec:  [employer] ruth for ramp? Tub bench    CM:  Continues to work on disposition and DME needs.      DC/Disposition:  6/12/21     Objective:  VITAL SIGNS: /67   Pulse 78   Temp 36.7 °C (98 °F) (Oral)   Resp 16   Ht 1.651 m (5' 5\")   Wt 87.5 kg (192 lb 14.4 oz)   LMP  (LMP Unknown)   SpO2 93%   BMI 32.10 kg/m²   Gen: NAD  Psych: Mood and affect appropriate  CV: RRR, no edema  Resp: CTAB, no upper airway sounds  Abd: NTND  Neuro: AOx4, walking with FWW  Unchanged from 6/9/21 in addition walking with FWW, forward flexed short step length      Recent Results (from the past 72 hour(s))   POCT glucose device results    Collection Time: 06/07/21  5:11 PM   Result Value Ref Range    Glucose - Accu-Ck 148 (H) 65 - 99 mg/dL   POCT glucose device results    Collection Time: 06/07/21  8:21 PM   Result Value Ref Range    " Glucose - Accu-Ck 179 (H) 65 - 99 mg/dL   VITAMIN B12    Collection Time: 06/08/21  5:42 AM   Result Value Ref Range    Vitamin B12 -True Cobalamin 943 (H) 211 - 911 pg/mL   FOLATE    Collection Time: 06/08/21  5:42 AM   Result Value Ref Range    Folate -Folic Acid 13.0 >4.0 ng/mL   CBC WITH DIFFERENTIAL    Collection Time: 06/08/21  5:42 AM   Result Value Ref Range    WBC 9.7 4.8 - 10.8 K/uL    RBC 3.44 (L) 4.20 - 5.40 M/uL    Hemoglobin 9.9 (L) 12.0 - 16.0 g/dL    Hematocrit 32.9 (L) 37.0 - 47.0 %    MCV 95.6 81.4 - 97.8 fL    MCH 28.8 27.0 - 33.0 pg    MCHC 30.1 (L) 33.6 - 35.0 g/dL    RDW 54.0 (H) 35.9 - 50.0 fL    Platelet Count 421 164 - 446 K/uL    MPV 9.5 9.0 - 12.9 fL    Neutrophils-Polys 67.60 44.00 - 72.00 %    Lymphocytes 19.90 (L) 22.00 - 41.00 %    Monocytes 7.30 0.00 - 13.40 %    Eosinophils 3.00 0.00 - 6.90 %    Basophils 1.00 0.00 - 1.80 %    Immature Granulocytes 1.20 (H) 0.00 - 0.90 %    Nucleated RBC 0.00 /100 WBC    Neutrophils (Absolute) 6.52 2.00 - 7.15 K/uL    Lymphs (Absolute) 1.92 1.00 - 4.80 K/uL    Monos (Absolute) 0.71 0.00 - 0.85 K/uL    Eos (Absolute) 0.29 0.00 - 0.51 K/uL    Baso (Absolute) 0.10 0.00 - 0.12 K/uL    Immature Granulocytes (abs) 0.12 (H) 0.00 - 0.11 K/uL    NRBC (Absolute) 0.00 K/uL   Basic Metabolic Panel    Collection Time: 06/08/21  5:42 AM   Result Value Ref Range    Sodium 136 135 - 145 mmol/L    Potassium 4.5 3.6 - 5.5 mmol/L    Chloride 99 96 - 112 mmol/L    Co2 27 20 - 33 mmol/L    Glucose 115 (H) 65 - 99 mg/dL    Bun 16 8 - 22 mg/dL    Creatinine 0.80 0.50 - 1.40 mg/dL    Calcium 9.2 8.5 - 10.5 mg/dL    Anion Gap 10.0 7.0 - 16.0   ESTIMATED GFR    Collection Time: 06/08/21  5:42 AM   Result Value Ref Range    GFR If African American >60 >60 mL/min/1.73 m 2    GFR If Non African American >60 >60 mL/min/1.73 m 2   POCT glucose device results    Collection Time: 06/08/21  7:27 AM   Result Value Ref Range    Glucose - Accu-Ck 171 (H) 65 - 99 mg/dL   POCT glucose  device results    Collection Time: 06/08/21 11:42 AM   Result Value Ref Range    Glucose - Accu-Ck 112 (H) 65 - 99 mg/dL   POCT glucose device results    Collection Time: 06/08/21  5:34 PM   Result Value Ref Range    Glucose - Accu-Ck 137 (H) 65 - 99 mg/dL   POCT glucose device results    Collection Time: 06/08/21  9:02 PM   Result Value Ref Range    Glucose - Accu-Ck 131 (H) 65 - 99 mg/dL   POCT glucose device results    Collection Time: 06/09/21  7:31 AM   Result Value Ref Range    Glucose - Accu-Ck 91 65 - 99 mg/dL   POCT glucose device results    Collection Time: 06/09/21 10:59 AM   Result Value Ref Range    Glucose - Accu-Ck 227 (H) 65 - 99 mg/dL   POCT glucose device results    Collection Time: 06/09/21  5:21 PM   Result Value Ref Range    Glucose - Accu-Ck 99 65 - 99 mg/dL   POCT glucose device results    Collection Time: 06/10/21  7:35 AM   Result Value Ref Range    Glucose - Accu-Ck 93 65 - 99 mg/dL   POCT glucose device results    Collection Time: 06/10/21 11:02 AM   Result Value Ref Range    Glucose - Accu-Ck 159 (H) 65 - 99 mg/dL       Current Facility-Administered Medications   Medication Frequency   • HYDROcodone/acetaminophen (NORCO)  MG per tablet 1 tablet Q3HRS PRN   • SITagliptin (JANUVIA) tablet 50 mg DAILY   • tizanidine (ZANAFLEX) tablet 4 mg TID   • senna-docusate (PERICOLACE or SENOKOT S) 8.6-50 MG per tablet 2 tablet BID PRN    And   • polyethylene glycol/lytes (MIRALAX) PACKET 1 Packet QDAY PRN    And   • magnesium hydroxide (MILK OF MAGNESIA) suspension 30 mL QDAY PRN    And   • bisacodyl (DULCOLAX) suppository 10 mg QDAY PRN   • glipiZIDE SR (GLUCOTROL) tablet 5 mg QAM AC   • lidocaine (LIDODERM) 5 % 2 Patch DAILY   • spironolactone (ALDACTONE) tablet 12.5 mg Q EVENING   • diphenhydrAMINE-zinc acetate (BENADRYL ITCH) topical cream TID PRN   • diphenhydrAMINE (BENADRYL) tablet/capsule 25 mg Q6HRS PRN   • budesonide-formoterol (SYMBICORT) 160-4.5 MCG/ACT inhaler 2 Puff BID (RT)   •  Respiratory Therapy Consult Continuous RT   • hydrALAZINE (APRESOLINE) tablet 25 mg Q8HRS PRN   • acetaminophen (Tylenol) tablet 650 mg Q4HRS PRN   • artificial tears ophthalmic solution 1 Drop PRN   • benzocaine-menthol (CEPACOL) lozenge 1 Lozenge Q2HRS PRN   • mag hydrox-al hydrox-simeth (MAALOX PLUS ES or MYLANTA DS) suspension 20 mL Q2HRS PRN   • ondansetron (ZOFRAN ODT) dispertab 4 mg 4X/DAY PRN    Or   • ondansetron (ZOFRAN) syringe/vial injection 4 mg 4X/DAY PRN   • traZODone (DESYREL) tablet 50 mg QHS PRN   • sodium chloride (OCEAN) 0.65 % nasal spray 2 Spray PRN   • insulin regular (HumuLIN R,NovoLIN R) injection 4X/DAY ACHS    And   • glucose 4 g chewable tablet 16 g Q15 MIN PRN    And   • dextrose 50% (D50W) injection 50 mL Q15 MIN PRN   • calcium carbonate (TUMS) chewable tab 500 mg BID   • vitamin D (cholecalciferol) tablet 5,000 Units DAILY   • albuterol inhaler 2 Puff Q6HRS PRN   • atorvastatin (LIPITOR) tablet 20 mg QHS   • furosemide (LASIX) tablet 40 mg DAILY   • gabapentin (NEURONTIN) capsule 800 mg TID   • levothyroxine (SYNTHROID) tablet 75 mcg QHS   • omeprazole (PRILOSEC) capsule 20 mg QHS   • ROPINIRole (REQUIP) tablet 8 mg QHS   • temazepam (Restoril) capsule 30 mg HS PRN   • venlafaxine (EFFEXOR) tablet 25 mg Q EVENING   • venlafaxine XR (EFFEXOR XR) capsule 150 mg Q EVENING   • potassium chloride SA (Kdur) tablet 10 mEq QHS       Orders Placed This Encounter   Procedures   • Diet Order Diet: Consistent CHO (Diabetic)     Standing Status:   Standing     Number of Occurrences:   1     Order Specific Question:   Diet:     Answer:   Consistent CHO (Diabetic) [4]       Assessment:  Active Hospital Problems    Diagnosis    • *S/P laminectomy with spinal fusion    • Thyroid disorder    • Dysphagia    • Diabetic polyneuropathy (HCC)    • Restless legs syndrome (RLS)    • Type 2 diabetes mellitus, without long-term current use of insulin (HCC)    • COPD (chronic obstructive pulmonary disease) (HCC)     • Tremor    • HTN (hypertension)        Medical Decision Making and Plan:  Lumbar retrospondylolisthesis - Patient with significant back pain caused by retrospondy of her L2 on her previous back surgery. Patient is now s/p L2-L3 XLIF and then L2-S1 redo laminectomies with Dr. Lechuga on 5/25/21 and 5/28/21  -PT and OT for mobility and ADLs. TLSO - poorly fitting, may need vendor to reassess.  -Follow-up with NSG.      Pain Control - Patient with post-operative and neuropathic pain. Continue PRN Tylenol, Oxycodone, and scheduled Gabapentin.   -Gabapentin 800 mg TID. Lidocaine patch bilaterally to low back. Switch Oxycodone to Norco    -Tizanidine TID for muscle spasms    Tremor - New tremor on 6/7/21, could be Gabapentin although home dose. Has a history of hypokalemia. Will check CMP - wnl, resolved on its own.      DM2 - Patient on SSI on transfer. Will monitor. Previously on Glipizide and Januvia. Recent changes with PCP were causing higher A1c.  A1c 9.2 on admission.   -Will restart Glipizide 2.5 mg daily. Improved on diabetic diet and Glipizide, will monitor. Highs into 170s. Increase Glipizide 5mg, requriring SSI. Start Januvia, improved.    -Blood sugars into 220s.  Increase Glipizide to 10 mg daily    HTN - Patient on Spironolactone and Lasix on transfer.   -SBP labile, down into 80s. 1/2 tab Spironolactone. Still into 90s, will reduce lasix to 20 mg. Monitor LE swelling.      COPD - Patient on PRN Albuterol. On 2L on transfer. RT to consult     HLD - Patient on Atorvastatin 20 mg daily.      Anemia - Check AM CBC. 10.6, will continue to monitor, Check Fe panel at next check    -Iron panel normal. Elevated RDW, check B12/folate - both within normal limits     Hypothyroidism - On 75 mcg Levothyroxine, check TSH - wnl     GERD - Patient on Prilosec QHS    GI Ppx - Patient with incontinence on 6/7, discontinue bowel medications. Low threshold for imaging of back as recent 2 stage.      RLS - On Requip  QHS.     Depression - Patient on Effexor  mg and 25 mg short acting     DVT ppx - Patient on Lovenox on transfer.     Total time:  36 minutes.  I spent greater than 50% of the time for patient care, counseling, and coordination on this date, including unit/floor time, and face-to-face time with the patient as per interval events and assessment and plan above. Topics discussed included discharge planning, elevated BSG, low BP, increase glipizide, and decrease lasix. Patient was discussed separately in IDT today; please see details above.     Deedee Ivy M.D.

## 2021-06-10 NOTE — CARE PLAN
Problem: Dressing  Goal: STG-Within one week, patient will dress LB with Min A using DME/AE as needed  Outcome: Met     Problem: Toileting  Goal: STG-Within one week, patient will complete toileting tasks with SBA using DME/AE as needed  Outcome: Met     Problem: Functional Transfers  Goal: STG-Within one week, patient will transfer to toilet with SBA at w/c level  Outcome: Met

## 2021-06-11 LAB
GLUCOSE BLD-MCNC: 103 MG/DL (ref 65–99)
GLUCOSE BLD-MCNC: 160 MG/DL (ref 65–99)
GLUCOSE BLD-MCNC: 164 MG/DL (ref 65–99)
GLUCOSE BLD-MCNC: 176 MG/DL (ref 65–99)

## 2021-06-11 PROCEDURE — 97530 THERAPEUTIC ACTIVITIES: CPT

## 2021-06-11 PROCEDURE — 94760 N-INVAS EAR/PLS OXIMETRY 1: CPT

## 2021-06-11 PROCEDURE — 99232 SBSQ HOSP IP/OBS MODERATE 35: CPT | Performed by: PHYSICAL MEDICINE & REHABILITATION

## 2021-06-11 PROCEDURE — 770010 HCHG ROOM/CARE - REHAB SEMI PRIVAT*

## 2021-06-11 PROCEDURE — 700102 HCHG RX REV CODE 250 W/ 637 OVERRIDE(OP): Performed by: PHYSICAL MEDICINE & REHABILITATION

## 2021-06-11 PROCEDURE — 97110 THERAPEUTIC EXERCISES: CPT

## 2021-06-11 PROCEDURE — A9270 NON-COVERED ITEM OR SERVICE: HCPCS | Performed by: PHYSICAL MEDICINE & REHABILITATION

## 2021-06-11 PROCEDURE — 700101 HCHG RX REV CODE 250: Performed by: PHYSICAL MEDICINE & REHABILITATION

## 2021-06-11 PROCEDURE — 82962 GLUCOSE BLOOD TEST: CPT

## 2021-06-11 PROCEDURE — 94660 CPAP INITIATION&MGMT: CPT

## 2021-06-11 PROCEDURE — 97535 SELF CARE MNGMENT TRAINING: CPT

## 2021-06-11 PROCEDURE — 94640 AIRWAY INHALATION TREATMENT: CPT

## 2021-06-11 RX ORDER — GABAPENTIN 400 MG/1
800 CAPSULE ORAL 3 TIMES DAILY
Qty: 90 CAPSULE | Refills: 3 | Status: SHIPPED | OUTPATIENT
Start: 2021-06-11

## 2021-06-11 RX ORDER — TIZANIDINE 4 MG/1
4 TABLET ORAL 3 TIMES DAILY
Qty: 30 TABLET | Refills: 0 | Status: SHIPPED | OUTPATIENT
Start: 2021-06-11

## 2021-06-11 RX ORDER — TRAZODONE HYDROCHLORIDE 50 MG/1
50 TABLET ORAL
Qty: 30 TABLET | Refills: 3 | Status: SHIPPED | OUTPATIENT
Start: 2021-06-11

## 2021-06-11 RX ORDER — SPIRONOLACTONE 25 MG/1
12.5 TABLET ORAL EVERY EVENING
Qty: 30 TABLET | Refills: 3 | Status: SHIPPED | OUTPATIENT
Start: 2021-06-11

## 2021-06-11 RX ORDER — FUROSEMIDE 20 MG/1
20 TABLET ORAL DAILY
Qty: 60 TABLET | Refills: 3 | Status: SHIPPED | OUTPATIENT
Start: 2021-06-12

## 2021-06-11 RX ORDER — GLIPIZIDE 10 MG/1
10 TABLET, FILM COATED, EXTENDED RELEASE ORAL
Qty: 30 TABLET | Refills: 3 | Status: SHIPPED | OUTPATIENT
Start: 2021-06-12

## 2021-06-11 RX ORDER — BUDESONIDE AND FORMOTEROL FUMARATE DIHYDRATE 160; 4.5 UG/1; UG/1
2 AEROSOL RESPIRATORY (INHALATION) 2 TIMES DAILY
Qty: 1 EACH | Refills: 3 | Status: SHIPPED | OUTPATIENT
Start: 2021-06-11

## 2021-06-11 RX ORDER — POTASSIUM CHLORIDE 750 MG/1
10 TABLET, EXTENDED RELEASE ORAL
Qty: 60 TABLET | Refills: 11 | Status: SHIPPED | OUTPATIENT
Start: 2021-06-11

## 2021-06-11 RX ORDER — OMEPRAZOLE 20 MG/1
20 CAPSULE, DELAYED RELEASE ORAL
Qty: 30 CAPSULE | Refills: 3 | Status: SHIPPED | OUTPATIENT
Start: 2021-06-11

## 2021-06-11 RX ADMIN — TIZANIDINE 4 MG: 4 TABLET ORAL at 20:36

## 2021-06-11 RX ADMIN — ROPINIROLE HYDROCHLORIDE 8 MG: 1 TABLET, FILM COATED ORAL at 20:18

## 2021-06-11 RX ADMIN — LEVOTHYROXINE SODIUM 75 MCG: 75 TABLET ORAL at 20:19

## 2021-06-11 RX ADMIN — ATORVASTATIN CALCIUM 20 MG: 10 TABLET, FILM COATED ORAL at 20:18

## 2021-06-11 RX ADMIN — HYDROCODONE BITARTRATE AND ACETAMINOPHEN 1 TABLET: 10; 325 TABLET ORAL at 00:09

## 2021-06-11 RX ADMIN — GABAPENTIN 800 MG: 400 CAPSULE ORAL at 15:47

## 2021-06-11 RX ADMIN — POTASSIUM CHLORIDE 10 MEQ: 1500 TABLET, EXTENDED RELEASE ORAL at 20:19

## 2021-06-11 RX ADMIN — CHOLECALCIFEROL TAB 25 MCG (1000 UNIT) 5000 UNITS: 25 TAB at 08:47

## 2021-06-11 RX ADMIN — VENLAFAXINE HYDROCHLORIDE 150 MG: 75 CAPSULE, EXTENDED RELEASE ORAL at 20:17

## 2021-06-11 RX ADMIN — VENLAFAXINE 25 MG: 25 TABLET ORAL at 20:19

## 2021-06-11 RX ADMIN — INSULIN HUMAN 2 UNITS: 100 INJECTION, SOLUTION PARENTERAL at 17:49

## 2021-06-11 RX ADMIN — OMEPRAZOLE 20 MG: 20 CAPSULE, DELAYED RELEASE ORAL at 20:18

## 2021-06-11 RX ADMIN — TIZANIDINE 4 MG: 4 TABLET ORAL at 08:48

## 2021-06-11 RX ADMIN — CALCIUM CARBONATE (ANTACID) CHEW TAB 500 MG 500 MG: 500 CHEW TAB at 20:18

## 2021-06-11 RX ADMIN — HYDROCODONE BITARTRATE AND ACETAMINOPHEN 1 TABLET: 10; 325 TABLET ORAL at 04:08

## 2021-06-11 RX ADMIN — GABAPENTIN 800 MG: 400 CAPSULE ORAL at 20:18

## 2021-06-11 RX ADMIN — INSULIN HUMAN 2 UNITS: 100 INJECTION, SOLUTION PARENTERAL at 20:25

## 2021-06-11 RX ADMIN — CALCIUM CARBONATE (ANTACID) CHEW TAB 500 MG 500 MG: 500 CHEW TAB at 08:47

## 2021-06-11 RX ADMIN — HYDROCODONE BITARTRATE AND ACETAMINOPHEN 1 TABLET: 10; 325 TABLET ORAL at 15:53

## 2021-06-11 RX ADMIN — DIPHENHYDRAMINE HYDROCHLORIDE, ZINC ACETATE: 2; .1 CREAM TOPICAL at 15:49

## 2021-06-11 RX ADMIN — GLIPIZIDE 10 MG: 2.5 TABLET, FILM COATED, EXTENDED RELEASE ORAL at 08:47

## 2021-06-11 RX ADMIN — HYDROCODONE BITARTRATE AND ACETAMINOPHEN 1 TABLET: 10; 325 TABLET ORAL at 08:54

## 2021-06-11 RX ADMIN — BUDESONIDE AND FORMOTEROL FUMARATE DIHYDRATE 2 PUFF: 160; 4.5 AEROSOL RESPIRATORY (INHALATION) at 07:58

## 2021-06-11 RX ADMIN — HYDROCODONE BITARTRATE AND ACETAMINOPHEN 1 TABLET: 10; 325 TABLET ORAL at 21:36

## 2021-06-11 RX ADMIN — SPIRONOLACTONE 12.5 MG: 25 TABLET, FILM COATED ORAL at 20:19

## 2021-06-11 RX ADMIN — BUDESONIDE AND FORMOTEROL FUMARATE DIHYDRATE 2 PUFF: 160; 4.5 AEROSOL RESPIRATORY (INHALATION) at 20:20

## 2021-06-11 RX ADMIN — INSULIN HUMAN 2 UNITS: 100 INJECTION, SOLUTION PARENTERAL at 11:54

## 2021-06-11 RX ADMIN — TRAZODONE HYDROCHLORIDE 50 MG: 50 TABLET ORAL at 00:09

## 2021-06-11 RX ADMIN — TEMAZEPAM 30 MG: 15 CAPSULE ORAL at 22:36

## 2021-06-11 RX ADMIN — FUROSEMIDE 20 MG: 20 TABLET ORAL at 08:47

## 2021-06-11 RX ADMIN — LIDOCAINE 2 PATCH: 50 PATCH TOPICAL at 08:48

## 2021-06-11 RX ADMIN — HYDROCODONE BITARTRATE AND ACETAMINOPHEN 1 TABLET: 10; 325 TABLET ORAL at 12:10

## 2021-06-11 RX ADMIN — SITAGLIPTIN 50 MG: 50 TABLET, FILM COATED ORAL at 08:47

## 2021-06-11 RX ADMIN — GABAPENTIN 800 MG: 400 CAPSULE ORAL at 08:47

## 2021-06-11 RX ADMIN — TIZANIDINE 4 MG: 4 TABLET ORAL at 15:47

## 2021-06-11 ASSESSMENT — ACTIVITIES OF DAILY LIVING (ADL)
BED_CHAIR_WHEELCHAIR_TRANSFER_DESCRIPTION: INCREASED TIME;ADAPTIVE EQUIPMENT
TUB_SHOWER_TRANSFER_DESCRIPTION: GRAB BAR;SHOWER BENCH
BED_CHAIR_WHEELCHAIR_TRANSFER_DESCRIPTION: ADAPTIVE EQUIPMENT;INCREASED TIME
SHOWER_TRANSFER_LEVEL_OF_ASSIST: ABLE TO COMPLETE SHOWER TRANSFER WITHOUT ASSIST
TOILETING_LEVEL_OF_ASSIST: ABLE TO COMPLETE TOILETING WITHOUT ASSIST
TOILET_TRANSFER_LEVEL_OF_ASSIST: ABLE TO COMPLETE TOILET TRANSFER WITHOUT ASSIST
BED_CHAIR_WHEELCHAIR_TRANSFER_DESCRIPTION: ADAPTIVE EQUIPMENT

## 2021-06-11 ASSESSMENT — PAIN SCALES - WONG BAKER
WONGBAKER_NUMERICALRESPONSE: DOESN'T HURT AT ALL
WONGBAKER_NUMERICALRESPONSE: DOESN'T HURT AT ALL

## 2021-06-11 ASSESSMENT — PAIN DESCRIPTION - PAIN TYPE
TYPE: CHRONIC PAIN

## 2021-06-11 ASSESSMENT — GAIT ASSESSMENTS
ASSISTIVE DEVICE: 4 WHEEL WALKER
DEVIATION: BRADYKINETIC
DISTANCE (FEET): 250
GAIT LEVEL OF ASSIST: MODIFIED INDEPENDENT

## 2021-06-11 NOTE — FLOWSHEET NOTE
06/11/21 0759   Events/Summary/Plan   Events/Summary/Plan Symbicort, SpO2 check   Vital Signs   Pulse 61   Respiration 16   Pulse Oximetry 94 %   $ Pulse Oximetry (Spot Check) Yes   Oxygen   O2 Delivery Device None - Room Air   Non-Invasive Ventilation [facility]   Nocturnal CPAP or BIPAP CPAP - [facility]   $ System Evaluation Yes

## 2021-06-11 NOTE — CARE PLAN
Problem: OT Long Term Goals  Goal: LTG-By discharge, patient will complete basic self care tasks at Mod I using DME/AE as needed  Outcome: Met  Goal: LTG-By discharge, patient will perform bathroom transfers at Mod I using DME/AE as needed  Outcome: Met

## 2021-06-11 NOTE — PROGRESS NOTES
"Rehab Progress Note   Encounter Date: 6/11/2021  CC: Lumbar surgery, decreased mobility    Interval Events (Subjective)  Patient is doing well today. She has no new complaints. She is ready to go home tomorrow. We discussed her medications and her functional recovery. She is in good spirits. She had a phone meeting with her pain doc yesterday and only needs her non-opioid prescriptions.     IDT Team Meeting 6/10/2021  DC/Disposition: 6/12/21     Objective:  VITAL SIGNS: /62   Pulse 61   Temp 36.7 °C (98 °F) (Oral)   Resp 16   Ht 1.651 m (5' 5\")   Wt 87.5 kg (192 lb 14.4 oz)   LMP  (LMP Unknown)   SpO2 94%   BMI 32.10 kg/m²   Gen: NAD  Psych: Mood and affect appropriate  CV: RRR, no edema  Resp: CTAB, no upper airway sounds  Abd: NTND  Neuro: AOx4, walking with FWW  Unchanged from 6/9/21 in addition walking with FWW, forward flexed short step length      Recent Results (from the past 72 hour(s))   POCT glucose device results    Collection Time: 06/08/21 11:42 AM   Result Value Ref Range    Glucose - Accu-Ck 112 (H) 65 - 99 mg/dL   POCT glucose device results    Collection Time: 06/08/21  5:34 PM   Result Value Ref Range    Glucose - Accu-Ck 137 (H) 65 - 99 mg/dL   POCT glucose device results    Collection Time: 06/08/21  9:02 PM   Result Value Ref Range    Glucose - Accu-Ck 131 (H) 65 - 99 mg/dL   POCT glucose device results    Collection Time: 06/09/21  7:31 AM   Result Value Ref Range    Glucose - Accu-Ck 91 65 - 99 mg/dL   POCT glucose device results    Collection Time: 06/09/21 10:59 AM   Result Value Ref Range    Glucose - Accu-Ck 227 (H) 65 - 99 mg/dL   POCT glucose device results    Collection Time: 06/09/21  5:21 PM   Result Value Ref Range    Glucose - Accu-Ck 99 65 - 99 mg/dL   POCT glucose device results    Collection Time: 06/10/21  7:35 AM   Result Value Ref Range    Glucose - Accu-Ck 93 65 - 99 mg/dL   POCT glucose device results    Collection Time: 06/10/21 11:02 AM   Result Value Ref " Range    Glucose - Accu-Ck 159 (H) 65 - 99 mg/dL   POCT glucose device results    Collection Time: 06/10/21  5:17 PM   Result Value Ref Range    Glucose - Accu-Ck 114 (H) 65 - 99 mg/dL   POCT glucose device results    Collection Time: 06/10/21  9:29 PM   Result Value Ref Range    Glucose - Accu-Ck 100 (H) 65 - 99 mg/dL   POCT glucose device results    Collection Time: 06/11/21  7:55 AM   Result Value Ref Range    Glucose - Accu-Ck 103 (H) 65 - 99 mg/dL       Current Facility-Administered Medications   Medication Frequency   • glipiZIDE SR (GLUCOTROL) tablet 10 mg QAM AC   • furosemide (LASIX) tablet 20 mg DAILY   • HYDROcodone/acetaminophen (NORCO)  MG per tablet 1 tablet Q3HRS PRN   • SITagliptin (JANUVIA) tablet 50 mg DAILY   • tizanidine (ZANAFLEX) tablet 4 mg TID   • senna-docusate (PERICOLACE or SENOKOT S) 8.6-50 MG per tablet 2 tablet BID PRN    And   • polyethylene glycol/lytes (MIRALAX) PACKET 1 Packet QDAY PRN    And   • magnesium hydroxide (MILK OF MAGNESIA) suspension 30 mL QDAY PRN    And   • bisacodyl (DULCOLAX) suppository 10 mg QDAY PRN   • lidocaine (LIDODERM) 5 % 2 Patch DAILY   • spironolactone (ALDACTONE) tablet 12.5 mg Q EVENING   • diphenhydrAMINE-zinc acetate (BENADRYL ITCH) topical cream TID PRN   • diphenhydrAMINE (BENADRYL) tablet/capsule 25 mg Q6HRS PRN   • budesonide-formoterol (SYMBICORT) 160-4.5 MCG/ACT inhaler 2 Puff BID (RT)   • Respiratory Therapy Consult Continuous RT   • hydrALAZINE (APRESOLINE) tablet 25 mg Q8HRS PRN   • acetaminophen (Tylenol) tablet 650 mg Q4HRS PRN   • artificial tears ophthalmic solution 1 Drop PRN   • benzocaine-menthol (CEPACOL) lozenge 1 Lozenge Q2HRS PRN   • mag hydrox-al hydrox-simeth (MAALOX PLUS ES or MYLANTA DS) suspension 20 mL Q2HRS PRN   • ondansetron (ZOFRAN ODT) dispertab 4 mg 4X/DAY PRN    Or   • ondansetron (ZOFRAN) syringe/vial injection 4 mg 4X/DAY PRN   • traZODone (DESYREL) tablet 50 mg QHS PRN   • sodium chloride (OCEAN) 0.65 % nasal  spray 2 Spray PRN   • insulin regular (HumuLIN R,NovoLIN R) injection 4X/DAY ACHS    And   • glucose 4 g chewable tablet 16 g Q15 MIN PRN    And   • dextrose 50% (D50W) injection 50 mL Q15 MIN PRN   • calcium carbonate (TUMS) chewable tab 500 mg BID   • vitamin D (cholecalciferol) tablet 5,000 Units DAILY   • albuterol inhaler 2 Puff Q6HRS PRN   • atorvastatin (LIPITOR) tablet 20 mg QHS   • gabapentin (NEURONTIN) capsule 800 mg TID   • levothyroxine (SYNTHROID) tablet 75 mcg QHS   • omeprazole (PRILOSEC) capsule 20 mg QHS   • ROPINIRole (REQUIP) tablet 8 mg QHS   • temazepam (Restoril) capsule 30 mg HS PRN   • venlafaxine (EFFEXOR) tablet 25 mg Q EVENING   • venlafaxine XR (EFFEXOR XR) capsule 150 mg Q EVENING   • potassium chloride SA (Kdur) tablet 10 mEq QHS       Orders Placed This Encounter   Procedures   • Diet Order Diet: Consistent CHO (Diabetic)     Standing Status:   Standing     Number of Occurrences:   1     Order Specific Question:   Diet:     Answer:   Consistent CHO (Diabetic) [4]       Assessment:  Active Hospital Problems    Diagnosis    • *S/P laminectomy with spinal fusion    • Thyroid disorder    • Dysphagia    • Diabetic polyneuropathy (HCC)    • Restless legs syndrome (RLS)    • Type 2 diabetes mellitus, without long-term current use of insulin (HCC)    • COPD (chronic obstructive pulmonary disease) (HCC)    • Tremor    • HTN (hypertension)        Medical Decision Making and Plan:  Lumbar retrospondylolisthesis - Patient with significant back pain caused by retrospondy of her L2 on her previous back surgery. Patient is now s/p L2-L3 XLIF and then L2-S1 redo laminectomies with Dr. Lechuga on 5/25/21 and 5/28/21  -PT and OT for mobility and ADLs. TLSO - poorly fitting, may need vendor to reassess.  -Follow-up with NSG.      Pain Control - Patient with post-operative and neuropathic pain. Continue PRN Tylenol, Oxycodone, and scheduled Gabapentin.   -Gabapentin 800 mg TID. Lidocaine patch bilaterally  to low back. Switch Oxycodone to Norco    -Tizanidine TID for muscle spasms    Tremor - New tremor on 6/7/21, could be Gabapentin although home dose. Has a history of hypokalemia. Will check CMP - wnl, resolved on its own.      DM2 - Patient on SSI on transfer. Will monitor. Previously on Glipizide and Januvia. Recent changes with PCP were causing higher A1c.  A1c 9.2 on admission.   -Will restart Glipizide 2.5 mg daily. Improved on diabetic diet and Glipizide, will monitor. Highs into 170s. Increase Glipizide 5mg, requriring SSI. Start Januvia, improved.    -Blood sugars into 220s.  Increase Glipizide to 10 mg daily    HTN - Patient on Spironolactone and Lasix on transfer.   -SBP labile, down into 80s. 1/2 tab Spironolactone. Still into 90s, will reduce lasix to 20 mg. Monitor LE swelling.      COPD - Patient on PRN Albuterol. On 2L on transfer. RT to consult     HLD - Patient on Atorvastatin 20 mg daily.      Anemia - Check AM CBC. 10.6, will continue to monitor, Check Fe panel at next check    -Iron panel normal. Elevated RDW, check B12/folate - both within normal limits     Hypothyroidism - On 75 mcg Levothyroxine, check TSH - wnl     GERD - Patient on Prilosec QHS    GI Ppx - Patient with incontinence on 6/7, discontinue bowel medications. Low threshold for imaging of back as recent 2 stage.      RLS - On Requip QHS.     Depression - Patient on Effexor  mg and 25 mg short acting     DVT ppx - Patient on Lovenox on transfer.     Total time:  26 minutes.  I spent greater than 50% of the time for patient care, counseling, and coordination on this date, including unit/floor time, and face-to-face time with the patient as per interval events and assessment and plan above.     Antonio Gordon D.O.

## 2021-06-11 NOTE — CARE PLAN
The patient is Stable - Low risk of patient condition declining or worsening  Problem: Pain - Standard  Goal: Alleviation of pain or a reduction in pain to the patient’s comfort goal  6/11/2021 0038 by Diana Cao R.N.  Outcome: Progressing  Flowsheets  Taken 6/11/2021 0000 by Diana Cao R.N.  Pain Rating Scale (NPRS): 7  Taken 6/10/2021 2029 by Diana Cao R.N.  Non Verbal Scale: Calm  Diaz-Orellana Scale: 0   OB Pain Level: 0-No Pain  Taken 6/10/2021 1517 by Princess Paola Mejia R.N.  OB Pain Intervention:   Medication - See MAR   Relaxation technique   Repositioned   Rest  Note: Complaint of pain on her lower back. Staples were removed today. Pain medication administered     Problem: Skin Integrity  Goal: Skin integrity is maintained or improved  Outcome: Progressing  Note:   Patient's skin remains intact and free from new or accidental injury this shift; no s/s of infection. Will continue to monitor.

## 2021-06-11 NOTE — DISCHARGE PLANNING
"Case management Summary:   Met with patient & SO Cesar today at bedside prior to discharge.   Reviewed all follow up appointments: NSGY, PCP & PM&R.   Referral made to [facility] and they are have accepted referral and are ready to follow.    [facility] unable to accept referral for 4WW. Patient has rec'd FWW covered by Medicare w/in past 5 yrs. Patient will need to private pay 4WW. Referral cancelled w/ [facility].    Patient has current application on file w/ [facility]. Contacted [facility]. They have tub transfer bench & 4WW available. Patient will need to provide proof of current address & income / expenses prior to receiving new DME. Also, requesting patient to bring back shower chair to exchange for tub transfer bench.   Updated patient w/ above [facility] information & [facility] closes at 5pm today, not open on weekends. States \"I'll get this done on Monday.\"   Patient has FWW to use over the weekend.    Provided [place] transportation brochure for Medicaid. Encouraged to utilize for non-emergent MD appts, therapy appts.  Patient receives caregiver services through [facility]. She will contact to resume & increase hours of service. [facility] accepts requests for service from patient / family only.     During hospitalization, I have provided support and education and have been available for questions and information during hours of operation, communicated with therapy team and MD along with providing links/resources  to outside services.    Patient verbalizes agreement with all plans and has an understanding of the next steps within the post acute services.     Individualized Goals:   1. To improve walking  2. To improve strength  3. To return home  4. Verify home assessment, DME, care support & assistance.    Outcome:   1. Goal partially met: patient has made improvement since rehab admit, but is not at baseline. It is anticipated she will continue to improve w/ OP mgmt. HH referral in " place.  2. Goal partially met: patient has made improvement since rehab admit, but is not at baseline. It is anticipated she will continue to improve w/ OP mgmt. HH referral in place.  3. Goal met & completed.  4. Goal met & completed.

## 2021-06-11 NOTE — THERAPY
"Occupational Therapy  Daily Treatment     Patient Name: Valentina Lu  Age:  65 y.o., Sex:  female  Medical Record #: [id number]  Today's Date: 6/11/2021     Precautions  Precautions: TLSO (Thoracolumbosacral orthosis), Spinal / Back Precautions  (Mod I in room with FWW)  Comments: Mod I in room     Safety   ADL Safety : Modified Independent    Subjective    \"I will get in a better habit of wearing the brace at home. I know it is important. People fall at my house all the time because kids leave toys all over the floor.\"     Objective       06/11/21 1001   Precautions   Precautions TLSO (Thoracolumbosacral orthosis);Spinal / Back Precautions   (Mod I in room with FWW)   Safety    ADL Safety  Modified Independent   Cognition    Level of Consciousness Alert   Sleep/Wake Cycle   Sleep & Rest Awake   Functional Level of Assist   Eating Independent   Grooming Independent   Bathing Modified Independent   Bathing Description Grab bar;Hand held shower;Tub bench   Upper Body Dressing Modified Independent   Lower Body Dressing Modified Independent   Lower Body Dressing Description Grab bar;Reacher   Bed, Chair, Wheelchair Transfer Modified Independent   Bed Chair Wheelchair Transfer Description Adaptive equipment   Tub / Shower Transfers Modified Independent   Tub Shower Transfer Description Grab bar;Shower bench   Interdisciplinary Plan of Care Collaboration   IDT Collaboration with  Physical Therapist   Patient Position at End of Therapy Edge of Bed;Call Light within Reach;Tray Table within Reach;Phone within Reach   Collaboration Comments Re: pt requesting shower   Strengths & Barriers   Strengths Able to follow instructions;Effective communication skills;Alert and oriented;Manages pain appropriately;Motivated for self care and independence;Pleasant and cooperative;Willingly participates in therapeutic activities   OT Total Time Spent   OT Individual Total Time Spent (Mins) 60   OT Charge Group   OT Self Care / ADL 3 "   OT Therapy Activity 1       Assessment    Pt at mod I level for ADLs. Pt at times feels the TLSO is inconvenient, but reports she will do a better job of wearing it at home. OT reinforced recommendation to wear anytime pt is OOB except during showers.    Strengths: Able to follow instructions, Effective communication skills, Alert and oriented, Manages pain appropriately, Motivated for self care and independence, Pleasant and cooperative, Willingly participates in therapeutic activities  Barriers: Decreased endurance, Fatigue, Generalized weakness, Impaired activity tolerance    Plan    D/c tomorrow    Passport completed      Occupational Therapy Goals (Active)     Problem: OT Long Term Goals     Dates: Start: 06/02/21       Goal: LTG-By discharge, patient will complete basic self care tasks at Mod I using DME/AE as needed     Dates: Start: 06/02/21          Goal: LTG-By discharge, patient will perform bathroom transfers at Mod I using DME/AE as needed     Dates: Start: 06/02/21

## 2021-06-11 NOTE — THERAPY
"Physical Therapy   Daily Treatment     Patient Name: Valentina Lu  Age:  65 y.o., Sex:  female  Medical Record #: [id number]  Today's Date: 6/11/2021     Precautions  Precautions: TLSO (Thoracolumbosacral orthosis), Spinal / Back Precautions  (Mod I in room with FWW)  Comments: Mod I in room     Subjective    Pt in bed, agreeable to PT.      Objective       06/11/21 0831   Precautions   Precautions TLSO (Thoracolumbosacral orthosis);Spinal / Back Precautions    Comments Mod I in room    Stairs Functional Level of Assist   Level of Assist with Stairs Stand by Assist   # of Stairs Climbed 4  (6\")   Stairs Description Extra time;Supervision for safety;Verbal cueing  (Pt used wall for UE support to mimic home environment )   Transfer Functional Level of Assist   Bed, Chair, Wheelchair Transfer Modified Independent   Bed Chair Wheelchair Transfer Description Increased time;Adaptive equipment  (bed > WC > EOB)   Bed Mobility    Supine to Sit Modified Independent   Sit to Stand Modified Independent   Interdisciplinary Plan of Care Collaboration   IDT Collaboration with  Nursing   Patient Position at End of Therapy Seated;Edge of Bed;Call Light within Reach;Tray Table within Reach   Collaboration Comments RN administered medication during PT session, with pt after session    PT Total Time Spent   PT Individual Total Time Spent (Mins) 30   PT Charge Group   PT Therapeutic Activities 2       Assessment    Pt participatory in session. Reported feeling \"weak\" during stair training, but no signs of instability.     Strengths: Effective communication skills, Independent prior level of function, Motivated for self care and independence, Pleasant and cooperative, Supportive family, Willingly participates in therapeutic activities  Barriers: Fatigue, Generalized weakness, Home accessibility, Impaired balance, Impaired activity tolerance, Limited mobility, Pain, Pain poorly managed    Plan    Pt to DC tomorrow, complete " passport and DC IRF Evan      Physical Therapy Problems (Active)     Problem: PT-Long Term Goals     Dates: Start: 06/02/21       Goal: LTG-By discharge, patient will ambulate     Dates: Start: 06/02/21       Description: AMB x 150 feet with FWW, mod I        Goal: LTG-By discharge, patient will transfer one surface to another     Dates: Start: 06/02/21       Description: SPT with FWW, mod I        Goal: LTG-By discharge, patient will transfer in/out of a car     Dates: Start: 06/02/21       Description: Car transfer with FWW with SPV       Goal: LTG-By discharge, patient will     Dates: Start: 06/02/21       Description: Up/down 2 steps with one HR and CGA

## 2021-06-11 NOTE — THERAPY
Recreational Therapy  Daily Treatment     Patient Name: Valentina Lu  AGE:  65 y.o., SEX:  female  Medical Record #: [id number]  Today's Date: 6/11/2021       Subjective    Pt speaking on why she had left early her last stay and how important her friends are in her life.      Objective       06/11/21 1101   Functional Ability Status - Cognitive   Attention Span Remains on Task   Comprehension Follows Three Step Commands   Judgment Able to Make Independent Decisions   Functional Ability Status - Emotional    Affect Appropriate   Mood Appropriate   Behavior Appropriate   Skilled Intervention    Skilled Intervention Relaxation / Coping Skills   Skilled Intervention Comments 36 piece puzzle   Interdisciplinary Plan of Care Collaboration   IDT Collaboration with  Family / Caregiver   Patient Position at End of Therapy In Bed;Tray Table within Reach;Call Light within Reach;Family / Friend in Room   Collaboration Comments SO attended session   Strengths & Barriers   Strengths Able to follow instructions;Alert and oriented;Manages pain appropriately;Supportive family;Pleasant and cooperative;Motivated for self care and independence;Willingly participates in therapeutic activities   Barriers Decreased endurance;Impaired activity tolerance   Treatment Time   Total Time Spent (mins) 30   Procedural Tracking   Procedural Tracking Community Re-Integration;Community Skills Development;Leisure Skills Awareness;Leisure Skills Development       Assessment    Pt chose to stay seated during the putting together of the puzzle. Pt completed the puzzle and spoke on her excitement to return home.     Strengths: (P) Able to follow instructions, Alert and oriented, Manages pain appropriately, Supportive family, Pleasant and cooperative, Motivated for self care and independence, Willingly participates in therapeutic activities  Barriers: (P) Decreased endurance, Impaired activity tolerance    Plan    Discharge Pt.       Passport items  completed:  Verbalize two positive leisure activities, discuss returning to work, hobbies, community groups or volunteer activities, explore community resources

## 2021-06-11 NOTE — DISCHARGE PLANNING
Case Management Discharge Instructions        Discharge Location: Home with Home Health     Agency Name / Phone: [facility]  [phone]    Registered Nurse, Occupational & Physical Therapist  Comments: Home Health will call to set up initial appointment.      [facility] [place]  [phone]   They have tub transfer bench & 4WW available.    Make appointment for .   Please bring proof of current address, income / expenses & return previous shower chair at time of .      Follow-up Information:    Michele Farfan P.A.-C. [phone]  Neuro-surgery office    [address]    [place] [address]   Wednesday 6.16.21, check in 2:45pm.      Beckie Ontiveros A.P.R.NAlisson [phone]  Primary Care Office    [address]    [place] [address]   Friday 6.18.21, check in 2:10pm for 2:20pm appointment.     We will fax DC summary to office.      Isa Hernandez D.O.  [phone]  Physiatry (Rehab MD)    [address]    [place] [address]    Wednesday 6.23.21, check in 8:15am for 8:30am appointment.     Map provided.

## 2021-06-11 NOTE — THERAPY
Physical Therapy   Daily Treatment     Patient Name: Valentina Lu  Age:  65 y.o., Sex:  female  Medical Record #: [id number]  Today's Date: 6/11/2021     Precautions  Precautions: TLSO (Thoracolumbosacral orthosis), Spinal / Back Precautions   Comments: Mod I in room     Subjective    Pt seated in room with  present, agreeable to PT.      Objective       06/11/21 1431   Precautions   Precautions TLSO (Thoracolumbosacral orthosis);Spinal / Back Precautions    Comments Mod I in room    Gait Functional Level of Assist    Gait Level Of Assist Modified Independent   Assistive Device 4 Wheel Walker   Distance (Feet) 250   Deviation Bradykinetic   Transfer Functional Level of Assist   Bed, Chair, Wheelchair Transfer Modified Independent   Bed Chair Wheelchair Transfer Description Adaptive equipment;Increased time   Sitting Lower Body Exercises   Ankle Pumps 1 set of 15   Hip Abduction 1 set of 15   Hip Adduction 1 set of 15   Long Arc Quad 1 set of 15   Marching 1 set of 15   Hamstring Curl 1 set of 15   Comments Above exercises completed following hand out for HEP, green tband for resistance    Bed Mobility    Supine to Sit Modified Independent   Sit to Supine Modified Independent   Sit to Stand Modified Independent   Scooting Modified Independent   Rolling Modified Independent   Interdisciplinary Plan of Care Collaboration   IDT Collaboration with  Family / Caregiver   Patient Position at End of Therapy Seated;Call Light within Reach;Tray Table within Reach   Collaboration Comments pt's  present for PT session    PT Total Time Spent   PT Individual Total Time Spent (Mins) 60   PT Charge Group   PT Therapeutic Exercise 1   PT Therapeutic Activities 3     Pt issued fall information packet and briefly reviewed with PT and . Pt also issued hand-out for seated LE HEP. Also reviewed passport and checked off PT items.    Pt completed car transfer into 's side of [facility], mod I with 4WW.      Assessment    Pt demonstrates readiness to transition home tomorrow.     Strengths: Effective communication skills, Independent prior level of function, Motivated for self care and independence, Pleasant and cooperative, Supportive family, Willingly participates in therapeutic activities  Barriers: Fatigue, Generalized weakness, Home accessibility, Impaired balance, Impaired activity tolerance, Limited mobility, Pain, Pain poorly managed    Plan    DC home tomorrow.       Physical Therapy Problems (Active)     Problem: PT-Long Term Goals     Dates: Start: 06/02/21       Goal: LTG-By discharge, patient will ambulate     Dates: Start: 06/02/21       Description: AMB x 150 feet with FWW, mod I        Goal: LTG-By discharge, patient will transfer one surface to another     Dates: Start: 06/02/21       Description: SPT with FWW, mod I        Goal: LTG-By discharge, patient will transfer in/out of a car     Dates: Start: 06/02/21       Description: Car transfer with FWW with SPV       Goal: LTG-By discharge, patient will     Dates: Start: 06/02/21       Description: Up/down 2 steps with one HR and CGA

## 2021-06-11 NOTE — THERAPY
"Occupational Therapy  Daily Treatment     Patient Name: Valentina Lu  Age:  65 y.o., Sex:  female  Medical Record #: [id number]  Today's Date: 6/11/2021     Precautions  Precautions: TLSO (Thoracolumbosacral orthosis), Spinal / Back Precautions  (Mod I in room with FWW)  Comments: Mod I in room     Safety   ADL Safety : Modified Independent    Subjective    \"I'll miss you guys.\"     Objective       06/11/21 1401   Cognition    Comments pt able to learn and solve Rush [employer] Car WFL while standing at raised mat table   Balance   Comments walked to/from session with FWW. 20 minutes of standing activity   Interdisciplinary Plan of Care Collaboration   IDT Collaboration with  Family / Caregiver;Physical Therapist   Patient Position at End of Therapy Edge of Bed;Tray Table within Reach;Call Light within Reach;Phone within Reach   Collaboration Comments re: transition home, TLSO extenders, and repititons/sets for exercises   OT Total Time Spent   OT Individual Total Time Spent (Mins) 30   OT Charge Group   OT Therapy Activity 2       Assessment    Pt prepared for safe d/c home with fair standing tolerance and mobility.    Strengths: Able to follow instructions, Effective communication skills, Alert and oriented, Manages pain appropriately, Motivated for self care and independence, Pleasant and cooperative, Willingly participates in therapeutic activities  Barriers: Decreased endurance, Fatigue, Generalized weakness, Impaired activity tolerance    Plan    Pt prepared for safe d/c home.    Occupational Therapy Goals (Active)      There are no active problems.             "

## 2021-06-12 VITALS
SYSTOLIC BLOOD PRESSURE: 104 MMHG | BODY MASS INDEX: 32.14 KG/M2 | RESPIRATION RATE: 16 BRPM | OXYGEN SATURATION: 95 % | WEIGHT: 192.9 LBS | DIASTOLIC BLOOD PRESSURE: 62 MMHG | HEIGHT: 65 IN | TEMPERATURE: 97.3 F | HEART RATE: 80 BPM

## 2021-06-12 LAB — GLUCOSE BLD-MCNC: 111 MG/DL (ref 65–99)

## 2021-06-12 PROCEDURE — 99239 HOSP IP/OBS DSCHRG MGMT >30: CPT | Performed by: PHYSICAL MEDICINE & REHABILITATION

## 2021-06-12 PROCEDURE — 94760 N-INVAS EAR/PLS OXIMETRY 1: CPT

## 2021-06-12 PROCEDURE — 700101 HCHG RX REV CODE 250: Performed by: PHYSICAL MEDICINE & REHABILITATION

## 2021-06-12 PROCEDURE — A9270 NON-COVERED ITEM OR SERVICE: HCPCS | Performed by: PHYSICAL MEDICINE & REHABILITATION

## 2021-06-12 PROCEDURE — 700102 HCHG RX REV CODE 250 W/ 637 OVERRIDE(OP): Performed by: PHYSICAL MEDICINE & REHABILITATION

## 2021-06-12 PROCEDURE — 82962 GLUCOSE BLOOD TEST: CPT

## 2021-06-12 PROCEDURE — 94640 AIRWAY INHALATION TREATMENT: CPT

## 2021-06-12 RX ADMIN — HYDROCODONE BITARTRATE AND ACETAMINOPHEN 1 TABLET: 10; 325 TABLET ORAL at 04:30

## 2021-06-12 RX ADMIN — SITAGLIPTIN 50 MG: 50 TABLET, FILM COATED ORAL at 09:37

## 2021-06-12 RX ADMIN — DIPHENHYDRAMINE HCL 25 MG: 25 TABLET ORAL at 09:40

## 2021-06-12 RX ADMIN — GABAPENTIN 800 MG: 400 CAPSULE ORAL at 09:37

## 2021-06-12 RX ADMIN — HYDROCODONE BITARTRATE AND ACETAMINOPHEN 1 TABLET: 10; 325 TABLET ORAL at 07:40

## 2021-06-12 RX ADMIN — CALCIUM CARBONATE (ANTACID) CHEW TAB 500 MG 500 MG: 500 CHEW TAB at 09:37

## 2021-06-12 RX ADMIN — BUDESONIDE AND FORMOTEROL FUMARATE DIHYDRATE 2 PUFF: 160; 4.5 AEROSOL RESPIRATORY (INHALATION) at 08:41

## 2021-06-12 RX ADMIN — DIPHENHYDRAMINE HYDROCHLORIDE, ZINC ACETATE: 2; .1 CREAM TOPICAL at 09:42

## 2021-06-12 RX ADMIN — FUROSEMIDE 20 MG: 20 TABLET ORAL at 09:37

## 2021-06-12 RX ADMIN — GLIPIZIDE 10 MG: 2.5 TABLET, FILM COATED, EXTENDED RELEASE ORAL at 07:40

## 2021-06-12 RX ADMIN — CHOLECALCIFEROL TAB 25 MCG (1000 UNIT) 5000 UNITS: 25 TAB at 09:37

## 2021-06-12 RX ADMIN — LIDOCAINE 2 PATCH: 50 PATCH TOPICAL at 09:41

## 2021-06-12 RX ADMIN — TIZANIDINE 4 MG: 4 TABLET ORAL at 09:38

## 2021-06-12 ASSESSMENT — PAIN DESCRIPTION - PAIN TYPE: TYPE: ACUTE PAIN

## 2021-06-12 NOTE — PROGRESS NOTES
Patient discharged to home per order.  Discharge instructions reviewed with patient and daughter; they verbalize understanding and signed copies placed in chart.  Patient has all belongings; signed copy of form in chart.  Patient left facility at 1208 via WC accompanied by rehab staff and daughter.  Have enjoyed working with this pleasant patient.

## 2021-06-12 NOTE — DISCHARGE INSTRUCTIONS
[facility] NURSING DISCHARGE INSTRUCTIONS    Blood Pressure : 107/59  Weight: 87.5 kg (192 lb 14.4 oz)  Nursing recommendations for Valentina Vance Aly at time of discharge are as follows:  Client verbalized understanding of all discharge instructions and prescriptions.     Review all your home medications and newly ordered medications with your doctor and/or pharmacist. Follow medication instructions as directed by your doctor and/or pharmacist.    Pain Management:   Discharge Pain Medication Instructions:  Comfort Goal: Comfort with Movement  Intervention: Medication (see MAR), Rest, Distraction, Relaxation Technique, Repositioned  Notify your primary care provider if pain is unrelieved with these measures, if the pain is new, or increased in intensity.    Discharge Skin Characteristics: Warm, Dry  Discharge Skin Exam: Clear  Wound 05/25/21 Incision Flank Left steristrips, 4x4, medipore (Active)   Site Assessment Clean;Dry 06/11/21 2017   Periwound Assessment Clean;Dry 06/11/21 2017   Margins Attached edges 06/11/21 2017   Closure Closure strips 06/10/21 0820   Dressing Options Steri-Strip 06/10/21 0820   NEXT Weekly Photo (Inpatient Only) 06/11/21 06/04/21 2000       Wound 05/28/21 Incision Back Bilateral Antibiotic Ointment, 4x4, Tape (Active)   Site Assessment LORI 06/11/21 2017   Periwound Assessment LORI 06/11/21 2017   Closure Closure strips 06/10/21 1700   Drainage Amount None 06/08/21 1100   Dressing Options Steri-Strip 06/10/21 1700   Dressing Changed New 06/10/21 1700   Dressing Status Clean;Dry;Intact 06/10/21 1700   Dressing Change/Treatment Frequency Daily, and As Needed 06/09/21 2000   NEXT Weekly Photo (Inpatient Only) 06/11/21 06/04/21 2000     Skin / Wound Care Instructions: Please contact your primary care physician for any change in skin integrity.     If You Have Surgical Incisions / Wounds:  Monitor surgical site(s) for signs of increased swelling, redness or symptoms  of drainage from the site or fever as this could indicate signs and symptoms of infection. If these symptoms are noted, notifiy your primary care provider.      Discharge Safety Instructions: No Supervision Needed     Discharge Safety Concerns: No Concerns Noted  The interdisciplinary team has made recommendation that you do not require supervision in the house due to demonstration of safety with ADL's and IADLS and problem solving skills  Anti-embolic stockings are not required to increase circulation to the lower extremities.    Discharge Diet: Diabetic diet     Discharge Liquids: Thin Liquids  Discharge Bowel Function: Continent  Please contact your primary care physician for any changes in bowel habits.  Discharge Bowel Program:    Discharge Bladder Function: Continent  Discharge Urinary Devices: None      Nursing Discharge Plan:        Case Management Discharge Instructions:   Discharge Location: Home with Home Health  Agency Name/Address/Phone: [facility]  [phone]  Home Health: Registered Nurse, Occupational Therapist, Physical Therapist  Outpatient Services:    DME Provider/Phone: [facility]  [phone]  Medical Equipment Ordered: Four Wheeled Walker  Prescription Faxed to:        Discharge Medication Instructions:  Below are the medications your physician expects you to take upon discharge:    Diabetes Mellitus and Nutrition, Adult  When you have diabetes (diabetes mellitus), it is very important to have healthy eating habits because your blood sugar (glucose) levels are greatly affected by what you eat and drink. Eating healthy foods in the appropriate amounts, at about the same times every day, can help you:  · Control your blood glucose.  · Lower your risk of heart disease.  · Improve your blood pressure.  · Reach or maintain a healthy weight.  Every person with diabetes is different, and each person has different needs for a meal plan. Your health care provider may recommend  that you work with a diet and nutrition specialist (dietitian) to make a meal plan that is best for you. Your meal plan may vary depending on factors such as:  · The calories you need.  · The medicines you take.  · Your weight.  · Your blood glucose, blood pressure, and cholesterol levels.  · Your activity level.  · Other health conditions you have, such as heart or kidney disease.  How do carbohydrates affect me?  Carbohydrates, also called carbs, affect your blood glucose level more than any other type of food. Eating carbs naturally raises the amount of glucose in your blood. Carb counting is a method for keeping track of how many carbs you eat. Counting carbs is important to keep your blood glucose at a healthy level, especially if you use insulin or take certain oral diabetes medicines.  It is important to know how many carbs you can safely have in each meal. This is different for every person. Your dietitian can help you calculate how many carbs you should have at each meal and for each snack.  Foods that contain carbs include:  · Bread, cereal, rice, pasta, and crackers.  · Potatoes and corn.  · Peas, beans, and lentils.  · Milk and yogurt.  · Fruit and juice.  · Desserts, such as cakes, cookies, ice cream, and candy.  How does alcohol affect me?  Alcohol can cause a sudden decrease in blood glucose (hypoglycemia), especially if you use insulin or take certain oral diabetes medicines. Hypoglycemia can be a life-threatening condition. Symptoms of hypoglycemia (sleepiness, dizziness, and confusion) are similar to symptoms of having too much alcohol.  If your health care provider says that alcohol is safe for you, follow these guidelines:  · Limit alcohol intake to no more than 1 drink per day for nonpregnant women and 2 drinks per day for men. One drink equals 12 oz of beer, 5 oz of wine, or 1½ oz of hard liquor.  · Do not drink on an empty stomach.  · Keep yourself hydrated with water, diet soda, or  "unsweetened iced tea.  · Keep in mind that regular soda, juice, and other mixers may contain a lot of sugar and must be counted as carbs.  What are tips for following this plan?    Reading food labels  · Start by checking the serving size on the \"Nutrition Facts\" label of packaged foods and drinks. The amount of calories, carbs, fats, and other nutrients listed on the label is based on one serving of the item. Many items contain more than one serving per package.  · Check the total grams (g) of carbs in one serving. You can calculate the number of servings of carbs in one serving by dividing the total carbs by 15. For example, if a food has 30 g of total carbs, it would be equal to 2 servings of carbs.  · Check the number of grams (g) of saturated and trans fats in one serving. Choose foods that have low or no amount of these fats.  · Check the number of milligrams (mg) of salt (sodium) in one serving. Most people should limit total sodium intake to less than 2,300 mg per day.  · Always check the nutrition information of foods labeled as \"low-fat\" or \"nonfat\". These foods may be higher in added sugar or refined carbs and should be avoided.  · Talk to your dietitian to identify your daily goals for nutrients listed on the label.  Shopping  · Avoid buying canned, premade, or processed foods. These foods tend to be high in fat, sodium, and added sugar.  · Shop around the outside edge of the grocery store. This includes fresh fruits and vegetables, bulk grains, fresh meats, and fresh dairy.  Cooking  · Use low-heat cooking methods, such as baking, instead of high-heat cooking methods like deep frying.  · Cook using healthy oils, such as olive, canola, or sunflower oil.  · Avoid cooking with butter, cream, or high-fat meats.  Meal planning  · Eat meals and snacks regularly, preferably at the same times every day. Avoid going long periods of time without eating.  · Eat foods high in fiber, such as fresh fruits, " vegetables, beans, and whole grains. Talk to your dietitian about how many servings of carbs you can eat at each meal.  · Eat 4-6 ounces (oz) of lean protein each day, such as lean meat, chicken, fish, eggs, or tofu. One oz of lean protein is equal to:  ? 1 oz of meat, chicken, or fish.  ? 1 egg.  ? ¼ cup of tofu.  · Eat some foods each day that contain healthy fats, such as avocado, nuts, seeds, and fish.  Lifestyle  · Check your blood glucose regularly.  · Exercise regularly as told by your health care provider. This may include:  ? 150 minutes of moderate-intensity or vigorous-intensity exercise each week. This could be brisk walking, biking, or water aerobics.  ? Stretching and doing strength exercises, such as yoga or weightlifting, at least 2 times a week.  · Take medicines as told by your health care provider.  · Do not use any products that contain nicotine or tobacco, such as cigarettes and e-cigarettes. If you need help quitting, ask your health care provider.  · Work with a counselor or diabetes educator to identify strategies to manage stress and any emotional and social challenges.  Questions to ask a health care provider  · Do I need to meet with a diabetes educator?  · Do I need to meet with a dietitian?  · What number can I call if I have questions?  · When are the best times to check my blood glucose?  Where to find more information:  · American Diabetes Association: diabetes.org  · Academy of Nutrition and Dietetics: www.eatright.org  · National [place] of Diabetes and Digestive and Kidney Diseases (NIH): www.niddk.nih.gov  Summary  · A healthy meal plan will help you control your blood glucose and maintain a healthy lifestyle.  · Working with a diet and nutrition specialist (dietitian) can help you make a meal plan that is best for you.  · Keep in mind that carbohydrates (carbs) and alcohol have immediate effects on your blood glucose levels. It is important to count carbs and to use alcohol  carefully.  This information is not intended to replace advice given to you by your health care provider. Make sure you discuss any questions you have with your health care provider.  Document Released: 09/14/2006 Document Revised: 11/30/2018 Document Reviewed: 01/22/2018  Elsevier Patient Education © 2020 [employer]      Fall Prevention in the Home, Adult  Falls can cause injuries and can affect people from all age groups. There are many simple things that you can do to make your home safe and to help prevent falls. Ask for help when making these changes, if needed.  What actions can I take to prevent falls?  General instructions  · Use good lighting in all rooms. Replace any light bulbs that burn out.  · Turn on lights if it is dark. Use night-lights.  · Place frequently used items in easy-to-reach places. Lower the shelves around your home if necessary.  · Set up furniture so that there are clear paths around it. Avoid moving your furniture around.  · Remove throw rugs and other tripping hazards from the floor.  · Avoid walking on wet floors.  · Fix any uneven floor surfaces.  · Add color or contrast paint or tape to grab bars and handrails in your home. Place contrasting color strips on the first and last steps of stairways.  · When you use a stepladder, make sure that it is completely opened and that the sides are firmly locked. Have someone hold the ladder while you are using it. Do not climb a closed stepladder.  · Be aware of any and all pets.  What can I do in the bathroom?         · Keep the floor dry. Immediately clean up any water that spills onto the floor.  · Remove soap buildup in the tub or shower on a regular basis.  · Use non-skid mats or decals on the floor of the tub or shower.  · Attach bath mats securely with double-sided, non-slip rug tape.  · If you need to sit down while you are in the shower, use a plastic, non-slip stool.  · Install grab bars by the toilet and in the tub and shower. Do  not use towel bars as grab bars.  What can I do in the bedroom?  · Make sure that a bedside light is easy to reach.  · Do not use oversized bedding that drapes onto the floor.  · Have a firm chair that has side arms to use for getting dressed.  What can I do in the kitchen?  · Clean up any spills right away.  · If you need to reach for something above you, use a sturdy step stool that has a grab bar.  · Keep electrical cables out of the way.  · Do not use floor polish or wax that makes floors slippery. If you must use wax, make sure that it is non-skid floor wax.  What can I do in the stairways?  · Do not leave any items on the stairs.  · Make sure that you have a light switch at the top of the stairs and the bottom of the stairs. Have them installed if you do not have them.  · Make sure that there are handrails on both sides of the stairs. Fix handrails that are broken or loose. Make sure that handrails are as long as the stairways.  · Install non-slip stair treads on all stairs in your home.  · Avoid having throw rugs at the top or bottom of stairways, or secure the rugs with carpet tape to prevent them from moving.  · Choose a carpet design that does not hide the edge of steps on the stairway.  · Check any carpeting to make sure that it is firmly attached to the stairs. Fix any carpet that is loose or worn.  What can I do on the outside of my home?  · Use bright outdoor lighting.  · Regularly repair the edges of walkways and driveways and fix any cracks.  · Remove high doorway thresholds.  · Trim any shrubbery on the main path into your home.  · Regularly check that handrails are securely fastened and in good repair. Both sides of any steps should have handrails.  · Install guardrails along the edges of any raised decks or porches.  · Clear walkways of debris and clutter, including tools and rocks.  · Have leaves, snow, and ice cleared regularly.  · Use sand or salt on walkways during winter months.  · In the  garage, clean up any spills right away, including grease or oil spills.  What other actions can I take?  · Wear closed-toe shoes that fit well and support your feet. Wear shoes that have rubber soles or low heels.  · Use mobility aids as needed, such as canes, walkers, scooters, and crutches.  · Review your medicines with your health care provider. Some medicines can cause dizziness or changes in blood pressure, which increase your risk of falling.  Talk with your health care provider about other ways that you can decrease your risk of falls. This may include working with a physical therapist or  to improve your strength, balance, and endurance.  Where to find more information  · Centers for Disease Control and Prevention, STEAD[place]: https://www.cdc.gov  · National [place] on Aging: https://xu4gjmr.heather.nih.gov  Contact a health care provider if:  · You are afraid of falling at home.  · You feel weak, drowsy, or dizzy at home.  · You fall at home.  Summary  · There are many simple things that you can do to make your home safe and to help prevent falls.  · Ways to make your home safe include removing tripping hazards and installing grab bars in the bathroom.  · Ask for help when making these changes in your home.  This information is not intended to replace advice given to you by your health care provider. Make sure you discuss any questions you have with your health care provider.  Document Released: 12/08/2003 Document Revised: 11/30/2018 Document Reviewed: 08/02/2018  [employer] Patient Education © 2020 [employer] Inc.      Depression / Suicide Risk    As you are discharged from this [facility] facility, it is important to learn how to keep safe from harming yourself.    Recognize the warning signs:  · Abrupt changes in personality, positive or negative- including increase in energy   · Giving away possessions  · Change in eating patterns- significant weight changes-  positive or negative  · Change in sleeping  patterns- unable to sleep or sleeping all the time   · Unwillingness or inability to communicate  · Depression  · Unusual sadness, discouragement and loneliness  · Talk of wanting to die  · Neglect of personal appearance   · Rebelliousness- reckless behavior  · Withdrawal from people/activities they love  · Confusion- inability to concentrate     If you or a loved one observes any of these behaviors or has concerns about self-harm, here's what you can do:  · Talk about it- your feelings and reasons for harming yourself  · Remove any means that you might use to hurt yourself (examples: pills, rope, extension cords, firearm)  · Get professional help from the community (Mental Health, Substance Abuse, psychological counseling)  · Do not be alone:Call your Safe Contact- someone whom you trust who will be there for you.  · Call your local CRISIS HOTLINE 601-1160 or 468-897-2511  · Call your local Children's Mobile Crisis Response Team [place] (929) 512-5331 or www.[employer]  · Call the toll free National Suicide Prevention Hotlines   · National Suicide Prevention Lifeline 409-699-VAYW (0118)  · National Hope Line Network 800-SUICIDE (626-9362)

## 2021-06-12 NOTE — CARE PLAN
Problem: Knowledge Deficit - Standard  Goal: Patient and family/care givers will demonstrate understanding of plan of care, disease process/condition, diagnostic tests and medications  Outcome: Met     Problem: Pain - Standard  Goal: Alleviation of pain or a reduction in pain to the patient’s comfort goal  Outcome: Met     Problem: Bladder / Voiding  Goal: Patient will establish and maintain regular urinary output  Outcome: Met     Problem: Bowel Elimination  Goal: Patient will participate in bowel management program  Outcome: Met     Problem: Skin Integrity  Goal: Skin integrity is maintained or improved  Outcome: Met     Problem: Diabetes Management  Goal: Patient's ability to maintain appropriate glucose levels will be maintained or improve  Outcome: Met   The patient is Stable - Low risk of patient condition declining or worsening

## 2021-06-12 NOTE — FLOWSHEET NOTE
06/12/21 0844   Events/Summary/Plan   Events/Summary/Plan Symbicort given, SpO2 check   Vital Signs   Pulse 80   Respiration 16   Pulse Oximetry 95 %   $ Pulse Oximetry (Spot Check) Yes   Oxygen   O2 Delivery Device None - Room Air

## 2021-06-12 NOTE — DISCHARGE SUMMARY
Rehab Discharge Summary    Admission Date: 6/1/2021    Discharge Date: 6/12/2021    Attending Provider: Deedee Ivy MD/PhD    Admission Diagnosis:   Active Hospital Problems    Diagnosis    • *S/P laminectomy with spinal fusion    • Thyroid disorder    • Dysphagia    • Diabetic polyneuropathy (HCC)    • Restless legs syndrome (RLS)    • Type 2 diabetes mellitus, without long-term current use of insulin (HCC)    • COPD (chronic obstructive pulmonary disease) (HCC)    • Tremor    • HTN (hypertension)        Discharge Diagnosis:  Active Hospital Problems    Diagnosis    • *S/P laminectomy with spinal fusion    • Thyroid disorder    • Dysphagia    • Diabetic polyneuropathy (HCC)    • Restless legs syndrome (RLS)    • Type 2 diabetes mellitus, without long-term current use of insulin (HCC)    • COPD (chronic obstructive pulmonary disease) (HCC)    • Tremor    • HTN (hypertension)        HPI per H&P:  Patient is a 65 y.o. female with a past medical history significant for DM, Hypothyroidism, GERD, ALONZO, and Hx of TIA admitted to [facility] on 5/25/2021 12:09 PM with scheduled back surgery. Patient reportedly has retrospondylolisthesis at L2-L3 causing stenosis above her previous L4-L5 fusion. Patient was previously admitted to [facility] in 2019 after her cervical fusion. Patient underwent L2-L3 XLIF with Dr. Lechuga on 5/25/21. Patient underwent her second stage L2-S1 laminectomy/laminotomy on 5/28/21.  Post-operatively patient had confusion and stroke like symptoms with Rapid response. CT head was negative and patient refused MRI due to claustrophobia. Patient has since improved cognitively.  She had active hallucinations at one point which have resolved.  UA was negative.  Patient with mild anemia and hyperglycemia.      Patient was previously living in a 1 story home with 2 steps to enter; living with adult children.  Patient was last evaluated by PT on 5/29/21 and was Gregg for gait and modA for bed  mobility. Patient was last evaluated by OT on 21 and was mod-maxA for ADLs     Patient tolerated transfer to [facility]. She reports she is motivated to get more independent. She reports her back pain is slightly improved from prior to the surgery but has ongoing post-operative pain. She reports she was hallucinating due to one of the pain medications but has now been taking Oxycodone. She reports she remembers being in here 2019 but she had to discharge early as a family friends son . She is motivated this time to stay as long as required. Denies SOB. Denies NVD. Denies fever or chills. No numbness. No new focal weakness     Patient was admitted to [facility] on 2021.     Hospital Course by Problem List:  Lumbar retrospondylolisthesis - Patient with significant back pain caused by retrospondy of her L2 on her previous back surgery. Patient is now s/p L2-L3 XLIF and then L2-S1 redo laminectomies with Dr. Lechuga on 21 and 21  -PT and OT for mobility and ADLs. TLSO - poorly fitting, may need vendor to reassess.  -Follow-up with NSG.      Pain Control - Patient with post-operative and neuropathic pain. Continue PRN Tylenol, Oxycodone, and scheduled Gabapentin.   -Gabapentin 800 mg TID. Lidocaine patch bilaterally to low back. Switch Oxycodone to Norco    -Tizanidine TID for muscle spasms     Tremor - New tremor on 21, could be Gabapentin although home dose. Has a history of hypokalemia. Will check CMP - wnl, resolved on its own.      DM2 - Patient on SSI on transfer. Will monitor. Previously on Glipizide and Januvia. Recent changes with PCP were causing higher A1c.  A1c 9.2 on admission.   -Will restart Glipizide 2.5 mg daily. Improved on diabetic diet and Glipizide, will monitor. Highs into 170s. Increase Glipizide 5mg, requriring SSI. Start Januvia, improved.    -Blood sugars into 220s.  Increase Glipizide to 10 mg daily     HTN - Patient on Spironolactone and Lasix on  transfer.   -SBP labile, down into 80s. 1/2 tab Spironolactone. Still into 90s, will reduce lasix to 20 mg. Monitor LE swelling.      COPD - Patient on PRN Albuterol. On 2L on transfer. RT to consult     HLD - Patient on Atorvastatin 20 mg daily.      Anemia - Check AM CBC. 10.6, will continue to monitor, Check Fe panel at next check    -Iron panel normal. Elevated RDW, check B12/folate - both within normal limits     Hypothyroidism - On 75 mcg Levothyroxine, check TSH - wnl     GERD - Patient on Prilosec QHS     GI Ppx - Patient with incontinence on 6/7, discontinue bowel medications. Low threshold for imaging of back as recent 2 stage.      RLS - On Requip QHS.     Depression - Patient on Effexor  mg and 25 mg short acting     DVT ppx - Patient on Lovenox on transfer.     Functional Status at Discharge  Eating:  Independent  Eating Description:     Grooming:  Independent  Grooming Description:  Seated in wheelchair at sink  Bathing:  Modified Independent  Bathing Description:  Grab bar, Hand held shower, Tub bench  Upper Body Dressing:  Modified Independent  Upper Body Dressing Description:  Increased time  Lower Body Dressing:  Modified Independent  Lower Body Dressing Description:  Grab bar, Reacher  Discharge Location : Home  Patient Discharging with Assist of: Family   Level of Supervision Required: No Supervision  Recommended Equipment for Discharge: 4-Wheeled Walker;Tub Transfer Bench;Grab Bars by Toilet;Grab Bars in Tub / Shower;Sock Aid;Reacher  Recommended Services Upon Discharge: No Follow-Up Occupational Therapy Recommended  Long Term Goals Met: 2  Long Term Goals Not Met: 0  Criteria for Termination of Services: Maximum Function Achieved for Inpatient Rehabilitation  Walk:  Modified Independent  Distance Walked:  250  Number of Times Distance Was Traveled:  2  Assistive Device:  4 Wheel Walker  Gait Deviation:  Bradykinetic  Wheelchair:  Modified Independent  Distance Propelled:  100   Wheelchair  Description:  Extra time  Stairs Stand by Assist  Stairs Description Extra time, Supervision for safety, Verbal cueing (Pt used wall for UE support to mimic home environment )  Discharge Location: Home  Patient Discharging with Assist of: Family  Level of Supervision Required Upon Discharge: Intermittent Supervision  Recommended Equipment for Discharge: 4-Wheeled Walker  Recommeded Services Upon Discharge: Home Health Physical Therapy  Criteria for Termination of Services: Maximum Function Achieved for Inpatient Rehabilitation  Comprehension:  Modified Independent  Comprehension Description:     Expression:  Modified Independent  Expression Description:     Social Interaction:     Social Interaction Description:     Problem Solving:  Supervision  Problem Solving Description:     Memory:  Supervision  Memory Description:          Isa MELLO D.O., personally performed a complete drug regimen review and no potential clinically significant medication issues were identified.   Discharge Medication:     Medication List      START taking these medications      Instructions   budesonide-formoterol 160-4.5 MCG/ACT Aero  Commonly known as: SYMBICORT  Notes to patient: Breathing treatment   Inhale 2 Puffs 2 times a day.  Dose: 2 Puff     gabapentin 400 MG Caps  Commonly known as: NEURONTIN  Replaces: gabapentin 800 MG tablet  Notes to patient: Nerve pain   Take 2 Capsules by mouth 3 times a day.  Dose: 800 mg     glipiZIDE SR 10 MG Tb24  Commonly known as: GLUCOTROL  Replaces: glipiZIDE 5 MG Tabs  Notes to patient: diabetes   Take 1 tablet by mouth every morning before breakfast.  Dose: 10 mg     omeprazole 20 MG delayed-release capsule  Commonly known as: PRILOSEC  Replaces: pantoprazole 20 MG tablet  Notes to patient: Prevents heart burn   Take 1 capsule by mouth at bedtime.  Dose: 20 mg     potassium chloride SA 10 MEQ Tbcr  Commonly known as: K-DUR   Take 1 tablet by mouth at bedtime.  Dose: 10 mEq      tizanidine 4 MG Tabs  Commonly known as: ZANAFLEX   Take 1 tablet by mouth in the morning, at noon, and at bedtime.  Dose: 4 mg     traZODone 50 MG Tabs  Commonly known as: DESYREL   Take 1 tablet by mouth at bedtime as needed.  Dose: 50 mg     vitamin D 1000 UNIT Tabs  Commonly known as: VITAMIND D3   Take 5 Tablets by mouth every day.  Dose: 5,000 Units        CHANGE how you take these medications      Instructions   furosemide 20 MG Tabs  What changed:   · medication strength  · how much to take  Commonly known as: LASIX  Notes to patient: Diuretic   Take 1 tablet by mouth every day.  Dose: 20 mg     SITagliptin 50 MG Tabs  What changed:   · medication strength  · how much to take  · when to take this  · additional instructions  Commonly known as: JANUVIA  Notes to patient: Diabetes   Take 1 tablet by mouth every day.  Dose: 50 mg     spironolactone 25 MG Tabs  What changed: how much to take  Commonly known as: ALDACTONE  Notes to patient: Diuretic    Take 0.5 Tablets by mouth every evening.  Dose: 12.5 mg        CONTINUE taking these medications      Instructions   atorvastatin 20 MG Tabs  Commonly known as: LIPITOR  Notes to patient: 9:00 pm   Take 1 Tab by mouth every bedtime.  Dose: 20 mg     levothyroxine 75 MCG Tabs  Commonly known as: SYNTHROID  Notes to patient: Thyroid   Take 1 Tab by mouth every bedtime.  Dose: 75 mcg     ropinirole 4 MG tablet  Commonly known as: REQUIP  Notes to patient: Restless leg    Take 2 Tabs by mouth every bedtime.  Dose: 8 mg     temazepam 30 MG capsule  Commonly known as: RESTORIL  Notes to patient: Sleep   Take 30 mg by mouth at bedtime as needed for Sleep.  Dose: 30 mg     * venlafaxine 25 MG Tabs  Commonly known as: EFFEXOR  Notes to patient: Mood   Take 25 mg by mouth every evening.  Dose: 25 mg     * venlafaxine 150 MG extended-release capsule  Commonly known as: EFFEXOR-XR  Notes to patient: mood   Take 1 Cap by mouth every evening.  Dose: 150 mg         * This list  has 2 medication(s) that are the same as other medications prescribed for you. Read the directions carefully, and ask your doctor or other care provider to review them with you.            STOP taking these medications    albuterol 108 (90 Base) MCG/ACT Aers inhalation aerosol     fluconazole 150 MG tablet  Commonly known as: DIFLUCAN     gabapentin 800 MG tablet  Commonly known as: NEURONTIN  Replaced by: gabapentin 400 MG Caps     glipiZIDE 5 MG Tabs  Commonly known as: GLUCOTROL  Replaced by: glipiZIDE SR 10 MG Tb24     Jardiance 25 MG Tabs  Generic drug: Empagliflozin     methocarbamol 750 MG Tabs  Commonly known as: ROBAXIN     oxyCODONE immediate-release 5 MG Tabs  Commonly known as: ROXICODONE     pantoprazole 20 MG tablet  Commonly known as: PROTONIX  Replaced by: omeprazole 20 MG delayed-release capsule     potassium chloride ER 10 MEQ tablet  Commonly known as: KLOR-CON     STOOL SOFTENER PO            Discharge Diet:  Regular    Discharge Activity:  Per therapy notes    Disposition:  Patient to discharge home with family support and community resources.     Equipment:  Walker    Follow-up & Discharge Instructions:  Follow up with your primary care provider (PCP) within 7-10 days of discharge to review your medications and take over your care.     If you develop chest pain, fever, chills, change in neurologic function (weakness, sensation changes, vision changes), or other concerning sxs, seek immediate medical attention or call 911.      Condition on Discharge:  Good    More than 35 minutes was spent on discharging this patient, including face-to-face time, prescription management, and the dictation of this note.    Isa Hernandez D.O.    Date of Service: 6/12/2021

## 2021-06-13 LAB — GLUCOSE BLD-MCNC: 142 MG/DL (ref 65–99)

## 2021-06-14 NOTE — DISCHARGE PLANNING
Per Brooke, [facility] accepted referral.    DC summary faxed to Dr. Clarisa Winchester's office.

## 2021-11-16 ENCOUNTER — HOSPITAL ENCOUNTER (OUTPATIENT)
Dept: RADIOLOGY | Facility: MEDICAL CENTER | Age: 66
End: 2021-11-16
Attending: NEUROLOGICAL SURGERY
Payer: MEDICARE

## 2021-11-16 DIAGNOSIS — M48.062 SPINAL STENOSIS, LUMBAR REGION, WITH NEUROGENIC CLAUDICATION: ICD-10-CM

## 2021-11-16 PROCEDURE — 72131 CT LUMBAR SPINE W/O DYE: CPT | Mod: MH

## 2021-11-24 ENCOUNTER — HOSPITAL ENCOUNTER (OUTPATIENT)
Facility: MEDICAL CENTER | Age: 66
End: 2021-11-24
Attending: NEUROLOGICAL SURGERY | Admitting: NEUROLOGICAL SURGERY
Payer: MEDICARE

## 2021-12-06 ENCOUNTER — HOSPITAL ENCOUNTER (OUTPATIENT)
Dept: RADIOLOGY | Facility: MEDICAL CENTER | Age: 66
End: 2021-12-06
Attending: NEUROLOGICAL SURGERY | Admitting: NEUROLOGICAL SURGERY
Payer: MEDICARE

## 2021-12-06 ENCOUNTER — PRE-ADMISSION TESTING (OUTPATIENT)
Dept: ADMISSIONS | Facility: MEDICAL CENTER | Age: 66
End: 2021-12-06
Attending: NEUROLOGICAL SURGERY
Payer: MEDICARE

## 2021-12-06 VITALS — BODY MASS INDEX: 34.17 KG/M2 | HEIGHT: 63 IN

## 2021-12-06 DIAGNOSIS — Z01.811 PRE-OPERATIVE RESPIRATORY EXAMINATION: ICD-10-CM

## 2021-12-06 DIAGNOSIS — Z01.810 PRE-OPERATIVE CARDIOVASCULAR EXAMINATION: ICD-10-CM

## 2021-12-06 DIAGNOSIS — Z01.812 PRE-OPERATIVE LABORATORY EXAMINATION: ICD-10-CM

## 2021-12-06 LAB
ANION GAP SERPL CALC-SCNC: 13 MMOL/L (ref 7–16)
APPEARANCE UR: CLEAR
APTT PPP: 26 SEC (ref 24.7–36)
BASOPHILS # BLD AUTO: 1 % (ref 0–1.8)
BASOPHILS # BLD: 0.06 K/UL (ref 0–0.12)
BILIRUB UR QL STRIP.AUTO: NEGATIVE
BUN SERPL-MCNC: 14 MG/DL (ref 8–22)
CALCIUM SERPL-MCNC: 9.5 MG/DL (ref 8.5–10.5)
CHLORIDE SERPL-SCNC: 99 MMOL/L (ref 96–112)
CO2 SERPL-SCNC: 30 MMOL/L (ref 20–33)
COLOR UR: YELLOW
CREAT SERPL-MCNC: 0.66 MG/DL (ref 0.5–1.4)
EKG IMPRESSION: NORMAL
EOSINOPHIL # BLD AUTO: 0.09 K/UL (ref 0–0.51)
EOSINOPHIL NFR BLD: 1.5 % (ref 0–6.9)
ERYTHROCYTE [DISTWIDTH] IN BLOOD BY AUTOMATED COUNT: 47.3 FL (ref 35.9–50)
EST. AVERAGE GLUCOSE BLD GHB EST-MCNC: 177 MG/DL
GLUCOSE SERPL-MCNC: 120 MG/DL (ref 65–99)
GLUCOSE UR STRIP.AUTO-MCNC: >=1000 MG/DL
HBA1C MFR BLD: 7.8 % (ref 4–5.6)
HCT VFR BLD AUTO: 39.2 % (ref 37–47)
HGB BLD-MCNC: 12.4 G/DL (ref 12–16)
IMM GRANULOCYTES # BLD AUTO: 0.02 K/UL (ref 0–0.11)
IMM GRANULOCYTES NFR BLD AUTO: 0.3 % (ref 0–0.9)
INR PPP: 0.96 (ref 0.87–1.13)
KETONES UR STRIP.AUTO-MCNC: NEGATIVE MG/DL
LEUKOCYTE ESTERASE UR QL STRIP.AUTO: NEGATIVE
LYMPHOCYTES # BLD AUTO: 1.39 K/UL (ref 1–4.8)
LYMPHOCYTES NFR BLD: 22.9 % (ref 22–41)
MCH RBC QN AUTO: 28.6 PG (ref 27–33)
MCHC RBC AUTO-ENTMCNC: 31.6 G/DL (ref 33.6–35)
MCV RBC AUTO: 90.3 FL (ref 81.4–97.8)
MICRO URNS: ABNORMAL
MONOCYTES # BLD AUTO: 0.53 K/UL (ref 0–0.85)
MONOCYTES NFR BLD AUTO: 8.7 % (ref 0–13.4)
NEUTROPHILS # BLD AUTO: 3.98 K/UL (ref 2–7.15)
NEUTROPHILS NFR BLD: 65.6 % (ref 44–72)
NITRITE UR QL STRIP.AUTO: NEGATIVE
NRBC # BLD AUTO: 0 K/UL
NRBC BLD-RTO: 0 /100 WBC
PH UR STRIP.AUTO: 5.5 [PH] (ref 5–8)
PLATELET # BLD AUTO: 328 K/UL (ref 164–446)
PMV BLD AUTO: 9.7 FL (ref 9–12.9)
POTASSIUM SERPL-SCNC: 3.3 MMOL/L (ref 3.6–5.5)
PROT UR QL STRIP: NEGATIVE MG/DL
PROTHROMBIN TIME: 12.5 SEC (ref 12–14.6)
RBC # BLD AUTO: 4.34 M/UL (ref 4.2–5.4)
RBC UR QL AUTO: NEGATIVE
SARS-COV-2 RNA RESP QL NAA+PROBE: NOTDETECTED
SODIUM SERPL-SCNC: 142 MMOL/L (ref 135–145)
SP GR UR STRIP.AUTO: 1.02
SPECIMEN SOURCE: NORMAL
UROBILINOGEN UR STRIP.AUTO-MCNC: 0.2 MG/DL
WBC # BLD AUTO: 6.1 K/UL (ref 4.8–10.8)

## 2021-12-06 PROCEDURE — 93010 ELECTROCARDIOGRAM REPORT: CPT | Performed by: INTERNAL MEDICINE

## 2021-12-06 PROCEDURE — 93005 ELECTROCARDIOGRAM TRACING: CPT

## 2021-12-06 PROCEDURE — 80048 BASIC METABOLIC PNL TOTAL CA: CPT

## 2021-12-06 PROCEDURE — 85730 THROMBOPLASTIN TIME PARTIAL: CPT

## 2021-12-06 PROCEDURE — 85025 COMPLETE CBC W/AUTO DIFF WBC: CPT

## 2021-12-06 PROCEDURE — 85610 PROTHROMBIN TIME: CPT

## 2021-12-06 PROCEDURE — 83036 HEMOGLOBIN GLYCOSYLATED A1C: CPT

## 2021-12-06 PROCEDURE — C9803 HOPD COVID-19 SPEC COLLECT: HCPCS

## 2021-12-06 PROCEDURE — 71045 X-RAY EXAM CHEST 1 VIEW: CPT

## 2021-12-06 PROCEDURE — U0005 INFEC AGEN DETEC AMPLI PROBE: HCPCS

## 2021-12-06 PROCEDURE — U0003 INFECTIOUS AGENT DETECTION BY NUCLEIC ACID (DNA OR RNA); SEVERE ACUTE RESPIRATORY SYNDROME CORONAVIRUS 2 (SARS-COV-2) (CORONAVIRUS DISEASE [COVID-19]), AMPLIFIED PROBE TECHNIQUE, MAKING USE OF HIGH THROUGHPUT TECHNOLOGIES AS DESCRIBED BY CMS-2020-01-R: HCPCS

## 2021-12-06 PROCEDURE — 81003 URINALYSIS AUTO W/O SCOPE: CPT

## 2021-12-06 PROCEDURE — 36415 COLL VENOUS BLD VENIPUNCTURE: CPT

## 2021-12-06 RX ORDER — HYDROCODONE BITARTRATE AND ACETAMINOPHEN 10; 325 MG/1; MG/1
TABLET ORAL
COMMUNITY
Start: 2021-09-30

## 2021-12-06 RX ORDER — METHOCARBAMOL 750 MG/1
TABLET, FILM COATED ORAL
COMMUNITY
Start: 2021-09-22

## 2021-12-06 RX ORDER — SEMAGLUTIDE 1.34 MG/ML
INJECTION, SOLUTION SUBCUTANEOUS
COMMUNITY
Start: 2021-09-29

## 2021-12-06 RX ORDER — TEMAZEPAM 7.5 MG/1
CAPSULE ORAL
COMMUNITY
Start: 2021-10-07

## 2021-12-06 RX ORDER — ALBUTEROL SULFATE 90 UG/1
AEROSOL, METERED RESPIRATORY (INHALATION)
COMMUNITY
Start: 2021-09-17

## 2022-06-22 ENCOUNTER — HOSPITAL ENCOUNTER (OUTPATIENT)
Dept: RADIOLOGY | Facility: MEDICAL CENTER | Age: 67
End: 2022-06-22
Attending: PHYSICIAN ASSISTANT
Payer: MEDICARE

## 2022-06-22 DIAGNOSIS — M54.2 CERVICALGIA: ICD-10-CM

## 2022-06-22 PROCEDURE — 72125 CT NECK SPINE W/O DYE: CPT | Mod: MH

## 2022-08-02 RX ORDER — POTASSIUM CHLORIDE 750 MG/1
10 TABLET, EXTENDED RELEASE ORAL
Qty: 60 TABLET | Refills: 0 | OUTPATIENT
Start: 2022-08-02

## 2022-08-08 ENCOUNTER — APPOINTMENT (OUTPATIENT)
Dept: LAB | Facility: MEDICAL CENTER | Age: 67
End: 2022-08-08
Attending: PHYSICIAN ASSISTANT
Payer: MEDICARE

## 2022-08-10 RX ORDER — POTASSIUM CHLORIDE 750 MG/1
10 TABLET, EXTENDED RELEASE ORAL
Qty: 60 TABLET | Refills: 0 | OUTPATIENT
Start: 2022-08-10

## 2022-08-12 RX ORDER — POTASSIUM CHLORIDE 750 MG/1
10 TABLET, EXTENDED RELEASE ORAL
Qty: 60 TABLET | Refills: 0 | OUTPATIENT
Start: 2022-08-12

## 2022-11-07 ENCOUNTER — PATIENT MESSAGE (OUTPATIENT)
Dept: HEALTH INFORMATION MANAGEMENT | Facility: OTHER | Age: 67
End: 2022-11-07
